# Patient Record
Sex: MALE | Race: BLACK OR AFRICAN AMERICAN | NOT HISPANIC OR LATINO | Employment: UNEMPLOYED | URBAN - METROPOLITAN AREA
[De-identification: names, ages, dates, MRNs, and addresses within clinical notes are randomized per-mention and may not be internally consistent; named-entity substitution may affect disease eponyms.]

---

## 2017-01-03 ENCOUNTER — THERAPY VISIT (OUTPATIENT)
Dept: PHYSICAL THERAPY | Facility: CLINIC | Age: 52
End: 2017-01-03
Payer: COMMERCIAL

## 2017-01-03 DIAGNOSIS — Z98.1 S/P SPINAL FUSION: ICD-10-CM

## 2017-01-03 DIAGNOSIS — Z47.89 AFTERCARE FOLLOWING SURGERY OF THE MUSCULOSKELETAL SYSTEM: Primary | ICD-10-CM

## 2017-01-03 DIAGNOSIS — M47.26 OSTEOARTHRITIS OF SPINE WITH RADICULOPATHY, LUMBAR REGION: ICD-10-CM

## 2017-01-03 PROCEDURE — 97110 THERAPEUTIC EXERCISES: CPT | Mod: GP | Performed by: PHYSICAL THERAPIST

## 2017-01-03 PROCEDURE — 97530 THERAPEUTIC ACTIVITIES: CPT | Mod: GP | Performed by: PHYSICAL THERAPIST

## 2017-01-09 ENCOUNTER — HOSPITAL ENCOUNTER (OUTPATIENT)
Dept: CT IMAGING | Facility: CLINIC | Age: 52
Discharge: HOME | End: 2017-01-09
Attending: PHYSICIAN ASSISTANT | Admitting: PHYSICIAN ASSISTANT
Payer: COMMERCIAL

## 2017-01-09 ENCOUNTER — THERAPY VISIT (OUTPATIENT)
Dept: PHYSICAL THERAPY | Facility: CLINIC | Age: 52
End: 2017-01-09
Payer: COMMERCIAL

## 2017-01-09 DIAGNOSIS — M47.26 OSTEOARTHRITIS OF SPINE WITH RADICULOPATHY, LUMBAR REGION: ICD-10-CM

## 2017-01-09 DIAGNOSIS — Z98.890 POST-OPERATIVE STATE: ICD-10-CM

## 2017-01-09 DIAGNOSIS — Z47.89 AFTERCARE FOLLOWING SURGERY OF THE MUSCULOSKELETAL SYSTEM: Primary | ICD-10-CM

## 2017-01-09 DIAGNOSIS — M54.16 LUMBAR RADICULOPATHY, ACUTE: ICD-10-CM

## 2017-01-09 DIAGNOSIS — Z98.1 S/P SPINAL FUSION: ICD-10-CM

## 2017-01-09 PROCEDURE — 97530 THERAPEUTIC ACTIVITIES: CPT | Mod: GP | Performed by: PHYSICAL THERAPIST

## 2017-01-09 PROCEDURE — 97110 THERAPEUTIC EXERCISES: CPT | Mod: GP | Performed by: PHYSICAL THERAPIST

## 2017-01-09 PROCEDURE — 25000125 ZZHC RX 250: Performed by: PHYSICIAN ASSISTANT

## 2017-01-09 PROCEDURE — 27210258 CT SI JOINT INJECTION

## 2017-01-09 RX ORDER — BUPIVACAINE HYDROCHLORIDE 2.5 MG/ML
10 INJECTION, SOLUTION EPIDURAL; INFILTRATION; INTRACAUDAL ONCE
Status: COMPLETED | OUTPATIENT
Start: 2017-01-09 | End: 2017-01-09

## 2017-01-09 RX ORDER — TRIAMCINOLONE ACETONIDE 40 MG/ML
40 INJECTION, SUSPENSION INTRA-ARTICULAR; INTRAMUSCULAR ONCE
Status: COMPLETED | OUTPATIENT
Start: 2017-01-09 | End: 2017-01-09

## 2017-01-09 RX ADMIN — BUPIVACAINE HYDROCHLORIDE 25 MG: 2.5 INJECTION, SOLUTION EPIDURAL; INFILTRATION; INTRACAUDAL at 13:35

## 2017-01-09 RX ADMIN — Medication 5 ML: at 13:34

## 2017-01-09 RX ADMIN — TRIAMCINOLONE ACETONIDE 40 MG: 40 INJECTION, SUSPENSION INTRA-ARTICULAR; INTRAMUSCULAR at 13:35

## 2017-01-09 NOTE — PROGRESS NOTES
Pt had left SI joint injection. No complications. Pt rated pain at 4 prior to injection and a 4 post injection. Pt was given D/C instructions by RN. Pt ambulated post injection.

## 2017-01-20 ENCOUNTER — THERAPY VISIT (OUTPATIENT)
Dept: PHYSICAL THERAPY | Facility: CLINIC | Age: 52
End: 2017-01-20
Payer: COMMERCIAL

## 2017-01-20 DIAGNOSIS — M47.26 OSTEOARTHRITIS OF SPINE WITH RADICULOPATHY, LUMBAR REGION: ICD-10-CM

## 2017-01-20 DIAGNOSIS — Z47.89 AFTERCARE FOLLOWING SURGERY OF THE MUSCULOSKELETAL SYSTEM: Primary | ICD-10-CM

## 2017-01-20 DIAGNOSIS — Z98.1 S/P SPINAL FUSION: ICD-10-CM

## 2017-01-20 PROCEDURE — 97110 THERAPEUTIC EXERCISES: CPT | Mod: GP | Performed by: PHYSICAL THERAPIST

## 2017-01-20 PROCEDURE — 97530 THERAPEUTIC ACTIVITIES: CPT | Mod: GP | Performed by: PHYSICAL THERAPIST

## 2017-01-23 ENCOUNTER — DOCUMENTATION ONLY (OUTPATIENT)
Dept: OTHER | Facility: CLINIC | Age: 52
End: 2017-01-23

## 2017-01-23 DIAGNOSIS — Z71.89 ACP (ADVANCE CARE PLANNING): Primary | Chronic | ICD-10-CM

## 2017-01-27 ENCOUNTER — THERAPY VISIT (OUTPATIENT)
Dept: PHYSICAL THERAPY | Facility: CLINIC | Age: 52
End: 2017-01-27
Payer: COMMERCIAL

## 2017-01-27 DIAGNOSIS — M47.26 OSTEOARTHRITIS OF SPINE WITH RADICULOPATHY, LUMBAR REGION: ICD-10-CM

## 2017-01-27 DIAGNOSIS — Z98.1 S/P SPINAL FUSION: ICD-10-CM

## 2017-01-27 DIAGNOSIS — Z47.89 AFTERCARE FOLLOWING SURGERY OF THE MUSCULOSKELETAL SYSTEM: Primary | ICD-10-CM

## 2017-01-27 PROCEDURE — 97110 THERAPEUTIC EXERCISES: CPT | Mod: GP | Performed by: PHYSICAL THERAPIST

## 2017-01-27 PROCEDURE — 97112 NEUROMUSCULAR REEDUCATION: CPT | Mod: GP | Performed by: PHYSICAL THERAPIST

## 2017-02-02 ENCOUNTER — THERAPY VISIT (OUTPATIENT)
Dept: PHYSICAL THERAPY | Facility: CLINIC | Age: 52
End: 2017-02-02
Payer: COMMERCIAL

## 2017-02-02 DIAGNOSIS — Z47.89 AFTERCARE FOLLOWING SURGERY OF THE MUSCULOSKELETAL SYSTEM: Primary | ICD-10-CM

## 2017-02-02 DIAGNOSIS — Z98.1 S/P SPINAL FUSION: ICD-10-CM

## 2017-02-02 DIAGNOSIS — M47.26 OSTEOARTHRITIS OF SPINE WITH RADICULOPATHY, LUMBAR REGION: ICD-10-CM

## 2017-02-02 PROCEDURE — 97112 NEUROMUSCULAR REEDUCATION: CPT | Mod: GP | Performed by: PHYSICAL THERAPIST

## 2017-02-02 PROCEDURE — 97110 THERAPEUTIC EXERCISES: CPT | Mod: GP | Performed by: PHYSICAL THERAPIST

## 2017-02-10 ENCOUNTER — OFFICE VISIT (OUTPATIENT)
Dept: NEUROSURGERY | Facility: CLINIC | Age: 52
End: 2017-02-10

## 2017-02-10 VITALS
WEIGHT: 191 LBS | HEIGHT: 70 IN | SYSTOLIC BLOOD PRESSURE: 115 MMHG | BODY MASS INDEX: 27.35 KG/M2 | HEART RATE: 91 BPM | DIASTOLIC BLOOD PRESSURE: 76 MMHG

## 2017-02-10 DIAGNOSIS — Z98.890 POST-OPERATIVE STATE: Primary | ICD-10-CM

## 2017-02-10 DIAGNOSIS — Z98.1 ARTHRODESIS STATUS: ICD-10-CM

## 2017-02-10 NOTE — PROGRESS NOTES
Neurosurgery clinic post op follow up  Date of visit: 2/10/17     Date of Surgery: 11/16/16 by Dr Marshall and Ashanti @[facility].  Procedure: 1.  Lateral lumbar interbody fusion via right-sided approach, L3-L4 and L4-L5.    2.  Placement of interbody PEEK cage with indirect decompression, L3-L4 and L4-L5.    3.  Performed intraoperative neuromonitoring.    4.  Use of bone morphogenic protein.    5.  Bilateral percutaneous segmental pedicle screw fixation, L3-L5.    6.  Revision of right hemilaminectomy, facetectomy and foraminotomy minimally invasive L3-L4    Implants:  1.  NuVasive XLIF System with CoRoent PEEK cages:  L3-L4 equals 12 mm height x 26 mm width x 60 mm length, 10 degrees lordotic; L4-L5 equals 12 mm height x 26 mm width x 16 mm length, 10 degrees lordotic.    2.  Photodigm Longitudinal percutaneous screw system:  L3 equals 6.5 mm x 50 mm on the right and 7.5 mm x 50 mm on the left; L4 equals 6.5 mm x 35 mm on the right and 7.5 mm x 50 mm on the left; L5 equals 7.5 mm x 45 mm bilaterally.    3.  Two 5.5 mm cobalt chrome rods, 70 mm on the right and 80 mm on the left.    4.  Small kit of Infuse rhBMP-2.    5.  Crushed cancellous allograft 30 mL    HPI:   Neema Hunt is a pleasant 51 year old male now 12 weeks status post the above procedure for progressive right-sided radicular pain in his leg.   About 3 years ago he underwent a decompression by Dr. Marshall.  He did well from that operation, but since then has redeveloped his symptoms.  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  For this reason, surgical intervention was offered.   The procedure itself was without incident.  He recovered well and was discharged home on POD 2 in good condition.  Since then his back pain is much better, he's been a bit more active than normal after having some light shoveling activities.   His PT is working on stamina with him.  His weight lift  limit still at 10 pounds.   He's hearing noise in his back when he walks but has no pain.  He had some left SI pain that resolved with an injection, or maybe on it;'s own, in any event he's pain free.  Overall he's making progress.      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  He has light duty offered at work but they rescinded that offer and now want him to for full duty full time when ready..  ACTIVITY:               Has been following activity restrictions.   PERTINENT MEDS:      Pain           Oxycodone 5 mg; 2-3 week per week.  Tylenol 650 mg 3-4 times a day    NSAIDS      none  NICOTINE:               none        Current outpatient prescriptions:      oxyCODONE-acetaminophen (PERCOCET) 5-325 MG per tablet, Take 1-2 tablets by mouth every 6 hours as needed for moderate to severe pain, Disp: 50 tablet, Rfl: 0     lidocaine (LIDODERM) 5 % Patch, Place 1 patch onto the skin every 24 hours, Disp: 30 patch, Rfl: 2     methylPREDNISolone (MEDROL DOSEPAK) 4 MG tablet, Follow package instructions, Disp: 21 tablet, Rfl: 0     gabapentin (NEURONTIN) 300 MG capsule, Take 1 cap (300 mg) in the AM and 3 caps (900mg) at hs, Disp: 240 capsule, Rfl: 1     oxyCODONE (ROXICODONE) 5 MG immediate release tablet, Take 1 tablet (5 mg) by mouth every 3 hours as needed for moderate to severe pain, Disp: 60 tablet, Rfl: 0     diazepam (VALIUM) 5 MG tablet, Take 1 tablet (5 mg) by mouth every 8 hours as needed for muscle spasms, Disp: 60 tablet, Rfl: 0     acetaminophen (TYLENOL) 325 MG tablet, Take 2 tablets (650 mg) by mouth every 4 hours as needed for other (surgical pain), Disp: 100 tablet, Rfl: 0     senna-docusate (SENOKOT-S;PERICOLACE) 8.6-50 MG per tablet, Take 1-2 tablets by mouth 2 times daily, Disp: 100 tablet, Rfl: 0     MELATONIN PO, Take 3 mg by mouth At Bedtime, Disp: , Rfl:      Multiple Vitamins-Minerals (MULTIVITAMIN ADULT PO), Take by mouth daily,  "Disp: , Rfl:      Probiotic Product (SOLUBLE FIBER/PROBIOTICS PO), Take 1 tablet by mouth daily , Disp: , Rfl:      Cyanocobalamin (B-12 PO), Take 500 mg by mouth daily , Disp: , Rfl:      bisacodyl (DULCOLAX) 5 MG EC tablet, Take 5 mg by mouth daily as needed.  , Disp: , Rfl:      AMLODIPINE BESYLATE PO, Take 10 mg by mouth daily., Disp: , Rfl:   No Known Allergies  PMH, SOC HIST, FAM HIST, PROBLEM LIST:  All reviewed in EPIC.    OBJECTIVE:  /76 mmHg  Pulse 91  Ht 1.778 m (5' 10\")  Wt 86.637 kg (191 lb)  BMI 27.41 kg/m2    Imaging:  AP lat L spine XR today  Stable postsurgical changes of combined anterior and  posterior instrumented fusion spanning from L3 through L5. Marked disc  space narrowing at L5-S1.  He has an \"appearance\" of lucency around the upper screws, unchanged over time. No disruption of hardware seen.     Left SI injection 1/9/17 no anesthetic response to injection but had great response after a week or so..    Films reviewed with the patient.    EXAM:  Well developed well nourished male found seated comfortably in exam chair.  No apparent distress. He is unaccompanied.   A&O X3.  Mood and affect WNL. Language and fund of knowledge intact.  Is able to sit and rise independently. Wounds look great.       Assessment:  1. Post-operative state    2. Arthrodesis status      PLAN:  Neema Hunt is doing well after his lumbar decompression and fusion. His surgical pain is improving.  The wounds are healthy. He is progressing as expected. The upper screws looks like there is lucency there but it hasn't changed and  he has no pain at that spot.  No change in plan for that.  We discussed medication.    *  FYI: In general we prescribe narcotics for pain management in the post operative recovery phase generally 2-6 weeks post op, depending on the surgery performed.  Pre-op our patients are advised that by 6 weeks post op we anticipate they will no longer require narcotic.  In some circumstances " we extend the treatment window to up to 12 weeks post-op.  *    Medications prescribed today: None.  From the standpoint of NS he should be off narcotic for his back.  *   Continue to avoid NSAIDs until 3 months post op.  *   PT for work conditioning  We discussed activities and return to work.  *  We recommend he can lift his activity restrictions to allow for work conditioning. No weight lifting limit is assigned.  *  He cannot return to work as yet. Since they are requiring he return full duty, full time we'll have to put him into a work conditioning program .  We'll re-evaluate him probably about a month from now  We discussed follow up   *  All the patient's questions have been answered and they demonstrate good understanding of the above.    *  Return to clinic in 4 weeks with me. Imaging:  None needed for that appointment.  *   The patient has our contact information and is aware that he should call if he has questions comments or concerns.     We appreciate the opportunity to be of service in the care of this pleasant patient.  Please do call if there is anything more we can do    Jennifer Monsivais PA-C  [facility]  Department of Neurosurgery  Phone: [phone]  Fax: [phone]

## 2017-02-10 NOTE — PROGRESS NOTES
Neurosurgery clinic post op follow up  Date of visit: 2/10/17     Date of Surgery: 11/16/16 by Dr Marshall and Ashanti @[facility].  Procedure: 1.  Lateral lumbar interbody fusion via right-sided approach, L3-L4 and L4-L5.    2.  Placement of interbody PEEK cage with indirect decompression, L3-L4 and L4-L5.    3.  Performed intraoperative neuromonitoring.    4.  Use of bone morphogenic protein.    5.  Bilateral percutaneous segmental pedicle screw fixation, L3-L5.    6.  Revision of right hemilaminectomy, facetectomy and foraminotomy minimally invasive L3-L4    Implants:  1.  NuVasive XLIF System with CoRoent PEEK cages:  L3-L4 equals 12 mm height x 26 mm width x 60 mm length, 10 degrees lordotic; L4-L5 equals 12 mm height x 26 mm width x 16 mm length, 10 degrees lordotic.    2.  [employer] Longitudinal percutaneous screw system:  L3 equals 6.5 mm x 50 mm on the right and 7.5 mm x 50 mm on the left; L4 equals 6.5 mm x 35 mm on the right and 7.5 mm x 50 mm on the left; L5 equals 7.5 mm x 45 mm bilaterally.    3.  Two 5.5 mm cobalt chrome rods, 70 mm on the right and 80 mm on the left.    4.  Small kit of Infuse rhBMP-2.    5.  Crushed cancellous allograft 30 mL    HPI:   Neema Hunt is a pleasant 51 year old male now 12 weeks status post the above procedure for progressive right-sided radicular pain in his leg.   About 3 years ago he underwent a decompression by Dr. Marshall.  He did well from that operation, but since then has redeveloped his symptoms.  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  For this reason, surgical intervention was offered.   The procedure itself was without incident.  He recovered well and was discharged home on POD 2 in good condition.  Since then his back pain is much better, he's been a bit more active than normal after having some light shoveling activities.   His PT is working on stamina with him.  But weight limit still at 10 pounds.  He's  hearing noise in the back when he walks but no pain.  Overall he's making progress.      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  He has light duty available at work but they much prefer him him to for full time.  ACTIVITY:               Has been following activity restrictions.   PERTINENT MEDS:      Pain           Oxycodone 5 mg; 2-3 week per week.  Tylenol 650 mg 3-4 times a day    NSAIDS      none  NICOTINE:               none        Current outpatient prescriptions:      oxyCODONE-acetaminophen (PERCOCET) 5-325 MG per tablet, Take 1-2 tablets by mouth every 6 hours as needed for moderate to severe pain, Disp: 50 tablet, Rfl: 0     lidocaine (LIDODERM) 5 % Patch, Place 1 patch onto the skin every 24 hours, Disp: 30 patch, Rfl: 2     methylPREDNISolone (MEDROL DOSEPAK) 4 MG tablet, Follow package instructions, Disp: 21 tablet, Rfl: 0     gabapentin (NEURONTIN) 300 MG capsule, Take 1 cap (300 mg) in the AM and 3 caps (900mg) at hs, Disp: 240 capsule, Rfl: 1     oxyCODONE (ROXICODONE) 5 MG immediate release tablet, Take 1 tablet (5 mg) by mouth every 3 hours as needed for moderate to severe pain, Disp: 60 tablet, Rfl: 0     diazepam (VALIUM) 5 MG tablet, Take 1 tablet (5 mg) by mouth every 8 hours as needed for muscle spasms, Disp: 60 tablet, Rfl: 0     acetaminophen (TYLENOL) 325 MG tablet, Take 2 tablets (650 mg) by mouth every 4 hours as needed for other (surgical pain), Disp: 100 tablet, Rfl: 0     senna-docusate (SENOKOT-S;PERICOLACE) 8.6-50 MG per tablet, Take 1-2 tablets by mouth 2 times daily, Disp: 100 tablet, Rfl: 0     MELATONIN PO, Take 3 mg by mouth At Bedtime, Disp: , Rfl:      Multiple Vitamins-Minerals (MULTIVITAMIN ADULT PO), Take by mouth daily, Disp: , Rfl:      Probiotic Product (SOLUBLE FIBER/PROBIOTICS PO), Take 1 tablet by mouth daily , Disp: , Rfl:      Cyanocobalamin (B-12 PO), Take 500 mg by  "mouth daily , Disp: , Rfl:      bisacodyl (DULCOLAX) 5 MG EC tablet, Take 5 mg by mouth daily as needed.  , Disp: , Rfl:      AMLODIPINE BESYLATE PO, Take 10 mg by mouth daily., Disp: , Rfl:   No Known Allergies  PMH, SOC HIST, FAM HIST, PROBLEM LIST:  All reviewed in EPIC.    OBJECTIVE:  /76 mmHg  Pulse 91  Ht 1.778 m (5' 10\")  Wt 86.637 kg (191 lb)  BMI 27.41 kg/m2    Imaging:  AP lat L spine XR today  Stable postsurgical changes of combined anterior and  posterior instrumented fusion spanning from L3 through L5. Marked disc  space narrowing at L5-S1.  He has an \"appearance\" of lucency around the upper screws, unchanged over time. No disruption of hardware seen.   Left SI injection 1/9/17 no anesthetic response to injection but had great response after a week or so..      Films reviewed with the patient.    EXAM:  Well developed well nourished male found seated comfortably in exam chair.  No apparent distress. He is unaccompanied.   A&O X3.  Mood and affect WNL. Language and fund of knowledge intact.  Is able to sit and rise independently.   Tender over left SI joint and the bilateral TFL.    Assessment:  1. Post-operative state    2. Arthrodesis status      PLAN:  eNema Hunt is doing well after his lumbar decompression and fusion. His surgical pain is improving.  The wounds are healthy. He is mostly progressing as expected. The upper screws look like there is lucency there but it hasn't changed    he has no pain at that spot.  No change in plan for that.  We discussed medication.    *  FYI: In general we prescribe narcotics for pain management in the post operative recovery phase generally 2-6 weeks post op, depending on the surgery performed.  Pre-op our patients are advised that by 6 weeks post op we anticipate they will no longer require narcotic.  In some circumstances we extend the treatment window to up to 12 weeks post-op with the understanding that after 6 weeks they have a choice to " either:  Have us devise a weaning schedule, goal to be no narcotic by the 12 th week post op:  Or we make a referral to pain management or PCP to take over narcotic prescriptions.   For pain outside the scope of these parameters we would refer the patient back to his/her other providers for ongoing narcotic needs.   *  Medications prescribed today:  Percocet,  Lidocaine patches. Medrol dose pack   Percocet was printed today and handed to the patient.  Others were e-prescribed.  *   Continue to avoid NSAIDs until 3 months post op.  *   Steroid injection left SI joint and Lidoderm   *   PT for SI and pelvic stability.  We discussed activities and return to work.  *  We recommend he continue the current activity restrictions until he returns for the next visit..  *  He cannot return to work as yet.  Probably about a month from now, after he's done a bit more PT.  We discussed follow up   *  All the patient's questions have been answered and they demonstrate good understanding of the above.    *  Return to clinic in 6 weeks with me. (That will be 12 weeks post op)  *   The patient has our contact information and is aware that he should call if he has questions comments or concerns.     We appreciate the opportunity to be of service in the care of this pleasant patient.  Please do call if there is anything more we can do    Jennifer Monsivais PA-C  [facility]  Department of Neurosurgery  Phone: [phone]  Fax: [phone]

## 2017-02-10 NOTE — NURSING NOTE
Chief Complaint   Patient presents with     RECHECK     Lateral Lumbar- Xrays prior     Chelly LIAO

## 2017-02-10 NOTE — LETTER
2/10/2017       RE: Neema Hunt  [address]  [facility] [address]     Dear Colleague,    Thank you for referring your patient, Neema Hunt, to the [facility] NEUROSURGERY at [facility]. Please see a copy of my visit note below.      Neurosurgery clinic post op follow up  Date of visit: 2/10/17     Date of Surgery: 11/16/16 by Dr Marshall and Ashanti @[facility].  Procedure: 1.  Lateral lumbar interbody fusion via right-sided approach, L3-L4 and L4-L5.    2.  Placement of interbody PEEK cage with indirect decompression, L3-L4 and L4-L5.    3.  Performed intraoperative neuromonitoring.    4.  Use of bone morphogenic protein.    5.  Bilateral percutaneous segmental pedicle screw fixation, L3-L5.    6.  Revision of right hemilaminectomy, facetectomy and foraminotomy minimally invasive L3-L4    Implants:  1.  NuVasive XLIF System with CoRoent PEEK cages:  L3-L4 equals 12 mm height x 26 mm width x 60 mm length, 10 degrees lordotic; L4-L5 equals 12 mm height x 26 mm width x 16 mm length, 10 degrees lordotic.    2.  Kingsoft[employer]onic Longitudinal percutaneous screw system:  L3 equals 6.5 mm x 50 mm on the right and 7.5 mm x 50 mm on the left; L4 equals 6.5 mm x 35 mm on the right and 7.5 mm x 50 mm on the left; L5 equals 7.5 mm x 45 mm bilaterally.    3.  Two 5.5 mm cobalt chrome rods, 70 mm on the right and 80 mm on the left.    4.  Small kit of Infuse rhBMP-2.    5.  Crushed cancellous allograft 30 mL    HPI:   Neema Hunt is a pleasant 51 year old male now 12 weeks status post the above procedure for progressive right-sided radicular pain in his leg.   About 3 years ago he underwent a decompression by Dr. Marshall.  He did well from that operation, but since then has redeveloped his symptoms.  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  For this reason, surgical intervention was offered.   The procedure itself was without  incident.  He recovered well and was discharged home on POD 2 in good condition.  Since then his back pain is much better, he's been a bit more active than normal after having some light shoveling activities.   His PT is working on stamina with him.  His weight lift  limit still at 10 pounds.  He's hearing noise in his back when he walks but has no pain.  He had some left SI pain that resolved with an injection, or maybe on it;'s own, in any event he's pain free.  Overall he's making progress.      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  He has light duty offered at work but they rescinded that offer and now want him to for full duty full time when ready..  ACTIVITY:               Has been following activity restrictions.   PERTINENT MEDS:      Pain           Oxycodone 5 mg; 2-3 week per week.  Tylenol 650 mg 3-4 times a day    NSAIDS      none  NICOTINE:               none        Current outpatient prescriptions:      oxyCODONE-acetaminophen (PERCOCET) 5-325 MG per tablet, Take 1-2 tablets by mouth every 6 hours as needed for moderate to severe pain, Disp: 50 tablet, Rfl: 0     lidocaine (LIDODERM) 5 % Patch, Place 1 patch onto the skin every 24 hours, Disp: 30 patch, Rfl: 2     methylPREDNISolone (MEDROL DOSEPAK) 4 MG tablet, Follow package instructions, Disp: 21 tablet, Rfl: 0     gabapentin (NEURONTIN) 300 MG capsule, Take 1 cap (300 mg) in the AM and 3 caps (900mg) at hs, Disp: 240 capsule, Rfl: 1     oxyCODONE (ROXICODONE) 5 MG immediate release tablet, Take 1 tablet (5 mg) by mouth every 3 hours as needed for moderate to severe pain, Disp: 60 tablet, Rfl: 0     diazepam (VALIUM) 5 MG tablet, Take 1 tablet (5 mg) by mouth every 8 hours as needed for muscle spasms, Disp: 60 tablet, Rfl: 0     acetaminophen (TYLENOL) 325 MG tablet, Take 2 tablets (650 mg) by mouth every 4 hours as needed for other (surgical pain),  "Disp: 100 tablet, Rfl: 0     senna-docusate (SENOKOT-S;PERICOLACE) 8.6-50 MG per tablet, Take 1-2 tablets by mouth 2 times daily, Disp: 100 tablet, Rfl: 0     MELATONIN PO, Take 3 mg by mouth At Bedtime, Disp: , Rfl:      Multiple Vitamins-Minerals (MULTIVITAMIN ADULT PO), Take by mouth daily, Disp: , Rfl:      Probiotic Product (SOLUBLE FIBER/PROBIOTICS PO), Take 1 tablet by mouth daily , Disp: , Rfl:      Cyanocobalamin (B-12 PO), Take 500 mg by mouth daily , Disp: , Rfl:      bisacodyl (DULCOLAX) 5 MG EC tablet, Take 5 mg by mouth daily as needed.  , Disp: , Rfl:      AMLODIPINE BESYLATE PO, Take 10 mg by mouth daily., Disp: , Rfl:   No Known Allergies  PMH, SOC HIST, FAM HIST, PROBLEM LIST:  All reviewed in EPIC.    OBJECTIVE:  /76 mmHg  Pulse 91  Ht 1.778 m (5' 10\")  Wt 86.637 kg (191 lb)  BMI 27.41 kg/m2    Imaging:  AP lat L spine XR today  Stable postsurgical changes of combined anterior and  posterior instrumented fusion spanning from L3 through L5. Marked disc  space narrowing at L5-S1.  He has an \"appearance\" of lucency around the upper screws, unchanged over time. No disruption of hardware seen.     Left SI injection 1/9/17 no anesthetic response to injection but had great response after a week or so..    Films reviewed with the patient.    EXAM:  Well developed well nourished male found seated comfortably in exam chair.  No apparent distress. He is unaccompanied.   A&O X3.  Mood and affect WNL. Language and fund of knowledge intact.  Is able to sit and rise independently. Wounds look great.       Assessment:  1. Post-operative state    2. Arthrodesis status      PLAN:  Neema Hunt is doing well after his lumbar decompression and fusion. His surgical pain is improving.  The wounds are healthy. He is progressing as expected. The upper screws looks like there is lucency there but it hasn't changed and  he has no pain at that spot.  No change in plan for that.  We discussed medication.    *  " FYI: In general we prescribe narcotics for pain management in the post operative recovery phase generally 2-6 weeks post op, depending on the surgery performed.  Pre-op our patients are advised that by 6 weeks post op we anticipate they will no longer require narcotic.  In some circumstances we extend the treatment window to up to 12 weeks post-op.  *    Medications prescribed today: None.  From the standpoint of NS he should be off narcotic for his back.  *   Continue to avoid NSAIDs until 3 months post op.  *   PT for work conditioning  We discussed activities and return to work.  *  We recommend he can lift his activity restrictions to allow for work conditioning. No weight lifting limit is assigned.  *  He cannot return to work as yet. Since they are requiring he return full duty, full time we'll have to put him into a work conditioning program .  We'll re-evaluate him probably about a month from now  We discussed follow up   *  All the patient's questions have been answered and they demonstrate good understanding of the above.    *  Return to clinic in 4 weeks with me. Imaging:  None needed for that appointment.  *   The patient has our contact information and is aware that he should call if he has questions comments or concerns.     We appreciate the opportunity to be of service in the care of this pleasant patient.  Please do call if there is anything more we can do    Jennifer Monsivais PA-C  [facility]  Department of Neurosurgery  Phone: [phone]  Fax: [phone]            Neurosurgery clinic post op follow up  Date of visit: 2/10/17     Date of Surgery: 11/16/16 by Dr Marshall and Ashanti @[facility].  Procedure: 1.  Lateral lumbar interbody fusion via right-sided approach, L3-L4 and L4-L5.    2.  Placement of interbody PEEK cage with indirect decompression, L3-L4 and L4-L5.    3.  Performed intraoperative neuromonitoring.    4.  Use of bone morphogenic protein.    5.  Bilateral percutaneous  segmental pedicle screw fixation, L3-L5.    6.  Revision of right hemilaminectomy, facetectomy and foraminotomy minimally invasive L3-L4    Implants:  1.  NuVasive XLIF System with CoRoent PEEK cages:  L3-L4 equals 12 mm height x 26 mm width x 60 mm length, 10 degrees lordotic; L4-L5 equals 12 mm height x 26 mm width x 16 mm length, 10 degrees lordotic.    2.  Patent [employer]ar[employer]ronic Longitudinal percutaneous screw system:  L3 equals 6.5 mm x 50 mm on the right and 7.5 mm x 50 mm on the left; L4 equals 6.5 mm x 35 mm on the right and 7.5 mm x 50 mm on the left; L5 equals 7.5 mm x 45 mm bilaterally.    3.  Two 5.5 mm cobalt chrome rods, 70 mm on the right and 80 mm on the left.    4.  Small kit of Infuse rhBMP-2.    5.  Crushed cancellous allograft 30 mL    HPI:   Neema Hunt is a pleasant 51 year old male now 12 weeks status post the above procedure for progressive right-sided radicular pain in his leg.   About 3 years ago he underwent a decompression by Dr. Marshall.  He did well from that operation, but since then has redeveloped his symptoms.  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  For this reason, surgical intervention was offered.   The procedure itself was without incident.  He recovered well and was discharged home on POD 2 in good condition.  Since then his back pain is much better, he's been a bit more active than normal after having some light shoveling activities.   His PT is working on stamina with him.  But weight limit still at 10 pounds.  He's hearing noise in the back when he walks but no pain.  Overall he's making progress.      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  He has light duty available at work but they much prefer him him to for full time.  ACTIVITY:               Has been following activity restrictions.   PERTINENT MEDS:      Pain            "Oxycodone 5 mg; 2-3 week per week.  Tylenol 650 mg 3-4 times a day    NSAIDS      none  NICOTINE:               none        Current outpatient prescriptions:      oxyCODONE-acetaminophen (PERCOCET) 5-325 MG per tablet, Take 1-2 tablets by mouth every 6 hours as needed for moderate to severe pain, Disp: 50 tablet, Rfl: 0     lidocaine (LIDODERM) 5 % Patch, Place 1 patch onto the skin every 24 hours, Disp: 30 patch, Rfl: 2     methylPREDNISolone (MEDROL DOSEPAK) 4 MG tablet, Follow package instructions, Disp: 21 tablet, Rfl: 0     gabapentin (NEURONTIN) 300 MG capsule, Take 1 cap (300 mg) in the AM and 3 caps (900mg) at hs, Disp: 240 capsule, Rfl: 1     oxyCODONE (ROXICODONE) 5 MG immediate release tablet, Take 1 tablet (5 mg) by mouth every 3 hours as needed for moderate to severe pain, Disp: 60 tablet, Rfl: 0     diazepam (VALIUM) 5 MG tablet, Take 1 tablet (5 mg) by mouth every 8 hours as needed for muscle spasms, Disp: 60 tablet, Rfl: 0     acetaminophen (TYLENOL) 325 MG tablet, Take 2 tablets (650 mg) by mouth every 4 hours as needed for other (surgical pain), Disp: 100 tablet, Rfl: 0     senna-docusate (SENOKOT-S;PERICOLACE) 8.6-50 MG per tablet, Take 1-2 tablets by mouth 2 times daily, Disp: 100 tablet, Rfl: 0     MELATONIN PO, Take 3 mg by mouth At Bedtime, Disp: , Rfl:      Multiple Vitamins-Minerals (MULTIVITAMIN ADULT PO), Take by mouth daily, Disp: , Rfl:      Probiotic Product (SOLUBLE FIBER/PROBIOTICS PO), Take 1 tablet by mouth daily , Disp: , Rfl:      Cyanocobalamin (B-12 PO), Take 500 mg by mouth daily , Disp: , Rfl:      bisacodyl (DULCOLAX) 5 MG EC tablet, Take 5 mg by mouth daily as needed.  , Disp: , Rfl:      AMLODIPINE BESYLATE PO, Take 10 mg by mouth daily., Disp: , Rfl:   No Known Allergies  PMH, SOC HIST, FAM HIST, PROBLEM LIST:  All reviewed in EPIC.    OBJECTIVE:  /76 mmHg  Pulse 91  Ht 1.778 m (5' 10\")  Wt 86.637 kg (191 lb)  BMI 27.41 kg/m2    Imaging:  AP lat L spine XR " "today  Stable postsurgical changes of combined anterior and  posterior instrumented fusion spanning from L3 through L5. Marked disc  space narrowing at L5-S1.  He has an \"appearance\" of lucency around the upper screws, unchanged over time. No disruption of hardware seen.   Left SI injection 1/9/17 no anesthetic response to injection but had great response after a week or so..      Films reviewed with the patient.    EXAM:  Well developed well nourished male found seated comfortably in exam chair.  No apparent distress. He is unaccompanied.   A&O X3.  Mood and affect WNL. Language and fund of knowledge intact.  Is able to sit and rise independently.   Tender over left SI joint and the bilateral TFL.    Assessment:  1. Post-operative state    2. Arthrodesis status      PLAN:  Neema Hunt is doing well after his lumbar decompression and fusion. His surgical pain is improving.  The wounds are healthy. He is mostly progressing as expected. The upper screws look like there is lucency there but it hasn't changed    he has no pain at that spot.  No change in plan for that.  We discussed medication.    *  FYI: In general we prescribe narcotics for pain management in the post operative recovery phase generally 2-6 weeks post op, depending on the surgery performed.  Pre-op our patients are advised that by 6 weeks post op we anticipate they will no longer require narcotic.  In some circumstances we extend the treatment window to up to 12 weeks post-op with the understanding that after 6 weeks they have a choice to either:  Have us devise a weaning schedule, goal to be no narcotic by the 12 th week post op:  Or we make a referral to pain management or PCP to take over narcotic prescriptions.   For pain outside the scope of these parameters we would refer the patient back to his/her other providers for ongoing narcotic needs.   *  Medications prescribed today:  Percocet,  Lidocaine patches. Medrol dose pack   Percocet was " printed today and handed to the patient.  Others were e-prescribed.  *   Continue to avoid NSAIDs until 3 months post op.  *   Steroid injection left SI joint and Lidoderm   *   PT for SI and pelvic stability.  We discussed activities and return to work.  *  We recommend he continue the current activity restrictions until he returns for the next visit..  *  He cannot return to work as yet.  Probably about a month from now, after he's done a bit more PT.  We discussed follow up   *  All the patient's questions have been answered and they demonstrate good understanding of the above.    *  Return to clinic in 6 weeks with me. (That will be 12 weeks post op)  *   The patient has our contact information and is aware that he should call if he has questions comments or concerns.     We appreciate the opportunity to be of service in the care of this pleasant patient.  Please do call if there is anything more we can do    Jennifer Monsivais PA-C  [facility]  Department of Neurosurgery  Phone: [phone]  Fax: [phone]

## 2017-02-13 DIAGNOSIS — Z98.890 POST-OPERATIVE STATE: ICD-10-CM

## 2017-02-13 DIAGNOSIS — Z98.1 ARTHRODESIS STATUS: Primary | ICD-10-CM

## 2017-02-17 ENCOUNTER — THERAPY VISIT (OUTPATIENT)
Dept: PHYSICAL THERAPY | Facility: CLINIC | Age: 52
End: 2017-02-17
Payer: COMMERCIAL

## 2017-02-17 DIAGNOSIS — Z47.89 AFTERCARE FOLLOWING SURGERY OF THE MUSCULOSKELETAL SYSTEM: ICD-10-CM

## 2017-02-17 DIAGNOSIS — Z98.1 S/P SPINAL FUSION: ICD-10-CM

## 2017-02-17 DIAGNOSIS — M47.26 OSTEOARTHRITIS OF SPINE WITH RADICULOPATHY, LUMBAR REGION: ICD-10-CM

## 2017-02-17 PROCEDURE — 97110 THERAPEUTIC EXERCISES: CPT | Mod: GP | Performed by: PHYSICAL THERAPIST

## 2017-02-17 PROCEDURE — 97530 THERAPEUTIC ACTIVITIES: CPT | Mod: GP | Performed by: PHYSICAL THERAPIST

## 2017-02-17 NOTE — PROGRESS NOTES
PROGRESS REPORT    Neema has been in therapy from December 13, 2016 to Feb 17, 2017 for treatment of S/p Right Lateral Lumbar interbody fusion, L3-4, 4-5; Posterior percutaneous pedicle screw instrumentation, L3-5; Right decompression L3-4 .    Therapist Impression:  Neema has progressed nicely in therapy.  He demonstrates good hip/core strength and control with his core exercises.  We discussed Jennifer Monsivais's recommendation of work conditioning.  Neema contacted a place that told him they would be able to see him once.  I advised him to follow up with this place to ensure he is being seen for work conditioning which should consist of several sessions over the next several weeks to prepare him for return to working without restrictions.  I provided two numbers of other places to call if he needs to pursue other options.  At this point, he will transition his care to a work conditioning therapist which continuing his general LE/hip/core strengthening program.    Subjective:  Set up for Work Conditioning next week.  Was wrapping a present and felt/heard a pop in his back; then heard some hinging type sounds.  Could reproduce this intermittently but not today.  Denies having any pain from it.  Has some residual pain on R side and hip/low back.  Pushing action brought on symptoms into leg.  Reports being at 80%.  Doing a lot of walking and going for walks.  Notices it after 1 mile.    Objective:  Hip and Knee Strength   MMT Hip Abduction Hip Extension   Left 4+/5 4/5   Right 5-/5 4+/5     Single leg glute bridge hold 60 seconds R and 35 seconds L.    ASSESSMENT/PLAN  Updated problem list and treatment plan: Diagnoses of Aftercare following surgery of the musculoskeletal system, S/P spinal fusion, and Osteoarthritis of spine with radiculopathy, lumbar region were pertinent to this visit. Pain - HEP  Decreased ROM/flexibility - HEP  Decreased strength - HEP  Impaired muscle performance - HEP  STG/LTGs have been met or  progress has been made towards goals:  Yes (See Goal flow sheet completed today.)  Assessment of Progress: The patient's condition is improving.  The patient's condition has potential to improve.  Self Management Plans:  Patient has been instructed in a home treatment program.  Patient is independent in a home treatment program.  Patient  has been instructed in self management of symptoms.  Patient is independent in self management of symptoms.  I have re-evaluated this patient and find that the nature, scope, duration and intensity of the therapy is appropriate for the medical condition of the patient.  Neema continues to require the following intervention to meet STG and LTG's:  PT intervention is no longer required to meet STG/LTG.    Recommendations:  This patient is ready to be discharged from therapy and continue their home treatment program and transition to work conditioining.          Please refer to the daily flowsheet for treatment today, total treatment time and time spent performing 1:1 timed codes.

## 2017-02-17 NOTE — MR AVS SNAPSHOT
After Visit Summary   2/17/2017    Neema Hunt    MRN: [id number]           Patient Information     Date Of Birth          1965        Visit Information        Provider Department      2/17/2017 1:30 PM Osmar Bassett PT [facility] Physical [facility]        Today's Diagnoses     Aftercare following surgery of the musculoskeletal system        S/P spinal fusion        Osteoarthritis of spine with radiculopathy, lumbar region           Follow-ups after your visit        Your next 10 appointments already scheduled     Mar 15, 2017 10:00 AM CDT   (Arrive by 9:45 AM)   Return Visit with Jennifer Monsivais PA-C   [facility] Neurosurgery ([facility])    [address]  3rd Floor  [facility] [address] [phone]              Who to contact     If you have questions or need follow up information about today's clinic visit or your schedule please contact [facility] directly at [phone].  Normal or non-critical lab and imaging results will be communicated to you by Konjekthart, letter or phone within 4 business days after the clinic has received the results. If you do not hear from us within 7 days, please contact the clinic through Cyber-Raint or phone. If you have a critical or abnormal lab result, we will notify you by phone as soon as possible.  Submit refill requests through Skigit or call your pharmacy and they will forward the refill request to us. Please allow 3 business days for your refill to be completed.          Additional Information About Your Visit        MyChart Information     Skigit gives you secure access to your electronic health record. If you see a primary care provider, you can also send messages to your care team and make appointments. If you have questions, please call your primary care clinic.  If you do not have a primary care provider, please call [phone] and they will assist  you.        Care EveryWhere ID     This is your Care EveryWhere ID. This could be used by other organizations to access your [facility] medical records  [id number]         Blood Pressure from Last 3 Encounters:   02/10/17 115/76   12/30/16 120/66   11/30/16 119/78    Weight from Last 3 Encounters:   02/10/17 86.6 kg (191 lb)   12/30/16 86.9 kg (191 lb 8 oz)   11/30/16 86.2 kg (190 lb)              We Performed the Following     THERAPEUTIC ACTIVITIES     THERAPEUTIC EXERCISES        Primary Care Provider Office Phone # Fax #    Cornel Mittal [phone]       [facility] [address]  [place] [address]        Thank you!     Thank you for choosing [facility] PHYSICAL THERAP[facility] [place]  for your care. Our goal is always to provide you with excellent care. Hearing back from our patients is one way we can continue to improve our services. Please take a few minutes to complete the written survey that you may receive in the mail after your visit with us. Thank you!             Your Updated Medication List - Protect others around you: Learn how to safely use, store and throw away your medicines at www.disposemymeds.org.          This list is accurate as of: 2/17/17  3:20 PM.  Always use your most recent med list.                   Brand Name Dispense Instructions for use    acetaminophen 325 MG tablet    TYLENOL    100 tablet    Take 2 tablets (650 mg) by mouth every 4 hours as needed for other (surgical pain)       AMLODIPINE BESYLATE PO      Take 10 mg by mouth daily.       B-12 PO      Take 500 mg by mouth daily       bisacodyl 5 MG EC tablet    DULCOLAX     Take 5 mg by mouth daily as needed.       diazepam 5 MG tablet    VALIUM    60 tablet    Take 1 tablet (5 mg) by mouth every 8 hours as needed for muscle spasms       gabapentin 300 MG capsule    NEURONTIN    240 capsule    Take 1 cap (300 mg) in the AM and 3 caps (900mg) at hs       lidocaine 5 % Patch    LIDODERM     30 patch    Place 1 patch onto the skin every 24 hours       MELATONIN PO      Take 3 mg by mouth At Bedtime       methylPREDNISolone 4 MG tablet    MEDROL DOSEPAK    21 tablet    Follow package instructions       MULTIVITAMIN ADULT PO      Take by mouth daily       oxyCODONE 5 MG IR tablet    ROXICODONE    60 tablet    Take 1 tablet (5 mg) by mouth every 3 hours as needed for moderate to severe pain       oxyCODONE-acetaminophen 5-325 MG per tablet    PERCOCET    50 tablet    Take 1-2 tablets by mouth every 6 hours as needed for moderate to severe pain       senna-docusate 8.6-50 MG per tablet    SENOKOT-S;PERICOLACE    100 tablet    Take 1-2 tablets by mouth 2 times daily       SOLUBLE FIBER/PROBIOTICS PO      Take 1 tablet by mouth daily

## 2017-02-20 ENCOUNTER — CARE COORDINATION (OUTPATIENT)
Dept: NEUROSURGERY | Facility: CLINIC | Age: 52
End: 2017-02-20

## 2017-02-20 DIAGNOSIS — Z98.1 ARTHRODESIS STATUS: Primary | ICD-10-CM

## 2017-02-24 ENCOUNTER — DOCUMENTATION ONLY (OUTPATIENT)
Dept: NEUROSURGERY | Facility: CLINIC | Age: 52
End: 2017-02-24

## 2017-02-24 NOTE — PROGRESS NOTES
Message received from Neema:    I've tried getting into all the possible facilities for work conditioning and no one would coverage unless it's a workman's comp issue.  I've since filed an I-Care to try and turn things around. It's very  frustrating.  I've been trying to go for trwo mile walks and do my PT at home.  The point is that I' have nothing to show for the next visit.    Neema has persistent issues in that he is reporting his insurance will not pay for referrals made.  I have instructed Neema to find out what his insurance will allow and await his response

## 2017-02-28 DIAGNOSIS — Z98.1 ARTHRODESIS STATUS: Primary | ICD-10-CM

## 2017-02-28 NOTE — PROGRESS NOTES
Spoke with Neema on phone.  I am open to seeing him for general strengthening and progression of lifting weights as long as it is ok that it is not a formal work conditioning program and as long as MD ok with this plan.

## 2017-03-03 ENCOUNTER — THERAPY VISIT (OUTPATIENT)
Dept: PHYSICAL THERAPY | Facility: CLINIC | Age: 52
End: 2017-03-03
Payer: COMMERCIAL

## 2017-03-03 DIAGNOSIS — M47.26 OSTEOARTHRITIS OF SPINE WITH RADICULOPATHY, LUMBAR REGION: ICD-10-CM

## 2017-03-03 DIAGNOSIS — Z98.1 S/P SPINAL FUSION: ICD-10-CM

## 2017-03-03 DIAGNOSIS — Z47.89 AFTERCARE FOLLOWING SURGERY OF THE MUSCULOSKELETAL SYSTEM: ICD-10-CM

## 2017-03-03 PROCEDURE — 97110 THERAPEUTIC EXERCISES: CPT | Mod: GP | Performed by: PHYSICAL THERAPIST

## 2017-03-03 PROCEDURE — 97530 THERAPEUTIC ACTIVITIES: CPT | Mod: GP | Performed by: PHYSICAL THERAPIST

## 2017-03-03 NOTE — PROGRESS NOTES
PROGRESS REPORT    Neema has been in therapy from December 13, 2017 to Mar 3, 2017 for treatment of S/p Right Lateral Lumbar interbody fusion, L3-4, 4-5; Posterior percutaneous pedicle screw instrumentation, L3-5; Right decompression L3-4 .      Therapist Impression:   Neema attempted to enroll in work conditioning which he was not able to find a place due to restrictions regarding not having work comp or they only offered a FCE.  Therefore, an order was placed for resuming regular therapy to emphasize strength and work conditioning in an informal matter.  We initiated CKC strengthening with 0 to 10# of weights and will work up to 20# as able.  Would recommend PT for 4-6 weeks to work up to his 20# work requirement.    Subjective:  R sided SI joint pain with walking  Thigh symptoms are improving.    Objective:  Anterior knee translation with squatting.  Good lumbopelvic control, position.    ASSESSMENT/PLAN  Updated problem list and treatment plan: Diagnosis 1:  S/p Right Lateral Lumbar interbody fusion, L3-4, 4-5; Posterior percutaneous pedicle screw instrumentation, L3-5; Right decompression L3-4   Pain -  hot/cold therapy, manual therapy, splint/taping/bracing/orthotics, self management, education and home program  Decreased strength - therapeutic exercise and therapeutic activities  Impaired muscle performance - neuro re-education  STG/LTGs have been met or progress has been made towards goals:  Yes (See Goal flow sheet completed today.)  Assessment of Progress: The patient's condition is improving.  Self Management Plans:  Patient has been instructed in a home treatment program.  Patient  has been instructed in self management of symptoms.  I have re-evaluated this patient and find that the nature, scope, duration and intensity of the therapy is appropriate for the medical condition of the patient.  Neema Hunt continues to require the following intervention to meet STG and LTG's:   PT    Recommendations:  This patient would benefit from continued therapy.     Frequency:  1 X week, once daily  Duration:  for 6 weeks              Please refer to the daily flowsheet for treatment today, total treatment time and time spent performing 1:1 timed codes.

## 2017-03-03 NOTE — MR AVS SNAPSHOT
After Visit Summary   3/3/2017    Neema Hunt    MRN: [id number]           Patient Information     Date Of Birth          1965        Visit Information        Provider Department      3/3/2017 9:00 AM Osmar Bassett, SHE[facility] [place]        Today's Diagnoses     Aftercare following surgery of the musculoskeletal system        S/P spinal fusion        Osteoarthritis of spine with radiculopathy, lumbar region           Follow-ups after your visit        Your next 10 appointments already scheduled     Mar 14, 2017 11:00 AM CDT   MILE Spine with Osmar Bassett PT   [facility] ( [facility])    [address] [place] [address]  [address] [address] [phone]            Mar 15, 2017 10:00 AM CDT   (Arrive by 9:45 AM)   Return Visit with Jennifer Monsivais PA-C   [facility] ([facility])    [address] [address] [phone]            Mar 21, 2017  9:40 AM CDT   MILE Spine with Osmar Bassett PT   [facility] [place] ( [facility])    [address] [place] [address]  [address] [address] [phone]              Who to contact     If you have questions or need follow up information about today's clinic visit or your schedule please contact [facility] directly at [phone].  Normal or non-critical lab and imaging results will be communicated to you by MyChart, letter or phone within 4 business days after the clinic has received the results. If you do not hear from us within 7 days, please contact the clinic through MyChart or phone. If you have a critical or abnormal lab result, we will notify you by phone as soon as possible.  Submit refill requests through [employer] or call your pharmacy and they will forward the refill request to us. Please allow 3  business days for your refill to be completed.          Additional Information About Your Visit        [employer] Information     [employer] gives you secure access to your electronic health record. If you see a primary care provider, you can also send messages to your care team and make appointments. If you have questions, please call your primary care clinic.  If you do not have a primary care provider, please call [phone] and they will assist you.        Care EveryWhere ID     This is your Care EveryWhere ID. This could be used by other organizations to access your [place] medical records  [id number]         Blood Pressure from Last 3 Encounters:   02/10/17 115/76   12/30/16 120/66   11/30/16 119/78    Weight from Last 3 Encounters:   02/10/17 86.6 kg (191 lb)   12/30/16 86.9 kg (191 lb 8 oz)   11/30/16 86.2 kg (190 lb)              We Performed the Following     THERAPEUTIC ACTIVITIES     THERAPEUTIC EXERCISES        Primary Care Provider Office Phone # Fax #    Cornel Mittal [phone]       [facility] [address]  [place] [address]        Thank you!     Thank you for choosing [facility] PHYSICAL THERAPY [place]  for your care. Our goal is always to provide you with excellent care. Hearing back from our patients is one way we can continue to improve our services. Please take a few minutes to complete the written survey that you may receive in the mail after your visit with us. Thank you!             Your Updated Medication List - Protect others around you: Learn how to safely use, store and throw away your medicines at www.disposemymeds.org.          This list is accurate as of: 3/3/17  4:09 PM.  Always use your most recent med list.                   Brand Name Dispense Instructions for use    acetaminophen 325 MG tablet    TYLENOL    100 tablet    Take 2 tablets (650 mg) by mouth every 4 hours as needed for other (surgical pain)       AMLODIPINE  BESYLATE PO      Take 10 mg by mouth daily.       B-12 PO      Take 500 mg by mouth daily       bisacodyl 5 MG EC tablet    DULCOLAX     Take 5 mg by mouth daily as needed.       diazepam 5 MG tablet    VALIUM    60 tablet    Take 1 tablet (5 mg) by mouth every 8 hours as needed for muscle spasms       gabapentin 300 MG capsule    NEURONTIN    240 capsule    Take 1 cap (300 mg) in the AM and 3 caps (900mg) at hs       lidocaine 5 % Patch    LIDODERM    30 patch    Place 1 patch onto the skin every 24 hours       MELATONIN PO      Take 3 mg by mouth At Bedtime       methylPREDNISolone 4 MG tablet    MEDROL DOSEPAK    21 tablet    Follow package instructions       MULTIVITAMIN ADULT PO      Take by mouth daily       oxyCODONE 5 MG IR tablet    ROXICODONE    60 tablet    Take 1 tablet (5 mg) by mouth every 3 hours as needed for moderate to severe pain       oxyCODONE-acetaminophen 5-325 MG per tablet    PERCOCET    50 tablet    Take 1-2 tablets by mouth every 6 hours as needed for moderate to severe pain       senna-docusate 8.6-50 MG per tablet    SENOKOT-S;PERICOLACE    100 tablet    Take 1-2 tablets by mouth 2 times daily       SOLUBLE FIBER/PROBIOTICS PO      Take 1 tablet by mouth daily

## 2017-03-14 ENCOUNTER — THERAPY VISIT (OUTPATIENT)
Dept: PHYSICAL THERAPY | Facility: CLINIC | Age: 52
End: 2017-03-14
Payer: COMMERCIAL

## 2017-03-14 DIAGNOSIS — M47.26 OSTEOARTHRITIS OF SPINE WITH RADICULOPATHY, LUMBAR REGION: ICD-10-CM

## 2017-03-14 DIAGNOSIS — Z98.1 S/P SPINAL FUSION: ICD-10-CM

## 2017-03-14 DIAGNOSIS — Z47.89 AFTERCARE FOLLOWING SURGERY OF THE MUSCULOSKELETAL SYSTEM: ICD-10-CM

## 2017-03-14 PROCEDURE — 97530 THERAPEUTIC ACTIVITIES: CPT | Mod: GP | Performed by: PHYSICAL THERAPIST

## 2017-03-14 PROCEDURE — 97110 THERAPEUTIC EXERCISES: CPT | Mod: GP | Performed by: PHYSICAL THERAPIST

## 2017-03-21 ENCOUNTER — THERAPY VISIT (OUTPATIENT)
Dept: PHYSICAL THERAPY | Facility: CLINIC | Age: 52
End: 2017-03-21
Payer: COMMERCIAL

## 2017-03-21 DIAGNOSIS — Z47.89 AFTERCARE FOLLOWING SURGERY OF THE MUSCULOSKELETAL SYSTEM: ICD-10-CM

## 2017-03-21 DIAGNOSIS — M47.26 OSTEOARTHRITIS OF SPINE WITH RADICULOPATHY, LUMBAR REGION: ICD-10-CM

## 2017-03-21 DIAGNOSIS — Z98.1 S/P SPINAL FUSION: ICD-10-CM

## 2017-03-21 PROCEDURE — 97110 THERAPEUTIC EXERCISES: CPT | Mod: GP | Performed by: PHYSICAL THERAPIST

## 2017-03-30 ENCOUNTER — THERAPY VISIT (OUTPATIENT)
Dept: PHYSICAL THERAPY | Facility: CLINIC | Age: 52
End: 2017-03-30
Payer: COMMERCIAL

## 2017-03-30 DIAGNOSIS — Z98.1 S/P SPINAL FUSION: ICD-10-CM

## 2017-03-30 DIAGNOSIS — Z47.89 AFTERCARE FOLLOWING SURGERY OF THE MUSCULOSKELETAL SYSTEM: ICD-10-CM

## 2017-03-30 DIAGNOSIS — M47.26 OSTEOARTHRITIS OF SPINE WITH RADICULOPATHY, LUMBAR REGION: ICD-10-CM

## 2017-03-30 PROCEDURE — 97530 THERAPEUTIC ACTIVITIES: CPT | Mod: GP | Performed by: PHYSICAL THERAPIST

## 2017-03-30 PROCEDURE — 97110 THERAPEUTIC EXERCISES: CPT | Mod: GP | Performed by: PHYSICAL THERAPIST

## 2017-03-30 NOTE — MR AVS SNAPSHOT
After Visit Summary   3/30/2017    Neema Hunt    MRN: [id number]           Patient Information     Date Of Birth          1965        Visit Information        Provider Department      3/30/2017 9:00 AM Osmar Bassett PT [facility]        Today's Diagnoses     Aftercare following surgery of the musculoskeletal system        S/P spinal fusion        Osteoarthritis of spine with radiculopathy, lumbar region           Follow-ups after your visit        Your next 10 appointments already scheduled     Apr 05, 2017 10:00 AM CDT   (Arrive by 9:45 AM)   Return Visit with Jennifer Monsivais PA-C   [facility] ([facility] [place])    [address] [facility] [address]  3rd Floor  [facility] [address] [phone]              Who to contact     If you have questions or need follow up information about today's clinic visit or your schedule please contact [facility] directly at [phone].  Normal or non-critical lab and imaging results will be communicated to you by [employer], letter or phone within 4 business days after the clinic has received the results. If you do not hear from us within 7 days, please contact the clinic through [email or web address] or phone. If you have a critical or abnormal lab result, we will notify you by phone as soon as possible.  Submit refill requests through [employer] or call your pharmacy and they will forward the refill request to us. Please allow 3 business days for your refill to be completed.          Additional Information About Your Visit        MyChart Information     [employer] gives you secure access to your electronic health record. If you see a primary care provider, you can also send messages to your care team and make appointments. If you have questions, please call your primary care clinic.  If you do not have a primary care provider, please call [phone] and they will assist  you.        Care EveryWhere ID     This is your Care EveryWhere ID. This could be used by other organizations to access your [place] medical records  [id number]         Blood Pressure from Last 3 Encounters:   02/10/17 115/76   12/30/16 120/66   11/30/16 119/78    Weight from Last 3 Encounters:   02/10/17 86.6 kg (191 lb)   12/30/16 86.9 kg (191 lb 8 oz)   11/30/16 86.2 kg (190 lb)              We Performed the Following     THERAPEUTIC ACTIVITIES     THERAPEUTIC EXERCISES        Primary Care Provider Office Phone # Fax #    Cornel Mittal [phone]       [facility] [address]  [place] [address]        Thank you!     Thank you for choosing [facility] PHYSICAL THERAPY [place]  for your care. Our goal is always to provide you with excellent care. Hearing back from our patients is one way we can continue to improve our services. Please take a few minutes to complete the written survey that you may receive in the mail after your visit with us. Thank you!             Your Updated Medication List - Protect others around you: Learn how to safely use, store and throw away your medicines at www.disposemymeds.org.          This list is accurate as of: 3/30/17  9:57 AM.  Always use your most recent med list.                   Brand Name Dispense Instructions for use    acetaminophen 325 MG tablet    TYLENOL    100 tablet    Take 2 tablets (650 mg) by mouth every 4 hours as needed for other (surgical pain)       AMLODIPINE BESYLATE PO      Take 10 mg by mouth daily.       B-12 PO      Take 500 mg by mouth daily       bisacodyl 5 MG EC tablet    DULCOLAX     Take 5 mg by mouth daily as needed.       diazepam 5 MG tablet    VALIUM    60 tablet    Take 1 tablet (5 mg) by mouth every 8 hours as needed for muscle spasms       gabapentin 300 MG capsule    NEURONTIN    240 capsule    Take 1 cap (300 mg) in the AM and 3 caps (900mg) at hs       lidocaine 5 % Patch    LIDODERM     30 patch    Place 1 patch onto the skin every 24 hours       MELATONIN PO      Take 3 mg by mouth At Bedtime       methylPREDNISolone 4 MG tablet    MEDROL DOSEPAK    21 tablet    Follow package instructions       MULTIVITAMIN ADULT PO      Take by mouth daily       oxyCODONE 5 MG IR tablet    ROXICODONE    60 tablet    Take 1 tablet (5 mg) by mouth every 3 hours as needed for moderate to severe pain       oxyCODONE-acetaminophen 5-325 MG per tablet    PERCOCET    50 tablet    Take 1-2 tablets by mouth every 6 hours as needed for moderate to severe pain       senna-docusate 8.6-50 MG per tablet    SENOKOT-S;PERICOLACE    100 tablet    Take 1-2 tablets by mouth 2 times daily       SOLUBLE FIBER/PROBIOTICS PO      Take 1 tablet by mouth daily

## 2017-03-30 NOTE — PROGRESS NOTES
PROGRESS REPORT    Neema has been in therapy from December 13, 2017 to Mar 30, 2017 for treatment of S/p Right Lateral Lumbar interbody fusion, L3-4, 4-5; Posterior percutaneous pedicle screw instrumentation, L3-5; Right decompression L3-4 .      Therapist Impression:   Our focus over the past month has been on general LE/core strengthening and creating a program that challenges both UE/LE as well as core while incorporating lifting of 20# weight to prepare for him to return to work.  Overall, his squatting and lifting mechanics are sound.  His lumbar/core endurance is sound as evident below with our testing today.  At this point, I feel as if Neema is ready to start back to work.  It would be appropriate for him to start 4 hours per day for 1-2 weeks while he continue his strengthening program at home.  Once he returns to work full time without restrictions, he can scale back on the amount of lifting weights and focus more on core/strengthening and upper body strengthening.  He will follow up in therapy as needed.    Subjective:  Doing some of the workout and walknig brought on symptoms but went away after.  Thinks it may be related to leg curls.  Pain is subtle on the R side of back.  Difficulty lifting up L leg when putting on pants.  Reports being at 85-90% overall.    Objective:  Prone Plank Hold: Pose: advanced X/60 Seconds Perceived Exertion   Hold Time 90/60 seconds 2   LSI% 100%      Side Plank Hold: Pose: advanced X/60 Seconds Percent Return Perceived Exertion   Uninvolved Side Down 60/60 seconds 100% 3   Involved Side Down 60/60 seconds 100% 3   LSI% 100%       Single Leg Bridge Reps (Tempo 60 BPM) # of Reps to Failure   Left 15 cramp   Right 30   LSI% 50%         ASSESSMENT/PLAN  Updated problem list and treatment plan: Diagnosis 1:  S/p Right Lateral Lumbar interbody fusion, L3-4, 4-5; Posterior percutaneous pedicle screw instrumentation, L3-5; Right decompression L3-4   Pain -  hot/cold therapy, manual  therapy, splint/taping/bracing/orthotics, self management, education and home program  Decreased strength - therapeutic exercise and therapeutic activities  Impaired muscle performance - neuro re-education  STG/LTGs have been met or progress has been made towards goals:  None  Assessment of Progress: The patient's condition is improving.  The patient's condition has potential to improve.  Self Management Plans:  Patient has been instructed in a home treatment program.  Patient is independent in a home treatment program.  Patient  has been instructed in self management of symptoms.  Patient is independent in self management of symptoms.  I have re-evaluated this patient and find that the nature, scope, duration and intensity of the therapy is appropriate for the medical condition of the patient.  Neema Hunt continues to require the following intervention to meet STG and LTG's:  None    Recommendations:  This patient is ready to be discharged from therapy and continue their home treatment program.          Please refer to the daily flowsheet for treatment today, total treatment time and time spent performing 1:1 timed codes.                  Please refer to the daily flowsheet for treatment today, total treatment time and time spent performing 1:1 timed codes.

## 2017-04-01 ENCOUNTER — TRANSFERRED RECORDS (OUTPATIENT)
Dept: HEALTH INFORMATION MANAGEMENT | Facility: CLINIC | Age: 52
End: 2017-04-01

## 2017-04-04 NOTE — PROGRESS NOTES
Neurosurgery clinic post op follow up  Date of visit: 4/5/17     Date of Surgery: 11/16/16 by Dr Marshall and Ashanti @[facility].  Procedure: 1.  Lateral lumbar interbody fusion via right-sided approach, L3-L4 and L4-L5.    2.  Placement of interbody PEEK cage with indirect decompression, L3-L4 and L4-L5.    3.  Performed intraoperative neuromonitoring.    4.  Use of bone morphogenic protein.    5.  Bilateral percutaneous segmental pedicle screw fixation, L3-L5.    6.  Revision of right hemilaminectomy, facetectomy and foraminotomy minimally invasive L3-L4    Implants:  1.  NuVasive XLIF System with CoRoent PEEK cages:  L3-L4 equals 12 mm height x 26 mm width x 60 mm length, 10 degrees lordotic; L4-L5 equals 12 mm height x 26 mm width x 16 mm length, 10 degrees lordotic.    2.  Space Pencil Longitudinal percutaneous screw system:  L3 equals 6.5 mm x 50 mm on the right and 7.5 mm x 50 mm on the left; L4 equals 6.5 mm x 35 mm on the right and 7.5 mm x 50 mm on the left; L5 equals 7.5 mm x 45 mm bilaterally.    3.  Two 5.5 mm cobalt chrome rods, 70 mm on the right and 80 mm on the left.    4.  Small kit of Infuse rhBMP-2.    5.  Crushed cancellous allograft 30 mL    HPI:   Neema Hunt is a pleasant 51 year old male now 4 months plus status post the above procedure for progressive right-sided radicular pain in his leg.   About 3 years ago he underwent a decompression by Dr. Marshall.  He did well from that operation, but since then has redeveloped his symptoms.  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  For this reason, surgical intervention was offered.   The procedure itself was without incident.  He recovered well and was discharged home on POD 2 in good condition.  Since then his back pain is much better, he's been a bit more active than normal after having some light shoveling activities.   His PT is working on stamina with him.  His weight lift  limit still at 10  pounds.  He's hearing noise in his back when he walks but has no pain.  He had some left SI pain that resolved with an injection, or maybe on it's own, in any event he's pain free.  Overall he's making progress.  He cannot return to work as yet. Since they are requiring he return full duty, full time we'll tried to put him into a work conditioning program .  No program would take him because he was not WC and that's about all they're taking now.  So he went to regular PT and his PT thinks he's ready to go back to work.  He walks up to 4 miles.    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  He has light duty offered at work but they rescinded that offer and now want him to for full duty full time when ready..  ACTIVITY:               Has been following activity restrictions.   PERTINENT MEDS:      Pain          Lidocaine patch. Uses percocet: but rarely Tylenol 650 mg 3-4 times a day    NSAIDS      None but can take them as desired now  NICOTINE:               none        Current Outpatient Prescriptions:      oxyCODONE-acetaminophen (PERCOCET) 5-325 MG per tablet, Take 1-2 tablets by mouth every 6 hours as needed for moderate to severe pain, Disp: 50 tablet, Rfl: 0     lidocaine (LIDODERM) 5 % Patch, Place 1 patch onto the skin every 24 hours, Disp: 30 patch, Rfl: 2     gabapentin (NEURONTIN) 300 MG capsule, Take 1 cap (300 mg) in the AM and 3 caps (900mg) at hs, Disp: 240 capsule, Rfl: 1     oxyCODONE (ROXICODONE) 5 MG immediate release tablet, Take 1 tablet (5 mg) by mouth every 3 hours as needed for moderate to severe pain, Disp: 60 tablet, Rfl: 0     acetaminophen (TYLENOL) 325 MG tablet, Take 2 tablets (650 mg) by mouth every 4 hours as needed for other (surgical pain), Disp: 100 tablet, Rfl: 0     senna-docusate (SENOKOT-S;PERICOLACE) 8.6-50 MG per tablet, Take 1-2 tablets by mouth 2 times daily, Disp: 100 tablet, Rfl:  "0     MELATONIN PO, Take 3 mg by mouth At Bedtime, Disp: , Rfl:      Multiple Vitamins-Minerals (MULTIVITAMIN ADULT PO), Take by mouth daily, Disp: , Rfl:      Probiotic Product (SOLUBLE FIBER/PROBIOTICS PO), Take 1 tablet by mouth daily , Disp: , Rfl:      Cyanocobalamin (B-12 PO), Take 500 mg by mouth daily , Disp: , Rfl:      bisacodyl (DULCOLAX) 5 MG EC tablet, Take 5 mg by mouth daily as needed.  , Disp: , Rfl:      AMLODIPINE BESYLATE PO, Take 10 mg by mouth daily., Disp: , Rfl:   No Known Allergies  PMH, SOC HIST, FAM HIST, PROBLEM LIST:  All reviewed in EPIC.    OBJECTIVE:  /84  Pulse 65  Ht 1.778 m (5' 10\")    Imaging:  AP lat L spine XR last visit  Stable postsurgical changes of combined anterior and  posterior instrumented fusion spanning from L3 through L5. Marked disc  space narrowing at L5-S1.  He has an \"appearance\" of lucency around the upper screws, unchanged over time. No disruption of hardware seen.     Left SI injection 1/9/17 no anesthetic response to injection but had great response after a week or so..    Films reviewed with the patient.    EXAM:  Well developed well nourished male found seated comfortably in exam chair.  No apparent distress. He is unaccompanied.   A&O X3.  Mood and affect WNL. Language and fund of knowledge intact.  Is able to sit and rise independently. Wounds look great.       Assessment:  1. Arthrodesis status    2. Post-operative state    3. S/P spinal fusion      PLAN:  Neema Hunt is doing well after his lumbar decompression and fusion. His surgical pain is improving.  The wounds are healthy. He is progressing as expected. The upper screws looks like there is lucency there but it hasn't changed and  he has no pain at that spot.  No change in plan for that.  We discussed medication.    *  FYI: In general we prescribe narcotics for pain management in the post operative recovery phase generally 2-6 weeks post op, depending on the surgery performed.  Pre-op " our patients are advised that by 6 weeks post op we anticipate they will no longer require narcotic.  In some circumstances we extend the treatment window to up to 12 weeks post-op.  *    Medications prescribed today: Lidocaine. From the standpoint of NS he should be off narcotic for his back. He admits he still takes some but rarely.  *   May resume NSAIDs.  We discussed activities and return to work.  *  We recommend he can return to work. Forms filled out today.    We discussed follow up   *  All the patient's questions have been answered and they demonstrate good understanding of the above.    *  Return to clinic in November with Dr Marshall. Imaging:  CT L spine  *   The patient has our contact information and is aware that he should call if he has questions comments or concerns.     We appreciate the opportunity to be of service in the care of this pleasant patient.  Please do call if there is anything more we can do    Jennifer Monsivais PA-C  [facility]  Department of Neurosurgery  Phone: [phone]  Fax: [phone]

## 2017-04-05 ENCOUNTER — OFFICE VISIT (OUTPATIENT)
Dept: NEUROSURGERY | Facility: CLINIC | Age: 52
End: 2017-04-05

## 2017-04-05 VITALS — DIASTOLIC BLOOD PRESSURE: 84 MMHG | SYSTOLIC BLOOD PRESSURE: 126 MMHG | HEIGHT: 70 IN | HEART RATE: 65 BPM

## 2017-04-05 DIAGNOSIS — M54.16 LUMBAR RADICULOPATHY, ACUTE: ICD-10-CM

## 2017-04-05 DIAGNOSIS — Z98.1 S/P SPINAL FUSION: ICD-10-CM

## 2017-04-05 DIAGNOSIS — Z98.890 POST-OPERATIVE STATE: ICD-10-CM

## 2017-04-05 DIAGNOSIS — Z98.1 ARTHRODESIS STATUS: Primary | ICD-10-CM

## 2017-04-05 RX ORDER — LIDOCAINE 50 MG/G
1 PATCH TOPICAL EVERY 24 HOURS
Qty: 30 PATCH | Refills: 2 | Status: SHIPPED | OUTPATIENT
Start: 2017-04-05

## 2017-04-05 ASSESSMENT — PAIN SCALES - GENERAL: PAINLEVEL: MILD PAIN (2)

## 2017-04-05 NOTE — NURSING NOTE
Chief Complaint   Patient presents with     RECHECK     s/p Right L3-4, 4-5 LLIF; DOS: 11/16/2016; Rolando Price, [facility]

## 2017-04-05 NOTE — LETTER
4/5/2017       RE: Neema Hunt  [address]  [facility] [address]     Dear Colleague,    Thank you for referring your patient, Neema Hunt, to the [facility] NEUROSURGERY at [facility]. Please see a copy of my visit note below.      Neurosurgery clinic post op follow up  Date of visit: 4/5/17     Date of Surgery: 11/16/16 by Dr Marshall and Ashanti @[facility].  Procedure: 1.  Lateral lumbar interbody fusion via right-sided approach, L3-L4 and L4-L5.    2.  Placement of interbody PEEK cage with indirect decompression, L3-L4 and L4-L5.    3.  Performed intraoperative neuromonitoring.    4.  Use of bone morphogenic protein.    5.  Bilateral percutaneous segmental pedicle screw fixation, L3-L5.    6.  Revision of right hemilaminectomy, facetectomy and foraminotomy minimally invasive L3-L4    Implants:  1.  NuVasive XLIF System with CoRoent PEEK cages:  L3-L4 equals 12 mm height x 26 mm width x 60 mm length, 10 degrees lordotic; L4-L5 equals 12 mm height x 26 mm width x 16 mm length, 10 degrees lordotic.    2.  MogiMetronic Longitudinal percutaneous screw system:  L3 equals 6.5 mm x 50 mm on the right and 7.5 mm x 50 mm on the left; L4 equals 6.5 mm x 35 mm on the right and 7.5 mm x 50 mm on the left; L5 equals 7.5 mm x 45 mm bilaterally.    3.  Two 5.5 mm cobalt chrome rods, 70 mm on the right and 80 mm on the left.    4.  Small kit of Infuse rhBMP-2.    5.  Crushed cancellous allograft 30 mL    HPI:   Neema Hunt is a pleasant 51 year old male now 4 months plus status post the above procedure for progressive right-sided radicular pain in his leg.   About 3 years ago he underwent a decompression by Dr. Marshall.  He did well from that operation, but since then has redeveloped his symptoms.  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  For this reason, surgical intervention was offered.   The procedure itself was without  incident.  He recovered well and was discharged home on POD 2 in good condition.  Since then his back pain is much better, he's been a bit more active than normal after having some light shoveling activities.   His PT is working on stamina with him.  His weight lift  limit still at 10 pounds.  He's hearing noise in his back when he walks but has no pain.  He had some left SI pain that resolved with an injection, or maybe on it's own, in any event he's pain free.  Overall he's making progress.  He cannot return to work as yet. Since they are requiring he return full duty, full time we'll tried to put him into a work conditioning program .  No program would take him because he was not WC and that's about all they're taking now.  So he went to regular PT and his PT thinks he's ready to go back to work.  He walks up to 4 miles.    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  He has light duty offered at work but they rescinded that offer and now want him to for full duty full time when ready..  ACTIVITY:               Has been following activity restrictions.   PERTINENT MEDS:      Pain          Lidocaine patch. Uses percocet: but rarely Tylenol 650 mg 3-4 times a day    NSAIDS      None but can take them as desired now  NICOTINE:               none        Current Outpatient Prescriptions:      oxyCODONE-acetaminophen (PERCOCET) 5-325 MG per tablet, Take 1-2 tablets by mouth every 6 hours as needed for moderate to severe pain, Disp: 50 tablet, Rfl: 0     lidocaine (LIDODERM) 5 % Patch, Place 1 patch onto the skin every 24 hours, Disp: 30 patch, Rfl: 2     gabapentin (NEURONTIN) 300 MG capsule, Take 1 cap (300 mg) in the AM and 3 caps (900mg) at hs, Disp: 240 capsule, Rfl: 1     oxyCODONE (ROXICODONE) 5 MG immediate release tablet, Take 1 tablet (5 mg) by mouth every 3 hours as needed for moderate to severe pain, Disp: 60 tablet,  "Rfl: 0     acetaminophen (TYLENOL) 325 MG tablet, Take 2 tablets (650 mg) by mouth every 4 hours as needed for other (surgical pain), Disp: 100 tablet, Rfl: 0     senna-docusate (SENOKOT-S;PERICOLACE) 8.6-50 MG per tablet, Take 1-2 tablets by mouth 2 times daily, Disp: 100 tablet, Rfl: 0     MELATONIN PO, Take 3 mg by mouth At Bedtime, Disp: , Rfl:      Multiple Vitamins-Minerals (MULTIVITAMIN ADULT PO), Take by mouth daily, Disp: , Rfl:      Probiotic Product (SOLUBLE FIBER/PROBIOTICS PO), Take 1 tablet by mouth daily , Disp: , Rfl:      Cyanocobalamin (B-12 PO), Take 500 mg by mouth daily , Disp: , Rfl:      bisacodyl (DULCOLAX) 5 MG EC tablet, Take 5 mg by mouth daily as needed.  , Disp: , Rfl:      AMLODIPINE BESYLATE PO, Take 10 mg by mouth daily., Disp: , Rfl:   No Known Allergies  PMH, SOC HIST, FAM HIST, PROBLEM LIST:  All reviewed in EPIC.    OBJECTIVE:  /84  Pulse 65  Ht 1.778 m (5' 10\")    Imaging:  AP lat L spine XR last visit  Stable postsurgical changes of combined anterior and  posterior instrumented fusion spanning from L3 through L5. Marked disc  space narrowing at L5-S1.  He has an \"appearance\" of lucency around the upper screws, unchanged over time. No disruption of hardware seen.     Left SI injection 1/9/17 no anesthetic response to injection but had great response after a week or so..    Films reviewed with the patient.    EXAM:  Well developed well nourished male found seated comfortably in exam chair.  No apparent distress. He is unaccompanied.   A&O X3.  Mood and affect WNL. Language and fund of knowledge intact.  Is able to sit and rise independently. Wounds look great.       Assessment:  1. Arthrodesis status    2. Post-operative state    3. S/P spinal fusion      PLAN:  Neema Hunt is doing well after his lumbar decompression and fusion. His surgical pain is improving.  The wounds are healthy. He is progressing as expected. The upper screws looks like there is lucency there " but it hasn't changed and  he has no pain at that spot.  No change in plan for that.  We discussed medication.    *  FYI: In general we prescribe narcotics for pain management in the post operative recovery phase generally 2-6 weeks post op, depending on the surgery performed.  Pre-op our patients are advised that by 6 weeks post op we anticipate they will no longer require narcotic.  In some circumstances we extend the treatment window to up to 12 weeks post-op.  *    Medications prescribed today: Lidocaine. From the standpoint of NS he should be off narcotic for his back. He admits he still takes some but rarely.  *   May resume NSAIDs.  We discussed activities and return to work.  *  We recommend he can return to work. Forms filled out today.    We discussed follow up   *  All the patient's questions have been answered and they demonstrate good understanding of the above.    *  Return to clinic in November with Dr Marshall. Imaging:  CT L spine  *   The patient has our contact information and is aware that he should call if he has questions comments or concerns.     We appreciate the opportunity to be of service in the care of this pleasant patient.  Please do call if there is anything more we can do    Jennifer Monsivais PA-C  [facility]  Department of Neurosurgery  Phone: [phone]  Fax: [phone]

## 2017-04-05 NOTE — MR AVS SNAPSHOT
After Visit Summary   4/5/2017    Neema Hunt    MRN: [id number]           Patient Information     Date Of Birth          1965        Visit Information        Provider Department      4/5/2017 10:00 AM Jennifer Monsivais PA-C [facility] Neurosurgery        Today's Diagnoses     Arthrodesis status    -  1    Post-operative state        S/P spinal fusion        Lumbar radiculopathy, acute           Follow-ups after your visit        Your next 10 appointments already scheduled     Nov 06, 2017  8:00 AM CST   (Arrive by 7:45 AM)   CT LUMBAR SPINE W/O CONTRAST with UCCT2   [facility] ([facility])    [address]  [address] [place] [address] [phone]           Please bring any scans or X-rays taken at other hospitals, if similar tests were done. Also bring a list of your medicines, including vitamins, minerals and over-the-counter drugs. It is safest to leave personal items at home.  Be sure to tell your doctor:   If you have any allergies.   If there s any chance you are pregnant.   If you are breastfeeding.   If you have any special needs.  You do not need to do anything special to prepare.  Please wear loose clothing, such as a sweat suit or jogging clothes. Avoid snaps, zippers and other metal. We may ask you to undress and put on a hospital gown.            Nov 06, 2017  8:30 AM CST   (Arrive by 8:15 AM)   Return Visit with Nhan Marshall MD   [facility] ([facility])    [address]  [address] [facility] [address] [phone]              Future tests that were ordered for you today     Open Future Orders        Priority Expected Expires Ordered    CT Lumbar spine without contrast [ITB558] Routine  4/5/2018 4/5/2017            Who to contact     Please call your clinic at [phone] to:    Ask questions about your health    Make or cancel appointments    Discuss your  "medicines    Learn about your test results    Speak to your doctor   If you have compliments or concerns about an experience at your clinic, or if you wish to file a complaint, please contact [facility] Patient Relations at [phone] or email us at [email or web address]         Additional Information About Your Visit        [employer] Information     [employer] gives you secure access to your electronic health record. If you see a primary care provider, you can also send messages to your care team and make appointments. If you have questions, please call your primary care clinic.  If you do not have a primary care provider, please call [phone] and they will assist you.      [employer] is an electronic gateway that provides easy, online access to your medical records. With [employer], you can request a clinic appointment, read your test results, renew a prescription or communicate with your care team.     To access your existing account, please contact your [facility] or call [phone] for assistance.        Care EveryWhere ID     This is your Care EveryWhere ID. This could be used by other organizations to access your [place] medical records  [id number]        Your Vitals Were     Pulse Height                65 1.778 m (5' 10\")           Blood Pressure from Last 3 Encounters:   04/05/17 126/84   02/10/17 115/76   12/30/16 120/66    Weight from Last 3 Encounters:   02/10/17 86.6 kg (191 lb)   12/30/16 86.9 kg (191 lb 8 oz)   11/30/16 86.2 kg (190 lb)                 Where to get your medicines      Some of these will need a paper prescription and others can be bought over the counter.  Ask your nurse if you have questions.     Bring a paper prescription for each of these medications     lidocaine 5 % Patch          Primary Care Provider Office Phone # Fax #    Cornel Mittal [phone]       [facility] [address] [address]        Thank you!     Thank you for choosing [facility] NEUROSURGERY  for your care. Our goal is always to provide you with excellent care. Hearing back from our patients is one way we can continue to improve our services. Please take a few minutes to complete the written survey that you may receive in the mail after your visit with us. Thank you!             Your Updated Medication List - Protect others around you: Learn how to safely use, store and throw away your medicines at www.disposemymeds.org.          This list is accurate as of: 4/5/17 10:44 AM.  Always use your most recent med list.                   Brand Name Dispense Instructions for use    acetaminophen 325 MG tablet    TYLENOL    100 tablet    Take 2 tablets (650 mg) by mouth every 4 hours as needed for other (surgical pain)       AMLODIPINE BESYLATE PO      Take 10 mg by mouth daily.       B-12 PO      Take 500 mg by mouth daily       bisacodyl 5 MG EC tablet    DULCOLAX     Take 5 mg by mouth daily as needed.       gabapentin 300 MG capsule    NEURONTIN    240 capsule    Take 1 cap (300 mg) in the AM and 3 caps (900mg) at hs       lidocaine 5 % Patch    LIDODERM    30 patch    Place 1 patch onto the skin every 24 hours       MELATONIN PO      Take 3 mg by mouth At Bedtime       MULTIVITAMIN ADULT PO      Take by mouth daily       oxyCODONE 5 MG IR tablet    ROXICODONE    60 tablet    Take 1 tablet (5 mg) by mouth every 3 hours as needed for moderate to severe pain       oxyCODONE-acetaminophen 5-325 MG per tablet    PERCOCET    50 tablet    Take 1-2 tablets by mouth every 6 hours as needed for moderate to severe pain       senna-docusate 8.6-50 MG per tablet    SENOKOT-S;PERICOLACE    100 tablet    Take 1-2 tablets by mouth 2 times daily       SOLUBLE FIBER/PROBIOTICS PO      Take 1 tablet by mouth daily

## 2017-06-07 ENCOUNTER — TRANSFERRED RECORDS (OUTPATIENT)
Dept: HEALTH INFORMATION MANAGEMENT | Facility: CLINIC | Age: 52
End: 2017-06-07

## 2017-07-06 ENCOUNTER — TELEPHONE (OUTPATIENT)
Dept: OTHER | Facility: OUTPATIENT CENTER | Age: 52
End: 2017-07-06

## 2017-07-06 NOTE — TELEPHONE ENCOUNTER
[facility] Telephone Intake    Date:  2017  Client Name:  Neema Hunt  Preferred Name: Neema    MRN:  [id number]   :  1965       Age:  52 year old     Presenting Problem / Reason for Assessment   (Clinical History &Symptoms):     Pt received a psycho sexual evaluation and stated that results showed not a threat but therapist encouraged pt to get care at [facility] to address current pending charge.     Suggested Program: [facility]    Seen Other Providers (if so, where):  M.D. :  [facility]  Therapist:  eGra Sheth  Psychiatrist:  no    Medications:    Amlodipine-for hypertension  Aleve -for inflamation    Prescribing Physician:  Dr. At [facility]    Referral Source:  Gera Sheth    Legal:  Court ordered evaluation:    No  Any legal charges pending:  Yes. Incident for an alleged prosition charge, currently pending.  Is this county ordered:   No    Follow Up:    Insurance Benefits to be evaluated.  Note will be entered when validated.    Patient wishes to be contacted regarding Insurance benefits: (n/a) Pt did not have current card on person, entered past insurance, but instructed pt how to verify and instructed to call back or bring card to next appt. )    Please Verify Registration    Please send Welcome Packet and document date sent.

## 2017-07-13 NOTE — TELEPHONE ENCOUNTER
JU OREILLY @ PREF 1 -TIER 1 - NO CO-PAY, $500 IND DEDUCT (MET) 10% CO-INS, W/ AN OOPM $3000 ($836.91MET) AUTH [facility] [phone] - PER BLAKE - NO LONGER FOLLOWING [facility] AUTH REQ. - MH DX REQ. -  ST, M/C COUSEL EXCLUDED. LVM TO [facility] @ [facility].

## 2017-07-17 ENCOUNTER — OFFICE VISIT (OUTPATIENT)
Dept: OTHER | Facility: OUTPATIENT CENTER | Age: 52
End: 2017-07-17

## 2017-07-17 DIAGNOSIS — F43.23 ADJUSTMENT DISORDER WITH MIXED ANXIETY AND DEPRESSED MOOD: Primary | ICD-10-CM

## 2017-07-17 ASSESSMENT — ANXIETY QUESTIONNAIRES
GAD7 TOTAL SCORE: 1
6. BECOMING EASILY ANNOYED OR IRRITABLE: NOT AT ALL
2. NOT BEING ABLE TO STOP OR CONTROL WORRYING: NOT AT ALL
7. FEELING AFRAID AS IF SOMETHING AWFUL MIGHT HAPPEN: NOT AT ALL
4. TROUBLE RELAXING: NOT AT ALL
5. BEING SO RESTLESS THAT IT IS HARD TO SIT STILL: NOT AT ALL
1. FEELING NERVOUS, ANXIOUS, OR ON EDGE: NOT AT ALL
3. WORRYING TOO MUCH ABOUT DIFFERENT THINGS: SEVERAL DAYS
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
## 2017-07-17 NOTE — MR AVS SNAPSHOT
After Visit Summary   7/17/2017    Neema Hunt    MRN: [id number]           Patient Information     Date Of Birth          1965        Visit Information        Provider Department      7/17/2017 10:00 AM Pascual Moore, PhD LP [facility]        Today's Diagnoses     Adjustment disorder with mixed anxiety and depressed mood    -  1       Follow-ups after your visit        Your next 10 appointments already scheduled     Jul 24, 2017  5:00 PM CDT   (Arrive by 4:00 PM)   INDIVIDUAL THERAPY with Pascual Moore, PhD SORAYA   [facility] ([facility])    [address]  [address]  [facility] [address]   [phone]            Nov 06, 2017  8:00 AM CST   (Arrive by 7:45 AM)   CT LUMBAR SPINE W/O CONTRAST with UCCT2   [facility] [place] [facility] ([facility])    [address]  [address] [facility] [address] [phone]           Please bring any scans or X-rays taken at other hospitals, if similar tests were done. Also bring a list of your medicines, including vitamins, minerals and over-the-counter drugs. It is safest to leave personal items at home.  Be sure to tell your doctor:   If you have any allergies.   If there s any chance you are pregnant.   If you are breastfeeding.   If you have any special needs.  You do not need to do anything special to prepare.  Please wear loose clothing, such as a sweat suit or jogging clothes. Avoid snaps, zippers and other metal. We may ask you to undress and put on a hospital gown.            Nov 06, 2017  8:30 AM CST   (Arrive by 8:15 AM)   Return Visit with Nhan Marshall MD   [facility] ([facility] [place])    [address] [place] [address]  [address] [facility] [address] [phone]              Who to contact     Please call your clinic at [phone] to:    Ask questions about your health    Make or cancel appointments    Discuss your  medicines    Learn about your test results    Speak to your doctor   If you have compliments or concerns about an experience at your clinic, or if you wish to file a complaint, please contact [facility] Patient Relations at [phone] or email us at [email or web address]         Additional Information About Your Visit        C[employer] Information     [employer] gives you secure access to your electronic health record. If you see a primary care provider, you can also send messages to your care team and make appointments. If you have questions, please call your primary care clinic.  If you do not have a primary care provider, please call [phone] and they will assist you.      FoxyTunes is an electronic gateway that provides easy, online access to your medical records. With FoxyTunes, you can request a clinic appointment, read your test results, renew a prescription or communicate with your care team.     To access your existing account, please contact your [facility] or call [phone] for assistance.        Care EveryWhere ID     This is your Care EveryWhere ID. This could be used by other organizations to access your [facility] medical records  [id number]         Blood Pressure from Last 3 Encounters:   04/05/17 126/84   02/10/17 115/76   12/30/16 120/66    Weight from Last 3 Encounters:   02/10/17 86.6 kg (191 lb)   12/30/16 86.9 kg (191 lb 8 oz)   11/30/16 86.2 kg (190 lb)              We Performed the Following     Diagnostic Assessment (complete) [33400]        Primary Care Provider Office Phone # Fax #    Cornel Mittal [phone]       [facility] [address]  [place] [address]        Equal Access to Services     GAEL SAUNDERS : Hadii frank Garcia, alfred king. So [facility] [phone].    ATENCIÓN: dunia Carter  yeh disposición servicios gratuitos de asistencia lingüística. Eli spring [phone].    We comply with applicable federal civil rights laws and [place] laws. We do not discriminate on the basis of race, color, national origin, age, disability sex, sexual orientation or gender identity.            Thank you!     Thank you for choosing [facility]  for your care. Our goal is always to provide you with excellent care. Hearing back from our patients is one way we can continue to improve our services. Please take a few minutes to complete the written survey that you may receive in the mail after your visit with us. Thank you!             Your Updated Medication List - Protect others around you: Learn how to safely use, store and throw away your medicines at www.disposemymeds.org.          This list is accurate as of: 7/17/17  3:43 PM.  Always use your most recent med list.                   Brand Name Dispense Instructions for use Diagnosis    acetaminophen 325 MG tablet    TYLENOL    100 tablet    Take 2 tablets (650 mg) by mouth every 4 hours as needed for other (surgical pain)    Lumbar radiculopathy, acute       AMLODIPINE BESYLATE PO      Take 10 mg by mouth daily.        B-12 PO      Take 500 mg by mouth daily        bisacodyl 5 MG EC tablet    DULCOLAX     Take 5 mg by mouth daily as needed.        gabapentin 300 MG capsule    NEURONTIN    240 capsule    Take 1 cap (300 mg) in the AM and 3 caps (900mg) at hs    Arthrodesis status       lidocaine 5 % Patch    LIDODERM    30 patch    Place 1 patch onto the skin every 24 hours    Lumbar radiculopathy, acute, Post-operative state       MELATONIN PO      Take 3 mg by mouth At Bedtime        MULTIVITAMIN ADULT PO      Take by mouth daily        oxyCODONE 5 MG IR tablet    ROXICODONE    60 tablet    Take 1 tablet (5 mg) by mouth every 3 hours as needed for moderate to severe pain    Lumbar radiculopathy, acute       oxyCODONE-acetaminophen 5-325 MG per  tablet    PERCOCET    50 tablet    Take 1-2 tablets by mouth every 6 hours as needed for moderate to severe pain    Post-operative state, Lumbar radiculopathy, acute       senna-docusate 8.6-50 MG per tablet    SENOKOT-S;PERICOLACE    100 tablet    Take 1-2 tablets by mouth 2 times daily    S/P spinal fusion       SOLUBLE FIBER/PROBIOTICS PO      Take 1 tablet by mouth daily

## 2017-07-18 ASSESSMENT — PATIENT HEALTH QUESTIONNAIRE - PHQ9: SUM OF ALL RESPONSES TO PHQ QUESTIONS 1-9: 4

## 2017-07-18 ASSESSMENT — ANXIETY QUESTIONNAIRES: GAD7 TOTAL SCORE: 1

## 2017-07-27 ENCOUNTER — OFFICE VISIT (OUTPATIENT)
Dept: OTHER | Facility: OUTPATIENT CENTER | Age: 52
End: 2017-07-27

## 2017-07-27 DIAGNOSIS — F43.23 ADJUSTMENT DISORDER WITH MIXED ANXIETY AND DEPRESSED MOOD: Primary | ICD-10-CM

## 2017-07-27 DIAGNOSIS — F91.8 CONDUCT DISORDER, UNDIFFERENTIATED TYPE: ICD-10-CM
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
## 2017-07-27 NOTE — MR AVS SNAPSHOT
After Visit Summary   7/27/2017    Neema Hunt    MRN: [id number]           Patient Information     Date Of Birth          1965        Visit Information        Provider Department      7/27/2017 4:00 PM Pascual Moore, PhD  [facility]        Today's Diagnoses     Adjustment disorder with mixed anxiety and depressed mood    -  1    Conduct disorder, undifferentiated type           Follow-ups after your visit        Your next 10 appointments already scheduled     Nov 06, 2017  8:00 AM CST   (Arrive by 7:45 AM)   CT LUMBAR SPINE W/O CONTRAST with UCCT2   [facility] [place] [facility] ([facility] [place])    [address] [address] [phone]           Please bring any scans or X-rays taken at other hospitals, if similar tests were done. Also bring a list of your medicines, including vitamins, minerals and over-the-counter drugs. It is safest to leave personal items at home.  Be sure to tell your doctor:   If you have any allergies.   If there s any chance you are pregnant.   If you are breastfeeding.   If you have any special needs.  You do not need to do anything special to prepare.  Please wear loose clothing, such as a sweat suit or jogging clothes. Avoid snaps, zippers and other metal. We may ask you to undress and put on a hospital gown.            Nov 06, 2017  8:30 AM CST   (Arrive by 8:15 AM)   Return Visit with Nhan Marshall MD   [facility] Neurosurgery ([facility])    [address] [place] [address] [phone]              Who to contact     Please call your clinic at [phone] to:    Ask questions about your health    Make or cancel appointments    Discuss your medicines    Learn about your test results    Speak to your doctor   If you have compliments or concerns about an experience at your clinic, or if you wish to file a complaint, please contact  [facility] Patient Relations at [phone] or email us at [email or web address]         Additional Information About Your Visit        [employer] Information     [employer] gives you secure access to your electronic health record. If you see a primary care provider, you can also send messages to your care team and make appointments. If you have questions, please call your primary care clinic.  If you do not have a primary care provider, please call [phone] and they will assist you.      [employer] is an electronic gateway that provides easy, online access to your medical records. With [employer], you can request a clinic appointment, read your test results, renew a prescription or communicate with your care team.     To access your existing account, please contact your [facility] or call [phone] for assistance.        Care EveryWhere ID     This is your Care EveryWhere ID. This could be used by other organizations to access your [facility] medical records  [id number]         Blood Pressure from Last 3 Encounters:   04/05/17 126/84   02/10/17 115/76   12/30/16 120/66    Weight from Last 3 Encounters:   02/10/17 86.6 kg (191 lb)   12/30/16 86.9 kg (191 lb 8 oz)   11/30/16 86.2 kg (190 lb)              We Performed the Following     Individual Psychotherapy (16-37 min) [[id number]]        Primary Care Provider Office Phone # Fax #    Cornel Mittal [phone]       [facility] [address]  [facility] [address]        Equal Access to Services     GAEL SAUNDERS : Hadii aad ku hadasho Soomaali, waaxda luqadaha, qaybta kaalmada adeegyada, alfred johnson . So [facility] [phone].    ATENCIÓN: Si habla español, tiene a yeh disposición servicios gratuitos de asistencia lingüística. Llame al [phone].    We comply with applicable federal civil rights laws and [place] laws. We do not discriminate on  the basis of race, color, national origin, age, disability sex, sexual orientation or gender identity.            Thank you!     Thank you for choosing [facility]  for your care. Our goal is always to provide you with excellent care. Hearing back from our patients is one way we can continue to improve our services. Please take a few minutes to complete the written survey that you may receive in the mail after your visit with us. Thank you!             Your Updated Medication List - Protect others around you: Learn how to safely use, store and throw away your medicines at www.disposemymeds.org.          This list is accurate as of: 7/27/17  4:30 PM.  Always use your most recent med list.                   Brand Name Dispense Instructions for use Diagnosis    acetaminophen 325 MG tablet    TYLENOL    100 tablet    Take 2 tablets (650 mg) by mouth every 4 hours as needed for other (surgical pain)    Lumbar radiculopathy, acute       AMLODIPINE BESYLATE PO      Take 10 mg by mouth daily.        B-12 PO      Take 500 mg by mouth daily        bisacodyl 5 MG EC tablet    DULCOLAX     Take 5 mg by mouth daily as needed.        gabapentin 300 MG capsule    NEURONTIN    240 capsule    Take 1 cap (300 mg) in the AM and 3 caps (900mg) at hs    Arthrodesis status       lidocaine 5 % Patch    LIDODERM    30 patch    Place 1 patch onto the skin every 24 hours    Lumbar radiculopathy, acute, Post-operative state       MELATONIN PO      Take 3 mg by mouth At Bedtime        MULTIVITAMIN ADULT PO      Take by mouth daily        oxyCODONE 5 MG IR tablet    ROXICODONE    60 tablet    Take 1 tablet (5 mg) by mouth every 3 hours as needed for moderate to severe pain    Lumbar radiculopathy, acute       oxyCODONE-acetaminophen 5-325 MG per tablet    PERCOCET    50 tablet    Take 1-2 tablets by mouth every 6 hours as needed for moderate to severe pain    Post-operative state, Lumbar radiculopathy, acute       senna-docusate  8.6-50 MG per tablet    SENOKOT-S;PERICOLACE    100 tablet    Take 1-2 tablets by mouth 2 times daily    S/P spinal fusion       SOLUBLE FIBER/PROBIOTICS PO      Take 1 tablet by mouth daily

## 2017-11-06 ENCOUNTER — OFFICE VISIT (OUTPATIENT)
Dept: NEUROSURGERY | Facility: CLINIC | Age: 52
End: 2017-11-06

## 2017-11-06 VITALS
BODY MASS INDEX: 28.42 KG/M2 | WEIGHT: 203 LBS | HEART RATE: 63 BPM | HEIGHT: 71 IN | DIASTOLIC BLOOD PRESSURE: 66 MMHG | SYSTOLIC BLOOD PRESSURE: 140 MMHG

## 2017-11-06 DIAGNOSIS — Z98.1 S/P SPINAL FUSION: Primary | ICD-10-CM

## 2017-11-06 ASSESSMENT — PAIN SCALES - GENERAL: PAINLEVEL: NO PAIN (0)

## 2017-11-06 NOTE — MR AVS SNAPSHOT
"              After Visit Summary   11/6/2017    Neema Hunt    MRN: [id number]           Patient Information     Date Of Birth          1965        Visit Information        Provider Department      11/6/2017 8:30 AM Nhan Marshall MD [facility] Neurosurgery        Today's Diagnoses     S/P spinal fusion    -  1       Follow-ups after your visit        Who to contact     Please call your clinic at [phone] to:    Ask questions about your health    Make or cancel appointments    Discuss your medicines    Learn about your test results    Speak to your doctor   If you have compliments or concerns about an experience at your clinic, or if you wish to file a complaint, please contact [facility] Patient Relations at [phone] or email us at [email or web address]         Additional Information About Your Visit        MyChart Information     [employer] gives you secure access to your electronic health record. If you see a primary care provider, you can also send messages to your care team and make appointments. If you have questions, please call your primary care clinic.  If you do not have a primary care provider, please call [phone] and they will assist you.      [employer] is an electronic gateway that provides easy, online access to your medical records. With [employer], you can request a clinic appointment, read your test results, renew a prescription or communicate with your care team.     To access your existing account, please contact your [facility] or call [phone] for assistance.        Care EveryWhere ID     This is your Care EveryWhere ID. This could be used by other organizations to access your [place] medical records  [id number]        Your Vitals Were     Pulse Height BMI (Body Mass Index)             63 1.791 m (5' 10.5\") 28.72 kg/m2          Blood Pressure from Last 3 Encounters:   11/06/17 140/66   04/05/17 126/84 "   02/10/17 115/76    Weight from Last 3 Encounters:   11/06/17 92.1 kg (203 lb)   02/10/17 86.6 kg (191 lb)   12/30/16 86.9 kg (191 lb 8 oz)              Today, you had the following     No orders found for display       Primary Care Provider Office Phone # Fax #    Cornel Mittal [phone] [address]  [facility] [address]        Equal Access to Services     [facility] : Hadii aad ku hadasho Soomaali, waaxda luqadaha, qaybta kaalmada adeegyada, waxay idiin hayaan adeeg kharamika laalhaji . So [facility] [phone].    ATENCIÓN: Si habla español, tiene a yeh disposición servicios gratuitos de asistencia lingüística. [facility] [phone].    We comply with applicable federal civil rights laws and [place] laws. We do not discriminate on the basis of race, color, national origin, age, disability, sex, sexual orientation, or gender identity.            Thank you!     Thank you for choosing [facility] NEUROSURGERY  for your care. Our goal is always to provide you with excellent care. Hearing back from our patients is one way we can continue to improve our services. Please take a few minutes to complete the written survey that you may receive in the mail after your visit with us. Thank you!             Your Updated Medication List - Protect others around you: Learn how to safely use, store and throw away your medicines at www.disposemymeds.org.          This list is accurate as of: 11/6/17 11:59 PM.  Always use your most recent med list.                   Brand Name Dispense Instructions for use Diagnosis    acetaminophen 325 MG tablet    TYLENOL    100 tablet    Take 2 tablets (650 mg) by mouth every 4 hours as needed for other (surgical pain)    Lumbar radiculopathy, acute       AMLODIPINE BESYLATE PO      Take 10 mg by mouth daily.        B-12 PO      Take 500 mg by mouth daily        bisacodyl 5 MG EC tablet    DULCOLAX     Take 5 mg by mouth daily as needed.        gabapentin 300 MG  capsule    NEURONTIN    240 capsule    Take 1 cap (300 mg) in the AM and 3 caps (900mg) at hs    Arthrodesis status       lidocaine 5 % Patch    LIDODERM    30 patch    Place 1 patch onto the skin every 24 hours    Lumbar radiculopathy, acute, Post-operative state       MELATONIN PO      Take 3 mg by mouth At Bedtime        MULTIVITAMIN ADULT PO      Take by mouth daily        oxyCODONE IR 5 MG tablet    ROXICODONE    60 tablet    Take 1 tablet (5 mg) by mouth every 3 hours as needed for moderate to severe pain    Lumbar radiculopathy, acute       oxyCODONE-acetaminophen 5-325 MG per tablet    PERCOCET    50 tablet    Take 1-2 tablets by mouth every 6 hours as needed for moderate to severe pain    Post-operative state, Lumbar radiculopathy, acute       senna-docusate 8.6-50 MG per tablet    SENOKOT-S;PERICOLACE    100 tablet    Take 1-2 tablets by mouth 2 times daily    S/P spinal fusion       SOLUBLE FIBER/PROBIOTICS PO      Take 1 tablet by mouth daily

## 2017-11-06 NOTE — LETTER
11/6/2017       RE: Neema Hunt  [address]  [facility] [address]     Dear Colleague,    Thank you for referring your patient, Neema Hunt, to the [facility] NEUROSURGERY at [facility]. Please see a copy of my visit note below.    It was a pleasure to see Neema Hunt today in Neurosurgery Clinic. he is a 52 year old male who underwent:    DATE OF OPERATION:  11/16/2016       PREOPERATIVE DIAGNOSES:   1.  Advanced lumbar spondylosis with degenerative scoliosis and positive sagittal imbalance.   2.  Right greater than left stenosis, L3-L4 and L4-L5.     3.  Recurrent right leg radicular pain and right-sided low back pain.   4.  Three years status post right foraminotomy, L3-L4.       POSTOPERATIVE DIAGNOSES:   1.  Advanced lumbar spondylosis with degenerative scoliosis and positive sagittal imbalance.   2.  Right greater than left stenosis, L3-L4 and L4-L5.     3.  Recurrent right leg radicular pain and right-sided low back pain.   4.  Three years status post right foraminotomy, L3-L4.       OPERATIONS PERFORMED:   1.  Lateral lumbar interbody fusion via right-sided approach, L3-L4 and L4-L5.   2.  Placement of interbody PEEK cage with indirect decompression, L3-L4 and L4-L5.   3.  Performed intraoperative neuromonitoring.   4.  Use of bone morphogenic protein.   5.  Bilateral percutaneous segmental pedicle screw fixation, L3-L5.   6.  Revision of right hemilaminectomy, facetectomy and foraminotomy minimally invasive L3-L4.   7.  Use of intraoperative neuronavigation.   8.  Use of operating microscope.       ATTENDING SURGEON:  Nhan Marshall MD       CO-SURGEON:  Joel Burrell MD       RESIDENT SURGEON:  Jimenez Pretty MD     Here for 1 year follow up. Doing well and is quite active. Has some intermittent R anterior thigh symptoms, but seem improved from preop. Working with out difficulty most of the time.    Vitals:    11/06/17 0823   BP: 140/66  "  Pulse: 63   Weight: 92.1 kg (203 lb)   Height: 1.791 m (5' 10.5\")     Body mass index is 28.72 kg/(m^2).  No Pain (0)    Incisions well healed.    Strength 5/5 BLE.    CT Lumbar shows no evidence of hardware loosening. Some areas of near fusion through cages and fusion around the cages. The imaging was shown to the patient and reviewed in clinic.     A: S/p L3-5 fusion. Doing well.    P: RTC PRN.    Again, thank you for allowing me to participate in the care of your patient.      Sincerely,    Nhan Marshall MD      "

## 2017-11-06 NOTE — PROGRESS NOTES
"It was a pleasure to see Neema Hunt today in Neurosurgery Clinic. he is a 52 year old male who underwent:    DATE OF OPERATION:  11/16/2016       PREOPERATIVE DIAGNOSES:   1.  Advanced lumbar spondylosis with degenerative scoliosis and positive sagittal imbalance.   2.  Right greater than left stenosis, L3-L4 and L4-L5.     3.  Recurrent right leg radicular pain and right-sided low back pain.   4.  Three years status post right foraminotomy, L3-L4.       POSTOPERATIVE DIAGNOSES:   1.  Advanced lumbar spondylosis with degenerative scoliosis and positive sagittal imbalance.   2.  Right greater than left stenosis, L3-L4 and L4-L5.     3.  Recurrent right leg radicular pain and right-sided low back pain.   4.  Three years status post right foraminotomy, L3-L4.       OPERATIONS PERFORMED:   1.  Lateral lumbar interbody fusion via right-sided approach, L3-L4 and L4-L5.   2.  Placement of interbody PEEK cage with indirect decompression, L3-L4 and L4-L5.   3.  Performed intraoperative neuromonitoring.   4.  Use of bone morphogenic protein.   5.  Bilateral percutaneous segmental pedicle screw fixation, L3-L5.   6.  Revision of right hemilaminectomy, facetectomy and foraminotomy minimally invasive L3-L4.   7.  Use of intraoperative neuronavigation.   8.  Use of operating microscope.       ATTENDING SURGEON:  Nhan Marshall MD       CO-SURGEON:  Joel Burrell MD       RESIDENT SURGEON:  Jimenez Pretty MD     Here for 1 year follow up. Doing well and is quite active. Has some intermittent R anterior thigh symptoms, but seem improved from preop. Working with out difficulty most of the time.    Vitals:    11/06/17 0823   BP: 140/66   Pulse: 63   Weight: 92.1 kg (203 lb)   Height: 1.791 m (5' 10.5\")     Body mass index is 28.72 kg/(m^2).  No Pain (0)    Incisions well healed.    Strength 5/5 BLE.    CT Lumbar shows no evidence of hardware loosening. Some areas of near fusion through cages and fusion around the cages. " The imaging was shown to the patient and reviewed in clinic.     A: S/p L3-5 fusion. Doing well.    P: RTC PRN.

## 2018-01-15 ENCOUNTER — HOSPITAL ENCOUNTER (OUTPATIENT)
Dept: CT IMAGING | Facility: CLINIC | Age: 53
Discharge: HOME | End: 2018-01-15
Attending: RADIOLOGY | Admitting: RADIOLOGY
Payer: COMMERCIAL

## 2018-01-15 DIAGNOSIS — M53.3 SACROILIAC JOINT DYSFUNCTION: ICD-10-CM

## 2018-01-15 PROCEDURE — 27096 INJECT SACROILIAC JOINT: CPT

## 2018-01-15 PROCEDURE — 25000128 H RX IP 250 OP 636: Performed by: RADIOLOGY

## 2018-01-15 PROCEDURE — 25000125 ZZHC RX 250: Performed by: RADIOLOGY

## 2018-01-15 RX ORDER — BUPIVACAINE HYDROCHLORIDE 2.5 MG/ML
10 INJECTION, SOLUTION INFILTRATION; PERINEURAL ONCE
Status: COMPLETED | OUTPATIENT
Start: 2018-01-15 | End: 2018-01-15

## 2018-01-15 RX ORDER — TRIAMCINOLONE ACETONIDE 40 MG/ML
40 INJECTION, SUSPENSION INTRA-ARTICULAR; INTRAMUSCULAR ONCE
Status: COMPLETED | OUTPATIENT
Start: 2018-01-15 | End: 2018-01-15

## 2018-01-15 RX ADMIN — LIDOCAINE HYDROCHLORIDE 5 ML: 10 INJECTION, SOLUTION EPIDURAL; INFILTRATION; INTRACAUDAL; PERINEURAL at 15:46

## 2018-01-15 RX ADMIN — BUPIVACAINE HYDROCHLORIDE 10 MG: 2.5 INJECTION, SOLUTION EPIDURAL; INFILTRATION; INTRACAUDAL at 15:45

## 2018-01-15 RX ADMIN — TRIAMCINOLONE ACETONIDE 40 MG: 40 INJECTION, SUSPENSION INTRA-ARTICULAR; INTRAMUSCULAR at 15:46

## 2018-01-15 NOTE — PROGRESS NOTES
Pt had right side sacro-iliac joint injection in CT by Dr. Pettit.  Pt states pain did decrease post-injection.  Ambulating without difficulty.  Discharge instructions discussed with patient, he verbalizes knowledge.  Discharge to home with a .

## 2019-07-18 ENCOUNTER — TELEPHONE (OUTPATIENT)
Dept: NEUROSURGERY | Facility: CLINIC | Age: 54
End: 2019-07-18

## 2019-07-18 DIAGNOSIS — Z98.1 S/P SPINAL FUSION: Primary | ICD-10-CM

## 2019-07-18 NOTE — TELEPHONE ENCOUNTER
Patient returned call asking for appointment with Dr Marshall for Right sided leg pain, right lower back pain.    DOS: 11/16/16  PREOPERATIVE DIAGNOSES:   1.  Advanced lumbar spondylosis with degenerative scoliosis and positive sagittal imbalance.   2.  Right greater than left stenosis, L3-L4 and L4-L5.     3.  Recurrent right leg radicular pain and right-sided low back pain.   4.  Three years status post right foraminotomy, L3-L4.     LOV 11/6/17     Will send a note to Lucía to schedule with Dr Marshall with CT Lumbar spine.  Note to Jay Care Coordinator to review

## 2019-07-18 NOTE — TELEPHONE ENCOUNTER
Patient left a voice mail on my phone asking for a return call.    Callback to patient, left voice mail on his phone for a return call.

## 2019-07-22 DIAGNOSIS — M54.9 BACK PAIN: Primary | ICD-10-CM

## 2019-07-26 ENCOUNTER — ANCILLARY PROCEDURE (OUTPATIENT)
Dept: CT IMAGING | Facility: CLINIC | Age: 54
End: 2019-07-26
Attending: NEUROLOGICAL SURGERY
Payer: COMMERCIAL

## 2019-07-26 DIAGNOSIS — M54.9 BACK PAIN: ICD-10-CM

## 2019-08-05 ENCOUNTER — PATIENT OUTREACH (OUTPATIENT)
Dept: NEUROSURGERY | Facility: CLINIC | Age: 54
End: 2019-08-05

## 2019-08-05 DIAGNOSIS — M54.16 LUMBAR RADICULOPATHY: ICD-10-CM

## 2019-08-05 DIAGNOSIS — M48.061 SPINAL STENOSIS OF LUMBAR REGION WITHOUT NEUROGENIC CLAUDICATION: Primary | ICD-10-CM

## 2019-08-05 NOTE — PROGRESS NOTES
Patient left a message on my phone mail asking if Dr Marshall had reviewed CT scan completed recently.    Callback to patient, per Dr Marshall patient needs MRI Lumbar spine without contrast then F/U [facility] Dr Marshall in [facility]

## 2019-08-07 NOTE — PROGRESS NOTES
Phone call to patient to pass on Dr Marshall's recommendation.  Will ask scheduling staff to coordinate MRI lumbar spine.  Order entered.

## 2019-08-12 ENCOUNTER — ANCILLARY PROCEDURE (OUTPATIENT)
Dept: MRI IMAGING | Facility: CLINIC | Age: 54
End: 2019-08-12
Attending: NEUROLOGICAL SURGERY
Payer: COMMERCIAL

## 2019-08-12 DIAGNOSIS — M54.16 LUMBAR RADICULOPATHY: ICD-10-CM

## 2019-08-13 ENCOUNTER — OFFICE VISIT (OUTPATIENT)
Dept: SURGERY | Facility: CLINIC | Age: 54
End: 2019-08-13
Attending: NEUROLOGICAL SURGERY
Payer: COMMERCIAL

## 2019-08-13 ENCOUNTER — ANCILLARY PROCEDURE (OUTPATIENT)
Dept: GENERAL RADIOLOGY | Facility: CLINIC | Age: 54
End: 2019-08-13
Attending: NEUROLOGICAL SURGERY
Payer: COMMERCIAL

## 2019-08-13 DIAGNOSIS — M41.56 OTHER SECONDARY SCOLIOSIS, LUMBAR REGION: Primary | ICD-10-CM

## 2019-08-13 DIAGNOSIS — M51.16 HERNIATION OF LUMBAR INTERVERTEBRAL DISC WITH RADICULOPATHY: ICD-10-CM

## 2019-08-13 DIAGNOSIS — M41.56 OTHER SECONDARY SCOLIOSIS, LUMBAR REGION: ICD-10-CM

## 2019-08-13 DIAGNOSIS — Z98.1 S/P LUMBAR FUSION: ICD-10-CM
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
## 2019-08-13 NOTE — LETTER
2019    Neema Hunt  [address] [place] [address]    Todays Date:  2019    Patient was seen in clinic today  yes    Patient:  Neema Hunt  : 1965    Physician/Nurse Practitioner: Nhan Marshall    He may not return to work effective today, 2019.    He is undergoing treatment for a herniated disc and may require surgery.      Nhan Marshall MD

## 2019-08-14 DIAGNOSIS — M54.16 LUMBAR RADICULOPATHY: Primary | ICD-10-CM

## 2019-08-14 NOTE — PROGRESS NOTES
Phone call to patient within info regarding ordered SAW.  Patient prefers [facility] facility closest to home.   Surgery to be scheduled by [facility] schedulers for sometime in September.  Patient preference.   Patient verbalized understanding/agreement with current plan.     Surgery orders sent to surgery schedulers at [facility] via [employer]

## 2019-08-29 ENCOUNTER — TELEPHONE (OUTPATIENT)
Dept: NEUROSURGERY | Facility: CLINIC | Age: 54
End: 2019-08-29

## 2019-08-29 NOTE — PATIENT INSTRUCTIONS
Patient Instructions  Pre-Operative:  -Surgery scheduled at [facility] for  REMOVAL LUMBAR L3-5 INSTRUMENTATION, POSTERIOR SEGMENTAL INSTRUMENTATION L2-4, RIGHT LUMBAR 2-3 DISCECTOMY , TLIF (transforaminal lumbar interbody fusion), REDO DECOMPRESSION RIGHT L3-4, POSTERIOR ARTHRODESIS L2-4   -Surgical risks: blood clots in the leg or lung, problems urinating, nerve damage, drainage from the incision, infection,stiffness  - Pre-operative physical with primary care physician within 30 days of surgical date. Pre op completed with Dr Masood Sauceda [facility] in [place] on 8/27/19.  -2 night hospitalization.   -Shower procedure: please shower with antimicrobial soap the night before surgery and morning of surgery.  Please refer to showering instruction sheet in folder.   -Stop all solid foods 8 hours before surgery.  -Keep drinking clear liquids until 4 hours before surgery. Clear liquids include water, clear juice, black coffee, or clear tea without milk, Gatorade, clear soda.   - Discontinue Aspirin, NSAIDs (Advil/Ibuprofen, Naproxen,Nuprin,Relafen/Nabumetone, Diclofenac,Meloxicam, Aleve, Celebrex) x 7 days prior to surgical date.     - May try tylenol for pain 1000 mg three times per day for pain        Post-Operative:  -Do not begin taking Non-Steroidal Anti-Inflammatory Drugs or NSAIDs (Advil/ibuprofen, Naproxen,Nuprin, Relafen/Nabumetone, Diclofenac,Meloxicam, Aleve, Celebrex, Aspirin, etc.) until 6-12 weeks after surgery. May cause bleeding and interfere with bone healing.   - Post operative incisional pain x 1-2 weeks which will require pain medications and muscle relaxants. You will receive medication upon discharge.  -Do NOT drive while taking narcotic pain medication.  -Do not drink alcohol while using any pain medication.  -Post operative incision care- Watch for signs of infection: redness, swelling, warmth, drainage, and fever of 101 degrees or higher. Notify clinic  [phone].  -No submerging incision in water such as pools, hot tubs,baths for at least 8 weeks or until incision is healed. Showers are fine.   - Post operative activity limitations: 6-8 weeks, no lifting > 10 pounds, no bending, twisting, or overhead reaching.  -Ok to walk as tolerated, avoid bed rest and prolonged sitting.  -No contact sports until after follow up visit  -No high impact activities such as; running/jogging, snowmobile or 4 marte riding or any other recreational vehicles.  -If Dr Marshall feels a brace is needed [facility] will fit you for a brace in the hospital. Lumbar Fusion: wear for 6-8 weeks or as directed by your surgeon.       - Post op follow up appointments: 2 weeks suture/staple removal and 6 week post op follow up visit with x-ray prior to appointment.Please call to schedule follow up appointment at [phone].  -If you are currently employed, you will need to be off work for 4-6 weeks for post op recovery and healing.

## 2019-08-29 NOTE — TELEPHONE ENCOUNTER
Pre-operative Education    Education included but not limited to:  - Pre-operative physical with primary care physician within 30 days of surgical date. Pre op completed on 8/27/19 by Dr Masood Melara at [facility] in [place].   - Pre-operative clearance (cardiology, hematology, etc).   - Discontinue Aspirin, NSAIDs, Diclofenac x 7 days prior to surgical date.   -May try tylenol for pain 1000 mg three times per day for pain  -Do not begin taking Non-Steroidal Anti-Inflammatory Drugs or NSAIDs (Advil,Motrin, Ibuprofen,Nuprin, Diclofenac,Meloxicam, Aleve, Celebrex, Aspirin, etc.) until 12 weeks after surgery if you had a fusion. May cause bleeding and interfere with bone healing.    -Patient is not a smoker  -Forms to be completed: STD/FMA 6 weeks   -Surgical risks: blood clots in the leg or lung, problems urinating, nerve damage, drainage from the incision, infection,stiffness  -Preparation timeline  - When to start NPO           -Special bathing procedure.Patient received Hibiclens and showering instruction sheet.   Hospital stay:    Checking in    The surgery itself     Recovery room    - Transition to the hospital room where you will begin your recovery  - Managing pain   - 2 night hospitalization  - Post operative incisional pain x 1-2 weeks which will require pain medications and muscle relaxants.   -Do NOT drive or drink alcohol while taking narcotic pain medication.  -Post operative incision care-Keep your incision clean and dry at all times. OK to remove dressing on postop day 2. OK to shower on postop day 3 and allow water to run over incision, pat dry after shower. No bathing, swimming or submerging in water. Call immediately or come to ED if any drainage occurs, or you develop new pain. Watch for signs of infection: redness, swelling, warmth, drainage, and fever of 101 degrees or higher. Notify clinic.   - Post operative activity limitations: 6-8 weeks, no lifting > 10 pounds, no bending,  twisting, or overhead reaching.  -Ok to walk as tolerated, avoid bed rest and prolonged sitting.  -No contact sports until after follow up visit  -No high impact activities such as; running/jogging, snowmobile or 4 marte riding or any other recreational vehicles.  -If a brace is required per Dr. Marshall, [facility] will fit you for the brace in the hospital. Brace type and length of time to wear it will be determined by Dr. Marshall.   - Post op follow up appointments: 2 weeks for suture/staple removal and 6 weeks with X-ray prior. Please call to schedule follow up appointment at [phone].   - Spine Education information and printout was also given to the patient for further review. Education was done over the phone . Emailed patient surgery info and instructions on 8/29/19 to [email or web address].    Patient verbalized understanding of above instructions. All questions were answered to the best of my ability and the patient's satisfaction. Patient advised to call with any additional questions or concerns.  Lara Caldera RN

## 2019-09-04 ENCOUNTER — DOCUMENTATION ONLY (OUTPATIENT)
Dept: NEUROSURGERY | Facility: CLINIC | Age: 54
End: 2019-09-04

## 2019-09-04 RX ORDER — AMLODIPINE AND BENAZEPRIL HYDROCHLORIDE 10; 20 MG/1; MG/1
1 CAPSULE ORAL EVERY MORNING
COMMUNITY

## 2019-09-04 NOTE — PROGRESS NOTES
September 4, 2019    [facility] Forms: yes    Faxed: [phone]    Type of form: [place] paperwork completed for [employer].     Placed a copy in the bin and sent the original to medical records

## 2019-09-05 ENCOUNTER — TELEPHONE (OUTPATIENT)
Dept: NEUROSURGERY | Facility: CLINIC | Age: 54
End: 2019-09-05

## 2019-09-05 ENCOUNTER — DOCUMENTATION ONLY (OUTPATIENT)
Dept: OTHER | Facility: CLINIC | Age: 54
End: 2019-09-05

## 2019-09-05 ENCOUNTER — ANESTHESIA (OUTPATIENT)
Dept: SURGERY | Facility: CLINIC | Age: 54
DRG: 454 | End: 2019-09-05
Payer: COMMERCIAL

## 2019-09-05 ENCOUNTER — HOSPITAL ENCOUNTER (INPATIENT)
Facility: CLINIC | Age: 54
LOS: 3 days | Discharge: HOME | DRG: 454 | End: 2019-09-08
Attending: NEUROLOGICAL SURGERY | Admitting: INTERNAL MEDICINE
Payer: COMMERCIAL

## 2019-09-05 ENCOUNTER — APPOINTMENT (OUTPATIENT)
Dept: GENERAL RADIOLOGY | Facility: CLINIC | Age: 54
DRG: 454 | End: 2019-09-05
Attending: NEUROLOGICAL SURGERY
Payer: COMMERCIAL

## 2019-09-05 ENCOUNTER — ANESTHESIA EVENT (OUTPATIENT)
Dept: SURGERY | Facility: CLINIC | Age: 54
DRG: 454 | End: 2019-09-05
Payer: COMMERCIAL

## 2019-09-05 DIAGNOSIS — Z98.1 STATUS POST LUMBAR SPINAL FUSION: Primary | ICD-10-CM

## 2019-09-05 LAB
ABO + RH BLD: NORMAL
ABO + RH BLD: NORMAL
BLD GP AB SCN SERPL QL: NORMAL
BLOOD BANK CMNT PATIENT-IMP: NORMAL
CREAT SERPL-MCNC: 0.94 MG/DL (ref 0.66–1.25)
GFR SERPL CREATININE-BSD FRML MDRD: >90 ML/MIN/{1.73_M2}
POTASSIUM SERPL-SCNC: 3.3 MMOL/L (ref 3.4–5.3)
SPECIMEN EXP DATE BLD: NORMAL

## 2019-09-05 PROCEDURE — 12000000 ZZH R&B MED SURG/OB

## 2019-09-05 PROCEDURE — 25000128 H RX IP 250 OP 636: Performed by: NEUROLOGICAL SURGERY

## 2019-09-05 PROCEDURE — 86891 AUTOLOGOUS BLOOD OP SALVAGE: CPT | Performed by: NEUROLOGICAL SURGERY

## 2019-09-05 PROCEDURE — 37000008 ZZH ANESTHESIA TECHNICAL FEE, 1ST 30 MIN: Performed by: NEUROLOGICAL SURGERY

## 2019-09-05 PROCEDURE — 25800030 ZZH RX IP 258 OP 636: Performed by: NURSE ANESTHETIST, CERTIFIED REGISTERED

## 2019-09-05 PROCEDURE — 86850 RBC ANTIBODY SCREEN: CPT | Performed by: ANESTHESIOLOGY

## 2019-09-05 PROCEDURE — 37000009 ZZH ANESTHESIA TECHNICAL FEE, EACH ADDTL 15 MIN: Performed by: NEUROLOGICAL SURGERY

## 2019-09-05 PROCEDURE — C1713 ANCHOR/SCREW BN/BN,TIS/BN: HCPCS | Performed by: NEUROLOGICAL SURGERY

## 2019-09-05 PROCEDURE — 22842 INSERT SPINE FIXATION DEVICE: CPT | Mod: AS | Performed by: PHYSICIAN ASSISTANT

## 2019-09-05 PROCEDURE — 20936 SP BONE AGRFT LOCAL ADD-ON: CPT | Performed by: NEUROLOGICAL SURGERY

## 2019-09-05 PROCEDURE — 25000566 ZZH SEVOFLURANE, EA 15 MIN: Performed by: NEUROLOGICAL SURGERY

## 2019-09-05 PROCEDURE — 25800030 ZZH RX IP 258 OP 636: Performed by: ANESTHESIOLOGY

## 2019-09-05 PROCEDURE — 0SG00AJ FUSION OF LUMBAR VERTEBRAL JOINT WITH INTERBODY FUSION DEVICE, POSTERIOR APPROACH, ANTERIOR COLUMN, OPEN APPROACH: ICD-10-PCS | Performed by: NEUROLOGICAL SURGERY

## 2019-09-05 PROCEDURE — 25800030 ZZH RX IP 258 OP 636: Performed by: NEUROLOGICAL SURGERY

## 2019-09-05 PROCEDURE — 25000125 ZZHC RX 250: Performed by: NEUROLOGICAL SURGERY

## 2019-09-05 PROCEDURE — 25000128 H RX IP 250 OP 636: Performed by: NURSE ANESTHETIST, CERTIFIED REGISTERED

## 2019-09-05 PROCEDURE — 71000012 ZZH RECOVERY PHASE 1 LEVEL 1 FIRST HR: Performed by: NEUROLOGICAL SURGERY

## 2019-09-05 PROCEDURE — 27211223 ZZ H OR BOWL PROCESSING SET 125ML FOR ELITE MACHINE CSE-P-125: Performed by: NEUROLOGICAL SURGERY

## 2019-09-05 PROCEDURE — 0QP004Z REMOVAL OF INTERNAL FIXATION DEVICE FROM LUMBAR VERTEBRA, OPEN APPROACH: ICD-10-PCS | Performed by: NEUROLOGICAL SURGERY

## 2019-09-05 PROCEDURE — 36415 COLL VENOUS BLD VENIPUNCTURE: CPT | Performed by: ANESTHESIOLOGY

## 2019-09-05 PROCEDURE — 22842 INSERT SPINE FIXATION DEVICE: CPT | Performed by: NEUROLOGICAL SURGERY

## 2019-09-05 PROCEDURE — 40000169 ZZH STATISTIC PRE-PROCEDURE ASSESSMENT I: Performed by: NEUROLOGICAL SURGERY

## 2019-09-05 PROCEDURE — 61783 SCAN PROC SPINAL: CPT | Performed by: NEUROLOGICAL SURGERY

## 2019-09-05 PROCEDURE — 71000013 ZZH RECOVERY PHASE 1 LEVEL 1 EA ADDTL HR: Performed by: NEUROLOGICAL SURGERY

## 2019-09-05 PROCEDURE — 0SB20ZZ EXCISION OF LUMBAR VERTEBRAL DISC, OPEN APPROACH: ICD-10-PCS | Performed by: NEUROLOGICAL SURGERY

## 2019-09-05 PROCEDURE — 22853 INSJ BIOMECHANICAL DEVICE: CPT | Mod: AS | Performed by: PHYSICIAN ASSISTANT

## 2019-09-05 PROCEDURE — 25000125 ZZHC RX 250: Performed by: NURSE ANESTHETIST, CERTIFIED REGISTERED

## 2019-09-05 PROCEDURE — 25000128 H RX IP 250 OP 636: Performed by: PHYSICIAN ASSISTANT

## 2019-09-05 PROCEDURE — 22853 INSJ BIOMECHANICAL DEVICE: CPT | Performed by: NEUROLOGICAL SURGERY

## 2019-09-05 PROCEDURE — 25000128 H RX IP 250 OP 636: Performed by: ANESTHESIOLOGY

## 2019-09-05 PROCEDURE — 86900 BLOOD TYPING SEROLOGIC ABO: CPT | Performed by: ANESTHESIOLOGY

## 2019-09-05 PROCEDURE — 36000075 ZZH SURGERY LEVEL 6 EA 15 ADDTL MIN: Performed by: NEUROLOGICAL SURGERY

## 2019-09-05 PROCEDURE — P9041 ALBUMIN (HUMAN),5%, 50ML: HCPCS | Performed by: NURSE ANESTHETIST, CERTIFIED REGISTERED

## 2019-09-05 PROCEDURE — C1762 CONN TISS, HUMAN(INC FASCIA): HCPCS | Performed by: NEUROLOGICAL SURGERY

## 2019-09-05 PROCEDURE — 82565 ASSAY OF CREATININE: CPT | Performed by: ANESTHESIOLOGY

## 2019-09-05 PROCEDURE — 36000077 ZZH SURGERY LEVEL 6 W FLUORO 1ST 30 MIN: Performed by: NEUROLOGICAL SURGERY

## 2019-09-05 PROCEDURE — 86901 BLOOD TYPING SEROLOGIC RH(D): CPT | Performed by: ANESTHESIOLOGY

## 2019-09-05 PROCEDURE — 22633 ARTHRD CMBN 1NTRSPC LUMBAR: CPT | Performed by: NEUROLOGICAL SURGERY

## 2019-09-05 PROCEDURE — 27211219 ZZ H OR RESEVOIR 3L 150 MICRON FILTER CELLSAVER HARDSHELL 00205-00: Performed by: NEUROLOGICAL SURGERY

## 2019-09-05 PROCEDURE — 22633 ARTHRD CMBN 1NTRSPC LUMBAR: CPT | Mod: AS | Performed by: PHYSICIAN ASSISTANT

## 2019-09-05 PROCEDURE — 27210794 ZZH OR GENERAL SUPPLY STERILE: Performed by: NEUROLOGICAL SURGERY

## 2019-09-05 PROCEDURE — 25000132 ZZH RX MED GY IP 250 OP 250 PS 637: Performed by: PHYSICIAN ASSISTANT

## 2019-09-05 PROCEDURE — 40000277 XR SURGERY CARM FLUORO LESS THAN 5 MIN W STILLS

## 2019-09-05 PROCEDURE — 84132 ASSAY OF SERUM POTASSIUM: CPT | Performed by: ANESTHESIOLOGY

## 2019-09-05 PROCEDURE — 8E0WXBF COMPUTER ASSISTED PROCEDURE OF TRUNK REGION, WITH FLUOROSCOPY: ICD-10-PCS | Performed by: NEUROLOGICAL SURGERY

## 2019-09-05 PROCEDURE — 0SG1071 FUSION OF 2 OR MORE LUMBAR VERTEBRAL JOINTS WITH AUTOLOGOUS TISSUE SUBSTITUTE, POSTERIOR APPROACH, POSTERIOR COLUMN, OPEN APPROACH: ICD-10-PCS | Performed by: NEUROLOGICAL SURGERY

## 2019-09-05 PROCEDURE — 25800030 ZZH RX IP 258 OP 636: Performed by: PHYSICIAN ASSISTANT

## 2019-09-05 PROCEDURE — 27211220 ZZ H OR ASSEMBLY BASIC A&A LINE 00208-00: Performed by: NEUROLOGICAL SURGERY

## 2019-09-05 PROCEDURE — 27211215 ZZ H OR FILTER BLOOD TRANS 40 MICRON SQ40S: Performed by: NEUROLOGICAL SURGERY

## 2019-09-05 PROCEDURE — 20930 SP BONE ALGRFT MORSEL ADD-ON: CPT | Performed by: NEUROLOGICAL SURGERY

## 2019-09-05 DEVICE — IMP SCR MEDT 5.5/6.0MM SOLERA 6.5X45MM MA 55840006545
Type: IMPLANTABLE DEVICE | Site: SPINE LUMBAR | Status: NON-FUNCTIONAL
Removed: 2023-12-21

## 2019-09-05 DEVICE — GRAFT BONE CRUSH CANC 30ML 400080: Type: IMPLANTABLE DEVICE | Site: SPINE LUMBAR | Status: FUNCTIONAL

## 2019-09-05 DEVICE — IMP ROD MEDT SOLERA CVD 5.5X80MM CHR 1555501080
Type: IMPLANTABLE DEVICE | Site: SPINE LUMBAR | Status: NON-FUNCTIONAL
Removed: 2021-05-27

## 2019-09-05 DEVICE — IMP SCR MEDT 5.5/6.0MM SOLERA 7.5X45MM MA 55840007545
Type: IMPLANTABLE DEVICE | Site: SPINE LUMBAR | Status: NON-FUNCTIONAL
Removed: 2023-12-21

## 2019-09-05 DEVICE — IMP SCR MEDT 5.5/6.0MM SOLERA 7.5X50MM MA 55840007550
Type: IMPLANTABLE DEVICE | Site: SPINE LUMBAR | Status: FUNCTIONAL
Removed: 2023-12-21

## 2019-09-05 DEVICE — IMP SCR SET MEDT SOLERA BREAK OFF 5.5MM TI 5540030
Type: IMPLANTABLE DEVICE | Site: SPINE LUMBAR | Status: NON-FUNCTIONAL
Removed: 2021-05-27

## 2019-09-05 DEVICE — IMP SCR MEDT 5.5/6.0MM SOLERA 6.5X50MM MA 55840006550
Type: IMPLANTABLE DEVICE | Site: SPINE LUMBAR | Status: NON-FUNCTIONAL
Removed: 2023-12-21

## 2019-09-05 RX ORDER — NALOXONE HYDROCHLORIDE 0.4 MG/ML
.1-.4 INJECTION, SOLUTION INTRAMUSCULAR; INTRAVENOUS; SUBCUTANEOUS
Status: DISCONTINUED | OUTPATIENT
Start: 2019-09-05 | End: 2019-09-08 | Stop reason: HOSPADM

## 2019-09-05 RX ORDER — FENTANYL CITRATE 50 UG/ML
25-50 INJECTION, SOLUTION INTRAMUSCULAR; INTRAVENOUS
Status: DISCONTINUED | OUTPATIENT
Start: 2019-09-05 | End: 2019-09-05 | Stop reason: HOSPADM

## 2019-09-05 RX ORDER — SODIUM CHLORIDE 9 MG/ML
INJECTION, SOLUTION INTRAVENOUS CONTINUOUS PRN
Status: DISCONTINUED | OUTPATIENT
Start: 2019-09-05 | End: 2019-09-05

## 2019-09-05 RX ORDER — SODIUM CHLORIDE, SODIUM LACTATE, POTASSIUM CHLORIDE, CALCIUM CHLORIDE 600; 310; 30; 20 MG/100ML; MG/100ML; MG/100ML; MG/100ML
INJECTION, SOLUTION INTRAVENOUS CONTINUOUS
Status: DISCONTINUED | OUTPATIENT
Start: 2019-09-05 | End: 2019-09-05 | Stop reason: HOSPADM

## 2019-09-05 RX ORDER — LISINOPRIL 20 MG/1
20 TABLET ORAL DAILY
Status: DISCONTINUED | OUTPATIENT
Start: 2019-09-06 | End: 2019-09-08 | Stop reason: HOSPADM

## 2019-09-05 RX ORDER — AMLODIPINE BESYLATE 10 MG/1
10 TABLET ORAL DAILY
Status: DISCONTINUED | OUTPATIENT
Start: 2019-09-06 | End: 2019-09-08 | Stop reason: HOSPADM

## 2019-09-05 RX ORDER — PROPOFOL 10 MG/ML
INJECTION, EMULSION INTRAVENOUS PRN
Status: DISCONTINUED | OUTPATIENT
Start: 2019-09-05 | End: 2019-09-05

## 2019-09-05 RX ORDER — ALBUMIN, HUMAN INJ 5% 5 %
SOLUTION INTRAVENOUS CONTINUOUS PRN
Status: DISCONTINUED | OUTPATIENT
Start: 2019-09-05 | End: 2019-09-05

## 2019-09-05 RX ORDER — HYDROCORTISONE 25 MG/G
OINTMENT TOPICAL DAILY PRN
COMMUNITY
End: 2023-09-18

## 2019-09-05 RX ORDER — VECURONIUM BROMIDE 1 MG/ML
INJECTION, POWDER, LYOPHILIZED, FOR SOLUTION INTRAVENOUS PRN
Status: DISCONTINUED | OUTPATIENT
Start: 2019-09-05 | End: 2019-09-05

## 2019-09-05 RX ORDER — NALOXONE HYDROCHLORIDE 0.4 MG/ML
.1-.4 INJECTION, SOLUTION INTRAMUSCULAR; INTRAVENOUS; SUBCUTANEOUS
Status: DISCONTINUED | OUTPATIENT
Start: 2019-09-05 | End: 2019-09-05

## 2019-09-05 RX ORDER — CEFAZOLIN SODIUM 1 G/3ML
1 INJECTION, POWDER, FOR SOLUTION INTRAMUSCULAR; INTRAVENOUS SEE ADMIN INSTRUCTIONS
Status: DISCONTINUED | OUTPATIENT
Start: 2019-09-05 | End: 2019-09-05

## 2019-09-05 RX ORDER — LIDOCAINE 40 MG/G
CREAM TOPICAL
Status: DISCONTINUED | OUTPATIENT
Start: 2019-09-05 | End: 2019-09-08 | Stop reason: HOSPADM

## 2019-09-05 RX ORDER — AMOXICILLIN 250 MG
2 CAPSULE ORAL 2 TIMES DAILY
Status: DISCONTINUED | OUTPATIENT
Start: 2019-09-05 | End: 2019-09-08 | Stop reason: HOSPADM

## 2019-09-05 RX ORDER — ACETAMINOPHEN 325 MG/1
975 TABLET ORAL ONCE
Status: COMPLETED | OUTPATIENT
Start: 2019-09-05 | End: 2019-09-05

## 2019-09-05 RX ORDER — ONDANSETRON 4 MG/1
4 TABLET, ORALLY DISINTEGRATING ORAL EVERY 30 MIN PRN
Status: DISCONTINUED | OUTPATIENT
Start: 2019-09-05 | End: 2019-09-05 | Stop reason: HOSPADM

## 2019-09-05 RX ORDER — AMOXICILLIN 250 MG
1 CAPSULE ORAL 2 TIMES DAILY
Status: DISCONTINUED | OUTPATIENT
Start: 2019-09-05 | End: 2019-09-08 | Stop reason: HOSPADM

## 2019-09-05 RX ORDER — METHOCARBAMOL 750 MG/1
750 TABLET, FILM COATED ORAL EVERY 6 HOURS PRN
Status: DISCONTINUED | OUTPATIENT
Start: 2019-09-05 | End: 2019-09-08 | Stop reason: HOSPADM

## 2019-09-05 RX ORDER — METOCLOPRAMIDE HYDROCHLORIDE 5 MG/ML
10 INJECTION INTRAMUSCULAR; INTRAVENOUS EVERY 6 HOURS PRN
Status: DISCONTINUED | OUTPATIENT
Start: 2019-09-05 | End: 2019-09-08 | Stop reason: HOSPADM

## 2019-09-05 RX ORDER — METOCLOPRAMIDE 10 MG/1
10 TABLET ORAL EVERY 6 HOURS PRN
Status: DISCONTINUED | OUTPATIENT
Start: 2019-09-05 | End: 2019-09-08 | Stop reason: HOSPADM

## 2019-09-05 RX ORDER — AMOXICILLIN 250 MG
1 CAPSULE ORAL DAILY
COMMUNITY
End: 2021-02-05

## 2019-09-05 RX ORDER — VANCOMYCIN HYDROCHLORIDE 1 G/20ML
INJECTION, POWDER, LYOPHILIZED, FOR SOLUTION INTRAVENOUS PRN
Status: DISCONTINUED | OUTPATIENT
Start: 2019-09-05 | End: 2019-09-05 | Stop reason: HOSPADM

## 2019-09-05 RX ORDER — ACETAMINOPHEN 325 MG/1
975 TABLET ORAL EVERY 8 HOURS
Status: COMPLETED | OUTPATIENT
Start: 2019-09-05 | End: 2019-09-08

## 2019-09-05 RX ORDER — FENTANYL CITRATE 50 UG/ML
INJECTION, SOLUTION INTRAMUSCULAR; INTRAVENOUS PRN
Status: DISCONTINUED | OUTPATIENT
Start: 2019-09-05 | End: 2019-09-05

## 2019-09-05 RX ORDER — SODIUM CHLORIDE 9 MG/ML
INJECTION, SOLUTION INTRAVENOUS CONTINUOUS
Status: DISCONTINUED | OUTPATIENT
Start: 2019-09-05 | End: 2019-09-07

## 2019-09-05 RX ORDER — OXYCODONE HYDROCHLORIDE 5 MG/1
5-10 TABLET ORAL
Status: DISCONTINUED | OUTPATIENT
Start: 2019-09-05 | End: 2019-09-08 | Stop reason: HOSPADM

## 2019-09-05 RX ORDER — NEOSTIGMINE METHYLSULFATE 1 MG/ML
VIAL (ML) INJECTION PRN
Status: DISCONTINUED | OUTPATIENT
Start: 2019-09-05 | End: 2019-09-05

## 2019-09-05 RX ORDER — GLYCOPYRROLATE 0.2 MG/ML
INJECTION, SOLUTION INTRAMUSCULAR; INTRAVENOUS PRN
Status: DISCONTINUED | OUTPATIENT
Start: 2019-09-05 | End: 2019-09-05

## 2019-09-05 RX ORDER — DEXAMETHASONE SODIUM PHOSPHATE 4 MG/ML
INJECTION, SOLUTION INTRA-ARTICULAR; INTRALESIONAL; INTRAMUSCULAR; INTRAVENOUS; SOFT TISSUE PRN
Status: DISCONTINUED | OUTPATIENT
Start: 2019-09-05 | End: 2019-09-05

## 2019-09-05 RX ORDER — ONDANSETRON 4 MG/1
4 TABLET, ORALLY DISINTEGRATING ORAL EVERY 6 HOURS PRN
Status: DISCONTINUED | OUTPATIENT
Start: 2019-09-05 | End: 2019-09-08 | Stop reason: HOSPADM

## 2019-09-05 RX ORDER — PROCHLORPERAZINE MALEATE 5 MG
10 TABLET ORAL EVERY 6 HOURS PRN
Status: DISCONTINUED | OUTPATIENT
Start: 2019-09-05 | End: 2019-09-08 | Stop reason: HOSPADM

## 2019-09-05 RX ORDER — HYDROXYZINE HYDROCHLORIDE 25 MG/1
25 TABLET, FILM COATED ORAL EVERY 6 HOURS PRN
Status: DISCONTINUED | OUTPATIENT
Start: 2019-09-05 | End: 2019-09-08 | Stop reason: HOSPADM

## 2019-09-05 RX ORDER — ONDANSETRON 2 MG/ML
4 INJECTION INTRAMUSCULAR; INTRAVENOUS EVERY 30 MIN PRN
Status: DISCONTINUED | OUTPATIENT
Start: 2019-09-05 | End: 2019-09-05 | Stop reason: HOSPADM

## 2019-09-05 RX ORDER — LIDOCAINE HYDROCHLORIDE 20 MG/ML
INJECTION, SOLUTION INFILTRATION; PERINEURAL PRN
Status: DISCONTINUED | OUTPATIENT
Start: 2019-09-05 | End: 2019-09-05

## 2019-09-05 RX ORDER — HYDROMORPHONE HYDROCHLORIDE 1 MG/ML
.3-.5 INJECTION, SOLUTION INTRAMUSCULAR; INTRAVENOUS; SUBCUTANEOUS EVERY 5 MIN PRN
Status: DISCONTINUED | OUTPATIENT
Start: 2019-09-05 | End: 2019-09-05 | Stop reason: HOSPADM

## 2019-09-05 RX ORDER — GABAPENTIN 300 MG/1
300 CAPSULE ORAL
Status: COMPLETED | OUTPATIENT
Start: 2019-09-05 | End: 2019-09-05

## 2019-09-05 RX ORDER — COVID-19 ANTIGEN TEST
220-440 KIT MISCELLANEOUS 2 TIMES DAILY PRN
Status: ON HOLD | COMMUNITY
End: 2019-09-05

## 2019-09-05 RX ORDER — BUPIVACAINE HYDROCHLORIDE AND EPINEPHRINE 2.5; 5 MG/ML; UG/ML
INJECTION, SOLUTION INFILTRATION; PERINEURAL PRN
Status: DISCONTINUED | OUTPATIENT
Start: 2019-09-05 | End: 2019-09-05 | Stop reason: HOSPADM

## 2019-09-05 RX ORDER — CALCIUM CARBONATE 500 MG/1
1000 TABLET, CHEWABLE ORAL 4 TIMES DAILY PRN
Status: DISCONTINUED | OUTPATIENT
Start: 2019-09-05 | End: 2019-09-08 | Stop reason: HOSPADM

## 2019-09-05 RX ORDER — CEFAZOLIN SODIUM 2 G/100ML
2 INJECTION, SOLUTION INTRAVENOUS
Status: DISCONTINUED | OUTPATIENT
Start: 2019-09-05 | End: 2019-09-05

## 2019-09-05 RX ORDER — ACETAMINOPHEN 325 MG/1
650 TABLET ORAL EVERY 4 HOURS PRN
Status: DISCONTINUED | OUTPATIENT
Start: 2019-09-08 | End: 2019-09-08 | Stop reason: HOSPADM

## 2019-09-05 RX ORDER — HYDROMORPHONE HYDROCHLORIDE 1 MG/ML
.3-.5 INJECTION, SOLUTION INTRAMUSCULAR; INTRAVENOUS; SUBCUTANEOUS
Status: DISCONTINUED | OUTPATIENT
Start: 2019-09-05 | End: 2019-09-08 | Stop reason: HOSPADM

## 2019-09-05 RX ORDER — ONDANSETRON 2 MG/ML
4 INJECTION INTRAMUSCULAR; INTRAVENOUS EVERY 6 HOURS PRN
Status: DISCONTINUED | OUTPATIENT
Start: 2019-09-05 | End: 2019-09-08 | Stop reason: HOSPADM

## 2019-09-05 RX ORDER — CEFAZOLIN SODIUM 2 G/100ML
2 INJECTION, SOLUTION INTRAVENOUS
Status: COMPLETED | OUTPATIENT
Start: 2019-09-05 | End: 2019-09-05

## 2019-09-05 RX ORDER — ONDANSETRON 2 MG/ML
INJECTION INTRAMUSCULAR; INTRAVENOUS PRN
Status: DISCONTINUED | OUTPATIENT
Start: 2019-09-05 | End: 2019-09-05

## 2019-09-05 RX ORDER — SODIUM CHLORIDE, SODIUM LACTATE, POTASSIUM CHLORIDE, CALCIUM CHLORIDE 600; 310; 30; 20 MG/100ML; MG/100ML; MG/100ML; MG/100ML
INJECTION, SOLUTION INTRAVENOUS CONTINUOUS
Status: DISCONTINUED | OUTPATIENT
Start: 2019-09-05 | End: 2019-09-05

## 2019-09-05 RX ORDER — AMLODIPINE AND BENAZEPRIL HYDROCHLORIDE 10; 20 MG/1; MG/1
1 CAPSULE ORAL DAILY
Status: DISCONTINUED | OUTPATIENT
Start: 2019-09-05 | End: 2019-09-05 | Stop reason: RX

## 2019-09-05 RX ORDER — LIDOCAINE HYDROCHLORIDE 20 MG/ML
JELLY TOPICAL PRN
Status: DISCONTINUED | OUTPATIENT
Start: 2019-09-05 | End: 2019-09-05 | Stop reason: HOSPADM

## 2019-09-05 RX ORDER — CEFAZOLIN SODIUM 2 G/100ML
2 INJECTION, SOLUTION INTRAVENOUS EVERY 8 HOURS
Status: COMPLETED | OUTPATIENT
Start: 2019-09-05 | End: 2019-09-06

## 2019-09-05 RX ADMIN — PHENYLEPHRINE HYDROCHLORIDE 100 MCG: 10 INJECTION INTRAVENOUS at 08:10

## 2019-09-05 RX ADMIN — ALBUMIN HUMAN: 0.05 INJECTION, SOLUTION INTRAVENOUS at 08:46

## 2019-09-05 RX ADMIN — SODIUM CHLORIDE: 9 INJECTION, SOLUTION INTRAVENOUS at 13:54

## 2019-09-05 RX ADMIN — ROCURONIUM BROMIDE 50 MG: 10 INJECTION INTRAVENOUS at 07:38

## 2019-09-05 RX ADMIN — FENTANYL CITRATE 50 MCG: 50 INJECTION, SOLUTION INTRAMUSCULAR; INTRAVENOUS at 11:26

## 2019-09-05 RX ADMIN — ACETAMINOPHEN 325 MG: 325 TABLET, FILM COATED ORAL at 07:08

## 2019-09-05 RX ADMIN — HYDROMORPHONE HYDROCHLORIDE 0.3 MG: 1 INJECTION, SOLUTION INTRAMUSCULAR; INTRAVENOUS; SUBCUTANEOUS at 15:04

## 2019-09-05 RX ADMIN — PHENYLEPHRINE HYDROCHLORIDE 100 MCG: 10 INJECTION INTRAVENOUS at 07:50

## 2019-09-05 RX ADMIN — FENTANYL CITRATE 50 MCG: 50 INJECTION, SOLUTION INTRAMUSCULAR; INTRAVENOUS at 11:01

## 2019-09-05 RX ADMIN — Medication 10 MG: at 10:43

## 2019-09-05 RX ADMIN — HYDROMORPHONE HYDROCHLORIDE 0.5 MG: 1 INJECTION, SOLUTION INTRAMUSCULAR; INTRAVENOUS; SUBCUTANEOUS at 12:43

## 2019-09-05 RX ADMIN — FENTANYL CITRATE 50 MCG: 50 INJECTION, SOLUTION INTRAMUSCULAR; INTRAVENOUS at 10:59

## 2019-09-05 RX ADMIN — LIDOCAINE HYDROCHLORIDE 100 MG: 20 INJECTION, SOLUTION INFILTRATION; PERINEURAL at 07:38

## 2019-09-05 RX ADMIN — SODIUM CHLORIDE, POTASSIUM CHLORIDE, SODIUM LACTATE AND CALCIUM CHLORIDE: 600; 310; 30; 20 INJECTION, SOLUTION INTRAVENOUS at 08:51

## 2019-09-05 RX ADMIN — PHENYLEPHRINE HYDROCHLORIDE 100 MCG: 10 INJECTION INTRAVENOUS at 08:34

## 2019-09-05 RX ADMIN — GLYCOPYRROLATE 0.9 MG: 0.2 INJECTION, SOLUTION INTRAMUSCULAR; INTRAVENOUS at 11:08

## 2019-09-05 RX ADMIN — ALBUMIN HUMAN: 0.05 INJECTION, SOLUTION INTRAVENOUS at 09:02

## 2019-09-05 RX ADMIN — HYDROMORPHONE HYDROCHLORIDE 0.5 MG: 1 INJECTION, SOLUTION INTRAMUSCULAR; INTRAVENOUS; SUBCUTANEOUS at 11:48

## 2019-09-05 RX ADMIN — FENTANYL CITRATE 50 MCG: 50 INJECTION, SOLUTION INTRAMUSCULAR; INTRAVENOUS at 08:22

## 2019-09-05 RX ADMIN — PHENYLEPHRINE HYDROCHLORIDE 100 MCG: 10 INJECTION INTRAVENOUS at 08:31

## 2019-09-05 RX ADMIN — OXYCODONE HYDROCHLORIDE 5 MG: 5 TABLET ORAL at 17:17

## 2019-09-05 RX ADMIN — PROPOFOL 200 MG: 10 INJECTION, EMULSION INTRAVENOUS at 07:38

## 2019-09-05 RX ADMIN — CEFAZOLIN SODIUM 2 G: 2 INJECTION, SOLUTION INTRAVENOUS at 18:00

## 2019-09-05 RX ADMIN — NEOSTIGMINE METHYLSULFATE 4.5 MG: 1 INJECTION, SOLUTION INTRAVENOUS at 11:08

## 2019-09-05 RX ADMIN — SODIUM CHLORIDE: 9 INJECTION, SOLUTION INTRAVENOUS at 08:00

## 2019-09-05 RX ADMIN — DEXMEDETOMIDINE HYDROCHLORIDE 0.4 MCG/KG/HR: 100 INJECTION, SOLUTION INTRAVENOUS at 08:10

## 2019-09-05 RX ADMIN — ACETAMINOPHEN 975 MG: 325 TABLET ORAL at 21:41

## 2019-09-05 RX ADMIN — FENTANYL CITRATE 100 MCG: 50 INJECTION, SOLUTION INTRAMUSCULAR; INTRAVENOUS at 08:21

## 2019-09-05 RX ADMIN — OXYCODONE HYDROCHLORIDE 10 MG: 5 TABLET ORAL at 23:57

## 2019-09-05 RX ADMIN — SODIUM CHLORIDE, POTASSIUM CHLORIDE, SODIUM LACTATE AND CALCIUM CHLORIDE: 600; 310; 30; 20 INJECTION, SOLUTION INTRAVENOUS at 07:10

## 2019-09-05 RX ADMIN — GABAPENTIN 300 MG: 300 CAPSULE ORAL at 07:08

## 2019-09-05 RX ADMIN — HYDROMORPHONE HYDROCHLORIDE 0.5 MG: 1 INJECTION, SOLUTION INTRAMUSCULAR; INTRAVENOUS; SUBCUTANEOUS at 12:18

## 2019-09-05 RX ADMIN — Medication 10 MG: at 09:45

## 2019-09-05 RX ADMIN — PHENYLEPHRINE HYDROCHLORIDE 100 MCG: 10 INJECTION INTRAVENOUS at 08:20

## 2019-09-05 RX ADMIN — OXYCODONE HYDROCHLORIDE 10 MG: 5 TABLET ORAL at 20:44

## 2019-09-05 RX ADMIN — MIDAZOLAM 2 MG: 1 INJECTION INTRAMUSCULAR; INTRAVENOUS at 07:31

## 2019-09-05 RX ADMIN — VECURONIUM BROMIDE 5 MG: 1 INJECTION, POWDER, LYOPHILIZED, FOR SOLUTION INTRAVENOUS at 09:49

## 2019-09-05 RX ADMIN — FENTANYL CITRATE 50 MCG: 50 INJECTION, SOLUTION INTRAMUSCULAR; INTRAVENOUS at 07:38

## 2019-09-05 RX ADMIN — PHENYLEPHRINE HYDROCHLORIDE 200 MCG: 10 INJECTION INTRAVENOUS at 09:00

## 2019-09-05 RX ADMIN — PHENYLEPHRINE HYDROCHLORIDE 200 MCG: 10 INJECTION INTRAVENOUS at 08:55

## 2019-09-05 RX ADMIN — SODIUM CHLORIDE 1 G: 9 INJECTION, SOLUTION INTRAVENOUS at 07:10

## 2019-09-05 RX ADMIN — OXYCODONE HYDROCHLORIDE 5 MG: 5 TABLET ORAL at 18:16

## 2019-09-05 RX ADMIN — FENTANYL CITRATE 50 MCG: 50 INJECTION, SOLUTION INTRAMUSCULAR; INTRAVENOUS at 07:55

## 2019-09-05 RX ADMIN — PHENYLEPHRINE HYDROCHLORIDE 100 MCG: 10 INJECTION INTRAVENOUS at 08:46

## 2019-09-05 RX ADMIN — VECURONIUM BROMIDE 5 MG: 1 INJECTION, POWDER, LYOPHILIZED, FOR SOLUTION INTRAVENOUS at 08:10

## 2019-09-05 RX ADMIN — SENNOSIDES AND DOCUSATE SODIUM 1 TABLET: 8.6; 5 TABLET ORAL at 22:44

## 2019-09-05 RX ADMIN — PHENYLEPHRINE HYDROCHLORIDE 100 MCG: 10 INJECTION INTRAVENOUS at 08:50

## 2019-09-05 RX ADMIN — SODIUM CHLORIDE 1 G: 9 INJECTION, SOLUTION INTRAVENOUS at 08:00

## 2019-09-05 RX ADMIN — METHOCARBAMOL TABLETS 750 MG: 750 TABLET, COATED ORAL at 20:44

## 2019-09-05 RX ADMIN — VECURONIUM BROMIDE 5 MG: 1 INJECTION, POWDER, LYOPHILIZED, FOR SOLUTION INTRAVENOUS at 08:56

## 2019-09-05 RX ADMIN — RANITIDINE 150 MG: 150 TABLET ORAL at 17:17

## 2019-09-05 RX ADMIN — CEFAZOLIN SODIUM 2 G: 2 INJECTION, SOLUTION INTRAVENOUS at 08:15

## 2019-09-05 RX ADMIN — VECURONIUM BROMIDE 1 MG: 1 INJECTION, POWDER, LYOPHILIZED, FOR SOLUTION INTRAVENOUS at 10:31

## 2019-09-05 RX ADMIN — PHENYLEPHRINE HYDROCHLORIDE 0.25 MCG/KG/MIN: 10 INJECTION INTRAVENOUS at 08:34

## 2019-09-05 RX ADMIN — Medication 30 MG: at 08:23

## 2019-09-05 RX ADMIN — CEFAZOLIN SODIUM 1 G: 2 INJECTION, SOLUTION INTRAVENOUS at 10:15

## 2019-09-05 RX ADMIN — ACETAMINOPHEN 975 MG: 325 TABLET ORAL at 14:36

## 2019-09-05 RX ADMIN — PHENYLEPHRINE HYDROCHLORIDE 100 MCG: 10 INJECTION INTRAVENOUS at 08:47

## 2019-09-05 RX ADMIN — PHENYLEPHRINE HYDROCHLORIDE 100 MCG: 10 INJECTION INTRAVENOUS at 08:14

## 2019-09-05 RX ADMIN — HYDROMORPHONE HYDROCHLORIDE 0.5 MG: 1 INJECTION, SOLUTION INTRAMUSCULAR; INTRAVENOUS; SUBCUTANEOUS at 10:48

## 2019-09-05 RX ADMIN — DEXAMETHASONE SODIUM PHOSPHATE 8 MG: 4 INJECTION, SOLUTION INTRA-ARTICULAR; INTRALESIONAL; INTRAMUSCULAR; INTRAVENOUS; SOFT TISSUE at 09:44

## 2019-09-05 RX ADMIN — FENTANYL CITRATE 50 MCG: 50 INJECTION, SOLUTION INTRAMUSCULAR; INTRAVENOUS at 11:08

## 2019-09-05 RX ADMIN — ONDANSETRON 8 MG: 2 INJECTION INTRAMUSCULAR; INTRAVENOUS at 11:00

## 2019-09-05 ASSESSMENT — ACTIVITIES OF DAILY LIVING (ADL)
ADLS_ACUITY_SCORE: 10
ADLS_ACUITY_SCORE: 10

## 2019-09-05 ASSESSMENT — MIFFLIN-ST. JEOR: SCORE: 1699.48

## 2019-09-05 NOTE — TELEPHONE ENCOUNTER
Type of surgery: TLIF  L3-5 w/discectomy   Location of surgery: [facility]  Date and time of surgery: 09/05/2019 at 7:30am  Surgeon: TEQUILA Marshall  Pre-Op Appt Date: 08/27/2019 at 12:30pm w/TEQUILA Melara ([facility])  Post-Op Appt Date: 2 Wk - DIscussed   Packet sent out: Yes  Pre-cert/Authorization completed:  Yes  Date: 09/05/2019 [facility]

## 2019-09-05 NOTE — PROGRESS NOTES
Admission medication history interview status for the 9/5/2019  admission is complete. See EPIC admission navigator for prior to admission medications     Medication history source reliability:Good    Medication history interview source(s):Patient    Medication history resources (including written lists, pill bottles, clinic record):None    Primary pharmacy.[facility]    Additional medication history information not noted on PTA med list :None    Time spent in this activity: 35 minutes    Prior to Admission medications    Medication Sig Last Dose Taking? Auth Provider   acetaminophen (TYLENOL) 325 MG tablet Take 2 tablets (650 mg) by mouth every 4 hours as needed for other (surgical pain) 9/5/2019 at 0400 Yes Sand, Joann Hernandez MD   amLODIPine-benazepril (LOTREL) 10-20 MG capsule Take 1 capsule by mouth daily 9/5/2019 at 0400 Yes Reported, Patient   Cyanocobalamin (B-12 PO) Take 2 tablets by mouth daily  9/4/2019 at am Yes Reported, Patient   hydrocortisone 2.5 % ointment Apply topically 2 times daily as needed more than a week at prn Yes Reported, Patient   lidocaine (LIDODERM) 5 % Patch Place 1 patch onto the skin every 24 hours 9/4/2019 at off Yes Jennifer Monsivais PA-C   MELATONIN PO Take 3 mg by mouth At Bedtime 9/4/2019 at pm Yes Reported, Patient   Multiple Vitamins-Minerals (AIRBORNE PO) Take 1 chew tab by mouth daily 9/4/2019 at pm Yes Reported, Patient   Multiple Vitamins-Minerals (MULTIVITAMIN ADULT PO) Take 2 tablets by mouth daily  9/4/2019 at am Yes Reported, Patient   naproxen sodium 220 MG capsule Take 220-440 mg by mouth 2 times daily as needed 8/29/2019 Yes Reported, Patient   Omega-3 Fatty Acids (OMEGA 3 PO) Take 2 capsules by mouth daily 9/4/2019 at am Yes Reported, Patient   Polyethylene Glycol 3350 (MIRALAX PO) Take 1 Dose by mouth daily 9/5/2019 at 0400 Yes Reported, Patient   Probiotic Product (SOLUBLE FIBER/PROBIOTICS PO) Take 1 tablet by mouth daily  9/4/2019 at pm Yes Reported, Patient    senna-docusate (SENOKOT-S/PERICOLACE) 8.6-50 MG tablet Take 1 tablet by mouth daily 9/4/2019 at am Yes Reported, Patient

## 2019-09-05 NOTE — BRIEF OP NOTE
[facility]    Brief Operative Note    Pre-operative diagnosis: secondary sciolosis lumbar region, herniation of lumbar intervetebral disc with radiculopathy, status post lumbar fusion  Post-operative diagnosis same  Procedure: Procedure(s):  REMOVAL LUMBAR L3-5 INSTRUMENTATION  POSTERIOR SEGMENTAL INSTRUMENTATION L2-4, RIGHT LUMBAR 2-3 DISCECTOMY AND TRANSFORAMINAL INTERBODY FUSION, REDO DECOMPRESSION RIGHT L3-4, POSTERIOR ARTHRODESIS L2-4  Surgeon: Surgeon(s) and Role:     * Nhan Marshall MD - Primary     * Juan Pablo Saez PA-C - Assisting  Anesthesia: General   Estimated blood loss: 400 ml  Drains: Hemovac  Specimens: * No specimens in log *  Findings:   None.  Complications: None.  Implants:    Implant Name Type Inv. Item Serial No.  Lot No. LRB No. Used   IMP SCR MEDT LONGITUDE II 7.5X50MM CAN MA [id number] Metallic Hardware/[place] IMP SCR MEDT LONGITUDE II 7.5X50MM CAN MA [id number]  [employer] [id number] N/A 2   IMP SET SCR MEDT LONGITUDE II 5.5MM TI  [id number] Metallic Hardware/[place] IMP SET SCR MEDT LONGITUDE II 5.5MM TI  [id number]  [employer] [id number] N/A 6   IMP GILL MEDT LONGITUDE II STR 5.5X70MM [id number] Metallic Hardware/[place] IMP GILL MEDT LONGITUDE II STR 5.5X70MM [id number]  [employer] [id number] N/A 1   IMP GILL MEDT LONGITUDE II STR 5.5X80MM [id number] Metallic Hardware/[place] IMP GILL MEDT LONGITUDE II STR 5.5X80MM [id number]  [employer] [id number] N/A 1   IMP SCR MEDT LONGITUDE II 7.5X45MM CAN MA [id number] Metallic Hardware/[place] IMP SCR MEDT LONGITUDE II 7.5X45MM CAN MA [id number]  [employer] [id number] N/A 2   IMP SCR MEDT LONGITUDE II 6.5X35MM CAN MA [id number] Metallic Hardware/[place] IMP SCR MEDT LONGITUDE II 6.5X35MM CAN MA [phone]  [employer] [id number] N/A 1   IMP SCR MEDT LONGITUDE II 6.5X50MM CAN MA [id number] Metallic Hardware/[place] IMP SCR MEDT LONGITUDE II 6.5X50MM CAN MA [phone]  [employer] [id number] N/A 1   GRAFT BONE  CRUSH CANC 30ML [id number] Bone/Tissue/Biologic GRAFT BONE CRUSH CANC 30ML [id number] MUSCULOSKELETAL [place]  N/A 1   Interbody Fusion Device 15mm x 22mm 12deg     06GH N/A 1   IMP SCR SET MEDT SOLERA BREAK OFF 5.5MM TI [id number] Metallic Hardware/[place] IMP SCR SET MEDT SOLERA BREAK OFF 5.5MM TI [id number]  [employer] LOAD 41 04 [id number] N/A 6   IMP SCR MEDT 5.5/6.0MM SOLERA 6.5X45MM MA [id number] Metallic Hardware/[place] IMP SCR MEDT 5.5/6.0MM SOLERA 6.5X45MM MA [phone]  [employer] LOAD 41 04 [id number] N/A 1   IMP SCR MEDT 5.5/6.0MM SOLERA 6.5X50MM MA [id number] Metallic Hardware/[place] IMP SCR MEDT 5.5/6.0MM SOLERA 6.5X50MM MA [phone]  [employer] LOAD 41 04 [id number] N/A 1   IMP SCR MEDT 5.5/6.0MM SOLERA 7.5X45MM MA [id number] Metallic Hardware/[place] IMP SCR MEDT 5.5/6.0MM SOLERA 7.5X45MM MA [phone]  [employer] LOAD 41 04 [id number] N/A 1   IMP SCR MEDT 5.5/6.0MM SOLERA 7.5X50MM MA [id number] Metallic Hardware/[place] IMP SCR MEDT 5.5/6.0MM SOLERA 7.5X50MM MA [phone]  [employer] LOAD 41 04 [id number] N/A 3   IMP GILL MEDT SOLERA CVD 5.5X80MM CHR [id number] Metallic Hardware/[place] IMP GILL MEDT SOLERA CVD 5.5X80MM CHR [id number]  [employer] LOAD 41 04 [id number] N/A 2

## 2019-09-05 NOTE — BRIEF OP NOTE
[facility] Brief Operative Note    Pre-operative diagnosis: secondary scoliosis lumbar region, herniation of lumbar intervetebral disc with radiculopathy, status post lumbar fusion with adjacent segment degeneration   Post-operative diagnosis same   Procedure: Procedure(s):  REMOVAL LUMBAR L3-5 INSTRUMENTATION  POSTERIOR SEGMENTAL INSTRUMENTATION L2-4, RIGHT LUMBAR 2-3 DISCECTOMY AND TRANSFORAMINAL INTERBODY FUSION, REDO DECOMPRESSION RIGHT L3-4, POSTERIOR ARTHRODESIS L2-4   Surgeon(s): Surgeon(s) and Role:     * Nhan Marshall MD - Primary     * Juan Pablo Saez PA-C - Assisting   Estimated blood loss: 575 mL    Specimens: * No specimens in log *   Findings: Adjacent segment degeneration       Juan Pablo Saez PA-C  [facility]

## 2019-09-05 NOTE — ANESTHESIA PROCEDURE NOTES
ARTERIAL LINE PROCEDURE NOTE:   Pre-Procedure  Performed by Javier Hernandez MD  Location: pre-op    Procedure Times:9/5/2019 7:17 AM and 9/5/2019 7:21 AM  Pre-Anesthestic Checklist: patient identified, IV checked, risks and benefits discussed, informed consent, monitors and equipment checked, pre-op evaluation and at physician/surgeon's request    Timeout  Correct Patient: Yes   Correct Procedure: Yes   Correct Site: Yes   Correct Laterality: Yes   Correct Position: Yes     .   Procedure Documentation  Procedure: arterial line    Supine  Insertion Site:radial, left.Injection technique: Seldinger Technique  .  .  Patient Prep;chlorhexidine gluconate and isopropyl alcohol    Assessment/Narrative    Catheter: 20 gauge, 1.75 in/4.5 cm quick cath (integral wire)      Tegaderm dressing used.    Arterial waveform: Yes IBP within 10% of NIBP: Yes

## 2019-09-05 NOTE — ANESTHESIA PREPROCEDURE EVALUATION
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, status post lumbar fusion    Procedure(s):  REMOVAL LUMBAR L3-5 INSTRUMENTATION (SOLERA MED[employer]IC CAPSTONE), (PRONE)  POSTERIOR SEGMENTAL INSTRUMENTATION L2-4, RIGHT LUMBAR 2-3 DISCECTOMY AND TRANSFORAMINAL INTERBODY FUSION, REDO DECOMPRESSION RIGHT L3-4, POSTERIOR ARTHRODESIS L2-4 (STEALTH, O-ARM, CELLSAVER, SOLIS TABLE, 4 POSTER, LEICA MICROSCOPE)    Past Medical History:   Diagnosis Date     Back pain      Gastro-oesophageal reflux disease      Hypertension      Radiculopathy      Scoliosis      Past Surgical History:   Procedure Laterality Date     FUSION SPINE ANTERIOR MINIMALLY INVASIVE TWO LEVELS N/A 2016    Procedure: FUSION SPINE ANTERIOR MINIMALLY INVASIVE TWO LEVELS;  Surgeon: Nhan Marshall MD;  Location: [facility] OR     HERNIA REPAIR      right inguinal hernia     LAMINECTOMY LUMBAR POSTERIOR MICROSCOPIC ONE LEVEL  2013    Procedure: LAMINECTOMY LUMBAR POSTERIOR MICROSCOPIC ONE LEVEL;  Right Lumbar 3-4 Micro Laminectomy & Foraminotomy;  Surgeon: Nhan Marshall MD;  Location: [facility] OR     OPTICAL TRACKING SYSTEM FUSION SPINE POSTERIOR LUMBAR PERCUTANEOUS TWO LEVELS N/A 2016    Procedure: OPTICAL TRACKING SYSTEM FUSION SPINE POSTERIOR LUMBAR PERCUTANEOUS TWO LEVELS;  Surgeon: Nhan Marshall MD;  Location: [facility] OR     ORTHOPEDIC SURGERY      left ankle, right finger       Anesthesia Evaluation     . Pt has had prior anesthetic. Type: General    No history of anesthetic complications          ROS/MED HX    ENT/Pulmonary:  - neg pulmonary ROS     Neurologic:     (+)other neuro Lumbar scoliosis, radiculopathy RLE    Cardiovascular:     (+) hypertension-range: 130s/70s, ---. : . . . :. . No previous cardiac testing      (-) taking anticoagulants/antiplatelets  "  METS/Exercise Tolerance:  >4 METS   Hematologic:     (+) Anemia, -      Musculoskeletal:   (+) arthritis,  -       GI/Hepatic: Comment: Occasional GERD.    (+) GERD       Renal/Genitourinary:  - ROS Renal section negative       Endo:  - neg endo ROS       Psychiatric:  - neg psychiatric ROS       Infectious Disease:  - neg infectious disease ROS       Malignancy:      - no malignancy   Other:    (+) No chance of pregnancy C-spine cleared: N/A, H/O Chronic Pain,no other significant disability                         Physical Exam  Normal systems: cardiovascular, pulmonary and dental    Airway   Mallampati: I  TM distance: >3 FB  Neck ROM: full    Dental     Cardiovascular   Rhythm and rate: regular and normal      Pulmonary    breath sounds clear to auscultation            Lab Results   Component Value Date    WBC 3.5 (L) 11/09/2016    HGB 12.7 (L) 11/09/2016    HCT 37.9 (L) 11/09/2016     11/09/2016     (H) 11/09/2016    POTASSIUM 3.3 (L) 09/05/2019    CHLORIDE 109 11/09/2016    CO2 28 11/09/2016    BUN 11 11/09/2016    CR 0.94 09/05/2019    GLC 91 11/09/2016    JASON 8.9 11/09/2016    ALBUMIN 4.1 11/09/2016    PROTTOTAL 7.5 11/09/2016    ALT 19 11/09/2016    AST 20 11/09/2016    ALKPHOS 40 11/09/2016    BILITOTAL 0.6 11/09/2016    PTT 30 04/09/2013    INR 1.13 11/09/2016       Preop Vitals  BP Readings from Last 3 Encounters:   09/05/19 138/88   11/06/17 140/66   04/05/17 126/84    Pulse Readings from Last 3 Encounters:   09/05/19 78   11/06/17 63   04/05/17 65      Resp Readings from Last 3 Encounters:   09/05/19 16   11/18/16 20   11/09/16 16    SpO2 Readings from Last 3 Encounters:   09/05/19 100%   11/18/16 98%   11/09/16 98%      Temp Readings from Last 1 Encounters:   09/05/19 36.7  C (98.1  F) (Temporal)    Ht Readings from Last 1 Encounters:   09/05/19 1.778 m (5' 10\")      Wt Readings from Last 1 Encounters:   09/05/19 84.8 kg (187 lb)    Estimated body mass index is 26.83 kg/m  as calculated " "from the following:    Height as of this encounter: 1.778 m (5' 10\").    Weight as of this encounter: 84.8 kg (187 lb).       Anesthesia Plan      History & Physical Review  History and physical reviewed and following examination; no interval change.    ASA Status:  2 .    NPO Status:  > 6 hours    Plan for General and ETT with Intravenous induction. Maintenance will be Balanced.    PONV prophylaxis:  Ondansetron (or other 5HT-3) and Dexamethasone or Solumedrol  Additional equipment: Arterial Line   Remifentanil, propofol infusions  ketamine      Postoperative Care  Postoperative pain management:  Oral pain medications and Multi-modal analgesia.      Consents  Anesthetic plan, risks, benefits and alternatives discussed with:  Patient..                 Javier Hernandez MD  "

## 2019-09-05 NOTE — OP NOTE
Procedure Date: 09/05/2019      PREOPERATIVE DIAGNOSIS:  L2-L3 herniated disk with radiculopathy and scoliosis and history of previous L3-L5 fusion.      POSTOPERATIVE DIAGNOSIS:  L2-L3 herniated disk with radiculopathy and scoliosis and history of previous L3-L5 fusion.      PROCEDURES:   1.  Removal of previous L3-L5 posterior segmental instrumentation.   2.  Replacement of posterior segmental instrumentation L2-L4.   3.  Right L2-L3 decompression, as well as a right L2-L3 transforaminal interbody fusion using Capstone control interbody cage.   4.  Right L3-L4 redo decompression.      SURGEON:  Nhan Marshall MD.      ASSISTANT:  Juan Pablo Saez PA-C.      ESTIMATED BLOOD LOSS:  400 mL.      ANESTHESIA:  General endotracheal anesthesia.      INDICATIONS:  The patient is a 54-year-old male with a previous L3-L5 fusion who was noted to have new onset of back and particularly right lower extremity symptoms.  He was noted to have an MRI with a herniated disk at L2-L3 as well as disk space collapse and some scoliosis focused at that level.  He was brought for a decompression and fusion of the area.      DESCRIPTION OF PROCEDURE:  The patient was brought to the operating room, general endotracheal anesthesia was induced.  The patient was rolled into the prone position on the Xu table and the back was prepped and draped in sterile fashion.  A midline incision was created and a midline exposure performed.  The previous pedicle screws were identified and exposed and these were removed from L3-L5.  Once this was done, the O-arm was sterilely draped and brought into the field.  Navigation 3-dimensional imaging was obtained and used to navigate using the Medtronic Stealth system.  Bilateral pedicle screws were placed from L2-L4 using the Medtronic Solera system and position confirmed with intraoperative 3D imaging.        Once this was done, attention was turned at the right L2-L3 level.  Facetectomy was performed and the  ligamentum flavum also removed, decompressing the lateral thecal sac.  The disk space was identified and opened.  Minimal disk material was left, but the turn-and-rotate distractors were used to distract open the disk space and finally after the disk space was packed with crushed cancellous chips, a 15 mm x 12 degree x 22 mm Capstone control cage was placed and confirmed to be in good location with fluoroscopy.  Once this was done, then the new rods were placed from L2-L4 and significant compression across the L2-L3 disk space, particularly on the left, was performed in order to help correct his deformity.  The posterior elements were decorticated and a combination of local autograft and allograft cancellous chips were used to perform arthrodesis from L2-L4.        A medium Hemovac drain was placed.  One gram of vancomycin powder was placed into the wound and the wound was closed with 0 interrupted Vicryl for the fascia, 2-0 inverted interrupted Vicryl for the subcutaneous layer and Dermabond for the skin.  The patient was then awakened, extubated and taken to the recovery room in good condition.         GRACIE LARSON MD             D: 2019   T: 2019   MT: BALWINDER      Name:     MISSY GAUN   MRN:      [id number]        Account:        [id number]   :      1965           Procedure Date: 2019      Document: [id number]

## 2019-09-05 NOTE — PLAN OF CARE
POD 0, arrived at 1330, lumbar fusion..  CMS stable.  Numbness and tingling at times in both legs. Back dressing marked again from PACU.  Hemovac.  Hill.  VSS, O2 at 1L, 98%.  Pain at a 4/10 upon arrival.  Scheduled tylenol given.  Bowel sounds hypoactive on left side.  Tolerating clear liquids.  Bedrest.  Continue to monitor.

## 2019-09-05 NOTE — ANESTHESIA CARE TRANSFER NOTE
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    Anesthesia Type:   General, ETT     Note:  Airway :Face Mask  Patient transferred to:PACU  Comments: Neuromuscular blockade reversed after TOF 4/4, spontaneous respirations, adequate tidal volumes, followed commands to voice, oropharynx suctioned with soft flexible catheter, extubated atraumatically, extubated with suction, airway patent after extubation.  Oxygen via facemask at 10 liters per minute to PACU. Oxygen tubing connected to wall O2 in PACU, SpO2, NiBP, and EKG monitors and alarms on and functioning, Devin Hugger warmer connected to patient gown, report on patient's clinical status given to PACU RN, RN questions answered. Handoff Report: Identifed the Patient, Identified the Reponsible Provider, Reviewed the pertinent medical history, Discussed the surgical course, Reviewed Intra-OP anesthesia mangement and issues during anesthesia, Set expectations for post-procedure period and Allowed opportunity for questions and acknowledgement of understanding      Vitals: (Last set prior to Anesthesia Care Transfer)    CRNA VITALS  9/5/2019 1059 - 9/5/2019 1139      9/5/2019             Pulse:  77    SpO2:  100 %    Resp Rate (set):  10                Electronically Signed By: NADIR Alex CRNA  September 5, 2019  11:39 AM

## 2019-09-05 NOTE — ANESTHESIA POSTPROCEDURE EVALUATION
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    Anesthesia Type:  General, ETT    Note:  Anesthesia Post Evaluation    Patient location during evaluation: PACU  Patient participation: Able to fully participate in evaluation  Level of consciousness: awake and alert  Pain management: adequate  Airway patency: patent  Cardiovascular status: acceptable  Respiratory status: acceptable  Hydration status: acceptable  PONV: none     Anesthetic complications: None          Last vitals:  Vitals:    09/05/19 1220 09/05/19 1230 09/05/19 1240   BP: 121/85 127/75 (!) 142/81   Pulse: 76 64 68   Resp: 16 15 20   Temp: 36.8  C (98.2  F)     SpO2: 99% 98% 99%         Electronically Signed By: Javier Hernandez MD  September 5, 2019  1:00 PM

## 2019-09-06 ENCOUNTER — APPOINTMENT (OUTPATIENT)
Dept: PHYSICAL THERAPY | Facility: CLINIC | Age: 54
DRG: 454 | End: 2019-09-06
Attending: PHYSICIAN ASSISTANT
Payer: COMMERCIAL

## 2019-09-06 ENCOUNTER — APPOINTMENT (OUTPATIENT)
Dept: GENERAL RADIOLOGY | Facility: CLINIC | Age: 54
DRG: 454 | End: 2019-09-06
Attending: PHYSICIAN ASSISTANT
Payer: COMMERCIAL

## 2019-09-06 ENCOUNTER — APPOINTMENT (OUTPATIENT)
Dept: PHYSICAL THERAPY | Facility: CLINIC | Age: 54
DRG: 454 | End: 2019-09-06
Attending: NEUROLOGICAL SURGERY
Payer: COMMERCIAL

## 2019-09-06 LAB
ALBUMIN UR-MCNC: 10 MG/DL
APPEARANCE UR: CLEAR
BILIRUB UR QL STRIP: NEGATIVE
COLOR UR AUTO: YELLOW
GLUCOSE BLDC GLUCOMTR-MCNC: 132 MG/DL (ref 70–99)
GLUCOSE UR STRIP-MCNC: NEGATIVE MG/DL
HGB BLD-MCNC: 9.5 G/DL (ref 13.3–17.7)
HGB UR QL STRIP: ABNORMAL
HYALINE CASTS #/AREA URNS LPF: 8 /LPF (ref 0–2)
KETONES UR STRIP-MCNC: NEGATIVE MG/DL
LACTATE BLD-SCNC: 1.2 MMOL/L (ref 0.7–2)
LEUKOCYTE ESTERASE UR QL STRIP: ABNORMAL
MUCOUS THREADS #/AREA URNS LPF: PRESENT /LPF
NITRATE UR QL: NEGATIVE
PH UR STRIP: 5.5 PH (ref 5–7)
RBC #/AREA URNS AUTO: 3 /HPF (ref 0–2)
SOURCE: ABNORMAL
SP GR UR STRIP: 1.02 (ref 1–1.03)
UROBILINOGEN UR STRIP-MCNC: NORMAL MG/DL (ref 0–2)
WBC #/AREA URNS AUTO: 5 /HPF (ref 0–5)

## 2019-09-06 PROCEDURE — 25800030 ZZH RX IP 258 OP 636: Performed by: PHYSICIAN ASSISTANT

## 2019-09-06 PROCEDURE — 83605 ASSAY OF LACTIC ACID: CPT | Performed by: NEUROLOGICAL SURGERY

## 2019-09-06 PROCEDURE — 25000132 ZZH RX MED GY IP 250 OP 250 PS 637: Performed by: PHYSICIAN ASSISTANT

## 2019-09-06 PROCEDURE — 00000146 ZZHCL STATISTIC GLUCOSE BY METER IP

## 2019-09-06 PROCEDURE — 81001 URINALYSIS AUTO W/SCOPE: CPT | Performed by: UROLOGY

## 2019-09-06 PROCEDURE — 25000132 ZZH RX MED GY IP 250 OP 250 PS 637: Performed by: NEUROLOGICAL SURGERY

## 2019-09-06 PROCEDURE — 36415 COLL VENOUS BLD VENIPUNCTURE: CPT | Performed by: PHYSICIAN ASSISTANT

## 2019-09-06 PROCEDURE — 72100 X-RAY EXAM L-S SPINE 2/3 VWS: CPT

## 2019-09-06 PROCEDURE — 99232 SBSQ HOSP IP/OBS MODERATE 35: CPT | Performed by: UROLOGY

## 2019-09-06 PROCEDURE — 36415 COLL VENOUS BLD VENIPUNCTURE: CPT | Performed by: NEUROLOGICAL SURGERY

## 2019-09-06 PROCEDURE — 97530 THERAPEUTIC ACTIVITIES: CPT | Mod: GP | Performed by: PHYSICAL THERAPIST

## 2019-09-06 PROCEDURE — 85018 HEMOGLOBIN: CPT | Performed by: PHYSICIAN ASSISTANT

## 2019-09-06 PROCEDURE — 97161 PT EVAL LOW COMPLEX 20 MIN: CPT | Mod: GP | Performed by: PHYSICAL THERAPIST

## 2019-09-06 PROCEDURE — 25000128 H RX IP 250 OP 636: Performed by: PHYSICIAN ASSISTANT

## 2019-09-06 PROCEDURE — 12000000 ZZH R&B MED SURG/OB

## 2019-09-06 PROCEDURE — 97116 GAIT TRAINING THERAPY: CPT | Mod: GP | Performed by: PHYSICAL THERAPIST

## 2019-09-06 RX ORDER — AMOXICILLIN 250 MG
1 CAPSULE ORAL 2 TIMES DAILY
Qty: 50 TABLET | Refills: 1 | Status: SHIPPED | OUTPATIENT
Start: 2019-09-06 | End: 2020-06-15

## 2019-09-06 RX ADMIN — OXYCODONE HYDROCHLORIDE 10 MG: 5 TABLET ORAL at 17:58

## 2019-09-06 RX ADMIN — SENNOSIDES AND DOCUSATE SODIUM 2 TABLET: 8.6; 5 TABLET ORAL at 20:17

## 2019-09-06 RX ADMIN — RANITIDINE 150 MG: 150 TABLET ORAL at 05:11

## 2019-09-06 RX ADMIN — OXYCODONE HYDROCHLORIDE 10 MG: 5 TABLET ORAL at 03:14

## 2019-09-06 RX ADMIN — OXYCODONE HYDROCHLORIDE 10 MG: 5 TABLET ORAL at 08:54

## 2019-09-06 RX ADMIN — Medication 1 LOZENGE: at 19:14

## 2019-09-06 RX ADMIN — ACETAMINOPHEN 975 MG: 325 TABLET ORAL at 05:11

## 2019-09-06 RX ADMIN — OXYCODONE HYDROCHLORIDE 10 MG: 5 TABLET ORAL at 15:05

## 2019-09-06 RX ADMIN — OXYCODONE HYDROCHLORIDE 10 MG: 5 TABLET ORAL at 12:08

## 2019-09-06 RX ADMIN — SENNOSIDES AND DOCUSATE SODIUM 1 TABLET: 8.6; 5 TABLET ORAL at 08:28

## 2019-09-06 RX ADMIN — RANITIDINE 150 MG: 150 TABLET ORAL at 17:58

## 2019-09-06 RX ADMIN — HYDROXYZINE HYDROCHLORIDE 25 MG: 25 TABLET ORAL at 20:18

## 2019-09-06 RX ADMIN — SODIUM CHLORIDE: 9 INJECTION, SOLUTION INTRAVENOUS at 04:05

## 2019-09-06 RX ADMIN — METHOCARBAMOL TABLETS 750 MG: 750 TABLET, COATED ORAL at 10:28

## 2019-09-06 RX ADMIN — LISINOPRIL 20 MG: 20 TABLET ORAL at 08:40

## 2019-09-06 RX ADMIN — AMLODIPINE BESYLATE 10 MG: 10 TABLET ORAL at 08:28

## 2019-09-06 RX ADMIN — METHOCARBAMOL TABLETS 750 MG: 750 TABLET, COATED ORAL at 22:58

## 2019-09-06 RX ADMIN — METHOCARBAMOL TABLETS 750 MG: 750 TABLET, COATED ORAL at 04:05

## 2019-09-06 RX ADMIN — ACETAMINOPHEN 975 MG: 325 TABLET ORAL at 21:28

## 2019-09-06 RX ADMIN — ACETAMINOPHEN 975 MG: 325 TABLET ORAL at 13:34

## 2019-09-06 RX ADMIN — HYDROMORPHONE HYDROCHLORIDE 0.5 MG: 1 INJECTION, SOLUTION INTRAMUSCULAR; INTRAVENOUS; SUBCUTANEOUS at 01:34

## 2019-09-06 RX ADMIN — OXYCODONE HYDROCHLORIDE 10 MG: 5 TABLET ORAL at 21:43

## 2019-09-06 RX ADMIN — OXYCODONE HYDROCHLORIDE 10 MG: 5 TABLET ORAL at 06:26

## 2019-09-06 RX ADMIN — CEFAZOLIN SODIUM 2 G: 2 INJECTION, SOLUTION INTRAVENOUS at 01:34

## 2019-09-06 RX ADMIN — HYDROXYZINE HYDROCHLORIDE 25 MG: 25 TABLET ORAL at 13:34

## 2019-09-06 ASSESSMENT — ACTIVITIES OF DAILY LIVING (ADL)
ADLS_ACUITY_SCORE: 10

## 2019-09-06 ASSESSMENT — MIFFLIN-ST. JEOR: SCORE: 1748.46

## 2019-09-06 NOTE — PLAN OF CARE
POD 0 from a L3-L5 hardware removal, L2-4 fusion with Dr. Marshall. A&O x4. VSS on RA. Capno on. CMS intact except tingling/numbness to anterior thighs, (MD aware).  Bowel sounds hypoactive, negative flatus. Tolerating good amount of clear liquids. Dressing marked again early x1 on my shift, ice applied. Hemovac patent with 40 ml output. Hill intact. Bloody drainage noted at tip of catheter site, per report traumatic insertion. Drainage improving, no complaints of pain, draining well and no hematuria noted. Turn and reposition/log roll q 2 hour. C/o pain to thighs and incisional area, decrease with oxycodone, tylenol, robaxin and ice. Pt scoring green on the Aggression Stop Light Tool. Discharge plan pending progress. Nursing to continue to monitor.     Addendum: Call to Juan Pablo Saez regarding patients dressing as has been marked on multiple shifts. OK to reinforce dressing If needed.

## 2019-09-06 NOTE — PROVIDER NOTIFICATION
Text page sent to [facility] Neurosurg regarding pt still bleeding from tip of penis where fowler was placed. Also c/o new numbness to L thigh which is new from preop. Awaiting orders/call back    Call back from Juan Pablo, no additional concerns with numbness to left thigh. Keep fowler in place and will assess need for urology consult tomorrow.

## 2019-09-06 NOTE — CONSULTS
Consult Date:  2019      The patient is a 54-year-old male who was admitted on  and underwent spinal surgery.  Postoperatively, last night he noticed blood around his Hill catheter but no blood in the urine.  Apparently, he had a traumatic catheterization either before his surgery or postop.  He has had Hill catheters before and has never seen blood in the urine previously.  He does have a father who has had prostate cancer, and he has had yearly PSAs and digital rectal exams that have been normal.      OTHER PAST MEDICAL HISTORY:  Eczema, insomnia, fatigue, hypertension, spermatocele, acute stress reaction, former smoker for 1 year in high school.      LABORATORY STUDIES:  No urinalysis on the chart in the last several years.  Normal creatinine.  Hemoglobin is 9.5 this morning.      PHYSICAL EXAMINATION:  On exam, the patient is alert and oriented.  He is on no anticoagulants at this time.  His urine is clear in the Hill tubing, and there is some blood on the catheter that is dried just outside the urethral meatus.  The Hill is not on traction.      ASSESSMENT:  Apparently, he had a traumatic Hill catheterization or a difficult Hill insertion.  Bleeding around the catheter is either from urethral veins or prostatic veins and should be limited.      PLAN:  He will observe overnight and obtain a urinalysis to rule out any other findings.  I will examine him in the morning and if the bleeding has slowed, he can have his Hill catheter removed tomorrow.         LYUBOV BROOKS JR, MD             D: 2019   T: 2019   MT:       Name:     MISSY GUAN   MRN:      [id number]        Account:       [id number]   :      1965           Consult Date:  2019      Document: [id number]       cc: Lyubov Brooks Jr, MD

## 2019-09-06 NOTE — PLAN OF CARE
Pt here with POD 1 hardware removal/fusion. A&O.x4. CMS intact ex numbness BLE. VSS. Regular diet, thin liquids. Takes pills whole. Bowel sounds active, +flatus. Up with assist x2. C/O pain in incision and BLE, prn oxy and robaxin given. Dressing on back, drainage extended, marked. Hemovac. Pt scoring Green on the Aggression Stop Light Tool.

## 2019-09-06 NOTE — PROGRESS NOTES
"   09/06/19 0900   Quick Adds   Type of Visit Initial PT Evaluation   Living Environment   Lives With significant other   Living Arrangements house   Home Accessibility stairs to enter home;stairs within home   Number of Stairs, Main Entrance 5   Stair Railings, Main Entrance railing on left side (ascending)   Number of Stairs, Within Home, Primary   (flight up to bed/bath, flight down to laundry)   Stair Railings, Within Home, Primary railing on left side (ascending)   Transportation Anticipated car, drives self;family or friend will provide   Living Environment Comment half bath main level, upstairs tub/shower with shower chair, standard toilets; states fiance is taking a \"few days off work\" and then planning to have a neighbor check in intermittently   Self-Care   Usual Activity Tolerance moderate   Current Activity Tolerance fair   Regular Exercise No   Equipment Currently Used at Home none   Activity/Exercise/Self-Care Comment 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 ambulation;transferring;toileting;bathing;dressing   Prior Functional Level Comment Reports he needed to walk his hands up his legs to assist to stand prior to surgery, IND without AD but poor tolerance to activity prior to surgery   General Information   Onset of Illness/Injury or Date of Surgery - Date 09/05/19   Referring Physician Juan Pablo Saez, FREYA   Patient/Family Goals Statement Home   Pertinent History of Current Problem (include personal factors and/or comorbidities that impact the POC) Pt is a 53yo male seen POD#1 s/p hardware removal and PSF L2-L4.    Precautions/Limitations fall precautions;spinal precautions   General Observations Pt resting in bed; pt with IV, " "hemovac, capnography, PCDs, fowler   General Info Comments Activity: ambulate, up ad apurva POD1   Cognitive Status Examination   Orientation orientation to person, place and time   Level of Consciousness alert   Follows Commands and Answers Questions 100% of the time;able to follow multistep instructions   Personal Safety and Judgment intact   Pain Assessment   Patient Currently in Pain Yes, see Vital Sign flowsheet   Integumentary/Edema   Integumentary/Edema Comments incision covered with dressing and hemovac appears intact; pt reports traumatic catheterization with \"bleeding from insertion site\"   Posture    Posture Not impaired   Range of Motion (ROM)   ROM Comment BLE WFL for mobility, not formally assessed d/t pain   Strength   Strength Comments BLE WFL for functional mobility with assist and AD, appears functionally weak and deconditioned, UEs appear strong   Bed Mobility   Bed Mobility Comments modA supine>sit   Transfer Skills   Transfer Comments modA sit>stand to FWW   Gait   Gait Comments CGA 3 steps at bedside with FWW, decreased B foot clearance   Balance   Balance Comments Good at EOB without UE support   Sensory Examination   Sensory Perception Comments Reports R/L anterior thigh numbness since surgery but improving   Coordination   Coordination Comments Gross motor coordination appears WFL   General Therapy Interventions   Planned Therapy Interventions bed mobility training;gait training;strengthening;transfer training;progressive activity/exercise;home program guidelines   Clinical Impression   Criteria for Skilled Therapeutic Intervention yes, treatment indicated   PT Diagnosis Difficulty ambulating   Influenced by the following impairments Pain, decreased balance, weakness, decreased activity tolerance, spine precautions   Functional limitations due to impairments Decreased safety and IND with functional transfers, gait, stairs   Clinical Presentation Stable/Uncomplicated   Clinical Decision Making " "(Complexity) Low complexity   Therapy Frequency 2x/day   Predicted Duration of Therapy Intervention (days/wks) 4 days   Anticipated Equipment Needs at Discharge front wheeled walker   Anticipated Discharge Disposition Home with Assist   Risk & Benefits of therapy have been explained Yes   Patient, Family & other staff in agreement with plan of care Yes   [facility] TM \"6 Clicks\"   2016, Trustees of [facility], under license to [employer].  All rights reserved.   6 Clicks Short Forms Basic Mobility Inpatient Short Form   [facility]  \"6 Clicks\" V.2 Basic Mobility Inpatient Short Form   1. Turning from your back to your side while in a flat bed without using bedrails? 3 - A Little   2. Moving from lying on your back to sitting on the side of a flat bed without using bedrails? 2 - A Lot   3. Moving to and from a bed to a chair (including a wheelchair)? 3 - A Little   4. Standing up from a chair using your arms (e.g., wheelchair, or bedside chair)? 2 - A Lot   5. To walk in hospital room? 3 - A Little   6. Climbing 3-5 steps with a railing? 2 - A Lot   Basic Mobility Raw Score (Score out of 24.Lower scores equate to lower levels of function) 15   Total Evaluation Time   Total Evaluation Time (Minutes) 10     "

## 2019-09-06 NOTE — PROGRESS NOTES
[facility]    Neurosurgery Progress Note    Date of Service (when I saw the patient): 09/06/2019     Assessment & Plan   Neema Hunt is a 54 year old male who was admitted on 9/5/2019. He presented with a previous L3-5 fusion with new onset back and right lower extremity symptoms. MRI revealed a herniated disc at L2-L3 as well as disc space collapse and some scoliosis. On 9/5/2019 he underwent a L3-5 removal of previous instrumentation and replacement of L2-4 instrumentation with right L2-3 decompression, right L3-4 decompression, and right L2-3 TLIF. Today he was seen sitting up in the chair. He had a traumatic catheterization and has been having a strong urge to void with bleeding around the fowler. He reports back pain but denies radicular pain. He has localized pain just above his knee on his right thigh. He also has left thigh numbness and tingling. He is without weakness.     Active Problems:    Status post lumbar spinal fusion    Assessment: s/p L3-5 removal of hardware and L2-4 PSF    Plan:   Urology consult for recommendations regarding traumatic catheterization  PT/OT and ambulation  Continue oral pain managment  Encourage use of IS and deep breathing   Keep drains in until less than 30 cc's   Suture/Staple removal in 10-14 days    I have discussed the following assessment and plan Dr. Marshall who is in agreement with initial plan and will follow up with further consultation recommendations.    Caren Light, CNP  [facility]  [address] [address]    Tel [phone]  Pager [phone]      Interval History   Stable.    Physical Exam   Temp: 99  F (37.2  C) Temp src: Oral BP: 118/70 Pulse: 90 Heart Rate: 94 Resp: 16 SpO2: 94 % O2 Device: None (Room air) Oxygen Delivery: 1 LPM  Vitals:    09/05/19 0637 09/06/19 0500   Weight: 187 lb (84.8 kg) 197 lb 12.8 oz (89.7 kg)     Vital Signs with Ranges  Temp:  [98  F  "(36.7  C)-102.1  F (38.9  C)] 99  F (37.2  C)  Pulse:  [60-90] 90  Heart Rate:  [62-94] 94  Resp:  [12-23] 16  BP: (113-142)/(60-91) 118/70  SpO2:  [94 %-100 %] 94 %  I/O last 3 completed shifts:  In: 3400 [P.O.:330; I.V.:2400; Other:170]  Out: 6870 [Urine:5275; Drains:1020; Blood:575]    Heart Rate: 94, Blood pressure 118/70, pulse 90, temperature 99  F (37.2  C), temperature source Oral, resp. rate 16, height 5' 10\" (1.778 m), weight 197 lb 12.8 oz (89.7 kg), SpO2 94 %.  197 lbs 12.8 oz  HEENT:  Normocephalic.  PERRLA.    Heart:  No peripheral edema  Lungs:  No SOB  Skin:  Warm and dry, good capillary refill.  Extremities:  Good radial and dorsalis pedis pulses bilaterally, no edema, cyanosis or clubbing.    NEUROLOGICAL EXAMINATION:   Mental status:  Alert and Oriented x 3, speech is fluent.  Motor:  Strength is 5/5 throughout the upper and lower extremities  Sensation:  Decreased left thigh    Medications     sodium chloride 75 mL/hr at 09/06/19 0405       acetaminophen  975 mg Oral Q8H     amLODIPine  10 mg Oral Daily    And     lisinopril  20 mg Oral Daily     ranitidine  150 mg Oral Q12H    Or     famotidine  20 mg Intravenous Q12H     heparin in saline (CELL SAVER) formula   PERFUSION Once     senna-docusate  1 tablet Oral BID    Or     senna-docusate  2 tablet Oral BID     sodium chloride (PF)  3 mL Intracatheter Q8H       Data     CBC RESULTS:   Recent Labs   Lab Test 09/06/19  0703 11/09/16  1111   WBC  --  3.5*   RBC  --  4.29*   HGB 9.5* 12.7*   HCT  --  37.9*   MCV  --  88   MCH  --  29.6   MCHC  --  33.5   RDW  --  13.9   PLT  --  229     Basic Metabolic Panel:  Lab Results   Component Value Date     11/09/2016      Lab Results   Component Value Date    POTASSIUM 3.3 09/05/2019     Lab Results   Component Value Date    CHLORIDE 109 11/09/2016     Lab Results   Component Value Date    JASON 8.9 11/09/2016     Lab Results   Component Value Date    CO2 28 11/09/2016     Lab Results   Component Value " Date    BUN 11 11/09/2016     Lab Results   Component Value Date    CR 0.94 09/05/2019     Lab Results   Component Value Date    GLC 91 11/09/2016     INR:  Lab Results   Component Value Date    INR 1.13 11/09/2016    INR 1.11 04/09/2013    INR 1.17 02/22/2013    INR 1.07 08/16/2012

## 2019-09-07 ENCOUNTER — APPOINTMENT (OUTPATIENT)
Dept: GENERAL RADIOLOGY | Facility: CLINIC | Age: 54
DRG: 454 | End: 2019-09-07
Attending: INTERNAL MEDICINE
Payer: COMMERCIAL

## 2019-09-07 ENCOUNTER — APPOINTMENT (OUTPATIENT)
Dept: PHYSICAL THERAPY | Facility: CLINIC | Age: 54
DRG: 454 | End: 2019-09-07
Attending: NEUROLOGICAL SURGERY
Payer: COMMERCIAL

## 2019-09-07 ENCOUNTER — APPOINTMENT (OUTPATIENT)
Dept: OCCUPATIONAL THERAPY | Facility: CLINIC | Age: 54
DRG: 454 | End: 2019-09-07
Attending: PHYSICIAN ASSISTANT
Payer: COMMERCIAL

## 2019-09-07 LAB
ALBUMIN UR-MCNC: NEGATIVE MG/DL
APPEARANCE UR: CLEAR
BILIRUB UR QL STRIP: NEGATIVE
COLOR UR AUTO: YELLOW
GLUCOSE BLDC GLUCOMTR-MCNC: 105 MG/DL (ref 70–99)
GLUCOSE UR STRIP-MCNC: NEGATIVE MG/DL
HGB BLD-MCNC: 9.7 G/DL (ref 13.3–17.7)
HGB UR QL STRIP: NEGATIVE
KETONES UR STRIP-MCNC: NEGATIVE MG/DL
LEUKOCYTE ESTERASE UR QL STRIP: NEGATIVE
MUCOUS THREADS #/AREA URNS LPF: PRESENT /LPF
NITRATE UR QL: NEGATIVE
PH UR STRIP: 6.5 PH (ref 5–7)
PROCALCITONIN SERPL-MCNC: 0.23 NG/ML
RBC #/AREA URNS AUTO: 1 /HPF (ref 0–2)
SOURCE: ABNORMAL
SP GR UR STRIP: 1.01 (ref 1–1.03)
UROBILINOGEN UR STRIP-MCNC: NORMAL MG/DL (ref 0–2)
WBC # BLD AUTO: 10.3 10E9/L (ref 4–11)
WBC #/AREA URNS AUTO: <1 /HPF (ref 0–5)

## 2019-09-07 PROCEDURE — 97165 OT EVAL LOW COMPLEX 30 MIN: CPT | Mod: GO

## 2019-09-07 PROCEDURE — 85018 HEMOGLOBIN: CPT | Performed by: PHYSICIAN ASSISTANT

## 2019-09-07 PROCEDURE — 12000000 ZZH R&B MED SURG/OB

## 2019-09-07 PROCEDURE — 99207 ZZC CONSULT E&M CHANGED TO SUBSEQUENT LEVEL: CPT | Performed by: INTERNAL MEDICINE

## 2019-09-07 PROCEDURE — 99232 SBSQ HOSP IP/OBS MODERATE 35: CPT | Performed by: INTERNAL MEDICINE

## 2019-09-07 PROCEDURE — 71046 X-RAY EXAM CHEST 2 VIEWS: CPT

## 2019-09-07 PROCEDURE — 25000132 ZZH RX MED GY IP 250 OP 250 PS 637: Performed by: PHYSICIAN ASSISTANT

## 2019-09-07 PROCEDURE — 36415 COLL VENOUS BLD VENIPUNCTURE: CPT | Performed by: PHYSICIAN ASSISTANT

## 2019-09-07 PROCEDURE — 97530 THERAPEUTIC ACTIVITIES: CPT | Mod: GO

## 2019-09-07 PROCEDURE — 36415 COLL VENOUS BLD VENIPUNCTURE: CPT | Performed by: INTERNAL MEDICINE

## 2019-09-07 PROCEDURE — 25000132 ZZH RX MED GY IP 250 OP 250 PS 637: Performed by: NEUROLOGICAL SURGERY

## 2019-09-07 PROCEDURE — 97116 GAIT TRAINING THERAPY: CPT | Mod: GP

## 2019-09-07 PROCEDURE — 97535 SELF CARE MNGMENT TRAINING: CPT | Mod: GO

## 2019-09-07 PROCEDURE — 81001 URINALYSIS AUTO W/SCOPE: CPT | Performed by: INTERNAL MEDICINE

## 2019-09-07 PROCEDURE — 00000146 ZZHCL STATISTIC GLUCOSE BY METER IP

## 2019-09-07 PROCEDURE — 84145 PROCALCITONIN (PCT): CPT | Performed by: PHYSICIAN ASSISTANT

## 2019-09-07 PROCEDURE — 87040 BLOOD CULTURE FOR BACTERIA: CPT | Performed by: INTERNAL MEDICINE

## 2019-09-07 PROCEDURE — 85048 AUTOMATED LEUKOCYTE COUNT: CPT | Performed by: PHYSICIAN ASSISTANT

## 2019-09-07 RX ADMIN — RANITIDINE 150 MG: 150 TABLET ORAL at 05:42

## 2019-09-07 RX ADMIN — AMLODIPINE BESYLATE 10 MG: 10 TABLET ORAL at 08:34

## 2019-09-07 RX ADMIN — OXYCODONE HYDROCHLORIDE 10 MG: 5 TABLET ORAL at 20:58

## 2019-09-07 RX ADMIN — METHOCARBAMOL TABLETS 750 MG: 750 TABLET, COATED ORAL at 05:42

## 2019-09-07 RX ADMIN — RANITIDINE 150 MG: 150 TABLET ORAL at 17:59

## 2019-09-07 RX ADMIN — OXYCODONE HYDROCHLORIDE 10 MG: 5 TABLET ORAL at 15:07

## 2019-09-07 RX ADMIN — ACETAMINOPHEN 975 MG: 325 TABLET ORAL at 20:53

## 2019-09-07 RX ADMIN — OXYCODONE HYDROCHLORIDE 10 MG: 5 TABLET ORAL at 08:34

## 2019-09-07 RX ADMIN — LISINOPRIL 20 MG: 20 TABLET ORAL at 08:34

## 2019-09-07 RX ADMIN — OXYCODONE HYDROCHLORIDE 10 MG: 5 TABLET ORAL at 04:14

## 2019-09-07 RX ADMIN — SENNOSIDES AND DOCUSATE SODIUM 2 TABLET: 8.6; 5 TABLET ORAL at 20:53

## 2019-09-07 RX ADMIN — METHOCARBAMOL TABLETS 750 MG: 750 TABLET, COATED ORAL at 11:54

## 2019-09-07 RX ADMIN — OXYCODONE HYDROCHLORIDE 10 MG: 5 TABLET ORAL at 18:00

## 2019-09-07 RX ADMIN — OXYCODONE HYDROCHLORIDE 10 MG: 5 TABLET ORAL at 01:13

## 2019-09-07 RX ADMIN — ACETAMINOPHEN 975 MG: 325 TABLET ORAL at 13:42

## 2019-09-07 RX ADMIN — ACETAMINOPHEN 975 MG: 325 TABLET ORAL at 05:42

## 2019-09-07 RX ADMIN — OXYCODONE HYDROCHLORIDE 10 MG: 5 TABLET ORAL at 11:54

## 2019-09-07 RX ADMIN — SENNOSIDES AND DOCUSATE SODIUM 1 TABLET: 8.6; 5 TABLET ORAL at 08:34

## 2019-09-07 ASSESSMENT — ACTIVITIES OF DAILY LIVING (ADL)
ADLS_ACUITY_SCORE: 15
ADLS_ACUITY_SCORE: 12
PREVIOUS_RESPONSIBILITIES: MEAL PREP;HOUSEKEEPING;LAUNDRY;SHOPPING;YARDWORK;MEDICATION MANAGEMENT;FINANCES;DRIVING;WORK
ADLS_ACUITY_SCORE: 12
ADLS_ACUITY_SCORE: 15
ADLS_ACUITY_SCORE: 15
ADLS_ACUITY_SCORE: 12

## 2019-09-07 NOTE — PROVIDER NOTIFICATION
MD Notification    Notified Person: MD    Notified Person Name: Nayana    Notification Date/Time: 9/6/19 2334    Notification Interaction: Amcom    Purpose of Notification:    Room 715 K.[address].    Temp km to 102.8 from 101.7 after scheduled Tylenol   HR: 109  Lactic fired : 1.2     Any new orders?    [id number]    Orders Received:    Consult Hospitalist     Comments:

## 2019-09-07 NOTE — PROGRESS NOTES
[facility]    Neurosurgery Progress Note    Date of Service (when I saw the patient): 09/07/2019     Assessment & Plan   Neema Hunt is a 54 year old male who was admitted on 9/5/2019. He presented with a previous L3-5 fusion with new onset back and right lower extremity symptoms. MRI revealed a herniated disc at L2-L3 as well as disc space collapse and some scoliosis. On 9/5/2019 he underwent a L3-5 removal of previous instrumentation and replacement of L2-4 instrumentation with right L2-3 decompression, right L3-4 decompression, and right L2-3 TLIF. He had a traumatic catheterization and has been having a strong urge to void with bleeding around the fowler, urology was consulted and appreciate their assistance. Fowler removed this AM and he is due to void. He did also have fever and tachycardia overnight, which has now stabilized; appreciate hospitalist assistance. This AM, He is resting comfortably in bed and notes localized back pain and anterior thigh tightness.  Denies radicular pain or weakness.      Active Problems:    Status post lumbar spinal fusion    Assessment: s/p L3-5 removal of hardware and L2-4 PSF    Plan:   Fowler removed this AM; due to void - appreciate urology assistance  VSS; appreciate hospitalist assistance  Discontinue drain  PT/OT and ambulation  Continue oral pain managment  Suture/Staple removal in 10-14 days      I have discussed the following assessment and plan with Dr. Marshall who is in agreement with initial plan and will follow up with further consultation recommendations.      Mónica Kraus PA-C  [facility]  [place] [address]    Tel [phone]  Pager [phone]      Interval History   Fowler removed this AM. VS improving.    Physical Exam   Temp: 99.3  F (37.4  C) Temp src: Oral BP: 109/69 Pulse: 87 Heart Rate: 95 Resp: 20 SpO2: 100 % O2 Device: None (Room air)    Vitals:    09/05/19 0637  "09/06/19 0500   Weight: 187 lb (84.8 kg) 197 lb 12.8 oz (89.7 kg)     Vital Signs with Ranges  Temp:  [98.8  F (37.1  C)-102.8  F (39.3  C)] 99.3  F (37.4  C)  Pulse:  [87] 87  Heart Rate:  [] 95  Resp:  [16-22] 20  BP: (108-151)/(58-71) 109/69  SpO2:  [94 %-100 %] 100 %  I/O last 3 completed shifts:  In: 440 [P.O.:440]  Out: 2800 [Urine:2750; Drains:50]    Heart Rate: 95, Blood pressure 109/69, pulse 87, temperature 99.3  F (37.4  C), temperature source Oral, resp. rate 20, height 5' 10\" (1.778 m), weight 197 lb 12.8 oz (89.7 kg), SpO2 100 %.  197 lbs 12.8 oz  HEENT:  Normocephalic, atraumatic.  PERRLA.  EOM s intact.    Heart:  No peripheral edema  Lungs:  No SOB  Skin:  Warm and dry. Incision covered with dressing re enforced.  Extremities:  No edema, cyanosis or clubbing.    NEUROLOGICAL EXAMINATION:   Mental status:  Alert and Oriented x 3, speech is fluent.  Cranial nerves:  II-XII intact.   Motor:     Hip Flexor:                Right: 5/5  Left:  5/5  Hip Adductor:             Right:  5/5  Left:  5/5  Hip Abductor:             Right:  5/5  Left:  5/5  Gastroc Soleus:        Right:  5/5  Left:  5/5  Tib/Ant:                      Right:  5/5  Left:  5/5  EHL:                     Right:  5/5  Left:  5/5  Sensation:  intact    Medications     sodium chloride 75 mL/hr at 09/06/19 0405       acetaminophen  975 mg Oral Q8H     amLODIPine  10 mg Oral Daily    And     lisinopril  20 mg Oral Daily     ranitidine  150 mg Oral Q12H    Or     famotidine  20 mg Intravenous Q12H     heparin in saline (CELL SAVER) formula   PERFUSION Once     senna-docusate  1 tablet Oral BID    Or     senna-docusate  2 tablet Oral BID     sodium chloride (PF)  3 mL Intracatheter Q8H       Data   CBC RESULTS:   Recent Labs   Lab Test 09/07/19 0913  11/09/16  1111   WBC  --   --  3.5*   RBC  --   --  4.29*   HGB 9.7*   < > 12.7*   HCT  --   --  37.9*   MCV  --   --  88   MCH  --   --  29.6   MCHC  --   --  33.5   RDW  --   --  13.9 "   PLT  --   --  229    < > = values in this interval not displayed.     Basic Metabolic Panel:  Lab Results   Component Value Date     11/09/2016      Lab Results   Component Value Date    POTASSIUM 3.3 09/05/2019     Lab Results   Component Value Date    CHLORIDE 109 11/09/2016     Lab Results   Component Value Date    JASON 8.9 11/09/2016     Lab Results   Component Value Date    CO2 28 11/09/2016     Lab Results   Component Value Date    BUN 11 11/09/2016     Lab Results   Component Value Date    CR 0.94 09/05/2019     Lab Results   Component Value Date    GLC 91 11/09/2016     INR:  Lab Results   Component Value Date    INR 1.13 11/09/2016    INR 1.11 04/09/2013    INR 1.17 02/22/2013    INR 1.07 08/16/2012

## 2019-09-07 NOTE — PROGRESS NOTES
Bladder scan pt for 410 amount scanning. Pt would like to finish lunch tray and will attempt to urinate before straight cath is done.

## 2019-09-07 NOTE — PLAN OF CARE
POD 2 for pt with hardware removal/fusion. A&Ox4. CMS intact ex numbness BLE, pt. States it is improving. Regular diet. Bowel sounds active, +flatus. Up with assist x1. C/O pain in incision and BLE, prn oxy and robaxin given. Dressing on back changed, CDI. Pt has had difficulty urinating after fowler d/c'd, straight cath for 400, bladder scanned  with 75 ml post void. Spoke urology, if need for straight cath again please leave fowler in place. See notes for fowler placement if needed. Continue to monitor.

## 2019-09-07 NOTE — PLAN OF CARE
"Discharge Planner PT   Patient plan for discharge: Home with fiance available to provide 24hr assist through ~Tuesday next week, then planning to have neighbor provide assist as needed     Current status: Patient supine in bed upon arrival; agreeable to therapy. Rates pain 5/10. Able to recall all spinal precautions. Performed supine to sit using log roll technique with bed flat and SBA. Increased time needed due to increase in pain. STS completed with CGA. Ambulated 200 ft x 1 with no assistive device and CGA. Good stability throughout however patient reports LEs feeling \"shaky\" with increase in pain. Describes pain as dull ache in bilateral LEs with LLE pain > RLE. Went up/down 3 stairs x 2 with single rail and CGA progressing to SBA; tolerated well. Assisted patient to toilet at end of session. RN notified.      Barriers to return to prior living situation: Pain, level of assist, stairs  Recommendations for discharge: Home with assist on stairs per plan established by the PT.  Rationale for recommendations: Pt moving fairly well on POD1 despite reporting higher pain levels, anticipate with continued IP PT that pt will progress to safely disch home with fiance       Entered by: Merle lOvera 09/07/2019 10:08 AM     "

## 2019-09-07 NOTE — PROGRESS NOTES
Spoke with Dr Solorzano Urology regarding pt output. Dr Solorzano ordered fowler to be replaced if pt unable to urinate on his own. Use 18 fr with lidocaine for comfort. Recommended to contact neurosurgery to change pain medications to a non narcotic medication.

## 2019-09-07 NOTE — PROGRESS NOTES
Alert  Urine clear  No further bledding around fowler  UA normal  P: discharge fowler       Yearly PSA, SHANE

## 2019-09-07 NOTE — PLAN OF CARE
Discharge Planner OT   Patient plan for discharge: Home  Current status: OT orders received, chart reviewed and initial eval complete. Pt admitted for L3-5 removal of previous instrumentation and replacement of L2-4 instrumentation with right L2-3 decompression, right L3-4 decompression, and right L2-3 TLIF. Pt lives w/significant other in a two-story home. Previously used adaptive equipment for LB dressing and assist of spouse for LB bathing. Pt reported if pain in legs is too bad, he scoots up/down the stairs on his bottom.    Pt STS w/SBA, toilet transfer w/SBA, donned/doffed socks w/min A initially, then SBA w/education of sock aid. Tub transfer w/SBA and education of leg  to assist getting LB into  tub.     Pt has no further IP OT needs at this time. Will complete orders.   Barriers to return to prior living situation: Stairs to enter and w/in home, current level of pain  Recommendations for discharge: Home w/assist of significant other for dressing, bathing as needed.   Rationale for recommendations: Pt at baseline w/I/ADL's, will be able to return home w/assist of significant other as needed for dressing, and bathing as needed.        Entered by: Tammy Jansen 09/07/2019 11:05 AM     Occupational Therapy Discharge Summary    Reason for therapy discharge:    All goals and outcomes met, no further needs identified.    Progress towards therapy goal(s). See goals on Care Plan in [facility] electronic health record for goal details.  Goals met    Therapy recommendation(s):    No further therapy is recommended.

## 2019-09-07 NOTE — PROVIDER NOTIFICATION
MD Notification    Notified Person: MD    Notified Person Name: MD Bobby    Notification Date/Time: 9/7 0039    Notification Interaction: Amcom    Purpose of Notification:    Room 715 [facility]    Tem 102.2  HR:112    Any new orders      Orders Received:    - blood culture  - UA  Comments:

## 2019-09-07 NOTE — CONSULTS
Consult Date:  2019      PRIMARY CARE PHYSICIAN:  Cornel Mittal      CONSULTING PHYSICIAN:  Dr. Nhan Marshall.      REASON FOR CONSULTATION:  Postop fever.      HISTORY OF PRESENT ILLNESS:  Neema Hunt is a very pleasant 54-year-old  gentleman with history of secondary scoliosis of the lumbar region and history of a herniation of the lumbar intervertebral disk with radiculopathy who has had a history of lumbar fusion with adjacent segment degeneration.  The patient subsequently underwent removal of lumbar L3-L5 instrumentation, posterior segment with instrumentation L2-L4 and right lumbar L2-L3 diskectomy and transforaminal interbody fusion and redo decompression on the right side with L3-L4 and posterior arthrodesis at L2-L4.  This was done by Dr. Marshall on .      The patient was doing unremarkable until he started having bleeding from the tip of the penis.  Subsequent Hill was placed.  The patient was doing reasonably well with therapies.  The patient apparently had a traumatic Hill catheterization and there was noted bleeding around the catheter.  The patient was seen by Urology.  Urinalysis obtained which was negative for infection.      Overnight, however, the patient had a fever up to 102.8.  The patient was examined.  He had a mild nonproductive cough.  Blood cultures were obtained.  Blood work is being repeated.  We will hold off on any empiric antibiotics.  The patient's dressing will be reexamined in a few hours.      PAST MEDICAL HISTORY:   1.  Lumbar disk disease with details as outlined above.   2.  Hypertension.   3.  Spermatocele.   4.  Acute stress reaction.   5.  Oral aphthae.     6.  Fatigue.   7.  Insomnia.   8.  Eczema.      ALLERGIES:  NO KNOWN DRUG ALLERGIES.      FAMILY HISTORY:  Sister with hypertension, peripheral artery disease.  Mother  at age 87 from old age.  She had hypertension, asthma, former smoker.  He has 2 children.  Father  at age 85 from  prostate cancer.      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  Former smoker in high school counselor who is .      PAST SURGICAL HISTORY:   1.  Left foot bunionectomy status post L3-L4 decompression, status post right fifth finger surgery, status post right inguinal hernia repair.   2.  Left ankle surgery.   3.  L3-L4 interbody fusion with screws.      CURRENT MEDICATIONS:   1.  Amlodipine/benazepril 10/20 one capsule once daily.   2.  Cyanocobalamin B12 two tablets daily.   3.  Hydrocortisone ointment.   4.  Lidoderm 5% patch.   5.  Melatonin.   6.  Multivitamin.   7.  MiraLax.   8.  Probiotic.     9.  Senna.   10.  Tylenol.      REVIEW OF SYSTEMS:  Ten-point systems reviewed and as dictated in history of present illness.      PHYSICAL EXAMINATION:   VITAL SIGNS:  Temperature 99.3, heart rate 87, respirations 20, blood pressure 109/60, sats 97% on room air.   GENERAL:  The patient is alert and oriented x3, nonseptic appearing.   HEENT:  Head is atraumatic.  Pupils equal.  Mucous membranes are moist.   NECK:  Veins not elevated.  No cervical lymphadenopathy.   PULMONARY:  Lungs are clear to auscultation.   CARDIOVASCULAR:  S1, S2, regular rate and rhythm.  No murmurs, gallops or rubs noted.   ABDOMEN:  Soft, nontender, normoactive bowel sounds.   MUSCULOSKELETAL:  No edema.   NEUROLOGIC:  The patient is grossly nonfocal.  Cranial nerves grossly intact.      LABORATORY:  See history of present illness.      ASSESSMENT:  Neema Hunt is 54, postoperative day 2 for lumbar surgery including removal of L2-L5 instrumentation as a redo decompression, diskectomy, posterior arthrodesis being stable with postoperative fever.      PLAN:   1.  Postop fever.  We will obtain blood cultures, urinalysis which appears to be unremarkable, chest x-ray.  We will add a procalcitonin level as well.  We will follow the patient clinically.   Will defer to Neurosurgery to take a look at the operative site also to ensure that there were no complications from that.  The patient will be monitored.  If he were to have another fever spike then could start the patient on broad-spectrum antibiotics.  For now, we will watch him clinically.   2.  Hypertension.  We will continue the patient on his Lotrel.  3.  Reflux disease.  We will continue the patient on ranitidine.   4.  Deep venous thrombosis prophylaxis:  The patient will receive compression boots.      CODE STATUS:  Full.      Thank you for this consultation.  We will follow the patient with you.         KAMI BROWN MD             D: 2019   T: 2019   MT: NTS      Name:     MISSY GUAN   MRN:      [id number]        Account:       [id number]   :      1965           Consult Date:  2019      Document: [id number]       cc: Cornel Marshall MD

## 2019-09-07 NOTE — PLAN OF CARE
Pt here with POD 2 hardware removal/fusion. A&Ox4. CMS intact ex numbness BLE, pt. States it is improving. Regular diet. Takes pills  whole. Bowel sounds active, +flatus. Up with assist x1. C/O pain in incision and BLE, prn oxy and robaxin given. Dressing on back marked- no change. Hemovac patent.  Hill patent. Pt scoring Green on the Aggression Stop Light Tool. Urology to see pt. Continue to monitor.    Pt. VS Tachycardic (108) and highest temp (102.8), sepsis fired, lactic 1.2. MD notified. Pt.'s VS improving HR (90) temp (99). Continue to monitor.

## 2019-09-07 NOTE — PROGRESS NOTES
"   09/07/19 1000   Quick Adds   Type of Visit Initial Occupational Therapy Evaluation   Living Environment   Lives With significant other   Living Arrangements house   Home Accessibility stairs to enter home;stairs within home   Number of Stairs, Main Entrance 5   Stair Railings, Main Entrance railing on left side (ascending)   Number of Stairs, Within Home, Primary   (1 flight up to bed/bath, 1 flight down to laundry)   Stair Railings, Within Home, Primary railing on right side (ascending)   Transportation Anticipated car, drives self;family or friend will provide   Living Environment Comment half bath main level, upstairs tub/shower with shower chair, standard toilets; states fiance is taking a \"few days off work\" and then planning to have a neighbor check in intermittently   Self-Care   Usual Activity Tolerance moderate   Current Activity Tolerance fair   Regular Exercise No   Equipment Currently Used at Home none   Activity/Exercise/Self-Care Comment 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; has crutches and cane but was not using   Functional Level   Ambulation 0-->independent   Transferring 0-->independent   Toileting 0-->independent   Bathing 2-->assistive person  (bathtub shower, shower chair, hand held shower head, )   Dressing 1-->assistive equipment  (uses reacher for undergarments)   Eating 0-->independent   Communication 0-->understands/communicates without difficulty   Swallowing 0-->swallows foods/liquids without difficulty   Cognition 0 - no cognition issues reported   Fall history within last six months no   Which of the above functional risks had a recent onset or change? ambulation;transferring;dressing;bathing   Prior Functional Level Comment Pt uses AE and person for dressing as needed, it independent w/toileting,    General Information   Onset of Illness/Injury or Date of Surgery - Date 09/05/19   Referring Physician Juan Pablo Saez, FREYA   Patient/Family Goals Statement home   Additional Occupational Profile " Info/Pertinent History of Current Problem L3-5 removal of previous instrumentation and replacement of L2-4 instrumentation with right L2-3 decompression, right L3-4 decompression, and right L2-3 TLIF   Precautions/Limitations spinal precautions   General Info Comments Pt notes pain in BLE, greater w/ambulation   Cognitive Status Examination   Orientation orientation to person, place and time   Visual Perception   Visual Perception No deficits were identified   Sensory Examination   Sensory Quick Adds Pain   Pain Assessment   Patient Currently in Pain Yes, see Vital Sign flowsheet  (in BLE greater w/movement)   Posture   Posture not impaired   Range of Motion (ROM)   ROM Quick Adds No deficits were identified   Coordination   Upper Extremity Coordination No deficits were identified   Transfer Skill: Bed to Chair/Chair to Bed   Level of [place]: Bed to Chair stand-by assist   Transfer Skill: Sit to Stand   Level of [place]: Sit/Stand stand-by assist   Physical Assist/Nonphysical Assist: Sit/Stand supervision   Transfer Skill: Toilet Transfer   Level of [place]: Toilet stand-by assist   Physical Assist/Nonphysical Assist: Toilet supervision   Lower Body Dressing   Level of [place]: Dress Lower Body minimum assist (75% patients effort)   Physical Assist/Nonphysical Assist: Dress Lower Body 1 person assist   Assistive Device reacher;long-handled shoe horn   Toileting   Level of [place]: Toilet independent   Grooming   Level of [place]: Grooming independent   Eating/Self Feeding   Level of [place]: Eating independent   Instrumental Activities of Daily Living (IADL)   Previous Responsibilities meal prep;housekeeping;laundry;shopping;yardwork;medication management;finances;driving;work   Activities of Daily Living Analysis   Impairments Contributing to Impaired Activities of Daily Living pain;post surgical precautions   General Therapy Interventions   Planned Therapy Interventions ADL  "retraining   Clinical Impression   Criteria for Skilled Therapeutic Interventions Met yes, treatment indicated   OT Diagnosis Impaired I/ADL, Imparied transers   Influenced by the following impairments Pain, s/p surigical precautions,    Assessment of Occupational Performance 1-3 Performance Deficits   Identified Performance Deficits I/ADL, functional transfers   Clinical Decision Making (Complexity) Low complexity   Therapy Frequency Daily   Predicted Duration of Therapy Intervention (days/wks)   (eval and one treat)   Anticipated Equipment Needs at Discharge bath sponge;long shoe horn;reacher;shower chair;sock aide;tub bench;raised toilet seat   Anticipated Discharge Disposition Home with Assist   Risks and Benefits of Treatment have been explained. Yes   Patient, Family & other staff in agreement with plan of care Yes   [facility] TM \"6 Clicks\"   2016, Trustees of [facility], under license to [employer].  All rights reserved.   6 Clicks Short Forms Daily Activity Inpatient Short Form   [facility]  \"6 Clicks\" Daily Activity Inpatient Short Form   1. Putting on and taking off regular lower body clothing? 3 - A Little   2. Bathing (including washing, rinsing, drying)? 3 - A Little   3. Toileting, which includes using toilet, bedpan or urinal? 4 - None   4. Putting on and taking off regular upper body clothing? 3 - A Little   5. Taking care of personal grooming such as brushing teeth? 4 - None   6. Eating meals? 4 - None   Daily Activity Raw Score (Score out of 24.Lower scores equate to lower levels of function) 21   Total Evaluation Time   Total Evaluation Time (Minutes) 10     "

## 2019-09-07 NOTE — PROVIDER NOTIFICATION
MD Notification    Notified Person: MD    Notified Person Name: MD Kraus    Notification Date/Time: 9/6/19 2037    Notification Interaction: amcom    Purpose of Notification:    FYI:    Temp 101.7          Orders Received:    Comments:

## 2019-09-08 ENCOUNTER — APPOINTMENT (OUTPATIENT)
Dept: PHYSICAL THERAPY | Facility: CLINIC | Age: 54
DRG: 454 | End: 2019-09-08
Attending: NEUROLOGICAL SURGERY
Payer: COMMERCIAL

## 2019-09-08 VITALS
TEMPERATURE: 97.9 F | BODY MASS INDEX: 28.32 KG/M2 | RESPIRATION RATE: 22 BRPM | WEIGHT: 197.8 LBS | HEART RATE: 88 BPM | DIASTOLIC BLOOD PRESSURE: 59 MMHG | HEIGHT: 70 IN | SYSTOLIC BLOOD PRESSURE: 107 MMHG | OXYGEN SATURATION: 97 %

## 2019-09-08 LAB
ANION GAP SERPL CALCULATED.3IONS-SCNC: 4 MMOL/L (ref 3–14)
BASOPHILS # BLD AUTO: 0 10E9/L (ref 0–0.2)
BASOPHILS NFR BLD AUTO: 0.1 %
BUN SERPL-MCNC: 8 MG/DL (ref 7–30)
CALCIUM SERPL-MCNC: 8.9 MG/DL (ref 8.5–10.1)
CHLORIDE SERPL-SCNC: 97 MMOL/L (ref 94–109)
CO2 SERPL-SCNC: 31 MMOL/L (ref 20–32)
CREAT SERPL-MCNC: 0.81 MG/DL (ref 0.66–1.25)
DIFFERENTIAL METHOD BLD: ABNORMAL
EOSINOPHIL # BLD AUTO: 0.1 10E9/L (ref 0–0.7)
EOSINOPHIL NFR BLD AUTO: 0.7 %
ERYTHROCYTE [DISTWIDTH] IN BLOOD BY AUTOMATED COUNT: 13.3 % (ref 10–15)
GFR SERPL CREATININE-BSD FRML MDRD: >90 ML/MIN/{1.73_M2}
GLUCOSE SERPL-MCNC: 143 MG/DL (ref 70–99)
HCT VFR BLD AUTO: 26.6 % (ref 40–53)
HGB BLD-MCNC: 9.3 G/DL (ref 13.3–17.7)
IMM GRANULOCYTES # BLD: 0.1 10E9/L (ref 0–0.4)
IMM GRANULOCYTES NFR BLD: 0.7 %
LYMPHOCYTES # BLD AUTO: 1.3 10E9/L (ref 0.8–5.3)
LYMPHOCYTES NFR BLD AUTO: 14.1 %
MCH RBC QN AUTO: 30 PG (ref 26.5–33)
MCHC RBC AUTO-ENTMCNC: 35 G/DL (ref 31.5–36.5)
MCV RBC AUTO: 86 FL (ref 78–100)
MONOCYTES # BLD AUTO: 1.2 10E9/L (ref 0–1.3)
MONOCYTES NFR BLD AUTO: 13.2 %
NEUTROPHILS # BLD AUTO: 6.5 10E9/L (ref 1.6–8.3)
NEUTROPHILS NFR BLD AUTO: 71.2 %
NRBC # BLD AUTO: 0 10*3/UL
NRBC BLD AUTO-RTO: 0 /100
PLATELET # BLD AUTO: 223 10E9/L (ref 150–450)
POTASSIUM SERPL-SCNC: 3.3 MMOL/L (ref 3.4–5.3)
RBC # BLD AUTO: 3.1 10E12/L (ref 4.4–5.9)
SODIUM SERPL-SCNC: 132 MMOL/L (ref 133–144)
WBC # BLD AUTO: 9.1 10E9/L (ref 4–11)

## 2019-09-08 PROCEDURE — 97116 GAIT TRAINING THERAPY: CPT | Mod: GP

## 2019-09-08 PROCEDURE — 25000132 ZZH RX MED GY IP 250 OP 250 PS 637: Performed by: NEUROLOGICAL SURGERY

## 2019-09-08 PROCEDURE — 99232 SBSQ HOSP IP/OBS MODERATE 35: CPT | Performed by: INTERNAL MEDICINE

## 2019-09-08 PROCEDURE — 85025 COMPLETE CBC W/AUTO DIFF WBC: CPT | Performed by: INTERNAL MEDICINE

## 2019-09-08 PROCEDURE — 80048 BASIC METABOLIC PNL TOTAL CA: CPT | Performed by: INTERNAL MEDICINE

## 2019-09-08 PROCEDURE — 25000132 ZZH RX MED GY IP 250 OP 250 PS 637: Performed by: PHYSICIAN ASSISTANT

## 2019-09-08 PROCEDURE — 36415 COLL VENOUS BLD VENIPUNCTURE: CPT | Performed by: INTERNAL MEDICINE

## 2019-09-08 RX ORDER — OXYCODONE HYDROCHLORIDE 5 MG/1
5-10 TABLET ORAL EVERY 4 HOURS PRN
Qty: 50 TABLET | Refills: 0 | Status: SHIPPED | OUTPATIENT
Start: 2019-09-08 | End: 2019-09-11

## 2019-09-08 RX ORDER — METHOCARBAMOL 750 MG/1
750 TABLET, FILM COATED ORAL EVERY 6 HOURS PRN
Qty: 50 TABLET | Refills: 1 | Status: SHIPPED | OUTPATIENT
Start: 2019-09-08 | End: 2020-06-15

## 2019-09-08 RX ADMIN — RANITIDINE 150 MG: 150 TABLET ORAL at 06:38

## 2019-09-08 RX ADMIN — ACETAMINOPHEN 975 MG: 325 TABLET ORAL at 06:38

## 2019-09-08 RX ADMIN — ACETAMINOPHEN 650 MG: 325 TABLET, FILM COATED ORAL at 14:31

## 2019-09-08 RX ADMIN — ACETAMINOPHEN 650 MG: 325 TABLET, FILM COATED ORAL at 10:55

## 2019-09-08 RX ADMIN — OXYCODONE HYDROCHLORIDE 10 MG: 5 TABLET ORAL at 06:38

## 2019-09-08 RX ADMIN — METHOCARBAMOL TABLETS 750 MG: 750 TABLET, COATED ORAL at 10:55

## 2019-09-08 RX ADMIN — OXYCODONE HYDROCHLORIDE 10 MG: 5 TABLET ORAL at 12:33

## 2019-09-08 RX ADMIN — OXYCODONE HYDROCHLORIDE 10 MG: 5 TABLET ORAL at 09:39

## 2019-09-08 RX ADMIN — OXYCODONE HYDROCHLORIDE 10 MG: 5 TABLET ORAL at 03:25

## 2019-09-08 RX ADMIN — SENNOSIDES AND DOCUSATE SODIUM 2 TABLET: 8.6; 5 TABLET ORAL at 09:37

## 2019-09-08 RX ADMIN — LISINOPRIL 20 MG: 20 TABLET ORAL at 09:37

## 2019-09-08 RX ADMIN — AMLODIPINE BESYLATE 10 MG: 10 TABLET ORAL at 09:37

## 2019-09-08 RX ADMIN — OXYCODONE HYDROCHLORIDE 10 MG: 5 TABLET ORAL at 00:05

## 2019-09-08 ASSESSMENT — ACTIVITIES OF DAILY LIVING (ADL)
ADLS_ACUITY_SCORE: 15

## 2019-09-08 NOTE — PLAN OF CARE
POD 3 from hardware removal and L2-L3 decompression and fusion and L3-L4 decompression. A&O. CMS intact, ex L thigh numbness. Bowel sounds active, passing flatus, tolerating regular diet. VSS, ex slight fever overnight but brought down with Tylenol. Dressing CDI. Up with SBA. C/o back pain, decreased with Oxycodone and scheduled Tylenol. Pt scoring green on the Aggression Stop Light Tool. Plan to work with therapy today. Discharge pending.

## 2019-09-08 NOTE — PLAN OF CARE
Discharge Planner PT   Patient plan for discharge: Home with fiance available to provide 24hr assist through ~Tuesday next week, then planning to have neighbor provide assist as needed     Current status: Patient supine in bed upon arrival; agreeable to therapy. Performed supine to sit using log roll technique with bed flat independently. STS completed with SBA. Ambulated 400 ft x 1 with no assistive device and SBA. Slow pace but good stability throughout.  Went up/down 3 stairs reciprocally x 3 with single rail and SBA; tolerated well. Up in chair at end of session.     Barriers to return to prior living situation: none anticipated  Recommendations for discharge: Home with assist on stairs per plan established by the PT.  Rationale for recommendations: Pt progressing well mobility wise. anticipate that pt will be able to safely disch home with fiance at discharge       Entered by: Merle Olvera 09/08/2019 8:28 AM

## 2019-09-08 NOTE — DISCHARGE INSTRUCTIONS
[facility]  Dr. Marshall Discharge Instructions Following Spine Surgery  [phone]  Monday - Friday; 8:00 AM - 4:00 PM    In General:   After you have had surgery on your spine, remember do not twist, or excessively flex or extend the area that you had surgery.  These activities can prevent healing.  Pain is normal and to be expected following surgery.  Please call our office to schedule your appointment follow up appointment.      Bowel Care:  Many people have constipation (hard stools) after surgery.  To help prevent constipation: Drink plenty of fluid (8-10 glasses/day); Eat more fiber, such as whole grain bread, bran cereal, and fruits and vegetables; Stay active by walking; Over the counter stool softener may also help.      Medications:  Spine surgery and pain management is unique to all patients.  You will generally be given medications for pain, muscle spasms or tightness, and for constipation during the immediate post op period.  It is important that you use these as prescribed.  Please remember to bring your pill bottles to all of your appointments. Avoid alcoholic beverages while taking narcotic pain medications. You can use ice to areas of pain as needed, 20 minutes at a time.  Changing positions and walking will help loosen your muscles as well.    Driving:  No driving while on narcotic pain medications.  It is state law not to drive while under the influence of a drug to a degree which renders you incapable of safely driving.  The narcotic medication you will be taking after surgery falls under this category.     Activity:   After surgery, most people feel less pain than they have had in a long time.  Walking and light activities will help you regain the use of your muscles.  You are encouraged to walk: start with short walks 5-10 minutes at a time for 4-5 times per day and increase as tolerated.  Stair climbing as tolerated, we recommend you use the railing.  No lifting greater than 10 pounds: approximately equal to one gallon of milk. No twisting, bending in the area you have had surgery. No housework, vacuuming, laundry, leaf raking, lawn mowing, or snow removal. Wear your brace (if ordered) as directed.    Showers:  If you have sutures or staples you may shower two days after surgery. It is ok to let water run over your incision but do not touch or scrub on the incision. Pat dry immediately after showering. If there is a dressing in place, you may remove it 2 days after surgery. If you were closed with Derma cruz (glue), you may shower without covering the incision. No baths, hot tubs, or pool activity for at least 6 weeks.     Nutrition:  In general, your diet restrictions will not change with your surgery.  You may need to eat small frequent meals initially until your appetite returns.  Eat plenty of high fiber foods and drink plenty of fluids. If you do not have a fluid restriction from or prior to surgery, we recommend 6-8 (8oz) glasses of water per day. Other fluids are fine, but water is best. Nausea is not uncommon; it is a common side effect to many pain medications.  We recommend that you take the pain medications with food, if this does not improve your symptoms, please call us.     Smoking:  For proper healing it is required that you quit using all tobacco products.  This includes smoking, chewing, nicotine gums, and nicotine patches.  Call Dr. Marshall if these occur: Drainage from your incision, increased pain/redness/swelling, temperatures greater than 101.5, increased leg pain or swelling or unrelieved headaches    Go to the nearest Emergency Room if you experience: chest pain, shortness of breath, neck swelling or swallowing problems

## 2019-09-08 NOTE — PLAN OF CARE
A and O x4. Pain well controlled with prn oxycodone and tylenol; robaxin continued for muscle spasm pain in R thigh. Mild numbness in anterior R and L thigh, improving. Good equal strength. Voided with PVR < 100 mL, BM today. Good appetite on regular diet. Up independently. VSS. Significant other at bedside for discharge. Reviewed all discharge instructions, follow up recommendations, and when to access medical attention. Reviewed discharge medications. Pt discharged to home with SO.

## 2019-09-08 NOTE — PROGRESS NOTES
"[facility]  Hospitalist Progress Note  Jas Abdullahi MD  09/08/2019    Assessment & Plan   ASSESSMENT:  Neema Hunt is 54, postoperative day 2 for lumbar surgery including removal of L2-L5 instrumentation as a redo decompression, diskectomy, posterior arthrodesis being stable with postoperative fever.      PLAN:   1.  Postop fever.   -- blood cultures, urinalysis and chest x-ray negative  -- likely post-op inflammation vs atelectasis.   2.  Hypertension.  We will continue the patient on his Lotrel, which includes Norvasc and lisinopril and benazepril   3.  Reflux disease.   -- continue the patient on ranitidine.   4. Disposition  -- ok for discharge home.     CODE STATUS:  Full.     Interval History   -- fever resolved  -- ready for discharge    -Data reviewed today: I reviewed all new labs and imaging over the last 24 hours. I personally reviewed no images or EKG's today.    Physical Exam   Heart Rate: 82, Blood pressure 107/59, pulse 88, temperature 97.9  F (36.6  C), temperature source Oral, resp. rate 22, height 1.778 m (5' 10\"), weight 89.7 kg (197 lb 12.8 oz), SpO2 97 %.  Vitals:    09/05/19 0637 09/06/19 0500   Weight: 84.8 kg (187 lb) 89.7 kg (197 lb 12.8 oz)     Vital Signs with Ranges  Temp:  [97.9  F (36.6  C)-100.7  F (38.2  C)] 97.9  F (36.6  C)  Pulse:  [88-99] 88  Heart Rate:  [] 82  Resp:  [16-22] 22  BP: ()/(57-69) 107/59  SpO2:  [97 %-98 %] 97 %  I/O's Last 24 hours  I/O last 3 completed shifts:  In: -   Out: 1525 [Urine:1525]    Constitutional: Awake, alert, cooperative, no apparent distress  Respiratory: Clear to auscultation bilaterally, no crackles or wheezing  Cardiovascular: Regular rate and rhythm, normal S1 and S2, and no murmur noted  GI: Normal bowel sounds, soft, non-distended, non-tender  Skin/Integumen: No rashes, no cyanosis, no edema  Other:      Medications   All medications were reviewed.      amLODIPine  10 mg Oral Daily    And     lisinopril  20 " mg Oral Daily     ranitidine  150 mg Oral Q12H    Or     famotidine  20 mg Intravenous Q12H     heparin in saline (CELL SAVER) formula   PERFUSION Once     senna-docusate  1 tablet Oral BID    Or     senna-docusate  2 tablet Oral BID     sodium chloride (PF)  3 mL Intracatheter Q8H        Data   Recent Labs   Lab 09/08/19  0828 09/07/19  0913 09/06/19  0703 09/05/19  0630   WBC 9.1 10.3  --   --    HGB 9.3* 9.7* 9.5*  --    MCV 86  --   --   --      --   --   --    *  --   --   --    POTASSIUM 3.3*  --   --  3.3*   CHLORIDE 97  --   --   --    CO2 31  --   --   --    BUN 8  --   --   --    CR 0.81  --   --  0.94   ANIONGAP 4  --   --   --    JASON 8.9  --   --   --    *  --   --   --        Recent Results (from the past 24 hour(s))   XR Chest 2 Views    Narrative    CHEST TWO VIEWS 9/7/2019 2:56 PM     HISTORY: Postop fever.    COMPARISON: None.    FINDINGS: Heart size and pulmonary vascularity are within normal  limits. The lungs are clear. No pneumothorax or pleural effusion.       Impression    IMPRESSION: No radiographic evidence of acute chest abnormality.     MD Jas CHÁVEZ MD  Text Page  (7am to 6pm)

## 2019-09-08 NOTE — DISCHARGE SUMMARY
[facility]    Discharge Summary  Neurosurgery    Date of Admission:  9/5/2019  Date of Discharge:  9/8/2019  Discharging Provider: Mónica Kraus  Date of Service (when I saw the patient): 09/08/19    Discharge Diagnoses   Active Problems:    Status post lumbar spinal fusion      History of Present Illness   Neema Hunt is an 54 year old male who presented with previous L3-5 fusion with new onset back and right lower extremity symptoms. MRI revealed a herniated disc at L2-L3 as well as disc space collapse and some scoliosis. On 9/5/2019 he underwent a L3-5 removal of previous instrumentation and replacement of L2-4 instrumentation with right L2-3 decompression, right L3-4 decompression, and right L2-3 TLIF. Today, he is sitting up at edge of bed and states he is feeling well. He has been urinating, passing gas, and tolerating diet. He has been up working with therapies, which has gone well. Notes some localized back pain, controlled with current pain regimen. No weakness.    Hospital Course   Neema Hunt was admitted on 9/5/2019.  The following problems were addressed during his hospitalization:    Active Problems:    Status post lumbar spinal fusion    Assessment: s/p L3-5 removal of hardware and L2-4 PSF    Plan:   -Discharge to home today  -Routine outpatient follow up  -Bowel regimen        I have discussed the following assessment and plan with Dr. Rony Marshall who is in agreement with initial plan and will follow up with further consultation recommendations.    Mónica Kraus PA-C  [facility]  [address] [address]    Tel [phone]  Pager [phone]        Pending Results   These results will be followed up by Dr. Marshall  Unresulted Labs Ordered in the Past 30 Days of this Admission     Date and Time Order Name Status Description    9/7/2019 0108 Blood culture Preliminary           Code Status   Full Code    Primary  Care Physician   Cornel Mittal    Physical Exam   Temp: 98.8  F (37.1  C) Temp src: Oral BP: 119/57 Pulse: 92 Heart Rate: 82 Resp: 18 SpO2: 97 % O2 Device: None (Room air)    Vitals:    09/05/19 0637 09/06/19 0500   Weight: 187 lb (84.8 kg) 197 lb 12.8 oz (89.7 kg)     Vital Signs with Ranges  Temp:  [98.8  F (37.1  C)-100.7  F (38.2  C)] 98.8  F (37.1  C)  Pulse:  [92-99] 92  Heart Rate:  [] 82  Resp:  [16-20] 18  BP: ()/(57-69) 119/57  SpO2:  [97 %-100 %] 97 %  I/O last 3 completed shifts:  In: -   Out: 1525 [Urine:1525]    Constitutional: Awake, alert, cooperative, no apparent distress, and appears stated age.  Eyes: Lids and lashes normal, pupils equal, round and reactive to light, extra ocular muscles intact  ENT: Normocephalic, without obvious abnormality, atraumatic  Respiratory: No increased work of breathing  Skin: No bruising or bleeding, normal skin color, texture, turgor, no redness, warmth, or swelling.  Incision covered with dressing CDI.  Musculoskeletal: There is no redness, warmth, or swelling of the joints.  Full range of motion noted.  Motor strength is 5 out of 5 all extremities bilaterally.  Tone is normal.  Neurologic: Awake, alert, oriented to name, place and time.  Cranial nerves II-XII are grossly intact.  Motor is 5 out of 5 bilaterally.     Neuropsychiatric: Calm, normal eye contact, alert, normal affect, oriented to self, place, time and situation, memory for past and recent events intact and thought process normal.       Time Spent on this Encounter   I, Mónica Kraus PA-C, personally saw the patient today and spent less than or equal to 30 minutes discharging this patient.    Discharge Disposition   Discharged to home  Condition at discharge: Stable    Consultations This Hospital Stay   OCCUPATIONAL THERAPY ADULT IP CONSULT  PHYSICAL THERAPY ADULT IP CONSULT  UROLOGY IP CONSULT  HOSPITALIST IP CONSULT     Discharge Medications   Current Discharge Medication List       START taking these medications    Details   methocarbamol (ROBAXIN) 750 MG tablet Take 1 tablet (750 mg) by mouth every 6 hours as needed for muscle spasms  Qty: 50 tablet, Refills: 1    Associated Diagnoses: Status post lumbar spinal fusion      oxyCODONE (ROXICODONE) 5 MG tablet Take 1-2 tablets (5-10 mg) by mouth every 4 hours as needed  Qty: 50 tablet, Refills: 0    Associated Diagnoses: Status post lumbar spinal fusion      !! senna-docusate (SENOKOT-S/PERICOLACE) 8.6-50 MG tablet Take 1 tablet by mouth 2 times daily  Qty: 50 tablet, Refills: 1    Associated Diagnoses: Status post lumbar spinal fusion       !! - Potential duplicate medications found. Please discuss with provider.      CONTINUE these medications which have NOT CHANGED    Details   acetaminophen (TYLENOL) 325 MG tablet Take 2 tablets (650 mg) by mouth every 4 hours as needed for other (surgical pain)  Qty: 100 tablet, Refills: 0    Associated Diagnoses: Lumbar radiculopathy, acute      amLODIPine-benazepril (LOTREL) 10-20 MG capsule Take 1 capsule by mouth daily      Cyanocobalamin (B-12 PO) Take 2 tablets by mouth daily       hydrocortisone 2.5 % ointment Apply topically 2 times daily as needed      lidocaine (LIDODERM) 5 % Patch Place 1 patch onto the skin every 24 hours  Qty: 30 patch, Refills: 2    Associated Diagnoses: Lumbar radiculopathy, acute; Post-operative state      MELATONIN PO Take 3 mg by mouth At Bedtime      !! Multiple Vitamins-Minerals (AIRBORNE PO) Take 1 chew tab by mouth daily      !! Multiple Vitamins-Minerals (MULTIVITAMIN ADULT PO) Take 2 tablets by mouth daily       Polyethylene Glycol 3350 (MIRALAX PO) Take 1 Dose by mouth daily      Probiotic Product (SOLUBLE FIBER/PROBIOTICS PO) Take 1 tablet by mouth daily       !! senna-docusate (SENOKOT-S/PERICOLACE) 8.6-50 MG tablet Take 1 tablet by mouth daily       !! - Potential duplicate medications found. Please discuss with provider.      STOP taking these medications        Omega-3 Fatty Acids (OMEGA 3 PO) Comments:   Reason for Stopping:             Allergies   No Known Allergies

## 2019-09-09 ENCOUNTER — TELEPHONE (OUTPATIENT)
Dept: NEUROSURGERY | Facility: CLINIC | Age: 54
End: 2019-09-09

## 2019-09-09 NOTE — TELEPHONE ENCOUNTER
Called and LVM for patient to call back to discuss incision site concerns. Patient is s/p L3-5 TLIF w/ discectomy with Dr. Marshall on 9/5.

## 2019-09-09 NOTE — TELEPHONE ENCOUNTER
"REASON FOR CALL: Had called pt to schedule 6 wk post-op appt. After appt was scheduled, pt stated his concern in regards to his incision site. He said that it is \"moist.\" He would like a call back to further discuss.   "

## 2019-09-10 ENCOUNTER — OFFICE VISIT (OUTPATIENT)
Dept: NEUROSURGERY | Facility: CLINIC | Age: 54
End: 2019-09-10
Attending: PHYSICIAN ASSISTANT
Payer: COMMERCIAL

## 2019-09-10 VITALS
WEIGHT: 190 LBS | BODY MASS INDEX: 27.2 KG/M2 | TEMPERATURE: 99.5 F | HEART RATE: 99 BPM | DIASTOLIC BLOOD PRESSURE: 71 MMHG | OXYGEN SATURATION: 97 % | HEIGHT: 70 IN | SYSTOLIC BLOOD PRESSURE: 115 MMHG

## 2019-09-10 DIAGNOSIS — Z98.1 S/P SPINAL FUSION: Primary | ICD-10-CM

## 2019-09-10 PROCEDURE — 99207 ZZC NO CHARGE NURSE ONLY: CPT

## 2019-09-10 PROCEDURE — 40000269 ZZH STATISTIC NO CHARGE FACILITY FEE

## 2019-09-10 RX ORDER — CEPHALEXIN 500 MG/1
500 CAPSULE ORAL 3 TIMES DAILY
Qty: 21 CAPSULE | Refills: 0 | Status: SHIPPED | OUTPATIENT
Start: 2019-09-10 | End: 2020-06-15

## 2019-09-10 ASSESSMENT — MIFFLIN-ST. JEOR: SCORE: 1713.08

## 2019-09-10 ASSESSMENT — PAIN SCALES - GENERAL: PAINLEVEL: SEVERE PAIN (6)

## 2019-09-10 NOTE — LETTER
9/10/2019         RE: Neema Hunt  [address]  [place] [address]        Dear Colleague,    Thank you for referring your patient, Neema Hunt, to the [facility]. Please see a copy of my visit note below.    Nurse Incision visit note:  Incision  Wound is healing nicely. Mild erythema, swelling towards the top of incision. Serosanguinous drainage noted upon palpation at site. Patient has low grade fever managed with tylenol.     Juan Pablo Saez PA-C examined incision site and felt it may be a possible seroma and did not appear to be infected but would like to put patient on prophylactic antibiotics as a precaution. He recommends keflex 500 mg three times daily X 7 days. Patient is to [place] and contact clinic RN once course of ABX is done to check in. Patient was in agreement with plan. Incision was cleaned and redressed by Magdalena Pate    Instructions for Patient    Keep your incision clean and dry at all times. Steri strips applied and should remain in place for 7-10 days.    Keflex antibiotic for one week. This has been sent to your pharmacy.     Call clinic RN after antibiotic course to check in regarding your incision site.     No bathing, swimming, or submerging in water until incision is well healed.      No lifting greater than 10-15 pounds. No bending, twisting, or overhead reaching.    Return as scheduled for post op visit     For refills on meds, please call our clinic. A nurse will call you back to obtain a pain assessment. Please call 3-4 business days before you run out so we can ensure there is a provider available at the location you prefer for .    Call the clinic or go to Emergency Room if you develop any new pain, drainage, swelling, or fever. Go to the Emergency Room if sudden onset of severe headache, weakness, confusion, change in level of consciousness, pain, or loss of movement.    Please call clinic with any further questions or concerns  [phone]        Lara, RN  [facility]  [address] [place] [address]  T:  [phone]  F:  [phone]      Again, thank you for allowing me to participate in the care of your patient.        Sincerely,         Neurosurgery Nurse

## 2019-09-10 NOTE — PROGRESS NOTES
Nurse Incision visit note:  Incision  Wound is healing nicely. Mild erythema, swelling towards the top of incision. Serosanguinous drainage noted upon palpation at site. Patient has low grade fever managed with tylenol.     Juan Pablo Saez PA-C examined incision site and felt it may be a possible seroma and did not appear to be infected but would like to put patient on prophylactic antibiotics as a precaution. He recommends keflex 500 mg three times daily X 7 days. Patient is to [facility] and contact clinic RN once course of ABX is done to check in. Patient was in agreement with plan. Incision was cleaned and redressed by Magdalena Pate MA.     Instructions for Patient    Keep your incision clean and dry at all times. Steri strips applied and should remain in place for 7-10 days.    Keflex antibiotic for one week. This has been sent to your pharmacy.     Call clinic RN after antibiotic course to check in regarding your incision site.     No bathing, swimming, or submerging in water until incision is well healed.      No lifting greater than 10-15 pounds. No bending, twisting, or overhead reaching.    Return as scheduled for post op visit     For refills on meds, please call our clinic. A nurse will call you back to obtain a pain assessment. Please call 3-4 business days before you run out so we can ensure there is a provider available at the location you prefer for .    Call the clinic or go to Emergency Room if you develop any new pain, drainage, swelling, or fever. Go to the Emergency Room if sudden onset of severe headache, weakness, confusion, change in level of consciousness, pain, or loss of movement.    Please call clinic with any further questions or concerns [phone]        MILLER Bermudez  [facility]  [address] [address]  T:  [phone]  F:  [phone]

## 2019-09-10 NOTE — PATIENT INSTRUCTIONS
Instructions for Patient    Keep your incision clean and dry at all times. Steri strips applied and should remain in place for 7-10 days.    Keflex antibiotic for one week. This has been sent to your pharmacy.     Call clinic RN after antibiotic course to check in regarding your incision site.     No bathing, swimming, or submerging in water until incision is well healed.      No lifting greater than 10-15 pounds. No bending, twisting, or overhead reaching.    Return as scheduled for post op visit     For refills on meds, please call our clinic. A nurse will call you back to obtain a pain assessment. Please call 3-4 business days before you run out so we can ensure there is a provider available at the location you prefer for .    Call the clinic or go to Emergency Room if you develop any new pain, drainage, swelling, or fever. Go to the Emergency Room if sudden onset of severe headache, weakness, confusion, change in level of consciousness, pain, or loss of movement.    Please call clinic with any further questions or concerns [phone]

## 2019-09-10 NOTE — NURSING NOTE
"Neema Hunt is a 54 year old male who presents for:  Chief Complaint   Patient presents with     Surgical Followup     Incision check for drainage reported by patient and temp. S/P L3-5 removal of hardware and L2-4 PSF on 9/5/19.         Initial Vitals:  /71 (BP Location: Right arm, Patient Position: Sitting, Cuff Size: Adult Large)   Pulse 99   Temp 99.5  F (37.5  C) (Oral)   Ht 5' 10\" (1.778 m)   Wt 190 lb (86.2 kg)   SpO2 97%   BMI 27.26 kg/m   Estimated body mass index is 27.26 kg/m  as calculated from the following:    Height as of this encounter: 5' 10\" (1.778 m).    Weight as of this encounter: 190 lb (86.2 kg).. Body surface area is 2.06 meters squared. BP completed using cuff size: X-large  Severe Pain (6)        Nursing Comments: Patient states his pain level today is at a 6 out of 10.         Magdalena Pate CMA    "

## 2019-09-10 NOTE — TELEPHONE ENCOUNTER
Spoke to patient. Patient has concerns regarding his incision site. He was discharged from the hospital on Sunday. He said by the time he got home he had soaked his dressing.  Since then he has been soaking through ABD dressings. Patient states watery red tinged fluid. Patient reports its hard for him to tell if if there is swelling at site. Patient reports a fever of 100 degrees. Patient has been treating with tylenol and applying ice to incision site. . Advised patient its best to come in to have RN look at incision site. Patient agreed with plan. He is schedules at [facility] at 1 pm today. He said he should be able to get a ride.

## 2019-09-11 ENCOUNTER — OFFICE VISIT (OUTPATIENT)
Dept: URGENT CARE | Facility: URGENT CARE | Age: 54
End: 2019-09-11
Payer: COMMERCIAL

## 2019-09-11 ENCOUNTER — HOSPITAL ENCOUNTER (EMERGENCY)
Facility: CLINIC | Age: 54
Discharge: HOME | End: 2019-09-11
Attending: EMERGENCY MEDICINE | Admitting: EMERGENCY MEDICINE
Payer: COMMERCIAL

## 2019-09-11 ENCOUNTER — APPOINTMENT (OUTPATIENT)
Dept: CT IMAGING | Facility: CLINIC | Age: 54
End: 2019-09-11
Attending: EMERGENCY MEDICINE
Payer: COMMERCIAL

## 2019-09-11 VITALS
WEIGHT: 190 LBS | OXYGEN SATURATION: 98 % | HEART RATE: 93 BPM | BODY MASS INDEX: 27.26 KG/M2 | SYSTOLIC BLOOD PRESSURE: 143 MMHG | DIASTOLIC BLOOD PRESSURE: 86 MMHG | TEMPERATURE: 100.4 F

## 2019-09-11 VITALS
HEART RATE: 94 BPM | BODY MASS INDEX: 27.2 KG/M2 | DIASTOLIC BLOOD PRESSURE: 81 MMHG | HEIGHT: 70 IN | OXYGEN SATURATION: 97 % | TEMPERATURE: 98.6 F | SYSTOLIC BLOOD PRESSURE: 139 MMHG | RESPIRATION RATE: 16 BRPM | WEIGHT: 190 LBS

## 2019-09-11 DIAGNOSIS — R50.9 FEVER IN ADULT: Primary | ICD-10-CM

## 2019-09-11 DIAGNOSIS — J18.9 PNEUMONIA OF RIGHT LOWER LOBE DUE TO INFECTIOUS ORGANISM: ICD-10-CM

## 2019-09-11 DIAGNOSIS — Z98.1 STATUS POST LUMBAR SPINAL FUSION: ICD-10-CM

## 2019-09-11 DIAGNOSIS — R06.02 SHORTNESS OF BREATH: ICD-10-CM

## 2019-09-11 DIAGNOSIS — R07.9 CHEST PAIN, UNSPECIFIED TYPE: ICD-10-CM

## 2019-09-11 LAB
ALBUMIN SERPL-MCNC: 2.9 G/DL (ref 3.4–5)
ALBUMIN UR-MCNC: NEGATIVE MG/DL
ALP SERPL-CCNC: 74 U/L (ref 40–150)
ALT SERPL W P-5'-P-CCNC: 26 U/L (ref 0–70)
ANION GAP SERPL CALCULATED.3IONS-SCNC: 4 MMOL/L (ref 3–14)
APPEARANCE UR: CLEAR
AST SERPL W P-5'-P-CCNC: 22 U/L (ref 0–45)
BASOPHILS # BLD AUTO: 0 10E9/L (ref 0–0.2)
BASOPHILS NFR BLD AUTO: 0.1 %
BILIRUB SERPL-MCNC: 0.4 MG/DL (ref 0.2–1.3)
BILIRUB UR QL STRIP: NEGATIVE
BUN SERPL-MCNC: 10 MG/DL (ref 7–30)
CALCIUM SERPL-MCNC: 9 MG/DL (ref 8.5–10.1)
CHLORIDE SERPL-SCNC: 102 MMOL/L (ref 94–109)
CO2 SERPL-SCNC: 29 MMOL/L (ref 20–32)
COLOR UR AUTO: YELLOW
CREAT SERPL-MCNC: 0.78 MG/DL (ref 0.66–1.25)
DIFFERENTIAL METHOD BLD: ABNORMAL
EOSINOPHIL # BLD AUTO: 0.1 10E9/L (ref 0–0.7)
EOSINOPHIL NFR BLD AUTO: 0.6 %
ERYTHROCYTE [DISTWIDTH] IN BLOOD BY AUTOMATED COUNT: 12.9 % (ref 10–15)
GFR SERPL CREATININE-BSD FRML MDRD: >90 ML/MIN/{1.73_M2}
GLUCOSE SERPL-MCNC: 125 MG/DL (ref 70–99)
GLUCOSE UR STRIP-MCNC: NEGATIVE MG/DL
HCT VFR BLD AUTO: 25.1 % (ref 40–53)
HGB BLD-MCNC: 8.6 G/DL (ref 13.3–17.7)
HGB UR QL STRIP: NEGATIVE
IMM GRANULOCYTES # BLD: 0.1 10E9/L (ref 0–0.4)
IMM GRANULOCYTES NFR BLD: 0.8 %
KETONES UR STRIP-MCNC: NEGATIVE MG/DL
LACTATE BLD-SCNC: 0.8 MMOL/L (ref 0.7–2)
LEUKOCYTE ESTERASE UR QL STRIP: NEGATIVE
LIPASE SERPL-CCNC: 108 U/L (ref 73–393)
LYMPHOCYTES # BLD AUTO: 1.1 10E9/L (ref 0.8–5.3)
LYMPHOCYTES NFR BLD AUTO: 10.7 %
MCH RBC QN AUTO: 29.2 PG (ref 26.5–33)
MCHC RBC AUTO-ENTMCNC: 34.3 G/DL (ref 31.5–36.5)
MCV RBC AUTO: 85 FL (ref 78–100)
MONOCYTES # BLD AUTO: 1.4 10E9/L (ref 0–1.3)
MONOCYTES NFR BLD AUTO: 14.1 %
MUCOUS THREADS #/AREA URNS LPF: PRESENT /LPF
NEUTROPHILS # BLD AUTO: 7.5 10E9/L (ref 1.6–8.3)
NEUTROPHILS NFR BLD AUTO: 73.7 %
NITRATE UR QL: NEGATIVE
PH UR STRIP: 6.5 PH (ref 5–7)
PLATELET # BLD AUTO: 445 10E9/L (ref 150–450)
POTASSIUM SERPL-SCNC: 4.3 MMOL/L (ref 3.4–5.3)
PROT SERPL-MCNC: 8.4 G/DL (ref 6.8–8.8)
RBC # BLD AUTO: 2.95 10E12/L (ref 4.4–5.9)
RBC #/AREA URNS AUTO: <1 /HPF (ref 0–2)
SODIUM SERPL-SCNC: 135 MMOL/L (ref 133–144)
SOURCE: ABNORMAL
SP GR UR STRIP: 1.01 (ref 1–1.03)
SQUAMOUS #/AREA URNS AUTO: <1 /HPF (ref 0–1)
TROPONIN I SERPL-MCNC: <0.015 UG/L (ref 0–0.04)
UROBILINOGEN UR STRIP-MCNC: NORMAL MG/DL (ref 0–2)
WBC # BLD AUTO: 10.2 10E9/L (ref 4–11)
WBC #/AREA URNS AUTO: <1 /HPF (ref 0–5)

## 2019-09-11 PROCEDURE — 99285 EMERGENCY DEPT VISIT HI MDM: CPT | Mod: 25

## 2019-09-11 PROCEDURE — 25000125 ZZHC RX 250: Performed by: EMERGENCY MEDICINE

## 2019-09-11 PROCEDURE — 81001 URINALYSIS AUTO W/SCOPE: CPT | Performed by: EMERGENCY MEDICINE

## 2019-09-11 PROCEDURE — 71275 CT ANGIOGRAPHY CHEST: CPT

## 2019-09-11 PROCEDURE — 96361 HYDRATE IV INFUSION ADD-ON: CPT

## 2019-09-11 PROCEDURE — 25000128 H RX IP 250 OP 636: Performed by: EMERGENCY MEDICINE

## 2019-09-11 PROCEDURE — 85025 COMPLETE CBC W/AUTO DIFF WBC: CPT | Performed by: EMERGENCY MEDICINE

## 2019-09-11 PROCEDURE — 99214 OFFICE O/P EST MOD 30 MIN: CPT | Performed by: PHYSICIAN ASSISTANT

## 2019-09-11 PROCEDURE — 87040 BLOOD CULTURE FOR BACTERIA: CPT | Mod: XU | Performed by: EMERGENCY MEDICINE

## 2019-09-11 PROCEDURE — 83690 ASSAY OF LIPASE: CPT | Performed by: EMERGENCY MEDICINE

## 2019-09-11 PROCEDURE — 93005 ELECTROCARDIOGRAM TRACING: CPT

## 2019-09-11 PROCEDURE — 25000132 ZZH RX MED GY IP 250 OP 250 PS 637: Performed by: EMERGENCY MEDICINE

## 2019-09-11 PROCEDURE — 83605 ASSAY OF LACTIC ACID: CPT | Performed by: EMERGENCY MEDICINE

## 2019-09-11 PROCEDURE — 84484 ASSAY OF TROPONIN QUANT: CPT | Performed by: EMERGENCY MEDICINE

## 2019-09-11 PROCEDURE — 96360 HYDRATION IV INFUSION INIT: CPT | Mod: 59

## 2019-09-11 PROCEDURE — 80053 COMPREHEN METABOLIC PANEL: CPT | Performed by: EMERGENCY MEDICINE

## 2019-09-11 RX ORDER — METHOCARBAMOL 500 MG/1
500 TABLET, FILM COATED ORAL ONCE
Status: COMPLETED | OUTPATIENT
Start: 2019-09-11 | End: 2019-09-11

## 2019-09-11 RX ORDER — OXYCODONE HYDROCHLORIDE 5 MG/1
5 TABLET ORAL
Status: COMPLETED | OUTPATIENT
Start: 2019-09-11 | End: 2019-09-11

## 2019-09-11 RX ORDER — ACETAMINOPHEN 325 MG/1
650 TABLET ORAL ONCE
Status: COMPLETED | OUTPATIENT
Start: 2019-09-11 | End: 2019-09-11

## 2019-09-11 RX ORDER — OXYCODONE HYDROCHLORIDE 5 MG/1
5-10 TABLET ORAL EVERY 4 HOURS PRN
Qty: 50 TABLET | Refills: 0 | Status: SHIPPED | OUTPATIENT
Start: 2019-09-12 | End: 2020-06-15

## 2019-09-11 RX ORDER — IOPAMIDOL 755 MG/ML
70 INJECTION, SOLUTION INTRAVASCULAR ONCE
Status: COMPLETED | OUTPATIENT
Start: 2019-09-11 | End: 2019-09-11

## 2019-09-11 RX ORDER — AZITHROMYCIN 250 MG/1
TABLET, FILM COATED ORAL
Qty: 6 TABLET | Refills: 0 | Status: SHIPPED | OUTPATIENT
Start: 2019-09-11 | End: 2020-06-15

## 2019-09-11 RX ORDER — CEPHALEXIN 500 MG/1
500 CAPSULE ORAL ONCE
Status: COMPLETED | OUTPATIENT
Start: 2019-09-11 | End: 2019-09-11

## 2019-09-11 RX ADMIN — SODIUM CHLORIDE 1000 ML: 9 INJECTION, SOLUTION INTRAVENOUS at 18:22

## 2019-09-11 RX ADMIN — IOPAMIDOL 70 ML: 755 INJECTION, SOLUTION INTRAVENOUS at 19:23

## 2019-09-11 RX ADMIN — OXYCODONE HYDROCHLORIDE 5 MG: 5 TABLET ORAL at 18:37

## 2019-09-11 RX ADMIN — ACETAMINOPHEN 650 MG: 325 TABLET ORAL at 18:37

## 2019-09-11 RX ADMIN — METHOCARBAMOL 500 MG: 500 TABLET, FILM COATED ORAL at 18:37

## 2019-09-11 RX ADMIN — CEPHALEXIN 500 MG: 500 CAPSULE ORAL at 18:37

## 2019-09-11 RX ADMIN — SODIUM CHLORIDE 94 ML: 9 INJECTION, SOLUTION INTRAVENOUS at 19:23

## 2019-09-11 ASSESSMENT — ENCOUNTER SYMPTOMS
VOMITING: 0
ABDOMINAL PAIN: 0
SHORTNESS OF BREATH: 1
MYALGIAS: 0
FEVER: 1

## 2019-09-11 ASSESSMENT — MIFFLIN-ST. JEOR: SCORE: 1713.08

## 2019-09-11 NOTE — PROGRESS NOTES
CHIEF COMPLAINT:   Chief Complaint   Patient presents with     Urgent Care     Breathing Problem     X2 days. Increased SOB with laying down.        HPI: Neema Hunt is a 54 year old male who presents to clinic today for evaluation of chest pain and shortness of breath. Patient reports that for the past 1 day he has been experiencing right sided chest pain, pain with inspiration and orthopnea. He reports that when he first lies down he is gasping for air. Walking does not increase his SOB. On 9/05/2019, he underwent lumbar fusion. He has been experiencing fever since this time. He was seen by Neurosurgery yesterday, and started on Keflex as prophylactic for infection. He had a CXR performed on 9/7/2019, which was negative for infection. The wound continues to have serosanguinous drainage. He denies having new leg pain or swelling. He denies having abdominal pain, nausea, vomiting or diarrhea. Has had daily Bowel movement.     Past Medical History:   Diagnosis Date     Back pain      Gastro-oesophageal reflux disease      Hypertension      Radiculopathy      Scoliosis      Past Surgical History:   Procedure Laterality Date     EXPLORE SPINE, REMOVE HARDWARE, COMBINED N/A 9/5/2019    Procedure: REMOVAL LUMBAR L3-5 INSTRUMENTATION;  Surgeon: Nhan Marshall MD;  Location: [facility] OR     FUSION SPINE ANTERIOR MINIMALLY INVASIVE TWO LEVELS N/A 11/16/2016    Procedure: FUSION SPINE ANTERIOR MINIMALLY INVASIVE TWO LEVELS;  Surgeon: Nhan Marshall MD;  Location: [facility] OR     HERNIA REPAIR      right inguinal hernia     LAMINECTOMY LUMBAR POSTERIOR MICROSCOPIC ONE LEVEL  4/9/2013    Procedure: LAMINECTOMY LUMBAR POSTERIOR MICROSCOPIC ONE LEVEL;  Right Lumbar 3-4 Micro Laminectomy & Foraminotomy;  Surgeon: Nhan Marshall MD;  Location: [facility] OR     OPTICAL TRACKING SYSTEM FUSION SPINE POSTERIOR LUMBAR PERCUTANEOUS TWO LEVELS N/A 11/16/2016    Procedure: OPTICAL TRACKING SYSTEM FUSION SPINE POSTERIOR LUMBAR  PERCUTANEOUS TWO LEVELS;  Surgeon: Nhan Marshall MD;  Location:  OR     OPTICAL TRACKING SYSTEM FUSION SPINE POSTERIOR LUMBAR THREE+ LEVELS Right 9/5/2019    Procedure: POSTERIOR SEGMENTAL INSTRUMENTATION L2-4, RIGHT LUMBAR 2-3 DISCECTOMY AND TRANSFORAMINAL INTERBODY FUSION, REDO DECOMPRESSION RIGHT L3-4, POSTERIOR ARTHRODESIS L2-4;  Surgeon: Nhan Marshall MD;  Location:  OR     ORTHOPEDIC SURGERY      left ankle, right finger     Social History     Tobacco Use     Smoking status: Never Smoker     Smokeless tobacco: Never Used   Substance Use Topics     Alcohol use: Yes     Comment: 1-2 drinks/week     Current Outpatient Medications   Medication     acetaminophen (TYLENOL) 325 MG tablet     amLODIPine-benazepril (LOTREL) 10-20 MG capsule     cephALEXin (KEFLEX) 500 MG capsule     Cyanocobalamin (B-12 PO)     hydrocortisone 2.5 % ointment     lidocaine (LIDODERM) 5 % Patch     MELATONIN PO     methocarbamol (ROBAXIN) 750 MG tablet     Multiple Vitamins-Minerals (AIRBORNE PO)     Multiple Vitamins-Minerals (MULTIVITAMIN ADULT PO)     [START ON 9/12/2019] oxyCODONE (ROXICODONE) 5 MG tablet     Polyethylene Glycol 3350 (MIRALAX PO)     Probiotic Product (SOLUBLE FIBER/PROBIOTICS PO)     senna-docusate (SENOKOT-S/PERICOLACE) 8.6-50 MG tablet     senna-docusate (SENOKOT-S/PERICOLACE) 8.6-50 MG tablet     No current facility-administered medications for this visit.      No Known Allergies    10 point ROS of systems including Constitutional, Eyes, Respiratory, Cardiovascular, Gastroenterology, Genitourinary, Integumentary, Muscularskeletal, Psychiatric were all negative except for pertinent positives noted in my HPI.        Exam:  BP (!) 143/86   Pulse 93   Temp 100.4  F (38  C) (Oral)   Wt 86.2 kg (190 lb)   SpO2 98%   BMI 27.26 kg/m    Constitutional: healthy, alert and no distress  Head: Normocephalic, atraumatic.  Eyes: conjunctiva clear, no drainage  ENT: MMM  Cardiovascular: RRR  Respiratory:  CTA bilaterally, no rhonchi or rales  Chest wall: No chest wall tenderness  Skin: no rashes  Neurologic: Speech clear, gait normal. Moves all extremities.      ASSESSMENT/PLAN:  1. Fever in adult    2. Chest pain, unspecified type    3. Shortness of breath    54 year old male with a history of recent lumbar surgery on 9/5/2019, has had fever since the surgery and latest CXR on 9/7/2019 was clear. For one day, he has had SOB, pleuritic chest pain and orthopnea. VSS in clinic. Discussed that I am concerned about PE and Pneumonia. Could perform CXR in clinic, but even if infiltrate is noted, I am not convinced that his symptoms are not 2/2 PE. Sending him to [facility] ER for further evaluation and care.     Arlin Foote PA-C

## 2019-09-11 NOTE — ED AVS SNAPSHOT
Emergency Department  [address] [address]  Phone:  [phone]  Fax:  [phone]                                    Neema Hunt   MRN: [id number]    Department:   Emergency Department   Date of Visit:  9/11/2019           After Visit Summary Signature Page    I have received my discharge instructions, and my questions have been answered. I have discussed any challenges I see with this plan with the nurse or doctor.    ..........................................................................................................................................  Patient/Patient Representative Signature      ..........................................................................................................................................  Patient Representative Print Name and Relationship to Patient    ..................................................               ................................................  Date                                   Time    ..........................................................................................................................................  Reviewed by Signature/Title    ...................................................              ..............................................  Date                                               Time          22EPIC Rev 08/18

## 2019-09-11 NOTE — ED PROVIDER NOTES
History     Chief Complaint:  Shortness of Breath    The history is provided by the patient.      Neema Hunt is a 54 year old male who is POD 6 s/p removal of lumbar instrumentation with replacement of segmental instrumentation L2-L4, right L2-L3 decompression and transforaminal interbody fusion and redo decompression right L3-4. He had postoperative fever of unknown cause and was discharged 3 days ago. He continued to have fever to 102.1F at home and some discharge from the operative site. He was seen by neurosurgery yesterday and started on prophylactic Keflex (has taken 3 doses so far). However, once he got home from this appointment yesterday he developed right posterior chest pain, pleuritic in nature, and shortness of breath. This prompted visit to Urgent Care who referred him to the ER. Notably his oral temperature there was 100.4F. He feels his pain is well controlled with oxycodone, Robaxin, and Tylenol. He denies vomiting, anterior chest pain, leg pain, new leg swelling, or abdominal pain. His family feels his mild cough is increased from that of discharge and he rarely has production of clear sputum.     Allergies:  No Known Drug Allergies     Medications:    Lotrel  Keflex  Lidoderm 5% patch  Melatonin  Robaxin  Oxycodone  Miralax  Senna-docusate    Past Medical History:    Back pain  GERD  Hypertension  Radiculopathy  Scoliosis  Osteoarthritis of spine with radiculopathy, lumbar region     Past Surgical History:    Explore spine, remove hardware, combined  Fusion spine anterior minimally invasive two levels  Hernia repair - right inguinal  Laminectomy lumbar posterior microscopic one level  Optical tracking system fusion spine posterior lumbar percutaneous two levels  Optical tracking system fusion spine posterior lumbar three levels  Orthopedic surgery - left ankle right finger    Family History:    Mother - asthma, hypertension, arthritis     Social History:  The patient was accompanied to  "the ED by family.  Smoking Status: No  Smokeless Tobacco: No  Alcohol Use: Yes  Drug Use: No   Marital Status:       Review of Systems   Constitutional: Positive for fever.   Respiratory: Positive for cough and shortness of breath.    Cardiovascular: Negative for chest pain and leg swelling.   Gastrointestinal: Negative for abdominal pain and vomiting.   Musculoskeletal: Positive for back pain (right posterior chest wall). Negative for myalgias.   Skin: Positive for wound (surgical).   All other systems reviewed and are negative.    Physical Exam     Patient Vitals for the past 24 hrs:   BP Temp Temp src Pulse Resp SpO2 Height Weight   09/11/19 2024 139/81 -- -- 94 -- 97 % -- --   09/11/19 2000 -- -- -- -- -- 97 % -- --   09/11/19 1738 (!) 142/83 98.6  F (37  C) Oral 91 16 99 % 1.778 m (5' 10\") 86.2 kg (190 lb)        Physical Exam  General: Well-developed and well-nourished. Well appearing middle aged  man. Cooperative.  Head:  Atraumatic.  Eyes:  Conjunctivae, lids, and sclerae are normal.  ENT:    Normal nose. Moist mucous membranes.  Neck:  Supple. Normal range of motion.  CV:  Regular rate and rhythm. Normal heart sounds with no murmurs, rubs, or gallops detected.  Resp:  No respiratory distress. Clear to auscultation bilaterally without decreased breath sounds, wheezing, rales, or rhonchi.  GI:  Soft. Non-distended. Non-tender.    MS:  Normal ROM. No bilateral lower extremity edema or calf tenderness. No tenderness to the right inferior thoracic back/ribs in area of pain.  Skin:  Warm. Non-diaphoretic. No pallor. Lumbar surgical incision with dressing intact with small amount of serosanguinous drainage on dressing. There is no surrounding erythema, purulent exudate, focal tenderness, crepitus, induration, or fluctuance.  Neuro:  Awake. A&Ox3. Normal strength.  Psych: Normal mood and affect. Normal speech.  Vitals reviewed.     Emergency Department Course     EKG  Indication: Shortness " of breath  Time: 1758  Rate 88 bpm. VA interval 148. QRS duration 86. QT/QTc 370/447.   Normal sinus rhythm  Possible anterior infarct, age undetermined  Abnormal ECG  No acute ST changes.  No significant change as compared to prior, dated 8/27/19.     Imaging:  Radiology findings were communicated with the patient and family who voiced understanding of the findings.  CT Chest Pulmonary Embolism w Contrast:  IMPRESSION:  1. No evidence of pulmonary embolism. Thoracic aorta is unremarkable.  2. Bibasilar infiltrate and areas of atelectasis are noted. Right  lower lobe pneumonia is possible. No pleural fluid is evident.  Reading per radiology.     Laboratory:  Laboratory findings were communicated with the patient and family who voiced understanding of the findings.  CBC: HGB 8.6 (L) o/w WNL (WBC 10.2, )  CMP: Glucose 125 (H), Albumin 2.9 (L) o/w WNL (Creatinine 0.78)  Lipase: 108  Troponin (Collected 1818): <0.015  Blood Cultures: Pending  Lactic Acid (Collected at 1818): 0.8     UA with Microscopic: Mucous Urine Present (A) o/w WNL     Interventions:  1822 0.9% NaCl Bolus 1000 mL IV  1837 Roxicodone 5 mg PO  1837 Tylenol 650 mg PO  1837 Robaxin 500 mg PO  1837 Keflex 500 mg PO     Emergency Department Course:  Nursing notes and vitals reviewed.  The patient was sent for a CT Chest Pulmonary Embolism w Contrast while in the emergency department, results above.    IV was inserted and blood was drawn for laboratory testing, results above.  The patient provided a urine sample here in the emergency department. This was sent for laboratory testing, findings above.  EKG obtained in the ED, see results above.      (1739)   I performed an exam of the patient as documented above. History obtained from patient and family.    (2002)   Rechecked patient. Discussed plan of care and offered admission. However, patient reports he feels like he is otherwise doing well and would prefer to be discharged.     (2019)   Rechecked  patient. Plan of care discussed.    I discussed the treatment plan with the patient. He expressed understanding of this plan and consented to discharge. He will be discharged home with instructions for care and follow up. In addition, the patient will return to the emergency department if his symptoms persist, worsen, if new symptoms arise or if there is any concern.  All questions were answered.     I personally reviewed the laboratory results with the patient and family and answered all related questions prior to discharge.    Impression & Plan      Medical Decision Making:  Neema is a 54-year-old man who is postoperative day 6 status post lumbar spine surgery as per HPI. His postoperative course was complicated by fever to 102F of uncertain cause.  He was seen by neurosurgery yesterday due to drainage from his wound and persistent fevers.  He was empirically started on Keflex.  He presents today because since that visit he has now developed a right thoracic back/posterior chest pain that is pleuritic in nature and associated with shortness of breath.  He has only a mild cough though his family states this does seem to be worsening since discharge which is productive of clear sputum.  He appears well on exam and has no lower extremity edema or tenderness though certainly PE is to be considered.  His surgical wound has no surrounding areas of cellulitis and has no purulent drainage though the bandage does have serosanguineous drainage noted.  EKG is reassuring without acute ST changes or arrhythmias.  Urinalysis reveals no evidence of infection as a possible cause for his ongoing fevers.  Blood cultures were obtained though it should be noted he is afebrile here and has no leukocytosis.  Anemia to 8.6 is near his baseline.  Laboratory tests are otherwise unremarkable including a normal lactic acid.  He was sent for CT angiogram of the chest which reveals no pulmonary embolus but with bibasilar infiltrates and  atelectasis.  Right lower lobe pneumonia is possible per radiology report.  It is unclear to me if patient truly has pneumonia.  He does have worsening cough and persistent fevers though his sputum is clear and he has no leukocytosis.  He is afebrile here.  He required no interventions aside from his usual oxycodone, Robaxin, Tylenol, and a dose of scheduled Keflex.  I discussed the results with him as well as possible pneumonia on the right.  I felt it prudent to treat as pneumonia given postoperative fevers and now shortness of breath with pain in the area.  I advised patient that this should be treated as a healthcare associated pneumonia with IV antibiotics.  However, he does not want to be admitted and states he would prefer to take antibiotics as an outpatient and see how he does.  He has no evidence of severe or overwhelming disease at this time and is a reliable patient, agreeing to return should he have persistent symptoms or development of new concerns as he did both yesterday when he needed his wound evaluated and today when he had onset of right posterior chest pain.  As such, I will discharge him at his request but I will add azithromycin to his Keflex to cover for possible pneumonia.  He understands this is not provide adequate coverage for healthcare pneumonia.  There is no evidence of wound or postoperative site infection at this time though he agrees to return immediately should he have change in drainage from his surgical site, increased pain, redness, or any other concerns.  He and his family understand this very low threshold for return for both possible pneumonia and persistent postoperative fevers.  He understands he will require IV antibiotics and admission should he return to the emergency department.  Otherwise, he was continue his home pain medication regimen and follow-up with neurosurgery or primary care.  All questions answered.  Discharged at his request.    Diagnosis:    ICD-10-CM     1. Pneumonia of right lower lobe due to infectious organism (H) J18.1         Disposition:   Discharged.    Discharge Medications:     Medication List      Started    azithromycin 250 MG tablet  Commonly known as:  ZITHROMAX Z-KILEY  Two tablets on the first day, then one tablet daily for the next 4 days            Scribe Disclosure:  Ignacia MUNGUIA, am serving as a scribe at 5:47 PM on 9/11/2019 to document services personally performed by Nel Brasher MD, based on my observations and the provider's statements to me.  9/11/2019    EMERGENCY DEPARTMENT       Nel Brasher MD  09/13/19 [id number]

## 2019-09-11 NOTE — TELEPHONE ENCOUNTER
Prescription Refill Request    Medication: Oxycodone 5 mg     Surgical procedure/date: s/p L3-5 removal of hardware and L2-4 PSF 9/5/19 by Dr Marshall.     Current regimen/number of tabs per day: 2 tabs every 4 hours.     Last refill:  9/8/19    Pain : low back pain     location: [facility] pulled- no red flags     Any patient questions or concerns: patient reports SOB when he got home from clinic visit from us yesterday around 3 pm  SOB. He did not start his Abx until later yesterday evening. He states he is continuing to have clear red tinged drainage but incision site remains intact and looks good otherwise today. States he is up and waling, using his IS, SOB is somewhat better but still there. Conitnue to have low grade temp. Managing with tyenol. Advised patient to call PCP or go in to Urgent Care regarding SOB.     Will forward request to care team for medication approval.

## 2019-09-12 LAB — INTERPRETATION ECG - MUSE: NORMAL

## 2019-09-12 NOTE — DISCHARGE INSTRUCTIONS
Since you wish to be discharged, I will add azithromycin to your Keflex and continue monitoring closely.  However, you should return to the emergency department if you have worsening cough, right-sided pain, return of fever greater than 101  F, or ANY OTHER CONCERNS.  This would require IV antibiotics and admission.  Your surgeon should know about continued drainage from your surgical site as well as your ER visit and any fevers.  Please follow-up with them closely. If you have increased back pain, redness around incision, change in the kind of discharge, or any other concerning features for surgical site infection, immediately return to the ER.   Continue home pain medication regimen.

## 2019-09-13 LAB
BACTERIA SPEC CULT: NO GROWTH
Lab: NORMAL
SPECIMEN SOURCE: NORMAL

## 2019-09-13 ASSESSMENT — ENCOUNTER SYMPTOMS
COUGH: 1
WOUND: 1
BACK PAIN: 1

## 2019-09-16 ENCOUNTER — NURSE TRIAGE (OUTPATIENT)
Dept: NURSING | Facility: CLINIC | Age: 54
End: 2019-09-16

## 2019-09-16 NOTE — TELEPHONE ENCOUNTER
Patient is calling back to say that he was seen by Dr. Brasher in the ED and is now doing better.  Patient report he had right chest pain and SOB when laying down prior to being seen.  Patient completed the ZPak last night; still on Keflex for one more day (4 doses left).  Patient report breathing is much better; no SOB, no fever (temp has been 100 F or less).  Patient has not follow up with his PCP but will send him a note.      Reason for Disposition    [1] Other NON-URGENT information for PCP AND [2] does not require PCP response    Additional Information    Negative: Lab calling with strep throat test results and triager can call in prescription    Negative: Lab calling with urinalysis test results and triager can call in prescription    Negative: Medication questions    Negative: ED call to PCP    Negative: Physician call to PCP    Negative: Call about patient who is currently hospitalized    Negative: Lab or radiology calling with CRITICAL test results    Negative: [1] Prescription not at pharmacy AND [2] was prescribed today by PCP    Negative: [1] Follow-up call from patient regarding patient's clinical status AND [2] information urgent    Negative: [1] Caller requests to speak ONLY to PCP AND [2] URGENT question    Negative: [1] Caller requests to speak to PCP now AND [2] won't tell us reason for call  (Exception: if 10 pm to 6 am, caller must first discuss reason for the call)    Negative: Notification of hospital admission    Negative: Notification of death    Negative: Caller requesting lab results    Negative: Lab or radiology calling with test results    Negative: [1] Follow-up call from patient regarding patient's clinical status AND [2] information NON-URGENT    Negative: [1] Caller requests to speak ONLY to PCP AND [2] NON-URGENT question    Negative: Caller requesting an appointment, triage offered and declined    Protocols used: PCP CALL - NO TRIAGE-A-

## 2019-09-17 LAB
BACTERIA SPEC CULT: NO GROWTH
BACTERIA SPEC CULT: NO GROWTH
Lab: NORMAL
Lab: NORMAL
SPECIMEN SOURCE: NORMAL
SPECIMEN SOURCE: NORMAL

## 2019-09-30 ENCOUNTER — HEALTH MAINTENANCE LETTER (OUTPATIENT)
Age: 54
End: 2019-09-30

## 2019-10-18 ENCOUNTER — OFFICE VISIT (OUTPATIENT)
Dept: NEUROSURGERY | Facility: CLINIC | Age: 54
End: 2019-10-18
Attending: PHYSICIAN ASSISTANT
Payer: COMMERCIAL

## 2019-10-18 VITALS
HEIGHT: 70 IN | RESPIRATION RATE: 16 BRPM | DIASTOLIC BLOOD PRESSURE: 71 MMHG | OXYGEN SATURATION: 100 % | SYSTOLIC BLOOD PRESSURE: 114 MMHG | HEART RATE: 61 BPM | BODY MASS INDEX: 28.35 KG/M2 | TEMPERATURE: 98.2 F | WEIGHT: 198 LBS

## 2019-10-18 DIAGNOSIS — Z98.1 STATUS POST LUMBAR SPINAL FUSION: Primary | ICD-10-CM

## 2019-10-18 PROCEDURE — G0463 HOSPITAL OUTPT CLINIC VISIT: HCPCS

## 2019-10-18 PROCEDURE — 99024 POSTOP FOLLOW-UP VISIT: CPT | Performed by: PHYSICIAN ASSISTANT

## 2019-10-18 ASSESSMENT — PAIN SCALES - GENERAL: PAINLEVEL: MODERATE PAIN (4)

## 2019-10-18 ASSESSMENT — MIFFLIN-ST. JEOR: SCORE: 1749.37

## 2019-10-18 NOTE — LETTER
[facility]  [address] [place] [address]  [id number]    October 18, 2019      To Whom it May Concern,          Neema Hunt was seen in clinic on 10/18/2019.  He is to remain off of work another 6 weeks while he works with physical therapy to improve conditioning. He will follow up in 6 weeks and will reassess at that time.              Sincerely,        Mónica Kraus PA-C  [facility]  [address] [address]    Tel [phone]

## 2019-10-18 NOTE — LETTER
10/18/2019         RE: Neema Hunt  [address] [place] [address]        Dear Colleague,    Thank you for referring your patient, Neema Hunt, to the [facility]. Please see a copy of my visit note below.    [facility]  Neurosurgery followup:    HPI: 6 weeks s/p revision L2-5 PSF.  Recovering well with significant improvement in back and leg pain. States his right leg also feels much stronger and that he is able to get into the car much easier. His incision is also well healed and states the antibiotics were very helpful. 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 No weakness.  Exam:  Constitutional:  Alert, well nourished, NAD.  HEENT: Normocephalic, atraumatic.   Pulm:  Without shortness of breath   CV:  No pitting edema of BLE.      Neurological:  Awake  Alert  Oriented x 3  Motor exam:        IP Q DF PF EHL  R   5  5   5   5    5  L   5  5   5   5    5     Reflexes are 2+ in the patellar and Achilles. There is no clonus. Downgoing Babinski.    Able to spontaneously move L/E bilaterally  Sensation intact throughout all L/E dermatomes     Incision: Healing nicely  A/P: 6 weeks s/p revision L2-5 PSF.  Recovering well with significant improvement in back and leg pain. Incision healing nicely. No weakness on exam. Will have him start a course of PT prior to returning to work to work on deconditioning. Given letter to reflect this. Discussed increasing lifting restriction to 20lbs. Will have him follow up in 6 weeks with xray prior. Patient voiced understanding and agreement.  - May increase lifting restriction to 20 pounds   - followup in 6 weeks with xray prior   - Call the clinic at [phone] for increased pain or any other questions and concerns.      Mónica Kraus PA-C  [facility]  [address]  [address] [place] [id number]    Tel [phone]  Pager [phone]      Again, thank you for  allowing me to participate in the care of your patient.        Sincerely,        Mónica Kraus PA-C

## 2019-10-18 NOTE — PATIENT INSTRUCTIONS
-Physical therapy ordered - they will contact you to schedule  - May increase lifting restriction to 20 pounds   - followup in 6 weeks with xray prior   - Call the clinic at [phone] for increased pain or any other questions and concerns.

## 2019-10-18 NOTE — PROGRESS NOTES
[facility]  Neurosurgery followup:    HPI: 6 weeks s/p revision L2-5 PSF.  Recovering well with significant improvement in back and leg pain. States his right leg also feels much stronger and that he is able to get into the car much easier. His incision is also well healed and states the antibiotics were very helpful. 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 No weakness.  Exam:  Constitutional:  Alert, well nourished, NAD.  HEENT: Normocephalic, atraumatic.   Pulm:  Without shortness of breath   CV:  No pitting edema of BLE.      Neurological:  Awake  Alert  Oriented x 3  Motor exam:        IP Q DF PF EHL  R   5  5   5   5    5  L   5  5   5   5    5     Reflexes are 2+ in the patellar and Achilles. There is no clonus. Downgoing Babinski.    Able to spontaneously move L/E bilaterally  Sensation intact throughout all L/E dermatomes     Incision: Healing nicely  A/P: 6 weeks s/p revision L2-5 PSF.  Recovering well with significant improvement in back and leg pain. Incision healing nicely. No weakness on exam. Will have him start a course of PT prior to returning to work to work on deconditioning. Given letter to reflect this. Discussed increasing lifting restriction to 20lbs. Will have him follow up in 6 weeks with xray prior. Patient voiced understanding and agreement.  - May increase lifting restriction to 20 pounds   - followup in 6 weeks with xray prior   - Call the clinic at [phone] for increased pain or any other questions and concerns.      Mónica Kraus PA-C  [facility]  [address]  [address] [place] [id number]    Tel [phone]  Pager [phone]

## 2019-10-18 NOTE — NURSING NOTE
"Neema Hunt is a 54 year old male who presents for:  Chief Complaint   Patient presents with     Follow Up        Initial Vitals:  /71   Pulse 61   Temp 98.2  F (36.8  C) (Oral)   Resp 16   Ht 5' 10\" (1.778 m)   Wt 198 lb (89.8 kg)   SpO2 100%   BMI 28.41 kg/m   Estimated body mass index is 28.41 kg/m  as calculated from the following:    Height as of this encounter: 5' 10\" (1.778 m).    Weight as of this encounter: 198 lb (89.8 kg).. Body surface area is 2.11 meters squared. BP completed using cuff size: regular  Moderate Pain (4)        Nursing Comments: Pt present today for follow up.        Radames Fernandez CMA    "

## 2019-10-21 ENCOUNTER — TELEPHONE (OUTPATIENT)
Dept: NEUROSURGERY | Facility: CLINIC | Age: 54
End: 2019-10-21

## 2019-10-21 DIAGNOSIS — M54.16 LUMBAR RADICULOPATHY: Primary | ICD-10-CM

## 2019-10-21 RX ORDER — GABAPENTIN 100 MG/1
300 CAPSULE ORAL 3 TIMES DAILY
Qty: 90 CAPSULE | Refills: 1 | Status: SHIPPED | OUTPATIENT
Start: 2019-10-21 | End: 2020-01-08

## 2019-10-21 NOTE — TELEPHONE ENCOUNTER
Patient is calling to request gabapentin for his achy bilateral lower leg 6/10 pain, right greater than left. This pain is worse at night or with increased activity. This started again approximately 3 post operatively, S/p lumbar fusion on 9/5/19 He states that Dr. Marshall prescribed gabapentin in the past for these symptoms. Will forward care team for review.

## 2019-10-21 NOTE — TELEPHONE ENCOUNTER
Called and informed patient that Mónica PILLAI approved Rx for gabapentin, Rx sent to patients preferred pharmacy.

## 2019-10-23 ENCOUNTER — THERAPY VISIT (OUTPATIENT)
Dept: PHYSICAL THERAPY | Facility: CLINIC | Age: 54
End: 2019-10-23
Payer: COMMERCIAL

## 2019-10-23 DIAGNOSIS — Z98.1 STATUS POST LUMBAR SPINAL FUSION: ICD-10-CM

## 2019-10-23 DIAGNOSIS — M54.50 BILATERAL LOW BACK PAIN WITHOUT SCIATICA: ICD-10-CM

## 2019-10-23 DIAGNOSIS — Z98.1 S/P LUMBAR FUSION: ICD-10-CM

## 2019-10-23 PROCEDURE — 97112 NEUROMUSCULAR REEDUCATION: CPT | Mod: GP | Performed by: PHYSICAL THERAPIST

## 2019-10-23 PROCEDURE — 97161 PT EVAL LOW COMPLEX 20 MIN: CPT | Mod: GP | Performed by: PHYSICAL THERAPIST

## 2019-10-23 PROCEDURE — 97110 THERAPEUTIC EXERCISES: CPT | Mod: GP | Performed by: PHYSICAL THERAPIST

## 2019-10-24 PROBLEM — M54.50 BILATERAL LOW BACK PAIN WITHOUT SCIATICA: Status: ACTIVE | Noted: 2019-10-24

## 2019-10-24 PROBLEM — Z98.1 S/P LUMBAR FUSION: Status: ACTIVE | Noted: 2019-10-24

## 2019-10-24 NOTE — PROGRESS NOTES
[facility] Initial Evaluation  Subjective:    Neema Hunt being seen for Physical Therapy.   Where condition occurred: other.  and reported as 4/10 on pain scale. General health as reported by patient is good. Pertinent medical history includes:  Calf pain-swelling-warmth, high blood pressure, numbness/tingling and pain at night/rest.    Surgeries include:  Orthopedic surgery.  Current medications:  Anti-inflammatory, pain medication and sleep medication.   Primary job tasks include:  Lifting/carrying, operating a machine/assembly, prolonged standing, pushing/pulling and repetitive tasks.  Pain is described as aching and is intermittent. Pain is worse during the day. Since onset symptoms are gradually improving. Special tests:  CT scan, MRI and x-ray. Previous treatment includes physical therapy and surgery. There was significant improvement following previous treatment.   Patient is [employer]  ([employer] Restrictions include:  Currently not working due to present treatment.    Barriers include:  Stairs.    Type of problem:  Lumbar   Condition occurred with:  Degenerative joint disease. This is a chronic condition   Problem details: S/p L2-5 fusion.   Patient reports pain:  Lower lumbar spine and lumbar spine right. Radiates to:  Gluteals left, gluteals right and thigh right. Associated symptoms:  Loss of motion/stiffness and loss of strength. Symptoms are exacerbated by lifting, bending, walking and standing and relieved by rest and NSAID's.                      Objective:  System         Lumbar/SI Evaluation  ROM:  Arom wnl lumbar: Formal AROM not assessed due to recent fusion.      Lumbar Myotomes:  normal                      Functional Tests:  Core strength and proprioception lumbar: poor lumbopelvic control.                                                         General     ROS    Assessment/Plan:    Patient is a 54 year old male with lumbar complaints.    Patient has the  following significant findings with corresponding treatment plan.                Diagnosis 1:  L2-5 fusion  Pain -  manual therapy, splint/taping/bracing/orthotics, self management and education  Decreased ROM/flexibility - manual therapy and therapeutic exercise  Decreased strength - therapeutic exercise and therapeutic activities  Impaired muscle performance - neuro re-education    Therapy Evaluation Codes:   Cumulative Therapy Evaluation is: Low complexity.    Previous and current functional limitations:  (See Goal Flow Sheet for this information)    Short term and Long term goals: (See Goal Flow Sheet for this information)     Communication ability:  Patient appears to be able to clearly communicate and understand verbal and written communication and follow directions correctly.  Treatment Explanation - The following has been discussed with the patient:   RX ordered/plan of care  Anticipated outcomes  Possible risks and side effects  This patient would benefit from PT intervention to resume normal activities.   Rehab potential is good.    Frequency:  1 X week, once daily  Duration:  for 8 weeks  Discharge Plan:  Achieve all LTG.  Independent in home treatment program.  Reach maximal therapeutic benefit.    Please refer to the daily flowsheet for treatment today, total treatment time and time spent performing 1:1 timed codes.

## 2019-10-30 ENCOUNTER — THERAPY VISIT (OUTPATIENT)
Dept: PHYSICAL THERAPY | Facility: CLINIC | Age: 54
End: 2019-10-30
Payer: COMMERCIAL

## 2019-10-30 DIAGNOSIS — M54.50 BILATERAL LOW BACK PAIN WITHOUT SCIATICA: ICD-10-CM

## 2019-10-30 DIAGNOSIS — Z98.1 S/P LUMBAR FUSION: ICD-10-CM

## 2019-10-30 PROCEDURE — 97112 NEUROMUSCULAR REEDUCATION: CPT | Mod: GP | Performed by: PHYSICAL THERAPIST

## 2019-10-30 PROCEDURE — 97110 THERAPEUTIC EXERCISES: CPT | Mod: GP | Performed by: PHYSICAL THERAPIST

## 2019-11-06 ENCOUNTER — THERAPY VISIT (OUTPATIENT)
Dept: PHYSICAL THERAPY | Facility: CLINIC | Age: 54
End: 2019-11-06
Payer: COMMERCIAL

## 2019-11-06 DIAGNOSIS — Z98.1 S/P LUMBAR FUSION: ICD-10-CM

## 2019-11-06 DIAGNOSIS — M54.50 BILATERAL LOW BACK PAIN WITHOUT SCIATICA: ICD-10-CM

## 2019-11-06 PROCEDURE — 97110 THERAPEUTIC EXERCISES: CPT | Mod: GP | Performed by: PHYSICAL THERAPIST

## 2019-11-06 PROCEDURE — 97112 NEUROMUSCULAR REEDUCATION: CPT | Mod: GP | Performed by: PHYSICAL THERAPIST

## 2019-11-06 PROCEDURE — 97530 THERAPEUTIC ACTIVITIES: CPT | Mod: GP | Performed by: PHYSICAL THERAPIST

## 2019-11-19 ENCOUNTER — THERAPY VISIT (OUTPATIENT)
Dept: PHYSICAL THERAPY | Facility: CLINIC | Age: 54
End: 2019-11-19
Payer: COMMERCIAL

## 2019-11-19 DIAGNOSIS — M54.50 BILATERAL LOW BACK PAIN WITHOUT SCIATICA: ICD-10-CM

## 2019-11-19 DIAGNOSIS — Z98.1 S/P LUMBAR FUSION: ICD-10-CM

## 2019-11-19 PROCEDURE — 97110 THERAPEUTIC EXERCISES: CPT | Mod: GP | Performed by: PHYSICAL THERAPIST

## 2019-11-19 PROCEDURE — 97530 THERAPEUTIC ACTIVITIES: CPT | Mod: GP | Performed by: PHYSICAL THERAPIST

## 2019-11-19 PROCEDURE — 97112 NEUROMUSCULAR REEDUCATION: CPT | Mod: GP | Performed by: PHYSICAL THERAPIST

## 2019-11-19 NOTE — PROGRESS NOTES
PROGRESS  REPORT    Progress reporting period is from 10/23/19 to 11/19/19. Pt has attended 4 total PT sessions s/p L2-5 fusion on 9/5/19. Pt reports doing well overall with PT focusing on neutral spine core stabilization, gluteal strengthening, postural control, and lifting mechanics. Pt has been walking for ~60 min without issues and hoping to progress to biking as well. Pt does report occasional R lower leg achiness in the morning but overall much better. Pt has adhered to 20# lifting restriction and hoping to return to work next week pending MD evaluation.     SUBJECTIVE  Subjective: Pt doing okay, but had some increased soreness this morning. Reports adherence to HEP and feels his abdominals have gotten stronger     Current pain level is 2/10  .     Previous pain level was  5/10  .   Changes in function:  Yes (See Goal flowsheet attached for changes in current functional level)  Adverse reaction to treatment or activity: None    OBJECTIVE  Changes noted in objective findings:  Yes,     Normal gait mechanics    Good tolerance for progressive neutral spine core stabilization     Able to squat lift 20# for 10 reps w/o pain (current wt restriction limit, reports needing to lift 50# for work)     ASSESSMENT/PLAN  Updated problem list and treatment plan: Diagnosis 1:  L2-5 Fusion    STG/LTGs have been met or progress has been made towards goals:  Yes (See Goal flow sheet completed today.)  Assessment of Progress: The patient's condition is improving.  Self Management Plans:  Patient has been instructed in a home treatment program.  Patient is independent in a home treatment program.  I have re-evaluated this patient and find that the nature, scope, duration and intensity of the therapy is appropriate for the medical condition of the patient.  Neema continues to require the following intervention to meet STG and LTG's:  PT    Recommendations:  This patient would benefit from continued therapy.     Frequency:  2 X a  month, once daily  Duration:  for 1 months        Please refer to the daily flowsheet for treatment today, total treatment time and time spent performing 1:1 timed codes.

## 2019-11-25 ENCOUNTER — ANCILLARY PROCEDURE (OUTPATIENT)
Dept: GENERAL RADIOLOGY | Facility: CLINIC | Age: 54
End: 2019-11-25
Attending: PHYSICIAN ASSISTANT
Payer: COMMERCIAL

## 2019-11-25 ENCOUNTER — OFFICE VISIT (OUTPATIENT)
Dept: NEUROSURGERY | Facility: CLINIC | Age: 54
End: 2019-11-25
Attending: NURSE PRACTITIONER
Payer: COMMERCIAL

## 2019-11-25 ENCOUNTER — TELEPHONE (OUTPATIENT)
Dept: NEUROSURGERY | Facility: CLINIC | Age: 54
End: 2019-11-25

## 2019-11-25 VITALS
OXYGEN SATURATION: 98 % | BODY MASS INDEX: 28.63 KG/M2 | SYSTOLIC BLOOD PRESSURE: 124 MMHG | WEIGHT: 200 LBS | TEMPERATURE: 98.4 F | HEIGHT: 70 IN | DIASTOLIC BLOOD PRESSURE: 79 MMHG | HEART RATE: 65 BPM

## 2019-11-25 DIAGNOSIS — Z98.1 STATUS POST LUMBAR SPINAL FUSION: Primary | ICD-10-CM

## 2019-11-25 DIAGNOSIS — Z98.1 STATUS POST LUMBAR SPINAL FUSION: ICD-10-CM

## 2019-11-25 PROCEDURE — 72100 X-RAY EXAM L-S SPINE 2/3 VWS: CPT

## 2019-11-25 PROCEDURE — G0463 HOSPITAL OUTPT CLINIC VISIT: HCPCS

## 2019-11-25 PROCEDURE — 99024 POSTOP FOLLOW-UP VISIT: CPT | Performed by: NURSE PRACTITIONER

## 2019-11-25 ASSESSMENT — PAIN SCALES - GENERAL: PAINLEVEL: MODERATE PAIN (5)

## 2019-11-25 ASSESSMENT — MIFFLIN-ST. JEOR: SCORE: 1758.44

## 2019-11-25 NOTE — TELEPHONE ENCOUNTER
Patient returned called Caren RODRIGUEZ reviewed XR results, per Caren RODRIGUEZ okay to write letter for patient to return to work on 11/28. He will be sending paper work via [employer].

## 2019-11-25 NOTE — PATIENT INSTRUCTIONS
- May increase lifting restriction to 30 pounds and increase activity as tolerated.  - Follow up in 3 months.  - Call the clinic at [phone] for increased pain or any other questions and concerns.

## 2019-11-25 NOTE — PROGRESS NOTES
[facility] Neurosurgery  Neurosurgery Follow Up:    HPI: 3 month s/p revision L2-5 PSF. Doing well with improvement in back and leg pain. Continues to work with therapies. He continues to remain off work while working with therapies. No weakness.   Exam:  Constitutional:  Alert, well nourished, NAD.  HEENT: Normocephalic, atraumatic.   Pulm:  Without shortness of breath   CV:  No pitting edema of BLE.      Neurological:  Awake  Alert  Oriented x 3  Motor exam:        IP Q DF PF EHL  R   5  5   5   5    5  L   5  5   5   5    5     Able to spontaneously move L/E bilaterally  Sensation intact throughout all L/E dermatomes     Incisions:  Healing nicely.  Imaging: AP and lateral films reveal intact and stable hardware.     A/P: 3 month s/p revision L2-5 PSF. Doing well with improvement in back and leg pain. Continues to work with therapies. He continues to remain off work while working with therapies. No weakness. Will obtain XR following office visit. I will contact him with the results. He states he will let the clinic know what date he is to return to work and we will write a letter for him and have it emailed per his request. He will follow up in 3 months. He verbalized understanding and agreement.    Patient Instructions   - May increase lifting restriction to 30 pounds and increase activity as tolerated.  - Follow up in 3 months.  - Call the clinic at [phone] for increased pain or any other questions and concerns.    Caren Light, JENNIFER  [facility]surgery  [address] [place] [address]  [address] [place] [address]  Tel [phone]  Fax [phone]

## 2019-11-25 NOTE — LETTER
11/25/2019         RE: Neema Hunt  [address] [place] [address]        Dear Colleague,    Thank you for referring your patient, Neema Hunt, to the [facility] NEUROSURGERY CLINIC. Please see a copy of my visit note below.    [facility] Neurosurgery  Neurosurgery Follow Up:    HPI: 3 month s/p revision L2-5 PSF. Doing well with improvement in back and leg pain. Continues to work with therapies. He continues to remain off work while working with therapies. No weakness.   Exam:  Constitutional:  Alert, well nourished, NAD.  HEENT: Normocephalic, atraumatic.   Pulm:  Without shortness of breath   CV:  No pitting edema of BLE.      Neurological:  Awake  Alert  Oriented x 3  Motor exam:        IP Q DF PF EHL  R   5  5   5   5    5  L   5  5   5   5    5     Able to spontaneously move L/E bilaterally  Sensation intact throughout all L/E dermatomes     Incisions:  Healing nicely.  Imaging: AP and lateral films reveal intact and stable hardware.     A/P: 3 month s/p revision L2-5 PSF. Doing well with improvement in back and leg pain. Continues to work with therapies. He continues to remain off work while working with therapies. No weakness. Will obtain XR following office visit. I will contact him with the results. He states he will let the clinic know what date he is to return to work and we will write a letter for him and have it emailed per his request. He will follow up in 3 months. He verbalized understanding and agreement.    Patient Instructions   - May increase lifting restriction to 30 pounds and increase activity as tolerated.  - Follow up in 3 months.  - Call the clinic at [phone] for increased pain or any other questions and concerns.    Caren Light, JENNIFER  [facility] Neurosurgery  [address] [place] [address]  [address] [place] [address], [place] [address]  Tel [phone]  Fax [phone]      Again, thank you for allowing me to participate in the care of your  patient.        Sincerely,        Caren Light, NP

## 2019-11-25 NOTE — LETTER
[facility]  Neurosurgery Clinic  [address] [place] [address]  [id number]      November 25, 2019    Re: Neema Salinas Gilbert      To Whom it May Concern,      Neema is being seen at our clinic for post-operative follow up care. He is able to return to work on 11/28/19 with the restrictions of:    -No heavy lifting greater than 30 pounds   -Increase activity as tolerated    He will be re-evaluated at his next follow up visit. Please call our clinic with questions or concerns.       Sincerely,          Caren Light, CNP  [phone]

## 2019-11-25 NOTE — TELEPHONE ENCOUNTER
Patient left a message on the nurse line, attempted to return call. LVM requesting a return call.  Per Caren CHEEMA normal hardware stable.

## 2019-12-30 ENCOUNTER — OFFICE VISIT (OUTPATIENT)
Dept: NEUROSURGERY | Facility: CLINIC | Age: 54
End: 2019-12-30
Attending: PHYSICIAN ASSISTANT
Payer: COMMERCIAL

## 2019-12-30 VITALS
SYSTOLIC BLOOD PRESSURE: 147 MMHG | OXYGEN SATURATION: 99 % | DIASTOLIC BLOOD PRESSURE: 97 MMHG | BODY MASS INDEX: 28.63 KG/M2 | HEART RATE: 69 BPM | TEMPERATURE: 98.5 F | HEIGHT: 70 IN | WEIGHT: 200 LBS

## 2019-12-30 DIAGNOSIS — Z98.1 STATUS POST LUMBAR SPINAL FUSION: Primary | ICD-10-CM

## 2019-12-30 PROCEDURE — 99213 OFFICE O/P EST LOW 20 MIN: CPT | Performed by: PHYSICIAN ASSISTANT

## 2019-12-30 PROCEDURE — G0463 HOSPITAL OUTPT CLINIC VISIT: HCPCS

## 2019-12-30 RX ORDER — COVID-19 ANTIGEN TEST
440 KIT MISCELLANEOUS 2 TIMES DAILY
Status: ON HOLD | COMMUNITY
End: 2021-05-29

## 2019-12-30 RX ORDER — ASPIRIN 81 MG/1
81 TABLET, CHEWABLE ORAL DAILY
COMMUNITY
End: 2020-06-15

## 2019-12-30 RX ORDER — OMEGA-3 FATTY ACIDS/FISH OIL 300-1000MG
1 CAPSULE ORAL EVERY MORNING
COMMUNITY

## 2019-12-30 ASSESSMENT — MIFFLIN-ST. JEOR: SCORE: 1758.44

## 2019-12-30 ASSESSMENT — PAIN SCALES - GENERAL: PAINLEVEL: SEVERE PAIN (7)

## 2019-12-30 NOTE — PROGRESS NOTES
"[facility] Neurosurgery  Neurosurgery followup:    HPI: 4 months s/p revision L2-5 revision PSF with Dr. Marshall. States he has done relatively well post op with minimal back and right thigh pain. He has had increased right shin pain, worse with ambulating greater distances. Has been taking gabpentin at night and continuing with home PT, with slight relief. He has been working 8 hour days with restrictions and feels he may be overdoing it (wearing steel toe boots has been bothersome). No weakness.  Exam:  Constitutional:  Alert, well nourished, NAD.  HEENT: Normocephalic, atraumatic.   Pulm:  Without shortness of breath   CV:  No pitting edema of BLE.      BP (!) 147/97 (BP Location: Right arm, Patient Position: Sitting, Cuff Size: Adult Large)   Pulse 69   Temp 98.5  F (36.9  C) (Oral)   Ht 5' 10\" (1.778 m)   Wt 200 lb (90.7 kg)   SpO2 99%   BMI 28.70 kg/m      Neurological:  Awake  Alert  Oriented x 3  Motor exam:        IP Q DF PF EHL  R   5  5   5   5    5  L   5  5   5   5    5     Reflexes are 2+ in the patellar and Achilles. There is no clonus. Downgoing Babinski.    Able to spontaneously move L/E bilaterally  Sensation intact throughout all L/E dermatomes     A/P:  4 months s/p revision L2-5 revision PSF with Dr. Marshall. States he has done relatively well post op with minimal back and right thigh pain. He has had increased right shin pain, worse with ambulating greater distances. Full strength on exam. Current localized right shin pain likely related to shin splints. In regards to his current work restrictions, will have him start a work hardening program and can continue with current restrictions at this time while he completes program. Follow up in 2 months for 6m post op. Patient voiced understanding and agreement.    Discussed plan with Dr. Marshall who is in agreement.        Mónica Kraus PA-C  [facility]  [address] [place] [address]  [address] [place] [address]    Tel " [phone]  Pager [phone]

## 2019-12-30 NOTE — LETTER
"    12/30/2019         RE: Neema Hunt  [address] [place] [address]        Dear Colleague,    Thank you for referring your patient, Neema Hunt, to the [facility] NEUROSURGERY CLINIC. Please see a copy of my visit note below.    [facility] Neurosurgery  Neurosurgery followup:    HPI: 4 months s/p revision L2-5 revision PSF with Dr. Marshall. States he has done relatively well post op with minimal back and right thigh pain. He has had increased right shin pain, worse with ambulating greater distances. Has been taking gabpentin at night and continuing with home PT, with slight relief. He has been working 8 hour days with restrictions and feels he may be overdoing it (wearing steel toe boots has been bothersome). No weakness.  Exam:  Constitutional:  Alert, well nourished, NAD.  HEENT: Normocephalic, atraumatic.   Pulm:  Without shortness of breath   CV:  No pitting edema of BLE.      BP (!) 147/97 (BP Location: Right arm, Patient Position: Sitting, Cuff Size: Adult Large)   Pulse 69   Temp 98.5  F (36.9  C) (Oral)   Ht 5' 10\" (1.778 m)   Wt 200 lb (90.7 kg)   SpO2 99%   BMI 28.70 kg/m       Neurological:  Awake  Alert  Oriented x 3  Motor exam:        IP Q DF PF EHL  R   5  5   5   5    5  L   5  5   5   5    5     Reflexes are 2+ in the patellar and Achilles. There is no clonus. Downgoing Babinski.    Able to spontaneously move L/E bilaterally  Sensation intact throughout all L/E dermatomes     A/P:  4 months s/p revision L2-5 revision PSF with Dr. Marshall. States he has done relatively well post op with minimal back and right thigh pain. He has had increased right shin pain, worse with ambulating greater distances. Full strength on exam. Current localized right shin pain likely related to shin splints. In regards to his current work restrictions, will have him start a work hardening program and can continue with current restrictions at this time while he completes program. " Follow up in 2 months for 6m post op. Patient voiced understanding and agreement.    Discussed plan with Dr. Marshall who is in agreement.        Mónica Kraus PA-C  [facility] Neurosurgery  [address] [place] [address]    Tel [phone]  Pager [phone]      Again, thank you for allowing me to participate in the care of your patient.        Sincerely,        Mónica Kraus PA-C

## 2019-12-30 NOTE — NURSING NOTE
"Neema Hunt is a 54 year old male who presents for:  Chief Complaint   Patient presents with     Follow Up     per Jade for new symptoms.         Initial Vitals:  BP (!) 147/97 (BP Location: Right arm, Patient Position: Sitting, Cuff Size: Adult Large)   Pulse 69   Temp 98.5  F (36.9  C) (Oral)   Ht 5' 10\" (1.778 m)   Wt 200 lb (90.7 kg)   SpO2 99%   BMI 28.70 kg/m   Estimated body mass index is 28.7 kg/m  as calculated from the following:    Height as of this encounter: 5' 10\" (1.778 m).    Weight as of this encounter: 200 lb (90.7 kg).. Body surface area is 2.12 meters squared. BP completed using cuff size: large  Severe Pain (7)        Nursing Comments: Patient states that he has right sided leg pain and gait issues.         Magdalena Pate, KELLY    "

## 2019-12-31 ENCOUNTER — TELEPHONE (OUTPATIENT)
Dept: NEUROSURGERY | Facility: CLINIC | Age: 54
End: 2019-12-31

## 2019-12-31 ENCOUNTER — MYC MEDICAL ADVICE (OUTPATIENT)
Dept: NEUROSURGERY | Facility: CLINIC | Age: 54
End: 2019-12-31

## 2019-12-31 NOTE — TELEPHONE ENCOUNTER
Laurence called stating that [facility] only has work hardening programs available for work comp cases. Stated can do PT for general strength and conditioning.   [facility] Per patient he would like for the referral to be sent to [facility].   Phone: [phone]  Fax: [phone]

## 2019-12-31 NOTE — TELEPHONE ENCOUNTER
Spoke to patient.    Patient is returning call to Mónica regarding the VM she left this morning- Dr. Marshall recommends work hardening program at this time. Mónica placed referral to [facility] program. Mónica LVM with patient asking him where we can send a work letter for continuing current restriction while in program. Patient is currently on a 30 lb lifting restriction.     Patient is wondering if the letter can include excusing him from work 1/1/20 to 1/15/20 so he can participate in the work hardening program.    Patient would like letter emailed to him at [email or web address]    Will send to Mónica PILLAI for approval.

## 2019-12-31 NOTE — LETTER
[facility]   Neurosurgery Clinic   [address] [facility]  [address] [place] [address]  [address]        To Whom it May Concern,      Neema Hunt is being seen at our clinic for post-operative follow up care. Due to necessary post-operative healing and recovery and while he completes a work conditioning program, please excuse him from work for from 1/1-1/20.    He will be re-evaluated at their next follow up visit. Please call our clinic with questions or concerns.       Sincerely,      Mónica Kraus PA-C

## 2019-12-31 NOTE — TELEPHONE ENCOUNTER
Reason For Call: Laurence from work hardening calling requesting additional information about a referal sent over for Neema she can be reached at [phone].

## 2020-01-02 ENCOUNTER — TELEPHONE (OUTPATIENT)
Dept: NEUROSURGERY | Facility: CLINIC | Age: 55
End: 2020-01-02

## 2020-01-02 ENCOUNTER — MYC MEDICAL ADVICE (OUTPATIENT)
Dept: NEUROSURGERY | Facility: CLINIC | Age: 55
End: 2020-01-02

## 2020-01-02 NOTE — TELEPHONE ENCOUNTER
"Sp[facility] with Mona at [facility] and she states that the OT referral form needs to be changed to PT and that it should not say [facility] as his insurance will not cover. Stated that it needs to say \"right leg strengthening\" and to fax order to [phone].  "

## 2020-01-02 NOTE — TELEPHONE ENCOUNTER
Josie from PT clinic call in regard to [facility] pt [facility]hea stating that the PT orders that was put in is wrong and needs to be changed from occupational to physical PT. She can be reached at:  PH: [phone]  FAX:[phone]

## 2020-01-02 NOTE — TELEPHONE ENCOUNTER
Per mark messages, patient would like letter to be emailed to [email or web address]. Per zaynab Armstrong writing letter for patient to be off work 1/1-1/20 while completing work conditioning. Letter sent.

## 2020-01-03 ENCOUNTER — TELEPHONE (OUTPATIENT)
Dept: NEUROSURGERY | Facility: CLINIC | Age: 55
End: 2020-01-03

## 2020-01-03 NOTE — TELEPHONE ENCOUNTER
Estuardo from [facility] called in regards to order that was faxed yesterday. Had some questions wondering if this is a WC case and what type of insurance? Order may need to be changed. She can be reached at [phone].

## 2020-01-06 ENCOUNTER — TELEPHONE (OUTPATIENT)
Dept: NEUROSURGERY | Facility: CLINIC | Age: 55
End: 2020-01-06

## 2020-01-06 NOTE — TELEPHONE ENCOUNTER
Chen from [employer] called requesting information on mutual patient. Claim # [id number]. Called and spoke to . Gave all relevant information. They will call us back if they have any further questions.

## 2020-01-08 DIAGNOSIS — M54.16 LUMBAR RADICULOPATHY: ICD-10-CM

## 2020-01-08 RX ORDER — GABAPENTIN 100 MG/1
300 CAPSULE ORAL 3 TIMES DAILY
Qty: 90 CAPSULE | Refills: 1 | Status: SHIPPED | OUTPATIENT
Start: 2020-01-08 | End: 2020-08-28

## 2020-01-08 NOTE — TELEPHONE ENCOUNTER
Received refill requesting refill of Gabapentin. Patient is s/p POSTERIOR SEGMENTAL INSTRUMENTATION L2-4, RIGHT LUMBAR 2-3 DISCECTOMY AND TRANSFORAMINAL INTERBODY FUSION, REDO DECOMPRESSION RIGHT L3-4, POSTERIOR ARTHRODESIS L2-4 with Dr. Marshall on 9/5/19. Patient states he is taking 3 tablets at night only. Called and LVM for him to return call to discuss before initiating refill.

## 2020-01-08 NOTE — TELEPHONE ENCOUNTER
Called and spoke to patient. States that he is taking three, 100 mg tabs at night to help sleep and that it has significantly helped with his leg pain. Will route message to care team for approval.

## 2020-01-10 ENCOUNTER — MYC MEDICAL ADVICE (OUTPATIENT)
Dept: NEUROSURGERY | Facility: CLINIC | Age: 55
End: 2020-01-10

## 2020-01-10 ENCOUNTER — DOCUMENTATION ONLY (OUTPATIENT)
Dept: NEUROSURGERY | Facility: CLINIC | Age: 55
End: 2020-01-10

## 2020-01-10 NOTE — TELEPHONE ENCOUNTER
Spoke to patient. 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     Will draft up letter and have provider sign. Patient would like letter emailed to him. His email is [email or web address].

## 2020-01-10 NOTE — LETTER
[facility]    Neurosurgery Clinic  [address] [place] [address]  [id number]      To Whom It May Concern,          Neema Hunt may return to work on 1/20/2020 as long as the following restrictions can be accommodated:    - No lifting greater than 30 lbs.  -4 hrs per day for the first two weeks  -then 6 hours per day for two weeks  -then gradually increase to 8 hours per day   -Patient will be reassessed end of February or early March 2020.       Sincerely,            Juan Pablo Saez PA-C  [facility]  Neurosurgery Clinic   [address] [place] [id number]

## 2020-01-17 ENCOUNTER — TRANSFERRED RECORDS (OUTPATIENT)
Dept: HEALTH INFORMATION MANAGEMENT | Facility: CLINIC | Age: 55
End: 2020-01-17

## 2020-01-28 ENCOUNTER — MYC MEDICAL ADVICE (OUTPATIENT)
Dept: NEUROSURGERY | Facility: CLINIC | Age: 55
End: 2020-01-28

## 2020-01-28 DIAGNOSIS — Z98.1 STATUS POST LUMBAR SPINAL FUSION: Primary | ICD-10-CM

## 2020-01-28 DIAGNOSIS — M54.16 LUMBAR RADICULOPATHY: ICD-10-CM

## 2020-01-29 NOTE — TELEPHONE ENCOUNTER
Ok per Dr Marshall to order updated lumbar MRI d/t patient's continues radicular symptoms. Order placed in Epic.     Called and discussed with patient. Patient is in agreement with plan and very grateful to get updated imaging. Provided patient with central scheduling number. Informed patient that he would be contacted once results reviewed by Dr Marshall. He verbalized understanding.

## 2020-02-05 ENCOUNTER — MYC MEDICAL ADVICE (OUTPATIENT)
Dept: NEUROSURGERY | Facility: CLINIC | Age: 55
End: 2020-02-05

## 2020-02-05 ENCOUNTER — HOSPITAL ENCOUNTER (OUTPATIENT)
Dept: MRI IMAGING | Facility: CLINIC | Age: 55
Discharge: HOME | End: 2020-02-05
Attending: NEUROLOGICAL SURGERY | Admitting: NEUROLOGICAL SURGERY
Payer: COMMERCIAL

## 2020-02-05 DIAGNOSIS — Z98.1 STATUS POST LUMBAR SPINAL FUSION: ICD-10-CM

## 2020-02-05 DIAGNOSIS — M54.16 LUMBAR RADICULOPATHY: ICD-10-CM

## 2020-02-05 PROCEDURE — 72148 MRI LUMBAR SPINE W/O DYE: CPT

## 2020-02-17 NOTE — TELEPHONE ENCOUNTER
Per Dr Marshall: He has a new herniated disc at L1-2. I am happy to see him to review and see what might be the best course of action.    Called and discussed above info per Dr marshall. Patient verbalized understanding and in agreement with plan. Patient is scheduled to see Dr Marshall 2/21/20 at 2:30 pm in [place].     Also, patient states he is currently working 6 hours per day , cannot walk long distance. He is due to go up to 8 hours per day which he said he should be able to tolerate as long as he takes breaks and maintain 30 lb weight restriction. He denies any need for updated work letter or PW for [employer] at this time. He will be reassessed with Dr Marshall on 2/21 so may change then.

## 2020-02-21 ENCOUNTER — OFFICE VISIT (OUTPATIENT)
Dept: NEUROSURGERY | Facility: CLINIC | Age: 55
End: 2020-02-21
Attending: NEUROLOGICAL SURGERY
Payer: COMMERCIAL

## 2020-02-21 VITALS
OXYGEN SATURATION: 99 % | DIASTOLIC BLOOD PRESSURE: 78 MMHG | HEART RATE: 70 BPM | WEIGHT: 200 LBS | SYSTOLIC BLOOD PRESSURE: 117 MMHG | HEIGHT: 70 IN | RESPIRATION RATE: 18 BRPM | BODY MASS INDEX: 28.63 KG/M2

## 2020-02-21 DIAGNOSIS — M54.16 LUMBAR RADICULOPATHY: ICD-10-CM

## 2020-02-21 DIAGNOSIS — Z98.1 STATUS POST LUMBAR SPINAL FUSION: Primary | ICD-10-CM

## 2020-02-21 PROCEDURE — 99213 OFFICE O/P EST LOW 20 MIN: CPT | Performed by: NEUROLOGICAL SURGERY

## 2020-02-21 PROCEDURE — G0463 HOSPITAL OUTPT CLINIC VISIT: HCPCS

## 2020-02-21 ASSESSMENT — MIFFLIN-ST. JEOR: SCORE: 1758.44

## 2020-02-21 ASSESSMENT — PAIN SCALES - GENERAL: PAINLEVEL: SEVERE PAIN (7)

## 2020-02-21 NOTE — LETTER
2/21/2020         RE: Neema Hunt  [address]  [place] [address]        Dear Colleague,    Thank you for referring your patient, Neema Hunt, to the [facility] NEUROSURGERY [facility]INIC. Please see a copy of my visit note below.    It was a pleasure to see Neema Hunt today in Neurosurgery Clinic. He is a 54 year old male who recently underwent:    PREOPERATIVE DIAGNOSIS:  L2-L3 herniated disk with radiculopathy and scoliosis and history of previous L3-L5 fusion.      POSTOPERATIVE DIAGNOSIS:  L2-L3 herniated disk with radiculopathy and scoliosis and history of previous L3-L5 fusion.      PROCEDURES:   1.  Removal of previous L3-L5 posterior segmental instrumentation.   2.  Replacement of posterior segmental instrumentation L2-L4.   3.  Right L2-L3 decompression, as well as a right L2-L3 transforaminal interbody fusion using Capstone control interbody cage.   4.  Right L3-L4 redo decompression.      SURGEON:  Nhan Marshall MD.      ASSISTANT:  Juan Pablo Saez PA-C.      ESTIMATED BLOOD LOSS:  400 mL.     He had been doing fairly well but is developed new pain in the right lower extremity particularly in the buttock hip lateral calf and ankle.  There is some numbness in that area if he leans to the right that exacerbates his symptoms.  And he can only walk for short distance without worsening his pain.  He states that once he hits about 6 hours at work that his symptoms worsen significantly.    Past Medical History:   Diagnosis Date     Back pain      Gastro-oesophageal reflux disease      Hypertension      Radiculopathy      Scoliosis      Past Surgical History:   Procedure Laterality Date     EXPLORE SPINE, REMOVE HARDWARE, COMBINED N/A 9/5/2019    Procedure: REMOVAL LUMBAR L3-5 INSTRUMENTATION;  Surgeon: Nhan Marshall MD;  Location: [facility]     FUSION SPINE ANTERIOR MINIMALLY INVASIVE TWO LEVELS N/A 11/16/2016    Procedure: FUSION SPINE ANTERIOR MINIMALLY INVASIVE TWO  LEVELS;  Surgeon: Nhan Marshall MD;  [facility]     HERNIA REPAIR      right inguinal hernia     LAMINECTOMY LUMBAR POSTERIOR MICROSCOPIC ONE LEVEL  4/9/2013    Procedure: LAMINECTOMY LUMBAR POSTERIOR MICROSCOPIC ONE LEVEL;  Right Lumbar 3-4 Micro Laminectomy & Foraminotomy;  Surgeon: Nhan Marshall MD;  [facility]     OPTICAL TRACKING SYSTEM FUSION SPINE POSTERIOR LUMBAR PERCUTANEOUS TWO LEVELS N/A 11/16/2016    Procedure: OPTICAL TRACKING SYSTEM FUSION SPINE POSTERIOR LUMBAR PERCUTANEOUS TWO LEVELS;  Surgeon: Nhan Marshall MD;  [facility]     OPTICAL TRACKING SYSTEM FUSION SPINE POSTERIOR LUMBAR THREE+ LEVELS Right 9/5/2019    Procedure: POSTERIOR SEGMENTAL INSTRUMENTATION L2-4, RIGHT LUMBAR 2-3 DISCECTOMY AND TRANSFORAMINAL INTERBODY FUSION, REDO DECOMPRESSION RIGHT L3-4, POSTERIOR ARTHRODESIS L2-4;  Surgeon: Nhan Marshall MD;  Location:  OR     ORTHOPEDIC SURGERY      left ankle, right finger      No Known Allergies    Current Outpatient Medications:      acetaminophen (TYLENOL) 325 MG tablet, Take 2 tablets (650 mg) by mouth every 4 hours as needed for other (surgical pain), Disp: 100 tablet, Rfl: 0     amLODIPine-benazepril (LOTREL) 10-20 MG capsule, Take 1 capsule by mouth daily, Disp: , Rfl:      Cyanocobalamin (B-12 PO), Take 2 tablets by mouth daily , Disp: , Rfl:      gabapentin (NEURONTIN) 100 MG capsule, Take 3 capsules (300 mg) by mouth 3 times daily, Disp: 90 capsule, Rfl: 1     MELATONIN PO, Take 3 mg by mouth At Bedtime, Disp: , Rfl:      Multiple Vitamins-Minerals (AIRBORNE PO), Take 1 chew tab by mouth daily, Disp: , Rfl:      Multiple Vitamins-Minerals (MULTIVITAMIN ADULT PO), Take 2 tablets by mouth daily , Disp: , Rfl:      naproxen sodium 220 MG capsule, Take 440 mg by mouth 2 times daily (with meals), Disp: , Rfl:      omega 3 1000 MG CAPS, Take 1 capsule by mouth daily, Disp: , Rfl:      Polyethylene Glycol 3350 (MIRALAX PO), Take 1 Dose by mouth  daily, Disp: , Rfl:      Probiotic Product (SOLUBLE FIBER/PROBIOTICS PO), Take 1 tablet by mouth daily , Disp: , Rfl:      senna-docusate (SENOKOT-S/PERICOLACE) 8.6-50 MG tablet, Take 1 tablet by mouth daily, Disp: , Rfl:      aspirin (ASA) 81 MG chewable tablet, Take 81 mg by mouth daily, Disp: , Rfl:      azithromycin (ZITHROMAX Z-KILEY) 250 MG tablet, Two tablets on the first day, then one tablet daily for the next 4 days (Patient not taking: Reported on 11/25/2019), Disp: 6 tablet, Rfl: 0     cephALEXin (KEFLEX) 500 MG capsule, Take 1 capsule (500 mg) by mouth 3 times daily (Patient not taking: Reported on 11/25/2019), Disp: 21 capsule, Rfl: 0     hydrocortisone 2.5 % ointment, Apply topically 2 times daily as needed, Disp: , Rfl:      lidocaine (LIDODERM) 5 % Patch, Place 1 patch onto the skin every 24 hours (Patient not taking: Reported on 12/30/2019), Disp: 30 patch, Rfl: 2     methocarbamol (ROBAXIN) 750 MG tablet, Take 1 tablet (750 mg) by mouth every 6 hours as needed for muscle spasms (Patient not taking: Reported on 11/25/2019), Disp: 50 tablet, Rfl: 1     oxyCODONE (ROXICODONE) 5 MG tablet, Take 1-2 tablets (5-10 mg) by mouth every 4 hours as needed for moderate to severe pain (Patient not taking: Reported on 11/25/2019), Disp: 50 tablet, Rfl: 0     senna-docusate (SENOKOT-S/PERICOLACE) 8.6-50 MG tablet, Take 1 tablet by mouth 2 times daily, Disp: 50 tablet, Rfl: 1  Social History     Socioeconomic History     Marital status:      Spouse name: None     Number of children: None     Years of education: None     Highest education level: None   Occupational History     None   Social Needs     Financial resource strain: None     Food insecurity:     Worry: None     Inability: None     Transportation needs:     Medical: None     Non-medical: None   Tobacco Use     Smoking status: Never Smoker     Smokeless tobacco: Never Used   Substance and Sexual Activity     Alcohol use: Yes     Comment: 1-2  "drinks/week     Drug use: No     Sexual activity: None   Lifestyle     Physical activity:     Days per week: None     Minutes per session: None     Stress: None   Relationships     Social connections:     Talks on phone: None     Gets together: None     Attends Anabaptism service: None     Active member of club or organization: None     Attends meetings of clubs or organizations: None     Relationship status: None     Intimate partner violence:     Fear of current or ex partner: None     Emotionally abused: None     Physically abused: None     Forced sexual activity: None   Other Topics Concern     Parent/sibling w/ CABG, MI or angioplasty before 65F 55M? Not Asked   Social History Narrative     None      Problem (# of Occurrences) Relation (Name,Age of Onset)    Arthritis (1) Mother    Asthma (1) Mother    Hypertension (1) Mother           ROS: 10 point ROS neg other than the symptoms noted above in the HPI.    Vitals:    02/21/20 1437   BP: 117/78   Pulse: 70   Resp: 18   SpO2: 99%   Weight: 90.7 kg (200 lb)   Height: 1.778 m (5' 10\")     Body mass index is 28.7 kg/m .  Severe Pain (7)    Well-healed lumbar incisions.    Bilateral lower extremity strength seems to be 5 out of 5 in all muscle groups.    Imaging: His MRI shows a new left sided L1-2 small disc herniation.  More significantly there is right-sided L5-S1 foraminal stenosis which appears to have gotten progressively worse particularly since his August 2019 MRI.  The imaging was reviewed with the patient shown to the patient in clinic today.    Assessment: Right L5 radiculopathy from L5-S1 foraminal stenosis.    Plan: We discussed that this is not unusual at the level below the fusion.  We will try a right L5-S1 transforaminal epidural steroid to see if this will temporize his symptoms.  If this does not help or if he is ready to proceed with surgery then I think we can proceed with an open right L5-S1 foraminotomies and discectomy.  He will call and let " us know when he wishes to proceed with surgery.        Again, thank you for allowing me to participate in the care of your patient.        Sincerely,        Nhan Marshall MD

## 2020-02-21 NOTE — LETTER
[facility]    Neurosurgery Clinic  [address] [place] [address]  [id number]      To Whom It May Concern,          Neema Hunt was seen in clinic on 2/21/20. We will be keeping him on a lifting restriction of no lifting greater than 30 lbs until further notice. He will be scheduling an injection in his back and we will reassess him after that.           Sincerely,          Rony Marshall MD  [facility]  Neurosurgery Clinic   [address] [place] [phone]

## 2020-02-21 NOTE — PATIENT INSTRUCTIONS
- Order placed for epidural steroid injection. The steroid can take 10-14 days to reach max effect. Please call our clinic if symptoms persist after this timeframe.  You can call [facility] to schedule your injection: [phone]        Please call us if you have any further questions or concerns.      [facility] Neurosurgery Clinic   Phone: [phone]  Fax: [phone]

## 2020-02-21 NOTE — PROGRESS NOTES
It was a pleasure to see Neema Hunt today in Neurosurgery Clinic. He is a 54 year old male who recently underwent:    PREOPERATIVE DIAGNOSIS:  L2-L3 herniated disk with radiculopathy and scoliosis and history of previous L3-L5 fusion.      POSTOPERATIVE DIAGNOSIS:  L2-L3 herniated disk with radiculopathy and scoliosis and history of previous L3-L5 fusion.      PROCEDURES:   1.  Removal of previous L3-L5 posterior segmental instrumentation.   2.  Replacement of posterior segmental instrumentation L2-L4.   3.  Right L2-L3 decompression, as well as a right L2-L3 transforaminal interbody fusion using Capstone control interbody cage.   4.  Right L3-L4 redo decompression.      SURGEON:  Nhan Marshall MD.      ASSISTANT:  Juan Pablo Saez PA-C.      ESTIMATED BLOOD LOSS:  400 mL.     He had been doing fairly well but is developed new pain in the right lower extremity particularly in the buttock hip lateral calf and ankle.  There is some numbness in that area if he leans to the right that exacerbates his symptoms.  And he can only walk for short distance without worsening his pain.  He states that once he hits about 6 hours at work that his symptoms worsen significantly.    Past Medical History:   Diagnosis Date     Back pain      Gastro-oesophageal reflux disease      Hypertension      Radiculopathy      Scoliosis      Past Surgical History:   Procedure Laterality Date     EXPLORE SPINE, REMOVE HARDWARE, COMBINED N/A 9/5/2019    Procedure: REMOVAL LUMBAR L3-5 INSTRUMENTATION;  Surgeon: Nhan Marshall MD;  [facility]     FUSION SPINE ANTERIOR MINIMALLY INVASIVE TWO LEVELS N/A 11/16/2016    Procedure: FUSION SPINE ANTERIOR MINIMALLY INVASIVE TWO LEVELS;  Surgeon: Nhan Marshall MD;  [facility]     HERNIA REPAIR      right inguinal hernia     LAMINECTOMY LUMBAR POSTERIOR MICROSCOPIC ONE LEVEL  4/9/2013    Procedure: LAMINECTOMY LUMBAR POSTERIOR MICROSCOPIC ONE LEVEL;  Right Lumbar 3-4 Micro  Laminectomy & Foraminotomy;  Surgeon: Nhan Marshall MD;  [facility]     OPTICAL TRACKING SYSTEM FUSION SPINE POSTERIOR LUMBAR PERCUTANEOUS TWO LEVELS N/A 11/16/2016    Procedure: OPTICAL TRACKING SYSTEM FUSION SPINE POSTERIOR LUMBAR PERCUTANEOUS TWO LEVELS;  Surgeon: Nhan Marshall MD;  Location:  OR     OPTICAL TRACKING SYSTEM FUSION SPINE POSTERIOR LUMBAR THREE+ LEVELS Right 9/5/2019    Procedure: POSTERIOR SEGMENTAL INSTRUMENTATION L2-4, RIGHT LUMBAR 2-3 DISCECTOMY AND TRANSFORAMINAL INTERBODY FUSION, REDO DECOMPRESSION RIGHT L3-4, POSTERIOR ARTHRODESIS L2-4;  Surgeon: Nhan Marshall MD;  Location:  OR     ORTHOPEDIC SURGERY      left ankle, right finger      No Known Allergies    Current Outpatient Medications:      acetaminophen (TYLENOL) 325 MG tablet, Take 2 tablets (650 mg) by mouth every 4 hours as needed for other (surgical pain), Disp: 100 tablet, Rfl: 0     amLODIPine-benazepril (LOTREL) 10-20 MG capsule, Take 1 capsule by mouth daily, Disp: , Rfl:      Cyanocobalamin (B-12 PO), Take 2 tablets by mouth daily , Disp: , Rfl:      gabapentin (NEURONTIN) 100 MG capsule, Take 3 capsules (300 mg) by mouth 3 times daily, Disp: 90 capsule, Rfl: 1     MELATONIN PO, Take 3 mg by mouth At Bedtime, Disp: , Rfl:      Multiple Vitamins-Minerals (AIRBORNE PO), Take 1 chew tab by mouth daily, Disp: , Rfl:      Multiple Vitamins-Minerals (MULTIVITAMIN ADULT PO), Take 2 tablets by mouth daily , Disp: , Rfl:      naproxen sodium 220 MG capsule, Take 440 mg by mouth 2 times daily (with meals), Disp: , Rfl:      omega 3 1000 MG CAPS, Take 1 capsule by mouth daily, Disp: , Rfl:      Polyethylene Glycol 3350 (MIRALAX PO), Take 1 Dose by mouth daily, Disp: , Rfl:      Probiotic Product (SOLUBLE FIBER/PROBIOTICS PO), Take 1 tablet by mouth daily , Disp: , Rfl:      senna-docusate (SENOKOT-S/PERICOLACE) 8.6-50 MG tablet, Take 1 tablet by mouth daily, Disp: , Rfl:      aspirin (ASA) 81 MG chewable  tablet, Take 81 mg by mouth daily, Disp: , Rfl:      azithromycin (ZITHROMAX Z-KILEY) 250 MG tablet, Two tablets on the first day, then one tablet daily for the next 4 days (Patient not taking: Reported on 11/25/2019), Disp: 6 tablet, Rfl: 0     cephALEXin (KEFLEX) 500 MG capsule, Take 1 capsule (500 mg) by mouth 3 times daily (Patient not taking: Reported on 11/25/2019), Disp: 21 capsule, Rfl: 0     hydrocortisone 2.5 % ointment, Apply topically 2 times daily as needed, Disp: , Rfl:      lidocaine (LIDODERM) 5 % Patch, Place 1 patch onto the skin every 24 hours (Patient not taking: Reported on 12/30/2019), Disp: 30 patch, Rfl: 2     methocarbamol (ROBAXIN) 750 MG tablet, Take 1 tablet (750 mg) by mouth every 6 hours as needed for muscle spasms (Patient not taking: Reported on 11/25/2019), Disp: 50 tablet, Rfl: 1     oxyCODONE (ROXICODONE) 5 MG tablet, Take 1-2 tablets (5-10 mg) by mouth every 4 hours as needed for moderate to severe pain (Patient not taking: Reported on 11/25/2019), Disp: 50 tablet, Rfl: 0     senna-docusate (SENOKOT-S/PERICOLACE) 8.6-50 MG tablet, Take 1 tablet by mouth 2 times daily, Disp: 50 tablet, Rfl: 1  Social History     Socioeconomic History     Marital status:      Spouse name: None     Number of children: None     Years of education: None     Highest education level: None   Occupational History     None   Social Needs     Financial resource strain: None     Food insecurity:     Worry: None     Inability: None     Transportation needs:     Medical: None     Non-medical: None   Tobacco Use     Smoking status: Never Smoker     Smokeless tobacco: Never Used   Substance and Sexual Activity     Alcohol use: Yes     Comment: 1-2 drinks/week     Drug use: No     Sexual activity: None   Lifestyle     Physical activity:     Days per week: None     Minutes per session: None     Stress: None   Relationships     Social connections:     Talks on phone: None     Gets together: None     Attends  "Oriental orthodox service: None     Active member of club or organization: None     Attends meetings of clubs or organizations: None     Relationship status: None     Intimate partner violence:     Fear of current or ex partner: None     Emotionally abused: None     Physically abused: None     Forced sexual activity: None   Other Topics Concern     Parent/sibling w/ CABG, MI or angioplasty before 65F 55M? Not Asked   Social History Narrative     None      Problem (# of Occurrences) Relation (Name,Age of Onset)    Arthritis (1) Mother    Asthma (1) Mother    Hypertension (1) Mother           ROS: 10 point ROS neg other than the symptoms noted above in the HPI.    Vitals:    02/21/20 1437   BP: 117/78   Pulse: 70   Resp: 18   SpO2: 99%   Weight: 90.7 kg (200 lb)   Height: 1.778 m (5' 10\")     Body mass index is 28.7 kg/m .  Severe Pain (7)    Well-healed lumbar incisions.    Bilateral lower extremity strength seems to be 5 out of 5 in all muscle groups.    Imaging: His MRI shows a new left sided L1-2 small disc herniation.  More significantly there is right-sided L5-S1 foraminal stenosis which appears to have gotten progressively worse particularly since his August 2019 MRI.  The imaging was reviewed with the patient shown to the patient in clinic today.    Assessment: Right L5 radiculopathy from L5-S1 foraminal stenosis.    Plan: We discussed that this is not unusual at the level below the fusion.  We will try a right L5-S1 transforaminal epidural steroid to see if this will temporize his symptoms.  If this does not help or if he is ready to proceed with surgery then I think we can proceed with an open right L5-S1 foraminotomies and discectomy.  He will call and let us know when he wishes to proceed with surgery.      "

## 2020-02-21 NOTE — LETTER
[facility]    Neurosurgery Clinic  [address] [place] [address]  [id number]      To Whom It May Concern,          Neema Hunt was seen in clinic on 2/21/20. We will be keeping him on a lifting restriction of no lifting greater than 30 lbs until further notice. He will be scheduling an injection in his back and we will reassess him after that.           Sincerely,          Maico Oliva PA-C    [facility]  Neurosurgery Clinic   [address] [place] [id number]

## 2020-02-24 ENCOUNTER — TELEPHONE (OUTPATIENT)
Dept: PALLIATIVE MEDICINE | Facility: CLINIC | Age: 55
End: 2020-02-24

## 2020-02-24 NOTE — TELEPHONE ENCOUNTER
"Pre-screening Questions for Radiology Injections:    Injection to be done at which interventional clinic site? [facility] [place]    Instruct patient to arrive as directed prior to the scheduled appointment time:    [place] minutes before      [place]: 30 minutes before; if IV needed 1 hour before     Dr. Francis-no IV needed for Cervical SAW; please instruct to arrive 30\" early    Procedure ordered by Rony Marshall     Procedure ordered? RIGHT transforaminal SAW at L5-S1       Transforaminal Cervical SAW - no pain provider currently performing    What insurance would patient like us to bill for this procedure? UMR      Worker's comp or MVA (motor vehicle accident) -Any injection DO NOT SCHEDULE and route to Natalya Talley.      Private Practice insurance - For SI joint injections, DO NOT SCHEDULE and route Natalya Talley.       [employer] - Any injection besides hip/shoulder/knee joint DO NOT SCHEDULE and route to Natalya Talley. She will obtain PA and call pt back to schedule procedure or notify pt of denial.       HP [employer]-Route to [facility] for review      **BCBS- ALL need to be routed to [facility] for review if a PA is needed**      IF SCHEDULING IN [place] AND NEEDS A PA, IT IS OKAY TO SCHEDULE. [place] HANDLES THEIR OWN PA'S AFTER THE PATIENT IS SCHEDULED. PLEASE SCHEDULE AT LEAST 1 WEEK OUT SO A PA CAN BE OBTAINED.    Any chance of pregnancy? Not Applicable   If YES, do NOT schedule and route to RN pool    Is an  needed? No     Patient has a drive home? (mandatory) YES: informed     Is patient taking any blood thinners (i.e. plavix, coumadin, jantoven, warfarin, heparin, pradaxa or dabigatran, etc)? No   If hold needed, do NOT schedule, route to RN pool     Is patient taking any aspirin products (includes Excedrin and Fiorinal)? No     If more than 325mg/day, OK to schedule; Instruct pt to decrease to less than 325 mg for 7 days AND route to RN pool    For CERVICAL procedures, hold all " aspirin products for 6 days.     Tell pt that if aspirin product is not held for 6 days, the procedure WILL BE cancelled.      Does the patient have a bleeding or clotting disorder? No     If YES, okay to schedule AND route to RN nurse pool    For any patients with platelet count <100, must be forwarded to provider    Is patient diabetic?  No  If YES, instruct them to bring their glucometer.    Does patient have an active infection or treated for one within the past week? No     Is patient currently taking any antibiotics?  No     For patients on chronic, preventative, or prophylactic antibiotics, procedures may be scheduled.     For patients on antibiotics for active or recent infection:antibiotic course must have been completed for 4 days    Is patient currently taking any steroid medications? (i.e. Prednisone, Medrol)  No     For patients on steroid medications, course must have been completed for 4 days    Is patient actively being treated for cancer or immunocompromised? No  If YES, do NOT schedule and route to RN pool     Are you able to get on and off an exam table with minimal or no assistance? Yes  If NO, do NOT schedule and route to RN pool    Are you able to roll over and lay on your stomach with minimal or no assistance? Yes  If NO, do NOT schedule and route to RN pool     Any allergies to contrast dye, iodine, shellfish, or numbing and steroid medications? No  If YES, route to RN pool AND add allergy information to appointment notes    Allergies: Patient has no known allergies.      Has the patient had a flu shot or any other vaccinations within 7 days before or after the procedure.  No     Does patient have an MRI/CT?  YES: 2020  Check Procedure Scheduling Grid to see if required.      Was the MRI done within the last 3 years?  Yes    If yes, where was the MRI done i.e.[facility], [facility], [facility], [facility] etc?       If no, do not schedule and route to RN pool    If MRI was not done at  [facility], [facility] or [facility] do NOT schedule and route to RN pool.      If pt has an imaging disc, the injection MAY be scheduled but pt has to bring disc to appt.     If they show up without the disc the injection cannot be done    Procedure Specific Instructions:      If celiac plexus block, informed patient NPO for 6 hours and that it is okay to take medications with sips of water, especially blood pressure medications  Not Applicable         If this is for a cervical procedure, informed patient that aspirin needs to be held for 6 days.   Not Applicable      If IV needed:    Do not schedule procedures requiring IV placement in the first appointment of the day or first appointment after lunch. Do NOT schedule at 0745, 0815 or 1245.     Instructed pt to arrive 30 minutes early for IV start if required. (Check Procedure Scheduling Grid)  Not Applicable    Reminders:      If you are started on any steroids or antibiotics between now and your appointment, you must contact us because the procedure may need to be cancelled.  No      For all procedures except radiofrequency ablations (RFAs) and spinal cord stimulator (SCS) trials, informed patient:    IV sedation is not provided for this procedure.  If you feel that an oral anti-anxiety medication is needed, you can discuss this further with your referring provider or primary care provider.  The Pain Clinic provider will discuss specifics of what the procedure includes at your appointment.  Most procedures last 10-20 minutes.  We use numbing medications to help with any discomfort during the procedure.  Not Applicable      For patients 85 or older we recommend having an adult stay w/ them for the remainder of the day.       Does the patient have any questions?  NO  Brittney Johnson  [facility]

## 2020-02-27 DIAGNOSIS — M54.16 LUMBAR RADICULOPATHY: Primary | ICD-10-CM

## 2020-02-27 NOTE — PROGRESS NOTES
[facility] - Procedure Note    Date of Service: 2/28/2020    Procedure performed: Right L5 transforaminal epidural steroid injection with fluoroscopic guidance  Diagnosis: Lumbar spondylosis; Lumbar radiculitis/radiculopathy  : Leo Francis DO   Anesthesia: none    Indications: Neema Hunt is a 54 year old male who is seen at the request of Dr. Marshall for lumbar transforaminal epidural steroid injection. The patient describes pain in his low back that radiates into his right leg and to the top of his foot. The patient has been exhibiting symptoms consistent with lumbar intraspinal inflammation and radiculopathy. Symptoms have been persistent, disabling, and intermittently severe. The patient reports minimal improvement with conservative treatment, including oral medications, exercises, prior lumbar fusion/decompression.    Lumbar MRI was done on 2/5/2020 which showed      FINDINGS: Five lumbar vertebral bodies are presumed, in keeping with  the numbering system from the prior lumbar spine MRI. Findings of  previous L2-L5 anterior/interbody fusion are noted, with  susceptibility artifact representing interbody devices in the L2-L3,  L3-L4 and L4-L5 disc spaces. Susceptibility artifact representing  posterior instrumentation spanning the L2-L4 levels is also noted. The  susceptibility artifact mildly limits evaluation of the regional  anatomy. No evidence for acute fracture. The vertebral body heights  are maintained. Modic type I degenerative endplate changes again noted  at the L5-S1 level. No destructive marrow lesions are seen. There is  straightening of the normal lumbar lordosis without significant  spondylolisthesis. Severe disc height loss at L5-S1, moderate disc  height loss at T11-T12 (which has progressed) and mild disc height  loss at L1-L2 (which has also progressed). Conus terminates at L1.  Paraspinous soft tissues are unremarkable.     Segmental  analysis:  T12-L1: No disc bulge or herniation. Normal facets. No spinal or  neural foraminal stenosis.     L1-L2: There is a new moderate size diffuse disc bulge with  superimposed left central disc extrusion with mild cranial migration  of the extruded disc content along the posterior aspect of the L1  vertebral body (series 10 image 11, series 13 image 9). Mild to  moderate bilateral facet arthropathy. Mild to moderate overall spinal  stenosis has significantly increased from the prior study. No  significant neural foraminal stenosis.     L2-L3: Compared to the most recent MRI, there are new changes of  anterior and posterior instrumented fusion. The previously seen large  disc bulge with associated extrusion is no longer present status post  discectomy. Mild residual neural foraminal stenosis related primarily  to bony hypertrophy of the vertebral endplate and facets. No residual  spinal stenosis.     L3-L4: Postoperative changes of anterior and posterior spinal fusion  again noted at this level. There is no significant spinal stenosis.  There is mild to moderate right and mild left neural foraminal  narrowing related to bony hypertrophy of the endplates and facets.  This is probably not significantly changed.     L4-L5: Postoperative changes of anterior interbody fusion are again  noted at this level. There is no spinal stenosis. There is mild right  neural foraminal narrowing related to bony hypertrophy of the  endplates/endplate osteophytes and facet joint on the right. Minimal  left neural foraminal narrowing. The findings are unchanged.     L5-S1: Prominent diffuse disc bulge with moderate bilateral facet  arthropathy again noted. Mild ligamentum flavum thickening. Minimal  spinal canal narrowing, most pronounced in the region of the left  lateral recess, with severe bilateral neural foraminal stenosis. The  degree of foraminal stenosis is similar to perhaps slightly increased  compared to most recent  exam.                                                                      IMPRESSION:  1. Postsurgical changes following L2-L3, L3-L4 and L4-L5 interbody  fusion and posterior instrumented fusion spanning L2-L4. The L2-L3  interbody and posterior spinal fusion changes are new from most recent  MRI of 8/12/2019. There is no residual spinal stenosis at L2-L3, and  the degree of neural foraminal stenosis at that level has also  significantly decreased.  2. Progression of degenerative disc disease at L1-L2, including a new  diffuse disc bulge with a superimposed cranially migrating small left  central disc extrusion. There is new mild-to-moderate spinal stenosis  at L1-L2.  3. Varying degrees of multilevel neural foraminal stenosis elsewhere  do not appear significantly changed. Of note, there is persistent  severe bilateral neural foraminal stenosis at L5-S1.     IVET LOVELL MD    Allergies:    No Known Allergies     Vitals:  Vitals   Pulse  55           BP  122/55               Review of Systems: The patient denies recent fever, chills, illness, use of antibiotics or anticoagulants. All other 10-point review of systems negative.     Procedure: The procedure and risks were explained, and informed written consent was obtained from the patient. Risks include but are not limited to: infection, bleeding, increased pain, and damage to soft tissue, nerve, muscle, and vasculature structures. After getting informed consent, patient was brought into the procedure suite and was placed in a prone position on the procedure table. A Pause for the Cause was performed. Patient was prepped and draped in sterile fashion.     After identifying the right L5 neuroforamen, the C-arm was rotated to a right lateral oblique angle.  A total of 4.5 mL of Lidocaine 1% was used to anesthetize the skin and the needle track at a skin entry site coaxial with the fluoroscopy beam, and overriding the superior aspect of the neuroforamen.  A 22  gauge 5 inch spinal needle was advanced under intermittent fluoroscopy until it entered the foramen superiorly.    The position was then inspected from anteroposterior and lateral views, and the needle adjusted appropriately.  After negative aspiration, a total of 1 mL of Isovue-200 was injected using static and continuous fluoroscopy confirming appropriate position, with spread along the nerve root sheath and into the epidural space, with no intravascular or intrathecal uptake. 9 mL of Isovue-200 was wasted.    1mL of 1% lidocaine with 10 mg of dexamethasone was injected.  The needle was removed. Hemostasis was achieved, the area was cleaned, and bandaids were placed when appropriate. Images were saved to PACS.    The patient tolerated the procedure well, and was taken to the recovery room, and there was no evidence of procedural complications. No new sensory or motor deficits were noted following the procedure. The patient was stable and able to ambulate on discharge home. Post-procedure instructions were provided.     Pre-procedure pain score: 1/10 in the back, 8/10 in the leg  Post-procedure pain score: 1/10 in the back, 6/10 in the leg    Assessment/Plan: Neema Hunt is a 54 year old male s/p Right L5 transforaminal epidural steroid injection today for lumbar spondylosis, radiculitis/radiculopathy.     1. Following today's procedure, the patient was advised to contact the [facility] for any of the following:   Fever, chills, or night sweats   New onset of pain, numbness, or weakness   Any questions/concerns regarding the procedure  If unable to contact the Pain Center, the patient was instructed to go to a local Emergency Room for any complications.   2. The patient will receive a follow-up call in 1 week.  3. The patient should follow-up with the referring provider in 2 weeks for post-procedure evaluation.        Leo Francis DO  [facility] [place]

## 2020-02-28 ENCOUNTER — HOSPITAL ENCOUNTER (OUTPATIENT)
Dept: GENERAL RADIOLOGY | Facility: CLINIC | Age: 55
End: 2020-02-28
Attending: PHYSICAL MEDICINE & REHABILITATION | Admitting: PHYSICAL MEDICINE & REHABILITATION
Payer: COMMERCIAL

## 2020-02-28 ENCOUNTER — HOSPITAL ENCOUNTER (OUTPATIENT)
Facility: CLINIC | Age: 55
Discharge: HOME | End: 2020-02-28
Attending: PHYSICAL MEDICINE & REHABILITATION | Admitting: PHYSICAL MEDICINE & REHABILITATION
Payer: COMMERCIAL

## 2020-02-28 ENCOUNTER — RADIOLOGY INJECTION OFFICE VISIT (OUTPATIENT)
Dept: PALLIATIVE MEDICINE | Facility: CLINIC | Age: 55
End: 2020-02-28
Payer: COMMERCIAL

## 2020-02-28 VITALS — HEART RATE: 62 BPM | SYSTOLIC BLOOD PRESSURE: 118 MMHG | DIASTOLIC BLOOD PRESSURE: 73 MMHG | OXYGEN SATURATION: 99 %

## 2020-02-28 VITALS — SYSTOLIC BLOOD PRESSURE: 122 MMHG | HEART RATE: 55 BPM | DIASTOLIC BLOOD PRESSURE: 55 MMHG

## 2020-02-28 DIAGNOSIS — M54.16 LUMBAR RADICULOPATHY: Primary | ICD-10-CM

## 2020-02-28 DIAGNOSIS — M54.16 LUMBAR RADICULOPATHY: ICD-10-CM

## 2020-02-28 PROCEDURE — 25000125 ZZHC RX 250: Performed by: PHYSICAL MEDICINE & REHABILITATION

## 2020-02-28 PROCEDURE — 40000863 ZZH STATISTIC RADIOLOGY XRAY, US, CT, MAR, NM

## 2020-02-28 PROCEDURE — 25000128 H RX IP 250 OP 636: Performed by: PHYSICAL MEDICINE & REHABILITATION

## 2020-02-28 PROCEDURE — 64483 NJX AA&/STRD TFRM EPI L/S 1: CPT | Mod: TC

## 2020-02-28 PROCEDURE — 64483 NJX AA&/STRD TFRM EPI L/S 1: CPT | Mod: RT | Performed by: PHYSICAL MEDICINE & REHABILITATION

## 2020-02-28 PROCEDURE — 25500064 ZZH RX 255 OP 636: Performed by: PHYSICAL MEDICINE & REHABILITATION

## 2020-02-28 RX ORDER — DEXAMETHASONE SODIUM PHOSPHATE 10 MG/ML
20 INJECTION, SOLUTION INTRAMUSCULAR; INTRAVENOUS ONCE
Status: COMPLETED | OUTPATIENT
Start: 2020-02-28 | End: 2020-02-28

## 2020-02-28 RX ORDER — LIDOCAINE HYDROCHLORIDE 10 MG/ML
30 INJECTION, SOLUTION EPIDURAL; INFILTRATION; INTRACAUDAL; PERINEURAL ONCE
Status: COMPLETED | OUTPATIENT
Start: 2020-02-28 | End: 2020-02-28

## 2020-02-28 RX ORDER — IOPAMIDOL 408 MG/ML
10 INJECTION, SOLUTION INTRATHECAL ONCE
Status: COMPLETED | OUTPATIENT
Start: 2020-02-28 | End: 2020-02-28

## 2020-02-28 RX ADMIN — LIDOCAINE HYDROCHLORIDE 5 ML: 10 INJECTION, SOLUTION EPIDURAL; INFILTRATION; INTRACAUDAL; PERINEURAL at 10:36

## 2020-02-28 RX ADMIN — IOPAMIDOL 1.5 ML: 408 INJECTION, SOLUTION INTRATHECAL at 10:40

## 2020-02-28 RX ADMIN — DEXAMETHASONE SODIUM PHOSPHATE 10 MG: 10 INJECTION, SOLUTION INTRAMUSCULAR; INTRAVENOUS at 10:42

## 2020-02-28 NOTE — BRIEF OP NOTE
[facility] Brief Operative Note    RADIOLOGY POST PROCEDURE NOTE    Patient name: Neema Hunt  MRN: [id number]  : 1965    Pre-procedure diagnosis: Lumbar Radiculopathy   Post-procedure diagnosis: Same    Procedure Date/Time: 2020  10:45 AM  Procedure: lumbar epidural steroid injection   Estimated blood loss: None  Specimen(s) collected with description: none    The patient tolerated the procedure well with no immediate complications.  Significant findings:none    See imaging dictation for procedural details.    Provider name: Leo Francis DO  Assistant(s):None

## 2020-02-28 NOTE — PROGRESS NOTES
Care Suites Discharge Nursing Note    Patient Information  Name: Neema Hunt  Age: 54 year old    Discharge Education:  Discharge instructions reviewed: Yes  Additional education/resources provided: none  Patient/patient representative verbalizes understanding: Yes  Patient discharging on new medications: No  Medication education completed: N/A    Discharge Plans:   Discharge location: home  Discharge ride contacted: Yes  Approximate discharge time: 1100    Rates pain at 1/10 in back and 6/10 in leg. Post procedure.    Discharge Criteria:  Discharge criteria met and vital signs stable: Yes    Patient Belongs:  Patient belongings returned to patient: N/A    Deirdre Cisneros RN

## 2020-02-28 NOTE — DISCHARGE INSTRUCTIONS
[facility] Procedure Discharge Instructions    Today you saw:  Dr Joel Barboza   Procedure:  Epidural Steroid Injection     Medications used:  Lidocaine- Dexamethasone  --  Isovue M200  -    After you go home:      You may resume your normal diet    It is recommended that a responsible adult stay with you for 6 hours if you received sedation       If you received sedation before, during or after your procedure:      For 24 hours -     Relax and take it easy    Do NOT make any important or legal decisions    Do NOT drive or operate machines at home or at work    Do NOT drink alcohol    Care of Puncture Site:      If you have a bandaid on your puncture site, you may remove it the next morning    You may shower     No bath tubs, whirlpools or swimming for 24 hours after your procedure     Activity:      You may go back to normal activity in 24 hours    Avoid strenuous activity for the first 24 hours.    Be cautious when walking. Numbness and/or weakness in the lower extremities may occur for up to 6-8 hours after the procedure due to effect of the numbing medication used.    Do not drive for 6 hours.  The effect of the numbing medication could slow your reflexes.    Medicines:      You may resume all medications    Resume your Warfarin/Coumadin at your regular dose tonight. Follow up with your provider to have your INR rechecked    Resume your Plavix/Clopidogrel and Aspirin in 12 hours    If you are taking a different blood thinner, follow the directions provided by the Pain Clinic RN for restarting the medication    For minor pain, you may use anti-inflammatory medications - (such as Ibuprofen, Aleve, Advil) or Acetaminophen (Tylenol) for pain control if necessary.    Pain:       You may have a mild to moderate increase in pain for several days following the injection.    It may take up to 14 days for the steroid medication to start working although you may feel the effect as early as a few  days after the procedure.    You may use ice packs for 10-15 minutes, 3 to 4 times a day at the injection site for comfort.    Do not use heat to painful areas for 6 to 8 hours.  This will give the numbing medication time to wear off and prevent you from accidentally burning your skin.    Call the Pain Clinic if you experience any of the following:      You have chills or a fever greater than 100 F     Swelling, bleeding, redness, drainage, warmth at the injection site    Progressive weakness or numbness in your legs or arms    If lumbar, call if you have a loss of bowel or bladder function    If cervical, call if you have any unusual headache that is not relieved by Tylenol or other pain medication    Unusual new onset of pain that is not improving    Other Instructions:      If you are diabetic, check your blood sugar more frequently than usual as your blood sugar may be higher than normal for 10-14 days following a steroid injection.  Contact the provider who manages your diabetes to help you control your blood sugar if needed.      If you have questions call:        Pain Clinic @ [phone] during business hours  Monday-Thursday 8 AM-5:30 PM and Friday 8 AM-4:30 PM    Provider Line @ [phone] after hours

## 2020-03-09 NOTE — PROGRESS NOTES
Patient did not return for further treatment and no additional progress was noted.  Please refer to the progress note and goal flowsheet completed on  Nov 19, 2019 for discharge information.

## 2020-03-10 PROBLEM — Z98.1 S/P LUMBAR FUSION: Status: RESOLVED | Noted: 2019-10-24 | Resolved: 2020-03-10

## 2020-03-10 PROBLEM — M54.50 BILATERAL LOW BACK PAIN WITHOUT SCIATICA: Status: RESOLVED | Noted: 2019-10-24 | Resolved: 2020-03-10

## 2020-03-26 ENCOUNTER — MYC MEDICAL ADVICE (OUTPATIENT)
Dept: NEUROSURGERY | Facility: CLINIC | Age: 55
End: 2020-03-26

## 2020-03-26 DIAGNOSIS — M54.16 LUMBAR RADICULOPATHY: Primary | ICD-10-CM

## 2020-03-26 NOTE — LETTER
[facility]  Neurosurgery Clinic  [address] [place] [address]  [id number]        3/27/2020    To Whom it May Concern,      Neema Hunt is being seen at our clinic. We will be keeping him on a lifting restriction of no lifting greater than 30 lbs and 5 minute hourly stretch breaks until further notice. He will be scheduling another injection in his back and we will reassess him after that.      Please call our clinic with questions or concerns: [phone]      Sincerely,      Rony Marshall MD

## 2020-03-26 NOTE — LETTER
[facility]  Neurosurgery Clinic  [address] [place] [address]  [id number]        6/5/2020    To Whom it May Concern,      Neema Hunt is being seen at our clinic. We will be keeping him on a lifting restriction of no lifting greater than 30 lbs and he will be reducing his hours to 4 hour shifts beginning 6/8/20-6/16/20. He is scheduled for surgery on 6/17/20. Patient will need to remain off work for 8 weeks for post op healing and recovery.     Please call our clinic with questions or concerns: [phone]      Sincerely,      Rony Marshall MD

## 2020-03-26 NOTE — LETTER
[facility]  Neurosurgery Clinic  [address] [place] [address]  [id number]        6/5/2020    To Whom it May Concern,      Neema Hunt is being seen at our clinic. We will be keeping him on a lifting restriction of no lifting greater than 30 lbs and he will be reducing his hours to 4 hour shifts beginning 6/8/20-6/16/20. He is scheduled for surgery on 6/17/20. He will need to remain off work for 8 weeks for post op healing and recovery.     Please call our clinic with questions or concerns: [phone]      Sincerely,      Rony Marshall MD

## 2020-03-27 ENCOUNTER — MYC MEDICAL ADVICE (OUTPATIENT)
Dept: NEUROSURGERY | Facility: CLINIC | Age: 55
End: 2020-03-27

## 2020-03-27 DIAGNOSIS — M54.16 LUMBAR RADICULOPATHY: Primary | ICD-10-CM

## 2020-03-27 NOTE — TELEPHONE ENCOUNTER
Spoke to patient. We can update restrictions letter to keep him on 30 lb restrictions and 5 min hourly breaks. Received some relief from the injection but still having continued pain. Ok per Dr Marshall to repeat injection. Order placed with [facility] and faxed to them at [phone]. Letter sent to patient via [employer]. Advised to call clinic if any further questions or concerns.

## 2020-03-27 NOTE — TELEPHONE ENCOUNTER
Called patient to discuss injection order and work letter specifications over the phone. Awaiting call back.

## 2020-04-09 ENCOUNTER — MYC MEDICAL ADVICE (OUTPATIENT)
Dept: NEUROSURGERY | Facility: CLINIC | Age: 55
End: 2020-04-09

## 2020-04-09 ENCOUNTER — TELEPHONE (OUTPATIENT)
Dept: PALLIATIVE MEDICINE | Facility: CLINIC | Age: 55
End: 2020-04-09

## 2020-04-09 NOTE — TELEPHONE ENCOUNTER
Essential, ok to schedule repeat right L5-S1 transforaminal epidural.    DO [facility]

## 2020-04-09 NOTE — TELEPHONE ENCOUNTER
"Pre-screening Questions for Radiology Injections:    Injection to be done at which interventional clinic site? [facility]    Instruct patient to arrive as directed prior to the scheduled appointment time:    [place] minutes before      [place]: 30 minutes before; if IV needed 1 hour before     Dr. Francis-no IV needed for Cervical SAW; please instruct to arrive 30\" early    Procedure ordered by Dr. Marshall    Procedure ordered? repeat right L5-S1 transforaminal epidural.      Transforaminal Cervical SAW - no pain provider currently performing    What insurance would patient like us to bill for this procedure? [employer]MR      Worker's comp or MVA (motor vehicle accident) -Any injection DO NOT SCHEDULE and route to Natalya Talley.      [employer] insurance - For SI joint injections, DO NOT SCHEDULE and route Natalya Talley.       [employer] - Any injection besides hip/shoulder/knee joint DO NOT SCHEDULE and route to Natalya Talley. She will obtain PA and call pt back to schedule procedure or notify pt of denial.       [employer]-Route to [place] for review      **BCBS- ALL need to be routed to [place] for review if a PA is needed**      IF SCHEDULING IN [place] AND NEEDS A PA, IT IS OKAY TO SCHEDULE. [place] HANDLES THEIR OWN PA'S AFTER THE PATIENT IS SCHEDULED. PLEASE SCHEDULE AT LEAST 1 WEEK OUT SO A PA CAN BE OBTAINED.    Any chance of pregnancy? Not Applicable   If YES, do NOT schedule and route to RN pool    Is an  needed? No     Patient has a drive home? (mandatory) YES: informed     Is patient taking any blood thinners (i.e. plavix, coumadin, jantoven, warfarin, heparin, pradaxa or dabigatran, etc)? No   If hold needed, do NOT schedule, route to RN pool     Is patient taking any aspirin products (includes Excedrin and Fiorinal)? No     If more than 325mg/day, OK to schedule; Instruct pt to decrease to less than 325 mg for 7 days AND route to RN pool    For CERVICAL procedures, hold all aspirin products " for 6 days.     Tell pt that if aspirin product is not held for 6 days, the procedure WILL BE cancelled.      Does the patient have a bleeding or clotting disorder? No     If YES, okay to schedule AND route to RN nurse pool    For any patients with platelet count <100, must be forwarded to provider    Is patient diabetic?  No  If YES, instruct them to bring their glucometer.    Does patient have an active infection or treated for one within the past week? No     Is patient currently taking any antibiotics?  No     For patients on chronic, preventative, or prophylactic antibiotics, procedures may be scheduled.     For patients on antibiotics for active or recent infection:antibiotic course must have been completed for 4 days    Is patient currently taking any steroid medications? (i.e. Prednisone, Medrol)  No     For patients on steroid medications, course must have been completed for 4 days    Is patient actively being treated for cancer or immunocompromised? No  If YES, do NOT schedule and route to RN pool     Are you able to get on and off an exam table with minimal or no assistance? Yes  If NO, do NOT schedule and route to RN pool    Are you able to roll over and lay on your stomach with minimal or no assistance? Yes  If NO, do NOT schedule and route to RN pool     Any allergies to contrast dye, iodine, shellfish, or numbing and steroid medications? No  If YES, route to RN pool AND add allergy information to appointment notes    Allergies: Patient has no known allergies.      Has the patient had a flu shot or any other vaccinations within 7 days before or after the procedure.  No     Does patient have an MRI/CT?  YES: 2020  Check Procedure Scheduling Grid to see if required.      Was the MRI done within the last 3 years?  Yes    If yes, where was the MRI done i.e.[facility], [facility], [facility], [facility] etc?       If no, do not schedule and route to RN pool    If MRI was not done at [facility], [facility] or  [facility] do NOT schedule and route to RN pool.      If pt has an imaging disc, the injection MAY be scheduled but pt has to bring disc to appt.     If they show up without the disc the injection cannot be done    Procedure Specific Instructions:      If celiac plexus block, informed patient NPO for 6 hours and that it is okay to take medications with sips of water, especially blood pressure medications  Not Applicable         If this is for a cervical procedure, informed patient that aspirin needs to be held for 6 days.   Not Applicable      If IV needed:    Do not schedule procedures requiring IV placement in the first appointment of the day or first appointment after lunch. Do NOT schedule at 0745, 0815 or 1245.     Instructed pt to arrive 30 minutes early for IV start if required. (Check Procedure Scheduling Grid)  Not Applicable    Reminders:      If you are started on any steroids or antibiotics between now and your appointment, you must contact us because the procedure may need to be cancelled.  No      For all procedures except radiofrequency ablations (RFAs) and spinal cord stimulator (SCS) trials, informed patient:    IV sedation is not provided for this procedure.  If you feel that an oral anti-anxiety medication is needed, you can discuss this further with your referring provider or primary care provider.  The Pain Clinic provider will discuss specifics of what the procedure includes at your appointment.  Most procedures last 10-20 minutes.  We use numbing medications to help with any discomfort during the procedure.  Not Applicable      For patients 85 or older we recommend having an adult stay w/ them for the remainder of the day.       Does the patient have any questions?  NO  Brittney Johnson  [facility]

## 2020-04-09 NOTE — TELEPHONE ENCOUNTER
Order received from Rony Marshall   at [facility]       Patient is being referred for LESI       Patients diagnosis is Lumbar radiculopathy  - Primary       Routing to interventionlist pool to determine if this is essential.         Brittney Johnson    [facility]

## 2020-04-16 NOTE — PROGRESS NOTES
[facility] - Procedure Note    Date of Visit: 4/17/2020    Procedure performed: Lumbar L5-S1 interlaminar epidural steroid injection  Diagnosis: Lumbar spondylosis; Lumbar radiculitis/radiculopathy  : Leo Francis DO   Anesthesia: none    Indications: Neema Hunt is a 54 year old male who is seen at the request of Dr. Marshall for a lumbar epidural steroid injection. The patient describes pain in his low back that radiates into the right leg and to the top of the right foot. The patient has been exhibiting symptoms consistent with lumbar intraspinal inflammation and radiculopathy. Symptoms have been persistent, disabling, and intermittently severe. The patient reports minimal improvement with conservative treatment, including oral medications, physical therapy, exercises, surgery.    He underwent a right L5 transforaminal SAW on 2/28/2020 with relief for about 40% relief for 6 weeks.    Lumbar MRI was done on 2/5/2020 which showed                                           1. Postsurgical changes following L2-L3, L3-L4 and L4-L5 interbody  fusion and posterior instrumented fusion spanning L2-L4. The L2-L3  interbody and posterior spinal fusion changes are new from most recent  MRI of 8/12/2019. There is no residual spinal stenosis at L2-L3, and  the degree of neural foraminal stenosis at that level has also  significantly decreased.  2. Progression of degenerative disc disease at L1-L2, including a new  diffuse disc bulge with a superimposed cranially migrating small left  central disc extrusion. There is new mild-to-moderate spinal stenosis  at L1-L2.  3. Varying degrees of multilevel neural foraminal stenosis elsewhere  do not appear significantly changed. Of note, there is persistent  severe bilateral neural foraminal stenosis at L5-S1.     IVET LOVELL MD  Allergies:    No Known Allergies     Vitals:  BP (!) 138/92   Pulse 71   SpO2 98%     Review of Systems: The  patient denies recent fever, chills, illness, use of antibiotics or anticoagulants. All other 10-point review of systems negative.     Procedure: The procedure and risks were explained, and informed written consent was obtained from the patient. Risks include but are not limited to: infection, bleeding, increased pain, and damage to soft tissue, nerve, muscle, and vasculature structures. After getting informed consent, patient was brought into the procedure suite and was placed in a prone position on the procedure table. A Pause for the Cause was performed. Patient was prepped and draped in sterile fashion.     The L5-S1 interspace was identified with use of fluoroscopy in AP view. A 25-gauge, 1.5 inch needle was used to anesthetize the skin and subcutaneous tissue entry site with a total of 2 ml of 1% lidocaine. Under fluoroscopic visualization, a 22-gauge, 3.5 inch Tuohy epidural needle was slowly advanced towards the epidural space a few millimeters left-sided of midline. The latter part of the needle advancement was guided with fluoroscopy in the lateral view. The epidural space was identified using loss of resistance technique. After negative aspiration for heme and cerebrospinal fluid, a total of 1 mL of non-ionic contrast was injected to confirm needle placement with 9 mL of contrast wasted. Epidurogram confirmed spread within the posterior epidural space. 1 ml of 40mg/ml of triamcinolone, 1 ml of 1% lidocaine, and 3 ml of preservative free saline was injected. The needle was removed.  Images were saved to PACS.    The patient tolerated the procedure well, and there was no evidence of procedural complications. No new sensory or motor deficits were noted following the procedure. The patient was stable and able to ambulate on discharge home. Post-procedure instructions were provided.     Pre-procedure pain score: 2/10 in the back, 7/10 in the leg  Post-procedure pain score: 2/10 in the back, 7/10 in the  leg    Assessment/Plan: Neema Hunt is a 54 year old male s/p lumbar interlaminar epidural steroid injection today for lumbar spondylosis and radiculitis/radiculopathy.     1. Following today's procedure, the patient was advised to contact the [facility] for any of the following:   Fever, chills, or night sweats   New onset of pain, numbness, or weakness   Any questions/concerns regarding the procedure  If unable to contact the Pain Center, the patient was instructed to go to a local Emergency Room for any complications.   2. The patient will receive a follow-up call in 1 week.   3. Follow-up with the referring provider in 2 weeks for post-procedure evaluation.        Leo Francis DO  [facility] [place]

## 2020-04-17 ENCOUNTER — ANCILLARY PROCEDURE (OUTPATIENT)
Dept: GENERAL RADIOLOGY | Facility: CLINIC | Age: 55
End: 2020-04-17
Attending: PHYSICAL MEDICINE & REHABILITATION
Payer: COMMERCIAL

## 2020-04-17 ENCOUNTER — RADIOLOGY INJECTION OFFICE VISIT (OUTPATIENT)
Dept: PALLIATIVE MEDICINE | Facility: CLINIC | Age: 55
End: 2020-04-17
Payer: COMMERCIAL

## 2020-04-17 VITALS — OXYGEN SATURATION: 98 % | SYSTOLIC BLOOD PRESSURE: 138 MMHG | HEART RATE: 71 BPM | DIASTOLIC BLOOD PRESSURE: 92 MMHG

## 2020-04-17 DIAGNOSIS — M54.16 LUMBAR RADICULOPATHY: ICD-10-CM

## 2020-04-17 DIAGNOSIS — M54.16 LUMBAR RADICULOPATHY: Primary | ICD-10-CM

## 2020-04-17 PROCEDURE — 62323 NJX INTERLAMINAR LMBR/SAC: CPT | Performed by: PHYSICAL MEDICINE & REHABILITATION

## 2020-04-17 NOTE — PATIENT INSTRUCTIONS
[facility] Procedure Discharge Instructions    Today you saw:    Dr. Leo Francis    Your procedure:  Epidural steroid injection     Medications used:  Lidocaine (anesthetic), Normal Saline,  Kenalog (steroid)  Omnipaque (contrast)             Be cautious when walking as numbness and/or weakness in the legs may occur up to 6-8 hours after the procedure due to effect of the local anesthetic    Do not drive for 6 hours. The effect of the local anesthetic could slow your reflexes.     Avoid strenuous activity for the first 24 hours. You may resume your regular activities after that.     You may shower, however avoid swimming, tub baths or hot tubs for 24 hours following your procedure    You may have a mild to moderate increase in pain for several days following the injection.      You may use ice packs for 10-15 minutes, 3 to 4 times a day at the injection site for comfort    Do not use heat to painful areas for 6 to 8 hours. This will give the local anesthetic time to wear off and prevent you from accidentally burning your skin.    Unless you have been directed to avoid the use of anti-inflammatory medications (NSAIDS-ibuprofen, Aleve, Motrin), you may use these medications or Tylenol for pain control if needed.     With diabetes, check your blood sugar more frequently than usual as your blood sugar may be higher than normal for 10-14 days following a steroid injection. Contact your doctor who manages your diabetes if your blood sugar is higher than usual    Possible side effects of steroids that you may experience include flushing, elevated blood pressure, increased appetite, mild headaches and restlessness.  All of these symptoms will get better with time.    It may take up to 14 days for the steroid medication to start working although you may feel the effect as early as a few days after the procedure.     Follow up with your referring provider in 2-3 weeks      If you experience any of  the following, call the pain center line during work hours at [phone] or on-call physician after hours at [phone]:  -Fever over 100 degree F  -Swelling, bleeding, redness, drainage, warmth at the injection site  -Progressive weakness or numbness in your legs  -Loss of bowel or bladder function  -Unusual headache that is not relieved by Tylenol or your regular headache medication  -Unusual new onset of pain that is not improving

## 2020-04-17 NOTE — Clinical Note
Lumbar epidural steroid injection completed. Tried the interlaminar approach so that we could use a different steroid to see if he would have longer duration of relief.    Thanks for the referral,    DO [facility]

## 2020-04-17 NOTE — NURSING NOTE
Pre-procedure Intake    Have you been fasting? NA    If yes, for how long? 2    Are you taking a prescribed blood thinner such as coumadin, Plavix, Xarelto?    No    If yes, when did you take your last dose?     Do you take aspirin?  No    If cervical procedure, have you held aspirin for 6 days?   NA    Do you have any allergies to contrast dye, iodine, steroid and/or numbing medications?  NO    Are you currently taking antibiotics or have an active infection?  NO    Have you had a fever/elevated temperature within the past week? NO    Are you currently taking oral steroids? NO    Do you have a ? Yes       Are you pregnant or breastfeeding?  Not Applicable    Are the vital signs normal?  Yes

## 2020-04-17 NOTE — NURSING NOTE
Discharge Information    IV Discontiued Time:  NA    Amount of Fluid Infused:  NA    Discharge Criteria = When patient returns to baseline or as per MD order    Consciousness:  Pt is fully awake    Circulation:  BP +/- 20% of pre-procedure level    Respiration:  Patient is able to breathe deeply    O2 Sat:  Patient is able to maintain O2 Sat >92% on room air    Activity:  Moves 4 extremities on command    Ambulation:  Patient is able to stand and walk or stand and pivot into wheelchair    Dressing:  Clean/dry or No Dressing    Notes:   Discharge instructions and AVS given to patient    Patient meets criteria for discharge?  YES    Admitted to PCU?  No    Responsible adult present to accompany patient home?  Yes    Signature/Title:    Ingrid Ram RN  RN Care Coordinator  [facility]

## 2020-05-11 ENCOUNTER — MYC MEDICAL ADVICE (OUTPATIENT)
Dept: NEUROSURGERY | Facility: CLINIC | Age: 55
End: 2020-05-11

## 2020-05-11 NOTE — LETTER
[facility]  Neurosurgery Clinic  [address] [place] [address]  [id number]        5/14/2020      To Whom it May Concern,        Neema Hunt is being seen at our clinic. We will be keeping him on a lifting restriction of no lifting greater than 30 lbs and 5 minute hourly stretch breaks until further notice. He will be scheduling surgery in the near future and will need to maintain these restrictions until then.      Please call our clinic with questions or concerns: [phone]        Sincerely,        Rony Marshall MD

## 2020-05-12 ENCOUNTER — PREP FOR PROCEDURE (OUTPATIENT)
Dept: NEUROLOGY | Facility: CLINIC | Age: 55
End: 2020-05-12

## 2020-05-12 DIAGNOSIS — M48.061 SPINAL STENOSIS OF LUMBAR REGION WITH RADICULOPATHY: Primary | ICD-10-CM

## 2020-05-12 DIAGNOSIS — M54.16 SPINAL STENOSIS OF LUMBAR REGION WITH RADICULOPATHY: Primary | ICD-10-CM

## 2020-05-12 NOTE — TELEPHONE ENCOUNTER
Called patient to discuss work letter. Patient states he is feeling a little bit better with this last injection that was done 3 weeks ago. Discussed with patient that he would like to keep that same restrictions as previous letter.  Pain is better, it has taken the edge off but repots pain in in right leg still but is more manageable, walking has improved since injection. He would like to proceed with surgery sometime this summer. Would like updated restrictions letter sent to him via [employer]. Would like to initiate surgery. Will update Dr Marshall.

## 2020-05-14 NOTE — TELEPHONE ENCOUNTER
Dr Marshall placed surgery case request and okayed to continue current work restrictions until surgery. Letter sent to patient via [employer].

## 2020-06-04 ENCOUNTER — TELEPHONE (OUTPATIENT)
Dept: NEUROSURGERY | Facility: CLINIC | Age: 55
End: 2020-06-04

## 2020-06-04 NOTE — TELEPHONE ENCOUNTER
Patient is calling because he was originally going to have surgery later but now it is being moved up. He would like a call back regarding this.

## 2020-06-04 NOTE — TELEPHONE ENCOUNTER
LMTCB- wondering if patient would like to have surgery in [place] instead of [place]. I could get him in a month sooner.

## 2020-06-04 NOTE — TELEPHONE ENCOUNTER
Spoke to patient. Surgery being bumped up to 6/17 at . He would like work letter to be updated. He is having increased pain and wants to see if Dr Marshall would approve him to Reduce work hours to 4 hour shifts starting 6/8/20 through surgery date can omit 5 min hourly stretch breaks per patient, would like to keep 30 lbs weight restriction.     He informed that his Pre op is scheduled on 6/9/20 at [facility].    Patient needs pt ed  He will be available tomorrow after 2 pm to do this.     Message routed to Dr Marshall for approval of new work restrictions letter.

## 2020-06-05 ENCOUNTER — TELEPHONE (OUTPATIENT)
Dept: NEUROSURGERY | Facility: CLINIC | Age: 55
End: 2020-06-05

## 2020-06-05 DIAGNOSIS — Z11.59 ENCOUNTER FOR SCREENING FOR OTHER VIRAL DISEASES: Primary | ICD-10-CM

## 2020-06-05 NOTE — TELEPHONE ENCOUNTER
New letter sent to patient via [employer]. Patient requesting to be off work 8 weeks for post op recovery and healing. He has a very physical job working in [employer]. He does a lot of standing and twisting.

## 2020-06-05 NOTE — PATIENT INSTRUCTIONS
Patient Instructions  Pre-Operative:    -Surgery scheduled at [facility] for Right Lumbar 5-Sacral 1 foraminotomy/discectomy with Dr. Marshall on 6/17/20  -Surgical risks: blood clots in the leg or lung, problems urinating, nerve damage, drainage from the incision, infection,stiffness  - Pre-operative physical with primary care physician within 30 days of surgical date.   - Likely same day procedure with discharge home day of surgery, may stay for 23 hour observation hospitalization for monitoring   -Shower procedure: please shower with antimicrobial soap the night before surgery and morning of surgery. Please refer to showering instruction sheet in folder.  -Stop all solid foods 8 hours before surgery.  -Keep drinking clear liquids until 4 hours before surgery. Clear liquids include water, clear juice, black coffee, or clear tea without milk, Gatorade, clear soda.   - Discontinue Aspirin, NSAIDs (Advil/Ibuprofen, Naproxen,Nuprin,Relafen/Nabumetone, Diclofenac,Meloxicam, Aleve, Celebrex) x 7 days prior to surgical date.    - May try tylenol for pain 1000 mg three times per day for pain      Post-Operative:  - you may resume taking NSAIDs immediately post op   - Post operative incisional pain which will require pain medications and muscle relaxants. You will receive medication upon discharge.  -Do NOT drive while taking narcotic pain medication.  -Do not drink alcohol while using any pain medication  -Post operative incision care- Watch for signs of infection: redness, swelling, warmth, drainage, and fever of 101 degrees or higher. [facility] [phone].  -No submerging incision in water such as pools, hot tubs,baths for at least 8 weeks or until incision is healed. Showers are fine.   - Post operative activity limitations: 6-8 weeks, no lifting > 10 pounds, limited bending, twisting, or overhead reaching.  -Ok to walk as tolerated, avoid bed rest and prolonged sitting.  -No contact sports until after  follow up visit  -No high impact activities such as; running/jogging, snowmobile or 4 marte riding or any other recreational vehicles.  - Post op follow up appointments: 2 week incision check with RN, 6 week post op follow up visit with Nurse practitioner or Physician Assistant, 3 months with Dr. Marshall. Please call to schedule follow up appointment at [phone].  -If you are currently employed, you will need to be off work for 2-4 weeks for post op recovery and healing. Please fax any FMLA/short term disability paperwork to [phone]. You may call our clinic when you'd like to return to work and we can provide a work letter.

## 2020-06-09 NOTE — TELEPHONE ENCOUNTER
Patient returned the call. Education reviewed. All questions answered. Packet placed in outgoing mail.

## 2020-06-14 DIAGNOSIS — Z11.59 ENCOUNTER FOR SCREENING FOR OTHER VIRAL DISEASES: ICD-10-CM

## 2020-06-14 PROCEDURE — U0003 INFECTIOUS AGENT DETECTION BY NUCLEIC ACID (DNA OR RNA); SEVERE ACUTE RESPIRATORY SYNDROME CORONAVIRUS 2 (SARS-COV-2) (CORONAVIRUS DISEASE [COVID-19]), AMPLIFIED PROBE TECHNIQUE, MAKING USE OF HIGH THROUGHPUT TECHNOLOGIES AS DESCRIBED BY CMS-2020-01-R: HCPCS | Performed by: NEUROLOGICAL SURGERY

## 2020-06-15 ENCOUNTER — MYC MEDICAL ADVICE (OUTPATIENT)
Dept: NEUROSURGERY | Facility: CLINIC | Age: 55
End: 2020-06-15

## 2020-06-15 ENCOUNTER — DOCUMENTATION ONLY (OUTPATIENT)
Dept: NEUROSURGERY | Facility: CLINIC | Age: 55
End: 2020-06-15

## 2020-06-15 LAB
SARS-COV-2 RNA SPEC QL NAA+PROBE: NOT DETECTED
SPECIMEN SOURCE: NORMAL

## 2020-06-15 NOTE — TELEPHONE ENCOUNTER
Patient returned call on nurse line. He states he signed a form approving [employer] to access his medical records to help support short term disability. He is wondering next steps on how [facility] can contact med rec dept.     Provided patient with the phone number for  [employer]  medical record dept. Advised patient to call us back if there are other questions or concerns with this process. Patient voiced agreement.

## 2020-06-16 ENCOUNTER — TELEPHONE (OUTPATIENT)
Dept: NEUROSURGERY | Facility: CLINIC | Age: 55
End: 2020-06-16

## 2020-06-16 DIAGNOSIS — Z11.59 ENCOUNTER FOR SCREENING FOR OTHER VIRAL DISEASES: Primary | ICD-10-CM

## 2020-06-16 ASSESSMENT — MIFFLIN-ST. JEOR: SCORE: 1734.73

## 2020-06-16 NOTE — TELEPHONE ENCOUNTER
"Patient was scheduled for surgery with Dr. Marshall tomorrow 06/17/20. He called today to update us of this call. Also inquiring about a letter for his work. He was unsure what the letter needed to state, and is waiting for a call back from his employer. He will let us know when he hears back.    Ivonne, surgery scheduling, is waiting for a call back to get a peer to peer set up.    Patient stated he is worried about the pain and waiting that long.     Discussed red flag symptoms to watch for. He stated in March he stated to have a new symptom, which was his right foot being harder to control. He confirmed it does not drag when he walks or sits but when he steps down it \"slaps\" to the ground. This symptom has gotten progressively worse. He is able to hold it up vs it dropping down. He is aware to be seen should the drop be sudden. He was unsure if this was discussed with Dr. Marshall since it first started in March.     Will discuss with care team concerns/recommendations and await his call back for letter.  "

## 2020-06-16 NOTE — LETTER
[facility]   Neurosurgery Clinic   [address] [place] [address]  [id number]          June 17, 2020    Re: Neema Hunt      To Whom it May Concern,      Neema was scheduled for surgery today, unfortunately this surgery was canceled and the reschedule is pending at this time. Due to Neema's current symptoms we would recommend that he continue to work with restrictions until his surgery is rescheduled.     Neema may return to work as of 6/18/20 with the following restrictions:  -Unable to lift/carry greater than 30 lbs.  -Max of 4 hours per work day.    Please feel free to contact our clinic with any questions or concerns: [phone]      Sincerely,            Nhan Marshall MD

## 2020-06-16 NOTE — TELEPHONE ENCOUNTER
"Per Maico Oliva PA-C, \"Since his procedure was canceled, and if he is truly developing a foot drop, I would recommend that he start some physical therapy in the interim. \"    Called to discuss with patient. He stated he would like to continue to monitor his foot versus going to PT. He says that the \"slap\" when he steps is better in the AM and gets worse throughout the day. That someone onlooking may not notice it unless right next to him. Said he had this happen before, prior to his injection, just seems to have been worse since the injection. He would like to monitor.  He is aware that if foot drop occurred to be seen right away.    He will call our team and update with any changes, concerns, or if he decides to pursue PT in the interim.  "

## 2020-06-16 NOTE — TELEPHONE ENCOUNTER
Patient called back to discuss work letter. Surgery was scheduled for tomorrow but cancelled d/t insurance. He is r/s for 7/15/20. Per previous RN note Ivonne, surgery scheduler is waiting for a c/b for peer to peer set up.     Patient does not feel he can go back to work and maintain the current restrictions for 4 weeks. He thinks he could for 2 weeks just because his symptoms are getting worse. See previous RN note. Patient has concerns at his job they have mandatory overtime and would like to have no overtime work added to new letter. Will update Dr Marshall and discuss continuation of restrictions letter. Patient states that he would like work letter sent to him via [employer].

## 2020-06-17 NOTE — TELEPHONE ENCOUNTER
Patient called to request the letter he discussed with Lara BANKS RN yesterday. Patient feels that he can return to work as previously detailed in letter on 6/5/20. Previous letter was written through today 6/17, which was when he was supposed to undergo surgery. However, surgery has been canceled due to insurance reasons and he is waiting to reschedule.     Will write new letter to reflect current situation.

## 2020-07-10 ENCOUNTER — AMBULATORY - HEALTHEAST (OUTPATIENT)
Dept: FAMILY MEDICINE | Facility: CLINIC | Age: 55
End: 2020-07-10

## 2020-07-10 DIAGNOSIS — Z11.59 ENCOUNTER FOR SCREENING FOR OTHER VIRAL DISEASES: ICD-10-CM

## 2020-07-12 ENCOUNTER — AMBULATORY - HEALTHEAST (OUTPATIENT)
Dept: FAMILY MEDICINE | Facility: CLINIC | Age: 55
End: 2020-07-12

## 2020-07-12 DIAGNOSIS — Z11.59 ENCOUNTER FOR SCREENING FOR OTHER VIRAL DISEASES: ICD-10-CM

## 2020-07-15 ENCOUNTER — ANESTHESIA EVENT (OUTPATIENT)
Dept: SURGERY | Facility: CLINIC | Age: 55
End: 2020-07-15
Payer: COMMERCIAL

## 2020-07-15 ENCOUNTER — ANESTHESIA (OUTPATIENT)
Dept: SURGERY | Facility: CLINIC | Age: 55
End: 2020-07-15
Payer: COMMERCIAL

## 2020-07-15 ENCOUNTER — APPOINTMENT (OUTPATIENT)
Dept: GENERAL RADIOLOGY | Facility: CLINIC | Age: 55
End: 2020-07-15
Attending: NEUROLOGICAL SURGERY
Payer: COMMERCIAL

## 2020-07-15 ENCOUNTER — HOSPITAL ENCOUNTER (OUTPATIENT)
Facility: CLINIC | Age: 55
Discharge: HOME | End: 2020-07-15
Attending: NEUROLOGICAL SURGERY | Admitting: NEUROLOGICAL SURGERY
Payer: COMMERCIAL

## 2020-07-15 VITALS
DIASTOLIC BLOOD PRESSURE: 78 MMHG | WEIGHT: 194 LBS | HEIGHT: 70 IN | SYSTOLIC BLOOD PRESSURE: 122 MMHG | TEMPERATURE: 97.5 F | RESPIRATION RATE: 18 BRPM | HEART RATE: 57 BPM | BODY MASS INDEX: 27.77 KG/M2 | OXYGEN SATURATION: 100 %

## 2020-07-15 DIAGNOSIS — M48.061 SPINAL STENOSIS OF LUMBAR REGION WITH RADICULOPATHY: ICD-10-CM

## 2020-07-15 DIAGNOSIS — M54.16 SPINAL STENOSIS OF LUMBAR REGION WITH RADICULOPATHY: ICD-10-CM

## 2020-07-15 LAB
ABO + RH BLD: NORMAL
ABO + RH BLD: NORMAL
BLD GP AB SCN SERPL QL: NORMAL
BLOOD BANK CMNT PATIENT-IMP: NORMAL
CREAT SERPL-MCNC: 0.77 MG/DL (ref 0.66–1.25)
GFR SERPL CREATININE-BSD FRML MDRD: >90 ML/MIN/{1.73_M2}
HGB BLD-MCNC: 12.4 G/DL (ref 13.3–17.7)
POTASSIUM SERPL-SCNC: 3.5 MMOL/L (ref 3.4–5.3)
SPECIMEN EXP DATE BLD: NORMAL

## 2020-07-15 PROCEDURE — 36415 COLL VENOUS BLD VENIPUNCTURE: CPT | Performed by: ANESTHESIOLOGY

## 2020-07-15 PROCEDURE — 71000013 ZZH RECOVERY PHASE 1 LEVEL 1 EA ADDTL HR: Performed by: NEUROLOGICAL SURGERY

## 2020-07-15 PROCEDURE — 37000008 ZZH ANESTHESIA TECHNICAL FEE, 1ST 30 MIN: Performed by: NEUROLOGICAL SURGERY

## 2020-07-15 PROCEDURE — 63030 LAMOT DCMPRN NRV RT 1 LMBR: CPT | Mod: AS | Performed by: PHYSICIAN ASSISTANT

## 2020-07-15 PROCEDURE — 25000125 ZZHC RX 250: Performed by: NEUROLOGICAL SURGERY

## 2020-07-15 PROCEDURE — 25000128 H RX IP 250 OP 636: Performed by: NURSE ANESTHETIST, CERTIFIED REGISTERED

## 2020-07-15 PROCEDURE — 25000128 H RX IP 250 OP 636: Performed by: ANESTHESIOLOGY

## 2020-07-15 PROCEDURE — 25800030 ZZH RX IP 258 OP 636: Performed by: ANESTHESIOLOGY

## 2020-07-15 PROCEDURE — 84132 ASSAY OF SERUM POTASSIUM: CPT | Performed by: NEUROLOGICAL SURGERY

## 2020-07-15 PROCEDURE — 86900 BLOOD TYPING SEROLOGIC ABO: CPT | Performed by: ANESTHESIOLOGY

## 2020-07-15 PROCEDURE — 85018 HEMOGLOBIN: CPT | Performed by: ANESTHESIOLOGY

## 2020-07-15 PROCEDURE — 36415 COLL VENOUS BLD VENIPUNCTURE: CPT | Performed by: NEUROLOGICAL SURGERY

## 2020-07-15 PROCEDURE — 25000128 H RX IP 250 OP 636: Performed by: NEUROLOGICAL SURGERY

## 2020-07-15 PROCEDURE — 82565 ASSAY OF CREATININE: CPT | Performed by: NEUROLOGICAL SURGERY

## 2020-07-15 PROCEDURE — 71000027 ZZH RECOVERY PHASE 2 EACH 15 MINS: Performed by: NEUROLOGICAL SURGERY

## 2020-07-15 PROCEDURE — 25000125 ZZHC RX 250: Performed by: NURSE ANESTHETIST, CERTIFIED REGISTERED

## 2020-07-15 PROCEDURE — 86850 RBC ANTIBODY SCREEN: CPT | Performed by: ANESTHESIOLOGY

## 2020-07-15 PROCEDURE — 36000063 ZZH SURGERY LEVEL 4 EA 15 ADDTL MIN: Performed by: NEUROLOGICAL SURGERY

## 2020-07-15 PROCEDURE — 71000012 ZZH RECOVERY PHASE 1 LEVEL 1 FIRST HR: Performed by: NEUROLOGICAL SURGERY

## 2020-07-15 PROCEDURE — 27210995 ZZH RX 272: Performed by: NEUROLOGICAL SURGERY

## 2020-07-15 PROCEDURE — 25000132 ZZH RX MED GY IP 250 OP 250 PS 637: Performed by: PHYSICIAN ASSISTANT

## 2020-07-15 PROCEDURE — 86901 BLOOD TYPING SEROLOGIC RH(D): CPT | Performed by: ANESTHESIOLOGY

## 2020-07-15 PROCEDURE — 40000277 XR SURGERY CARM FLUORO LESS THAN 5 MIN W STILLS: Mod: TC

## 2020-07-15 PROCEDURE — 40000306 ZZH STATISTIC PRE PROC ASSESS II: Performed by: NEUROLOGICAL SURGERY

## 2020-07-15 PROCEDURE — 63030 LAMOT DCMPRN NRV RT 1 LMBR: CPT | Mod: RT | Performed by: NEUROLOGICAL SURGERY

## 2020-07-15 PROCEDURE — 27210794 ZZH OR GENERAL SUPPLY STERILE: Performed by: NEUROLOGICAL SURGERY

## 2020-07-15 PROCEDURE — 25800030 ZZH RX IP 258 OP 636: Performed by: NURSE ANESTHETIST, CERTIFIED REGISTERED

## 2020-07-15 PROCEDURE — 36000065 ZZH SURGERY LEVEL 4 W FLUORO 1ST 30 MIN: Performed by: NEUROLOGICAL SURGERY

## 2020-07-15 PROCEDURE — 37000009 ZZH ANESTHESIA TECHNICAL FEE, EACH ADDTL 15 MIN: Performed by: NEUROLOGICAL SURGERY

## 2020-07-15 RX ORDER — ONDANSETRON 2 MG/ML
INJECTION INTRAMUSCULAR; INTRAVENOUS PRN
Status: DISCONTINUED | OUTPATIENT
Start: 2020-07-15 | End: 2020-07-15

## 2020-07-15 RX ORDER — ONDANSETRON 4 MG/1
4 TABLET, ORALLY DISINTEGRATING ORAL EVERY 30 MIN PRN
Status: DISCONTINUED | OUTPATIENT
Start: 2020-07-15 | End: 2020-07-15 | Stop reason: HOSPADM

## 2020-07-15 RX ORDER — LABETALOL 20 MG/4 ML (5 MG/ML) INTRAVENOUS SYRINGE
10
Status: DISCONTINUED | OUTPATIENT
Start: 2020-07-15 | End: 2020-07-15 | Stop reason: HOSPADM

## 2020-07-15 RX ORDER — FENTANYL CITRATE 50 UG/ML
100 INJECTION, SOLUTION INTRAMUSCULAR; INTRAVENOUS
Status: COMPLETED | OUTPATIENT
Start: 2020-07-15 | End: 2020-07-15

## 2020-07-15 RX ORDER — LIDOCAINE 40 MG/G
CREAM TOPICAL
Status: DISCONTINUED | OUTPATIENT
Start: 2020-07-15 | End: 2020-07-15 | Stop reason: HOSPADM

## 2020-07-15 RX ORDER — LIDOCAINE HYDROCHLORIDE 10 MG/ML
INJECTION, SOLUTION INFILTRATION; PERINEURAL PRN
Status: DISCONTINUED | OUTPATIENT
Start: 2020-07-15 | End: 2020-07-15

## 2020-07-15 RX ORDER — PROPOFOL 10 MG/ML
INJECTION, EMULSION INTRAVENOUS PRN
Status: DISCONTINUED | OUTPATIENT
Start: 2020-07-15 | End: 2020-07-15

## 2020-07-15 RX ORDER — GLYCOPYRROLATE 0.2 MG/ML
INJECTION, SOLUTION INTRAMUSCULAR; INTRAVENOUS PRN
Status: DISCONTINUED | OUTPATIENT
Start: 2020-07-15 | End: 2020-07-15

## 2020-07-15 RX ORDER — METHOCARBAMOL 750 MG/1
750 TABLET, FILM COATED ORAL 4 TIMES DAILY PRN
Qty: 20 TABLET | Refills: 0 | Status: SHIPPED | OUTPATIENT
Start: 2020-07-15 | End: 2020-07-28

## 2020-07-15 RX ORDER — OXYCODONE HYDROCHLORIDE 5 MG/1
5-10 TABLET ORAL EVERY 4 HOURS PRN
Qty: 20 TABLET | Refills: 0 | Status: SHIPPED | OUTPATIENT
Start: 2020-07-15 | End: 2020-07-17

## 2020-07-15 RX ORDER — FENTANYL CITRATE 50 UG/ML
25-50 INJECTION, SOLUTION INTRAMUSCULAR; INTRAVENOUS
Status: DISCONTINUED | OUTPATIENT
Start: 2020-07-15 | End: 2020-07-15 | Stop reason: HOSPADM

## 2020-07-15 RX ORDER — CEFAZOLIN SODIUM 1 G/3ML
1 INJECTION, POWDER, FOR SOLUTION INTRAMUSCULAR; INTRAVENOUS SEE ADMIN INSTRUCTIONS
Status: DISCONTINUED | OUTPATIENT
Start: 2020-07-15 | End: 2020-07-15 | Stop reason: HOSPADM

## 2020-07-15 RX ORDER — KETAMINE HYDROCHLORIDE 10 MG/ML
INJECTION INTRAMUSCULAR; INTRAVENOUS PRN
Status: DISCONTINUED | OUTPATIENT
Start: 2020-07-15 | End: 2020-07-15

## 2020-07-15 RX ORDER — OXYCODONE HYDROCHLORIDE 5 MG/1
5 TABLET ORAL
Status: COMPLETED | OUTPATIENT
Start: 2020-07-15 | End: 2020-07-15

## 2020-07-15 RX ORDER — CEFAZOLIN SODIUM 2 G/100ML
2 INJECTION, SOLUTION INTRAVENOUS
Status: COMPLETED | OUTPATIENT
Start: 2020-07-15 | End: 2020-07-15

## 2020-07-15 RX ORDER — EPHEDRINE SULFATE 50 MG/ML
INJECTION, SOLUTION INTRAMUSCULAR; INTRAVENOUS; SUBCUTANEOUS PRN
Status: DISCONTINUED | OUTPATIENT
Start: 2020-07-15 | End: 2020-07-15

## 2020-07-15 RX ORDER — NEOSTIGMINE METHYLSULFATE 1 MG/ML
VIAL (ML) INJECTION PRN
Status: DISCONTINUED | OUTPATIENT
Start: 2020-07-15 | End: 2020-07-15

## 2020-07-15 RX ORDER — ONDANSETRON 2 MG/ML
4 INJECTION INTRAMUSCULAR; INTRAVENOUS EVERY 30 MIN PRN
Status: DISCONTINUED | OUTPATIENT
Start: 2020-07-15 | End: 2020-07-15 | Stop reason: HOSPADM

## 2020-07-15 RX ORDER — HYDROMORPHONE HYDROCHLORIDE 1 MG/ML
.3-.5 INJECTION, SOLUTION INTRAMUSCULAR; INTRAVENOUS; SUBCUTANEOUS EVERY 5 MIN PRN
Status: DISCONTINUED | OUTPATIENT
Start: 2020-07-15 | End: 2020-07-15 | Stop reason: HOSPADM

## 2020-07-15 RX ORDER — DEXAMETHASONE SODIUM PHOSPHATE 4 MG/ML
INJECTION, SOLUTION INTRA-ARTICULAR; INTRALESIONAL; INTRAMUSCULAR; INTRAVENOUS; SOFT TISSUE PRN
Status: DISCONTINUED | OUTPATIENT
Start: 2020-07-15 | End: 2020-07-15

## 2020-07-15 RX ORDER — SODIUM CHLORIDE, SODIUM LACTATE, POTASSIUM CHLORIDE, CALCIUM CHLORIDE 600; 310; 30; 20 MG/100ML; MG/100ML; MG/100ML; MG/100ML
INJECTION, SOLUTION INTRAVENOUS CONTINUOUS
Status: DISCONTINUED | OUTPATIENT
Start: 2020-07-15 | End: 2020-07-15 | Stop reason: HOSPADM

## 2020-07-15 RX ORDER — BUPIVACAINE HYDROCHLORIDE AND EPINEPHRINE 2.5; 5 MG/ML; UG/ML
INJECTION, SOLUTION INFILTRATION; PERINEURAL PRN
Status: DISCONTINUED | OUTPATIENT
Start: 2020-07-15 | End: 2020-07-15 | Stop reason: HOSPADM

## 2020-07-15 RX ORDER — FENTANYL CITRATE 50 UG/ML
INJECTION, SOLUTION INTRAMUSCULAR; INTRAVENOUS PRN
Status: DISCONTINUED | OUTPATIENT
Start: 2020-07-15 | End: 2020-07-15

## 2020-07-15 RX ORDER — NALOXONE HYDROCHLORIDE 0.4 MG/ML
.1-.4 INJECTION, SOLUTION INTRAMUSCULAR; INTRAVENOUS; SUBCUTANEOUS
Status: DISCONTINUED | OUTPATIENT
Start: 2020-07-15 | End: 2020-07-15 | Stop reason: HOSPADM

## 2020-07-15 RX ORDER — ACETAMINOPHEN 325 MG/1
650 TABLET ORAL
Status: DISCONTINUED | OUTPATIENT
Start: 2020-07-15 | End: 2020-07-15 | Stop reason: HOSPADM

## 2020-07-15 RX ADMIN — FENTANYL CITRATE 150 MCG: 50 INJECTION, SOLUTION INTRAMUSCULAR; INTRAVENOUS at 09:51

## 2020-07-15 RX ADMIN — MIDAZOLAM 2 MG: 1 INJECTION INTRAMUSCULAR; INTRAVENOUS at 09:49

## 2020-07-15 RX ADMIN — SODIUM CHLORIDE, POTASSIUM CHLORIDE, SODIUM LACTATE AND CALCIUM CHLORIDE: 600; 310; 30; 20 INJECTION, SOLUTION INTRAVENOUS at 11:01

## 2020-07-15 RX ADMIN — FENTANYL CITRATE 100 MCG: 50 INJECTION, SOLUTION INTRAMUSCULAR; INTRAVENOUS at 09:34

## 2020-07-15 RX ADMIN — SODIUM CHLORIDE, POTASSIUM CHLORIDE, SODIUM LACTATE AND CALCIUM CHLORIDE: 600; 310; 30; 20 INJECTION, SOLUTION INTRAVENOUS at 09:38

## 2020-07-15 RX ADMIN — GLYCOPYRROLATE 0.4 MG: 0.2 INJECTION, SOLUTION INTRAMUSCULAR; INTRAVENOUS at 11:12

## 2020-07-15 RX ADMIN — Medication 10 MG: at 10:09

## 2020-07-15 RX ADMIN — SODIUM CHLORIDE, POTASSIUM CHLORIDE, SODIUM LACTATE AND CALCIUM CHLORIDE: 600; 310; 30; 20 INJECTION, SOLUTION INTRAVENOUS at 09:45

## 2020-07-15 RX ADMIN — OXYCODONE HYDROCHLORIDE 5 MG: 5 TABLET ORAL at 13:04

## 2020-07-15 RX ADMIN — Medication 3 MG: at 11:12

## 2020-07-15 RX ADMIN — HYDROMORPHONE HYDROCHLORIDE 1 MG: 1 INJECTION, SOLUTION INTRAMUSCULAR; INTRAVENOUS; SUBCUTANEOUS at 10:05

## 2020-07-15 RX ADMIN — HYDROMORPHONE HYDROCHLORIDE 0.5 MG: 1 INJECTION, SOLUTION INTRAMUSCULAR; INTRAVENOUS; SUBCUTANEOUS at 11:50

## 2020-07-15 RX ADMIN — GLYCOPYRROLATE 0.2 MG: 0.2 INJECTION, SOLUTION INTRAMUSCULAR; INTRAVENOUS at 09:51

## 2020-07-15 RX ADMIN — DEXMEDETOMIDINE HYDROCHLORIDE 0.4 MCG/KG/HR: 100 INJECTION, SOLUTION INTRAVENOUS at 09:55

## 2020-07-15 RX ADMIN — DEXAMETHASONE SODIUM PHOSPHATE 4 MG: 4 INJECTION, SOLUTION INTRA-ARTICULAR; INTRALESIONAL; INTRAMUSCULAR; INTRAVENOUS; SOFT TISSUE at 09:51

## 2020-07-15 RX ADMIN — ROCURONIUM BROMIDE 10 MG: 10 INJECTION INTRAVENOUS at 10:12

## 2020-07-15 RX ADMIN — ROCURONIUM BROMIDE 10 MG: 10 INJECTION INTRAVENOUS at 10:30

## 2020-07-15 RX ADMIN — ONDANSETRON HYDROCHLORIDE 4 MG: 2 INJECTION, SOLUTION INTRAVENOUS at 11:12

## 2020-07-15 RX ADMIN — Medication 15 MG: at 10:15

## 2020-07-15 RX ADMIN — FENTANYL CITRATE 50 MCG: 50 INJECTION, SOLUTION INTRAMUSCULAR; INTRAVENOUS at 12:03

## 2020-07-15 RX ADMIN — FENTANYL CITRATE 50 MCG: 50 INJECTION, SOLUTION INTRAMUSCULAR; INTRAVENOUS at 11:45

## 2020-07-15 RX ADMIN — HYDROMORPHONE HYDROCHLORIDE 0.5 MG: 1 INJECTION, SOLUTION INTRAMUSCULAR; INTRAVENOUS; SUBCUTANEOUS at 13:06

## 2020-07-15 RX ADMIN — PROPOFOL 150 MG: 10 INJECTION, EMULSION INTRAVENOUS at 09:51

## 2020-07-15 RX ADMIN — LIDOCAINE HYDROCHLORIDE 50 MG: 10 INJECTION, SOLUTION INFILTRATION; PERINEURAL at 09:51

## 2020-07-15 RX ADMIN — ROCURONIUM BROMIDE 10 MG: 10 INJECTION INTRAVENOUS at 10:01

## 2020-07-15 RX ADMIN — ROCURONIUM BROMIDE 40 MG: 10 INJECTION INTRAVENOUS at 09:51

## 2020-07-15 RX ADMIN — OXYCODONE HYDROCHLORIDE 5 MG: 5 TABLET ORAL at 12:17

## 2020-07-15 RX ADMIN — Medication 50 MG: at 10:05

## 2020-07-15 RX ADMIN — ACETAMINOPHEN 650 MG: 325 TABLET, FILM COATED ORAL at 12:17

## 2020-07-15 RX ADMIN — CEFAZOLIN SODIUM 2 G: 2 INJECTION, SOLUTION INTRAVENOUS at 09:58

## 2020-07-15 ASSESSMENT — ENCOUNTER SYMPTOMS: DYSRHYTHMIAS: 0

## 2020-07-15 ASSESSMENT — MIFFLIN-ST. JEOR: SCORE: 1730.2

## 2020-07-15 NOTE — OR NURSING
Discharge instructions reviewed with patient and support person.  All questions were answered to their satisfaction.  Patient and support person verbalized understanding of all Same Day Surgery discharge instructions.

## 2020-07-15 NOTE — OR NURSING
"Patient able to urinate \"much more\" than he was able to last time.  States he feels he was able to empty his bladder at this time.  "

## 2020-07-15 NOTE — OP NOTE
Procedure Date: 07/15/2020      PREOPERATIVE DIAGNOSIS:  Right L5-S1 stenosis with radiculopathy.      POSTOPERATIVE DIAGNOSIS:  Right L5-S1 stenosis with radiculopathy.      PROCEDURES:  Right L5-S1 foraminotomy and microdiskectomy.      SURGEON:  Nhan Marshall MD      ASSISTANT:  Prasanna Oliva PA-C      ANESTHESIA:  General endotracheal anesthesia plus local anesthetic.      ESTIMATED BLOOD LOSS:  20 mL      INDICATIONS:  The patient is a 55-year-old male with right lower extremity pain and a history of previous fusion to L5.  He was noted to have severe stenosis in the right L5-S1 foramen from facet hypertrophy and foraminal disk herniation He was brought to the operating room for foraminotomy and microdiscectomy. Please note that Prasanna Oliva PA-C's assistance was needed for positioning, retraction, suctioning, and closure.      DESCRIPTION OF PROCEDURE:  The patient was brought to the operating room, general endotracheal anesthesia was induced.  The patient was rolled in the prone position on the Vinnie frame.  The back was prepped and draped in sterile fashion.  C-arm fluoroscopy was used to localize the L5-S1 level and a midline incision was made and the right-sided exposure performed.  The Garcia retractor was placed.  The microscope was sterilely draped and brought into the field.  The level was again confirmed with fluoroscopy and then the high-speed drill was used to remove part of the lamina of L5 as well as the medial facet and part of the inferior articular process of L5.  The S1 lamina was exposed and then used to identify the superior articular process of S1, which was then drilled away as much as possible undercutting the L5 inferior articular process.  Kerrison punches were used to remove the remainder of the facet particularly the tip of the process.  Any ligamentous or other synovium was also removed from the foramen and the nerve root was clearly identified.  The L5-S1 disk  space was then opened with an 11 blade.  He used a 15 blade and the foraminal disk herniation also removed using pituitary rongeurs and curettes.  Once the foramen was well decompressed and then the nerve noted to be free.  Hemostasis was achieved.  The fascia was closed with 0 interrupted Vicryl, 2-0 inverted interrupted Vicryl was used for the galea and a 4-0 subcuticular Monocryl used set for skin and Exofin placed as a dressing.  The patient was awakened, extubated and taken to the recovery room in good condition.         GRACIE LARSON MD             D: 07/15/2020   T: 07/15/2020   MT:       Name:     MISSY GUAN   MRN:      [id number]        Account:        [id number]   :      1965           Procedure Date: 07/15/2020      Document: [id number]

## 2020-07-15 NOTE — DISCHARGE INSTRUCTIONS
Types of Lumbar Disk Surgery     During a diskectomy, the surgeon removes part of a damaged disk.   You have a problem disk in your lower back. This disk problem may be corrected with a diskectomy. This is the surgical removal of a portion of a disk. There are 3 main types of diskectomy. Each has risks and benefits. Your surgeon will recommend the type that is best for your back and your health:    Standard open diskectomy. An incision is made in the middle of the back. To see and reach the damaged disk, the surgeon then removes some or all of the lamina. Once the disk is exposed, the surgeon removes the part of the disk that is pressing on a nerve.    Microdiskectomy. This surgery is much like the  standard  diskectomy. But the surgeon uses an operating microscope or special glasses to magnify, highlight, and view the disk.    Percutaneous or endoscopic diskectomy. This is an outpatient procedure (usually doesn t need an overnight hospital stay). The surgeon uses X-ray pictures and a video screen to help reach and fix the damaged disk.  Date Last Reviewed: 5/1/2018 2000-2019 [employer]. [address] [place] [address]. All rights reserved. This information is not intended as a substitute for professional medical care. Always follow your healthcare professional's instructions.      DR. GRACIE LARSON M.D.         CLINIC PHONE NUMBER:  [phone]          [facility]    Maximum acetaminophen (Tylenol) dose from all sources should not exceed 4 grams (4000 mg) per day. You had 650 mg at 12:17 p.m. today.             GENERAL ANESTHESIA OR SEDATION ADULT DISCHARGE INSTRUCTIONS   SPECIAL PRECAUTIONS FOR 24 HOURS AFTER SURGERY    IT IS NOT UNUSUAL TO FEEL LIGHT-HEADED OR FAINT, UP TO 24 HOURS AFTER SURGERY OR WHILE TAKING PAIN MEDICATION.  IF YOU HAVE THESE SYMPTOMS; SIT FOR A FEW MINUTES BEFORE STANDING AND HAVE SOMEONE ASSIST YOU WHEN YOU GET UP TO WALK OR USE THE  BATHROOM.    YOU SHOULD REST AND RELAX FOR THE NEXT 24 HOURS AND YOU MUST MAKE ARRANGEMENTS TO HAVE SOMEONE STAY WITH YOU FOR AT LEAST 24 HOURS AFTER YOUR DISCHARGE.  AVOID HAZARDOUS AND STRENUOUS ACTIVITIES.  DO NOT MAKE IMPORTANT DECISIONS FOR 24 HOURS.    DO NOT DRIVE ANY VEHICLE OR OPERATE MECHANICAL EQUIPMENT FOR 24 HOURS FOLLOWING THE END OF YOUR SURGERY.  EVEN THOUGH YOU MAY FEEL NORMAL, YOUR REACTIONS MAY BE AFFECTED BY THE MEDICATION YOU HAVE RECEIVED.    DO NOT DRINK ALCOHOLIC BEVERAGES FOR 24 HOURS FOLLOWING YOUR SURGERY.    DRINK CLEAR LIQUIDS (APPLE JUICE, GINGER ALE, 7-UP, BROTH, ETC.).  PROGRESS TO YOUR REGULAR DIET AS YOU FEEL ABLE.    YOU MAY HAVE A DRY MOUTH, A SORE THROAT, MUSCLES ACHES OR TROUBLE SLEEPING.  THESE SHOULD GO AWAY AFTER 24 HOURS.    CALL YOUR DOCTOR FOR ANY OF THE FOLLOWING:  SIGNS OF INFECTION (FEVER, GROWING TENDERNESS AT THE SURGERY SITE, A LARGE AMOUNT OF DRAINAGE OR BLEEDING, SEVERE PAIN, FOUL-SMELLING DRAINAGE, REDNESS OR SWELLING.    IT HAS BEEN OVER 8 TO 10 HOURS SINCE SURGERY AND YOU ARE STILL NOT ABLE TO URINATE (PASS WATER).     If you do not urinate by 9:30PM, or if your bladder starts feeling uncomfortably full, you need to be evaluated in the Emergency Department.

## 2020-07-15 NOTE — PROGRESS NOTES
"SPIRITUAL HEALTH SERVICES Progress Note  [facility] Pre-surgical    SH consult per pt request.  Met with pt, Neema, and his fiance, \"Delphine\". Neema shares he is undergoing a lumbar spine procedure and invites prayers for his medical team and recovery. Delphine asks for prayers to \"be safe from COVID\" and they also request blessing for their upcoming marriage. Gathered bedside and shared in prayer together. No other needs expressed at this time. SH remains available.     ASTRID Kelly.  Staff    Pager #[phone]   Pronouns: he/him/his    "

## 2020-07-15 NOTE — ANESTHESIA PREPROCEDURE EVALUATION
Anesthesia Pre-Procedure Evaluation    Patient: Neema Hunt   MRN: [id number] : 1965          Preoperative Diagnosis: Spinal stenosis of lumbar region with radiculopathy [M48.061, M54.16]    Procedure(s):  Right Lumbar 5-Sacral 1 foraminotomy/discectomy    Past Medical History:   Diagnosis Date     Arthritis      Back pain      Gastro-oesophageal reflux disease      Hypertension      Radiculopathy      Scoliosis      Past Surgical History:   Procedure Laterality Date     EXPLORE SPINE, REMOVE HARDWARE, COMBINED N/A 2019    Procedure: REMOVAL LUMBAR L3-5 INSTRUMENTATION;  Surgeon: Nhan Marshall MD;  Location: [facility] OR     FUSION SPINE ANTERIOR MINIMALLY INVASIVE TWO LEVELS N/A 2016    Procedure: FUSION SPINE ANTERIOR MINIMALLY INVASIVE TWO LEVELS;  Surgeon: Nhan Marshall MD;  Location: [facility] OR     HERNIA REPAIR      right inguinal hernia     LAMINECTOMY LUMBAR POSTERIOR MICROSCOPIC ONE LEVEL  2013    Procedure: LAMINECTOMY LUMBAR POSTERIOR MICROSCOPIC ONE LEVEL;  Right Lumbar 3-4 Micro Laminectomy & Foraminotomy;  Surgeon: Nhan Marshall MD;  [facility]     OPTICAL TRACKING SYSTEM FUSION SPINE POSTERIOR LUMBAR PERCUTANEOUS TWO LEVELS N/A 2016    Procedure: OPTICAL TRACKING SYSTEM FUSION SPINE POSTERIOR LUMBAR PERCUTANEOUS TWO LEVELS;  Surgeon: Nhan Marshall MD;  Location: [facility] OR     OPTICAL TRACKING SYSTEM FUSION SPINE POSTERIOR LUMBAR THREE+ LEVELS Right 2019    Procedure: POSTERIOR SEGMENTAL INSTRUMENTATION L2-4, RIGHT LUMBAR 2-3 DISCECTOMY AND TRANSFORAMINAL INTERBODY FUSION, REDO DECOMPRESSION RIGHT L3-4, POSTERIOR ARTHRODESIS L2-4;  Surgeon: Nhan Marshall MD;  Location:  OR     ORTHOPEDIC SURGERY      left ankle, right finger     Anesthesia Evaluation     .             ROS/MED HX    ENT/Pulmonary:      (-) asthma and sleep apnea   Neurologic:  - neg neurologic ROS     Cardiovascular:     (+) hypertension----. : . . . :. .      (-) CAD, CHF,  arrhythmias, pulmonary hypertension and dyslipidemia   METS/Exercise Tolerance:     Hematologic:        (-) anemia   Musculoskeletal:   (+) arthritis,  -       GI/Hepatic:     (+) GERD       Renal/Genitourinary:      (-) renal disease   Endo:      (-) Type I DM, Type II DM, thyroid disease, chronic steroid usage, other endocrine disorder and obesity   Psychiatric:        (-) psychiatric history   Infectious Disease:  - neg infectious disease ROS       Malignancy:         Other:    (+) H/O Chronic Pain,                        Physical Exam      Airway   Mallampati: II  TM distance: >3 FB  Neck ROM: full    Dental     Cardiovascular   Rhythm and rate: regular and normal  (-) no murmur    Pulmonary    breath sounds clear to auscultation    Other findings: EKG - Sinus Jimy    Lab Test        09/11/19 09/08/19 09/07/19      --          11/09/16      --          04/09/13      --          02/22/13                       1818          0828          0913           --           1111           --           1216           --           0820          WBC          10.2         9.1          10.3          --          3.5*           < >         --           --           --           HGB          8.6*         9.3*         9.7*           < >        12.7*         --          13.0*          < >         --           MCV          85           86            --           --          88            --           --           --           --           PLT          445          223           --           --          229           --           --           --          231           INR           --           --           --           --          1.13          --          1.11          --          1.17*          < > = values in this interval not displayed.                  Lab Test        09/11/19 09/08/19 09/05/19 11/09/16                       1818          0828          0630          1111          NA           135           "132*          --          146*          POTASSIUM    4.3          3.3*         3.3*         4.2           CHLORIDE     102          97            --          109           CO2          29           31            --          28            BUN          10           8             --          11            CR           0.78         0.81         0.94         1.02          ANIONGAP     4            4             --          9             JASON          9.0          8.9           --          8.9           GLC          125*         143*          --          91                  Lab Results   Component Value Date    WBC 10.2 09/11/2019    HGB 8.6 (L) 09/11/2019    HCT 25.1 (L) 09/11/2019     09/11/2019     09/11/2019    POTASSIUM 4.3 09/11/2019    CHLORIDE 102 09/11/2019    CO2 29 09/11/2019    BUN 10 09/11/2019    CR 0.78 09/11/2019     (H) 09/11/2019    JASON 9.0 09/11/2019    ALBUMIN 2.9 (L) 09/11/2019    PROTTOTAL 8.4 09/11/2019    ALT 26 09/11/2019    AST 22 09/11/2019    ALKPHOS 74 09/11/2019    BILITOTAL 0.4 09/11/2019    LIPASE 108 09/11/2019    PTT 30 04/09/2013    INR 1.13 11/09/2016       Preop Vitals  BP Readings from Last 3 Encounters:   04/17/20 (!) 138/92   02/28/20 118/73   02/28/20 122/55    Pulse Readings from Last 3 Encounters:   04/17/20 71   02/28/20 62   02/28/20 55      Resp Readings from Last 3 Encounters:   02/21/20 18   10/18/19 16   09/11/19 16    SpO2 Readings from Last 3 Encounters:   04/17/20 98%   02/28/20 99%   02/21/20 99%      Temp Readings from Last 1 Encounters:   12/30/19 98.5  F (36.9  C) (Oral)    Ht Readings from Last 1 Encounters:   06/16/20 1.784 m (5' 10.25\")      Wt Readings from Last 1 Encounters:   06/16/20 88.5 kg (195 lb)    Estimated body mass index is 27.78 kg/m  as calculated from the following:    Height as of this encounter: 1.784 m (5' 10.25\").    Weight as of this encounter: 88.5 kg (195 lb).       Anesthesia Plan      History & Physical Review  History " and physical reviewed and following examination; no interval change.    ASA Status:  2 .    NPO Status:  > 8 hours    Plan for General with Propofol induction. Maintenance will be Balanced.    PONV prophylaxis:  Ondansetron (or other 5HT-3) and Dexamethasone or Solumedrol  Precedex please        Postoperative Care  Postoperative pain management:  IV analgesics.      Consents  Anesthetic plan, risks, benefits and alternatives discussed with:  Patient.  Use of blood products discussed: Yes.   Use of blood products discussed with Patient.  Consented to blood products.  .                 Osmar Alfaro MD                    .

## 2020-07-15 NOTE — BRIEF OP NOTE
[facility]    Brief Operative Note    Pre-operative diagnosis: Spinal stenosis of lumbar region with radiculopathy [M48.061, M54.16]  Post-operative diagnosis Same as pre-operative diagnosis    Procedure: Procedure(s):  Right Lumbar 5-Sacral 1 foraminotomy/discectomy  Surgeon: Surgeon(s) and Role:     * Nhan Marshall MD - Primary     * Prasanna Oliva PA-C - Assisting  Anesthesia: General   Estimated blood loss: 20 ml  Drains: None  Specimens: * No specimens in log *  Findings:   None.  Complications: None.  Implants: * No implants in log *    [id number]

## 2020-07-15 NOTE — OR NURSING
"Patient able to urinate \"a bit\" on third attempt at voiding.  No orders to have patient urinate before discharge.  Patient states he had issues urinating after a fowler catheter was discontinued with a previous surgery.  This RN educated patient on post-op urinary retention.  This line was added to the discharge paperwork \"If you do not urinate by 9:30PM, or if your bladder starts feeling uncomfortably full, you need to be evaluated in the Emergency Department.\".  Patient verbalizes understanding.    "

## 2020-07-15 NOTE — ANESTHESIA CARE TRANSFER NOTE
Patient: Neema Hunt    Procedure(s):  Right Lumbar 5-Sacral 1 foraminotomy/discectomy    Diagnosis: Spinal stenosis of lumbar region with radiculopathy [M48.061, M54.16]  Diagnosis Additional Information: No value filed.    Anesthesia Type:   General     Note:  Airway :Face Mask  Patient transferred to:PACU  Comments: Pt transferred to PACu; VSS; Report to RNHandoff Report: Identifed the Patient, Identified the Reponsible Provider, Reviewed the pertinent medical history, Discussed the surgical course, Reviewed Intra-OP anesthesia mangement and issues during anesthesia, Set expectations for post-procedure period and Allowed opportunity for questions and acknowledgement of understanding      Vitals: (Last set prior to Anesthesia Care Transfer)    CRNA VITALS  7/15/2020 1051 - 7/15/2020 1128      7/15/2020             Pulse:  87    SpO2:  100 %    Resp Rate (observed):  10                Electronically Signed By: NADIR Bell CRNA  July 15, 2020  11:28 AM

## 2020-07-15 NOTE — ANESTHESIA POSTPROCEDURE EVALUATION
Patient: Neema Hunt    Procedure(s):  Right Lumbar 5-Sacral 1 foraminotomy/discectomy    Diagnosis:Spinal stenosis of lumbar region with radiculopathy [M48.061, M54.16]  Diagnosis Additional Information: No value filed.    Anesthesia Type:  General    Note:  Anesthesia Post Evaluation    Patient location during evaluation: PACU  Patient participation: Able to fully participate in evaluation  Level of consciousness: awake  Pain management: adequate  Airway patency: patent  Cardiovascular status: acceptable  Respiratory status: acceptable  Hydration status: euvolemic  PONV: controlled     Anesthetic complications: None          Last vitals:  Vitals:    07/15/20 1238 07/15/20 1300 07/15/20 1334   BP: 127/83 114/64 114/70   Pulse:      Resp: 16 16 16   Temp: 96.4  F (35.8  C)     SpO2: 99% 97% 97%         Electronically Signed By: Osmar Alfaro MD  July 15, 2020  1:48 PM

## 2020-07-16 ENCOUNTER — COMMUNICATION - HEALTHEAST (OUTPATIENT)
Dept: FAMILY MEDICINE | Facility: CLINIC | Age: 55
End: 2020-07-16

## 2020-07-16 ENCOUNTER — TELEPHONE (OUTPATIENT)
Dept: NEUROSURGERY | Facility: CLINIC | Age: 55
End: 2020-07-16

## 2020-07-16 NOTE — TELEPHONE ENCOUNTER
Contacted patient for post-op follow up call. Patient is s/p Right L5-S1 foraminotomy and microdiskectomy on 7/15/20.     Post-Op Pain Management:   After surgery patient is doing well overall, but reports sore muscles, continued right shin pain. Rates pain 5-6/10. Patient denies any swelling, pain, warmth, redness, to lower extremities. Denies any SOB.   Pain is controlled by robaxin ,tylenol and oxycodone.   Reminded patient to apply ice to help with pain management as well.      Incision Care:   Patient denies issues or concerns with incision today.   Reminded to watch for s/sx of infection: drainage, redness, swelling, warmth, and fever.   Advised proper incision care: No submerging incision for 8 weeks or until it is fully healed. Okay to shower, use soap, and gently pat dry.   Advised to call clinic with any incision questions or concerns.     Post-Op Activity Restrictions:   Reminded patient to follow activity restrictions for the next 6 weeks: No heavy lifting more than 10lbs, limit repetitive bending/twisting.     Nutrition:   Patient has been eating and drinking well.   No issues with bowel or bladder habits.   Advised patient to increase fluid, fiber intake, and use OTC stool softener if needed.     Follow-Up:  Patient scheduled for post-op visits.   Advised to call our clinic with any post-op questions or concerns: [phone]

## 2020-07-17 DIAGNOSIS — M54.16 SPINAL STENOSIS OF LUMBAR REGION WITH RADICULOPATHY: ICD-10-CM

## 2020-07-17 DIAGNOSIS — Z98.890 S/P LUMBAR MICRODISCECTOMY: Primary | ICD-10-CM

## 2020-07-17 DIAGNOSIS — M48.061 SPINAL STENOSIS OF LUMBAR REGION WITH RADICULOPATHY: ICD-10-CM

## 2020-07-17 RX ORDER — OXYCODONE HYDROCHLORIDE 5 MG/1
5-10 TABLET ORAL EVERY 4 HOURS PRN
Qty: 20 TABLET | Refills: 0 | Status: SHIPPED | OUTPATIENT
Start: 2020-07-19 | End: 2020-08-28

## 2020-07-17 NOTE — TELEPHONE ENCOUNTER
Prescription Refill Request    Medication: Oxycodone 5 mg    Surgical procedure/date: s/p Right L5-S1 foraminotomy and microdiskectomy on 7/15/20.    Current regimen/number of tabs per day: 1-2 tabs q 4 hrs , has 6-8 tabs left. Will run out on Sunday.     Last refill:  7/15/20 #20    Pain Assessment:  5/10, low back pain, sore muscles, right leg pain (mostly in right shin)      location: [facility]   Any patient questions or concerns: n/a    Will forward request to care team for approval.

## 2020-07-28 ENCOUNTER — VIRTUAL VISIT (OUTPATIENT)
Dept: NEUROSURGERY | Facility: CLINIC | Age: 55
End: 2020-07-28
Payer: COMMERCIAL

## 2020-07-28 DIAGNOSIS — M48.061 SPINAL STENOSIS OF LUMBAR REGION WITH RADICULOPATHY: ICD-10-CM

## 2020-07-28 DIAGNOSIS — Z98.890 S/P LUMBAR MICRODISCECTOMY: Primary | ICD-10-CM

## 2020-07-28 DIAGNOSIS — M54.16 SPINAL STENOSIS OF LUMBAR REGION WITH RADICULOPATHY: ICD-10-CM

## 2020-07-28 PROCEDURE — 40000269 ZZH STATISTIC NO CHARGE FACILITY FEE: Mod: TEL

## 2020-07-28 PROCEDURE — 99207 ZZC NO CHARGE NURSE ONLY: CPT | Mod: TEL

## 2020-07-28 RX ORDER — METHOCARBAMOL 750 MG/1
750 TABLET, FILM COATED ORAL 4 TIMES DAILY PRN
Qty: 20 TABLET | Refills: 0 | Status: SHIPPED | OUTPATIENT
Start: 2020-07-28 | End: 2020-12-15

## 2020-07-28 NOTE — PROGRESS NOTES
"Telephone Encounter: Nurse Visit for Incision Check      S:  Patient is s/p Right L5-S1 foraminotomy and microdiskectomy on 7/15/20 on with Dr. Marshall. Patient states pain rating 4/10 in low back, 6/10 mid shin to ankle and mid quad to knee on right leg, intermittent pain in right leg. He reports tingling in right shin, denies numbness. Denies weakness to lower extremities describes as more muscle fatigue. For pain management, patient is taking oxycodone and robaxin. Patient also denies any fever, cough, or SOB today.   Patient will need refill refill on muscle relaxer Robaxin. Received verbal order from Aureliano Gaspar CNP to refill.  Refill sent over to pharmacy.   O:  Patient assessed incision while discussing over the phone. He reports incision \"looks good\".  Denies any redness, swelling, drainage, dehiscence, warmth or fevers.    A: S/p Right L5-S1 foraminotomy and microdiskectomy on 7/15/20 on with Dr. Marshall. Recovering well overall.  P:    - Keep your incision clean and dry at all times. No bathing, swimming, or submerging in water until incision is well healed.  Call clinic or go to Emergency Room if you develop any new pain, drainage, swelling, or fever.  - No lifting greater than 10-15 pounds. No bending or twisting, or over head reaching.   - Return  as scheduled for post op follow-up      -Please call clinic with any further questions or concerns: [phone]     Lara Caldera RN  [facility] Neurosurgery   [address] [address]     Tel [phone]       "

## 2020-08-25 ENCOUNTER — MYC MEDICAL ADVICE (OUTPATIENT)
Dept: NEUROSURGERY | Facility: CLINIC | Age: 55
End: 2020-08-25

## 2020-08-25 NOTE — TELEPHONE ENCOUNTER
Called and spoke to the patient. He states that it has not been draining anymore since the blood spots he noticed this morning. He states that he put Vaseline and a primapore dressing over it. No fevers, incision does not look swollen. Utilizing ice as needed.  Pain is slowly improving. He is taking Tylenol as needed for pain.     Spoke with Prasanna Oliva, he thinks it is okay to have patient keep his scheduled appt this Friday and we can assess his incision at that time. Informed patient that he should monitor his incision very closely and let us know if there is any increased drainage, swelling, or if he develops any fevers or chills.

## 2020-08-28 ENCOUNTER — OFFICE VISIT (OUTPATIENT)
Dept: NEUROSURGERY | Facility: CLINIC | Age: 55
End: 2020-08-28
Attending: NEUROLOGICAL SURGERY
Payer: COMMERCIAL

## 2020-08-28 VITALS
DIASTOLIC BLOOD PRESSURE: 81 MMHG | SYSTOLIC BLOOD PRESSURE: 126 MMHG | HEIGHT: 70 IN | OXYGEN SATURATION: 99 % | BODY MASS INDEX: 28.77 KG/M2 | WEIGHT: 201 LBS | HEART RATE: 62 BPM | RESPIRATION RATE: 18 BRPM

## 2020-08-28 DIAGNOSIS — Z98.890 S/P LUMBAR MICRODISCECTOMY: ICD-10-CM

## 2020-08-28 PROCEDURE — 99024 POSTOP FOLLOW-UP VISIT: CPT | Performed by: NURSE PRACTITIONER

## 2020-08-28 PROCEDURE — G0463 HOSPITAL OUTPT CLINIC VISIT: HCPCS

## 2020-08-28 RX ORDER — GABAPENTIN 100 MG/1
300 CAPSULE ORAL 3 TIMES DAILY
Qty: 90 CAPSULE | Refills: 1 | Status: SHIPPED | OUTPATIENT
Start: 2020-08-28 | End: 2021-05-26

## 2020-08-28 RX ORDER — OXYCODONE HYDROCHLORIDE 5 MG/1
5 TABLET ORAL 2 TIMES DAILY PRN
Qty: 10 TABLET | Refills: 0 | Status: SHIPPED | OUTPATIENT
Start: 2020-08-28 | End: 2020-10-09

## 2020-08-28 ASSESSMENT — MIFFLIN-ST. JEOR: SCORE: 1757.98

## 2020-08-28 ASSESSMENT — PAIN SCALES - GENERAL: PAINLEVEL: MODERATE PAIN (4)

## 2020-08-28 NOTE — LETTER
[facility]  Neurosurgery Clinic  [address]  [address]        8/28/20    To Whom it May Concern,      Neema Hunt is being seen at our clinic for post-operative follow up care. He is able to return to work on 9/14/20 with the restrictions of:    -4 hours per day for 2 weeks, then able to increase to full time hours.  -20 lb lifting restriction with gradual increase of 2-5 lbs per week, avoiding any jarring/jostling activities     Neema will be re-evaluated at the next follow up visit on 10/9/20. Please call our clinic with questions or concerns: [phone]      Sincerely,            Chasity Bedoya, CNP

## 2020-08-28 NOTE — PROGRESS NOTES
"[facility] Neurosurgery Follow Up:     HPI: Neema Hunt is a 55 year old male who presents for post op follow-up, 6 weeks s/p right L5-S1 foraminotomy and microdiscectomy with Dr. Marshall. Today, patient reports his right leg pain has improved overall, but still bothersome at night. Denies back pain, falls, or weakness. Currently using ice, Aleve, and Tylenol. He does not have any remaining tabs of Oxycodone or Robaxin at this time, but would like a refill of Oxycodone if possible. He states he takes 1/2 tab to 1 tab daily as needed. He would also like to resume Gabapentin 300mg at bedtime. He has noticed some improvement in his walking, but would like to work on PT to help with right leg strengthening. He would also like to RTW on 9/14/20 with part time hours for 2 weeks, then increase to full time hours.        Exam:  Constitutional:  Alert, well nourished, NAD.  HEENT: Normocephalic, atraumatic.   Pulm:  Without shortness of breath   CV:  No pitting edema of BLE.      /81   Pulse 62   Resp 18   Ht 5' 10\" (1.778 m)   Wt 201 lb (91.2 kg)   SpO2 99%   BMI 28.84 kg/m      Neurological:  Awake  Alert  Oriented x 3  Motor exam:         IP Q DF PF EHL  R   5  5   5   5    5  L   5  5   5   5    5     Reflexes are 2+ in the patellar and Achilles. There is no clonus. Downgoing Babinski.    Able to spontaneously move L/E bilaterally  Sensation intact throughout all L/E dermatomes     Incision: Clean, dry, intact. Healing well. No signs of infection noted.     A/P: 6 weeks post op right L5-S1 foraminotomy and microdiscectomy     Patient Instructions   Work letter provided     Referral for physical therapy     Gabapentin and Oxycodone refill sent to your pharmacy    Follow up in 6 weeks as scheduled     Please call our clinic with any post op questions or concerns        Chasity Bedoya CNP  [facility] Neurosurgery  [address] [place] [id number]    Tel [phone]      "

## 2020-08-28 NOTE — LETTER
"    8/28/2020         RE: Neema Hunt  [address]  [place] [address]        Dear Colleague,    Thank you for referring your patient, Neema Hunt, to the [facility] NEUROSURGERY CLINIC. Please see a copy of my visit note below.    [facility] Neurosurgery Follow Up:     HPI: Neema Hunt is a 55 year old male who presents for post op follow-up, 6 weeks s/p right L5-S1 foraminotomy and microdiscectomy with Dr. Marshall. Today, patient reports his right leg pain has improved overall, but still bothersome at night. Denies back pain, falls, or weakness. Currently using ice, Aleve, and Tylenol. He does not have any remaining tabs of Oxycodone or Robaxin at this time, but would like a refill of Oxycodone if possible. He states he takes 1/2 tab to 1 tab daily as needed. He would also like to resume Gabapentin 300mg at bedtime. He has noticed some improvement in his walking, but would like to work on PT to help with right leg strengthening. He would also like to RTW on 9/14/20 with part time hours for 2 weeks, then increase to full time hours.        Exam:  Constitutional:  Alert, well nourished, NAD.  HEENT: Normocephalic, atraumatic.   Pulm:  Without shortness of breath   CV:  No pitting edema of BLE.      /81   Pulse 62   Resp 18   Ht 5' 10\" (1.778 m)   Wt 201 lb (91.2 kg)   SpO2 99%   BMI 28.84 kg/m      Neurological:  Awake  Alert  Oriented x 3  Motor exam:         IP Q DF PF EHL  R   5  5   5   5    5  L   5  5   5   5    5     Reflexes are 2+ in the patellar and Achilles. There is no clonus. Downgoing Babinski.    Able to spontaneously move L/E bilaterally  Sensation intact throughout all L/E dermatomes     Incision: Clean, dry, intact. Healing well. No signs of infection noted.     A/P: 6 weeks post op right L5-S1 foraminotomy and microdiscectomy     Patient Instructions   Work letter provided     Referral for physical therapy     Gabapentin and Oxycodone refill sent to " your pharmacy    Follow up in 6 weeks as scheduled     Please call our clinic with any post op questions or concerns        Chasity Bedoya CNP  [address] [address]    Tel [phone]        Again, thank you for allowing me to participate in the care of your patient.        Sincerely,        Chasity Bedoya, NP

## 2020-08-28 NOTE — LETTER
[facility]  Neurosurgery Clinic  [address]  [address]        8/28/20    To Whom it May Concern,      Neema Hunt is being seen at our clinic for post-operative follow up care. He is able to return to work on 9/7/20 with the restrictions of:    -4 hours per day for 2 weeks, then able to increase to full time hours.  -20 lb lifting restriction with gradual increase of 2-5 lbs per week, avoiding any jarring/jostling activities     Neema will be re-evaluated at the next follow up visit on 10/9/20. Please call our clinic with questions or concerns: [phone]      Sincerely,            Chasity Bedoya, CNP

## 2020-08-28 NOTE — NURSING NOTE
"Neema Hunt is a 55 year old male who presents for:  Chief Complaint   Patient presents with     Follow Up        Initial Vitals:  /81   Pulse 62   Resp 18   Ht 5' 10\" (1.778 m)   Wt 201 lb (91.2 kg)   SpO2 99%   BMI 28.84 kg/m   Estimated body mass index is 28.84 kg/m  as calculated from the following:    Height as of this encounter: 5' 10\" (1.778 m).    Weight as of this encounter: 201 lb (91.2 kg).. Body surface area is 2.12 meters squared. BP completed using cuff size: regular  Moderate Pain (4)    Nursing Comments: Pt present today for 6 weeks post op follow up.    Radames Fernandez CMA    "

## 2020-08-28 NOTE — PATIENT INSTRUCTIONS
Work letter provided     Referral for physical therapy     Gabapentin and Oxycodone refill sent to your pharmacy    Follow up in 6 weeks as scheduled     Please call our clinic with any post op questions or concerns

## 2020-09-01 ENCOUNTER — THERAPY VISIT (OUTPATIENT)
Dept: PHYSICAL THERAPY | Facility: CLINIC | Age: 55
End: 2020-09-01
Attending: NURSE PRACTITIONER
Payer: COMMERCIAL

## 2020-09-01 DIAGNOSIS — Z98.890 S/P LUMBAR MICRODISCECTOMY: ICD-10-CM

## 2020-09-01 PROCEDURE — 97110 THERAPEUTIC EXERCISES: CPT | Mod: GP | Performed by: PHYSICAL THERAPIST

## 2020-09-01 PROCEDURE — 97161 PT EVAL LOW COMPLEX 20 MIN: CPT | Mod: GP | Performed by: PHYSICAL THERAPIST
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
## 2020-09-01 NOTE — PROGRESS NOTES
[facility] Initial Evaluation  Subjective:  The history is provided by the patient. No  was used.   Patient Health History    Date of Onset: 8/28/20.   Problem occurred: right L5-S1 foraminotomy and microdiscectomy (7/15/20)                                                              Objective:  System    Physical Exam    General     ROS    Assessment/Plan:    {REHAB NOTES:111137}

## 2020-09-01 NOTE — PROGRESS NOTES
[facility] Initial Evaluation  Subjective:  The history is provided by the patient. No  was used.   Patient Health History  Neema Hunt being seen for R leg weakness.     Date of Onset: 8/28/20.   Problem occurred: right L5-S1 foraminotomy and microdiscectomy (7/15/20)   Pain is reported as 5/10 on pain scale.                                         Therapist Generated HPI Evaluation  Problem details: The pt has not really stopped his core exercises from his previous PT. He reports that he feels as though he has been gaining more weight. He feels frustrated that his symptoms have not changed much from all of his back surgeries.    Pt reports he has been able to walk for longer distances now since his most recent surgery. Now he can walk 35 minutes until he feels weakness on the right side of his leg.    Goals: strengthen his right leg and walk for longer..         Type of problem:  Lumbar.      Condition occurred with:  Other reason.  Where condition occurred: for unknown reasons.                                         Objective:  System    Physical Exam    General     ROS    Assessment/Plan:    {REHAB NOTES:770423}

## 2020-09-08 ENCOUNTER — THERAPY VISIT (OUTPATIENT)
Dept: PHYSICAL THERAPY | Facility: CLINIC | Age: 55
End: 2020-09-08
Payer: COMMERCIAL

## 2020-09-08 DIAGNOSIS — M54.16 LUMBAR RADICULOPATHY, ACUTE: ICD-10-CM

## 2020-09-08 PROCEDURE — 97110 THERAPEUTIC EXERCISES: CPT | Mod: GP | Performed by: PHYSICAL THERAPIST

## 2020-09-08 PROCEDURE — 97112 NEUROMUSCULAR REEDUCATION: CPT | Mod: GP | Performed by: PHYSICAL THERAPIST

## 2020-09-14 ENCOUNTER — THERAPY VISIT (OUTPATIENT)
Dept: PHYSICAL THERAPY | Facility: CLINIC | Age: 55
End: 2020-09-14
Payer: COMMERCIAL

## 2020-09-14 DIAGNOSIS — M54.16 LUMBAR RADICULOPATHY, ACUTE: ICD-10-CM

## 2020-09-14 PROCEDURE — 97140 MANUAL THERAPY 1/> REGIONS: CPT | Mod: GP | Performed by: PHYSICAL THERAPIST

## 2020-09-14 PROCEDURE — 97110 THERAPEUTIC EXERCISES: CPT | Mod: GP | Performed by: PHYSICAL THERAPIST

## 2020-09-21 ENCOUNTER — THERAPY VISIT (OUTPATIENT)
Dept: PHYSICAL THERAPY | Facility: CLINIC | Age: 55
End: 2020-09-21
Payer: COMMERCIAL

## 2020-09-21 ENCOUNTER — MYC MEDICAL ADVICE (OUTPATIENT)
Dept: NEUROSURGERY | Facility: CLINIC | Age: 55
End: 2020-09-21

## 2020-09-21 DIAGNOSIS — M54.16 LUMBAR RADICULOPATHY, ACUTE: ICD-10-CM

## 2020-09-21 DIAGNOSIS — Z98.890 S/P LUMBAR MICRODISCECTOMY: Primary | ICD-10-CM

## 2020-09-21 PROCEDURE — 97110 THERAPEUTIC EXERCISES: CPT | Mod: GP | Performed by: PHYSICAL THERAPIST

## 2020-09-21 PROCEDURE — 97530 THERAPEUTIC ACTIVITIES: CPT | Mod: GP | Performed by: PHYSICAL THERAPIST

## 2020-10-08 ENCOUNTER — TELEPHONE (OUTPATIENT)
Dept: NEUROSURGERY | Facility: CLINIC | Age: 55
End: 2020-10-08

## 2020-10-08 NOTE — TELEPHONE ENCOUNTER
Patient is calling to see if imaging is needed for his appointment tomorrow. Please call the patient back @ [phone] as he states there was talk about imaging through [employer] messages
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
## 2020-10-09 ENCOUNTER — OFFICE VISIT (OUTPATIENT)
Dept: NEUROSURGERY | Facility: CLINIC | Age: 55
End: 2020-10-09
Attending: NEUROLOGICAL SURGERY
Payer: COMMERCIAL

## 2020-10-09 VITALS
SYSTOLIC BLOOD PRESSURE: 128 MMHG | BODY MASS INDEX: 28.63 KG/M2 | DIASTOLIC BLOOD PRESSURE: 80 MMHG | OXYGEN SATURATION: 100 % | HEART RATE: 65 BPM | HEIGHT: 70 IN | WEIGHT: 200 LBS

## 2020-10-09 DIAGNOSIS — M54.16 SPINAL STENOSIS OF LUMBAR REGION WITH RADICULOPATHY: Primary | ICD-10-CM

## 2020-10-09 DIAGNOSIS — M48.061 SPINAL STENOSIS OF LUMBAR REGION WITH RADICULOPATHY: Primary | ICD-10-CM

## 2020-10-09 PROCEDURE — 99024 POSTOP FOLLOW-UP VISIT: CPT | Performed by: NEUROLOGICAL SURGERY

## 2020-10-09 ASSESSMENT — MIFFLIN-ST. JEOR: SCORE: 1753.44
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
## 2020-12-10 ENCOUNTER — MYC MEDICAL ADVICE (OUTPATIENT)
Dept: NEUROSURGERY | Facility: CLINIC | Age: 55
End: 2020-12-10

## 2020-12-10 DIAGNOSIS — M54.16 SPINAL STENOSIS OF LUMBAR REGION WITH RADICULOPATHY: Primary | ICD-10-CM

## 2020-12-10 DIAGNOSIS — M48.061 SPINAL STENOSIS OF LUMBAR REGION WITH RADICULOPATHY: Primary | ICD-10-CM

## 2020-12-15 DIAGNOSIS — Z11.59 ENCOUNTER FOR SCREENING FOR OTHER VIRAL DISEASES: Primary | ICD-10-CM

## 2020-12-16 RX ORDER — DEXTROSE MONOHYDRATE 25 G/50ML
25-50 INJECTION, SOLUTION INTRAVENOUS
Status: CANCELLED | OUTPATIENT
Start: 2020-12-16

## 2020-12-16 RX ORDER — NICOTINE POLACRILEX 4 MG
15-30 LOZENGE BUCCAL
Status: CANCELLED | OUTPATIENT
Start: 2020-12-16

## 2020-12-18 ENCOUNTER — TELEPHONE (OUTPATIENT)
Dept: MEDSURG UNIT | Facility: CLINIC | Age: 55
End: 2020-12-18

## 2020-12-18 DIAGNOSIS — Z11.59 ENCOUNTER FOR SCREENING FOR OTHER VIRAL DISEASES: ICD-10-CM

## 2020-12-18 LAB
SARS-COV-2 RNA SPEC QL NAA+PROBE: NORMAL
SPECIMEN SOURCE: NORMAL

## 2020-12-18 PROCEDURE — U0003 INFECTIOUS AGENT DETECTION BY NUCLEIC ACID (DNA OR RNA); SEVERE ACUTE RESPIRATORY SYNDROME CORONAVIRUS 2 (SARS-COV-2) (CORONAVIRUS DISEASE [COVID-19]), AMPLIFIED PROBE TECHNIQUE, MAKING USE OF HIGH THROUGHPUT TECHNOLOGIES AS DESCRIBED BY CMS-2020-01-R: HCPCS | Performed by: PHYSICIAN ASSISTANT

## 2020-12-18 NOTE — TELEPHONE ENCOUNTER
Pre-Procedure Negative COVID Test Results    Results   The patient had a COVID test today at 10:45 am.  RN will follow-up on results prior to his procedure on 12/21/20.    No COVID pre-call needed.

## 2020-12-19 LAB
LABORATORY COMMENT REPORT: NORMAL
SARS-COV-2 RNA SPEC QL NAA+PROBE: NEGATIVE
SPECIMEN SOURCE: NORMAL

## 2020-12-21 ENCOUNTER — HOSPITAL ENCOUNTER (OUTPATIENT)
Dept: GENERAL RADIOLOGY | Facility: CLINIC | Age: 55
End: 2020-12-21
Attending: PHYSICIAN ASSISTANT
Payer: COMMERCIAL

## 2020-12-21 ENCOUNTER — HOSPITAL ENCOUNTER (OUTPATIENT)
Facility: CLINIC | Age: 55
Discharge: HOME | End: 2020-12-21
Admitting: PHYSICIAN ASSISTANT
Payer: COMMERCIAL

## 2020-12-21 VITALS
SYSTOLIC BLOOD PRESSURE: 144 MMHG | HEIGHT: 70 IN | HEART RATE: 60 BPM | OXYGEN SATURATION: 99 % | BODY MASS INDEX: 28.35 KG/M2 | TEMPERATURE: 98.5 F | DIASTOLIC BLOOD PRESSURE: 83 MMHG | RESPIRATION RATE: 16 BRPM | WEIGHT: 198 LBS

## 2020-12-21 DIAGNOSIS — M48.061 SPINAL STENOSIS OF LUMBAR REGION WITH RADICULOPATHY: ICD-10-CM

## 2020-12-21 DIAGNOSIS — M54.16 SPINAL STENOSIS OF LUMBAR REGION WITH RADICULOPATHY: ICD-10-CM

## 2020-12-21 PROCEDURE — 250N000011 HC RX IP 250 OP 636: Performed by: PHYSICIAN ASSISTANT

## 2020-12-21 PROCEDURE — 255N000002 HC RX 255 OP 636: Performed by: PHYSICIAN ASSISTANT

## 2020-12-21 PROCEDURE — 999N000154 HC STATISTIC RADIOLOGY XRAY, US, CT, MAR, NM

## 2020-12-21 PROCEDURE — 64483 NJX AA&/STRD TFRM EPI L/S 1: CPT

## 2020-12-21 PROCEDURE — 250N000009 HC RX 250: Performed by: PHYSICIAN ASSISTANT

## 2020-12-21 RX ORDER — BETAMETHASONE SODIUM PHOSPHATE AND BETAMETHASONE ACETATE 3; 3 MG/ML; MG/ML
6 INJECTION, SUSPENSION INTRA-ARTICULAR; INTRALESIONAL; INTRAMUSCULAR; SOFT TISSUE ONCE
Status: COMPLETED | OUTPATIENT
Start: 2020-12-21 | End: 2020-12-21

## 2020-12-21 RX ORDER — IOPAMIDOL 408 MG/ML
10 INJECTION, SOLUTION INTRATHECAL ONCE
Status: COMPLETED | OUTPATIENT
Start: 2020-12-21 | End: 2020-12-21

## 2020-12-21 RX ORDER — LIDOCAINE HYDROCHLORIDE 10 MG/ML
30 INJECTION, SOLUTION EPIDURAL; INFILTRATION; INTRACAUDAL; PERINEURAL ONCE
Status: COMPLETED | OUTPATIENT
Start: 2020-12-21 | End: 2020-12-21

## 2020-12-21 RX ORDER — DEXAMETHASONE SODIUM PHOSPHATE 10 MG/ML
20 INJECTION, SOLUTION INTRAMUSCULAR; INTRAVENOUS ONCE
Status: DISCONTINUED | OUTPATIENT
Start: 2020-12-21 | End: 2020-12-21 | Stop reason: CLARIF

## 2020-12-21 RX ORDER — DEXTROSE MONOHYDRATE 25 G/50ML
25-50 INJECTION, SOLUTION INTRAVENOUS
Status: DISCONTINUED | OUTPATIENT
Start: 2020-12-21 | End: 2020-12-21 | Stop reason: HOSPADM

## 2020-12-21 RX ORDER — NICOTINE POLACRILEX 4 MG
15-30 LOZENGE BUCCAL
Status: DISCONTINUED | OUTPATIENT
Start: 2020-12-21 | End: 2020-12-21 | Stop reason: HOSPADM

## 2020-12-21 RX ADMIN — LIDOCAINE HYDROCHLORIDE 4 ML: 10 INJECTION, SOLUTION EPIDURAL; INFILTRATION; INTRACAUDAL; PERINEURAL at 11:48

## 2020-12-21 RX ADMIN — IOPAMIDOL 0.5 ML: 408 INJECTION, SOLUTION INTRATHECAL at 11:56

## 2020-12-21 RX ADMIN — BETAMETHASONE ACETATE AND BETAMETHASONE SODIUM PHOSPHATE 3 ML: 3; 3 INJECTION, SUSPENSION INTRA-ARTICULAR; INTRALESIONAL; INTRAMUSCULAR; SOFT TISSUE at 11:57

## 2020-12-21 ASSESSMENT — MIFFLIN-ST. JEOR: SCORE: 1744.37

## 2020-12-21 NOTE — PROGRESS NOTES
Care Suites Post Procedure Note    Patient Information  Name: Neema Hunt  Age: 55 year old    Post Procedure  Time patient returned to Care Suites:1200  Concerns/abnormal assessment: none  If abnormal assessment, provider notified: No  Plan/Other: Get up in 30 minutes then discharge. Band-aid to mid low back CDI. Pain as before injection. Taking PO, BP slightly elevated.    Jaqueline Bauman RN

## 2020-12-21 NOTE — DISCHARGE INSTRUCTIONS
Steroid Injection Discharge Instructions     After you go home:      You may resume your normal diet.    Care of Puncture Site:      If you have a bandaid on your puncture site, you may remove it the next morning    You may shower tomorrow    No bath tubs, whirlpools or swimming for at least 3 days     Activity:      You may go back to normal activity in 24 hours    You should let pain be your guide as to the extent of your activities    Maintain any activity limitations as ordered by your provider    Do NOT drive a vehicle if you develop numbness in your arm or leg    Medicines:      You may resume all medications    Resume your Warfarin/Coumadin at your regular dose today. Follow up with your provider to have your INR rechecked    Resume your Platelet Inhibitors and Aspirin tomorrow at your regular dose    For minor pain, you may take Acetaminophen (Tylenol) or Ibuprofen (Advil)    Pain:       You may experience increased or different pain over the next 24-48 hours    For the next 48 hrs - you may use ice packs for discomfort     Call your primary care doctor if:      You have severe pain that does not improve with pain medication    You have chills or a fever greater than 101 F (38 C)    The site is red, swollen, hot or tender    New problems with your bowel or bladder    Any questions or concerns    Other Instructions:      New numbness down your leg post injection is temporary and may last for up to 6 hours. You may need assistance with activity until your leg has normal sensation.    If you are diabetic, monitor your blood sugar closely. Contact the provider who manages your diabetes to help you control your blood sugar if needed.    For Your Information:      A steroid was injected to help decrease swelling and may help to reduce pain. It may take up to 7-10 days to obtain full results.    Some patients will get lasting relief from a single injection. Others may require up to 3 injections to get results. If  you have more than one steroid injection, they should be given 2 weeks apart.    Side effects of your steroid injection are mild and will go away in 2-3 days  - Insomnia  - Heartburn  - Flushed face  - Water retention  - Increased appetite  - Increased blood sugar      If you have questions call:        [facility] Radiology Dept @ [phone]      The provider who performed your procedure was _________________.
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
## 2020-12-21 NOTE — PROGRESS NOTES
PATIENT/VISITOR WELLNESS SCREENING    Step 1 Patient Screening    1. In the last month, have you been in contact with someone who was confirmed or suspected to have Coronavirus/COVID-19? No    2. Do you have the following symptoms?  Fever/Chills? No   Cough? No   Shortness of breath? No   New loss of taste or smell? No  Sore throat? No  Muscle or body aches? No  Headaches? No  Fatigue? No  Vomiting or diarrhea? No    Step 3 Refer to logic grid below for actions    NO SYMPTOM(S)    ACTIONS:  1. Standard rooming process  2. Provider to assess per normal protocol  3. Implement precautions as needed and per guidelines     POSITIVE SYMPTOM(S)  If positive for ANY of the following symptoms: fever, cough, shortness of breath, rash    ACTION:  1. Continue to have the patient wear a mask   2. Room patient as soon as possible  3. Don appropriate PPE when entering room  4. Provider evaluation

## 2021-01-04 ENCOUNTER — MYC MEDICAL ADVICE (OUTPATIENT)
Dept: NEUROSURGERY | Facility: CLINIC | Age: 56
End: 2021-01-04

## 2021-01-04 DIAGNOSIS — M54.16 SPINAL STENOSIS OF LUMBAR REGION WITH RADICULOPATHY: Primary | ICD-10-CM

## 2021-01-04 DIAGNOSIS — M48.061 SPINAL STENOSIS OF LUMBAR REGION WITH RADICULOPATHY: Primary | ICD-10-CM

## 2021-01-08 DIAGNOSIS — Z11.59 ENCOUNTER FOR SCREENING FOR OTHER VIRAL DISEASES: Primary | ICD-10-CM

## 2021-01-11 ENCOUNTER — MYC MEDICAL ADVICE (OUTPATIENT)
Dept: NEUROSURGERY | Facility: CLINIC | Age: 56
End: 2021-01-11

## 2021-01-11 RX ORDER — NICOTINE POLACRILEX 4 MG
15-30 LOZENGE BUCCAL
Status: CANCELLED | OUTPATIENT
Start: 2021-01-11

## 2021-01-11 RX ORDER — DEXTROSE MONOHYDRATE 25 G/50ML
25-50 INJECTION, SOLUTION INTRAVENOUS
Status: CANCELLED | OUTPATIENT
Start: 2021-01-11

## 2021-01-12 DIAGNOSIS — Z11.59 ENCOUNTER FOR SCREENING FOR OTHER VIRAL DISEASES: ICD-10-CM

## 2021-01-12 LAB
SARS-COV-2 RNA RESP QL NAA+PROBE: NORMAL
SPECIMEN SOURCE: NORMAL

## 2021-01-12 PROCEDURE — 87635 SARS-COV-2 COVID-19 AMP PRB: CPT | Performed by: NEUROLOGICAL SURGERY

## 2021-01-13 LAB
LABORATORY COMMENT REPORT: NORMAL
SARS-COV-2 RNA RESP QL NAA+PROBE: NEGATIVE
SPECIMEN SOURCE: NORMAL

## 2021-01-14 ENCOUNTER — TELEPHONE (OUTPATIENT)
Dept: MEDSURG UNIT | Facility: CLINIC | Age: 56
End: 2021-01-14

## 2021-01-14 NOTE — TELEPHONE ENCOUNTER
Pre-Procedure Negative COVID Test Results    Results Reviewed  The patient has a negative COVID test result within the required timeframe for the scheduled procedure.     No COVID pre-call needed.     Lauren Deleon RN

## 2021-01-15 ENCOUNTER — HOSPITAL ENCOUNTER (OUTPATIENT)
Dept: GENERAL RADIOLOGY | Facility: CLINIC | Age: 56
End: 2021-01-15
Attending: PHYSICIAN ASSISTANT
Payer: COMMERCIAL

## 2021-01-15 ENCOUNTER — HOSPITAL ENCOUNTER (OUTPATIENT)
Facility: CLINIC | Age: 56
Discharge: HOME | End: 2021-01-15
Admitting: PHYSICIAN ASSISTANT
Payer: COMMERCIAL

## 2021-01-15 ENCOUNTER — HEALTH MAINTENANCE LETTER (OUTPATIENT)
Age: 56
End: 2021-01-15

## 2021-01-15 VITALS
SYSTOLIC BLOOD PRESSURE: 113 MMHG | HEIGHT: 70 IN | HEART RATE: 53 BPM | OXYGEN SATURATION: 98 % | RESPIRATION RATE: 16 BRPM | BODY MASS INDEX: 27.92 KG/M2 | WEIGHT: 195 LBS | DIASTOLIC BLOOD PRESSURE: 79 MMHG | TEMPERATURE: 98 F

## 2021-01-15 DIAGNOSIS — M48.061 SPINAL STENOSIS OF LUMBAR REGION WITH RADICULOPATHY: ICD-10-CM

## 2021-01-15 DIAGNOSIS — M54.16 SPINAL STENOSIS OF LUMBAR REGION WITH RADICULOPATHY: ICD-10-CM

## 2021-01-15 PROCEDURE — 250N000009 HC RX 250: Performed by: PHYSICIAN ASSISTANT

## 2021-01-15 PROCEDURE — 62323 NJX INTERLAMINAR LMBR/SAC: CPT

## 2021-01-15 PROCEDURE — 999N000154 HC STATISTIC RADIOLOGY XRAY, US, CT, MAR, NM

## 2021-01-15 PROCEDURE — 255N000002 HC RX 255 OP 636: Performed by: PHYSICIAN ASSISTANT

## 2021-01-15 PROCEDURE — 250N000011 HC RX IP 250 OP 636: Performed by: PHYSICIAN ASSISTANT

## 2021-01-15 RX ORDER — BETAMETHASONE SODIUM PHOSPHATE AND BETAMETHASONE ACETATE 3; 3 MG/ML; MG/ML
6 INJECTION, SUSPENSION INTRA-ARTICULAR; INTRALESIONAL; INTRAMUSCULAR; SOFT TISSUE ONCE
Status: COMPLETED | OUTPATIENT
Start: 2021-01-15 | End: 2021-01-15

## 2021-01-15 RX ORDER — LIDOCAINE HYDROCHLORIDE 10 MG/ML
30 INJECTION, SOLUTION EPIDURAL; INFILTRATION; INTRACAUDAL; PERINEURAL ONCE
Status: COMPLETED | OUTPATIENT
Start: 2021-01-15 | End: 2021-01-15

## 2021-01-15 RX ORDER — DEXAMETHASONE SODIUM PHOSPHATE 10 MG/ML
10 INJECTION, SOLUTION INTRAMUSCULAR; INTRAVENOUS ONCE
Status: DISCONTINUED | OUTPATIENT
Start: 2021-01-15 | End: 2021-01-15 | Stop reason: CLARIF

## 2021-01-15 RX ORDER — IOPAMIDOL 408 MG/ML
10 INJECTION, SOLUTION INTRATHECAL ONCE
Status: COMPLETED | OUTPATIENT
Start: 2021-01-15 | End: 2021-01-15

## 2021-01-15 RX ADMIN — LIDOCAINE HYDROCHLORIDE 3 ML: 10 INJECTION, SOLUTION EPIDURAL; INFILTRATION; INTRACAUDAL; PERINEURAL at 14:49

## 2021-01-15 RX ADMIN — IOPAMIDOL 1 ML: 408 INJECTION, SOLUTION INTRATHECAL at 14:46

## 2021-01-15 RX ADMIN — BETAMETHASONE ACETATE AND BETAMETHASONE SODIUM PHOSPHATE 3 ML: 3; 3 INJECTION, SUSPENSION INTRA-ARTICULAR; INTRALESIONAL; INTRAMUSCULAR; SOFT TISSUE at 14:50

## 2021-01-15 RX ADMIN — LIDOCAINE HYDROCHLORIDE 3 ML: 10 INJECTION, SOLUTION EPIDURAL; INFILTRATION; INTRACAUDAL; PERINEURAL at 14:45

## 2021-01-15 ASSESSMENT — MIFFLIN-ST. JEOR: SCORE: 1730.76

## 2021-01-15 NOTE — DISCHARGE INSTRUCTIONS
Steroid Injection Discharge Instructions     After you go home:      You may resume your normal diet.    Care of Puncture Site:      If you have a bandaid on your puncture site, you may remove it the next morning    You may shower tomorrow    No bath tubs, whirlpools or swimming for at least 3 days     Activity:      You may go back to normal activity in 24 hours    You should let pain be your guide as to the extent of your activities    Maintain any activity limitations as ordered by your provider    Do NOT drive a vehicle if you develop numbness in your arm or leg    Medicines:      You may resume all medications    Resume your Warfarin/Coumadin at your regular dose today. Follow up with your provider to have your INR rechecked    Resume your Platelet Inhibitors and Aspirin tomorrow at your regular dose    For minor pain, you may take Acetaminophen (Tylenol) or Ibuprofen (Advil)    Pain:       You may experience increased or different pain over the next 24-48 hours    For the next 48 hrs - you may use ice packs for discomfort     Call your primary care doctor if:      You have severe pain that does not improve with pain medication    You have chills or a fever greater than 101 F (38 C)    The site is red, swollen, hot or tender    New problems with your bowel or bladder    Any questions or concerns    Other Instructions:      New numbness down your leg post injection is temporary and may last for up to 6 hours. You may need assistance with activity until your leg has normal sensation.    If you are diabetic, monitor your blood sugar closely. Contact the provider who manages your diabetes to help you control your blood sugar if needed.    For Your Information:      A steroid was injected to help decrease swelling and may help to reduce pain. It may take up to 7-10 days to obtain full results.    Some patients will get lasting relief from a single injection. Others may require up to 3 injections to get results. If  you have more than one steroid injection, they should be given 2 weeks apart.    Side effects of your steroid injection are mild and will go away in 2-3 days  - Insomnia  - Heartburn  - Flushed face  - Water retention  - Increased appetite  - Increased blood sugar      If you have questions call:        [facility] Radiology Dept @ [phone]      The provider who performed your procedure was _________________.

## 2021-01-15 NOTE — PROGRESS NOTES
Care Suites Admission Nursing Note    Patient Information  Name: Neema Hunt  Age: 55 year old  Reason for admission: epidural   Care Suites arrival time: 1335    Patient Admission/Assessment   Pre-procedure assessment complete: Yes  If abnormal assessment/labs, provider notified: N/A  NPO: Yes  Medications held per instructions/orders: Yes  Consent: deferred  If applicable, pregnancy test status: deferred  Patient oriented to room: Yes  Education/questions answered: Yes  Plan/other: steroid injection     Discharge Planning  Discharge name/phone number: adelina  Overnight post sedation caregiver: adelina  Discharge location: home    Mahsa Gordillo RN

## 2021-01-15 NOTE — PROCEDURES
[facility]    Procedure: L5-S1 interlaminar SAW    Date/Time: 1/15/2021 2:52 PM  Performed by: Teagan Wade PA-C  Authorized by: Teagan Wade PA-C     UNIVERSAL PROTOCOL   Site Marked: Yes  Prior Images Obtained and Reviewed:  Yes  Required items: Required blood products, implants, devices and special equipment available    Patient identity confirmed:  Verbally with patient  NA - No sedation, light sedation, or local anesthesia  Confirmation Checklist:  Patient's identity using two indicators, relevant allergies, procedure was appropriate and matched the consent or emergent situation and correct equipment/implants were available  Time out: Immediately prior to the procedure a time out was called    Universal Protocol: the Joint Commission Universal Protocol was followed    Preparation: Patient was prepped and draped in usual sterile fashion           ANESTHESIA    Anesthesia: Local infiltration  Local Anesthetic:  Lidocaine 1% without epinephrine      SEDATION    Patient Sedated: No    See dictated procedure note for full details.  PROCEDURE   Patient Tolerance:  Patient tolerated the procedure well with no immediate complications  Describe Procedure: Pt has had unsuccessful TF SAW in the past and successful IL SAW x2. Requested that we repeat IL SAW at L5-S1.   Length of time physician/provider present for 1:1 monitoring during sedation: 0

## 2021-01-15 NOTE — DISCHARGE SUMMARY
Care Suites Discharge Nursing Note    Patient Information  Name: Neema Hunt  Age: 55 year old    Discharge Education:  Discharge instructions reviewed: Yes  Additional education/resources provided: no  Patient/patient representative verbalizes understanding: Yes  Patient discharging on new medications: No  Medication education completed: N/A    Discharge Plans:   Discharge location: home  Discharge ride contacted: Yes  Approximate discharge time: 1530    Discharge Criteria:  Discharge criteria met and vital signs stable: Yes    Patient Belongs:  Patient belongings returned to patient: Yes    Injection site CDI, no swelling or hematoma    Espinoza Rosenberg RN

## 2021-01-25 NOTE — PROGRESS NOTES
Discharge Note    Progress reporting period is from initial evaluation date (please see noted date below) to Sep 21, 2020.  No linked episodes      Neema failed to follow up and current status is unknown.  Please see information below for last relevant information on current status.  Patient seen for 4 visits.    SUBJECTIVE  Subjective changes noted by patient:  The pt reports he has really good days and then days where he feels more achy.  .  Current pain level is  .     Previous pain level was  5/10.   Changes in function:  Yes (See Goal flowsheet attached for changes in current functional level)  Adverse reaction to treatment or activity: None    OBJECTIVE  Changes noted in objective findings: Pt demonstrates good squatting mechancis 75% of the time, 25% of the time pt is increasing weight shift to R LE. Strengthe DF: L: 4+/5, R: 4-/5     ASSESSMENT/PLAN  Diagnosis: R lumbar radiculopathy   Updated problem list and treatment plan:   Pain - HEP  Decreased ROM/flexibility - HEP  Decreased strength - HEP  Impaired muscle performance - HEP  STG/LTGs have been met or progress has been made towards goals:  Yes, please see goal flowsheet for most current information  Assessment of Progress: current status is unknown.    Last current status: Pt is progressing as expected   Self Management Plans:  HEP  I have re-evaluated this patient and find that the nature, scope, duration and intensity of the therapy is appropriate for the medical condition of the patient.  Neema continues to require the following intervention to meet STG and LTG's:  HEP.    Recommendations:  Discharge with current home program.  Patient to follow up with MD as needed.    Please refer to the daily flowsheet for treatment today, total treatment time and time spent performing 1:1 timed codes.

## 2021-02-04 ENCOUNTER — MYC MEDICAL ADVICE (OUTPATIENT)
Dept: NEUROSURGERY | Facility: CLINIC | Age: 56
End: 2021-02-04

## 2021-02-05 ENCOUNTER — OFFICE VISIT (OUTPATIENT)
Dept: NEUROSURGERY | Facility: CLINIC | Age: 56
End: 2021-02-05
Attending: NURSE PRACTITIONER
Payer: COMMERCIAL

## 2021-02-05 VITALS
HEIGHT: 70 IN | OXYGEN SATURATION: 99 % | SYSTOLIC BLOOD PRESSURE: 128 MMHG | WEIGHT: 195 LBS | HEART RATE: 68 BPM | BODY MASS INDEX: 27.92 KG/M2 | DIASTOLIC BLOOD PRESSURE: 82 MMHG

## 2021-02-05 DIAGNOSIS — Z98.890 S/P LUMBAR MICRODISCECTOMY: Primary | ICD-10-CM

## 2021-02-05 PROCEDURE — 99213 OFFICE O/P EST LOW 20 MIN: CPT | Performed by: NURSE PRACTITIONER

## 2021-02-05 ASSESSMENT — MIFFLIN-ST. JEOR: SCORE: 1730.76

## 2021-02-05 NOTE — LETTER
"    2/5/2021         RE: Neema Hunt  [address] [place] [facility] [place] [address]        Dear Colleague,    Thank you for referring your patient, Neema Hunt, to the [facility] NEUROSURGERY CLINIC [place]. Please see a copy of my visit note below.    [facility] Neurosurgery Follow-Up:     HPI: 6 months s/p right L5-S1 foraminotomy and microdiscectomy with Dr. Marshall. Patient reports recent flare up of back pain that occurred at work on 2/3, somewhat improved today with use of heating pad. Continued right leg pain, states it is the same pain as prior to surgery, with some intermittent numbness in right shin. Mild pain in left shin as discussed at last office visit with Dr. Marshall. Feels unsteady at times, but denies any falls or bowel/bladder changes. Tried injections on 12/21/20 and 1/15/21 which provided about 2 days of relief. Also did course of PT and continues to do the home exercises.     Exam:  Constitutional:  Alert, well nourished, NAD.  HEENT: Normocephalic, atraumatic.   Pulm:  Without shortness of breath   CV:  No pitting edema of BLE.      /82   Pulse 68   Ht 5' 10\" (1.778 m)   Wt 195 lb (88.5 kg)   SpO2 99%   BMI 27.98 kg/m      Neurological:  Awake  Alert  Oriented x 3  Motor exam:  Hip Flexor:                Right: 5/5  Left:  5/5  Quadriceps:              Right:  5/5  Left:  5/5  Hamstrings:              Right:  5/5  Left:  5/5  Gastroc Soleus:        Right:  5/5  Left:  5/5  Tib/Ant:                      Right:  5/5  Left:  5/5  EHL:                          Right:  5/5  Left:  5/5      Reflexes are 2+ in the patellar and Achilles. There is no clonus.  Able to spontaneously move L/E bilaterally  Sensation intact throughout all L/E dermatomes     Incision: Well healed     Assessment/Plan:   6 months s/p right L5-S1 foraminotomy and microdiscectomy with Dr. Marshall. Patient reports recent flare up of back pain that occurred at work on 2/3, somewhat improved today with use of " heating pad. Continued right leg pain, states it is the same pain as prior to surgery, with some intermittent numbness in right shin. Tried PT and SAW x2, but this did not provide lasting relief. We discussed obtaining a lumbar spine MRI for further evaluation at this time. I will call with the results and next steps.     Discussed red flag symptoms and advised to seek medical attention if these develop. Patient voiced understanding and agreement.      Chasity Bedoya, JENNIFER  [address] [address]  Tel [phone]  Pager [phone]          Again, thank you for allowing me to participate in the care of your patient.        Sincerely,        Chasity Bedoya, NP

## 2021-02-05 NOTE — PROGRESS NOTES
"[facility] Neurosurgery Follow-Up:     HPI: 6 months s/p right L5-S1 foraminotomy and microdiscectomy with Dr. Marshall. Patient reports recent flare up of back pain that occurred at work on 2/3, somewhat improved today with use of heating pad. Continued pain down anterior right leg, states it is the same pain as prior to surgery, with some intermittent numbness in right shin. Mild pain in left shin as discussed at last office visit with Dr. Marshall. Feels unsteady at times, but denies any falls or bowel/bladder changes. Tried injections on 12/21/20 and 1/15/21 which provided about 2 days of relief. Also did course of PT and continues to do the home exercises.     Exam:  Constitutional:  Alert, well nourished, NAD.  HEENT: Normocephalic, atraumatic.   Pulm:  Without shortness of breath   CV:  No pitting edema of BLE.      /82   Pulse 68   Ht 5' 10\" (1.778 m)   Wt 195 lb (88.5 kg)   SpO2 99%   BMI 27.98 kg/m      Neurological:  Awake  Alert  Oriented x 3  Motor exam:  Hip Flexor:                Right: 5/5  Left:  5/5  Quadriceps:              Right:  5/5  Left:  5/5  Hamstrings:              Right:  5/5  Left:  5/5  Gastroc Soleus:        Right:  5/5  Left:  5/5  Tib/Ant:                      Right:  5/5  Left:  5/5  EHL:                          Right:  5/5  Left:  5/5      Reflexes are 2+ in the patellar and Achilles. There is no clonus.  Able to spontaneously move L/E bilaterally  Sensation intact throughout all L/E dermatomes     Incision: Well healed     Assessment/Plan:   6 months s/p right L5-S1 foraminotomy and microdiscectomy with Dr. Marshall. Patient reports recent flare up of back pain that occurred at work on 2/3, somewhat improved today with use of heating pad. Continued right leg pain, states it is the same pain as prior to surgery, with some intermittent numbness in right shin. Tried PT and SAW x2, but this did not provide lasting relief. We discussed obtaining a lumbar spine MRI for further " evaluation at this time. I will call with the results and next steps. Also offered MDP for his acute low back pain, but patient declined today since it has improved.     Discussed red flag symptoms and advised to seek medical attention if these develop. Patient voiced understanding and agreement.      Chasity Bedoya CNP  [facility] Neurosurgery  [address] [place] [id number]  Tel [phone]  Pager [phone]

## 2021-02-05 NOTE — LETTER
[facility]  Neurosurgery Clinic  [address] [place] [address], [place]  [address]        2/5/21    To Whom it May Concern,      Neema Hunt is being seen at our clinic for post-operative follow up care. Please continue with current workability status. We will plan to re evaluate after the new MRI is completed. Please call our clinic with questions or concerns: [phone]      Sincerely,          Chasity Bedoya CNP  [facility] Neurosurgery  [address] [place] [address]  Tel [phone]

## 2021-02-05 NOTE — NURSING NOTE
"February 5, 2021 2:03 PM   Neema Hunt is a 55 year old male who presents for:    Chief Complaint   Patient presents with     RECHECK     Lumbar strain     Initial Vitals: /82   Pulse 68   Ht 5' 10\" (1.778 m)   Wt 195 lb (88.5 kg)   SpO2 99%   BMI 27.98 kg/m   Estimated body mass index is 27.98 kg/m  as calculated from the following:    Height as of this encounter: 5' 10\" (1.778 m).    Weight as of this encounter: 195 lb (88.5 kg). Body surface area is 2.09 meters squared.  Data Unavailable Comment: Data Unavailable       Clinical concerns: Neema Hunt is here today for strain of lumbar.  Danni Hudson MA  "

## 2021-02-05 NOTE — PATIENT INSTRUCTIONS
Order for lumbar spine MRI. You can schedule at [phone]. I will call with the results and next steps.    Work letter provided.     Please call our clinic if symptoms persist, change, or worsen at any time.    Chasity Bedoya CNP  [facility] Neurosurgery  [address] [address]  Tel [phone]  Pager [phone]

## 2021-02-10 ENCOUNTER — HOSPITAL ENCOUNTER (OUTPATIENT)
Dept: MRI IMAGING | Facility: CLINIC | Age: 56
Discharge: HOME | End: 2021-02-10
Attending: NURSE PRACTITIONER | Admitting: NURSE PRACTITIONER
Payer: COMMERCIAL

## 2021-02-10 DIAGNOSIS — Z98.890 S/P LUMBAR MICRODISCECTOMY: ICD-10-CM

## 2021-02-10 PROCEDURE — 72148 MRI LUMBAR SPINE W/O DYE: CPT

## 2021-02-26 ENCOUNTER — OFFICE VISIT (OUTPATIENT)
Dept: NEUROSURGERY | Facility: CLINIC | Age: 56
End: 2021-02-26
Attending: NEUROLOGICAL SURGERY
Payer: COMMERCIAL

## 2021-02-26 VITALS
HEART RATE: 56 BPM | DIASTOLIC BLOOD PRESSURE: 84 MMHG | SYSTOLIC BLOOD PRESSURE: 135 MMHG | WEIGHT: 205.2 LBS | HEIGHT: 70 IN | BODY MASS INDEX: 29.38 KG/M2 | OXYGEN SATURATION: 100 %

## 2021-02-26 DIAGNOSIS — M54.16 SPINAL STENOSIS OF LUMBAR REGION WITH RADICULOPATHY: Primary | ICD-10-CM

## 2021-02-26 DIAGNOSIS — M48.061 SPINAL STENOSIS OF LUMBAR REGION WITH RADICULOPATHY: Primary | ICD-10-CM

## 2021-02-26 PROCEDURE — 99213 OFFICE O/P EST LOW 20 MIN: CPT | Performed by: NEUROLOGICAL SURGERY

## 2021-02-26 ASSESSMENT — MIFFLIN-ST. JEOR: SCORE: 1777.03

## 2021-02-26 ASSESSMENT — PAIN SCALES - GENERAL: PAINLEVEL: SEVERE PAIN (7)

## 2021-02-26 NOTE — LETTER
2021    Neema Hunt  [address] [place] [address]    Patient was seen in clinic today  yes    Patient:  Neema Hunt  : 1965    Physician/Nurse Practitioner: Nhan Marshall MD    Mr. Hunt may work without restrictions. He will be reevaluated in 3 months.        Nhan Marshall MD

## 2021-02-26 NOTE — PROGRESS NOTES
"It was a pleasure to see Neema Hunt today in Neurosurgery Clinic. He is a 55 year old male who underwent surgery at L5-S1 on the right last summer for radiculopathy.  He improved for a time.    However his symptoms have begun to worsen again.  He also has some mild left lower extremity symptoms.    He has had 2 epidural injections at L5-S1 with short-lived relief.    Vitals:    02/26/21 1512   BP: 135/84   Pulse: 56   SpO2: 100%   Weight: 93.1 kg (205 lb 3.2 oz)   Height: 1.778 m (5' 10\")     Body mass index is 29.44 kg/m .  Severe Pain (7)    Well-healed lumbar incision.    Bilateral lower extremity strength is 5/5 in all muscle groups.    Imaging: MRI of the lumbar spine from earlier in February shows what appears to be recurrent right L5-S1 foraminal stenosis that is not quite as bad as it was prior to his last surgery but certainly looks significant enough to cause symptomatic radiculopathy.  The imaging was reviewed with patient shown the patient in clinic today.    Assessment: Recurrent right L5 radiculopathy.    Plan: Given his recurrent symptoms and multiple attempts at previous decompression I think that future intervention will require fusion at L5-S1.  Patient wishes to hold off for now and will come back in 3 months to see me in clinic with x-rays of the lumbar spine.  If he worsens in the meantime we be happy to see him back sooner to discuss surgery.     "

## 2021-02-26 NOTE — LETTER
"    2/26/2021         RE: Neema Hunt  [address]  [place] [address]        Dear Colleague,    Thank you for referring your patient, Neema Hunt, to the [facility] NEUROSURGERY CLINIC [place]. Please see a copy of my visit note below.    It was a pleasure to see Neema Hunt today in Neurosurgery Clinic. He is a 55 year old male who underwent surgery at L5-S1 on the right last summer for radiculopathy.  He improved for a time.    However his symptoms have begun to worsen again.  He also has some mild left lower extremity symptoms.    He has had 2 epidural injections at L5-S1 with short-lived relief.    Vitals:    02/26/21 1512   BP: 135/84   Pulse: 56   SpO2: 100%   Weight: 93.1 kg (205 lb 3.2 oz)   Height: 1.778 m (5' 10\")     Body mass index is 29.44 kg/m .  Severe Pain (7)    Well-healed lumbar incision.    Bilateral lower extremity strength is 5/5 in all muscle groups.    Imaging: MRI of the lumbar spine from earlier in February shows what appears to be recurrent right L5-S1 foraminal stenosis that is not quite as bad as it was prior to his last surgery but certainly looks significant enough to cause symptomatic radiculopathy.  The imaging was reviewed with patient shown the patient in clinic today.    Assessment: Recurrent right L5 radiculopathy.    Plan: Given his recurrent symptoms and multiple attempts at previous decompression I think that future intervention will require fusion at L5-S1.  Patient wishes to hold off for now and will come back in 3 months to see me in clinic with x-rays of the lumbar spine.  If he worsens in the meantime we be happy to see him back sooner to discuss surgery.         Again, thank you for allowing me to participate in the care of your patient.        Sincerely,        Nhan Marshall MD    "

## 2021-02-26 NOTE — NURSING NOTE
"February 26, 2021 3:13 PM   Neema Hunt is a 55 year old male who presents for:    Chief Complaint   Patient presents with     Consult     Review MRI (2/10/21), lumbar, right leg pain (from hip to ankle)     Initial Vitals: /84   Pulse 56   Ht 5' 10\" (1.778 m)   Wt 205 lb 3.2 oz (93.1 kg)   SpO2 100%   BMI 29.44 kg/m   Estimated body mass index is 29.44 kg/m  as calculated from the following:    Height as of this encounter: 5' 10\" (1.778 m).    Weight as of this encounter: 205 lb 3.2 oz (93.1 kg). Body surface area is 2.14 meters squared.  Severe Pain (7) Comment: Data Unavailable       Clinical concerns: Neema Hunt is here today for a Review of MRI (2/10/21), lumbar, right leg pain (from hip to ankle).    Jc Gleason MA  "

## 2021-04-23 ENCOUNTER — OFFICE VISIT (OUTPATIENT)
Dept: NEUROSURGERY | Facility: CLINIC | Age: 56
End: 2021-04-23
Attending: NEUROLOGICAL SURGERY
Payer: COMMERCIAL

## 2021-04-23 ENCOUNTER — ANCILLARY PROCEDURE (OUTPATIENT)
Dept: GENERAL RADIOLOGY | Facility: CLINIC | Age: 56
End: 2021-04-23
Attending: NEUROLOGICAL SURGERY
Payer: COMMERCIAL

## 2021-04-23 VITALS
BODY MASS INDEX: 27.49 KG/M2 | SYSTOLIC BLOOD PRESSURE: 113 MMHG | OXYGEN SATURATION: 100 % | WEIGHT: 192 LBS | HEIGHT: 70 IN | DIASTOLIC BLOOD PRESSURE: 71 MMHG | TEMPERATURE: 98.3 F | HEART RATE: 65 BPM

## 2021-04-23 DIAGNOSIS — M48.061 SPINAL STENOSIS OF LUMBAR REGION WITH RADICULOPATHY: ICD-10-CM

## 2021-04-23 DIAGNOSIS — M48.061 SPINAL STENOSIS OF LUMBAR REGION WITH RADICULOPATHY: Primary | ICD-10-CM

## 2021-04-23 DIAGNOSIS — M54.16 SPINAL STENOSIS OF LUMBAR REGION WITH RADICULOPATHY: Primary | ICD-10-CM

## 2021-04-23 DIAGNOSIS — M54.16 SPINAL STENOSIS OF LUMBAR REGION WITH RADICULOPATHY: ICD-10-CM

## 2021-04-23 PROCEDURE — G0463 HOSPITAL OUTPT CLINIC VISIT: HCPCS

## 2021-04-23 PROCEDURE — 72100 X-RAY EXAM L-S SPINE 2/3 VWS: CPT | Performed by: RADIOLOGY

## 2021-04-23 PROCEDURE — 99213 OFFICE O/P EST LOW 20 MIN: CPT | Performed by: NEUROLOGICAL SURGERY

## 2021-04-23 ASSESSMENT — PAIN SCALES - GENERAL: PAINLEVEL: MODERATE PAIN (5)

## 2021-04-23 ASSESSMENT — MIFFLIN-ST. JEOR: SCORE: 1717.16

## 2021-04-23 NOTE — LETTER
"    4/23/2021         RE: Neema Hunt  [address] [facility] [address]        Dear Colleague,    Thank you for referring your patient, Neema Hunt, to the [facility] NEUROSURGERY CLINIC [place]. Please see a copy of my visit note below.    It was a pleasure to see Neema Hunt today in Neurosurgery Clinic. He is a 55 year old male well-known to me from multiple previous spinal operations, most recently a right L5-S1 foraminotomy microdiscectomy.    The patient received some initial benefit but really continues to have bilateral lower extremity symptoms consistent with L5 radiculopathies after multiple previous interventions.    Given his persistent symptoms we had previously discussed extending his fusion down to the L5-S1 level in order to be able to provide adequate bilateral decompression at this level.    He continues to have pain in the mainly radiates down the right leg in an L5 distribution as well as in the left lower leg in an L5 distribution.    Vitals:    04/23/21 1425   BP: 113/71   Pulse: 65   Temp: 98.3  F (36.8  C)   SpO2: 100%   Weight: 87.1 kg (192 lb)   Height: 1.778 m (5' 10\")     Body mass index is 27.55 kg/m .  Moderate Pain (5)    Well-healed lumbar incisions.  Bilateral lower extremity strength is 5 out of 5 in all muscle groups.    Imaging: X-rays today show stable alignment of his previous fusions from L2-L5.  There is significant disc collapse and foraminal stenosis at L5-S1.  Imaging was reviewed with the patient shown to the patient clinic today.    Assessment: Persistent radicular symptoms from foraminal stenosis after multiple decompressions at L5-S1.    Plan: I have recommended extension of his fusion to L5-S1 which would require removal of the previous rods placement of new pedicle screws at L5 and S1 and bilateral L5-S1 transforaminal interbody fusion.  We discussed the risks benefits and alternatives to this.  The patient understands and wishes " to proceed with surgery.  We will work on scheduling this at the near future.       I spent a total of 20 minutes on the day of the visit.   Time spent doing chart review, history and exam, documentation and further activities per the note          Again, thank you for allowing me to participate in the care of your patient.        Sincerely,        Nhan Marshall MD

## 2021-04-23 NOTE — PATIENT INSTRUCTIONS
Patient Instructions    Surgery scheduled at [facility] for Lumbar 5-Sacral 1 posterior segemental instrumentation, bilateral decompression and transforaminal interbody fusion. Revision of Lumbar 2-4 ian. Posterior arthrodesis Lumbar 5-Sacral 1 with Dr. Marshall    Pre-Operative    Surgical risks: blood clots in the leg or lung, problems urinating, nerve damage, drainage from the incision, infection,stiffness    Pre-operative physical with primary care physician within 30 days of surgical date.     2-4 night hospitalization stay    Shower procedure  o Please shower with antimicrobial soap the night before surgery and morning of surgery. Please refer to showering instruction sheet in folder.    Eating/Drinking  o Stop all solid foods 8 hours before surgery.  o Keep drinking clear liquids until 4 hours before surgery  - Clear liquids include water, clear juice, black coffee, or clear tea without milk, Gatorade, clear soda.     Medications  o Hold Aspirin, NSAIDs (Advil/Ibuprofen, Indocin, Naproxen,Nuprin,Relafen/Nabumetone, Diclofenac,Meloxicam, Aleve, Celebrex) x 7 days prior to surgical date  o You can take Tylenol (Acetaminophen) for pain, 1000 mg  - Do not exceed 3,000 mg per day   o Any other medications prescribed, please discuss with your primary care provider at your pre-operative physical     As part of preparation for your upcoming procedure you are required to have a test for the novel Coronavirus, COVID-19.  o Please call the drive-up testing number to schedule your appointment. The test needs to be completed within 4 days (96) hours of surgery.   o Scheduling Number: [phone]    Pain Management    Dealing with pain  o As your body heals, you might feel a stabbing, burning, or aching pain. You may also have some numbness.  o Everyone feels pain differently, we may ask you to rate your pain using a pain scale. This will let us know how much pain you feel.   o Keep in mind that medicine  won't take away all of your pain. It helps to try other ways to relax and ease pain.     Things to help with pain  o After surgery, we will give you medicine for your pain. These medications work well, but they can make you drowsy, itchy, or sick to your stomach. If we give you narcotics for pain, try to take the pills with food.   o For mild to moderate pain, you can take medication such as Tylenol. These can be used with narcotics, but make sure that your narcotic does not contain Tylenol.   - Do NOT drive while taking narcotic pain medication  - Do NOT drink alcohol while using any pain medication  o You can utilize ice as needed (no longer than 20 minutes at one time)    You may resume taking NSAIDs (ex. Ibuprofen, aleve, naproxen) 12 weeks after surgery as it may cause bleeding and interfere with bone healing     Incision Care    No submerging incision in water such as pools, hot tubs, baths for at least 8 weeks or until incision is healed    You may get your incision wet in the shower. Allow water to run over incision, and gently pat dry.     Remove dressing as instructed upon discharge    Watch for signs of infection  o Redness, swelling, warmth, drainage, and fever of 101 degrees or higher  o Notify clinic [phone].    Activity Restrictions    For the first 6-8 weeks, no lifting > 10 pounds, no bending, twisting, or overhead reaching.    Take stairs in moderation     Ok to walk as tolerated, take short frequent walks. You may gradually increase the distance as tolerated.     Avoid bed rest and prolonged sitting for longer than 30 minutes (change positions frequently while awake)    No contact sports until after follow up visit    No high impact activities such as; running/jogging, snowmobile or 4 marte riding or any other recreational vehicles    Please call the clinic if you develop any of the following symptoms:  o Swelling and/or warmth in one or both legs  o Pain or tenderness in your leg, ankle,  foot, or arm   o Red or discolored skin     Post-Op Follow Up Appointments    2 week staple/suture removal with RN    6 week post op with xray prior     3 months post op with xray prior     1 year post op with xray prior    Please call to schedule follow up and xray appointments at [phone]    Resources    If you are currently employed, you will need to be off work for 4-6 weeks for post op recovery and healing.    Please fax any FMLA/short term disability paperwork to [phone]    You may call our clinic when you'd like to return to work and we can provide a work letter    To allow staff adequate time to complete paperwork, please send as soon as possible

## 2021-04-23 NOTE — NURSING NOTE
"Neema Hunt is a 55 year old male who presents for:  Chief Complaint   Patient presents with     Surgical Followup     3 mo. f/u--dx. lumbar microdiscectomy        Initial Vitals:  /71   Pulse 65   Temp 98.3  F (36.8  C)   Ht 5' 10\" (1.778 m)   Wt 192 lb (87.1 kg)   SpO2 100%   BMI 27.55 kg/m   Estimated body mass index is 27.55 kg/m  as calculated from the following:    Height as of this encounter: 5' 10\" (1.778 m).    Weight as of this encounter: 192 lb (87.1 kg).. Body surface area is 2.07 meters squared. BP completed using cuff size: large  Moderate Pain (5)    Nursing Comments: Patient presents for 3 mo. f/u--dx. lumbar microdiscectomy    Rodri Mares MA  "

## 2021-04-23 NOTE — NURSING NOTE
Reviewed pre- and post-operative instructions as outlined in the After Visit Summary/Patient Instructions with patient.   Surgery folder was given to patient    Patient Education Topic: Procedure with Dr. Marshall     Learner(s): Patient    Knowledge Level: Basic    Readiness to Learn: Ready    Method:  Verbal explanation and Written material     Outcome: Able to verbalize instructions    Barriers to Learning: No barrier    Covid Testing: patient educated they will need to have a test for the novel Coronavirus, COVID-19.The test needs to be completed within 4 days (96) hours of surgery. Order Placed.    Scheduling Number: [phone]    Patient had the opportunity for questions to be answered. Advised Patient to call clinic with any questions/concerns. Gave patient antibacterial soap for pre-surgery skin preparation.

## 2021-04-23 NOTE — PROGRESS NOTES
"It was a pleasure to see Neema Hunt today in Neurosurgery Clinic. He is a 55 year old male well-known to me from multiple previous spinal operations, most recently a right L5-S1 foraminotomy microdiscectomy.    The patient received some initial benefit but really continues to have bilateral lower extremity symptoms consistent with L5 radiculopathies after multiple previous interventions.    Given his persistent symptoms we had previously discussed extending his fusion down to the L5-S1 level in order to be able to provide adequate bilateral decompression at this level.    He continues to have pain in the mainly radiates down the right leg in an L5 distribution as well as in the left lower leg in an L5 distribution.    Vitals:    04/23/21 1425   BP: 113/71   Pulse: 65   Temp: 98.3  F (36.8  C)   SpO2: 100%   Weight: 87.1 kg (192 lb)   Height: 1.778 m (5' 10\")     Body mass index is 27.55 kg/m .  Moderate Pain (5)    Well-healed lumbar incisions.  Bilateral lower extremity strength is 5 out of 5 in all muscle groups.    Imaging: X-rays today show stable alignment of his previous fusions from L2-L5.  There is significant disc collapse and foraminal stenosis at L5-S1.  Imaging was reviewed with the patient shown to the patient clinic today.    Assessment: Persistent radicular symptoms from foraminal stenosis after multiple decompressions at L5-S1.    Plan: I have recommended extension of his fusion to L5-S1 which would require removal of the previous rods placement of new pedicle screws at L5 and S1 and bilateral L5-S1 transforaminal interbody fusion.  We discussed the risks benefits and alternatives to this.  The patient understands and wishes to proceed with surgery.  We will work on scheduling this at the near future.       I spent a total of 20 minutes on the day of the visit.   Time spent doing chart review, history and exam, documentation and further activities per the note      "

## 2021-04-26 DIAGNOSIS — M54.16 SPINAL STENOSIS OF LUMBAR REGION WITH RADICULOPATHY: Primary | ICD-10-CM

## 2021-04-26 DIAGNOSIS — M48.061 SPINAL STENOSIS OF LUMBAR REGION WITH RADICULOPATHY: Primary | ICD-10-CM

## 2021-05-04 ENCOUNTER — TELEPHONE (OUTPATIENT)
Dept: NEUROSURGERY | Facility: CLINIC | Age: 56
End: 2021-05-04

## 2021-05-04 NOTE — TELEPHONE ENCOUNTER
Last Visit: 4/23/21  Next Visit: 5/27/21 Lumbar 5-Sacral 1 posterior segemental instrumentation, bilateral decompression and transforaminal interbody fusion using local autograft and allograft cancellous chips. Revision of Lumbar 2-4 ian. Posterior arthrodesis Lumbar 5-Sacral 1 using  local autograft and allograft cancellous chips    Name of Provider: Dr. Marshall    Assessment: Pt calls requesting a work note for activity restrictions. He said his [employer] job is very strenuous requiring a lot of lifting and bending and he is exhausted and in a lot of pain by the end of the day exacerbating his symptoms. He is currently scheduled for surgery on 5/27/21.  He has pain primarily to his right leg, and some to his low back and left knee which also has been feeling more weak.     Action needed from provider: Please advise.

## 2021-05-05 ENCOUNTER — MYC MEDICAL ADVICE (OUTPATIENT)
Dept: NEUROSURGERY | Facility: CLINIC | Age: 56
End: 2021-05-05

## 2021-05-05 NOTE — TELEPHONE ENCOUNTER
Ok per Dr. Marshall to provide work note with activity restrictions prior to surgery. Sent to pt via [employer].     Arlin Rivera RN

## 2021-05-06 ENCOUNTER — TELEPHONE (OUTPATIENT)
Dept: NEUROSURGERY | Facility: CLINIC | Age: 56
End: 2021-05-06

## 2021-05-06 NOTE — TELEPHONE ENCOUNTER
Patient needs more specifics for work restrictions letter.  Wants to speak with someone so they have a clear understanding of his job duties, as he wants to work, yet have a safe work environment.  Patient also has several forms he has been trying to upload to My-chart and the system won't take the forms.

## 2021-05-12 ENCOUNTER — HOSPITAL ENCOUNTER (OUTPATIENT)
Dept: CT IMAGING | Facility: CLINIC | Age: 56
Discharge: HOME | End: 2021-05-12
Attending: NEUROLOGICAL SURGERY | Admitting: NEUROLOGICAL SURGERY
Payer: COMMERCIAL

## 2021-05-12 DIAGNOSIS — M48.061 SPINAL STENOSIS OF LUMBAR REGION WITH RADICULOPATHY: ICD-10-CM

## 2021-05-12 DIAGNOSIS — M54.16 SPINAL STENOSIS OF LUMBAR REGION WITH RADICULOPATHY: ICD-10-CM

## 2021-05-12 PROCEDURE — 72131 CT LUMBAR SPINE W/O DYE: CPT

## 2021-05-13 DIAGNOSIS — Z11.59 ENCOUNTER FOR SCREENING FOR OTHER VIRAL DISEASES: ICD-10-CM

## 2021-05-23 DIAGNOSIS — Z11.59 ENCOUNTER FOR SCREENING FOR OTHER VIRAL DISEASES: ICD-10-CM

## 2021-05-23 LAB
LABORATORY COMMENT REPORT: NORMAL
SARS-COV-2 RNA RESP QL NAA+PROBE: NEGATIVE
SARS-COV-2 RNA RESP QL NAA+PROBE: NORMAL
SPECIMEN SOURCE: NORMAL
SPECIMEN SOURCE: NORMAL

## 2021-05-23 PROCEDURE — U0003 INFECTIOUS AGENT DETECTION BY NUCLEIC ACID (DNA OR RNA); SEVERE ACUTE RESPIRATORY SYNDROME CORONAVIRUS 2 (SARS-COV-2) (CORONAVIRUS DISEASE [COVID-19]), AMPLIFIED PROBE TECHNIQUE, MAKING USE OF HIGH THROUGHPUT TECHNOLOGIES AS DESCRIBED BY CMS-2020-01-R: HCPCS | Performed by: NEUROLOGICAL SURGERY

## 2021-05-23 PROCEDURE — U0005 INFEC AGEN DETEC AMPLI PROBE: HCPCS | Performed by: NEUROLOGICAL SURGERY

## 2021-05-26 ENCOUNTER — ANESTHESIA EVENT (OUTPATIENT)
Dept: SURGERY | Facility: CLINIC | Age: 56
DRG: 455 | End: 2021-05-26
Payer: COMMERCIAL

## 2021-05-26 RX ORDER — GABAPENTIN 100 MG/1
300 CAPSULE ORAL AT BEDTIME
COMMUNITY
End: 2021-08-27

## 2021-05-26 RX ORDER — TETRAHYDROZOLINE HCL 0.05 %
1 DROPS OPHTHALMIC (EYE) DAILY PRN
COMMUNITY

## 2021-05-26 RX ORDER — ACETAMINOPHEN 500 MG
1000 TABLET ORAL DAILY PRN
COMMUNITY

## 2021-05-26 RX ORDER — PHENOL 1.4 %
10 AEROSOL, SPRAY (ML) MUCOUS MEMBRANE
COMMUNITY

## 2021-05-26 RX ORDER — POLYETHYLENE GLYCOL 3350 17 G/17G
1 POWDER, FOR SOLUTION ORAL DAILY
COMMUNITY

## 2021-05-26 NOTE — PROGRESS NOTES
PTA medications updated by Medication Scribe prior to surgery via phone call with patient (last doses completed by Nurse)     Medication history sources: Patient, Surescripts and H&P  In the past week, patient estimated taking medication this percent of the time: Greater than 90%  Adherence assessment: N/A Not Observed    Significant changes made to the medication list:  None      Additional medication history information:   Patient brought own home meds: Visine Eye Drops    Medication reconciliation completed by provider prior to medication history? No    Time spent in this activity: 20 minutes    The information provided in this note is only as accurate as the sources available at the time of update(s)      Prior to Admission medications    Medication Sig Last Dose Taking? Auth Provider   acetaminophen (TYLENOL) 500 MG tablet Take 1,000 mg by mouth daily as needed for mild pain (2 X 500 mg)  at PRN Yes Reported, Patient   amLODIPine-benazepril (LOTREL) 10-20 MG capsule Take 1 capsule by mouth every morning   at AM Yes Reported, Patient   Cyanocobalamin (B-12 PO) Take 2 chew tab by mouth every morning  5/26/2021 at AM Yes Reported, Patient   gabapentin (NEURONTIN) 100 MG capsule Take 300 mg by mouth At Bedtime (3 X 300 mg) 5/26/2021 at HS Yes Reported, Patient   hydrocortisone 2.5 % ointment Apply topically daily as needed   at PRN Yes Reported, Patient   lidocaine (LIDODERM) 5 % Patch Place 1 patch onto the skin every 24 hours 5/26/2021 at AM Yes Jennifer Monsivais PA-C   Melatonin 10 MG TABS tablet Take 10 mg by mouth nightly as needed for sleep 5/26/2021 at PM Yes Reported, Patient   Multiple Vitamins-Minerals (AIRBORNE PO) Take 1 chew tab by mouth every morning  5/26/2021 at AM Yes Reported, Patient   Multiple Vitamins-Minerals (MULTIVITAMIN ADULT PO) Take 2 chew tab by mouth every morning  5/26/2021 at AM Yes Reported, Patient   naproxen sodium 220 MG capsule Take 440 mg by mouth 2 times daily  MORE THAN A WEEK  at AM Yes Reported, Patient   omega 3 1000 MG CAPS Take 1 chew tab by mouth every morning  MORE THAN A WEEK at AM Yes Reported, Patient   polyethylene glycol (MIRALAX) 17 g packet Take 1 packet by mouth every morning 5/26/2021 at AM Yes Reported, Patient   Probiotic Product (SOLUBLE FIBER/PROBIOTICS PO) Take 1 chew tab by mouth every morning  5/26/2021 at AM Yes Reported, Patient   tetrahydrozoline (VISINE) 0.05 % ophthalmic solution Place 1 drop into both eyes daily as needed  at PRN Yes Reported, Patient       Nuno Vigil, Niall  [facility]

## 2021-05-27 ENCOUNTER — HOSPITAL ENCOUNTER (INPATIENT)
Facility: CLINIC | Age: 56
LOS: 2 days | Discharge: HOME | DRG: 455 | End: 2021-05-29
Attending: NEUROLOGICAL SURGERY | Admitting: NEUROLOGICAL SURGERY
Payer: COMMERCIAL

## 2021-05-27 ENCOUNTER — ANESTHESIA (OUTPATIENT)
Dept: SURGERY | Facility: CLINIC | Age: 56
DRG: 455 | End: 2021-05-27
Payer: COMMERCIAL

## 2021-05-27 ENCOUNTER — APPOINTMENT (OUTPATIENT)
Dept: GENERAL RADIOLOGY | Facility: CLINIC | Age: 56
DRG: 455 | End: 2021-05-27
Attending: NEUROLOGICAL SURGERY
Payer: COMMERCIAL

## 2021-05-27 DIAGNOSIS — Z98.1 S/P SPINAL FUSION: Primary | ICD-10-CM

## 2021-05-27 DIAGNOSIS — M54.16 SPINAL STENOSIS OF LUMBAR REGION WITH RADICULOPATHY: ICD-10-CM

## 2021-05-27 DIAGNOSIS — M48.061 SPINAL STENOSIS OF LUMBAR REGION WITH RADICULOPATHY: ICD-10-CM

## 2021-05-27 LAB
ABO + RH BLD: NORMAL
ABO + RH BLD: NORMAL
BLD GP AB SCN SERPL QL: NORMAL
BLOOD BANK CMNT PATIENT-IMP: NORMAL
POTASSIUM SERPL-SCNC: 3.4 MMOL/L (ref 3.4–5.3)
SPECIMEN EXP DATE BLD: NORMAL

## 2021-05-27 PROCEDURE — 250N000011 HC RX IP 250 OP 636: Performed by: NEUROLOGICAL SURGERY

## 2021-05-27 PROCEDURE — 258N000003 HC RX IP 258 OP 636: Performed by: PHYSICIAN ASSISTANT

## 2021-05-27 PROCEDURE — 250N000009 HC RX 250

## 2021-05-27 PROCEDURE — 272N000002 HC OR SUPPLY OTHER OPNP: Performed by: NEUROLOGICAL SURGERY

## 2021-05-27 PROCEDURE — 22633 ARTHRD CMBN 1NTRSPC LUMBAR: CPT | Performed by: NEUROLOGICAL SURGERY

## 2021-05-27 PROCEDURE — 0SG3071 FUSION OF LUMBOSACRAL JOINT WITH AUTOLOGOUS TISSUE SUBSTITUTE, POSTERIOR APPROACH, POSTERIOR COLUMN, OPEN APPROACH: ICD-10-PCS | Performed by: NEUROLOGICAL SURGERY

## 2021-05-27 PROCEDURE — 0QH004Z INSERTION OF INTERNAL FIXATION DEVICE INTO LUMBAR VERTEBRA, OPEN APPROACH: ICD-10-PCS | Performed by: NEUROLOGICAL SURGERY

## 2021-05-27 PROCEDURE — 999N000141 HC STATISTIC PRE-PROCEDURE NURSING ASSESSMENT: Performed by: NEUROLOGICAL SURGERY

## 2021-05-27 PROCEDURE — 22853 INSJ BIOMECHANICAL DEVICE: CPT | Mod: AS | Performed by: PHYSICIAN ASSISTANT

## 2021-05-27 PROCEDURE — C1762 CONN TISS, HUMAN(INC FASCIA): HCPCS | Performed by: NEUROLOGICAL SURGERY

## 2021-05-27 PROCEDURE — 84132 ASSAY OF SERUM POTASSIUM: CPT | Performed by: ANESTHESIOLOGY

## 2021-05-27 PROCEDURE — 22842 INSERT SPINE FIXATION DEVICE: CPT | Performed by: NEUROLOGICAL SURGERY

## 2021-05-27 PROCEDURE — 710N000009 HC RECOVERY PHASE 1, LEVEL 1, PER MIN: Performed by: NEUROLOGICAL SURGERY

## 2021-05-27 PROCEDURE — 250N000009 HC RX 250: Performed by: NURSE ANESTHETIST, CERTIFIED REGISTERED

## 2021-05-27 PROCEDURE — 63047 LAM FACETEC & FORAMOT LUMBAR: CPT | Mod: AS | Performed by: PHYSICIAN ASSISTANT

## 2021-05-27 PROCEDURE — 22853 INSJ BIOMECHANICAL DEVICE: CPT | Performed by: NEUROLOGICAL SURGERY

## 2021-05-27 PROCEDURE — 250N000011 HC RX IP 250 OP 636: Performed by: PHYSICIAN ASSISTANT

## 2021-05-27 PROCEDURE — 8E0W0CZ ROBOTIC ASSISTED PROCEDURE OF TRUNK REGION, OPEN APPROACH: ICD-10-PCS | Performed by: NEUROLOGICAL SURGERY

## 2021-05-27 PROCEDURE — 999N000179 XR SURGERY CARM FLUORO LESS THAN 5 MIN W STILLS

## 2021-05-27 PROCEDURE — 61783 SCAN PROC SPINAL: CPT | Performed by: NEUROLOGICAL SURGERY

## 2021-05-27 PROCEDURE — 86850 RBC ANTIBODY SCREEN: CPT | Performed by: NEUROLOGICAL SURGERY

## 2021-05-27 PROCEDURE — 250N000011 HC RX IP 250 OP 636: Performed by: NURSE ANESTHETIST, CERTIFIED REGISTERED

## 2021-05-27 PROCEDURE — 370N000017 HC ANESTHESIA TECHNICAL FEE, PER MIN: Performed by: NEUROLOGICAL SURGERY

## 2021-05-27 PROCEDURE — 0SP004Z REMOVAL OF INTERNAL FIXATION DEVICE FROM LUMBAR VERTEBRAL JOINT, OPEN APPROACH: ICD-10-PCS | Performed by: NEUROLOGICAL SURGERY

## 2021-05-27 PROCEDURE — 272N000001 HC OR GENERAL SUPPLY STERILE: Performed by: NEUROLOGICAL SURGERY

## 2021-05-27 PROCEDURE — 0SG30AJ FUSION OF LUMBOSACRAL JOINT WITH INTERBODY FUSION DEVICE, POSTERIOR APPROACH, ANTERIOR COLUMN, OPEN APPROACH: ICD-10-PCS | Performed by: NEUROLOGICAL SURGERY

## 2021-05-27 PROCEDURE — 278N000051 HC OR IMPLANT GENERAL: Performed by: NEUROLOGICAL SURGERY

## 2021-05-27 PROCEDURE — 250N000009 HC RX 250: Performed by: NEUROLOGICAL SURGERY

## 2021-05-27 PROCEDURE — 250N000025 HC SEVOFLURANE, PER MIN: Performed by: NEUROLOGICAL SURGERY

## 2021-05-27 PROCEDURE — 36415 COLL VENOUS BLD VENIPUNCTURE: CPT | Performed by: NEUROLOGICAL SURGERY

## 2021-05-27 PROCEDURE — 0SB40ZZ EXCISION OF LUMBOSACRAL DISC, OPEN APPROACH: ICD-10-PCS | Performed by: NEUROLOGICAL SURGERY

## 2021-05-27 PROCEDURE — C1713 ANCHOR/SCREW BN/BN,TIS/BN: HCPCS | Performed by: NEUROLOGICAL SURGERY

## 2021-05-27 PROCEDURE — 250N000013 HC RX MED GY IP 250 OP 250 PS 637: Performed by: NEUROLOGICAL SURGERY

## 2021-05-27 PROCEDURE — 250N000011 HC RX IP 250 OP 636

## 2021-05-27 PROCEDURE — 01NB0ZZ RELEASE LUMBAR NERVE, OPEN APPROACH: ICD-10-PCS | Performed by: NEUROLOGICAL SURGERY

## 2021-05-27 PROCEDURE — 250N000005 HC OR RX SURGIFLO HEMOSTATIC MATRIX 10ML 199102S OPNP: Performed by: NEUROLOGICAL SURGERY

## 2021-05-27 PROCEDURE — 20936 SP BONE AGRFT LOCAL ADD-ON: CPT | Performed by: NEUROLOGICAL SURGERY

## 2021-05-27 PROCEDURE — 360N000087 HC SURGERY LEVEL 7 W/ FLUORO, PER MIN: Performed by: NEUROLOGICAL SURGERY

## 2021-05-27 PROCEDURE — 250N000011 HC RX IP 250 OP 636: Performed by: ANESTHESIOLOGY

## 2021-05-27 PROCEDURE — 120N000001 HC R&B MED SURG/OB

## 2021-05-27 PROCEDURE — 22842 INSERT SPINE FIXATION DEVICE: CPT | Mod: AS | Performed by: PHYSICIAN ASSISTANT

## 2021-05-27 PROCEDURE — 86901 BLOOD TYPING SEROLOGIC RH(D): CPT | Performed by: NEUROLOGICAL SURGERY

## 2021-05-27 PROCEDURE — 258N000003 HC RX IP 258 OP 636: Performed by: NURSE ANESTHETIST, CERTIFIED REGISTERED

## 2021-05-27 PROCEDURE — P9041 ALBUMIN (HUMAN),5%, 50ML: HCPCS | Performed by: NURSE ANESTHETIST, CERTIFIED REGISTERED

## 2021-05-27 PROCEDURE — 250N000013 HC RX MED GY IP 250 OP 250 PS 637: Performed by: PHYSICIAN ASSISTANT

## 2021-05-27 PROCEDURE — 63047 LAM FACETEC & FORAMOT LUMBAR: CPT | Mod: 59 | Performed by: NEUROLOGICAL SURGERY

## 2021-05-27 PROCEDURE — 258N000003 HC RX IP 258 OP 636: Performed by: ANESTHESIOLOGY

## 2021-05-27 PROCEDURE — 22633 ARTHRD CMBN 1NTRSPC LUMBAR: CPT | Mod: AS | Performed by: PHYSICIAN ASSISTANT

## 2021-05-27 PROCEDURE — 20930 SP BONE ALGRFT MORSEL ADD-ON: CPT | Performed by: NEUROLOGICAL SURGERY

## 2021-05-27 PROCEDURE — 86900 BLOOD TYPING SEROLOGIC ABO: CPT | Performed by: NEUROLOGICAL SURGERY

## 2021-05-27 DEVICE — IMPLANTABLE DEVICE
Type: IMPLANTABLE DEVICE | Site: SPINE LUMBAR | Status: NON-FUNCTIONAL
Removed: 2023-12-21

## 2021-05-27 DEVICE — IMP SPACER MEDT CAPSTONE CONTROL 13X22MM 18DEG 4021322: Type: IMPLANTABLE DEVICE | Site: SPINE LUMBAR | Status: FUNCTIONAL

## 2021-05-27 DEVICE — IMP SCR MEDT 5.5/6.0MM SOLERA 6.5X45MM MA 55840006545: Type: IMPLANTABLE DEVICE | Site: SPINE LUMBAR | Status: FUNCTIONAL

## 2021-05-27 DEVICE — GRAFT BONE CRUSH CANC 30ML 400080: Type: IMPLANTABLE DEVICE | Site: SPINE LUMBAR | Status: FUNCTIONAL

## 2021-05-27 DEVICE — IMP SCR SET MEDT SOLERA BREAK OFF 5.5MM TI 5540030
Type: IMPLANTABLE DEVICE | Site: SPINE LUMBAR | Status: NON-FUNCTIONAL
Removed: 2023-12-21

## 2021-05-27 DEVICE — IMP SCR MEDT 5.5/6.0MM SOLERA 7.5X45MM MA 55840007545
Type: IMPLANTABLE DEVICE | Site: SPINE LUMBAR | Status: FUNCTIONAL
Removed: 2023-12-21

## 2021-05-27 RX ORDER — SODIUM CHLORIDE 9 MG/ML
INJECTION, SOLUTION INTRAVENOUS CONTINUOUS
Status: DISCONTINUED | OUTPATIENT
Start: 2021-05-27 | End: 2021-05-29 | Stop reason: HOSPADM

## 2021-05-27 RX ORDER — PROPOFOL 10 MG/ML
INJECTION, EMULSION INTRAVENOUS CONTINUOUS PRN
Status: DISCONTINUED | OUTPATIENT
Start: 2021-05-27 | End: 2021-05-27

## 2021-05-27 RX ORDER — NALOXONE HYDROCHLORIDE 0.4 MG/ML
0.2 INJECTION, SOLUTION INTRAMUSCULAR; INTRAVENOUS; SUBCUTANEOUS
Status: DISCONTINUED | OUTPATIENT
Start: 2021-05-27 | End: 2021-05-27

## 2021-05-27 RX ORDER — CALCIUM CARBONATE 500 MG/1
500 TABLET, CHEWABLE ORAL 4 TIMES DAILY PRN
Status: DISCONTINUED | OUTPATIENT
Start: 2021-05-27 | End: 2021-05-29 | Stop reason: HOSPADM

## 2021-05-27 RX ORDER — FAMOTIDINE 20 MG/1
20 TABLET, FILM COATED ORAL DAILY PRN
Status: DISCONTINUED | OUTPATIENT
Start: 2021-05-27 | End: 2021-05-29 | Stop reason: HOSPADM

## 2021-05-27 RX ORDER — SODIUM CHLORIDE, SODIUM LACTATE, POTASSIUM CHLORIDE, CALCIUM CHLORIDE 600; 310; 30; 20 MG/100ML; MG/100ML; MG/100ML; MG/100ML
INJECTION, SOLUTION INTRAVENOUS CONTINUOUS
Status: DISCONTINUED | OUTPATIENT
Start: 2021-05-27 | End: 2021-05-27 | Stop reason: HOSPADM

## 2021-05-27 RX ORDER — NALOXONE HYDROCHLORIDE 0.4 MG/ML
0.4 INJECTION, SOLUTION INTRAMUSCULAR; INTRAVENOUS; SUBCUTANEOUS
Status: DISCONTINUED | OUTPATIENT
Start: 2021-05-27 | End: 2021-05-29 | Stop reason: HOSPADM

## 2021-05-27 RX ORDER — LISINOPRIL 20 MG/1
20 TABLET ORAL EVERY MORNING
Status: DISCONTINUED | OUTPATIENT
Start: 2021-05-28 | End: 2021-05-29 | Stop reason: HOSPADM

## 2021-05-27 RX ORDER — TETRAHYDROZOLINE HCL 0.05 %
1 DROPS OPHTHALMIC (EYE) DAILY PRN
Status: DISCONTINUED | OUTPATIENT
Start: 2021-05-27 | End: 2021-05-29 | Stop reason: HOSPADM

## 2021-05-27 RX ORDER — GABAPENTIN 300 MG/1
300 CAPSULE ORAL AT BEDTIME
Status: DISCONTINUED | OUTPATIENT
Start: 2021-05-27 | End: 2021-05-29 | Stop reason: HOSPADM

## 2021-05-27 RX ORDER — AMLODIPINE AND BENAZEPRIL HYDROCHLORIDE 10; 20 MG/1; MG/1
1 CAPSULE ORAL EVERY MORNING
Status: DISCONTINUED | OUTPATIENT
Start: 2021-05-28 | End: 2021-05-27

## 2021-05-27 RX ORDER — FENTANYL CITRATE 50 UG/ML
INJECTION, SOLUTION INTRAMUSCULAR; INTRAVENOUS PRN
Status: DISCONTINUED | OUTPATIENT
Start: 2021-05-27 | End: 2021-05-27

## 2021-05-27 RX ORDER — UBIDECARENONE 75 MG
100 CAPSULE ORAL EVERY MORNING
Status: DISCONTINUED | OUTPATIENT
Start: 2021-05-28 | End: 2021-05-29 | Stop reason: HOSPADM

## 2021-05-27 RX ORDER — FENTANYL CITRATE 50 UG/ML
25-50 INJECTION, SOLUTION INTRAMUSCULAR; INTRAVENOUS
Status: DISCONTINUED | OUTPATIENT
Start: 2021-05-27 | End: 2021-05-27 | Stop reason: HOSPADM

## 2021-05-27 RX ORDER — EPHEDRINE SULFATE 50 MG/ML
INJECTION, SOLUTION INTRAMUSCULAR; INTRAVENOUS; SUBCUTANEOUS PRN
Status: DISCONTINUED | OUTPATIENT
Start: 2021-05-27 | End: 2021-05-27

## 2021-05-27 RX ORDER — HYDROMORPHONE HCL IN WATER/PF 6 MG/30 ML
0.2 PATIENT CONTROLLED ANALGESIA SYRINGE INTRAVENOUS
Status: DISCONTINUED | OUTPATIENT
Start: 2021-05-27 | End: 2021-05-29 | Stop reason: HOSPADM

## 2021-05-27 RX ORDER — NAPROXEN 500 MG/1
500 TABLET ORAL 2 TIMES DAILY
Status: DISCONTINUED | OUTPATIENT
Start: 2021-05-27 | End: 2021-05-29 | Stop reason: HOSPADM

## 2021-05-27 RX ORDER — AMOXICILLIN 250 MG
1 CAPSULE ORAL 2 TIMES DAILY
Status: DISCONTINUED | OUTPATIENT
Start: 2021-05-27 | End: 2021-05-29 | Stop reason: HOSPADM

## 2021-05-27 RX ORDER — OXYCODONE HYDROCHLORIDE 5 MG/1
5 TABLET ORAL EVERY 4 HOURS PRN
Status: DISCONTINUED | OUTPATIENT
Start: 2021-05-27 | End: 2021-05-29 | Stop reason: HOSPADM

## 2021-05-27 RX ORDER — ONDANSETRON 2 MG/ML
4 INJECTION INTRAMUSCULAR; INTRAVENOUS EVERY 30 MIN PRN
Status: DISCONTINUED | OUTPATIENT
Start: 2021-05-27 | End: 2021-05-27 | Stop reason: HOSPADM

## 2021-05-27 RX ORDER — MULTIVITAMIN,THERAPEUTIC
1 TABLET ORAL EVERY MORNING
Status: DISCONTINUED | OUTPATIENT
Start: 2021-05-28 | End: 2021-05-29 | Stop reason: HOSPADM

## 2021-05-27 RX ORDER — HYDROCORTISONE 25 MG/G
OINTMENT TOPICAL DAILY PRN
Status: DISCONTINUED | OUTPATIENT
Start: 2021-05-27 | End: 2021-05-29 | Stop reason: HOSPADM

## 2021-05-27 RX ORDER — BUPIVACAINE HYDROCHLORIDE AND EPINEPHRINE 2.5; 5 MG/ML; UG/ML
INJECTION, SOLUTION INFILTRATION; PERINEURAL PRN
Status: DISCONTINUED | OUTPATIENT
Start: 2021-05-27 | End: 2021-05-27 | Stop reason: HOSPADM

## 2021-05-27 RX ORDER — PROPOFOL 10 MG/ML
INJECTION, EMULSION INTRAVENOUS PRN
Status: DISCONTINUED | OUTPATIENT
Start: 2021-05-27 | End: 2021-05-27

## 2021-05-27 RX ORDER — POLYETHYLENE GLYCOL 3350 17 G/17G
17 POWDER, FOR SOLUTION ORAL EVERY MORNING
Status: DISCONTINUED | OUTPATIENT
Start: 2021-05-28 | End: 2021-05-27

## 2021-05-27 RX ORDER — GLYCOPYRROLATE 0.2 MG/ML
INJECTION, SOLUTION INTRAMUSCULAR; INTRAVENOUS PRN
Status: DISCONTINUED | OUTPATIENT
Start: 2021-05-27 | End: 2021-05-27

## 2021-05-27 RX ORDER — TRANEXAMIC ACID 10 MG/ML
1000 INJECTION, SOLUTION INTRAVENOUS ONCE
Status: COMPLETED | OUTPATIENT
Start: 2021-05-27 | End: 2021-05-27

## 2021-05-27 RX ORDER — NALOXONE HYDROCHLORIDE 0.4 MG/ML
0.4 INJECTION, SOLUTION INTRAMUSCULAR; INTRAVENOUS; SUBCUTANEOUS
Status: DISCONTINUED | OUTPATIENT
Start: 2021-05-27 | End: 2021-05-27

## 2021-05-27 RX ORDER — ALBUMIN, HUMAN INJ 5% 5 %
SOLUTION INTRAVENOUS CONTINUOUS PRN
Status: DISCONTINUED | OUTPATIENT
Start: 2021-05-27 | End: 2021-05-27

## 2021-05-27 RX ORDER — LIDOCAINE HYDROCHLORIDE 20 MG/ML
INJECTION, SOLUTION INFILTRATION; PERINEURAL PRN
Status: DISCONTINUED | OUTPATIENT
Start: 2021-05-27 | End: 2021-05-27

## 2021-05-27 RX ORDER — OXYCODONE HYDROCHLORIDE 5 MG/1
10 TABLET ORAL EVERY 4 HOURS PRN
Status: DISCONTINUED | OUTPATIENT
Start: 2021-05-27 | End: 2021-05-29 | Stop reason: HOSPADM

## 2021-05-27 RX ORDER — ACETAMINOPHEN 325 MG/1
975 TABLET ORAL ONCE
Status: COMPLETED | OUTPATIENT
Start: 2021-05-27 | End: 2021-05-27

## 2021-05-27 RX ORDER — CEFAZOLIN SODIUM 2 G/100ML
2 INJECTION, SOLUTION INTRAVENOUS SEE ADMIN INSTRUCTIONS
Status: DISCONTINUED | OUTPATIENT
Start: 2021-05-27 | End: 2021-05-27 | Stop reason: HOSPADM

## 2021-05-27 RX ORDER — NALOXONE HYDROCHLORIDE 0.4 MG/ML
0.2 INJECTION, SOLUTION INTRAMUSCULAR; INTRAVENOUS; SUBCUTANEOUS
Status: DISCONTINUED | OUTPATIENT
Start: 2021-05-27 | End: 2021-05-29 | Stop reason: HOSPADM

## 2021-05-27 RX ORDER — HYDROMORPHONE HYDROCHLORIDE 1 MG/ML
0.4 INJECTION, SOLUTION INTRAMUSCULAR; INTRAVENOUS; SUBCUTANEOUS
Status: DISCONTINUED | OUTPATIENT
Start: 2021-05-27 | End: 2021-05-29 | Stop reason: HOSPADM

## 2021-05-27 RX ORDER — SODIUM CHLORIDE, SODIUM LACTATE, POTASSIUM CHLORIDE, CALCIUM CHLORIDE 600; 310; 30; 20 MG/100ML; MG/100ML; MG/100ML; MG/100ML
INJECTION, SOLUTION INTRAVENOUS CONTINUOUS PRN
Status: DISCONTINUED | OUTPATIENT
Start: 2021-05-27 | End: 2021-05-27

## 2021-05-27 RX ORDER — AMLODIPINE BESYLATE 10 MG/1
10 TABLET ORAL EVERY MORNING
Status: DISCONTINUED | OUTPATIENT
Start: 2021-05-28 | End: 2021-05-29 | Stop reason: HOSPADM

## 2021-05-27 RX ORDER — BISACODYL 10 MG
10 SUPPOSITORY, RECTAL RECTAL DAILY PRN
Status: DISCONTINUED | OUTPATIENT
Start: 2021-05-27 | End: 2021-05-29 | Stop reason: HOSPADM

## 2021-05-27 RX ORDER — FENTANYL CITRATE 50 UG/ML
50 INJECTION, SOLUTION INTRAMUSCULAR; INTRAVENOUS
Status: DISCONTINUED | OUTPATIENT
Start: 2021-05-27 | End: 2021-05-27 | Stop reason: HOSPADM

## 2021-05-27 RX ORDER — ONDANSETRON 4 MG/1
4 TABLET, ORALLY DISINTEGRATING ORAL EVERY 6 HOURS PRN
Status: DISCONTINUED | OUTPATIENT
Start: 2021-05-27 | End: 2021-05-29 | Stop reason: HOSPADM

## 2021-05-27 RX ORDER — NEOSTIGMINE METHYLSULFATE 1 MG/ML
VIAL (ML) INJECTION PRN
Status: DISCONTINUED | OUTPATIENT
Start: 2021-05-27 | End: 2021-05-27

## 2021-05-27 RX ORDER — LIDOCAINE HYDROCHLORIDE 20 MG/ML
JELLY TOPICAL PRN
Status: DISCONTINUED | OUTPATIENT
Start: 2021-05-27 | End: 2021-05-27 | Stop reason: HOSPADM

## 2021-05-27 RX ORDER — ACETAMINOPHEN 325 MG/1
975 TABLET ORAL EVERY 8 HOURS
Status: DISCONTINUED | OUTPATIENT
Start: 2021-05-27 | End: 2021-05-29 | Stop reason: HOSPADM

## 2021-05-27 RX ORDER — LIDOCAINE 40 MG/G
CREAM TOPICAL
Status: DISCONTINUED | OUTPATIENT
Start: 2021-05-27 | End: 2021-05-29 | Stop reason: HOSPADM

## 2021-05-27 RX ORDER — TRANEXAMIC ACID 10 MG/ML
1 INJECTION, SOLUTION INTRAVENOUS CONTINUOUS
Status: DISCONTINUED | OUTPATIENT
Start: 2021-05-27 | End: 2021-05-27 | Stop reason: HOSPADM

## 2021-05-27 RX ORDER — HYDROMORPHONE HYDROCHLORIDE 1 MG/ML
.3-.5 INJECTION, SOLUTION INTRAMUSCULAR; INTRAVENOUS; SUBCUTANEOUS EVERY 5 MIN PRN
Status: DISCONTINUED | OUTPATIENT
Start: 2021-05-27 | End: 2021-05-27 | Stop reason: HOSPADM

## 2021-05-27 RX ORDER — METHOCARBAMOL 750 MG/1
750 TABLET, FILM COATED ORAL EVERY 6 HOURS PRN
Status: DISCONTINUED | OUTPATIENT
Start: 2021-05-27 | End: 2021-05-29 | Stop reason: HOSPADM

## 2021-05-27 RX ORDER — KETAMINE HYDROCHLORIDE 10 MG/ML
INJECTION INTRAMUSCULAR; INTRAVENOUS PRN
Status: DISCONTINUED | OUTPATIENT
Start: 2021-05-27 | End: 2021-05-27

## 2021-05-27 RX ORDER — ONDANSETRON 2 MG/ML
4 INJECTION INTRAMUSCULAR; INTRAVENOUS EVERY 6 HOURS PRN
Status: DISCONTINUED | OUTPATIENT
Start: 2021-05-27 | End: 2021-05-29 | Stop reason: HOSPADM

## 2021-05-27 RX ORDER — ACETAMINOPHEN 325 MG/1
650 TABLET ORAL EVERY 4 HOURS PRN
Status: DISCONTINUED | OUTPATIENT
Start: 2021-05-30 | End: 2021-05-29 | Stop reason: HOSPADM

## 2021-05-27 RX ORDER — ONDANSETRON 4 MG/1
4 TABLET, ORALLY DISINTEGRATING ORAL EVERY 30 MIN PRN
Status: DISCONTINUED | OUTPATIENT
Start: 2021-05-27 | End: 2021-05-27 | Stop reason: HOSPADM

## 2021-05-27 RX ORDER — LIDOCAINE 4 G/G
1 PATCH TOPICAL EVERY 24 HOURS
Status: DISCONTINUED | OUTPATIENT
Start: 2021-05-28 | End: 2021-05-29 | Stop reason: HOSPADM

## 2021-05-27 RX ORDER — PROCHLORPERAZINE MALEATE 10 MG
10 TABLET ORAL EVERY 6 HOURS PRN
Status: DISCONTINUED | OUTPATIENT
Start: 2021-05-27 | End: 2021-05-29 | Stop reason: HOSPADM

## 2021-05-27 RX ORDER — LIDOCAINE 40 MG/G
CREAM TOPICAL
Status: DISCONTINUED | OUTPATIENT
Start: 2021-05-27 | End: 2021-05-27 | Stop reason: HOSPADM

## 2021-05-27 RX ORDER — ACETAMINOPHEN 500 MG
1000 TABLET ORAL DAILY PRN
Status: DISCONTINUED | OUTPATIENT
Start: 2021-05-27 | End: 2021-05-27

## 2021-05-27 RX ORDER — DOCUSATE SODIUM 100 MG/1
100 CAPSULE, LIQUID FILLED ORAL 2 TIMES DAILY
Status: DISCONTINUED | OUTPATIENT
Start: 2021-05-27 | End: 2021-05-29 | Stop reason: HOSPADM

## 2021-05-27 RX ORDER — ONDANSETRON 2 MG/ML
INJECTION INTRAMUSCULAR; INTRAVENOUS PRN
Status: DISCONTINUED | OUTPATIENT
Start: 2021-05-27 | End: 2021-05-27

## 2021-05-27 RX ORDER — HYDROXYZINE HYDROCHLORIDE 25 MG/1
25 TABLET, FILM COATED ORAL EVERY 6 HOURS PRN
Status: DISCONTINUED | OUTPATIENT
Start: 2021-05-27 | End: 2021-05-29 | Stop reason: HOSPADM

## 2021-05-27 RX ORDER — POLYETHYLENE GLYCOL 3350 17 G/17G
17 POWDER, FOR SOLUTION ORAL DAILY
Status: DISCONTINUED | OUTPATIENT
Start: 2021-05-28 | End: 2021-05-29 | Stop reason: HOSPADM

## 2021-05-27 RX ORDER — CEFAZOLIN SODIUM 2 G/100ML
2 INJECTION, SOLUTION INTRAVENOUS
Status: DISCONTINUED | OUTPATIENT
Start: 2021-05-27 | End: 2021-05-27 | Stop reason: HOSPADM

## 2021-05-27 RX ORDER — FAMOTIDINE 20 MG/1
20 TABLET, FILM COATED ORAL EVERY EVENING
COMMUNITY

## 2021-05-27 RX ADMIN — Medication 10 MG: at 11:33

## 2021-05-27 RX ADMIN — HYDROMORPHONE HYDROCHLORIDE 0.5 MG: 1 INJECTION, SOLUTION INTRAMUSCULAR; INTRAVENOUS; SUBCUTANEOUS at 14:17

## 2021-05-27 RX ADMIN — OXYCODONE HYDROCHLORIDE 5 MG: 5 TABLET ORAL at 17:19

## 2021-05-27 RX ADMIN — ROCURONIUM BROMIDE 10 MG: 10 INJECTION INTRAVENOUS at 12:01

## 2021-05-27 RX ADMIN — TRANEXAMIC ACID 1 G: 10 INJECTION, SOLUTION INTRAVENOUS at 10:28

## 2021-05-27 RX ADMIN — ROCURONIUM BROMIDE 10 MG: 10 INJECTION INTRAVENOUS at 11:30

## 2021-05-27 RX ADMIN — SODIUM CHLORIDE, POTASSIUM CHLORIDE, SODIUM LACTATE AND CALCIUM CHLORIDE: 600; 310; 30; 20 INJECTION, SOLUTION INTRAVENOUS at 07:53

## 2021-05-27 RX ADMIN — Medication 10 MG: at 10:39

## 2021-05-27 RX ADMIN — MIDAZOLAM 2 MG: 1 INJECTION INTRAMUSCULAR; INTRAVENOUS at 10:04

## 2021-05-27 RX ADMIN — DOCUSATE SODIUM 100 MG: 100 CAPSULE, LIQUID FILLED ORAL at 20:07

## 2021-05-27 RX ADMIN — PHENYLEPHRINE HYDROCHLORIDE 50 MCG: 10 INJECTION INTRAVENOUS at 11:05

## 2021-05-27 RX ADMIN — LIDOCAINE HYDROCHLORIDE 80 MG: 20 INJECTION, SOLUTION INFILTRATION; PERINEURAL at 10:07

## 2021-05-27 RX ADMIN — FENTANYL CITRATE 50 MCG: 0.05 INJECTION, SOLUTION INTRAMUSCULAR; INTRAVENOUS at 13:54

## 2021-05-27 RX ADMIN — HYDROMORPHONE HYDROCHLORIDE 0.3 MG: 1 INJECTION, SOLUTION INTRAMUSCULAR; INTRAVENOUS; SUBCUTANEOUS at 13:10

## 2021-05-27 RX ADMIN — NEOSTIGMINE METHYLSULFATE 5 MG: 1 INJECTION, SOLUTION INTRAVENOUS at 13:10

## 2021-05-27 RX ADMIN — FENTANYL CITRATE 50 MCG: 50 INJECTION, SOLUTION INTRAMUSCULAR; INTRAVENOUS at 09:15

## 2021-05-27 RX ADMIN — GABAPENTIN 300 MG: 300 CAPSULE ORAL at 20:06

## 2021-05-27 RX ADMIN — ONDANSETRON 4 MG: 2 INJECTION INTRAMUSCULAR; INTRAVENOUS at 12:40

## 2021-05-27 RX ADMIN — Medication 30 MG: at 12:38

## 2021-05-27 RX ADMIN — SENNOSIDES AND DOCUSATE SODIUM 1 TABLET: 8.6; 5 TABLET ORAL at 20:06

## 2021-05-27 RX ADMIN — DEXMEDETOMIDINE HYDROCHLORIDE 0.3 MCG/KG/HR: 100 INJECTION, SOLUTION INTRAVENOUS at 10:28

## 2021-05-27 RX ADMIN — TRANEXAMIC ACID 1 MG/KG/HR: 10 INJECTION, SOLUTION INTRAVENOUS at 11:10

## 2021-05-27 RX ADMIN — PROPOFOL 200 MG: 10 INJECTION, EMULSION INTRAVENOUS at 10:07

## 2021-05-27 RX ADMIN — ROCURONIUM BROMIDE 10 MG: 10 INJECTION INTRAVENOUS at 12:20

## 2021-05-27 RX ADMIN — PHENYLEPHRINE HYDROCHLORIDE 0.2 MCG/KG/MIN: 10 INJECTION INTRAVENOUS at 11:15

## 2021-05-27 RX ADMIN — HYDROMORPHONE HYDROCHLORIDE 0.5 MG: 1 INJECTION, SOLUTION INTRAMUSCULAR; INTRAVENOUS; SUBCUTANEOUS at 12:40

## 2021-05-27 RX ADMIN — NAPROXEN 500 MG: 500 TABLET ORAL at 20:07

## 2021-05-27 RX ADMIN — HYDROMORPHONE HYDROCHLORIDE 0.2 MG: 0.2 INJECTION, SOLUTION INTRAMUSCULAR; INTRAVENOUS; SUBCUTANEOUS at 16:23

## 2021-05-27 RX ADMIN — SODIUM CHLORIDE, SODIUM LACTATE, POTASSIUM CHLORIDE, CALCIUM CHLORIDE: 600; 310; 30; 20 INJECTION, SOLUTION INTRAVENOUS at 10:28

## 2021-05-27 RX ADMIN — Medication 5 MG: at 10:19

## 2021-05-27 RX ADMIN — CEFAZOLIN SODIUM 2 G: 2 INJECTION, SOLUTION INTRAVENOUS at 10:26

## 2021-05-27 RX ADMIN — ALBUMIN HUMAN: 0.05 INJECTION, SOLUTION INTRAVENOUS at 11:24

## 2021-05-27 RX ADMIN — ACETAMINOPHEN 975 MG: 325 TABLET, FILM COATED ORAL at 08:09

## 2021-05-27 RX ADMIN — PHENYLEPHRINE HYDROCHLORIDE 50 MCG: 10 INJECTION INTRAVENOUS at 12:09

## 2021-05-27 RX ADMIN — ROCURONIUM BROMIDE 50 MG: 10 INJECTION INTRAVENOUS at 10:07

## 2021-05-27 RX ADMIN — HYDROMORPHONE HYDROCHLORIDE 0.2 MG: 0.2 INJECTION, SOLUTION INTRAMUSCULAR; INTRAVENOUS; SUBCUTANEOUS at 18:13

## 2021-05-27 RX ADMIN — METHOCARBAMOL 750 MG: 750 TABLET ORAL at 20:07

## 2021-05-27 RX ADMIN — HYDROMORPHONE HYDROCHLORIDE 0.5 MG: 1 INJECTION, SOLUTION INTRAMUSCULAR; INTRAVENOUS; SUBCUTANEOUS at 14:37

## 2021-05-27 RX ADMIN — PHENYLEPHRINE HYDROCHLORIDE 50 MCG: 10 INJECTION INTRAVENOUS at 12:00

## 2021-05-27 RX ADMIN — GLYCOPYRROLATE 0.2 MG: 0.2 INJECTION, SOLUTION INTRAMUSCULAR; INTRAVENOUS at 11:35

## 2021-05-27 RX ADMIN — Medication 5 MG: at 10:35

## 2021-05-27 RX ADMIN — SODIUM CHLORIDE: 9 INJECTION, SOLUTION INTRAVENOUS at 16:05

## 2021-05-27 RX ADMIN — ACETAMINOPHEN 975 MG: 325 TABLET, FILM COATED ORAL at 16:23

## 2021-05-27 RX ADMIN — PROPOFOL 20 MCG/KG/MIN: 10 INJECTION, EMULSION INTRAVENOUS at 10:28

## 2021-05-27 RX ADMIN — GLYCOPYRROLATE 0.8 MG: 0.2 INJECTION, SOLUTION INTRAMUSCULAR; INTRAVENOUS at 13:10

## 2021-05-27 RX ADMIN — Medication 5 MG: at 10:51

## 2021-05-27 RX ADMIN — FENTANYL CITRATE 100 MCG: 50 INJECTION, SOLUTION INTRAMUSCULAR; INTRAVENOUS at 10:07

## 2021-05-27 RX ADMIN — HYDROMORPHONE HYDROCHLORIDE 0.4 MG: 1 INJECTION, SOLUTION INTRAMUSCULAR; INTRAVENOUS; SUBCUTANEOUS at 20:06

## 2021-05-27 RX ADMIN — SODIUM CHLORIDE, POTASSIUM CHLORIDE, SODIUM LACTATE AND CALCIUM CHLORIDE: 600; 310; 30; 20 INJECTION, SOLUTION INTRAVENOUS at 12:42

## 2021-05-27 RX ADMIN — ROCURONIUM BROMIDE 20 MG: 10 INJECTION INTRAVENOUS at 10:46

## 2021-05-27 ASSESSMENT — MIFFLIN-ST. JEOR: SCORE: 1715.79

## 2021-05-27 ASSESSMENT — ACTIVITIES OF DAILY LIVING (ADL)
ADLS_ACUITY_SCORE: 13
ADLS_ACUITY_SCORE: 13

## 2021-05-27 NOTE — ANESTHESIA CARE TRANSFER NOTE
Patient: Neema Hunt    Procedure(s):  Lumbar 5-Sacral 1 posterior segemental instrumentation, bilateral decompression and transforaminal interbody fusion using local autograft and allograft cancellous chips. Revision of Lumbar 2-4 ian. Posterior arthrodesis Lumbar 5-Sacral 1 using  local autograft and allograft cancellous chips    Diagnosis: Spinal stenosis of lumbar region with radiculopathy [M48.061, M54.16]  Diagnosis Additional Information: No value filed.    Anesthesia Type:   General     Note:    Oropharynx: oropharynx clear of all foreign objects  Level of Consciousness: awake and drowsy  Oxygen Supplementation: face mask  Level of Supplemental Oxygen (L/min / FiO2): 6  Independent Airway: airway patency satisfactory and stable  Dentition: dentition unchanged  Vital Signs Stable: post-procedure vital signs reviewed and stable  Report to RN Given: handoff report given  Patient transferred to: PACU  Comments: To PACU: Arouses easily, good airway, 02 face mask, VSS  Follows simple commands & moves all extremities  Report to RN  Handoff Report: Identifed the Patient, Identified the Reponsible Provider, Reviewed the pertinent medical history, Discussed the surgical course, Reviewed Intra-OP anesthesia mangement and issues during anesthesia, Set expectations for post-procedure period and Allowed opportunity for questions and acknowledgement of understanding      Vitals: (Last set prior to Anesthesia Care Transfer)  CRNA VITALS  5/27/2021 1256 - 5/27/2021 1334      5/27/2021             NIBP:  137/85    NIBP Mean:  104        Electronically Signed By: NADIR Delaney CRNA  May 27, 2021  1:34 PM

## 2021-05-27 NOTE — ANESTHESIA PROCEDURE NOTES
Airway       Patient location during procedure: OR       Procedure Start/Stop Times: 5/27/2021 10:10 AM  Staff -        Anesthesiologist:  Chris Fernandez DO       CRNA: Marilou Bray APRN CRNA       Other Anesthesia Staff: Jimy Lynn       Performed By: EM  Consent for Airway        Urgency: elective  Indications and Patient Condition       Indications for airway management: rubén-procedural       Induction type:intravenous       Mask difficulty assessment: 1 - vent by mask    Final Airway Details       Final airway type: endotracheal airway       Successful airway: ETT - single and Oral  Endotracheal Airway Details        ETT size (mm): 8.0       Cuffed: yes       Successful intubation technique: direct laryngoscopy       DL Blade Type: MAC 4       Grade View of Cords: 2       Adjucts: stylet       Position: Left       Measured from: gums/teeth       Secured at (cm): 24       Bite block used: Soft    Post intubation assessment        Placement verified by: capnometry, equal breath sounds and chest rise        Number of attempts at approach: 1       Number of other approaches attempted: 0       Secured with: pink tape       Ease of procedure: easy       Dentition: Intact and Unchanged    Medication(s) Administered   Medication Administration Time: 5/27/2021 10:10 AM

## 2021-05-27 NOTE — PROGRESS NOTES
SPIRITUAL HEALTH SERVICES  [facility] Pre-Op  PRE-SURGICAL VISIT    Had pre-surgical visit with patient.  Provided spiritual support and prayer.     Linnea Deal  Associate   [phone] (pager)  [phone] (office)

## 2021-05-27 NOTE — PLAN OF CARE
Pt here with L5-S1 fusion POD 0. A&Ox4. CMS intact except numbness to RUE and RLE. VSS. Clear liquid diet, thin liquids. Takes pills whole. Bowel sounds hypoactive, has not passed gas. Bedrest this shift. Pain controlled with IV Dilaudid x2, Oxycodone x1. Hill patent with good output. Incision marked, no extension. Pt scoring green on the Aggression Stop Light Tool. Plan mobilize. Discharge pending.

## 2021-05-27 NOTE — BRIEF OP NOTE
[facility]    Brief Operative Note    Pre-operative diagnosis: Spinal stenosis of lumbar region with radiculopathy [M48.061, M54.16]  Post-operative diagnosis Same as pre-operative diagnosis    Procedure: Procedure(s):  Lumbar 5-Sacral 1 posterior segemental instrumentation, bilateral decompression and transforaminal interbody fusion using local autograft and allograft cancellous chips. Revision of Lumbar 2-4 ian. Posterior arthrodesis Lumbar 5-Sacral 1 using  local autograft and allograft cancellous chips  Surgeon: Surgeon(s) and Role:     * Nhan Marshall MD - Primary     * Trinh Deluna PA-C - Assisting     * Prasanna Oliva PA-C - Assisting  Anesthesia: General   Estimated blood loss: 650ml  Drains: None  Specimens: * No specimens in log *  Findings:   None.  Complications: None.  Implants:   Implant Name Type Inv. Item Serial No.  Lot No. LRB No. Used Action   IMP SCR MEDT 5.5/6.0MM SOLERA 7.5X45MM MA [id number] - [id number] Metallic Hardware/[place] IMP SCR MEDT 5.5/6.0MM SOLERA 7.5X45MM MA [id number]  MEDTRONIC INC 41 06 12MAY 2021 N/A 3 Implanted   IMP SCR MEDT 5.5/6.0MM SOLERA 6.5X45MM MA [id number] - [id number] Metallic Hardware/[place] IMP SCR MEDT 5.5/6.0MM SOLERA 6.5X45MM MA [id number]  [employer] 41 06 12MAY 2021 N/A 1 Implanted   IMP SPACER MEDT CAPSTONE CONTROL [id number] 18DEG [id number] - [id number] Metallic Hardware/[place] IMP SPACER MEDT CAPSTONE CONTROL [id number] 18DEG [id number]  MED[employer] [id number] N/A 1 Wasted   IMP SPACER MEDT CAPSTONE CONTROL [id number] 18DEG [id number] - [id number] Metallic Hardware/[place] IMP SPACER MEDT CAPSTONE CONTROL [id number] 18DEG [id number]  [employer] INC [id number] N/A 1 Wasted   IMP SPACER MEDT CAPSTONE CONTROL [id number] 18DEG [id number] - [id number] Metallic Hardware/[place] IMP SPACER MEDT CAPSTONE CONTROL [id number] 18DEG 1626897  [employer] [id number] N/A 1 Implanted   IMP SPACER MEDT CAPSTONE CONTROL [id number] 18DEG [id number]  - [id number] Metallic Hardware/[place] IMP SPACER MEDT CAPSTONE CONTROL [id number] 18DEG 3654509  [employer] INC [id number] N/A 1 Implanted   GRAFT BONE CRUSH CANC 30ML [id number] - [id number] Bone/Tissue/Biologic GRAFT BONE CRUSH CANC 30ML 201540 [id number] MUSCULOSKELETAL [place]  N/A 1 Implanted   IMP SCR SET MEDT SOLERA BREAK OFF 5.5MM TI [id number] - [id number] Metallic Hardware/[place] IMP SCR SET MEDT SOLERA BREAK OFF 5.5MM TI [id number]  [employer] 41 09, [id number] N/A 10 Implanted   IMP GILL MEDT LONGITUDE II STR [id number] - [id number] Metallic Hardware/[place] IMP GILL MEDT LONGITUDE II STR 5.9M827NG [id number]  [employer] 41 09, [id number] N/A 1 Implanted   IMP SCR SET MEDT SOLERA BREAK OFF 5.5MM TI [id number] Metallic Hardware/[place] IMP SCR SET MEDT SOLERA BREAK OFF 5.5MM TI [id number]  [employer] LOAD 41 04 [id number] N/A 6 Explanted   IMP GILL MEDT SOLERA CVD 5.5X80MM CHR [id number] Metallic Hardware/[place] IMP GILL MEDT SOLERA CVD 5.5X80MM CHR [id number]  [employer] LOAD 41 04 [id number] N/A 2 Explanted   IMP GILL MEDT LONGITUDE II STR 5.6F337YI [id number] - [id number] Metallic Hardware/[place] IMP GILL MEDT LONGITUDE II STR 5.4S357HH [id number]  [employer] 41 09, [id number] N/A 1 Implanted     [id number]

## 2021-05-27 NOTE — ANESTHESIA PREPROCEDURE EVALUATION
Anesthesia Pre-Procedure Evaluation    Patient: Neema Hunt   MRN: [id number] : 1965        Preoperative Diagnosis: Spinal stenosis of lumbar region with radiculopathy [M48.061, M54.16]   Procedure : Procedure(s):  Lumbar 5-Sacral 1 posterior segemental instrumentation, bilateral decompression and transforaminal interbody fusion using local autograft and allograft cancellous chips. Revision of Lumbar 2-4 ian. Posterior arthrodesis Lumbar 5-Sacral 1 using  local autograft and allograft cancellous chips     Past Medical History:   Diagnosis Date     Arthritis      Back pain      Gastro-oesophageal reflux disease      Hypertension      Radiculopathy      Scoliosis       Past Surgical History:   Procedure Laterality Date     DISCECTOMY LUMBAR POSTERIOR MICROSCOPIC ONE LEVEL Right 7/15/2020    Procedure: Right L5-S1 foraminotomy and microdiskectomy;  Surgeon: Nhan Marshall MD;  Location: [facility] OR     EXPLORE SPINE, REMOVE HARDWARE, COMBINED N/A 2019    Procedure: REMOVAL LUMBAR L3-5 INSTRUMENTATION;  Surgeon: Nhan Marshall MD;  Location: [facility] OR     FUSION SPINE ANTERIOR MINIMALLY INVASIVE TWO LEVELS N/A 2016    Procedure: FUSION SPINE ANTERIOR MINIMALLY INVASIVE TWO LEVELS;  Surgeon: Nhan Marshall MD;  Location: [facility] OR     HERNIA REPAIR      right inguinal hernia     LAMINECTOMY LUMBAR POSTERIOR MICROSCOPIC ONE LEVEL  2013    Procedure: LAMINECTOMY LUMBAR POSTERIOR MICROSCOPIC ONE LEVEL;  Right Lumbar 3-4 Micro Laminectomy & Foraminotomy;  Surgeon: Nhan Marshall MD;  Location: [facility] OR     OPTICAL TRACKING SYSTEM FUSION SPINE POSTERIOR LUMBAR PERCUTANEOUS TWO LEVELS N/A 2016    Procedure: OPTICAL TRACKING SYSTEM FUSION SPINE POSTERIOR LUMBAR PERCUTANEOUS TWO LEVELS;  Surgeon: Nhan Marshall MD;  Location: [facility] OR     OPTICAL TRACKING SYSTEM FUSION SPINE POSTERIOR LUMBAR THREE+ LEVELS Right 2019    Procedure: POSTERIOR SEGMENTAL INSTRUMENTATION L2-4, RIGHT  LUMBAR 2-3 DISCECTOMY AND TRANSFORAMINAL INTERBODY FUSION, REDO DECOMPRESSION RIGHT L3-4, POSTERIOR ARTHRODESIS L2-4;  Surgeon: Nhan Marshall MD;  Location: [facility] OR     ORTHOPEDIC SURGERY      left ankle, right finger      No Known Allergies   Social History     Tobacco Use     Smoking status: Former Smoker     Smokeless tobacco: Never Used   Substance Use Topics     Alcohol use: Yes     Comment: 1-2 drinks/week      Wt Readings from Last 1 Encounters:   04/23/21 87.1 kg (192 lb)        Anesthesia Evaluation            ROS/MED HX  ENT/Pulmonary:  - neg pulmonary ROS  (-) sleep apnea   Neurologic: Comment: Continued radiculopathy s/p lumbar fusion      Cardiovascular:     (+) hypertension-----    METS/Exercise Tolerance:     Hematologic: Comments: H/o elevated PLT count    (+) anemia,     Musculoskeletal: Comment: S/p lumbar fusion      GI/Hepatic:     (+) GERD,     Renal/Genitourinary:  - neg Renal ROS     Endo:  - neg endo ROS     Psychiatric/Substance Use:       Infectious Disease:       Malignancy:       Other:            Physical Exam    Airway  airway exam normal      Mallampati: II   TM distance: > 3 FB   Neck ROM: full   Mouth opening: > 3 cm    Respiratory Devices and Support         Dental  no notable dental history         Cardiovascular   cardiovascular exam normal          Pulmonary                   OUTSIDE LABS:  CBC:   Lab Results   Component Value Date    WBC 10.2 09/11/2019    WBC 9.1 09/08/2019    HGB 12.4 (L) 07/15/2020    HGB 8.6 (L) 09/11/2019    HCT 25.1 (L) 09/11/2019    HCT 26.6 (L) 09/08/2019     09/11/2019     09/08/2019     BMP:   Lab Results   Component Value Date     09/11/2019     (L) 09/08/2019    POTASSIUM 3.5 07/15/2020    POTASSIUM 4.3 09/11/2019    CHLORIDE 102 09/11/2019    CHLORIDE 97 09/08/2019    CO2 29 09/11/2019    CO2 31 09/08/2019    BUN 10 09/11/2019    BUN 8 09/08/2019    CR 0.77 07/15/2020    CR 0.78 09/11/2019     (H) 09/11/2019      (H) 09/08/2019     COAGS:   Lab Results   Component Value Date    PTT 30 04/09/2013    INR 1.13 11/09/2016     POC:   Lab Results   Component Value Date     (H) 09/07/2019     HEPATIC:   Lab Results   Component Value Date    ALBUMIN 2.9 (L) 09/11/2019    PROTTOTAL 8.4 09/11/2019    ALT 26 09/11/2019    AST 22 09/11/2019    ALKPHOS 74 09/11/2019    BILITOTAL 0.4 09/11/2019     OTHER:   Lab Results   Component Value Date    LACT 0.8 09/11/2019    JASON 9.0 09/11/2019    LIPASE 108 09/11/2019       Anesthesia Plan    ASA Status:  2   NPO Status:  NPO Appropriate    Anesthesia Type: General.     - Airway: ETT   Induction: Intravenous, Propofol.   Maintenance: Balanced.        Consents    Anesthesia Plan(s) and associated risks, benefits, and realistic alternatives discussed. Questions answered and patient/representative(s) expressed understanding.     - Discussed with:  Patient         Postoperative Care    Pain management: IV analgesics.   PONV prophylaxis: Ondansetron (or other 5HT-3)     Comments:                Chris Fernandez, DO, DO

## 2021-05-27 NOTE — PHARMACY
"The following home medications were NOT continued on inpatient admission per \"Discontinuation of nonessential home medications during hospitalization\" policy: Airborne Chewable, Soluble Fiber Chews    If a therapeutic holiday is deemed inappropriate per the prescriber, please notify the pharmacist regarding the medication order.    The pharmacist is available to answer any questions and/or concerns the patient may have regarding discontinuation of non-essential medications.    Please ensure that these medications are restarted as needed upon discharge via the medication reconciliation discharge process and included on the discharge medication reconciliation report.    Thank you,  Shobha Maxwell, MONE    "

## 2021-05-27 NOTE — OR NURSING
During pre-surgery brief, the patient expressed concern about catheter placement. He states that in the past, following catheter placement for previous surgeries, he has had blood in his urine and been passing clots after the surgery. This has been painful and delayed his discharge from the hospital. He asked Dr. Marshall if a urologist could place his catheter for this surgery.     He also has concerns around pain control. During a previous surgery, he wasn't given enough pain medication to last a whole week. His insurance wouldn't fill another prescription from his surgeon unless it had been at least a week since his last prescription refill.     Dr. Marshall paged to pre-op. No urologist is available for catheter placement. Dr. Marshall stated he would place the patient's catheter (14 Fr. Coude tip) and use Lidocaine jelly. I also reminded Dr. Marshall and TYSON Guardado about the patient's concerns around pain control.

## 2021-05-27 NOTE — OP NOTE
Procedure Date: 05/27/2021    PREOPERATIVE DIAGNOSES:    1.  L5-S1 bilateral foraminal stenosis with radiculopathy.  2.  History of previous L2-L5 fusion.    POSTOPERATIVE DIAGNOSES:    1.  L5-S1 bilateral foraminal stenosis with radiculopathy.  2.  History of previous L2-L5 fusion.    PROCEDURES:    1.  L5-S1 bilateral posterior segmental instrumentation using Medtronic Solera system.  2.  Bilateral L5-S1 decompression, transforaminal interbody fusion using Capstone control cages and local autograft.  3.  Revision of L2-L4 ian.  4.  Posterior arthrodesis, L5-S1 using local autograft and allograft cancellous chips.    SURGEON:  Nhan Marshall MD    ASSISTANT:  Prasanna Oliva PA-C    ANESTHESIA:  General endotracheal anesthesia.    ESTIMATED BLOOD LOSS: 650 mL    INDICATIONS FOR PROCEDURE:  The patient is a 56-year-old male with a history of multiple previous lumbar fusions with symptomatic degeneration at L5-S1, particularly foraminal stenosis bilaterally.  He was brought for extension of his fusion down to the sacrum, interbody fusion and decompression. Please note that Prasanna Oliva PA-C's assistance was needed for positioning, retraction, suctioning, and closure.     DESCRIPTION OF PROCEDURE:  The patient was brought to the operating room.  General endotracheal anesthesia was induced.  The patient was rolled in the prone position on the four poster bed.  The back was prepped and draped in sterile fashion.  Midline exposure was performed of the previous instrumentation down to the sacrum.  Once this was done, a right posterior superior iliac spine Schanz pin was placed and the [employer] robot was attached to the patient and the navigation was registered. Using neuronavigation, bilateral pedicle screws were placed using the Medtronic Solera system at L5 and S1 under robotic guidance.  Once these were confirmed in good position with fluoroscopy then the robot was removed and bilateral facetectomies, total  facetectomies, were performed, a wheeled lamina  was used to distract the disc spaces and the disc spaces were opened bilaterally and turn and rotate distractors were used to distract the disc spaces.  Eventually, a bilateral 13 x 22 x 18 mm degree Capstone control cages were placed. These were packed with local autograft and allograft and local autograft was also placed in the space between the cages.  Once these were confirmed to be in good position the previous rods were removed from L2 -L4 and a new ian was seated from L2-S1.  After being appropriately contoured and secured compression was applied across L5-S1.  The wound was copiously irrigated.  The posterior elements were decorticated at L5-S1 and a combination of allograft cancellous chips and local autograft placed across the posterior elements there.  Fascia was closed with 0 Vicryl, 2-0 inverted interrupted Vicryl was used in subcutaneous layer, running 3-0 nylon was used for skin.  Sterile dressing was applied.  The patient was awakened, extubated, and taken to the recovery room in good condition.    Nhan Marshall MD        D: 2021   T: 2021   MT: [id number]/[id number]    Name:     MISSY GUAN  MRN:      [id number]        Account:        [id number]   :      1965           Procedure Date: 2021     Document: [id number]

## 2021-05-27 NOTE — PROGRESS NOTES
Pt would like a urologist to place the fowler catheter due to troubles with catheters in the past; history of blood clots in urine per patient.     Pt would also like to speak to the physician prescribing pain medications; has run out in the past, insurance wouldn't cover the refill.

## 2021-05-27 NOTE — ANESTHESIA POSTPROCEDURE EVALUATION
Patient: Neema Hunt    Procedure(s):  Lumbar 5-Sacral 1 posterior segemental instrumentation, bilateral decompression and transforaminal interbody fusion using local autograft and allograft cancellous chips. Revision of Lumbar 2-4 ian. Posterior arthrodesis Lumbar 5-Sacral 1 using  local autograft and allograft cancellous chips    Diagnosis:Spinal stenosis of lumbar region with radiculopathy [M48.061, M54.16]  Diagnosis Additional Information: No value filed.    Anesthesia Type:  General    Note:  Disposition: Admission   Postop Pain Control: Uneventful            Sign Out: Well controlled pain   PONV: No   Neuro/Psych: Uneventful            Sign Out: Acceptable/Baseline neuro status   Airway/Respiratory: Uneventful            Sign Out: Acceptable/Baseline resp. status   CV/Hemodynamics: Uneventful            Sign Out: Acceptable CV status; No obvious hypovolemia; No obvious fluid overload   Other NRE: NONE   DID A NON-ROUTINE EVENT OCCUR? No           Last vitals:  Vitals:    05/27/21 1500 05/27/21 1510 05/27/21 1521   BP: 112/74 114/82    Pulse: 66 56    Resp: 14 12    Temp:      SpO2: 100% 95% 98%       Last vitals prior to Anesthesia Care Transfer:  CRNA VITALS  5/27/2021 1256 - 5/27/2021 1356      5/27/2021             SpO2:  100 %          Electronically Signed By: Chris Fernandez DO, DO  May 27, 2021  3:26 PM

## 2021-05-27 NOTE — OR NURSING
Dr. Fernandez approved pt to Tx to station 73.  A&Ox4, neuros intact, Resp: 2L NC--lungs clear, Tele: SR/SB--no ectopy, GI/: fowler good UOP.  50mcg fentanyl and 0.5mg dilaudid x2 for pain control.  Dressing drainage marked.  Wife Delphine updated.

## 2021-05-28 ENCOUNTER — APPOINTMENT (OUTPATIENT)
Dept: PHYSICAL THERAPY | Facility: CLINIC | Age: 56
DRG: 455 | End: 2021-05-28
Attending: PHYSICIAN ASSISTANT
Payer: COMMERCIAL

## 2021-05-28 ENCOUNTER — APPOINTMENT (OUTPATIENT)
Dept: GENERAL RADIOLOGY | Facility: CLINIC | Age: 56
DRG: 455 | End: 2021-05-28
Attending: PHYSICIAN ASSISTANT
Payer: COMMERCIAL

## 2021-05-28 LAB — HGB BLD-MCNC: 10.8 G/DL (ref 13.3–17.7)

## 2021-05-28 PROCEDURE — 250N000013 HC RX MED GY IP 250 OP 250 PS 637: Performed by: NEUROLOGICAL SURGERY

## 2021-05-28 PROCEDURE — 97161 PT EVAL LOW COMPLEX 20 MIN: CPT | Mod: GP

## 2021-05-28 PROCEDURE — 120N000001 HC R&B MED SURG/OB

## 2021-05-28 PROCEDURE — 72100 X-RAY EXAM L-S SPINE 2/3 VWS: CPT

## 2021-05-28 PROCEDURE — 97116 GAIT TRAINING THERAPY: CPT | Mod: GP

## 2021-05-28 PROCEDURE — 250N000013 HC RX MED GY IP 250 OP 250 PS 637: Performed by: PHYSICIAN ASSISTANT

## 2021-05-28 PROCEDURE — 36415 COLL VENOUS BLD VENIPUNCTURE: CPT | Performed by: PHYSICIAN ASSISTANT

## 2021-05-28 PROCEDURE — 97530 THERAPEUTIC ACTIVITIES: CPT | Mod: GP

## 2021-05-28 PROCEDURE — 85018 HEMOGLOBIN: CPT | Performed by: PHYSICIAN ASSISTANT

## 2021-05-28 RX ADMIN — OXYCODONE HYDROCHLORIDE 10 MG: 5 TABLET ORAL at 00:15

## 2021-05-28 RX ADMIN — HYDROXYZINE HYDROCHLORIDE 25 MG: 25 TABLET, FILM COATED ORAL at 04:41

## 2021-05-28 RX ADMIN — LISINOPRIL 20 MG: 20 TABLET ORAL at 08:34

## 2021-05-28 RX ADMIN — ACETAMINOPHEN 975 MG: 325 TABLET, FILM COATED ORAL at 00:02

## 2021-05-28 RX ADMIN — OXYCODONE HYDROCHLORIDE 10 MG: 5 TABLET ORAL at 12:47

## 2021-05-28 RX ADMIN — DOCUSATE SODIUM 100 MG: 100 CAPSULE, LIQUID FILLED ORAL at 20:12

## 2021-05-28 RX ADMIN — GABAPENTIN 300 MG: 300 CAPSULE ORAL at 22:02

## 2021-05-28 RX ADMIN — DOCUSATE SODIUM 100 MG: 100 CAPSULE, LIQUID FILLED ORAL at 08:34

## 2021-05-28 RX ADMIN — LIDOCAINE 1 PATCH: 560 PATCH PERCUTANEOUS; TOPICAL; TRANSDERMAL at 08:34

## 2021-05-28 RX ADMIN — SENNOSIDES AND DOCUSATE SODIUM 1 TABLET: 8.6; 5 TABLET ORAL at 08:34

## 2021-05-28 RX ADMIN — THERA TABS 1 TABLET: TAB at 08:34

## 2021-05-28 RX ADMIN — Medication 100 MCG: at 08:34

## 2021-05-28 RX ADMIN — OXYCODONE HYDROCHLORIDE 5 MG: 5 TABLET ORAL at 22:01

## 2021-05-28 RX ADMIN — Medication 1 LOZENGE: at 22:35

## 2021-05-28 RX ADMIN — METHOCARBAMOL 750 MG: 750 TABLET ORAL at 11:25

## 2021-05-28 RX ADMIN — METHOCARBAMOL 750 MG: 750 TABLET ORAL at 22:01

## 2021-05-28 RX ADMIN — AMLODIPINE BESYLATE 10 MG: 10 TABLET ORAL at 08:34

## 2021-05-28 RX ADMIN — OXYCODONE HYDROCHLORIDE 10 MG: 5 TABLET ORAL at 17:57

## 2021-05-28 RX ADMIN — NAPROXEN 500 MG: 500 TABLET ORAL at 08:34

## 2021-05-28 RX ADMIN — ACETAMINOPHEN 975 MG: 325 TABLET, FILM COATED ORAL at 08:34

## 2021-05-28 RX ADMIN — METHOCARBAMOL 750 MG: 750 TABLET ORAL at 04:41

## 2021-05-28 RX ADMIN — ACETAMINOPHEN 975 MG: 325 TABLET, FILM COATED ORAL at 15:55

## 2021-05-28 RX ADMIN — SENNOSIDES AND DOCUSATE SODIUM 1 TABLET: 8.6; 5 TABLET ORAL at 20:12

## 2021-05-28 RX ADMIN — OXYCODONE HYDROCHLORIDE 10 MG: 5 TABLET ORAL at 08:40

## 2021-05-28 RX ADMIN — FAMOTIDINE 20 MG: 20 TABLET ORAL at 20:12

## 2021-05-28 RX ADMIN — POLYETHYLENE GLYCOL 3350 17 G: 17 POWDER, FOR SOLUTION ORAL at 08:34

## 2021-05-28 RX ADMIN — OXYCODONE HYDROCHLORIDE 10 MG: 5 TABLET ORAL at 04:41

## 2021-05-28 RX ADMIN — NAPROXEN 500 MG: 500 TABLET ORAL at 20:12

## 2021-05-28 ASSESSMENT — ACTIVITIES OF DAILY LIVING (ADL)
ADLS_ACUITY_SCORE: 13

## 2021-05-28 NOTE — PLAN OF CARE
POD1 L5-S1 fusion. A&Ox4. VSS on RA. C/o of pain controlled w/ oxycodone and robixan. CMS intact. Lidocaine patch in place on RLE. Incision dressing CDI dressing reinforced. Bowel sounds normoactive and +flatus. Hill was removed due to void. Regular diet tolerating well. SBA in the halls/ind in room. Plan: possible discharge 5/29

## 2021-05-28 NOTE — PROGRESS NOTES
05/28/21 1045   Quick Adds   Type of Visit Initial PT Evaluation   Living Environment   People in home spouse   Current Living Arrangements house   Home Accessibility stairs to enter home;stairs within home   Number of Stairs, Main Entrance 2   Stair Railings, Main Entrance none   Number of Stairs, Within Home, Primary 10  (flight of stairs to basement)   Stair Railings, Within Home, Primary railing on left side (ascending)   Transportation Anticipated family or friend will provide   Living Environment Comments All needs can be met on first level   Self-Care   Usual Activity Tolerance moderate   Current Activity Tolerance fair   Regular Exercise Yes   Activity/Exercise Type walking   Exercise Amount/Frequency 3-5 times/wk   Equipment Currently Used at Home none   Disability/Function   Hearing Difficulty or Deaf no   Wear Glasses or Blind no   Concentrating, Remembering or Making Decisions Difficulty no   Difficulty Communicating no   Difficulty Eating/Swallowing no   Walking or Climbing Stairs Difficulty no   Dressing/Bathing Difficulty no   Toileting issues no   Doing Errands Independently Difficulty (such as shopping) no   Fall history within last six months no   Change in Functional Status Since Onset of Current Illness/Injury yes   General Information   Onset of Illness/Injury or Date of Surgery 05/27/21   Referring Physician Nhan Marshall MD   Patient/Family Therapy Goals Statement (PT) Return home and get back to daily routine   Pertinent History of Current Problem (include personal factors and/or comorbidities that impact the POC) Per chart, pt is a 56-year-old male with a history of multiple previous lumbar fusions with symptomatic degeneration at L5-S1, particularly foraminal stenosis bilaterally.  He was brought for extension of his fusion down to the sacrum, interbody fusion and decompression. PMH signifincant for HTN, anemia, and GERD. Pt is POD 1 s/p L5-S1 fusion.    Existing  Precautions/Restrictions spinal   Cognition   Orientation Status (Cognition) oriented x 4   Affect/Mental Status (Cognition) WNL   Follows Commands (Cognition) WNL   Pain Assessment   Patient Currently in Pain Yes, see Vital Sign flowsheet  (reporting 4/10 along incision site)   Integumentary/Edema   Integumentary/Edema Comments Incision site draining blood once ambulating, requiring dressing change from RN   Posture    Posture Not impaired   Range of Motion (ROM)   ROM Quick Adds ROM WFL   Strength   Manual Muscle Testing Quick Adds Deficits observed during functional mobility   Strength Comments Generalized weakness    Bed Mobility   Comment (Bed Mobility) SBA w/supine>sit using logroll technique   Transfers   Transfer Safety Comments STS CGA no AD   Gait/Stairs (Locomotion)   Comment (Gait/Stairs) Pt amb x 80' + 200' SBA-CGA w/o AD, one minor LOB when turning head. ASc/desc x 3 stairs x 3 bouts SBA, initially B railing and on last out w/one railing.    Balance   Balance Comments Balance impaired, tali dynamic, e.g. w/head turns   Sensory Examination   Sensory Perception Comments Pt w/some numbness/tingling in palmar aspect of R hand and dorsal aspect of L foot; reports is much improved from pre-op state   Coordination   Coordination no deficits were identified   Muscle Tone   Muscle Tone no deficits were identified   Clinical Impression   Criteria for Skilled Therapeutic Intervention yes, treatment indicated   PT Diagnosis (PT) Impaired mobility   Influenced by the following impairments pain, generalized weakness, impaired balance   Functional limitations due to impairments s/p spinal fusion   Clinical Presentation Stable/Uncomplicated   Clinical Presentation Rationale Pt presentation and clinical judgement   Clinical Decision Making (Complexity) low complexity   Therapy Frequency (PT) Daily   Predicted Duration of Therapy Intervention (days/wks) 3 days   Planned Therapy Interventions (PT) balance training;bed  mobility training;gait training;home exercise program;neuromuscular re-education;patient/family education;ROM (range of motion);strengthening;stair training;transfer training   Risk & Benefits of therapy have been explained evaluation/treatment results reviewed;care plan/treatment goals reviewed;risks/benefits reviewed;current/potential barriers reviewed;participants voiced agreement with care plan;participants included;patient   PT Discharge Planning    PT Discharge Recommendation (DC Rec) home with assist   PT Rationale for DC Rec Anticipate pt will be safe to return home w/assist from spouse. Pt is able to stay on one level once in home (2STE) but demonstrates safety w/amb w/o AD and stair training. Pt demonstrates awareness and maintenance of spinal precautions.    PT Brief overview of current status  grossly SBA for bed mobility, transfers, and gait. Occasional CGA for STS from low chair or balance during amb   Total Evaluation Time   Total Evaluation Time (Minutes) 10

## 2021-05-28 NOTE — PLAN OF CARE
Up A1 GB&W. Pt did not get OOB this shift. Log-rolled a few times with moderate discomfort. A&Ox4. VSS RA. BLE tingling toes, improving. Incision reinforced with ABD x1, CDI. Pain managed with oxycodone, robaxin, and atarax. Hill patent with AUO. Tolerating full liq diet, advanced to reg this morning. BS hypo, +gas. Plan for PT today. Discharge pending progress.

## 2021-05-29 ENCOUNTER — APPOINTMENT (OUTPATIENT)
Dept: OCCUPATIONAL THERAPY | Facility: CLINIC | Age: 56
DRG: 455 | End: 2021-05-29
Attending: PHYSICIAN ASSISTANT
Payer: COMMERCIAL

## 2021-05-29 ENCOUNTER — MYC MEDICAL ADVICE (OUTPATIENT)
Dept: NEUROSURGERY | Facility: CLINIC | Age: 56
End: 2021-05-29

## 2021-05-29 VITALS
RESPIRATION RATE: 18 BRPM | TEMPERATURE: 97.6 F | BODY MASS INDEX: 27.6 KG/M2 | WEIGHT: 192.8 LBS | HEIGHT: 70 IN | DIASTOLIC BLOOD PRESSURE: 65 MMHG | HEART RATE: 88 BPM | OXYGEN SATURATION: 99 % | SYSTOLIC BLOOD PRESSURE: 99 MMHG

## 2021-05-29 DIAGNOSIS — Z98.1 S/P SPINAL FUSION: ICD-10-CM

## 2021-05-29 DIAGNOSIS — M48.061 SPINAL STENOSIS OF LUMBAR REGION WITH RADICULOPATHY: ICD-10-CM

## 2021-05-29 DIAGNOSIS — M54.16 SPINAL STENOSIS OF LUMBAR REGION WITH RADICULOPATHY: ICD-10-CM

## 2021-05-29 LAB
GLUCOSE BLDC GLUCOMTR-MCNC: 94 MG/DL (ref 70–99)
HGB BLD-MCNC: 10.5 G/DL (ref 13.3–17.7)

## 2021-05-29 PROCEDURE — 36415 COLL VENOUS BLD VENIPUNCTURE: CPT | Performed by: PHYSICIAN ASSISTANT

## 2021-05-29 PROCEDURE — 85018 HEMOGLOBIN: CPT | Performed by: PHYSICIAN ASSISTANT

## 2021-05-29 PROCEDURE — 250N000013 HC RX MED GY IP 250 OP 250 PS 637: Performed by: PHYSICIAN ASSISTANT

## 2021-05-29 PROCEDURE — 999N001017 HC STATISTIC GLUCOSE BY METER IP

## 2021-05-29 PROCEDURE — 97535 SELF CARE MNGMENT TRAINING: CPT | Mod: GO

## 2021-05-29 PROCEDURE — 97165 OT EVAL LOW COMPLEX 30 MIN: CPT | Mod: GO

## 2021-05-29 PROCEDURE — 250N000013 HC RX MED GY IP 250 OP 250 PS 637: Performed by: NEUROLOGICAL SURGERY

## 2021-05-29 RX ORDER — METHOCARBAMOL 750 MG/1
750 TABLET, FILM COATED ORAL EVERY 6 HOURS PRN
Qty: 30 TABLET | Refills: 0 | Status: SHIPPED | OUTPATIENT
Start: 2021-05-29 | End: 2021-06-10

## 2021-05-29 RX ORDER — AMOXICILLIN 250 MG
1 CAPSULE ORAL 2 TIMES DAILY PRN
Qty: 20 TABLET | Refills: 0 | Status: SHIPPED | OUTPATIENT
Start: 2021-05-29 | End: 2021-07-12

## 2021-05-29 RX ORDER — DOCUSATE SODIUM 100 MG/1
100 CAPSULE, LIQUID FILLED ORAL 2 TIMES DAILY PRN
Qty: 20 CAPSULE | Refills: 0 | Status: SHIPPED | OUTPATIENT
Start: 2021-05-29 | End: 2023-09-26

## 2021-05-29 RX ORDER — OXYCODONE HYDROCHLORIDE 5 MG/1
5 TABLET ORAL EVERY 4 HOURS PRN
Qty: 40 TABLET | Refills: 0 | Status: SHIPPED | OUTPATIENT
Start: 2021-05-29 | End: 2021-06-01

## 2021-05-29 RX ADMIN — ACETAMINOPHEN 975 MG: 325 TABLET, FILM COATED ORAL at 15:03

## 2021-05-29 RX ADMIN — OXYCODONE HYDROCHLORIDE 10 MG: 5 TABLET ORAL at 15:04

## 2021-05-29 RX ADMIN — SENNOSIDES AND DOCUSATE SODIUM 1 TABLET: 8.6; 5 TABLET ORAL at 09:06

## 2021-05-29 RX ADMIN — OXYCODONE HYDROCHLORIDE 10 MG: 5 TABLET ORAL at 09:06

## 2021-05-29 RX ADMIN — LIDOCAINE 1 PATCH: 560 PATCH PERCUTANEOUS; TOPICAL; TRANSDERMAL at 09:07

## 2021-05-29 RX ADMIN — NAPROXEN 500 MG: 500 TABLET ORAL at 09:06

## 2021-05-29 RX ADMIN — METHOCARBAMOL 750 MG: 750 TABLET ORAL at 04:12

## 2021-05-29 RX ADMIN — THERA TABS 1 TABLET: TAB at 09:06

## 2021-05-29 RX ADMIN — ACETAMINOPHEN 975 MG: 325 TABLET, FILM COATED ORAL at 00:11

## 2021-05-29 RX ADMIN — LISINOPRIL 20 MG: 20 TABLET ORAL at 09:06

## 2021-05-29 RX ADMIN — AMLODIPINE BESYLATE 10 MG: 10 TABLET ORAL at 09:06

## 2021-05-29 RX ADMIN — OXYCODONE HYDROCHLORIDE 10 MG: 5 TABLET ORAL at 02:02

## 2021-05-29 RX ADMIN — HYDROXYZINE HYDROCHLORIDE 25 MG: 25 TABLET, FILM COATED ORAL at 09:06

## 2021-05-29 RX ADMIN — Medication 100 MCG: at 09:05

## 2021-05-29 RX ADMIN — POLYETHYLENE GLYCOL 3350 17 G: 17 POWDER, FOR SOLUTION ORAL at 09:06

## 2021-05-29 RX ADMIN — DOCUSATE SODIUM 100 MG: 100 CAPSULE, LIQUID FILLED ORAL at 09:06

## 2021-05-29 RX ADMIN — ACETAMINOPHEN 975 MG: 325 TABLET, FILM COATED ORAL at 09:05

## 2021-05-29 RX ADMIN — Medication 1 LOZENGE: at 04:27

## 2021-05-29 ASSESSMENT — ACTIVITIES OF DAILY LIVING (ADL)
ADLS_ACUITY_SCORE: 13

## 2021-05-29 NOTE — PROGRESS NOTES
05/29/21 0942   Quick Adds   Type of Visit Initial Occupational Therapy Evaluation   Living Environment   People in home spouse   Current Living Arrangements house   Home Accessibility stairs to enter home;stairs within home   Number of Stairs, Main Entrance 2   Stair Railings, Main Entrance none   Number of Stairs, Within Home, Primary other (see comments)  (flight of stairs to basement)   Transportation Anticipated family or friend will provide   Living Environment Comments All needs met on first level   Self-Care   Usual Activity Tolerance moderate   Current Activity Tolerance fair   Regular Exercise Yes   Activity/Exercise Type walking   Exercise Amount/Frequency 3-5 times/wk   Equipment Currently Used at Home none   Activity/Exercise/Self-Care Comment Pt reports indep with self-cares and mobility. Wobbly when walking but did not use a device. Has a shower chair. Sits on lower bench to don socks.    Disability/Function   Fall history within last six months no   General Information   Onset of Illness/Injury or Date of Surgery 05/27/21   Referring Physician Prasanna Oliva PA-C   Patient/Family Therapy Goal Statement (OT) return home   Additional Occupational Profile Info/Pertinent History of Current Problem Per chart, pt is a 56-year-old male with a history of multiple previous lumbar fusions with symptomatic degeneration at L5-S1, particularly foraminal stenosis bilaterally.  He was brought for extension of his fusion down to the sacrum, interbody fusion and decompression. PMH signifincant for HTN, anemia, and GERD. Pt is POD 1 s/p L5-S1 fusion.    Existing Precautions/Restrictions spinal   Cognitive Status Examination   Orientation Status orientation to person, place and time   Affect/Mental Status (Cognitive) WNL   Sensory   Sensory Quick Adds No deficits were identified   Pain Assessment   Patient Currently in Pain Yes, see Vital Sign flowsheet   Integumentary/Edema   Integumentary/Edema no  deficits were identifed   Posture   Posture forward head position   Range of Motion Comprehensive   General Range of Motion no range of motion deficits identified   Strength Comprehensive (MMT)   General Manual Muscle Testing (MMT) Assessment other (see comments)  (not formally tested d/t recent sx, appears at least 4/5)   Coordination   Upper Extremity Coordination No deficits were identified   Transfers   Transfers sit-stand transfer;toilet transfer   Transfer Comments Mod I with all mobility. No AD but moves slowly.    Activities of Daily Living   BADL Assessment/Intervention lower body dressing;grooming   Lower Body Dressing Assessment/Training   [place] Level (Lower Body Dressing) supervision;verbal cues   Comment (Lower Body Dressing) VC for pants, dep to don sock but pt declines AE   Grooming Assessment/Training   [place] Level (Grooming) modified independence   Position (Grooming) sink side   Clinical Impression   Criteria for Skilled Therapeutic Interventions Met (OT) yes;meets criteria;skilled treatment is necessary   OT Diagnosis Impaired ADLs, IADLs and functional mobility    OT Problem List-Impairments impacting ADL problems related to;activity tolerance impaired;balance;mobility;post-surgical precautions;pain   Assessment of Occupational Performance 1-3 Performance Deficits   Identified Performance Deficits dressing, functional mobility   Planned Therapy Interventions (OT) ADL retraining;strengthening;transfer training;home program guidelines;progressive activity/exercise   Clinical Decision Making Complexity (OT) low complexity   Therapy Frequency (OT) 1x eval and treat   Predicted Duration of Therapy 1   Risk & Benefits of therapy have been explained evaluation/treatment results reviewed;care plan/treatment goals reviewed   OT Discharge Planning    OT Discharge Recommendation (DC Rec) Home with assist   OT Rationale for DC Rec Pt is below baseline for functional mobility likely d/t  post-surgical pain. Pt has had multiple back surgeries in the past and understanding process. Pt may need assist with heavy household chores but will likely be indep with all ADLs and mobility.    Total Evaluation Time (Minutes)   Total Evaluation Time (Minutes) 10

## 2021-05-29 NOTE — DISCHARGE INSTRUCTIONS
Have someone look at you incision daily to check for signs of infection. Please call Dr. Marshall's office if you have any redness, drainage, swelling at the incision site or a fever greater than 101.0 degrees. Dr. Marshall's number is [phone].

## 2021-05-29 NOTE — DISCHARGE SUMMARY
[facility]    Discharge Summary   [facility]    Date of Admission:  5/27/2021  Date of Discharge:  5/29/2021  Discharging Provider: Trinh Deluna  Date of Service (when I saw the patient): 05/29/21    Discharge Diagnoses   Principal Problem:    Spinal stenosis of lumbar region with radiculopathy      History of Present Illness   Neema Hunt is an 56 year old male who presented with L5-S1 bilateral foraminal stenosis with radiculopathy. Surgery was recommended.    Hospital Course   Neema Hunt was admitted on 5/27/2021.  The following problems were addressed during his hospitalization:    Neema Hunt is an 56 year old male who presented with L5-S1 bilateral foraminal stenosis with radiculopathy. Surgery was recommended. On 5/27/21, Neema underwent Lumbar 5-Sacral 1 posterior segemental instrumentation, bilateral decompression and transforaminal interbody fusion using local autograft and allograft cancellous chips. Revision of Lumbar 2-4 ian. Posterior arthrodesis Lumbar 5-Sacral 1 using  local autograft and allograft cancellous chips with Dr. Marshall. No intraoperative complications.EBL 650ml. Admitted for pain control, wound care, therapies. On 5/29, was meeting all discharge criteria.     I have discussed the following assessment and plan with Weston who is in agreement with initial plan and will follow up with further consultation recommendations.    Significant Results and Procedures       Pending Results     Unresulted Labs Ordered in the Past 30 Days of this Admission     Date and Time Order Name Status Description    5/29/2021 0000 Hemoglobin In process           Code Status   Full Code    Primary Care Physician   Cornel Mittal    Physical Exam   Temp: 98.2  F (36.8  C) Temp src: Oral BP: 123/73 Pulse: 92   Resp: 18 SpO2: 100 % O2 Device: None (Room air)    Vitals:    05/27/21 0750   Weight: 192 lb 12.8 oz (87.5 kg)     Vital Signs with  Ranges  Temp:  [97.5  F (36.4  C)-101.2  F (38.4  C)] 98.2  F (36.8  C)  Pulse:  [69-92] 92  Resp:  [17-18] 18  BP: (109-126)/(65-76) 123/73  SpO2:  [94 %-100 %] 100 %  I/O last 3 completed shifts:  In: 480 [P.O.:480]  Out: 2000 [Urine:2000]  Awake, alert, appropriate, nad   PEOPLES  Standing in room independently   Incision dressing removed; incision c/d/i with nylon in place. No active drainage  Full strength BLE  Sensation intact     Time Spent on this Encounter   ITrinh PA-C, personally saw the patient today and spent less than or equal to 30 minutes discharging this patient.    Discharge Disposition   Discharged to home  Condition at discharge: Stable    Consultations This Hospital Stay   OCCUPATIONAL THERAPY ADULT IP CONSULT  PHYSICAL THERAPY ADULT IP CONSULT    Discharge Orders   No discharge procedures on file.  Discharge Medications   Current Discharge Medication List      CONTINUE these medications which have NOT CHANGED    Details   acetaminophen (TYLENOL) 500 MG tablet Take 1,000 mg by mouth daily as needed for mild pain (2 X 500 mg)      amLODIPine-benazepril (LOTREL) 10-20 MG capsule Take 1 capsule by mouth every morning       Cyanocobalamin (B-12 PO) Take 2 chew tab by mouth every morning       famotidine (PEPCID) 20 MG tablet Take 20 mg by mouth as needed      gabapentin (NEURONTIN) 100 MG capsule Take 300 mg by mouth At Bedtime (3 X 300 mg)      hydrocortisone 2.5 % ointment Apply topically daily as needed       lidocaine (LIDODERM) 5 % Patch Place 1 patch onto the skin every 24 hours  Qty: 30 patch, Refills: 2    Associated Diagnoses: Lumbar radiculopathy, acute; Post-operative state      Melatonin 10 MG TABS tablet Take 10 mg by mouth nightly as needed for sleep      !! Multiple Vitamins-Minerals (AIRBORNE PO) Take 1 chew tab by mouth every morning       !! Multiple Vitamins-Minerals (MULTIVITAMIN ADULT PO) Take 2 chew tab by mouth every morning       naproxen sodium 220 MG capsule Take  440 mg by mouth 2 times daily       omega 3 1000 MG CAPS Take 1 chew tab by mouth every morning       polyethylene glycol (MIRALAX) 17 g packet Take 1 packet by mouth every morning      Probiotic Product (SOLUBLE FIBER/PROBIOTICS PO) Take 1 chew tab by mouth every morning       tetrahydrozoline (VISINE) 0.05 % ophthalmic solution Place 1 drop into both eyes daily as needed       !! - Potential duplicate medications found. Please discuss with provider.        Allergies   No Known Allergies

## 2021-05-29 NOTE — PLAN OF CARE
Pt discharged home approximately 15:45. Discharge instructions and medications reviewed with pt. AVS signed and all questions answered. Medications sent with pt and receipt signed. IV removed. No changes noted.  Pt sent home with belongings: shoes, cell phone, clothing.

## 2021-05-29 NOTE — DISCHARGE SUMMARY
Occupational Therapy Discharge Summary    Reason for therapy discharge:    All goals and outcomes met, no further needs identified.    Progress towards therapy goal(s). See goals on Care Plan in [facility] electronic health record for goal details.  Goals met    Therapy recommendation(s):    No further therapy is recommended.

## 2021-05-29 NOTE — PLAN OF CARE
PT: attempted session. Pt currently waiting for pain meds. Wishes to defer therapy at this time due to increased pain  ]    Pt discharged to home. PT goals met.

## 2021-05-29 NOTE — PLAN OF CARE
POD 1 from a L5-S1 A&O x 4. CMS intact Bowel sounds normoactive, passing flatus, tolerating regular diet. VSS, on RA. Dressing ABD- small drainage, marked and unchanged during shift.  Independent in room.  C/o incisional and BLE pain, decreased with ice packs, PRN oxycodone, and PRN robaxin.  PVR's within parameters.  Pt scoring green on the Aggression Stop Light Tool. Plan for possible discharge tomorrow.

## 2021-05-29 NOTE — PLAN OF CARE
POD #2 from a L4-S1 PSF. A&Ox4. CMS intact. Bowel sounds active, passing flatus, tolerating regular diet. VSS. Dressing ABD changed, original bandage in place. Up independently in room. C/o 5/10 pain, decreased with 10 mg oxycodone and PRN robaxin. Voiding adequately. Pt scoring green on the Aggression Stop Light Tool. Continue to monitor.

## 2021-05-29 NOTE — PLAN OF CARE
POD2 L5-S1 fusion. A&Ox4. VSS on RA. C/o of pain controlled w/ oxycodone and robaxin. CMS intact. Lidocaine patch in place on RLE. Incision- new dressing CDI. BS (+). No BM since surgery but voiding well. Regular diet tolerating well. SBA in the halls/ind in room. Pt discharging home today.

## 2021-06-01 ENCOUNTER — DOCUMENTATION ONLY (OUTPATIENT)
Dept: NEUROSURGERY | Facility: CLINIC | Age: 56
End: 2021-06-01

## 2021-06-01 RX ORDER — OXYCODONE HYDROCHLORIDE 5 MG/1
5 TABLET ORAL EVERY 4 HOURS PRN
Qty: 20 TABLET | Refills: 0 | Status: SHIPPED | OUTPATIENT
Start: 2021-06-01 | End: 2021-06-10

## 2021-06-01 NOTE — TELEPHONE ENCOUNTER
Called pt to follow-up on his [employer] message and picture sent regarding his concerns with his hospitalization stay and being discharge with 2 IV's.     Pt reports that he was able to remove his two PIVs on his own. He states the catheter tips were intact and he applied pressure and a bandage after.     Writer informed pt that patient relations were contacted and they will be calling him to follow up with his concerns.     At this time pt did request a medication refill for his Oxycodone. He said that he was taking 2 tabs in the hospital and so that is what he was taking at home until he realized that the prescribing dose was for only one tab at a time. He has since weaned down to the 1 tab Q4 hrs. Refill request sent to provider.     Necessary reporting and documentation has been completed per facility policy.

## 2021-06-01 NOTE — PROGRESS NOTES
TYPE: LA    RECEIVED DATE: [unfilled]    FAX/MAIL/EMAIL TO: [facility] [phone]     ATTN: unknown    SCANNED TO MEDIA: Yes    Kel Mota, CMA

## 2021-06-10 ENCOUNTER — OFFICE VISIT (OUTPATIENT)
Dept: NEUROSURGERY | Facility: CLINIC | Age: 56
End: 2021-06-10
Payer: COMMERCIAL

## 2021-06-10 ENCOUNTER — TELEPHONE (OUTPATIENT)
Dept: NEUROSURGERY | Facility: CLINIC | Age: 56
End: 2021-06-10

## 2021-06-10 VITALS
BODY MASS INDEX: 27.49 KG/M2 | DIASTOLIC BLOOD PRESSURE: 72 MMHG | OXYGEN SATURATION: 99 % | WEIGHT: 192 LBS | HEART RATE: 65 BPM | HEIGHT: 70 IN | SYSTOLIC BLOOD PRESSURE: 110 MMHG | TEMPERATURE: 98.8 F

## 2021-06-10 DIAGNOSIS — Z98.1 S/P SPINAL FUSION: Primary | ICD-10-CM

## 2021-06-10 DIAGNOSIS — M48.061 SPINAL STENOSIS OF LUMBAR REGION WITH RADICULOPATHY: ICD-10-CM

## 2021-06-10 DIAGNOSIS — Z98.1 S/P SPINAL FUSION: ICD-10-CM

## 2021-06-10 DIAGNOSIS — M54.16 SPINAL STENOSIS OF LUMBAR REGION WITH RADICULOPATHY: ICD-10-CM

## 2021-06-10 PROCEDURE — 99207 PR NO CHARGE NURSE ONLY: CPT

## 2021-06-10 PROCEDURE — G0463 HOSPITAL OUTPT CLINIC VISIT: HCPCS

## 2021-06-10 RX ORDER — METHOCARBAMOL 750 MG/1
750 TABLET, FILM COATED ORAL EVERY 6 HOURS PRN
Qty: 30 TABLET | Refills: 0 | Status: SHIPPED | OUTPATIENT
Start: 2021-06-10 | End: 2021-08-11

## 2021-06-10 RX ORDER — OXYCODONE HYDROCHLORIDE 5 MG/1
5 TABLET ORAL EVERY 4 HOURS PRN
Qty: 20 TABLET | Refills: 0 | Status: SHIPPED | OUTPATIENT
Start: 2021-06-10 | End: 2021-07-12

## 2021-06-10 ASSESSMENT — MIFFLIN-ST. JEOR: SCORE: 1712.16

## 2021-06-10 ASSESSMENT — PAIN SCALES - GENERAL: PAINLEVEL: MODERATE PAIN (4)

## 2021-06-10 NOTE — TELEPHONE ENCOUNTER
The patient was just seen in the clinic for his 2 week incision check. He still has pain 6-7/10.     5/27/21 Lumbar 5-Sacral 1 posterior segemental instrumentation, bilateral decompression and transforaminal interbody fusion using local autograft and allograft cancellous chips. Revision of Lumbar 2-4 ian. Posterior arthrodesis Lumbar 5-Sacral 1 using  local autograft and allograft cancellous chips with Dr Marshall    Pain assessment completed in the office visit note.

## 2021-06-10 NOTE — PATIENT INSTRUCTIONS
Instructions for Patient    Keep your incision clean and dry at all times.     No bathing, swimming, or submerging in water until incision is well healed.      No lifting greater than 10-15 pounds. No bending, twisting, or overhead reaching.    Return in 4  weeks for follow up appointment and xray    Weaning from narcotic pain medications    When it is time, start weaning by extending the time between doses.     For example, if you're taking 2 tabs every 4 hours, spread it out to 2 tabs over 4.5, 5, 6 hours.     At that point you can certainly cut down to 1 tab, then wean to an as needed basis until completely done with them.    For refills, please call our clinic. A nurse will call you back to obtain a pain assessment. Please call 3-4 business days before you run out so we can ensure there is a provider available at the location you prefer for .    Call the clinic or go to Emergency Room if you develop any new pain, drainage, swelling, or fever. Go to the Emergency Room if sudden onset of severe headache, weakness, confusion, change in level of consciousness, pain, or loss of movement.    Post-operative appointments    You will need an x-ray before your 6 week post op, 3 months post op, 6 months post op, 1 year post-op appointments     Please call clinic with any further questions or concerns    [facility]  [address] [address]  T:  [phone]  F:  [phone]

## 2021-06-10 NOTE — PROGRESS NOTES
Patient presents for 2 week incision nurse visit check.     DOS: 5/27/21  Procedure: Lumbar 5-Sacral 1 posterior segemental instrumentation, bilateral decompression and transforaminal interbody fusion using local autograft and allograft cancellous chips. Revision of Lumbar 2-4 ian. Posterior arthrodesis Lumbar 5-Sacral 1 using  local autograft and allograft cancellous chips  Surgeon: Nhan Marshall MD    The patient states the pain  Has moved from the right leg to the left.  He has pain in the hips and knees now.  On Tues 6/8 he reports he was not being able to walk but that is slowly resolving.  He states the pressure is 6-7/10. He has some sharp shooting pain when he is walking.  Denies falls.  The patient is taking robaxin and tylenol every 8 hours.  Oxycodone every 4-6 hours.     Encouraged icing for at least 5-7 times throughout the day for 20-30 minutes at a time, avoiding heat to the incision area. Discussed maximum dose of Acetaminophen in 24 hours, along with holding NSAIDs for a period of 6 weeks.     Patient is walking frequently without difficulty. Legs examined in clinic; no redness, swelling, or warmth noted. Patient denies any pain in bilateral calves. Activity restrictions reinforced at this time as outlined the After Visit Summary.     Patient's appetite is normal  Bowel/bladder problems? No  Taking stool softeners? Yes  Discussed reasons for constipation post-operatively and encouraged stool softeners while taking narcotic pain medication.     Patient denies signs of infection at incision site. The incision inspected. Edges well-approximated. No redness, swelling, drainage, or warmth noted. Incision prepped with ChloraPrep and suture(s) removed without difficulty. Steri-strips applied. Aftercare instructions discussed with patient.     Refills given at this appointment? Yes  Sent for x-rays after this appointment? No  Return to work discussed at this appointment? No    All of patient's questions  addressed today. He was instructed to call with any additional questions/concerns.

## 2021-06-10 NOTE — NURSING NOTE
"Neema Hunt is a 56 year old male who presents for:  Chief Complaint   Patient presents with     Surgical Followup     2 Wk F/U. DOS: 5/27/21.  L 5-S 1 posterior segemental instrumentation, bilateral decompression and transforaminal interbody fusion. Revision of Lumbar 2-4 ian. Posterior arthrodesis Lumbar 5-Sacral 1        Initial Vitals:  /72   Pulse 65   Temp 98.8  F (37.1  C)   Ht 5' 10\" (1.778 m)   Wt 192 lb (87.1 kg)   SpO2 99%   BMI 27.55 kg/m   Estimated body mass index is 27.55 kg/m  as calculated from the following:    Height as of this encounter: 5' 10\" (1.778 m).    Weight as of this encounter: 192 lb (87.1 kg).. Body surface area is 2.07 meters squared. BP completed using cuff size: large  Moderate Pain (4)    Nursing Comments: Patient presents for 2 Wk F/U. DOS: 5/27/21.  L 5-S 1 posterior segemental instrumentation, bilateral decompression and transforaminal interbody fusion. Revision of Lumbar 2-4 ian. Posterior arthrodesis Lumbar 5-Sacral 1    Rodri Mares MA  "

## 2021-06-10 NOTE — TELEPHONE ENCOUNTER
Informed pateint that imaging has been scheduled prior to both follow up apts, and will be on his my-chart appt desk as well.

## 2021-06-20 NOTE — LETTER
Letter by Fiona Bowden RN at      Author: Fiona Bowden RN Service: -- Author Type: --    Filed:  Encounter Date: 7/16/2020 Status: (Other)       7/16/2020        Neema Hunt  [address] [place] [address]    This letter provides a written record that you were tested for COVID-19 on 7/12/2020.     Your result was negative. This means that we didnt find the virus that causes COVID-19 in your sample. A test may show negative when you do actually have the virus. This can happen when the virus is in the early stages of infection, before you feel illness symptoms.    If you have symptoms   Stay home and away from others (self-isolate) until you meet ALL of the guidelines below:    Youve had no fever--and no medicine that reduces fever--for 3 full days (72 hours). And ?    Your other symptoms have gotten better. For example, your cough or breathing has improved. And?    At least 10 days have passed since your symptoms started.    During this time:    Stay home. Dont go to work, school or anywhere else.     Stay in your own room, including for meals. Use your own bathroom if you can.    Stay away from others in your home. No hugging, kissing or shaking hands. No visitors.    Clean high touch surfaces often (doorknobs, counters, handles, etc.). Use a household cleaning spray or wipes. You can find a full list on the EPA website at www.epa.gov/pesticide-registration/list-n-disinfectants-use-against-sars-cov-2.    Cover your mouth and nose with a mask, tissue or washcloth to avoid spreading germs.    Wash your hands and face often with soap and water.    Going back to work  Check with your employer for any guidelines to follow for going back to work.    Employers: This document serves as formal notice that your employee tested negative for COVID-19, as of the testing date shown above.

## 2021-07-12 ENCOUNTER — OFFICE VISIT (OUTPATIENT)
Dept: NEUROSURGERY | Facility: CLINIC | Age: 56
End: 2021-07-12
Attending: PHYSICIAN ASSISTANT
Payer: COMMERCIAL

## 2021-07-12 ENCOUNTER — ANCILLARY PROCEDURE (OUTPATIENT)
Dept: GENERAL RADIOLOGY | Facility: CLINIC | Age: 56
End: 2021-07-12
Attending: PHYSICIAN ASSISTANT
Payer: COMMERCIAL

## 2021-07-12 VITALS — DIASTOLIC BLOOD PRESSURE: 79 MMHG | HEART RATE: 91 BPM | OXYGEN SATURATION: 98 % | SYSTOLIC BLOOD PRESSURE: 125 MMHG

## 2021-07-12 DIAGNOSIS — Z98.1 S/P SPINAL FUSION: ICD-10-CM

## 2021-07-12 DIAGNOSIS — Z98.1 S/P SPINAL FUSION: Primary | ICD-10-CM

## 2021-07-12 DIAGNOSIS — Z98.1 STATUS POST LUMBAR SPINAL FUSION: ICD-10-CM

## 2021-07-12 PROCEDURE — 99024 POSTOP FOLLOW-UP VISIT: CPT | Performed by: PHYSICIAN ASSISTANT

## 2021-07-12 PROCEDURE — G0463 HOSPITAL OUTPT CLINIC VISIT: HCPCS

## 2021-07-12 PROCEDURE — 72100 X-RAY EXAM L-S SPINE 2/3 VWS: CPT | Performed by: RADIOLOGY

## 2021-07-12 NOTE — PROGRESS NOTES
Neurosurgery 6-week follow-up    Mr. Hunt is 6 weeks status post extension of lumbar fusion from L5-S1 with revision of pedicle rods from L2-S1.  The patient reports that he has had some improvement in his symptoms however he continues to have significant issues with back pain and left greater than right hamstring pain.  He had a flareup of plantar fasciitis in his right foot and some numbness and tingling in his right foot.  He also reports pain to his hips.  He is having difficulty with walking distances, and has not yet begun physical therapy.  He denies any issues with his incision    Exam    Alert and oriented no acute distress  Bilateral lower extremities with appropriate strength  Gait is normal  Incision is well-healed    Imaging    Her x-ray demonstrates expected placement of fusion hardware from L2-S1    Assessment    Status post revision and extension of lumbar fusion      Plan    Patient will begin physical therapy  Follow-up in 6 weeks with Dr. Marshall with repeat x-rays prior    Follow-up visit in 6 weeks with cervical x-ray with AP/lateral views prior.

## 2021-07-12 NOTE — LETTER
7/12/2021         RE: Neema Hunt  [address] [facility] [id number]        Dear Colleague,    Thank you for referring your patient, Neema Hunt, to the [facility] [place]. Please see a copy of my visit note below.    Neurosurgery 6-week follow-up    Mr. Hunt is 6 weeks status post extension of lumbar fusion from L5-S1 with revision of pedicle rods from L2-S1.  The patient reports that he has had some improvement in his symptoms however he continues to have significant issues with back pain and left greater than right hamstring pain.  He had a flareup of plantar fasciitis in his right foot and some numbness and tingling in his right foot.  He also reports pain to his hips.  He is having difficulty with walking distances, and has not yet begun physical therapy.  He denies any issues with his incision    Exam    Alert and oriented no acute distress  Bilateral lower extremities with appropriate strength  Gait is normal  Incision is well-healed    Imaging    Her x-ray demonstrates expected placement of fusion hardware from L2-S1    Assessment    Status post revision and extension of lumbar fusion      Plan    Patient will begin physical therapy  Follow-up in 6 weeks with Dr. Marshall with repeat x-rays prior    Follow-up visit in 6 weeks with cervical x-ray with AP/lateral views prior.              Again, thank you for allowing me to participate in the care of your patient.        Sincerely,        Anthony Freeman PA-C

## 2021-07-14 ENCOUNTER — THERAPY VISIT (OUTPATIENT)
Dept: PHYSICAL THERAPY | Facility: CLINIC | Age: 56
End: 2021-07-14
Attending: PHYSICIAN ASSISTANT
Payer: COMMERCIAL

## 2021-07-14 ENCOUNTER — MYC MEDICAL ADVICE (OUTPATIENT)
Dept: NEUROSURGERY | Facility: CLINIC | Age: 56
End: 2021-07-14

## 2021-07-14 DIAGNOSIS — Z98.1 STATUS POST LUMBAR SPINAL FUSION: ICD-10-CM

## 2021-07-14 PROCEDURE — 97161 PT EVAL LOW COMPLEX 20 MIN: CPT | Mod: GP | Performed by: PHYSICAL THERAPIST

## 2021-07-14 PROCEDURE — 97110 THERAPEUTIC EXERCISES: CPT | Mod: GP | Performed by: PHYSICAL THERAPIST

## 2021-07-14 NOTE — PROGRESS NOTES
Physical Therapy Initial Evaluation  Subjective:  The history is provided by the patient. No  was used.   Therapist Generated HPI Evaluation  Problem details: 5/27/21. Pt underwent lumbar spinal fusion revision surgery on 5/27/21. He was in hospital for 2 nights following his surgery. His current restrictions are no lifting >10 pounds, no bending, and no twisting. Pt reports chronic history of low back problems. He reports history of 4 previous low back surgeries..         Type of problem:  Lumbar.    This is a chronic condition.  Condition occurred with:  Insidious onset.  Where condition occurred: for unknown reasons.  Patient reports pain:  Central lumbar spine, lumbar spine left and lumbar spine right.  Pain is described as burning and cramping and is intermittent.  Pain radiates to:  Gluteals left, gluteals right, thigh left, lower leg right and foot right.   Since onset symptoms are gradually improving.  Associated symptoms:  Tingling, loss of strength and loss of balance. Symptoms are exacerbated by other and walking (sneezing, bed mobility)  and relieved by analgesics and ice.  Special tests included:  MRI and x-ray.  Previous treatment includes physical therapy and surgery.   Restrictions due to condition include:  Currently not working due to present treatment.  Barriers include:  None as reported by patient.    Patient Health History         Pain is reported as 2/10 (8/10 at worst) on pain scale.  General health as reported by patient is good.  Pertinent medical history includes: high blood pressure and numbness/tingling.   Red flags:  Pain at rest/night, calf pain-swelling-warmth and significant weakness (consistent with current problem).  Medical allergies: none.   Surgeries include:  Orthopedic surgery. Other surgery history details: spinal fusion, L subtalar fusion 2013.    Current medications:  Anti-inflammatory, high blood pressure medication and pain medication.    Current  occupation is Fabricator.   Primary job tasks include:  Lifting/carrying, pushing/pulling, repetitive tasks and prolonged standing.                                    Objective:  Standing Alignment:    Cervical/Thoracic:  Forward head  Shoulder/UE:  Rounded shoulders  Lumbar:  Lordosis decr            Gait:  Mild general unsteadiness  Gait Type:  Antalgic   Assistive Devices:  None  Deviations:  Shoulder:  Decr arm swing RLumbar:  Trunk lean RGeneral Deviations:  Base of support incr               Lumbar/SI Evaluation  ROM:  AROM Lumbar: not assessed      Lumbar Myotomes:  Lumbar myotomes: L ankle has been weak since subtalar fusion surgery in 2013.    L2-4 (Quads):  Left:  5    Right:  5  L4 (Ankle DF):  Left:  5    Right:  5  L5 (Great Toe Ext): Left: 5    Right: 4-   S1 (Toe Raise):  Left: 2+    Right: 5  Lumbar DTR's:    L4 (Quad):  Left:  0   Right:  0  S1 (Achilles):  Left:  0   Right:  1    Lumbar Dermtomes:        L2 Left:  Hypo-light touch     L2 Right:  Normal-light touch  L3 Left:  Hypo-light touch     L3 Right:  Normal-light touch  L4 Left:  Hypo-light touch       L4 Right:  Hypo-light touch  L5 Left:  Normal-light touch     L5 Right:  Normal-light touch  S1 Left:  Normal-light touch     S1 Right:  Normal-light touch                                                         General     ROS    Assessment/Plan:    Patient is a 56 year old male with lumbar complaints.    Patient has the following significant findings with corresponding treatment plan.                Diagnosis 1:  S/p lumbar spinal fusion, DOS 5/27/21  Pain -  hot/cold therapy, manual therapy, self management, education and home program  Decreased ROM/flexibility - manual therapy, therapeutic exercise and home program  Decreased strength - therapeutic exercise, therapeutic activities and home program  Decreased proprioception - neuro re-education, gait training, therapeutic activities and home program  Impaired gait - gait training and home  program  Impaired muscle performance - neuro re-education and home program  Decreased function - therapeutic activities and home program  Impaired posture - neuro re-education and home program    Therapy Evaluation Codes:   1) History comprised of:   Personal factors that impact the plan of care:      Time since onset of symptoms and Work status.    Comorbidity factors that impact the plan of care are:      High blood pressure, Numbness/tingling, Pain at night/rest and Weakness.     Medications impacting care: Anti-inflammatory, High blood pressure and Pain.  2) Examination of Body Systems comprised of:   Body structures and functions that impact the plan of care:      Lumbar spine.   Activity limitations that impact the plan of care are:      Bathing, Cooking, Dressing, Lifting, Standing, Walking, Working and Sleeping.  3) Clinical presentation characteristics are:   Stable/Uncomplicated.  4) Decision-Making    Low complexity using standardized patient assessment instrument and/or measureable assessment of functional outcome.  Cumulative Therapy Evaluation is: Low complexity.    Previous and current functional limitations:  (See Goal Flow Sheet for this information)    Short term and Long term goals: (See Goal Flow Sheet for this information)     Communication ability:  Patient appears to be able to clearly communicate and understand verbal and written communication and follow directions correctly.  Treatment Explanation - The following has been discussed with the patient:   RX ordered/plan of care  Anticipated outcomes  Possible risks and side effects  This patient would benefit from PT intervention to resume normal activities.   Rehab potential is good.    Frequency:  1 X week, once daily  Duration:  for 6 weeks tapering to 1 X every other week over 4 weeks  Discharge Plan:  Achieve all LTG.  Independent in home treatment program.  Reach maximal therapeutic benefit.    Please refer to the daily flowsheet for  treatment today, total treatment time and time spent performing 1:1 timed codes.

## 2021-07-19 ENCOUNTER — DOCUMENTATION ONLY (OUTPATIENT)
Dept: NEUROSURGERY | Facility: CLINIC | Age: 56
End: 2021-07-19

## 2021-07-21 ENCOUNTER — THERAPY VISIT (OUTPATIENT)
Dept: PHYSICAL THERAPY | Facility: CLINIC | Age: 56
End: 2021-07-21
Payer: COMMERCIAL

## 2021-07-21 DIAGNOSIS — Z98.1 STATUS POST LUMBAR SPINAL FUSION: Primary | ICD-10-CM

## 2021-07-21 PROCEDURE — 97110 THERAPEUTIC EXERCISES: CPT | Mod: GP | Performed by: PHYSICAL THERAPIST

## 2021-07-21 PROCEDURE — 97112 NEUROMUSCULAR REEDUCATION: CPT | Mod: GP | Performed by: PHYSICAL THERAPIST

## 2021-07-21 PROCEDURE — 97530 THERAPEUTIC ACTIVITIES: CPT | Mod: GP | Performed by: PHYSICAL THERAPIST

## 2021-07-28 ENCOUNTER — THERAPY VISIT (OUTPATIENT)
Dept: PHYSICAL THERAPY | Facility: CLINIC | Age: 56
End: 2021-07-28
Payer: COMMERCIAL

## 2021-07-28 DIAGNOSIS — Z98.1 STATUS POST LUMBAR SPINAL FUSION: Primary | ICD-10-CM

## 2021-07-28 PROCEDURE — 97112 NEUROMUSCULAR REEDUCATION: CPT | Mod: GP | Performed by: PHYSICAL THERAPIST

## 2021-07-28 PROCEDURE — 97530 THERAPEUTIC ACTIVITIES: CPT | Mod: GP | Performed by: PHYSICAL THERAPIST

## 2021-07-28 PROCEDURE — 97110 THERAPEUTIC EXERCISES: CPT | Mod: GP | Performed by: PHYSICAL THERAPIST

## 2021-08-04 ENCOUNTER — THERAPY VISIT (OUTPATIENT)
Dept: PHYSICAL THERAPY | Facility: CLINIC | Age: 56
End: 2021-08-04
Payer: COMMERCIAL

## 2021-08-04 DIAGNOSIS — Z98.1 STATUS POST LUMBAR SPINAL FUSION: ICD-10-CM

## 2021-08-04 PROCEDURE — 97110 THERAPEUTIC EXERCISES: CPT | Mod: GP | Performed by: PHYSICAL THERAPIST

## 2021-08-04 PROCEDURE — 97140 MANUAL THERAPY 1/> REGIONS: CPT | Mod: GP | Performed by: PHYSICAL THERAPIST

## 2021-08-04 PROCEDURE — 97112 NEUROMUSCULAR REEDUCATION: CPT | Mod: GP | Performed by: PHYSICAL THERAPIST

## 2021-08-11 ENCOUNTER — OFFICE VISIT (OUTPATIENT)
Dept: SURGERY | Facility: CLINIC | Age: 56
End: 2021-08-11
Payer: COMMERCIAL

## 2021-08-11 VITALS
WEIGHT: 195 LBS | SYSTOLIC BLOOD PRESSURE: 126 MMHG | HEIGHT: 70 IN | HEART RATE: 66 BPM | DIASTOLIC BLOOD PRESSURE: 88 MMHG | BODY MASS INDEX: 27.92 KG/M2 | OXYGEN SATURATION: 98 %

## 2021-08-11 DIAGNOSIS — K42.9 UMBILICAL HERNIA WITHOUT OBSTRUCTION AND WITHOUT GANGRENE: Primary | ICD-10-CM

## 2021-08-11 PROCEDURE — 99204 OFFICE O/P NEW MOD 45 MIN: CPT | Performed by: SURGERY

## 2021-08-11 ASSESSMENT — MIFFLIN-ST. JEOR: SCORE: 1725.76

## 2021-08-11 NOTE — LETTER
August 11, 2021          Lara Doimngo PA-C  [facility]  [address]  [place] [address]      RE:   Neema Hunt 1965      Dear Colleague,    Thank you for referring your patient, Neema Hunt, to [facility]. Please see a copy of my visit note below.    [facility]  Surgery Consultation     HPI: Patient is a 56 year old male who is here for consultation requested by Cornel Mittal [phone] for evaluation of umbilical and possible recurrent right hernia. Hernia has been present for some time but he has had multiple back operations and has not had significant symptoms from the hernia so he has been watching it.  His right inguinal hernia repair was open approximately 25 years ago.  He does not have significant pain from either hernia.  He describes being able to push in the hernia most days but sometimes it takes more effort.  He denies any episodes of incarceration.  He does have some discomfort while doing abdominal exercises but it is temporary. Patient has not had any symptoms of bowel obstruction. Patient denies fevers, chills, nausea, vomiting, SOB, chest pain, abdominal pain..     PMH:   has a past medical history of Arthritis, Back pain, Gastro-oesophageal reflux disease, Hypertension, Radiculopathy, and Scoliosis.  PSH:    has a past surgical history that includes orthopedic surgery; hernia repair; Laminectomy lumbar posterior microscopic one level (4/9/2013); Fusion anterior spine minimally invasive two levels (N/A, 11/16/2016); Optical tracking system fusion spine posterior lumbar percutaneous two levels (N/A, 11/16/2016); Explore spine, remove hardware, combined (N/A, 9/5/2019); Optical tracking system fusion spine posterior lumbar three+ levels (Right, 9/5/2019); Discectomy lumbar posterior microscopic one level (Right, 7/15/2020); and Fusion, Spine, Lumbar, 1 Level, Posterior Approach, Robotic-Assisted (N/A,  "5/27/2021).  Social History:   reports that he has quit smoking. He has never used smokeless tobacco. He reports current alcohol use. He reports that he does not use drugs.  Family History:   family history includes Arthritis in his mother; Asthma in his mother; Hypertension in his mother.  Medications/Allergies: Home medications and allergies reviewed.     ROS:  The 12 point Review of Systems is negative other than noted in the HPI.     Physical Exam:  /88 (BP Location: Left arm, Patient Position: Sitting, Cuff Size: Adult Regular)   Pulse 66   Ht 1.778 m (5' 10\")   Wt 88.5 kg (195 lb)   SpO2 98%   BMI 27.98 kg/m    GENERAL: Generally appears well.  Psych: Alert and Oriented.  Normal affect  Eyes: Sclera clear  Respiratory:  Lungs with good air excursion  Cardiovascular:  Normal peripheral pulses  GI: Abdomen Soft Non-Tender entire abdomen.  Umbilical hernia easily seen and reduced while sitting against supine position.  Defect measures approximate 1 cm.  Groin- I examined the patient in both the standing and supine positions. Right Groin- moderate sized inguinal hernia and hernia was reducible Left Groin- no hernia palpated and no tenderness No scrotal or testicle abnormalities.  Lymphatic/Hematologic/Immune:  No cervical lymphadenopathy.  Integumentary:  No rashes  Neurological: grossly intact      All new lab and imaging data was reviewed.      Impression and Plan:  Patient is a 56 year old male with umbilical and recurrent right inguinal hernia     PLAN:  I discussed the pathophysiology of hernias and options for repair including laparoscopic VS open.  The patient has a recurrent right and umbilical hernia.  He does not have significant symptoms from either hernia at this point and given his recent multiple back operations he would like to defer treatment for a number of months.  I think this is acceptable as he has had both hernias for some time without significant issues.  I would recommend going " forward with a laparoscopic right inguinal hernia repair and open umbilical hernia repair with mesh.  He will think it over and will call to schedule.  The risks associated with the procedure including, but not limited to, recurrence, nerve entrapment or injury, persistence of pain, injury to the bowel/bladder, infertility, hematoma, mesh migration, mesh infection, MI, and PE were discussed with the patient. He indicated understanding of the discussion, asked appropriate questions, and provided consent. Signs and symptoms of incarceration were discussed. If these develop in the interim, he promises to call or go straight to the ER. I have provided the patient with an information pamphlet.  Thank you very much for this consult.       Again, thank you for allowing me to participate in the care of your patient.      Sincerely,      Giovani Regalado MD

## 2021-08-11 NOTE — PROGRESS NOTES
[facility]  Surgery Consultation    HPI: Patient is a 56 year old male who is here for consultation requested by Cornel Mittal [phone] for evaluation of umbilical and possible recurrent right hernia. Hernia has been present for some time but he has had multiple back operations and has not had significant symptoms from the hernia so he has been watching it.  His right inguinal hernia repair was open approximately 25 years ago.  He does not have significant pain from either hernia.  He describes being able to push in the hernia most days but sometimes it takes more effort.  He denies any episodes of incarceration.  He does have some discomfort while doing abdominal exercises but it is temporary. Patient has not had any symptoms of bowel obstruction. Patient denies fevers, chills, nausea, vomiting, SOB, chest pain, abdominal pain..    PMH:   has a past medical history of Arthritis, Back pain, Gastro-oesophageal reflux disease, Hypertension, Radiculopathy, and Scoliosis.  PSH:    has a past surgical history that includes orthopedic surgery; hernia repair; Laminectomy lumbar posterior microscopic one level (4/9/2013); Fusion anterior spine minimally invasive two levels (N/A, 11/16/2016); Optical tracking system fusion spine posterior lumbar percutaneous two levels (N/A, 11/16/2016); Explore spine, remove hardware, combined (N/A, 9/5/2019); Optical tracking system fusion spine posterior lumbar three+ levels (Right, 9/5/2019); Discectomy lumbar posterior microscopic one level (Right, 7/15/2020); and Fusion, Spine, Lumbar, 1 Level, Posterior Approach, Robotic-Assisted (N/A, 5/27/2021).  Social History:   reports that he has quit smoking. He has never used smokeless tobacco. He reports current alcohol use. He reports that he does not use drugs.  Family History:   family history includes Arthritis in his mother; Asthma in his mother; Hypertension in his mother.  Medications/Allergies: Home  "medications and allergies reviewed.    ROS:  The 12 point Review of Systems is negative other than noted in the HPI.    Physical Exam:  /88 (BP Location: Left arm, Patient Position: Sitting, Cuff Size: Adult Regular)   Pulse 66   Ht 1.778 m (5' 10\")   Wt 88.5 kg (195 lb)   SpO2 98%   BMI 27.98 kg/m    GENERAL: Generally appears well.  Psych: Alert and Oriented.  Normal affect  Eyes: Sclera clear  Respiratory:  Lungs with good air excursion  Cardiovascular:  Normal peripheral pulses  GI: Abdomen Soft Non-Tender entire abdomen.  Umbilical hernia easily seen and reduced while sitting against supine position.  Defect measures approximate 1 cm.  Groin- I examined the patient in both the standing and supine positions. Right Groin- moderate sized inguinal hernia and hernia was reducible Left Groin- no hernia palpated and no tenderness No scrotal or testicle abnormalities.  Lymphatic/Hematologic/Immune:  No cervical lymphadenopathy.  Integumentary:  No rashes  Neurological: grossly intact     All new lab and imaging data was reviewed.     Impression and Plan:  Patient is a 56 year old male with umbilical and recurrent right inguinal hernia    PLAN:  I discussed the pathophysiology of hernias and options for repair including laparoscopic VS open.  The patient has a recurrent right and umbilical hernia.  He does not have significant symptoms from either hernia at this point and given his recent multiple back operations he would like to defer treatment for a number of months.  I think this is acceptable as he has had both hernias for some time without significant issues.  I would recommend going forward with a laparoscopic right inguinal hernia repair and open umbilical hernia repair with mesh.  He will think it over and will call to schedule.  The risks associated with the procedure including, but not limited to, recurrence, nerve entrapment or injury, persistence of pain, injury to the bowel/bladder, infertility, " hematoma, mesh migration, mesh infection, MI, and PE were discussed with the patient. He indicated understanding of the discussion, asked appropriate questions, and provided consent. Signs and symptoms of incarceration were discussed. If these develop in the interim, he promises to call or go straight to the ER. I have provided the patient with an information pamphlet.  Thank you very much for this consult.    Giovani Regalado M.D.  [facility]  [phone]    Please route or send letter to:  Primary Care Provider (PCP) and Referring Provider

## 2021-08-13 ENCOUNTER — THERAPY VISIT (OUTPATIENT)
Dept: PHYSICAL THERAPY | Facility: CLINIC | Age: 56
End: 2021-08-13
Payer: COMMERCIAL

## 2021-08-13 DIAGNOSIS — Z98.1 STATUS POST LUMBAR SPINAL FUSION: ICD-10-CM

## 2021-08-13 PROCEDURE — 97140 MANUAL THERAPY 1/> REGIONS: CPT | Mod: GP | Performed by: PHYSICAL THERAPIST

## 2021-08-13 PROCEDURE — 97112 NEUROMUSCULAR REEDUCATION: CPT | Mod: GP | Performed by: PHYSICAL THERAPIST

## 2021-08-13 PROCEDURE — 97110 THERAPEUTIC EXERCISES: CPT | Mod: GP | Performed by: PHYSICAL THERAPIST

## 2021-08-18 ENCOUNTER — THERAPY VISIT (OUTPATIENT)
Dept: PHYSICAL THERAPY | Facility: CLINIC | Age: 56
End: 2021-08-18
Payer: COMMERCIAL

## 2021-08-18 DIAGNOSIS — Z98.1 STATUS POST LUMBAR SPINAL FUSION: ICD-10-CM

## 2021-08-18 PROCEDURE — 97140 MANUAL THERAPY 1/> REGIONS: CPT | Mod: GP | Performed by: PHYSICAL THERAPIST

## 2021-08-18 PROCEDURE — 97112 NEUROMUSCULAR REEDUCATION: CPT | Mod: GP | Performed by: PHYSICAL THERAPIST

## 2021-08-18 PROCEDURE — 97110 THERAPEUTIC EXERCISES: CPT | Mod: GP | Performed by: PHYSICAL THERAPIST

## 2021-08-25 ENCOUNTER — THERAPY VISIT (OUTPATIENT)
Dept: PHYSICAL THERAPY | Facility: CLINIC | Age: 56
End: 2021-08-25
Payer: COMMERCIAL

## 2021-08-25 DIAGNOSIS — Z98.1 STATUS POST LUMBAR SPINAL FUSION: ICD-10-CM

## 2021-08-25 PROCEDURE — 97112 NEUROMUSCULAR REEDUCATION: CPT | Mod: GP | Performed by: PHYSICAL THERAPIST

## 2021-08-25 PROCEDURE — 97110 THERAPEUTIC EXERCISES: CPT | Mod: GP | Performed by: PHYSICAL THERAPIST

## 2021-08-25 NOTE — PROGRESS NOTES
Subjective:  The history is provided by the patient. No  was used.     Physical Exam                    Objective:  Standing Alignment:    Cervical/Thoracic:  Forward head  Shoulder/UE:  Rounded shoulders  Lumbar:  Normal            Gait:    Gait Type:  Antalgic   Assistive Devices:  None  Deviations:  Shoulder:  Decr arm swing R and shoulder hiking RAnkle:  Push off decr L          Physical Exam    General     ROS    Assessment/Plan:    PROGRESS  REPORT    Progress reporting period is from 7/14/21 to 8/25/21.       SUBJECTIVE  Subjective changes noted by patient:  Pt reports overall improvement of 80% since his low back surgery. Able to walk better and farther. C/o intermittent pain in R thigh and constant grabbing sensation in R lower leg. R knee feels better. C/o intermittent episodes of R knee weakness.       Current pain level is 3/10.     Initial Pain level: 8/10.   Changes in function:  Yes (See Goal flowsheet attached for changes in current functional level)  Adverse reaction to treatment or activity: None    OBJECTIVE  Changes noted in objective findings:  Yes, see objective findings.    ASSESSMENT/PLAN  Updated problem list and treatment plan: Diagnosis 1:  S/p lumbar spinal fusion, DOS 5/27/21  Pain -  hot/cold therapy, manual therapy, self management, education and home program  Decreased ROM/flexibility - manual therapy, therapeutic exercise and home program  Decreased strength - therapeutic exercise, therapeutic activities and home program  Decreased proprioception - neuro re-education, gait training, therapeutic activities and home program  Impaired gait - gait training and home program  Impaired muscle performance - neuro re-education and home program  Decreased function - therapeutic activities and home program  Impaired posture - neuro re-education and home program  STG/LTGs have been met or progress has been made towards goals:  Yes (See Goal flow sheet completed  today.)  Assessment of Progress: The patient's condition is improving.  Self Management Plans:  Patient has been instructed in a home treatment program.  Patient  has been instructed in self management of symptoms.  I have re-evaluated this patient and find that the nature, scope, duration and intensity of the therapy is appropriate for the medical condition of the patient.  Neema continues to require the following intervention to meet STG and LTG's:  PT    Recommendations:  This patient would benefit from continued therapy.     Frequency:  1 X week, once daily  Duration:  for 2 weeks tapering to 1 X every other week over 8 weeks        Please refer to the daily flowsheet for treatment today, total treatment time and time spent performing 1:1 timed codes.

## 2021-08-27 ENCOUNTER — ANCILLARY PROCEDURE (OUTPATIENT)
Dept: GENERAL RADIOLOGY | Facility: CLINIC | Age: 56
End: 2021-08-27
Attending: PHYSICIAN ASSISTANT
Payer: COMMERCIAL

## 2021-08-27 ENCOUNTER — OFFICE VISIT (OUTPATIENT)
Dept: NEUROSURGERY | Facility: CLINIC | Age: 56
End: 2021-08-27
Attending: NEUROLOGICAL SURGERY
Payer: COMMERCIAL

## 2021-08-27 DIAGNOSIS — Z98.1 STATUS POST LUMBAR SPINAL FUSION: ICD-10-CM

## 2021-08-27 DIAGNOSIS — Z98.1 S/P SPINAL FUSION: ICD-10-CM

## 2021-08-27 DIAGNOSIS — M48.061 SPINAL STENOSIS OF LUMBAR REGION WITH RADICULOPATHY: Primary | ICD-10-CM

## 2021-08-27 DIAGNOSIS — M54.16 SPINAL STENOSIS OF LUMBAR REGION WITH RADICULOPATHY: Primary | ICD-10-CM

## 2021-08-27 PROCEDURE — 99024 POSTOP FOLLOW-UP VISIT: CPT | Performed by: NEUROLOGICAL SURGERY

## 2021-08-27 PROCEDURE — 72100 X-RAY EXAM L-S SPINE 2/3 VWS: CPT | Performed by: RADIOLOGY

## 2021-08-27 RX ORDER — GABAPENTIN 300 MG/1
300 CAPSULE ORAL AT BEDTIME
Qty: 90 CAPSULE | Refills: 3 | Status: SHIPPED | OUTPATIENT
Start: 2021-08-27

## 2021-08-27 NOTE — LETTER
8/27/2021         RE: Neema Hunt  [address] [id number]        Dear Colleague,    Thank you for referring your patient, Neema Hunt, to the [facility] NEUROSURGERY CLINIC [place]. Please see a copy of my visit note below.    It was a pleasure to see Neema Hunt today in Neurosurgery Clinic. He is a 56 year old male who underwent:    Procedure Date: 05/27/2021     PREOPERATIVE DIAGNOSES:    1.  L5-S1 bilateral foraminal stenosis with radiculopathy.  2.  History of previous L2-L5 fusion.     POSTOPERATIVE DIAGNOSES:    1.  L5-S1 bilateral foraminal stenosis with radiculopathy.  2.  History of previous L2-L5 fusion.     PROCEDURES:    1.  L5-S1 bilateral posterior segmental instrumentation using Yogome Solera system.  2.  Bilateral L5-S1 decompression, transforaminal interbody fusion using Capstone control cages and local autograft.  3.  Revision of L2-L4 ian.  4.  Posterior arthrodesis, L5-S1 using local autograft and allograft cancellous chips.     SURGEON:  Nhan Marshall MD     ASSISTANT:  Prasanna Oliva PA-C    Overall he thinks he is doing reasonably well and feels like his symptoms have improved from prior to surgery.    He continues to have some symptoms with some fatigue particularly in the right lower extremity if he walks more than a mile.    He is continuing to work with PT.    There were no vitals filed for this visit.  There is no height or weight on file to calculate BMI.  Data Unavailable    Well healed incision.    BLE strength 5/5 except for trace R right ADF weakness.    Imaging: X-rays of the lumbar spine show stable alignment.  Imaging was reviewed with patient shown the patient in clinic today.    Assessment: Status post fusion L5-S1.    Plan: I would like to see him back in 9 months with a CT of the lumbar spine to evaluate his fusion.    We discussed his return to work and I think he would be ready to return to work with a 25 pound  weight restriction on September 20.  He may start at 4 hours a day for the first 2 weeks then 6 hours/day for 2 weeks then move up to 8 hours/day.  He will continue to work with physical therapy on improving his weight tolerance and once physical therapy let us know that he can tolerate up to 50 pounds we will let him lift up to 50 pounds at work.           Again, thank you for allowing me to participate in the care of your patient.        Sincerely,        Nhan Marshall MD

## 2021-08-27 NOTE — PROGRESS NOTES
It was a pleasure to see Neema Hunt today in Neurosurgery Clinic. He is a 56 year old male who underwent:    Procedure Date: 05/27/2021     PREOPERATIVE DIAGNOSES:    1.  L5-S1 bilateral foraminal stenosis with radiculopathy.  2.  History of previous L2-L5 fusion.     POSTOPERATIVE DIAGNOSES:    1.  L5-S1 bilateral foraminal stenosis with radiculopathy.  2.  History of previous L2-L5 fusion.     PROCEDURES:    1.  L5-S1 bilateral posterior segmental instrumentation using Medtronic Solera system.  2.  Bilateral L5-S1 decompression, transforaminal interbody fusion using Capstone control cages and local autograft.  3.  Revision of L2-L4 ian.  4.  Posterior arthrodesis, L5-S1 using local autograft and allograft cancellous chips.     SURGEON:  Nhan Marshall MD     ASSISTANT:  Prasanna Oliva PA-C    Overall he thinks he is doing reasonably well and feels like his symptoms have improved from prior to surgery.    He continues to have some symptoms with some fatigue particularly in the right lower extremity if he walks more than a mile.    He is continuing to work with PT.    There were no vitals filed for this visit.  There is no height or weight on file to calculate BMI.  Data Unavailable    Well healed incision.    BLE strength 5/5 except for trace R right ADF weakness.    Imaging: X-rays of the lumbar spine show stable alignment.  Imaging was reviewed with patient shown the patient in clinic today.    Assessment: Status post fusion L5-S1.    Plan: I would like to see him back in 9 months with a CT of the lumbar spine to evaluate his fusion.    We discussed his return to work and I think he would be ready to return to work with a 25 pound weight restriction on September 20.  He may start at 4 hours a day for the first 2 weeks then 6 hours/day for 2 weeks then move up to 8 hours/day.  He will continue to work with physical therapy on improving his weight tolerance and once physical therapy let us know that he  can tolerate up to 50 pounds we will let him lift up to 50 pounds at work.

## 2021-08-27 NOTE — LETTER
[facility]   Neurosurgery Clinic   [address] [place] [address]  [id number]      August 27, 2021    To Whom it May Concern,      Neema Hunt is being seen at our clinic for post-operative follow up care. Due to necessary post-operative healing and recovery, please excuse Neema from work until September 21, 2021. At that time he will return with the restrictions of:    No lifting greater than 25 lbs    He will start his transition back to work with 4hrs per day x2 weeks, 6hrs per day x2 weeks, then 8hrs per day    He will continue to be evaluated by PT during this time    Please call our clinic with questions or concerns: [phone]      Sincerely,    Dr. Nhan Marshall  (electronically signed 8/27/2021 at 12:00PM)      [facility] Neurosurgery  [address] [address]  Tel [phone]

## 2021-09-01 ENCOUNTER — THERAPY VISIT (OUTPATIENT)
Dept: PHYSICAL THERAPY | Facility: CLINIC | Age: 56
End: 2021-09-01
Payer: COMMERCIAL

## 2021-09-01 DIAGNOSIS — Z98.1 STATUS POST LUMBAR SPINAL FUSION: ICD-10-CM

## 2021-09-01 PROCEDURE — 97112 NEUROMUSCULAR REEDUCATION: CPT | Mod: GP | Performed by: PHYSICAL THERAPIST

## 2021-09-01 PROCEDURE — 97110 THERAPEUTIC EXERCISES: CPT | Mod: GP | Performed by: PHYSICAL THERAPIST

## 2021-09-03 ENCOUNTER — DOCUMENTATION ONLY (OUTPATIENT)
Dept: NEUROSURGERY | Facility: CLINIC | Age: 56
End: 2021-09-03

## 2021-09-03 NOTE — PROGRESS NOTES
On 8/26/21, [employer] requested additional disability paperwork for patient. Forms completed. Attached office visits as requested. Awaiting Dr. Masrhall signature.     Once signed - please fax to [phone].

## 2021-09-08 ENCOUNTER — THERAPY VISIT (OUTPATIENT)
Dept: PHYSICAL THERAPY | Facility: CLINIC | Age: 56
End: 2021-09-08
Payer: COMMERCIAL

## 2021-09-08 DIAGNOSIS — Z98.1 STATUS POST LUMBAR SPINAL FUSION: ICD-10-CM

## 2021-09-08 PROCEDURE — 97530 THERAPEUTIC ACTIVITIES: CPT | Mod: GP | Performed by: PHYSICAL THERAPIST

## 2021-09-08 PROCEDURE — 97110 THERAPEUTIC EXERCISES: CPT | Mod: GP | Performed by: PHYSICAL THERAPIST

## 2021-09-08 PROCEDURE — 97112 NEUROMUSCULAR REEDUCATION: CPT | Mod: GP | Performed by: PHYSICAL THERAPIST

## 2021-09-16 ENCOUNTER — THERAPY VISIT (OUTPATIENT)
Dept: PHYSICAL THERAPY | Facility: CLINIC | Age: 56
End: 2021-09-16
Payer: COMMERCIAL

## 2021-09-16 DIAGNOSIS — Z98.1 STATUS POST LUMBAR SPINAL FUSION: ICD-10-CM

## 2021-09-16 PROCEDURE — 97110 THERAPEUTIC EXERCISES: CPT | Mod: GP | Performed by: PHYSICAL THERAPIST

## 2021-09-16 PROCEDURE — 97112 NEUROMUSCULAR REEDUCATION: CPT | Mod: GP | Performed by: PHYSICAL THERAPIST

## 2021-09-16 PROCEDURE — 97530 THERAPEUTIC ACTIVITIES: CPT | Mod: GP | Performed by: PHYSICAL THERAPIST

## 2021-09-17 ENCOUNTER — DOCUMENTATION ONLY (OUTPATIENT)
Dept: NEUROSURGERY | Facility: CLINIC | Age: 56
End: 2021-09-17

## 2021-09-17 NOTE — PROGRESS NOTES
Faxed [employer] Forms September 17, 2021 to fax number [phone]    Right Fax confirmed at 4:00 PM    Sent to be scanned

## 2021-09-23 ENCOUNTER — THERAPY VISIT (OUTPATIENT)
Dept: PHYSICAL THERAPY | Facility: CLINIC | Age: 56
End: 2021-09-23
Payer: COMMERCIAL

## 2021-09-23 DIAGNOSIS — Z98.1 STATUS POST LUMBAR SPINAL FUSION: ICD-10-CM

## 2021-09-23 PROCEDURE — 97112 NEUROMUSCULAR REEDUCATION: CPT | Mod: GP | Performed by: PHYSICAL THERAPIST

## 2021-09-23 PROCEDURE — 97110 THERAPEUTIC EXERCISES: CPT | Mod: GP | Performed by: PHYSICAL THERAPIST

## 2021-09-30 ENCOUNTER — THERAPY VISIT (OUTPATIENT)
Dept: PHYSICAL THERAPY | Facility: CLINIC | Age: 56
End: 2021-09-30
Payer: COMMERCIAL

## 2021-09-30 DIAGNOSIS — Z98.1 STATUS POST LUMBAR SPINAL FUSION: ICD-10-CM

## 2021-09-30 PROCEDURE — 97530 THERAPEUTIC ACTIVITIES: CPT | Mod: GP | Performed by: PHYSICAL THERAPIST

## 2021-09-30 PROCEDURE — 97110 THERAPEUTIC EXERCISES: CPT | Mod: GP | Performed by: PHYSICAL THERAPIST

## 2021-10-06 ENCOUNTER — THERAPY VISIT (OUTPATIENT)
Dept: PHYSICAL THERAPY | Facility: CLINIC | Age: 56
End: 2021-10-06
Payer: COMMERCIAL

## 2021-10-06 DIAGNOSIS — Z98.1 STATUS POST LUMBAR SPINAL FUSION: ICD-10-CM

## 2021-10-06 PROCEDURE — 97110 THERAPEUTIC EXERCISES: CPT | Mod: GP | Performed by: PHYSICAL THERAPIST

## 2021-10-06 PROCEDURE — 97530 THERAPEUTIC ACTIVITIES: CPT | Mod: GP | Performed by: PHYSICAL THERAPIST

## 2021-10-13 ENCOUNTER — THERAPY VISIT (OUTPATIENT)
Dept: PHYSICAL THERAPY | Facility: CLINIC | Age: 56
End: 2021-10-13
Payer: COMMERCIAL

## 2021-10-13 DIAGNOSIS — Z98.1 STATUS POST LUMBAR SPINAL FUSION: ICD-10-CM

## 2021-10-13 PROCEDURE — 97112 NEUROMUSCULAR REEDUCATION: CPT | Mod: GP | Performed by: PHYSICAL THERAPIST

## 2021-10-13 PROCEDURE — 97140 MANUAL THERAPY 1/> REGIONS: CPT | Mod: GP | Performed by: PHYSICAL THERAPIST

## 2021-10-13 PROCEDURE — 97110 THERAPEUTIC EXERCISES: CPT | Mod: GP | Performed by: PHYSICAL THERAPIST

## 2021-10-24 ENCOUNTER — HEALTH MAINTENANCE LETTER (OUTPATIENT)
Age: 56
End: 2021-10-24

## 2021-10-25 ENCOUNTER — MYC MEDICAL ADVICE (OUTPATIENT)
Dept: NEUROSURGERY | Facility: CLINIC | Age: 56
End: 2021-10-25

## 2021-10-25 DIAGNOSIS — Z98.1 STATUS POST LUMBAR SPINAL FUSION: Primary | ICD-10-CM

## 2021-11-02 ENCOUNTER — THERAPY VISIT (OUTPATIENT)
Dept: PHYSICAL THERAPY | Facility: CLINIC | Age: 56
End: 2021-11-02
Payer: COMMERCIAL

## 2021-11-02 DIAGNOSIS — Z98.1 STATUS POST LUMBAR SPINAL FUSION: ICD-10-CM

## 2021-11-02 PROCEDURE — 97110 THERAPEUTIC EXERCISES: CPT | Mod: GP | Performed by: PHYSICAL THERAPIST

## 2021-11-02 PROCEDURE — 97112 NEUROMUSCULAR REEDUCATION: CPT | Mod: GP | Performed by: PHYSICAL THERAPIST

## 2021-11-02 PROCEDURE — 97530 THERAPEUTIC ACTIVITIES: CPT | Mod: GP | Performed by: PHYSICAL THERAPIST

## 2021-11-02 NOTE — PROGRESS NOTES
Subjective:  The history is provided by the patient. No  was used.     Physical Exam                    Objective:  Standing Alignment:    Cervical/Thoracic:  Forward head  Shoulder/UE:  Normal  Lumbar:  Lordosis decr            Gait:    Gait Type:  Antalgic   Assistive Devices:  None  Deviations:  Ankle:  Push off decr R                                                 Hip Evaluation          Functional Testing:          Quad:      Bilateral leg squat:  Mild loss of control and excessive anterior knee excursion                  General     ROS    Assessment/Plan:    PROGRESS  REPORT    Progress reporting period is from 7/14/21 to 11/2/21.       SUBJECTIVE  Subjective changes noted by patient:  Pt has resumed working 8 hours days. He notes intermittent L thigh pain and R anterolateral lower leg pain which is worse by end of work day. He has been following 25# lifting restriction at work. He has questions about how to further progress to normal work activities.       Current Pain level: 3/10 R anterolateral lower leg.     Initial Pain level: 8/10.   Changes in function:  Yes (See Goal flowsheet attached for changes in current functional level)  Adverse reaction to treatment or activity: None    OBJECTIVE  Changes noted in objective findings:  Yes, see objective findings    Lifting mechanics with partial 2-leg squat:  Normal control and no symptoms 25-35 pounds  Mild loss of control and R anterolateral lower leg discomfort with 40 pounds    ASSESSMENT/PLAN  Updated problem list and treatment plan: Diagnosis 1:  S/p lumbar spinal fusion, DOS 5/27/21  Pain -  hot/cold therapy, manual therapy, self management, education and home program  Decreased ROM/flexibility - manual therapy, therapeutic exercise and home program  Decreased strength - therapeutic exercise, therapeutic activities and home program  Decreased proprioception - neuro re-education, gait training, therapeutic activities and home  program  Impaired gait - gait training and home program  Impaired muscle performance - neuro re-education and home program  Decreased function - therapeutic activities and home program  Impaired posture - neuro re-education and home program  STG/LTGs have been met or progress has been made towards goals:  Yes (See Goal flow sheet completed today.)  Assessment of Progress: The patient's condition is improving.  Self Management Plans:  Patient has been instructed in a home treatment program.  Patient  has been instructed in self management of symptoms.  I have re-evaluated this patient and find that the nature, scope, duration and intensity of the therapy is appropriate for the medical condition of the patient.  Neema continues to require the following intervention to meet STG and LTG's:  PT    Recommendations:  This patient would benefit from continued therapy.     Frequency:  1 X every other week, once daily  Duration:  for 8 weeks        Please refer to the daily flowsheet for treatment today, total treatment time and time spent performing 1:1 timed codes.

## 2021-11-03 RX ORDER — METHYLPREDNISOLONE 4 MG
TABLET, DOSE PACK ORAL
Qty: 21 TABLET | Refills: 0 | Status: SHIPPED | OUTPATIENT
Start: 2021-11-03 | End: 2022-05-13

## 2021-11-03 NOTE — TELEPHONE ENCOUNTER
Responded to patients [email or web address]. Per Maico Oliva ordered MDP. Sent to patients pharmacy and instructed to call us back with any change or worsening [email or web address]

## 2021-11-15 ENCOUNTER — THERAPY VISIT (OUTPATIENT)
Dept: PHYSICAL THERAPY | Facility: CLINIC | Age: 56
End: 2021-11-15
Payer: COMMERCIAL

## 2021-11-15 DIAGNOSIS — Z98.1 STATUS POST LUMBAR SPINAL FUSION: ICD-10-CM

## 2021-11-15 PROCEDURE — 97112 NEUROMUSCULAR REEDUCATION: CPT | Mod: GP | Performed by: PHYSICAL THERAPIST

## 2021-11-15 PROCEDURE — 97110 THERAPEUTIC EXERCISES: CPT | Mod: GP | Performed by: PHYSICAL THERAPIST

## 2021-11-17 ENCOUNTER — TELEPHONE (OUTPATIENT)
Dept: NEUROSURGERY | Facility: CLINIC | Age: 56
End: 2021-11-17
Payer: COMMERCIAL

## 2021-11-17 NOTE — TELEPHONE ENCOUNTER
Cornel Blake PT had left a VM on the nurse line.     Returned the call to SHELIA Unger.  The patient is currently working with PT on a work conditioning program. He has been able to tolerate lifting 35 pounds. If approved by Dr Marshall the patient would like to have the work letter updated from 25 pounds to a 35 pound limit.   Per Cornel the patient is still not tolerating lunges    Per the last appointment with Dr Marshall 8/27:      Return to work with a 25 pound weight restriction on September 20.     Start at 4 hours a day for the first 2 weeks then 6 hours/day for 2 weeks then move up to 8 hours/day.     Continue to work with physical therapy on improving his weight tolerance and once physical therapy let us know that he can tolerate up to 50 pounds we will let him lift up to 50 pounds at work.    DOS: 5/27/21  Lumbar 5-Sacral 1 posterior segemental instrumentation, bilateral decompression and transforaminal interbody fusion using local autograft and allograft cancellous chips. Revision of Lumbar 2-4 ian. Posterior arthrodesis Lumbar 5-Sacral 1 using  local autograft and allograft cancellous chips

## 2021-11-17 NOTE — LETTER
[facility]  Neurosurgery Clinic  [address] [place] [address]  [id number]        November 18, 2021    To Whom it May Concern,      Neema Hunt is being seen at our clinic for post-operative follow up care. He is able to return to work  with the restrictions of:    -No heavy lifting greater than 35 pounds   -No twisting or bending     Please call our clinic with questions or concerns: [phone]      Sincerely,      Dr Nhan Marshall  (Electronically Signed)

## 2021-11-18 NOTE — TELEPHONE ENCOUNTER
Okay to increase weight restriction to 35 pounds per Dr Marshall.    Letter drafted and sent to the patient's email  [email or web address]    The patient will call if he has any further concerns.

## 2021-11-30 ENCOUNTER — THERAPY VISIT (OUTPATIENT)
Dept: PHYSICAL THERAPY | Facility: CLINIC | Age: 56
End: 2021-11-30
Payer: COMMERCIAL

## 2021-11-30 DIAGNOSIS — Z98.1 STATUS POST LUMBAR SPINAL FUSION: ICD-10-CM

## 2021-11-30 PROCEDURE — 97112 NEUROMUSCULAR REEDUCATION: CPT | Mod: GP | Performed by: PHYSICAL THERAPIST

## 2021-11-30 PROCEDURE — 97110 THERAPEUTIC EXERCISES: CPT | Mod: GP | Performed by: PHYSICAL THERAPIST

## 2021-12-13 ENCOUNTER — THERAPY VISIT (OUTPATIENT)
Dept: PHYSICAL THERAPY | Facility: CLINIC | Age: 56
End: 2021-12-13
Payer: COMMERCIAL

## 2021-12-13 DIAGNOSIS — Z98.1 STATUS POST LUMBAR SPINAL FUSION: ICD-10-CM

## 2021-12-13 PROCEDURE — 97112 NEUROMUSCULAR REEDUCATION: CPT | Mod: GP | Performed by: PHYSICAL THERAPIST

## 2021-12-13 PROCEDURE — 97110 THERAPEUTIC EXERCISES: CPT | Mod: GP | Performed by: PHYSICAL THERAPIST

## 2021-12-27 ENCOUNTER — THERAPY VISIT (OUTPATIENT)
Dept: PHYSICAL THERAPY | Facility: CLINIC | Age: 56
End: 2021-12-27
Payer: COMMERCIAL

## 2021-12-27 DIAGNOSIS — Z98.1 STATUS POST LUMBAR SPINAL FUSION: ICD-10-CM

## 2021-12-27 PROCEDURE — 97110 THERAPEUTIC EXERCISES: CPT | Mod: GP | Performed by: PHYSICAL THERAPIST

## 2021-12-27 PROCEDURE — 97112 NEUROMUSCULAR REEDUCATION: CPT | Mod: GP | Performed by: PHYSICAL THERAPIST

## 2022-01-10 ENCOUNTER — THERAPY VISIT (OUTPATIENT)
Dept: PHYSICAL THERAPY | Facility: CLINIC | Age: 57
End: 2022-01-10
Payer: COMMERCIAL

## 2022-01-10 DIAGNOSIS — Z98.1 STATUS POST LUMBAR SPINAL FUSION: ICD-10-CM

## 2022-01-10 PROCEDURE — 97110 THERAPEUTIC EXERCISES: CPT | Mod: GP | Performed by: PHYSICAL THERAPIST

## 2022-01-10 PROCEDURE — 97112 NEUROMUSCULAR REEDUCATION: CPT | Mod: GP | Performed by: PHYSICAL THERAPIST

## 2022-01-10 PROCEDURE — 97530 THERAPEUTIC ACTIVITIES: CPT | Mod: GP | Performed by: PHYSICAL THERAPIST

## 2022-01-10 NOTE — PROGRESS NOTES
Subjective:  The history is provided by the patient. No  was used.     Physical Exam                    Objective:  Standing Alignment:    Cervical/Thoracic:  Forward head  Shoulder/UE:  Rounded shoulders  Lumbar:  Lordosis decr            Gait:    Gait Type:  Antalgic   Assistive Devices:  None  Deviations:  Ankle:  Push off decr L               Lumbar/SI Evaluation    Lumbar Myotomes:            S1 (Toe Raise):  Left: 2+    Right: 5                                                    Hip Evaluation          Functional Testing:          Quad:      Bilateral leg squat: Able to squat and lift up to 40 pounds from surface height of 30 inches with good form. Pt started to demonstrate mild loss of control and faitigue with squatting and lifting 45 pounds. Pt had not pain and symptoms while performing lifting.                   General     ROS    Assessment/Plan:    PROGRESS  REPORT    Progress reporting period is from 11/2/21 to 1/10/22.       SUBJECTIVE  Subjective changes noted by patient:  C/o intermittent B thigh pain, R knee pain, and R anterolateral lower leg pain. His pain is worse at end of work day and with home care activities such as blowing and shoveling snow. Pt is working normal hours with lifting restrictions. He was able to lift 50 pound bag of salt at home without pain. He notes his R leg pain worsens with repetitive heavy lifting. He continues to have 35 pound lifting restriction. He is able to perform stationary biking at home. He will resume outdoor walking as weather allows.       Current Pain level: 2-3/10 R knee.     nitial Pain level: 8/10.   Changes in function:  Yes (See Goal flowsheet attached for changes in current functional level)  Adverse reaction to treatment or activity: None    OBJECTIVE  Changes noted in objective findings:  Yes, see objective findings.    ASSESSMENT/PLAN  Updated problem list and treatment plan: Diagnosis 1:  S/p lumbar spinal fusion, DOS 5/27/21   Pain -  hot/cold therapy, manual therapy, self management, education and home program  Decreased ROM/flexibility - manual therapy, therapeutic exercise and home program  Decreased strength - therapeutic exercise, therapeutic activities and home program  Decreased proprioception - neuro re-education, gait training, therapeutic activities and home program  Impaired gait - gait training and home program  Impaired muscle performance - neuro re-education and home program  Decreased function - therapeutic activities and home program  Impaired posture - neuro re-education and home program  STG/LTGs have been met or progress has been made towards goals:  Yes (See Goal flow sheet completed today.)  Assessment of Progress: The patient's condition is improving.  Self Management Plans:  Patient has been instructed in a home treatment program.  Patient  has been instructed in self management of symptoms.  I have re-evaluated this patient and find that the nature, scope, duration and intensity of the therapy is appropriate for the medical condition of the patient.  Neema continues to require the following intervention to meet STG and LTG's:  PT    Recommendations:  This patient would benefit from continued therapy.     Frequency:  1 X a month, once daily  Duration:  for 3 months        Please refer to the daily flowsheet for treatment today, total treatment time and time spent performing 1:1 timed codes.

## 2022-02-07 ENCOUNTER — THERAPY VISIT (OUTPATIENT)
Dept: PHYSICAL THERAPY | Facility: CLINIC | Age: 57
End: 2022-02-07
Payer: COMMERCIAL

## 2022-02-07 DIAGNOSIS — Z98.1 STATUS POST LUMBAR SPINAL FUSION: ICD-10-CM

## 2022-02-07 PROCEDURE — 97112 NEUROMUSCULAR REEDUCATION: CPT | Mod: GP | Performed by: PHYSICAL THERAPIST

## 2022-02-07 PROCEDURE — 97110 THERAPEUTIC EXERCISES: CPT | Mod: GP | Performed by: PHYSICAL THERAPIST

## 2022-02-13 ENCOUNTER — HEALTH MAINTENANCE LETTER (OUTPATIENT)
Age: 57
End: 2022-02-13

## 2022-02-17 PROBLEM — Z71.89 ACP (ADVANCE CARE PLANNING): Chronic | Status: RESOLVED | Noted: 2017-01-23 | Resolved: 2019-09-05

## 2022-03-07 ENCOUNTER — THERAPY VISIT (OUTPATIENT)
Dept: PHYSICAL THERAPY | Facility: CLINIC | Age: 57
End: 2022-03-07
Payer: COMMERCIAL

## 2022-03-07 DIAGNOSIS — Z98.1 STATUS POST LUMBAR SPINAL FUSION: ICD-10-CM

## 2022-03-07 PROCEDURE — 97530 THERAPEUTIC ACTIVITIES: CPT | Mod: GP | Performed by: PHYSICAL THERAPIST

## 2022-03-07 PROCEDURE — 97110 THERAPEUTIC EXERCISES: CPT | Mod: GP | Performed by: PHYSICAL THERAPIST

## 2022-03-07 NOTE — PROGRESS NOTES
Subjective:  The history is provided by the patient. No  was used.     Physical Exam                    Objective:    Gait:  Mild decreased push off on R  Gait Type:  Antalgic   Assistive Devices:  None            Physical Exam    General     ROS    Assessment/Plan:    PROGRESS  REPORT    Progress reporting period is from 1/10/22 to 3/7/22.       SUBJECTIVE  Subjective changes noted by patient:  Pt reports he is better overall. He has been able to lift as much as 50 pounds at home. He c/o intermittent low back, R thigh, and R shin pain. His pain worsens with lifting, repetitive activities, and prolonged standing/walking. His pain will intermittently wake him at night. Pt has been diligent with improved posture and body mechanics. He is more aware of asking for help with lifting heavy and large objects at work.       Current Pain level: 0/10 low back, 2/10 R thigh, 3/10 R shin.     Initial Pain level: 8/10.   Changes in function:  Yes (See Goal flowsheet attached for changes in current functional level)  Adverse reaction to treatment or activity: None    OBJECTIVE  Changes noted in objective findings:  Yes, see objective findings.    Lifting mechanics: Pt demonstrates good body mechanics and control when lifting weights from medium height counter top ranging from 30-45 pounds. Pt notes mild R shin tingling after lifting 45 pounds.    ASSESSMENT/PLAN  Updated problem list and treatment plan: Diagnosis 1:  S/p lumbar spinal fusion, DOS 5/27/21  Pain -  hot/cold therapy, manual therapy, self management, education and home program  Decreased ROM/flexibility - manual therapy, therapeutic exercise and home program  Decreased strength - therapeutic exercise, therapeutic activities and home program  Decreased proprioception - neuro re-education, gait training, therapeutic activities and home program  Impaired gait - gait training and home program  Impaired muscle performance - neuro re-education and home  program  Decreased function - therapeutic activities and home program  Impaired posture - neuro re-education and home program  STG/LTGs have been met or progress has been made towards goals:  Yes (See Goal flow sheet completed today.)  Assessment of Progress: The patient's condition is improving.  Self Management Plans:  Patient has been instructed in a home treatment program.  Patient  has been instructed in self management of symptoms.  I have re-evaluated this patient and find that the nature, scope, duration and intensity of the therapy is appropriate for the medical condition of the patient.  Neema continues to require the following intervention to meet STG and LTG's:  PT    Recommendations:  This patient would benefit from continued therapy.     Frequency:  1 X a month, once daily  Duration:  for 1 month        Please refer to the daily flowsheet for treatment today, total treatment time and time spent performing 1:1 timed codes.

## 2022-04-04 ENCOUNTER — THERAPY VISIT (OUTPATIENT)
Dept: PHYSICAL THERAPY | Facility: CLINIC | Age: 57
End: 2022-04-04
Payer: COMMERCIAL

## 2022-04-04 DIAGNOSIS — Z98.1 STATUS POST LUMBAR SPINAL FUSION: ICD-10-CM

## 2022-04-04 PROCEDURE — 97110 THERAPEUTIC EXERCISES: CPT | Mod: GP | Performed by: PHYSICAL THERAPIST

## 2022-04-04 PROCEDURE — 97530 THERAPEUTIC ACTIVITIES: CPT | Mod: GP | Performed by: PHYSICAL THERAPIST

## 2022-04-04 NOTE — PROGRESS NOTES
Subjective:  The history is provided by the patient. No  was used.     Physical Exam                    Objective:    Gait:  Mild decreased push off on R  Gait Type:  Antalgic   Assistive Devices:  None            Physical Exam    General     ROS    Assessment/Plan:    DISCHARGE REPORT    Progress reporting period is from 3/7/22 to 4/4/22.       SUBJECTIVE  Subjective changes noted by patient:  Pt feels better overall since surgery. He is working full time with lifting restrictions. He notes increased R anterolateral lower leg pain after prolonged activity such as working. His lower leg pain improves with rest. His R lower leg pain has been same for a while now. He started having R mid- and low back pain over past 2 weeks. Denies specific cause for R sided back pain. He has been inconsistent with walking due to weather. He notes onset of R foot and ankle weakness with walking >20 minutes. He has been consistent with PT prescribed HEP. He purchased  for his bike.       Current Pain level: 2/10.     Initial Pain level: 8/10.   Changes in function:  Yes (See Goal flowsheet attached for changes in current functional level)  Adverse reaction to treatment or activity: None    OBJECTIVE  Changes noted in objective findings:  Yes, see objective findings:    Lifting mechanics: Able to lift 35-50# in crate from counter height mat utilizing 2 leg squat. Intermittent cues for keeping weight close to body, TA muscle activation, use of squat with lowering the weight back to the mat, and controlling speed. Pt is able to maintain improved body mechanics after cuing. Pt reported 1 episode of brief L lower leg pain with lifting 40# which resolved with standing rest.      ASSESSMENT/PLAN  Updated problem list and treatment plan: Diagnosis 1:  S/p lumbar spinal fusion, DOS 5/27/21  Pain -  hot/cold therapy, manual therapy, self management, education and home program  Decreased ROM/flexibility - manual  therapy, therapeutic exercise and home program  Decreased strength - therapeutic exercise, therapeutic activities and home program  Decreased proprioception - neuro re-education, gait training, therapeutic activities and home program  Impaired gait - gait training and home program  Impaired muscle performance - neuro re-education and home program  Decreased function - therapeutic activities and home program  Impaired posture - neuro re-education and home program  STG/LTGs have been met or progress has been made towards goals:  Yes (See Goal flow sheet completed today.)  Assessment of Progress: The patient's condition is improving.  Self Management Plans:  Patient is independent in a home treatment program.  Patient is independent in self management of symptoms.  I have re-evaluated this patient and find that the nature, scope, duration and intensity of the therapy is appropriate for the medical condition of the patient.  Neema continues to require the following intervention to meet STG and LTG's:  PT intervention is no longer required to meet STG/LTG.    Recommendations:  This patient is ready to be discharged from therapy and continue their home treatment program.    Please refer to the daily flowsheet for treatment today, total treatment time and time spent performing 1:1 timed codes.

## 2022-04-29 ENCOUNTER — HOSPITAL ENCOUNTER (OUTPATIENT)
Dept: CT IMAGING | Facility: CLINIC | Age: 57
Discharge: HOME | End: 2022-04-29
Attending: NEUROLOGICAL SURGERY | Admitting: NEUROLOGICAL SURGERY
Payer: COMMERCIAL

## 2022-04-29 ENCOUNTER — OFFICE VISIT (OUTPATIENT)
Dept: NEUROSURGERY | Facility: CLINIC | Age: 57
End: 2022-04-29
Payer: COMMERCIAL

## 2022-04-29 VITALS — OXYGEN SATURATION: 98 % | HEART RATE: 55 BPM | DIASTOLIC BLOOD PRESSURE: 79 MMHG | SYSTOLIC BLOOD PRESSURE: 121 MMHG

## 2022-04-29 DIAGNOSIS — Z98.1 STATUS POST LUMBAR SPINAL FUSION: ICD-10-CM

## 2022-04-29 DIAGNOSIS — M48.061 SPINAL STENOSIS OF LUMBAR REGION WITH RADICULOPATHY: ICD-10-CM

## 2022-04-29 DIAGNOSIS — M96.0 PSEUDARTHROSIS AFTER FUSION OR ARTHRODESIS: ICD-10-CM

## 2022-04-29 DIAGNOSIS — M54.16 SPINAL STENOSIS OF LUMBAR REGION WITH RADICULOPATHY: ICD-10-CM

## 2022-04-29 DIAGNOSIS — Z98.1 STATUS POST LUMBAR SPINAL FUSION: Primary | ICD-10-CM

## 2022-04-29 PROCEDURE — 72131 CT LUMBAR SPINE W/O DYE: CPT

## 2022-04-29 PROCEDURE — 99214 OFFICE O/P EST MOD 30 MIN: CPT | Performed by: NEUROLOGICAL SURGERY

## 2022-04-29 ASSESSMENT — PAIN SCALES - GENERAL: PAINLEVEL: MILD PAIN (3)

## 2022-04-29 NOTE — PROGRESS NOTES
"It was a pleasure to see Neema Hunt today in Neurosurgery Clinic. He is a 57 year old male who underwent:    Procedure Date: 05/27/2021     PREOPERATIVE DIAGNOSES:    1.  L5-S1 bilateral foraminal stenosis with radiculopathy.  2.  History of previous L2-L5 fusion.     POSTOPERATIVE DIAGNOSES:    1.  L5-S1 bilateral foraminal stenosis with radiculopathy.  2.  History of previous L2-L5 fusion.     PROCEDURES:    1.  L5-S1 bilateral posterior segmental instrumentation using Medtronic Solera system.  2.  Bilateral L5-S1 decompression, transforaminal interbody fusion using Capstone control cages and local autograft.  3.  Revision of L2-L4 ian.  4.  Posterior arthrodesis, L5-S1 using local autograft and allograft cancellous chips.     SURGEON:  Nhan Marshall MD     ASSISTANT:  Prasanna Oliva PA-C    Overall he is doing reasonably well.  He thinks that he is better than prior to surgery but continues to have some right lateral calf symptoms.  He feels that he did tweak his back and physical therapy with some worsening of his symptoms.    Vitals:    04/29/22 1518   BP: 121/79   Pulse: 55   SpO2: 98%     Estimated body mass index is 27.98 kg/m  as calculated from the following:    Height as of 8/11/21: 1.778 m (5' 10\").    Weight as of 8/11/21: 88.5 kg (195 lb).  Mild Pain (3)    Well-healed lumbar incisions.  No pain to palpation.  Bilateral lower extremity strength 5 out of 5 in all muscle groups.    Imaging: CT scan of the lumbar spine shows solid fusion from L2-L5.  There does appear to be pseudoarthrosis developing at L5-S1 with haloing around the S1 screws and some vacuum sign in the disc.  The imaging was reviewed with the patient shown to the patient clinic today.    Assessment: Status post lumbar fusion.  Pseudoarthrosis L5-S1.    Plan: At this point I do not think intervention is necessary although we did discuss bone growth stimulator which I think might be helpful in this setting.  However if he " continues to have problems and his symptoms worsen then we would likely need to consider revision fusion at L5-S1 including pelvic instrumentation.  I have scheduled him to see me in 1 year with a repeat CT to see what things look like in that timeframe.  He will return sooner if he has worsening symptoms or other problems.

## 2022-04-29 NOTE — LETTER
"    4/29/2022         RE: Neema Hunt  [address] [facility] [address]        Dear Colleague,    Thank you for referring your patient, Neema Hunt, to the [facility] NEUROLOGY CLINICS [facility]. Please see a copy of my visit note below.    It was a pleasure to see Neema Hunt today in Neurosurgery Clinic. He is a 57 year old male who underwent:    Procedure Date: 05/27/2021     PREOPERATIVE DIAGNOSES:    1.  L5-S1 bilateral foraminal stenosis with radiculopathy.  2.  History of previous L2-L5 fusion.     POSTOPERATIVE DIAGNOSES:    1.  L5-S1 bilateral foraminal stenosis with radiculopathy.  2.  History of previous L2-L5 fusion.     PROCEDURES:    1.  L5-S1 bilateral posterior segmental instrumentation using [employer] Solera system.  2.  Bilateral L5-S1 decompression, transforaminal interbody fusion using Capstone control cages and local autograft.  3.  Revision of L2-L4 ian.  4.  Posterior arthrodesis, L5-S1 using local autograft and allograft cancellous chips.     SURGEON:  Nhan Marshall MD     ASSISTANT:  Prasanna Oliva PA-C    Overall he is doing reasonably well.  He thinks that he is better than prior to surgery but continues to have some right lateral calf symptoms.  He feels that he did tweak his back and physical therapy with some worsening of his symptoms.    Vitals:    04/29/22 1518   BP: 121/79   Pulse: 55   SpO2: 98%     Estimated body mass index is 27.98 kg/m  as calculated from the following:    Height as of 8/11/21: 1.778 m (5' 10\").    Weight as of 8/11/21: 88.5 kg (195 lb).  Mild Pain (3)    Well-healed lumbar incisions.  No pain to palpation.  Bilateral lower extremity strength 5 out of 5 in all muscle groups.    Imaging: CT scan of the lumbar spine shows solid fusion from L2-L5.  There does appear to be pseudoarthrosis developing at L5-S1 with haloing around the S1 screws and some vacuum sign in the disc.  The imaging was reviewed with the patient shown " to the patient clinic today.    Assessment: Status post lumbar fusion.  Pseudoarthrosis L5-S1.    Plan: At this point I do not think intervention is necessary although we did discuss bone growth stimulator which I think might be helpful in this setting.  However if he continues to have problems and his symptoms worsen then we would likely need to consider revision fusion at L5-S1 including pelvic instrumentation.  I have scheduled him to see me in 1 year with a repeat CT to see what things look like in that timeframe.  He will return sooner if he has worsening symptoms or other problems.         Again, thank you for allowing me to participate in the care of your patient.        Sincerely,        Nhan Marshall MD

## 2022-04-29 NOTE — LETTER
April 29, 2022    Neema Hunt  [address] [facility] [place] [address]      To Whom It May Concern:    Mr. Hunt has a permanent 40 pound lifting restriction.    Sincerely,        Nhan Marshall MD

## 2022-05-13 ENCOUNTER — OFFICE VISIT (OUTPATIENT)
Dept: SURGERY | Facility: CLINIC | Age: 57
End: 2022-05-13
Payer: COMMERCIAL

## 2022-05-13 VITALS — HEART RATE: 65 BPM | SYSTOLIC BLOOD PRESSURE: 120 MMHG | DIASTOLIC BLOOD PRESSURE: 72 MMHG | OXYGEN SATURATION: 97 %

## 2022-05-13 DIAGNOSIS — K40.91 UNILATERAL RECURRENT INGUINAL HERNIA WITHOUT OBSTRUCTION OR GANGRENE: ICD-10-CM

## 2022-05-13 DIAGNOSIS — K42.9 UMBILICAL HERNIA WITHOUT OBSTRUCTION AND WITHOUT GANGRENE: Primary | ICD-10-CM

## 2022-05-13 PROCEDURE — 99204 OFFICE O/P NEW MOD 45 MIN: CPT | Performed by: SURGERY

## 2022-05-13 NOTE — PROGRESS NOTES
[facility] General Surgery Clinic Consultation    CHIEF COMPLAINT:  Chief Complaint   Patient presents with     Consult     [facility]        HISTORY OF PRESENT ILLNESS:  Neema Hunt is a 57 year old male who is seen in consultation at the request of Dr. Mittal for evaluation of umbilical and right inguinal hernias.  The patient was previously seen by one of my surgical partners in August 2021.  At that time, he was noted to have both umbilical and recurrent right inguinal hernias.  The patient did previously undergo open right inguinal hernia repair with placement of mesh approximately 25 years ago.  Since last August, the patient reports noticing intermittent symptoms, most pronounced at the umbilicus.  He does describe some associated indigestion.  No discomfort or bulging in the left inguinal area.  No other previous abdominal surgical procedures.  Patient denies use of tobacco.  No significant family history of hernias.    REVIEW OF SYSTEMS:  Constitutional: No fevers or chills  Eyes: No blurred or double vision  HENT: Denies headaches, No rhinorrhea, No sore throat  Respiratory: No cough or shortness of breath  Cardiovascular: Denies chest pain or palpitations  Gastrointestinal: No abdominal pain or nausea/vomiting  Genitourinary: No hematuria or dysuria  Musculoskeletal: Right knee pain  Neurologic: Right lower extremity numbness/tingling  Integumentary: No skin rashes    Past Medical History:   Diagnosis Date     Arthritis      Back pain      Gastro-oesophageal reflux disease      Hypertension      Radiculopathy      Scoliosis        Past Surgical History:   Procedure Laterality Date     DISCECTOMY LUMBAR POSTERIOR MICROSCOPIC ONE LEVEL Right 7/15/2020    Procedure: Right L5-S1 foraminotomy and microdiskectomy;  Surgeon: Nhan Marshall MD;  Location: [facility] OR     EXPLORE SPINE, REMOVE HARDWARE, COMBINED N/A 9/5/2019    Procedure: REMOVAL LUMBAR L3-5 INSTRUMENTATION;  Surgeon: Nhan Marshall MD;   Location: [facility] OR     FUSION SPINE ANTERIOR MINIMALLY INVASIVE TWO LEVELS N/A 11/16/2016    Procedure: FUSION SPINE ANTERIOR MINIMALLY INVASIVE TWO LEVELS;  Surgeon: Nhan Marshall MD;  Location: [facility]     FUSION, SPINE, LUMBAR, 1 LEVEL, POSTERIOR APPROACH, ROBOTIC-ASSISTED N/A 5/27/2021    Procedure: Lumbar 5-Sacral 1 posterior segemental instrumentation, bilateral decompression and transforaminal interbody fusion using local autograft and allograft cancellous chips. Revision of Lumbar 2-4 ian. Posterior arthrodesis Lumbar 5-Sacral 1 using  local autograft and allograft cancellous chips;  Surgeon: Nhan Marshall MD;  Location:  OR     HERNIA REPAIR      right inguinal hernia     LAMINECTOMY LUMBAR POSTERIOR MICROSCOPIC ONE LEVEL  4/9/2013    Procedure: LAMINECTOMY LUMBAR POSTERIOR MICROSCOPIC ONE LEVEL;  Right Lumbar 3-4 Micro Laminectomy & Foraminotomy;  Surgeon: Nhan Marshall MD;  [facility]: [facility] OR     OPTICAL TRACKING SYSTEM FUSION SPINE POSTERIOR LUMBAR PERCUTANEOUS TWO LEVELS N/A 11/16/2016    Procedure: OPTICAL TRACKING SYSTEM FUSION SPINE POSTERIOR LUMBAR PERCUTANEOUS TWO LEVELS;  Surgeon: Nhan Marshall MD;  Location[facility]     OPTICAL TRACKING SYSTEM FUSION SPINE POSTERIOR LUMBAR THREE+ LEVELS Right 9/5/2019    Procedure: POSTERIOR SEGMENTAL INSTRUMENTATION L2-4, RIGHT LUMBAR 2-3 DISCECTOMY AND TRANSFORAMINAL INTERBODY FUSION, REDO DECOMPRESSION RIGHT L3-4, POSTERIOR ARTHRODESIS L2-4;  Surgeon: Nhan Marshall MD;  Location:  OR     ORTHOPEDIC SURGERY      left ankle, right finger       Family History   Problem Relation Age of Onset     Asthma Mother      Hypertension Mother      Arthritis Mother        Social History     Tobacco Use     Smoking status: Former Smoker     Smokeless tobacco: Never Used   Substance Use Topics     Alcohol use: Yes     Comment: 3-4 drinks/week       Patient Active Problem List   Diagnosis     Acquired kyphosis     Other secondary scoliosis, lumbar  region     Spinal stenosis of lumbar region without neurogenic claudication     Status post lumbar spinal fusion     Spinal stenosis of lumbar region with radiculopathy       No Known Allergies    Current Outpatient Medications   Medication Sig Dispense Refill     acetaminophen (TYLENOL) 500 MG tablet Take 1,000 mg by mouth daily as needed for mild pain (2 X 500 mg)       amLODIPine-benazepril (LOTREL) 10-20 MG capsule Take 1 capsule by mouth every morning        Cyanocobalamin (B-12 PO) Take 2 chew tab by mouth every morning        docusate sodium (COLACE) 100 MG capsule Take 1 capsule (100 mg) by mouth 2 times daily as needed for constipation 20 capsule 0     famotidine (PEPCID) 20 MG tablet Take 20 mg by mouth as needed       gabapentin (NEURONTIN) 300 MG capsule Take 1 capsule (300 mg) by mouth At Bedtime 90 capsule 3     hydrocortisone 2.5 % ointment Apply topically daily as needed        lidocaine (LIDODERM) 5 % Patch Place 1 patch onto the skin every 24 hours 30 patch 2     Melatonin 10 MG TABS tablet Take 10 mg by mouth nightly as needed for sleep       Multiple Vitamins-Minerals (AIRBORNE PO) Take 1 chew tab by mouth every morning        Multiple Vitamins-Minerals (MULTIVITAMIN ADULT PO) Take 2 chew tab by mouth every morning        omega 3 1000 MG CAPS Take 1 chew tab by mouth every morning        polyethylene glycol (MIRALAX) 17 g packet Take 1 packet by mouth every morning       Probiotic Product (SOLUBLE FIBER/PROBIOTICS PO) Take 1 chew tab by mouth every morning        tetrahydrozoline (VISINE) 0.05 % ophthalmic solution Place 1 drop into both eyes daily as needed         Vitals: /72   Pulse 65   SpO2 97%   BMI= There is no height or weight on file to calculate BMI.    EXAM:  General: Vital signs reviewed, in no apparent distress  Eyes: Anicteric  HENT: Normocephalic, atraumatic, trachea midline   Respiratory: Breathing nonlabored  Cardiovascular: Regular rate and rhythm  GI: Abdomen soft,  nondistended, nontender; easily reducible umbilical hernia with approximately 1.5 cm fascial defect; small, reducible right inguinal hernia, possible small left inguinal hernia  Musculoskeletal: No gross deformities  Neurologic: Grossly nonfocal exam  Psychiatric: Normal mood, affect and insight  Integumentary: Warm and dry      ASSESSMENT:  Neema Hunt is a 57 year old who presents with umbilical hernia, recurrent right inguinal hernia, possible left inguinal hernia.  Significant pertinent co-morbidities include: None.       PLAN:  Following a thorough discussion with the patient, the decision was made to proceed to the operating room for robotic repair of the patient's umbilical and right inguinal hernias with placement of mesh.  If I do identify a left inguinal hernia at the time of surgery, this will be repaired with mesh as well.  Risks and benefits of surgery were discussed.  The procedure will be scheduled at the patient's earliest convenience.    It was my pleasure to participate in the care of Neema Hunt in clinic today. Thank you for this consultation.         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  Per [facility] policy, any patient questions/concerns regarding the income that I earn from [employer] were addressed today in clinic.

## 2022-05-13 NOTE — LETTER
May 16, 2022          Cornel Mittal MD  [address] [facility] [address]  [place] [address]      RE:   Neema Hunt 1965      Dear Colleague,    Thank you for referring your patient, Neema Hunt, to [facility] Please see a copy of my visit note below.     Neema Hunt is a 57 year old male who is seen in consultation at the request of Dr. Mittal for evaluation of umbilical and right inguinal hernias.  The patient was previously seen by one of my surgical partners in August 2021.  At that time, he was noted to have both umbilical and recurrent right inguinal hernias.  The patient did previously undergo open right inguinal hernia repair with placement of mesh approximately 25 years ago.  Since last August, the patient reports noticing intermittent symptoms, most pronounced at the umbilicus.  He does describe some associated indigestion.  No discomfort or bulging in the left inguinal area.  No other previous abdominal surgical procedures.  Patient denies use of tobacco.  No significant family history of hernias.     REVIEW OF SYSTEMS:  Constitutional: No fevers or chills  Eyes: No blurred or double vision  HENT: Denies headaches, No rhinorrhea, No sore throat  Respiratory: No cough or shortness of breath  Cardiovascular: Denies chest pain or palpitations  Gastrointestinal: No abdominal pain or nausea/vomiting  Genitourinary: No hematuria or dysuria  Musculoskeletal: Right knee pain  Neurologic: Right lower extremity numbness/tingling  Integumentary: No skin rashes          Past Medical History:   Diagnosis Date     Arthritis       Back pain       Gastro-oesophageal reflux disease       Hypertension       Radiculopathy       Scoliosis                 Past Surgical History:   Procedure Laterality Date     DISCECTOMY LUMBAR POSTERIOR MICROSCOPIC ONE LEVEL Right 7/15/2020     Procedure: Right L5-S1 foraminotomy and microdiskectomy;  Surgeon: Nhan Marshall MD;   Location:  OR     EXPLORE SPINE, REMOVE HARDWARE, COMBINED N/A 9/5/2019     Procedure: REMOVAL LUMBAR L3-5 INSTRUMENTATION;  Surgeon: Nhan Marshall MD;  Location: [facility] OR     FUSION SPINE ANTERIOR MINIMALLY INVASIVE TWO LEVELS N/A 11/16/2016     Procedure: FUSION SPINE ANTERIOR MINIMALLY INVASIVE TWO LEVELS;  Surgeon: Nhan Marshall MD;  [facility]     FUSION, SPINE, LUMBAR, 1 LEVEL, POSTERIOR APPROACH, ROBOTIC-ASSISTED N/A 5/27/2021     Procedure: Lumbar 5-Sacral 1 posterior segemental instrumentation, bilateral decompression and transforaminal interbody fusion using local autograft and allograft cancellous chips. Revision of Lumbar 2-4 ian. Posterior arthrodesis Lumbar 5-Sacral 1 using  local autograft and allograft cancellous chips;  Surgeon: Nhan Marshall MD;  Location:  OR     HERNIA REPAIR         right inguinal hernia     LAMINECTOMY LUMBAR POSTERIOR MICROSCOPIC ONE LEVEL   4/9/2013     Procedure: LAMINECTOMY LUMBAR POSTERIOR MICROSCOPIC ONE LEVEL;  Right Lumbar 3-4 Micro Laminectomy & Foraminotomy;  Surgeon: Nhan Marshall MD;  [facility]: [facility]     OPTICAL TRACKING SYSTEM FUSION SPINE POSTERIOR LUMBAR PERCUTANEOUS TWO LEVELS N/A 11/16/2016     Procedure: OPTICAL TRACKING SYSTEM FUSION SPINE POSTERIOR LUMBAR PERCUTANEOUS TWO LEVELS;  Surgeon: Nhan Marshall MD;  [facility]     OPTICAL TRACKING SYSTEM FUSION SPINE POSTERIOR LUMBAR THREE+ LEVELS Right 9/5/2019     Procedure: POSTERIOR SEGMENTAL INSTRUMENTATION L2-4, RIGHT LUMBAR 2-3 DISCECTOMY AND TRANSFORAMINAL INTERBODY FUSION, REDO DECOMPRESSION RIGHT L3-4, POSTERIOR ARTHRODESIS L2-4;  Surgeon: Nhan Marshall MD;  Location:  OR     ORTHOPEDIC SURGERY         left ankle, right finger               Family History   Problem Relation Age of Onset     Asthma Mother       Hypertension Mother       Arthritis Mother           Social History            Tobacco Use     Smoking status: Former Smoker     Smokeless  tobacco: Never Used   Substance Use Topics     Alcohol use: Yes       Comment: 3-4 drinks/week             Patient Active Problem List   Diagnosis     Acquired kyphosis     Other secondary scoliosis, lumbar region     Spinal stenosis of lumbar region without neurogenic claudication     Status post lumbar spinal fusion     Spinal stenosis of lumbar region with radiculopathy         No Known Allergies            Current Outpatient Medications   Medication Sig Dispense Refill     acetaminophen (TYLENOL) 500 MG tablet Take 1,000 mg by mouth daily as needed for mild pain (2 X 500 mg)         amLODIPine-benazepril (LOTREL) 10-20 MG capsule Take 1 capsule by mouth every morning          Cyanocobalamin (B-12 PO) Take 2 chew tab by mouth every morning          docusate sodium (COLACE) 100 MG capsule Take 1 capsule (100 mg) by mouth 2 times daily as needed for constipation 20 capsule 0     famotidine (PEPCID) 20 MG tablet Take 20 mg by mouth as needed         gabapentin (NEURONTIN) 300 MG capsule Take 1 capsule (300 mg) by mouth At Bedtime 90 capsule 3     hydrocortisone 2.5 % ointment Apply topically daily as needed          lidocaine (LIDODERM) 5 % Patch Place 1 patch onto the skin every 24 hours 30 patch 2     Melatonin 10 MG TABS tablet Take 10 mg by mouth nightly as needed for sleep         Multiple Vitamins-Minerals (AIRBORNE PO) Take 1 chew tab by mouth every morning          Multiple Vitamins-Minerals (MULTIVITAMIN ADULT PO) Take 2 chew tab by mouth every morning          omega 3 1000 MG CAPS Take 1 chew tab by mouth every morning          polyethylene glycol (MIRALAX) 17 g packet Take 1 packet by mouth every morning         Probiotic Product (SOLUBLE FIBER/PROBIOTICS PO) Take 1 chew tab by mouth every morning          tetrahydrozoline (VISINE) 0.05 % ophthalmic solution Place 1 drop into both eyes daily as needed             Vitals: /72   Pulse 65   SpO2 97%   BMI= There is no height or weight on file to  calculate BMI.     EXAM:  General: Vital signs reviewed, in no apparent distress  Eyes: Anicteric  HENT: Normocephalic, atraumatic, trachea midline   Respiratory: Breathing nonlabored  Cardiovascular: Regular rate and rhythm  GI: Abdomen soft, nondistended, nontender; easily reducible umbilical hernia with approximately 1.5 cm fascial defect; small, reducible right inguinal hernia, possible small left inguinal hernia  Musculoskeletal: No gross deformities  Neurologic: Grossly nonfocal exam  Psychiatric: Normal mood, affect and insight  Integumentary: Warm and dry        ASSESSMENT:  Neema Hunt is a 57 year old who presents with umbilical hernia, recurrent right inguinal hernia, possible left inguinal hernia.  Significant pertinent co-morbidities include: None.         PLAN:  Following a thorough discussion with the patient, the decision was made to proceed to the operating room for robotic repair of the patient's umbilical and right inguinal hernias with placement of mesh.  If I do identify a left inguinal hernia at the time of surgery, this will be repaired with mesh as well.  Risks and benefits of surgery were discussed.  The procedure will be scheduled at the patient's earliest convenience.        Again, thank you for allowing me to participate in the care of your patient.      Sincerely,      Mary Morales MD

## 2022-05-18 ENCOUNTER — TELEPHONE (OUTPATIENT)
Dept: SURGERY | Facility: CLINIC | Age: 57
End: 2022-05-18
Payer: COMMERCIAL

## 2022-05-18 NOTE — TELEPHONE ENCOUNTER
Type of surgery: Robotic umbilical hernia repair with mesh, robotic right inguinal hernia repair with mesh, possible bilateral repair  Location of surgery: [facility]  Date and time of surgery: 7/26/22 at 11:30am  Surgeon: Dr. Mary Morales  Pre-Op Appt Date: patient to schedule  Post-Op Appt Date: patient to schedule   Packet sent out: Yes  Pre-cert/Authorization completed:  Not Applicable  Date: 5/18/22

## 2022-07-21 ENCOUNTER — TRANSFERRED RECORDS (OUTPATIENT)
Dept: MULTI SPECIALTY CLINIC | Facility: CLINIC | Age: 57
End: 2022-07-21

## 2022-07-21 LAB
CREATININE (EXTERNAL): 0.78 MG/DL (ref 0.76–1.27)
GFR ESTIMATED (EXTERNAL): 105 ML/MIN/1.73
GLUCOSE (EXTERNAL): 80 MG/DL (ref 65–99)
POTASSIUM (EXTERNAL): 4.4 MMOL/L (ref 3.5–5.2)

## 2022-07-25 ENCOUNTER — ANESTHESIA EVENT (OUTPATIENT)
Dept: SURGERY | Facility: CLINIC | Age: 57
End: 2022-07-25
Payer: COMMERCIAL

## 2022-07-25 RX ORDER — COVID-19 ANTIGEN TEST
220 KIT MISCELLANEOUS 2 TIMES DAILY WITH MEALS
Status: ON HOLD | COMMUNITY
End: 2023-09-29

## 2022-07-25 ASSESSMENT — LIFESTYLE VARIABLES: TOBACCO_USE: 0

## 2022-07-25 ASSESSMENT — COPD QUESTIONNAIRES: COPD: 0

## 2022-07-25 NOTE — ANESTHESIA PREPROCEDURE EVALUATION
Anesthesia Pre-Procedure Evaluation    Patient: Neema Hunt   MRN: [id number] : 1965        Procedure : Procedure(s):  Robotic repair of umbilical hernia with placement of mesh  Robotic repair of recurrent right inguinal hernia with placement of mesh, possible robotic repair of left inguinal hernia with placement of mesh          Past Medical History:   Diagnosis Date     Arthritis      Back pain      Gastro-oesophageal reflux disease      Hypertension      Radiculopathy      Scoliosis       Past Surgical History:   Procedure Laterality Date     DISCECTOMY LUMBAR POSTERIOR MICROSCOPIC ONE LEVEL Right 7/15/2020    Procedure: Right L5-S1 foraminotomy and microdiskectomy;  Surgeon: Nhan Marshall MD;  Location:  OR     EXPLORE SPINE, REMOVE HARDWARE, COMBINED N/A 2019    Procedure: REMOVAL LUMBAR L3-5 INSTRUMENTATION;  Surgeon: Nhan Marshall MD;  Location: [facility] OR     FUSION SPINE ANTERIOR MINIMALLY INVASIVE TWO LEVELS N/A 2016    Procedure: FUSION SPINE ANTERIOR MINIMALLY INVASIVE TWO LEVELS;  Surgeon: Nhan Marshall MD;  [facility]     FUSION, SPINE, LUMBAR, 1 LEVEL, POSTERIOR APPROACH, ROBOTIC-ASSISTED N/A 2021    Procedure: Lumbar 5-Sacral 1 posterior segemental instrumentation, bilateral decompression and transforaminal interbody fusion using local autograft and allograft cancellous chips. Revision of Lumbar 2-4 ian. Posterior arthrodesis Lumbar 5-Sacral 1 using  local autograft and allograft cancellous chips;  Surgeon: Nhan Marshall MD;  Location:  OR     HERNIA REPAIR      right inguinal hernia     LAMINECTOMY LUMBAR POSTERIOR MICROSCOPIC ONE LEVEL  2013    Procedure: LAMINECTOMY LUMBAR POSTERIOR MICROSCOPIC ONE LEVEL;  Right Lumbar 3-4 Micro Laminectomy & Foraminotomy;  Surgeon: Nhan Marshall MD;  Location:  OR     OPTICAL TRACKING SYSTEM FUSION SPINE POSTERIOR LUMBAR PERCUTANEOUS TWO LEVELS N/A 2016    Procedure: OPTICAL TRACKING  SYSTEM FUSION SPINE POSTERIOR LUMBAR PERCUTANEOUS TWO LEVELS;  Surgeon: Nhan Marshall MD;  [facility]     OPTICAL TRACKING SYSTEM FUSION SPINE POSTERIOR LUMBAR THREE+ LEVELS Right 9/5/2019    Procedure: POSTERIOR SEGMENTAL INSTRUMENTATION L2-4, RIGHT LUMBAR 2-3 DISCECTOMY AND TRANSFORAMINAL INTERBODY FUSION, REDO DECOMPRESSION RIGHT L3-4, POSTERIOR ARTHRODESIS L2-4;  Surgeon: Nhan Marshall MD;  Location:  OR     ORTHOPEDIC SURGERY      left ankle, right finger      No Known Allergies   Social History     Tobacco Use     Smoking status: Former Smoker     Smokeless tobacco: Never Used   Substance Use Topics     Alcohol use: Yes     Comment: 3-4 drinks/week      Wt Readings from Last 1 Encounters:   08/11/21 88.5 kg (195 lb)        Anesthesia Evaluation   Pt has had prior anesthetic. Type: General.    No history of anesthetic complications       ROS/MED HX  ENT/Pulmonary:    (-) tobacco use, asthma, COPD, sleep apnea and recent URI   Neurologic:  - neg neurologic ROS     Cardiovascular:     (+) hypertension----- (-) CAD   METS/Exercise Tolerance:     Hematologic:  - neg hematologic  ROS     Musculoskeletal: Comment: Hx of lumbar fusion  (+) arthritis,     GI/Hepatic:     (+) GERD,  (-) liver disease   Renal/Genitourinary:    (-) renal disease   Endo:    (-) Type I DM and Type II DM   Psychiatric/Substance Use:       Infectious Disease:       Malignancy:       Other:            Physical Exam    Airway        Mallampati: II   TM distance: > 3 FB   Neck ROM: full   Mouth opening: > 3 cm    Respiratory Devices and Support         Dental  no notable dental history         Cardiovascular   cardiovascular exam normal          Pulmonary   pulmonary exam normal                OUTSIDE LABS:  CBC:   Lab Results   Component Value Date    WBC 10.2 09/11/2019    WBC 9.1 09/08/2019    HGB 10.5 (L) 05/29/2021    HGB 10.8 (L) 05/28/2021    HCT 25.1 (L) 09/11/2019    HCT 26.6 (L) 09/08/2019     09/11/2019      09/08/2019     BMP:   Lab Results   Component Value Date     09/11/2019     (L) 09/08/2019    POTASSIUM 3.4 05/27/2021    POTASSIUM 3.5 07/15/2020    CHLORIDE 102 09/11/2019    CHLORIDE 97 09/08/2019    CO2 29 09/11/2019    CO2 31 09/08/2019    BUN 10 09/11/2019    BUN 8 09/08/2019    CR 0.77 07/15/2020    CR 0.78 09/11/2019     (H) 09/11/2019     (H) 09/08/2019     COAGS:   Lab Results   Component Value Date    PTT 30 04/09/2013    INR 1.13 11/09/2016     POC:   Lab Results   Component Value Date    BGM 94 05/29/2021     HEPATIC:   Lab Results   Component Value Date    ALBUMIN 2.9 (L) 09/11/2019    PROTTOTAL 8.4 09/11/2019    ALT 26 09/11/2019    AST 22 09/11/2019    ALKPHOS 74 09/11/2019    BILITOTAL 0.4 09/11/2019     OTHER:   Lab Results   Component Value Date    LACT 0.8 09/11/2019    JASON 9.0 09/11/2019    LIPASE 108 09/11/2019       Anesthesia Plan    ASA Status:  2   NPO Status:  NPO Appropriate    Anesthesia Type: General.     - Airway: ETT   Induction: Intravenous.   Maintenance: Balanced.   Techniques and Equipment:     - Lines/Monitors: 2nd IV     Consents    Anesthesia Plan(s) and associated risks, benefits, and realistic alternatives discussed. Questions answered and patient/representative(s) expressed understanding.    - Discussed:     - Discussed with:  Patient      - Extended Intubation/Ventilatory Support Discussed: No.      - Patient is DNR/DNI Status: No    Use of blood products discussed: Yes.     - Discussed with: Patient.     - Consented: consented to blood products            Reason for refusal: other.     Postoperative Care    Pain management: IV analgesics, Multi-modal analgesia.     - Plan for long acting post-op opioid use   PONV prophylaxis: Ondansetron (or other 5HT-3), Dexamethasone or Solumedrol, Background Propofol Infusion     Comments:                Chris Heck MD

## 2022-07-26 ENCOUNTER — ANESTHESIA (OUTPATIENT)
Dept: SURGERY | Facility: CLINIC | Age: 57
End: 2022-07-26
Payer: COMMERCIAL

## 2022-07-26 ENCOUNTER — HOSPITAL ENCOUNTER (OUTPATIENT)
Facility: CLINIC | Age: 57
Discharge: HOME | End: 2022-07-26
Attending: SURGERY | Admitting: SURGERY
Payer: COMMERCIAL

## 2022-07-26 VITALS
WEIGHT: 191.2 LBS | SYSTOLIC BLOOD PRESSURE: 140 MMHG | HEART RATE: 70 BPM | DIASTOLIC BLOOD PRESSURE: 88 MMHG | BODY MASS INDEX: 27.37 KG/M2 | TEMPERATURE: 98.1 F | RESPIRATION RATE: 16 BRPM | OXYGEN SATURATION: 98 % | HEIGHT: 70 IN

## 2022-07-26 DIAGNOSIS — K40.91 UNILATERAL RECURRENT INGUINAL HERNIA WITHOUT OBSTRUCTION OR GANGRENE: ICD-10-CM

## 2022-07-26 DIAGNOSIS — K42.9 UMBILICAL HERNIA WITHOUT OBSTRUCTION AND WITHOUT GANGRENE: ICD-10-CM

## 2022-07-26 LAB — POTASSIUM BLD-SCNC: 4.1 MMOL/L (ref 3.4–5.3)

## 2022-07-26 PROCEDURE — 49651 LAP ING HERNIA REPAIR RECUR: CPT | Mod: RT | Performed by: SURGERY

## 2022-07-26 PROCEDURE — 258N000003 HC RX IP 258 OP 636: Performed by: ANESTHESIOLOGY

## 2022-07-26 PROCEDURE — 250N000009 HC RX 250: Performed by: NURSE ANESTHETIST, CERTIFIED REGISTERED

## 2022-07-26 PROCEDURE — 250N000011 HC RX IP 250 OP 636: Performed by: NURSE ANESTHETIST, CERTIFIED REGISTERED

## 2022-07-26 PROCEDURE — 250N000013 HC RX MED GY IP 250 OP 250 PS 637: Performed by: PHYSICIAN ASSISTANT

## 2022-07-26 PROCEDURE — 272N000001 HC OR GENERAL SUPPLY STERILE: Performed by: SURGERY

## 2022-07-26 PROCEDURE — 49651 LAP ING HERNIA REPAIR RECUR: CPT | Mod: AS | Performed by: PHYSICIAN ASSISTANT

## 2022-07-26 PROCEDURE — 999N000141 HC STATISTIC PRE-PROCEDURE NURSING ASSESSMENT: Performed by: SURGERY

## 2022-07-26 PROCEDURE — 49650 LAP ING HERNIA REPAIR INIT: CPT | Mod: AS | Performed by: PHYSICIAN ASSISTANT

## 2022-07-26 PROCEDURE — 710N000012 HC RECOVERY PHASE 2, PER MINUTE: Performed by: SURGERY

## 2022-07-26 PROCEDURE — 710N000009 HC RECOVERY PHASE 1, LEVEL 1, PER MIN: Performed by: SURGERY

## 2022-07-26 PROCEDURE — 49650 LAP ING HERNIA REPAIR INIT: CPT | Mod: LT | Performed by: SURGERY

## 2022-07-26 PROCEDURE — 360N000080 HC SURGERY LEVEL 7, PER MIN: Performed by: SURGERY

## 2022-07-26 PROCEDURE — 36415 COLL VENOUS BLD VENIPUNCTURE: CPT | Performed by: ANESTHESIOLOGY

## 2022-07-26 PROCEDURE — 250N000009 HC RX 250: Performed by: SURGERY

## 2022-07-26 PROCEDURE — 370N000017 HC ANESTHESIA TECHNICAL FEE, PER MIN: Performed by: SURGERY

## 2022-07-26 PROCEDURE — C1781 MESH (IMPLANTABLE): HCPCS | Performed by: SURGERY

## 2022-07-26 PROCEDURE — 84132 ASSAY OF SERUM POTASSIUM: CPT | Performed by: ANESTHESIOLOGY

## 2022-07-26 PROCEDURE — 250N000025 HC SEVOFLURANE, PER MIN: Performed by: SURGERY

## 2022-07-26 PROCEDURE — 250N000011 HC RX IP 250 OP 636: Performed by: ANESTHESIOLOGY

## 2022-07-26 PROCEDURE — 49653 PR LAP VENT/ABD HERN PROC COMP: CPT | Mod: AS | Performed by: PHYSICIAN ASSISTANT

## 2022-07-26 PROCEDURE — 49653 PR LAP VENT/ABD HERN PROC COMP: CPT | Performed by: SURGERY

## 2022-07-26 PROCEDURE — 250N000011 HC RX IP 250 OP 636: Performed by: SURGERY

## 2022-07-26 DEVICE — MESH MACROPOROUS POLY 15X10CM X3 PPM1510X3: Type: IMPLANTABLE DEVICE | Site: UMBILICAL | Status: FUNCTIONAL

## 2022-07-26 DEVICE — MESH DEXTILE ANATOMICAL 15CM X 10CM LG RIGHT DXT1510AR: Type: IMPLANTABLE DEVICE | Site: GROIN | Status: FUNCTIONAL

## 2022-07-26 DEVICE — MESH DEXTILE ANATOMICAL 15CM X 10CM LG LEFT DXT1510AL: Type: IMPLANTABLE DEVICE | Site: GROIN | Status: FUNCTIONAL

## 2022-07-26 RX ORDER — HYDROMORPHONE HYDROCHLORIDE 1 MG/ML
INJECTION, SOLUTION INTRAMUSCULAR; INTRAVENOUS; SUBCUTANEOUS PRN
Status: DISCONTINUED | OUTPATIENT
Start: 2022-07-26 | End: 2022-07-26

## 2022-07-26 RX ORDER — PROPOFOL 10 MG/ML
INJECTION, EMULSION INTRAVENOUS PRN
Status: DISCONTINUED | OUTPATIENT
Start: 2022-07-26 | End: 2022-07-26

## 2022-07-26 RX ORDER — HYDROMORPHONE HCL IN WATER/PF 6 MG/30 ML
0.2 PATIENT CONTROLLED ANALGESIA SYRINGE INTRAVENOUS EVERY 5 MIN PRN
Status: DISCONTINUED | OUTPATIENT
Start: 2022-07-26 | End: 2022-07-26 | Stop reason: HOSPADM

## 2022-07-26 RX ORDER — ONDANSETRON 4 MG/1
4 TABLET, ORALLY DISINTEGRATING ORAL EVERY 30 MIN PRN
Status: DISCONTINUED | OUTPATIENT
Start: 2022-07-26 | End: 2022-07-26 | Stop reason: HOSPADM

## 2022-07-26 RX ORDER — FENTANYL CITRATE 0.05 MG/ML
25 INJECTION, SOLUTION INTRAMUSCULAR; INTRAVENOUS EVERY 5 MIN PRN
Status: DISCONTINUED | OUTPATIENT
Start: 2022-07-26 | End: 2022-07-26 | Stop reason: HOSPADM

## 2022-07-26 RX ORDER — GLYCOPYRROLATE 0.2 MG/ML
INJECTION, SOLUTION INTRAMUSCULAR; INTRAVENOUS PRN
Status: DISCONTINUED | OUTPATIENT
Start: 2022-07-26 | End: 2022-07-26

## 2022-07-26 RX ORDER — OXYCODONE HYDROCHLORIDE 5 MG/1
5-10 TABLET ORAL EVERY 4 HOURS PRN
Qty: 18 TABLET | Refills: 0 | Status: SHIPPED | OUTPATIENT
Start: 2022-07-26 | End: 2023-09-18

## 2022-07-26 RX ORDER — NEOSTIGMINE METHYLSULFATE 1 MG/ML
VIAL (ML) INJECTION PRN
Status: DISCONTINUED | OUTPATIENT
Start: 2022-07-26 | End: 2022-07-26

## 2022-07-26 RX ORDER — MAGNESIUM HYDROXIDE 1200 MG/15ML
LIQUID ORAL PRN
Status: DISCONTINUED | OUTPATIENT
Start: 2022-07-26 | End: 2022-07-26 | Stop reason: HOSPADM

## 2022-07-26 RX ORDER — LIDOCAINE HYDROCHLORIDE 20 MG/ML
INJECTION, SOLUTION INFILTRATION; PERINEURAL PRN
Status: DISCONTINUED | OUTPATIENT
Start: 2022-07-26 | End: 2022-07-26

## 2022-07-26 RX ORDER — CEFAZOLIN SODIUM/WATER 2 G/20 ML
2 SYRINGE (ML) INTRAVENOUS SEE ADMIN INSTRUCTIONS
Status: DISCONTINUED | OUTPATIENT
Start: 2022-07-26 | End: 2022-07-26 | Stop reason: HOSPADM

## 2022-07-26 RX ORDER — OXYCODONE HYDROCHLORIDE 5 MG/1
5 TABLET ORAL
Status: COMPLETED | OUTPATIENT
Start: 2022-07-26 | End: 2022-07-26

## 2022-07-26 RX ORDER — DEXMEDETOMIDINE HYDROCHLORIDE 4 UG/ML
INJECTION, SOLUTION INTRAVENOUS PRN
Status: DISCONTINUED | OUTPATIENT
Start: 2022-07-26 | End: 2022-07-26

## 2022-07-26 RX ORDER — AMOXICILLIN 250 MG
1-2 CAPSULE ORAL 2 TIMES DAILY PRN
Qty: 20 TABLET | Refills: 0 | Status: ON HOLD | OUTPATIENT
Start: 2022-07-26 | End: 2023-09-29

## 2022-07-26 RX ORDER — BUPIVACAINE HYDROCHLORIDE AND EPINEPHRINE 5; 5 MG/ML; UG/ML
INJECTION, SOLUTION PERINEURAL PRN
Status: DISCONTINUED | OUTPATIENT
Start: 2022-07-26 | End: 2022-07-26 | Stop reason: HOSPADM

## 2022-07-26 RX ORDER — ONDANSETRON 2 MG/ML
INJECTION INTRAMUSCULAR; INTRAVENOUS PRN
Status: DISCONTINUED | OUTPATIENT
Start: 2022-07-26 | End: 2022-07-26

## 2022-07-26 RX ORDER — MEPERIDINE HYDROCHLORIDE 25 MG/ML
12.5 INJECTION INTRAMUSCULAR; INTRAVENOUS; SUBCUTANEOUS
Status: DISCONTINUED | OUTPATIENT
Start: 2022-07-26 | End: 2022-07-26 | Stop reason: HOSPADM

## 2022-07-26 RX ORDER — FENTANYL CITRATE 0.05 MG/ML
25 INJECTION, SOLUTION INTRAMUSCULAR; INTRAVENOUS
Status: DISCONTINUED | OUTPATIENT
Start: 2022-07-26 | End: 2022-07-26 | Stop reason: HOSPADM

## 2022-07-26 RX ORDER — LABETALOL HYDROCHLORIDE 5 MG/ML
INJECTION, SOLUTION INTRAVENOUS PRN
Status: DISCONTINUED | OUTPATIENT
Start: 2022-07-26 | End: 2022-07-26

## 2022-07-26 RX ORDER — SODIUM CHLORIDE, SODIUM LACTATE, POTASSIUM CHLORIDE, CALCIUM CHLORIDE 600; 310; 30; 20 MG/100ML; MG/100ML; MG/100ML; MG/100ML
INJECTION, SOLUTION INTRAVENOUS CONTINUOUS
Status: DISCONTINUED | OUTPATIENT
Start: 2022-07-26 | End: 2022-07-26 | Stop reason: HOSPADM

## 2022-07-26 RX ORDER — ONDANSETRON 2 MG/ML
4 INJECTION INTRAMUSCULAR; INTRAVENOUS EVERY 30 MIN PRN
Status: DISCONTINUED | OUTPATIENT
Start: 2022-07-26 | End: 2022-07-26 | Stop reason: HOSPADM

## 2022-07-26 RX ORDER — OXYCODONE HYDROCHLORIDE 5 MG/1
5 TABLET ORAL EVERY 4 HOURS PRN
Status: DISCONTINUED | OUTPATIENT
Start: 2022-07-26 | End: 2022-07-26 | Stop reason: HOSPADM

## 2022-07-26 RX ORDER — CEFAZOLIN SODIUM/WATER 2 G/20 ML
2 SYRINGE (ML) INTRAVENOUS
Status: COMPLETED | OUTPATIENT
Start: 2022-07-26 | End: 2022-07-26

## 2022-07-26 RX ORDER — DEXAMETHASONE SODIUM PHOSPHATE 4 MG/ML
INJECTION, SOLUTION INTRA-ARTICULAR; INTRALESIONAL; INTRAMUSCULAR; INTRAVENOUS; SOFT TISSUE PRN
Status: DISCONTINUED | OUTPATIENT
Start: 2022-07-26 | End: 2022-07-26

## 2022-07-26 RX ORDER — FENTANYL CITRATE 50 UG/ML
INJECTION, SOLUTION INTRAMUSCULAR; INTRAVENOUS PRN
Status: DISCONTINUED | OUTPATIENT
Start: 2022-07-26 | End: 2022-07-26

## 2022-07-26 RX ADMIN — ROCURONIUM BROMIDE 30 MG: 50 INJECTION, SOLUTION INTRAVENOUS at 14:03

## 2022-07-26 RX ADMIN — DEXAMETHASONE SODIUM PHOSPHATE 4 MG: 4 INJECTION, SOLUTION INTRA-ARTICULAR; INTRALESIONAL; INTRAMUSCULAR; INTRAVENOUS; SOFT TISSUE at 13:26

## 2022-07-26 RX ADMIN — OXYCODONE HYDROCHLORIDE 5 MG: 5 TABLET ORAL at 17:18

## 2022-07-26 RX ADMIN — ROCURONIUM BROMIDE 20 MG: 50 INJECTION, SOLUTION INTRAVENOUS at 13:33

## 2022-07-26 RX ADMIN — ROCURONIUM BROMIDE 10 MG: 50 INJECTION, SOLUTION INTRAVENOUS at 15:26

## 2022-07-26 RX ADMIN — FENTANYL CITRATE 25 MCG: 50 INJECTION, SOLUTION INTRAMUSCULAR; INTRAVENOUS at 16:49

## 2022-07-26 RX ADMIN — GLYCOPYRROLATE 0.8 MG: 0.2 INJECTION, SOLUTION INTRAMUSCULAR; INTRAVENOUS at 15:51

## 2022-07-26 RX ADMIN — ROCURONIUM BROMIDE 20 MG: 50 INJECTION, SOLUTION INTRAVENOUS at 14:46

## 2022-07-26 RX ADMIN — FENTANYL CITRATE 100 MCG: 50 INJECTION, SOLUTION INTRAMUSCULAR; INTRAVENOUS at 13:13

## 2022-07-26 RX ADMIN — ROCURONIUM BROMIDE 20 MG: 50 INJECTION, SOLUTION INTRAVENOUS at 14:30

## 2022-07-26 RX ADMIN — FENTANYL CITRATE 25 MCG: 50 INJECTION, SOLUTION INTRAMUSCULAR; INTRAVENOUS at 16:28

## 2022-07-26 RX ADMIN — HYDROMORPHONE HYDROCHLORIDE 0.5 MG: 1 INJECTION, SOLUTION INTRAMUSCULAR; INTRAVENOUS; SUBCUTANEOUS at 15:55

## 2022-07-26 RX ADMIN — FENTANYL CITRATE 25 MCG: 50 INJECTION, SOLUTION INTRAMUSCULAR; INTRAVENOUS at 17:22

## 2022-07-26 RX ADMIN — DEXMEDETOMIDINE HYDROCHLORIDE 12 MCG: 200 INJECTION INTRAVENOUS at 13:37

## 2022-07-26 RX ADMIN — PROPOFOL 200 MG: 10 INJECTION, EMULSION INTRAVENOUS at 13:13

## 2022-07-26 RX ADMIN — LABETALOL HYDROCHLORIDE 5 MG: 5 INJECTION INTRAVENOUS at 15:57

## 2022-07-26 RX ADMIN — SODIUM CHLORIDE, POTASSIUM CHLORIDE, SODIUM LACTATE AND CALCIUM CHLORIDE: 600; 310; 30; 20 INJECTION, SOLUTION INTRAVENOUS at 13:07

## 2022-07-26 RX ADMIN — SODIUM CHLORIDE, POTASSIUM CHLORIDE, SODIUM LACTATE AND CALCIUM CHLORIDE: 600; 310; 30; 20 INJECTION, SOLUTION INTRAVENOUS at 15:02

## 2022-07-26 RX ADMIN — ROCURONIUM BROMIDE 50 MG: 50 INJECTION, SOLUTION INTRAVENOUS at 13:13

## 2022-07-26 RX ADMIN — ONDANSETRON 4 MG: 2 INJECTION INTRAMUSCULAR; INTRAVENOUS at 16:32

## 2022-07-26 RX ADMIN — LIDOCAINE HYDROCHLORIDE 100 MG: 20 INJECTION, SOLUTION INFILTRATION; PERINEURAL at 13:13

## 2022-07-26 RX ADMIN — Medication 2 G: at 13:09

## 2022-07-26 RX ADMIN — ONDANSETRON 4 MG: 2 INJECTION INTRAMUSCULAR; INTRAVENOUS at 15:47

## 2022-07-26 RX ADMIN — NEOSTIGMINE METHYLSULFATE 5 MG: 1 INJECTION, SOLUTION INTRAVENOUS at 15:51

## 2022-07-26 RX ADMIN — MIDAZOLAM 2 MG: 1 INJECTION INTRAMUSCULAR; INTRAVENOUS at 13:12

## 2022-07-26 RX ADMIN — HYDROMORPHONE HYDROCHLORIDE 0.5 MG: 1 INJECTION, SOLUTION INTRAMUSCULAR; INTRAVENOUS; SUBCUTANEOUS at 13:40

## 2022-07-26 RX ADMIN — DEXMEDETOMIDINE HYDROCHLORIDE 8 MCG: 200 INJECTION INTRAVENOUS at 13:40

## 2022-07-26 NOTE — BRIEF OP NOTE
[facility]    Brief Operative Note    Pre-operative diagnosis: Umbilical hernia without obstruction and without gangrene [K42.9]  Unilateral recurrent inguinal hernia without obstruction or gangrene [K40.91]  Post-operative diagnosis Umbilical hernia. Bilateral Inguinal hernia    Procedure: Procedure(s):  Robotic repair of umbilical hernia with placement of mesh  Robotic repair of recurrent right inguinal hernia with placement of mesh, robotic repair of left inguinal hernia with placement of mesh  Surgeon: Surgeon(s) and Role:     * Mary Morales MD - Primary     * Lonny Mittal PA-C - Assisting  Anesthesia: General   Estimated Blood Loss: 10cc    Drains: None  Specimens: * No specimens in log *  Findings:   Bilateral groin hernias. Umbilical hernia.  Complications: None.  Implants:   Implant Name Type Inv. Item Serial No.  Lot No. LRB No. Used Action   MESH DEXTILE ANATOMICAL 15CM X 10CM LG LEFT [id number] - [id number] Mesh MESH DEXTILE ANATOMICAL 15CM X 10CM LG LEFT [id number]  COVIDIEN [id number] Left 1 Implanted   MESH DEXTILE ANATOMICAL 15CM X 10CM LG RIGHT [id number] - [id number] Mesh MESH DEXTILE ANATOMICAL 15CM X 10CM LG RIGHT [id number]  COVIDIEN [id number] Right 1 Implanted   MESH MACROPOROUS POLY [id number] [id number] - [id number] Mesh MESH MACROPOROUS POLY [id number] X3 [id number]  COVIDIEN [id number] N/A 1 Implanted       Lonny COE. FREYA Mittal

## 2022-07-26 NOTE — DISCHARGE INSTRUCTIONS
[facility]  Discharge Instructions: Post-Operative Robotic Inguinal and Umbilical Hernia Repair    ACTIVITY  Take frequent, short walks and increase your activity gradually.    Avoid strenuous physical activity or heavy lifting greater than 15 lbs. for 3-4 weeks.  You may climb stairs.  You may drive without restrictions when you are not using any prescription pain medication and feel comfortable in a car.  You may return to work/school when you are comfortable without any prescription pain medication.    WOUND CARE  You may remove your outer dressing or Band-Aids and shower 48 hours after the surgery.  Pat your incisions dry and leave them open to air.  Re-apply dressing (Band-Aids or gauze/tape) as needed for comfort or drainage.  You may have steri-strips (looks like white tape) on your incision.  You may peel off the steri-strips 2 weeks after your surgery if they have not peeled off on their own.   Do not soak your incisions in a tub or pool for 2 weeks.   Do not apply any lotions, creams, or ointments to your incisions.  A ridge under your incisions is normal and will gradually resolve.    DIET  Start with liquids, then gradually resume your regular diet as tolerated.   Drink plenty of fluids to stay hydrated.    PAIN  Expect some tenderness and discomfort at the incision site(s).  Use the prescribed pain medication at your discretion.  Expect gradual resolution of your pain over several days.  You may take ibuprofen with food (unless you have been told not to) or acetaminophen/Tylenol instead of or in addition to your prescribed pain medication.  If you are taking Norco or Percocet, do not take any additional acetaminophen/Tylenol.  Do not drink alcohol or drive while you are taking pain medications.  You may apply ice to your incisions in 20 minute intervals as needed for the next 48 hours.  After that time, consider switching to heat if you prefer.    EXPECTATIONS  You may  notice air in your scrotum and/or penis after the surgery.  This is due to the gas used during the surgery.  You can expect some swelling and bruising involving the scrotum and/or penis as well as numbness.  These symptoms are expected and should gradually resolve in the next few days.  You may use ice to help with the swelling and try placing a rolled hand towel below your scrotum to help alleviate scrotal discomfort.  If you develop significant testicular or penile pain, please call our office and speak with a nurse.  Pain medications can cause constipation.  Limit use when possible.  Take over the counter stool softener/stimulant, such as Colace or Senna, 1-2 times a day with plenty of water.  You may take a mild over the counter laxative, such as Miralax or a suppository, as needed.  You may take 1 oz. (2 tablespoons) Milk of Magnesia the evening following surgery to encourage bowel movement.  You may discontinue these medications once you are having regular bowel movements and/or are no longer taking your narcotic pain medication.   You may have shoulder or upper back discomfort due to the gas used in surgery.  This is temporary and should resolve in 48-72 hours.  Short, frequent walks may help with this.  If you are unable to urinate, or feel as though you are not emptying your bladder adequately, we recommend you call our office and/or seek care at an ER or Urgent Care facility if after hours.    FOLLOW UP  Our office will contact you in approximately 2 weeks to check on your progress and answer any questions you may have.  If you are doing well, you will not need to return for a follow up appointment.  If any concerns are identified over the phone, we will help you make an appointment to see a provider.   If you have not received a phone call, have any questions or concerns, or would like to be seen, please call us at [phone] and ask to speak with our nurse.  We are located at [address]  [address] [place] [address].    CALL OUR OFFICE -[phone] IF YOU HAVE:   Chills or fever above 101 F.  Increased redness, warmth, or drainage at your incisions.  Significant bleeding.  Pain not relieved by your pain medication or rest.  Increasing pain after the first 48 hours.  Any other concerns or questions.       Rev 9/20        Same Day Surgery Discharge Instructions for  Sedation and General Anesthesia     It's not unusual to feel dizzy, light-headed or faint for up to 24 hours after surgery or while taking pain medication.  If you have these symptoms: sit for a few minutes before standing and have someone assist you when you get up to walk or use the bathroom.    You should rest and relax for the next 24 hours. We recommend you make arrangements to have an adult stay with you for at least 24 hours after your discharge.  Avoid hazardous and strenuous activity.    DO NOT DRIVE any vehicle or operate mechanical equipment for 24 hours following the end of your surgery.  Even though you may feel normal, your reactions may be affected by the medication you have received.    Do not drink alcoholic beverages for 24 hours following surgery.     Slowly progress to your regular diet as you feel able. It's not unusual to feel nauseated and/or vomit after receiving anesthesia.  If you develop these symptoms, drink clear liquids (apple juice, ginger ale, broth, 7-up, etc. ) until you feel better.  If your nausea and vomiting persists for 24 hours, please notify your surgeon.      All narcotic pain medications, along with inactivity and anesthesia, can cause constipation. Drinking plenty of liquids and increasing fiber intake will help.    For any questions of a medical nature, call your surgeon.    Do not make important decisions for 24 hours.    If you had general anesthesia, you may have a sore throat for a couple of days related to the breathing tube used during surgery.  You may use Cepacol lozenges to help with  this discomfort.  If it worsens or if you develop a fever, contact your surgeon.     If you feel your pain is not well managed with the pain medications prescribed by your surgeon, please contact your surgeon's office to let them know so they can address your concerns.       Today you received Toradol, an antiinflammatory medication similar to Ibuprofen.  You should not take other antiinflammatory medication, such as Ibuprofen, Motrin, Advil, Aleve, Naprosyn, etc until 8:00pm      .**If you have concerns or questions about your procedure,    please contact Dr Morales at  [phone]**

## 2022-07-26 NOTE — INTERVAL H&P NOTE
"I have reviewed the surgical (or preoperative) H&P that is linked to this encounter, and examined the patient. There are no significant changes    Clinical Conditions Present on Arrival:  Clinically Significant Risk Factors Present on Admission                   # Overweight: Estimated body mass index is 27.43 kg/m  as calculated from the following:    Height as of this encounter: 1.778 m (5' 10\").    Weight as of this encounter: 86.7 kg (191 lb 3.2 oz).       "

## 2022-07-26 NOTE — ANESTHESIA POSTPROCEDURE EVALUATION
Patient: Neema Hunt    Procedure: Procedure(s):  Robotic repair of umbilical hernia with placement of mesh  Robotic repair of recurrent right inguinal hernia with placement of mesh, robotic repair of left inguinal hernia with placement of mesh       Anesthesia Type:  General    Note:     Postop Pain Control: Uneventful            Sign Out: Well controlled pain   PONV: No   Neuro/Psych: Uneventful            Sign Out: Acceptable/Baseline neuro status   Airway/Respiratory: Uneventful            Sign Out: Acceptable/Baseline resp. status   CV/Hemodynamics: Uneventful            Sign Out: Acceptable CV status; No obvious hypovolemia; No obvious fluid overload   Other NRE: NONE   DID A NON-ROUTINE EVENT OCCUR? No           Last vitals:  Vitals Value Taken Time   /91 07/26/22 1715   Temp 37.1  C (98.7  F) 07/26/22 1700   Pulse 72 07/26/22 1727   Resp 22 07/26/22 1727   SpO2 99 % 07/26/22 1727   Vitals shown include unvalidated device data.    Electronically Signed By: Masood Peres MD  July 26, 2022  5:28 PM

## 2022-07-26 NOTE — ANESTHESIA CARE TRANSFER NOTE
Patient: Neema Hunt    Procedure: Procedure(s):  Robotic repair of umbilical hernia with placement of mesh  Robotic repair of recurrent right inguinal hernia with placement of mesh, robotic repair of left inguinal hernia with placement of mesh       Diagnosis: Umbilical hernia without obstruction and without gangrene [K42.9]  Unilateral recurrent inguinal hernia without obstruction or gangrene [K40.91]  Diagnosis Additional Information: No value filed.    Anesthesia Type:   General     Note:    Oropharynx: oropharynx clear of all foreign objects  Level of Consciousness: drowsy  Oxygen Supplementation: face mask  Level of Supplemental Oxygen (L/min / FiO2): 6  Independent Airway: airway patency satisfactory and stable  Dentition: dentition unchanged  Vital Signs Stable: post-procedure vital signs reviewed and stable  Report to RN Given: handoff report given  Patient transferred to: PACU    Handoff Report: Identifed the Patient, Identified the Reponsible Provider, Reviewed the pertinent medical history, Discussed the surgical course, Reviewed Intra-OP anesthesia mangement and issues during anesthesia, Set expectations for post-procedure period and Allowed opportunity for questions and acknowledgement of understanding      Vitals:  Vitals Value Taken Time   /95 07/26/22 1602   Temp     Pulse 79 07/26/22 1604   Resp 12 07/26/22 1604   SpO2 100 % 07/26/22 1604   Vitals shown include unvalidated device data.    Electronically Signed By: NADIR Maradiaga CRNA  July 26, 2022  4:05 PM

## 2022-07-26 NOTE — OP NOTE
Procedure Date: 07/26/22    STAFF SURGEON:  Mary Morales MD    ASSISTANT:  Lonny Mittal PA-C     PREOPERATIVE DIAGNOSIS:    1.  Recurrent right inguinal hernia, possible left inguinal hernia.  2.  Umbilical hernia    POSTOPERATIVE DIAGNOSIS:    1.   Left inguinal hernia, recurrent right inguinal hernia  2.  Umbilical hernia    PROCEDURE:    1. Robotic repair of bilateral inguinal hernias with placement of bilateral preperitoneal mesh.  2.  Robotic repair of umbilical hernia with placement of preperitoneal mesh    INDICATION FOR OPERATION:  Neeam is a 57-year-old male who presented to the Surgery Clinic with complaints of right inguinal pain.  Patient had undergone previous open right inguinal hernia repair with placement of mesh.  On examination, the patient was found to have evidence of a reducible right inguinal hernia and a possible small left inguinal hernia.  The patient was also found to have evidence of reducible umbilical hernia.  The decision was made for the patient to proceed to the operating room for robotic repair of umbilical and recurrent right and possible left inguinal hernias with placement of mesh.  The risks and benefits of the procedure were discussed with the patient.  Consent for the procedure was obtained.    ANESTHESIA:  General.    DESCRIPTION OF PROCEDURE:  The patient was brought to the preoperative area and preoperative antibiotics were administered.  The patient was brought back to the operating room and placed in the supine position on the operating table.  A timeout was completed.  Anesthesia was induced and the patient was intubated.  The patient was secured to the operating table and his pressure points were padded.  The patient's abdomen was prepped and draped in the sterile fashion.  Following infiltration with local anesthetic, the 11 blade scalpel was used to make a small incision overlying the patient's left upper quadrant.  Entrance into the abdomen was then gained under  direct visualization using the 5 mm Visiport and the 5 mm laparoscope.  When the patient's abdomen had been safely entered, it was fully insufflated.  The laparoscope was reinserted into the abdominal cavity and initial inspection revealed no evidence of injury at the port entry site.  Under direct visualization, 2 additional 8 mm robotic ports were placed, one in the supraumbilical position and one in the right upper quadrant.  The patient's left upper quadrant port was then up-sized to an 8 mm robotic port.  The patient was placed into the Trendelenburg position.  The robot was brought onto the field and docked.  Under direct visualization, the robotic fenestrated grasper and robotic scissors were introduced into the abdominal cavity.  The patient's bilateral inguinal areas were surveyed.  The patient was found to have evidence of bilateral indirect and direct inguinal hernias.  We began our dissection on the patient's right side.  The peritoneum was incised from the level of the anterior-superior iliac spine to the abdominal midline superior to the patient's hernia defects, utilizing the robotic scissors.  The patient's preperitoneal space was then developed using the robotic scissors, electrocautery, and blunt dissection.  We dissected down to Darek's ligament medially and then continued our dissection in a medial to lateral fashion.  The patient's direct and indirect hernia sacs were carefully using the electrocautery and blunt dissection.  We dissected out laterally to accommodate placement of the mesh.  The patient's peritoneal edge was then swept inferiorly along the length of our preperitoneal pocket utilizing blunt dissection and the electrocautery.  We then turned our attention to the patient's left side.  In a similar fashion, the peritoneum was incised and the preperitoneal pocket was developed.  We continued our medial dissection on the left side until we met up with her medial dissection from the  right side.  The direct inguinal hernia sac was reduced and the small indirect inguinal hernia sac on the left side was carefully  from the cord structures using blunt dissection and the electrocautery.  The peritoneum was swept inferiorly along the length of our preperitoneal pocket.  A piece of large right-sided and large left-sided direct Dextile mesh were then rolled and inserted into the abdominal cavity.  Each piece of mesh was situated in the correct-sided pocket with excellent overlap of the potential hernia sites of the myopectineal orifice.  Each piece of mesh was sutured into place medially at Darek's ligament and just lateral to the epigastric vessels using interrupted 2-0 Vicryl sutures.  The mesh was overlapped medially at the pubis.  The peritoneum was then reapproximated over each piece of mesh using a running 3-0 Stratafix suture.  Small holes in the peritoneum on both sides were reapproximated using figure-of-eight 2-0 Vicryl sutures.  The surgical sites were inspected and found to be hemostatic.  The remaining needles and suture material were removed from the abdominal cavity under direct visualization.    Our attention was then turned to the patient's umbilicus.  The peritoneum was incised superior to the patient's umbilical defect utilizing the robotic scissors.  The periumbilical preperitoneal space was then developed using blunt dissection and the electrocautery.  A large plug of preperitoneal fat was reduced from the patient's umbilical fascial defect.  The fascial defect was found to measure approximately 3 cm.  Once the preperitoneal space had been fully developed, the fascial defect was reapproximated using a running #1 strata fix suture.  A piece of Parietene mesh was then trimmed to an 8 cm [place].  The mesh was placed into our preperitoneal pocket with excellent overlap of the umbilical defect circumferentially.  The mesh was secured in each of the 4 quadrants utilizing  interrupted 2-0 Vicryl sutures.  The peritoneal pocket was then reapproximated over the top of the mesh using a running 3-0 strata fix suture.  The remaining needles and suture material were removed from the abdominal cavity under direct visualization.      The robotic equipment was removed from the abdominal cavity and the robot was undocked.  The patient's abdomen was allowed to desufflate fully and the robotic ports were removed.  The port sites were inspected and hemostasis was ensured.  The port sites were then reapproximated using 4-0 Monocryl subcuticular stitches.  The incisions were washed and dried and skin glue was applied.  Lap, needle and instrument counts were correct at the end of the procedure.  The patient tolerated the procedure well and was extubated and taken to the recovery area in stable condition.    Lonny Mittal PA-C assisted in the above procedure. They provided assistance with pre-operative positioning, prepping, and draping of the patient. The assistant provided vital operative assistance with retraction using instruments thus providing the necessary exposure and visualization for the case, manipulation of tissues to achieve hemostasis, and assisted in closure of the wound. Post-operatively they assisted in transfer of the patient off the operative table and transition to the PACU physicians.    ESTIMATED BLOOD LOSS:  10 mL.    FINDINGS: Direct and indirect inguinal hernias bilaterally (recurrent indirect inguinal hernia on the right) repaired with sac reduction and placement of large Dextile preperitoneal mesh.  Umbilical hernia with approximately 3 cm fascial defect repaired primarily and with placement of 8 cm Parietene preperitoneal mesh.    COMPLICATIONS:  None.    SPECIMENS:  None.    DRAINS:  None.    CONDITION:  The patient will be discharged to home directly from the postanesthesia care unit with instructions for postoperative cares and medications for pain management.    Mary  EMMY Morales MD

## 2022-07-26 NOTE — PROGRESS NOTES
Patient brought a picture of home covid antigen test on phone as directed.  I personally saw this result and it was time stamped 7/24/22.  Result was negative.

## 2022-07-26 NOTE — ANESTHESIA PROCEDURE NOTES
Airway       Patient location during procedure: OR       Procedure Start/Stop Times: 7/26/2022 1:16 PM  Staff -        Anesthesiologist:  Chris Heck MD       CRNA: Elise Patel APRN CRNA       Performed By: CRNA  Consent for Airway        Urgency: elective  Indications and Patient Condition       Indications for airway management: rubén-procedural       Induction type:intravenous       Mask difficulty assessment: 2 - vent by mask + OA or adjuvant +/- NMBA    Final Airway Details       Final airway type: endotracheal airway       Successful airway: ETT - single  Endotracheal Airway Details        ETT size (mm): 8.0       Cuffed: yes       Successful intubation technique: direct laryngoscopy       DL Blade Type: Painter 2       Grade View of Cords: 1       Adjucts: stylet       Position: Right       Measured from: gums/teeth       Secured at (cm): 23       Bite block used: None    Post intubation assessment        Placement verified by: capnometry, equal breath sounds and chest rise        Number of attempts at approach: 1       Number of other approaches attempted: 0       Secured with: silk tape       Ease of procedure: easy       Dentition: Intact and Unchanged    Medication(s) Administered   Medication Administration Time: 7/26/2022 1:16 PM

## 2022-08-16 ENCOUNTER — TELEPHONE (OUTPATIENT)
Dept: SURGERY | Facility: CLINIC | Age: 57
End: 2022-08-16

## 2022-08-16 NOTE — LETTER
[facility] - [facility] SERVICE LINE  [address] [address]          August 16, 2022    RE:  Neema Hunt                                                                                                                                                       [address] [address]            To whom it may concern:    Neema Hunt underwent surgery with Dr. Morales and is recovering as expected. He  may return to work on 9/5/22 working limited hours per day to accommodate his ongoing recovery. I would expect he will be able to work full time by mid September.      Regards,       oLnny Mittal PA-C

## 2022-08-16 NOTE — TELEPHONE ENCOUNTER
[facility]  Post op call note - Minimally Invasive Inguinal Hernia Repair    Neema Hunt was called for an update regarding his recovery.  He underwent   1. Robotic repair of bilateral inguinal hernias with placement of bilateral preperitoneal mesh.  2.  Robotic repair of umbilical hernia with placement of preperitoneal mesh  by Dr. Morales on 7/26/22.     Today he tells me he is doing well and denies any complaints.  He currently takes APAP intermittently for pain, which is mostly in his testicles at this point.  He is eating a normal diet and his bowels are regular.   He states he is slowly resuming normal activity.  He states his wounds are healing well   He denies any erythema or drainage at his wounds.      He may advance his activity as tolerated with no heavy lifting > 20 lbs or strenuous exercise x 1-2 more weeks. He is planning to return to work on 9/5 with limited hours, then increase to full time in mid September.   A letter has been drafted outlining this plan.    The patient states all of his questions were answered and he understands our discussion.  He agrees to follow up as needed and to call our office with any concerns.    Lonny Mittal PA-C

## 2022-10-15 ENCOUNTER — HEALTH MAINTENANCE LETTER (OUTPATIENT)
Age: 57
End: 2022-10-15

## 2023-01-29 ENCOUNTER — MYC MEDICAL ADVICE (OUTPATIENT)
Dept: NEUROSURGERY | Facility: CLINIC | Age: 58
End: 2023-01-29
Payer: COMMERCIAL

## 2023-01-29 DIAGNOSIS — Z98.1 S/P SPINAL FUSION: Primary | ICD-10-CM

## 2023-02-02 NOTE — TELEPHONE ENCOUNTER
Patient s/p Lumbar fusion 5/27/21. Last seen in clinic 4/29/22. Plan then was to followup in a year with CT Lumbar prior.   Patient sent [facility] complaining of increased symptoms.     As for Dr Marshall, ok for patient to return to clinic sooner than originally planned, with CT prior.     CT ordered.   Patient notified.

## 2023-02-03 ENCOUNTER — HOSPITAL ENCOUNTER (OUTPATIENT)
Dept: CT IMAGING | Facility: CLINIC | Age: 58
Discharge: HOME | End: 2023-02-03
Attending: NEUROLOGICAL SURGERY | Admitting: NEUROLOGICAL SURGERY
Payer: COMMERCIAL

## 2023-02-03 DIAGNOSIS — Z98.1 S/P SPINAL FUSION: ICD-10-CM

## 2023-02-03 PROCEDURE — 72131 CT LUMBAR SPINE W/O DYE: CPT

## 2023-02-10 ENCOUNTER — OFFICE VISIT (OUTPATIENT)
Dept: NEUROSURGERY | Facility: CLINIC | Age: 58
End: 2023-02-10
Payer: COMMERCIAL

## 2023-02-10 VITALS
HEIGHT: 70 IN | OXYGEN SATURATION: 100 % | BODY MASS INDEX: 28.43 KG/M2 | DIASTOLIC BLOOD PRESSURE: 79 MMHG | SYSTOLIC BLOOD PRESSURE: 137 MMHG | WEIGHT: 198.56 LBS | HEART RATE: 59 BPM

## 2023-02-10 DIAGNOSIS — Z98.1 S/P SPINAL FUSION: ICD-10-CM

## 2023-02-10 DIAGNOSIS — M96.0 PSEUDARTHROSIS AFTER FUSION OR ARTHRODESIS: Primary | ICD-10-CM

## 2023-02-10 PROCEDURE — 99213 OFFICE O/P EST LOW 20 MIN: CPT | Performed by: NEUROLOGICAL SURGERY

## 2023-02-10 ASSESSMENT — PAIN SCALES - GENERAL: PAINLEVEL: MILD PAIN (3)

## 2023-02-10 NOTE — NURSING NOTE
"Neema Hunt is a 57 year old male who presents for:  Chief Complaint   Patient presents with     RECHECK     S/P Lumbar Fusion        Initial Vitals:  /79   Pulse 59   Ht 5' 10\" (1.778 m)   Wt 198 lb 9 oz (90.1 kg)   SpO2 100%   BMI 28.49 kg/m   Estimated body mass index is 28.49 kg/m  as calculated from the following:    Height as of this encounter: 5' 10\" (1.778 m).    Weight as of this encounter: 198 lb 9 oz (90.1 kg).. Body surface area is 2.11 meters squared. BP completed using cuff size: small regular  Mild Pain (3)        Kimberly Stephenson  "

## 2023-02-10 NOTE — PROGRESS NOTES
"It was a pleasure to see Neema Hunt today in Neurosurgery Clinic. He is a 57 year old male who last underwent:    Procedure Date: 05/27/2021     PREOPERATIVE DIAGNOSES:    1.  L5-S1 bilateral foraminal stenosis with radiculopathy.  2.  History of previous L2-L5 fusion.     POSTOPERATIVE DIAGNOSES:    1.  L5-S1 bilateral foraminal stenosis with radiculopathy.  2.  History of previous L2-L5 fusion.     PROCEDURES:    1.  L5-S1 bilateral posterior segmental instrumentation using Medtronic Solera system.  2.  Bilateral L5-S1 decompression, transforaminal interbody fusion using Capstone control cages and local autograft.  3.  Revision of L2-L4 ian.  4.  Posterior arthrodesis, L5-S1 using local autograft and allograft cancellous chips.     SURGEON:  Nhan Marshall MD     ASSISTANT:  Prasanna Oliva PA-C    He returns today with some increase in his symptoms.  He feels like his chronic L5 radicular symptoms seem to worsen with some weakness in the right foot and some occasional flopping of the foot.  He also feels that increasing activity seems to worsen his low back pain.    Vitals:    02/10/23 1422   BP: 137/79   Pulse: 59   SpO2: 100%   Weight: 90.1 kg (198 lb 9 oz)   Height: 1.778 m (5' 10\")     Estimated body mass index is 28.49 kg/m  as calculated from the following:    Height as of this encounter: 1.778 m (5' 10\").    Weight as of this encounter: 90.1 kg (198 lb 9 oz).  Mild Pain (3)    Well-healed lumbar incision  Bilateral lower extremity strength is 5 out of 5 in all muscle groups except for 4+ out of 5 ankle dorsiflexion inversion eversion    Imaging: Review of his CT of the lumbar spine and compared with 2022 shows there is clear evidence of pseudoarthrosis at L5-S1.  The haloing of around the S1 screws appears to have worsened.  Imaging was reviewed with the patient shown to the patient clinic today.    Assessment: L5-S1 pseudoarthrosis and stenosis with radiculopathy.    Plan: At this point the " patient is not eager to undergo further surgery.  I think that however repair of the pseudoarthrosis at L5-S1 will be necessary.  For the meantime however we will continue to try to manage this conservatively.  If needed I think right L5-S1 transforaminal epidural might be a reasonable course of action.  When he returns to clinic I would like to obtain standing scoliosis films on the EOS machine for further planning purposes for potential surgery in the future.

## 2023-02-10 NOTE — LETTER
February 10, 2023    Neema Hunt  [address] [place] [address]    Todays Date:  2/10/2023    Patient was seen in clinic today  yes    Patient:  Neema Hunt  : 1965    Physician/Nurse Practitioner: Nhan Marshall MD    He may not lift more than 20# at work for the next month, until March 15, 2023.          Nhan Marshall MD

## 2023-02-10 NOTE — LETTER
"    2/10/2023         RE: Neema Hunt  [address] [address]        Dear Colleague,    Thank you for referring your patient, Neema Hunt, to the [facility]. Please see a copy of my visit note below.    It was a pleasure to see Neema Hunt today in Neurosurgery Clinic. He is a 57 year old male who last underwent:    Procedure Date: 05/27/2021     PREOPERATIVE DIAGNOSES:    1.  L5-S1 bilateral foraminal stenosis with radiculopathy.  2.  History of previous L2-L5 fusion.     POSTOPERATIVE DIAGNOSES:    1.  L5-S1 bilateral foraminal stenosis with radiculopathy.  2.  History of previous L2-L5 fusion.     PROCEDURES:    1.  L5-S1 bilateral posterior segmental instrumentation using Medtronic Solera system.  2.  Bilateral L5-S1 decompression, transforaminal interbody fusion using Capstone control cages and local autograft.  3.  Revision of L2-L4 ian.  4.  Posterior arthrodesis, L5-S1 using local autograft and allograft cancellous chips.     SURGEON:  Nhan Marshall MD     ASSISTANT:  Prasanna Oliva PA-C    He returns today with some increase in his symptoms.  He feels like his chronic L5 radicular symptoms seem to worsen with some weakness in the right foot and some occasional flopping of the foot.  He also feels that increasing activity seems to worsen his low back pain.    Vitals:    02/10/23 1422   BP: 137/79   Pulse: 59   SpO2: 100%   Weight: 90.1 kg (198 lb 9 oz)   Height: 1.778 m (5' 10\")     Estimated body mass index is 28.49 kg/m  as calculated from the following:    Height as of this encounter: 1.778 m (5' 10\").    Weight as of this encounter: 90.1 kg (198 lb 9 oz).  Mild Pain (3)    Well-healed lumbar incision  Bilateral lower extremity strength is 5 out of 5 in all muscle groups except for 4+ out of 5 ankle dorsiflexion inversion eversion    Imaging: Review of his CT of the lumbar spine and compared with 2022 shows there is clear evidence of " pseudoarthrosis at L5-S1.  The haloing of around the S1 screws appears to have worsened.  Imaging was reviewed with the patient shown to the patient clinic today.    Assessment: L5-S1 pseudoarthrosis and stenosis with radiculopathy.    Plan: At this point the patient is not eager to undergo further surgery.  I think that however repair of the pseudoarthrosis at L5-S1 will be necessary.  For the meantime however we will continue to try to manage this conservatively.  If needed I think right L5-S1 transforaminal epidural might be a reasonable course of action.  When he returns to clinic I would like to obtain standing scoliosis films on the EOS machine for further planning purposes for potential surgery in the future.         Again, thank you for allowing me to participate in the care of your patient.        Sincerely,        Nhan Marshall MD

## 2023-03-14 ENCOUNTER — TELEPHONE (OUTPATIENT)
Dept: NEUROSURGERY | Facility: CLINIC | Age: 58
End: 2023-03-14
Payer: COMMERCIAL

## 2023-03-14 NOTE — TELEPHONE ENCOUNTER
Returned call. Reviewed what updated letter stated. She will call back with any further questions or concerns.

## 2023-03-14 NOTE — TELEPHONE ENCOUNTER
Ev calling from patient's work requesting clarification on the most recent note that was faxed to her regarding notes from 3/15/23. The note that was sent was not clear and blurry and she wanted clarification on what it says. Please call her back at [phone] Thank you

## 2023-03-26 ENCOUNTER — HEALTH MAINTENANCE LETTER (OUTPATIENT)
Age: 58
End: 2023-03-26

## 2023-03-30 ENCOUNTER — TELEPHONE (OUTPATIENT)
Dept: NEUROSURGERY | Facility: CLINIC | Age: 58
End: 2023-03-30
Payer: COMMERCIAL

## 2023-03-30 NOTE — CONFIDENTIAL NOTE
"Last Visit: 2/10/23    Next Visit: None scheduled    Name of Provider:  Dr. Marshall    Assessment: Patient last saw Dr. Marshall on 2/10. Per Dr. Marshall's last OV note, right L5-S1 transforaminal epidural might be a reasonable course of action.     Called patient to discuss -     Reviewed last office visit note with patient and mentioned possible L5-S1 injection.     Patient states he is interested in possibly getting an injection for his mid-back. Patient states pain is at 'elbow height.' Patient states he feels \"clicking\", tightness, and aching especially with twisting.     He states his lower back seems to be improving so he doesn't think he needs an injection at his low back at this time.     Recommendation given: Will route to provider for review and recommendation. Will call patient with recommendations.     Action needed from provider: Patient requesting injection for his mid back pain.      "

## 2023-03-30 NOTE — TELEPHONE ENCOUNTER
Patient requesting call back from 's nursing team. He is still experiencing pain and interested in getting an injection and other treatment options.

## 2023-03-31 NOTE — CONFIDENTIAL NOTE
Dr. Marshall does not recommend injection at a higher level at this time. [facility]

## 2023-08-02 ENCOUNTER — TELEPHONE (OUTPATIENT)
Dept: NEUROSURGERY | Facility: CLINIC | Age: 58
End: 2023-08-02

## 2023-08-02 DIAGNOSIS — Z98.1 S/P SPINAL FUSION: Primary | ICD-10-CM

## 2023-08-02 DIAGNOSIS — M96.0 PSEUDARTHROSIS AFTER FUSION OR ARTHRODESIS: ICD-10-CM

## 2023-08-02 NOTE — TELEPHONE ENCOUNTER
"Patient last saw Dr. Marshall on 2/10/23.    At that time Dr. Marshall stated , \"I think that however repair of the pseudoarthrosis at L5-S1 will be necessary\". Patient was not ready for another surgical procedure at that time.     Dr. Marshall also stated \"When he returns to clinic I would like to obtain standing scoliosis films on the EOS machine for further planning purposes for potential surgery in the future\".     Called patient to discuss.    Patient reporting he has fallen about 4 times in the last month. Denied hitting his head during any falls.     He states both of his legs feel weak, \"from time to time\". Previously his symptoms were all right sided, he states he now also has pain near his left SI joint. Patient feels like all of his symptoms are slowly progressing and wonders what his next steps are.    Reviewed previous above recommendations from Dr. Marshall, patient in agreement with that plan.    Will review with Dr. Marshall to ensure the standing scoli XR on EOS machine is the only imaging needed.       "

## 2023-08-02 NOTE — TELEPHONE ENCOUNTER
No additional imaging besides standing scoli on EOS machine needed prior to appointment with Dr. Marshall.     XR order placed. Called patient to provide update.     Call transferred to scheduling team to coordinate.

## 2023-08-02 NOTE — TELEPHONE ENCOUNTER
Patient requests a call back regarding more symptoms he is referring to as debilitating as his legs are getting weaker and has trouble with stairs. He would like to speak to nurse to see what the next steps should be. Please call patient back at [phone]. Thank you~

## 2023-08-03 ENCOUNTER — ANCILLARY PROCEDURE (OUTPATIENT)
Dept: GENERAL RADIOLOGY | Facility: CLINIC | Age: 58
End: 2023-08-03
Attending: NEUROLOGICAL SURGERY

## 2023-08-03 DIAGNOSIS — M96.0 PSEUDARTHROSIS AFTER FUSION OR ARTHRODESIS: ICD-10-CM

## 2023-08-03 DIAGNOSIS — Z98.1 S/P SPINAL FUSION: ICD-10-CM

## 2023-08-03 PROCEDURE — 72082 X-RAY EXAM ENTIRE SPI 2/3 VW: CPT | Performed by: STUDENT IN AN ORGANIZED HEALTH CARE EDUCATION/TRAINING PROGRAM

## 2023-08-14 ENCOUNTER — OFFICE VISIT (OUTPATIENT)
Dept: NEUROSURGERY | Facility: CLINIC | Age: 58
End: 2023-08-14
Attending: NEUROLOGICAL SURGERY

## 2023-08-14 VITALS
HEART RATE: 63 BPM | DIASTOLIC BLOOD PRESSURE: 80 MMHG | WEIGHT: 198 LBS | HEIGHT: 70 IN | SYSTOLIC BLOOD PRESSURE: 133 MMHG | BODY MASS INDEX: 28.35 KG/M2 | OXYGEN SATURATION: 99 %

## 2023-08-14 DIAGNOSIS — Z98.1 STATUS POST LUMBAR SPINAL FUSION: ICD-10-CM

## 2023-08-14 DIAGNOSIS — M47.12 CERVICAL SPONDYLOSIS WITH MYELOPATHY: Primary | ICD-10-CM

## 2023-08-14 DIAGNOSIS — R29.898 WEAKNESS OF BOTH LOWER LIMBS: ICD-10-CM

## 2023-08-14 DIAGNOSIS — M54.6 THORACIC SPINE PAIN: ICD-10-CM

## 2023-08-14 PROCEDURE — 99214 OFFICE O/P EST MOD 30 MIN: CPT | Performed by: NEUROLOGICAL SURGERY

## 2023-08-14 PROCEDURE — G0463 HOSPITAL OUTPT CLINIC VISIT: HCPCS | Performed by: NEUROLOGICAL SURGERY

## 2023-08-14 ASSESSMENT — PAIN SCALES - GENERAL: PAINLEVEL: EXTREME PAIN (8)

## 2023-08-14 NOTE — LETTER
"    8/14/2023         RE: Neema Hunt  [address] [facility] [address]        Dear Colleague,    Thank you for referring your patient, Neema Hunt, to the [facility] [place]. Please see a copy of my visit note below.    It was a pleasure to see Neema Hunt today in Neurosurgery Clinic. He is a 58 year old male who has a history of multiple previous lumbar fusions.  I have been following him for his pseudoarthrosis at L5-S1.  However he is had worsening symptoms including pain along the left iliac crest and hip and proximal weakness of the lower extremities.  He also has some tingling along the left lateral calf.  He also describes what sounds like a Lhermitte's type phenomenon when he extends his neck.    Vitals:    08/14/23 1527   BP: 133/80   Pulse: 63   SpO2: 99%   Weight: 89.8 kg (198 lb)   Height: 1.778 m (5' 10\")     Estimated body mass index is 28.41 kg/m  as calculated from the following:    Height as of this encounter: 1.778 m (5' 10\").    Weight as of this encounter: 89.8 kg (198 lb).  Extreme Pain (8)    Awake alert and oriented.  Well-healed lumbar incision  Bilateral lower extremities with proximal weakness 4 out of 5.    Negative SHERMAN sign bilaterally.    Imaging: Review of updated x-rays shows his previous instrumentation with the previously noted concerns for pseudoarthrosis.  Imaging was reviewed with the patient shown the patient in clinic today.    Assessment: Status post lumbar fusion.  Bilateral lower extremity numbness and weakness concerning for cervical or thoracic myelopathy.    Plan: I would like to obtain MRI imaging of the cervical thoracic and lumbar spine to evaluate his lower extremity weakness.  Once this is done we will follow-up with the patient and make further diagnostic and treatment plans as needed.         Again, thank you for allowing me to participate in the care of your patient.        Sincerely,        Nhan" Glenn Marshall MD

## 2023-08-14 NOTE — PROGRESS NOTES
"It was a pleasure to see Neema Hunt today in Neurosurgery Clinic. He is a 58 year old male who has a history of multiple previous lumbar fusions.  I have been following him for his pseudoarthrosis at L5-S1.  However he is had worsening symptoms including pain along the left iliac crest and hip and proximal weakness of the lower extremities.  He also has some tingling along the left lateral calf.  He also describes what sounds like a Lhermitte's type phenomenon when he extends his neck.    Vitals:    08/14/23 1527   BP: 133/80   Pulse: 63   SpO2: 99%   Weight: 89.8 kg (198 lb)   Height: 1.778 m (5' 10\")     Estimated body mass index is 28.41 kg/m  as calculated from the following:    Height as of this encounter: 1.778 m (5' 10\").    Weight as of this encounter: 89.8 kg (198 lb).  Extreme Pain (8)    Awake alert and oriented.  Well-healed lumbar incision  Bilateral lower extremities with proximal weakness 4 out of 5.    Negative SHERMAN sign bilaterally.    Imaging: Review of updated x-rays shows his previous instrumentation with the previously noted concerns for pseudoarthrosis.  Imaging was reviewed with the patient shown the patient in clinic today.    Assessment: Status post lumbar fusion.  Bilateral lower extremity numbness and weakness concerning for cervical or thoracic myelopathy.    Plan: I would like to obtain MRI imaging of the cervical thoracic and lumbar spine to evaluate his lower extremity weakness.  Once this is done we will follow-up with the patient and make further diagnostic and treatment plans as needed.     "

## 2023-08-14 NOTE — PATIENT INSTRUCTIONS
Patient Next Steps:      Order placed for imaging. MRIs Lumbar, Thoracic and Cervical. You can schedule at our  today, or Central Scheduling will call you to make the appointment. If you do not hear from them within 1-2 business days you can call the numbers below. We will call you with the results and next steps once imaging is completed.  [phone] ([place])  [phone] ([place])  [phone] ([facility])  You can call [facility] (previously known as [facility]) to schedule your imaging at [phone]      Dr Marshall would like to see you back in the clinic for follow up after.  Please call the number below to schedule.         Please call us if you have any further questions or concerns.    [facility]   Phone: [phone]  Fax: [phone]

## 2023-08-16 NOTE — NURSING NOTE
Faxed MRI referrals to [facility] August 16, 2023 to fax number [phone]    Right Fax confirmed at 1231 PM    Bandar Reed RN

## 2023-09-11 ENCOUNTER — TELEPHONE (OUTPATIENT)
Dept: NEUROSURGERY | Facility: CLINIC | Age: 58
End: 2023-09-11
Payer: COMMERCIAL

## 2023-09-11 ENCOUNTER — TRANSFERRED RECORDS (OUTPATIENT)
Dept: MULTI SPECIALTY CLINIC | Facility: CLINIC | Age: 58
End: 2023-09-11

## 2023-09-11 LAB
ALT SERPL-CCNC: 18 IU/L (ref 0–44)
AST SERPL-CCNC: 31 IU/L (ref 0–40)
CHOLESTEROL (EXTERNAL): 202 MG/DL (ref 100–199)
CREATININE (EXTERNAL): 0.98 MG/DL (ref 0.76–1.27)
GFR ESTIMATED (EXTERNAL): 90 ML/MIN/1.73M2
GLUCOSE (EXTERNAL): 101 MG/DL (ref 70–99)
HDLC SERPL-MCNC: 113 MG/DL
LDL CHOLESTEROL CALCULATED (EXTERNAL): 80 MG/DL (ref 0–99)
POTASSIUM (EXTERNAL): 4.7 MMOL/L (ref 3.5–5.2)
TRIGLYCERIDES (EXTERNAL): 50 MG/DL (ref 0–149)

## 2023-09-11 NOTE — TELEPHONE ENCOUNTER
Peer to Peer scheduled - 9/12/23- 12:30P:     The authorization for procedure MRI LUMBAR AND THORACIC SPINE on date of service 9/13/2023 has been denied by the patient's insurance for the following reason:     Denial Reason:   In order to complete a medical necessity review on your request, the following information is   required: type and duration of treatment performed in the last 3 months for the diagnosis.     Case Number: [id number]  Appointment Date: 09/12/2023  Appointment Time: 12:30 PM /Central  Name of Provider Requesting P2P: John Deluna   Name: Christine Novak  Phone Number for P2P: [phone]  Alternate Contact Number: [phone]  Contact Instructions: JOHN Deluna on behalf of Dr. Nhan Marshall  Level of Review: Reconsideration P2P

## 2023-09-12 ENCOUNTER — TELEPHONE (OUTPATIENT)
Dept: NEUROSURGERY | Facility: CLINIC | Age: 58
End: 2023-09-12
Payer: COMMERCIAL

## 2023-09-12 NOTE — TELEPHONE ENCOUNTER
PRIOR AUTH FOR IMAGING APPROVAL      Approval number   [id number]  Effective now-10/27/23- thoracic and lumbar expiration     C spine approval number [id number]  Approved 9/5  expiration 10/21/23

## 2023-09-12 NOTE — TELEPHONE ENCOUNTER
Per Trinh Deluna PA-C:    Approval number  [id number]  Effective now-10/27/23- thoracic and lumbar expiration    C spine approval number [id number]  Approved 9/5  expiration 10/21/23     Called and updated patient of approval and to keep imaging appts as scheduled for tomorrow.     Staff message sent to Auth team on above as well.

## 2023-09-13 ENCOUNTER — HOSPITAL ENCOUNTER (OUTPATIENT)
Dept: MRI IMAGING | Facility: CLINIC | Age: 58
Discharge: HOME | End: 2023-09-13
Attending: NEUROLOGICAL SURGERY
Payer: COMMERCIAL

## 2023-09-13 ENCOUNTER — TELEPHONE (OUTPATIENT)
Dept: NEUROSURGERY | Facility: CLINIC | Age: 58
End: 2023-09-13

## 2023-09-13 ENCOUNTER — TELEPHONE (OUTPATIENT)
Dept: NEUROSURGERY | Facility: CLINIC | Age: 58
End: 2023-09-13
Payer: COMMERCIAL

## 2023-09-13 DIAGNOSIS — M47.12 CERVICAL SPONDYLOSIS WITH MYELOPATHY: ICD-10-CM

## 2023-09-13 DIAGNOSIS — Z98.1 STATUS POST LUMBAR SPINAL FUSION: ICD-10-CM

## 2023-09-13 DIAGNOSIS — M54.6 THORACIC SPINE PAIN: ICD-10-CM

## 2023-09-13 LAB — RADIOLOGIST FLAGS: ABNORMAL

## 2023-09-13 PROCEDURE — 72146 MRI CHEST SPINE W/O DYE: CPT

## 2023-09-13 PROCEDURE — 72141 MRI NECK SPINE W/O DYE: CPT

## 2023-09-13 PROCEDURE — 72148 MRI LUMBAR SPINE W/O DYE: CPT

## 2023-09-13 NOTE — TELEPHONE ENCOUNTER
Called patient to schedule appointment with Dr. Ontiveros on Monday, 9/18/23 per BALTAZAR request. While speaking to patient he asked that BALTAZAR call him back as he has an additional question for her. Please call patient back at [phone]. Thank you~

## 2023-09-13 NOTE — TELEPHONE ENCOUNTER
Reviewed imaging with Dr. Ontiveros and with patient     Patient notes his symptoms have been significantly worsening   He is now falling at least once weekly, continued weakness primarily at bilateral hips, both feet are completely numb and over the last 1 month does not feel like he is fully emptying his bladder    Plan  -schedule follow up MONDAY 9/18 with Dr. Ontiveros   -our office will write note to excuse patient from work until clinic visit   -advised to present sooner should further concerns arise    Dr. Weston Bethea in agreement    Rns updated    Trinh Deluna PA-C  [facility] [place] [facility]  [address] [address]    Tel [phone]  Pager [phone]

## 2023-09-13 NOTE — TELEPHONE ENCOUNTER
Patient called to request MRI results and next steps. He did ask for an appointment however, there are no open BALTAZAR dates until October and patient does not want to wait that long. Could someone call him to discuss his results on behalf of Dr. Marshall? Thank you~

## 2023-09-13 NOTE — TELEPHONE ENCOUNTER
Called patient with plans per Trinh Deluna PA-C. Letter written and delivered to patient via [employer] per patient preference. Staff message sent to scheduling team to schedule patient with Dr. Ontiveros on Monday. Advised patient to contact clinic or seek emergency care should symptoms worsen and become unmanageable at home. Patient voiced agreement and understanding.

## 2023-09-13 NOTE — LETTER
September 13, 2023      Nemea Hunt  [address] [facility] [address]        To Whom It May Concern:      Neema Hunt is being seen in our clinic for care. Please excuse him from work through 9/18/2023 at which time he will be re-evaluated. Please contact our clinic if you have additional questions or concerns: [phone].      Sincerely,        Trinh Deluna PA-C

## 2023-09-14 ENCOUNTER — TELEPHONE (OUTPATIENT)
Dept: NEUROSURGERY | Facility: CLINIC | Age: 58
End: 2023-09-14
Payer: COMMERCIAL

## 2023-09-14 NOTE — TELEPHONE ENCOUNTER
Called Neema OSPINA to return call if he has questions/concerns      Trinh SHEPARD [facility] Neurosurgery  [address] [address]    Tel [phone]  Pager [phone]

## 2023-09-15 NOTE — TELEPHONE ENCOUNTER
Patient returning call. He would like to know if he needs additional imaging because he fell recently.

## 2023-09-18 ENCOUNTER — OFFICE VISIT (OUTPATIENT)
Dept: NEUROSURGERY | Facility: CLINIC | Age: 58
End: 2023-09-18
Payer: COMMERCIAL

## 2023-09-18 VITALS
HEIGHT: 70 IN | SYSTOLIC BLOOD PRESSURE: 137 MMHG | DIASTOLIC BLOOD PRESSURE: 75 MMHG | HEART RATE: 76 BPM | WEIGHT: 189 LBS | BODY MASS INDEX: 27.06 KG/M2 | OXYGEN SATURATION: 98 %

## 2023-09-18 DIAGNOSIS — M47.14 THORACIC MYELOPATHY: Primary | ICD-10-CM

## 2023-09-18 PROCEDURE — 99214 OFFICE O/P EST MOD 30 MIN: CPT | Performed by: NEUROLOGICAL SURGERY

## 2023-09-18 RX ORDER — MULTIVITAMIN WITH IRON
1 TABLET ORAL DAILY
COMMUNITY

## 2023-09-18 ASSESSMENT — PAIN SCALES - GENERAL: PAINLEVEL: MODERATE PAIN (5)

## 2023-09-18 NOTE — LETTER
September 18, 2023   To Whom it May Concern,      Neema Hunt  is being seen at our clinic for care. He/she is scheduled for surgery 9/27/2023. He/she is to be off of work starting now and he/she is able to return to work on 10/25/2023 with the restrictions of:    -Light duty work only  -No heavy lifting greater than 10 pounds   -No twisting or bending    Neema Hunt will be re-evaluated at the next follow up visit. Please call our clinic with questions or concerns: [phone]      Sincerely,    Zane Ontiveros MD   (Electronically Signed, September 18, 2023, 0549)

## 2023-09-18 NOTE — PATIENT INSTRUCTIONS
Patient Instructions    Surgery scheduled at [facility] for T11-12 laminectomy with Dr. Ontiveros    Pre-Operative      Surgical risks: blood clots in the leg or lung, problems urinating, nerve damage, drainage from the incision, infection, stiffness    You will need a Pre-operative physical with primary care physician within 30 days prior to your surgical date.     This is a same day procedure with discharge home day of surgery.    Smoking Cessation  You are advised to quit smoking immediately through recovery to help with healing and reduce risk of complications. Printout given.     Shower procedure  You will need to shower the night before and morning of surgery using the antimicrobial soap (chlorhexidine) given to you. Please refer to showering instruction sheet in surgery education folder.    Eating/Drinking  Stop all solid foods 8 hours before surgery.  Keep drinking clear liquids until 4 hours before surgery  Clear liquids include water, clear juice, black coffee, or clear tea without milk, Gatorade, clear soda.     Medications  Discontinue Aspirin & NSAIDs (Advil/Ibuprofen, Indocin, Naproxen,Nuprin,Relafen/Nabumetone, Diclofenac,Meloxicam, Aleve, Celebrex) 7 days prior to surgical date. After surgery, do not begin taking these medications until given clearance as it may cause bleeding and interfere with healing.  It is ok to take Tylenol (Acetaminophen) for pain within the 7 days prior to surgery. Example: you could take 1000 mg 3 times per day. Do not exceed 3,000 mg per day.   If you are on chronic pain medication (oxycodone, Percocet, hydrocodone, Vicodin, Norco, Dilaudid, morphine, MS Contin, naltrexone, Suboxone, etc) or have a pain contract we will reach out to your pain clinic to gather your most recent records and recommendations for pain management post-op.  Please ask your provider who manages your chronic pain if they require you to schedule an office visit prior to surgery.  Continue obtaining your pain medications from your current provider until surgery. Our team will manage your acute post-op pain in the hospital and during the recovery period. Your pain team will continue to manage your chronic pain.   Any other medications prescribed, please discuss with your primary care provider at your pre-operative physical       Post-Operative    Incision Care  Look at your incision site every day. You  may need a mirror or family member to help you.   Watch for signs of infection  Redness, swelling, warmth, drainage (Green or yellow drainage (pus) from your incision or increased bloody drainage), and fever of 101 degrees or higher  [facility] [phone]  Remove dressing as instructed upon discharge  Do not apply lotions or ointments to incision  It is okay to shower, just pat the incision dry   No submerging incision in water such as pools, hot tubs, or baths for at least 8 weeks and until the incision is healed    Pain Management  Dealing with pain  As your body heals, you might feel a stabbing, burning, or aching pain. You may also have some numbness.  Everyone feels pain differently, we may ask you to rate your pain using a pain scale. This will let us know how much pain you feel.   Keep in mind that medicine won't take away all of your pain. It helps to try other ways to relax and ease pain.   Things to help with pain  After surgery, we will give you medicine for your pain. These medications work well, but they can make you drowsy, itchy, or sick to your stomach. If we give you narcotics for pain, try to take the pills with food.   For mild to moderate pain, you can take medication such as Tylenol. These can be used with narcotics, but make sure that your narcotic does not contain Tylenol.   Do NOT drive while taking narcotic pain medication  Do NOT drink alcohol while using any pain medication  You can utilize ice as needed (no longer than 20 minutes at one time)  Refills of pain  medication: please call the neurosurgery clinic to request 2-3 days before you run out  Aspirin & NSAIDS (ex. Ibuprofen, aleve, naproxen): Don't take NSAIDs until 2 weeks after surgery to reduce risk of bleeding and interference with bone healing     Bowel Care  Many people have constipation (hard stools) after surgery. The narcotic pain medication we often prescribed can contribute to constipation. To help prevent constipation: Drink plenty of fluid (8-10 glasses/day); Eat more fiber, such as whole grain bread, bran cereal, and fruits and vegetables; Stay active by walking; Over the counter stool softener may also help.      Activity Restrictions  For the first 6 weeks, no lifting > 10 pounds, limited bending, twisting, or overhead reaching.  Take stairs in moderation   Walking is the best way to start exercise after surgery. Take short frequent walks. You may gradually increase the distance as tolerated. If you feel pain, decrease your activity, but DO NOT stop walking. Walking will help you gain strength, prevent muscle soreness and spasms, and prevent blood clots.  Avoid bed rest and prolonged sitting for longer than 30 minutes (change positions frequently while awake)  No contact sports or high impact activities such as; running/jogging, snowmobile or 4 marte riding or any other recreational vehicles until after given clearance at one of your follow up visits    Contact clinic right away or go to the Emergency Department if you develop:   Infection (incisional redness, swelling, warmth, drainage, or fever (temp > 101 F))  New injury  Bladder or bowel changes or loss of control    Signs of blood clot:  Swelling and/or warmth in one or both legs  Pain or tenderness in your leg, ankle, foot, or arm   Red or discolored skin     Go to the Emergency Department   If sudden onset of severe headache, weakness, confusion, change in level of consciousness, pain, or loss of movement.  Chest pain  Trouble breathing      Post-Op Follow Up Appointments  2 week incision check & staple/suture removal with a Neurosurgery Nurse  6 week and 3 month post-op visit with Physican Assistant or Nurse Practitioner     Resources  If you are currently employed, you will need to be off work for 2-4 weeks for recovery and healing.  Please fax any FMLA/short term disability paperwork to [phone] prior to surgery  You may call our clinic when you'd like to return to work and we can provide a work letter  To allow staff adequate time to complete paperwork, please send as soon as possible     [facility]  [facility] - [address] [address]  Telephone:  [phone]       Fax:  [phone]

## 2023-09-18 NOTE — NURSING NOTE
Dr. Ontiveros ok'd pt off starting through surgery recovery. Letter drafted and sent via [facility].

## 2023-09-18 NOTE — PROGRESS NOTES
58 year old male with multiple prior lumbar surgeries, presents with worsening leg weakness, severe thoracic stenosis and myelopathy.  Has undergone past lumbar surgeries resulting in instrumentation from L2-S1, with evidence of L5-S1 pseudoarthrosis on CT Lumbar from February.  XR Scoli last month showed positive sagittal balance.  He notes 6 weeks of markedly worsening weakness and numbness in his legs.  Has had several falls, and is unable to work currently as a result of falling at work.  Severe numbness below the knees and worsening balance.  Sharp throbbing pain radiating to the left flank.  MR Thoracic, personally reviewed, with T11-12 grade 1 anterolisthesis and severe stenosis.       Past Medical History:   Diagnosis Date    Arthritis     Back pain     Gastro-oesophageal reflux disease     Hypertension     Radiculopathy     Scoliosis      Past Surgical History:   Procedure Laterality Date    DAVINCI XI HERNIORRHAPHY INGUINAL Bilateral 7/26/2022    Procedure: Robotic repair of recurrent right inguinal hernia with placement of mesh, robotic repair of left inguinal hernia with placement of mesh;  Surgeon: Mary Morales MD;  Location:  OR    DAVINCI XI HERNIORRHAPHY VENTRAL N/A 7/26/2022    Procedure: Robotic repair of umbilical hernia with placement of mesh;  Surgeon: Mary Morales MD;  Location: [facility] OR    DISCECTOMY LUMBAR POSTERIOR MICROSCOPIC ONE LEVEL Right 7/15/2020    Procedure: Right L5-S1 foraminotomy and microdiskectomy;  Surgeon: Nhan Marshall MD;  Location: [facility] OR    EXPLORE SPINE, REMOVE HARDWARE, COMBINED N/A 9/5/2019    Procedure: REMOVAL LUMBAR L3-5 INSTRUMENTATION;  Surgeon: Nhan Marshall MD;  Location: [facility] OR    FUSION SPINE ANTERIOR MINIMALLY INVASIVE TWO LEVELS N/A 11/16/2016    Procedure: FUSION SPINE ANTERIOR MINIMALLY INVASIVE TWO LEVELS;  Surgeon: Nhan Marshall MD;  Location: [facility]    FUSION, SPINE, LUMBAR, 1 LEVEL, POSTERIOR APPROACH, ROBOTIC-ASSISTED N/A  5/27/2021    Procedure: Lumbar 5-Sacral 1 posterior segemental instrumentation, bilateral decompression and transforaminal interbody fusion using local autograft and allograft cancellous chips. Revision of Lumbar 2-4 ian. Posterior arthrodesis Lumbar 5-Sacral 1 using  local autograft and allograft cancellous chips;  Surgeon: Nhan Marshall MD;  Location:  OR    HERNIA REPAIR      right inguinal hernia    LAMINECTOMY LUMBAR POSTERIOR MICROSCOPIC ONE LEVEL  4/9/2013    Procedure: LAMINECTOMY LUMBAR POSTERIOR MICROSCOPIC ONE LEVEL;  Right Lumbar 3-4 Micro Laminectomy & Foraminotomy;  Surgeon: Nhan Marshall MD;  [facility]: [facility]    OPTICAL TRACKING SYSTEM FUSION SPINE POSTERIOR LUMBAR PERCUTANEOUS TWO LEVELS N/A 11/16/2016    Procedure: OPTICAL TRACKING SYSTEM FUSION SPINE POSTERIOR LUMBAR PERCUTANEOUS TWO LEVELS;  Surgeon: Nhan Marshall MD;  Location: [facility]    OPTICAL TRACKING SYSTEM FUSION SPINE POSTERIOR LUMBAR THREE+ LEVELS Right 9/5/2019    Procedure: POSTERIOR SEGMENTAL INSTRUMENTATION L2-4, RIGHT LUMBAR 2-3 DISCECTOMY AND TRANSFORAMINAL INTERBODY FUSION, REDO DECOMPRESSION RIGHT L3-4, POSTERIOR ARTHRODESIS L2-4;  Surgeon: Nhan Marshall MD;  Location:  OR    ORTHOPEDIC SURGERY      left ankle, right finger     Social History     Socioeconomic History    Marital status:      Spouse name: Not on file    Number of children: Not on file    Years of education: Not on file    Highest education level: Not on file   Occupational History    Not on file   Tobacco Use    Smoking status: Former    Smokeless tobacco: Never   Substance and Sexual Activity    Alcohol use: Yes     Comment: 3-4 drinks/week    Drug use: No    Sexual activity: Not on file   Other Topics Concern    Parent/sibling w/ CABG, MI or angioplasty before 65F 55M? Not Asked   Social History Narrative    Not on file     Social Determinants of Health     Financial Resource Strain: Not on file   Food Insecurity: Not on file  "  Transportation Needs: Not on file   Physical Activity: Not on file   Stress: Not on file   Social Connections: Not on file   Intimate Partner Violence: Not on file   Housing Stability: Not on file     Family History   Problem Relation Age of Onset    Asthma Mother     Hypertension Mother     Arthritis Mother         ROS: 10 point ROS neg other than the symptoms noted above in the HPI.    Physical Exam  /75   Pulse 76   Ht 1.778 m (5' 10\")   Wt 85.7 kg (189 lb)   SpO2 98%   BMI 27.12 kg/m    HEENT:  Normocephalic, atraumatic.  PERRLA.  EOM s intact.  Visual fields full to gross exam  Neck:  Supple, non-tender, without lymphadenopathy.  Heart:  No peripheral edema  Lungs:  No SOB  Abdomen:  Non-distended.   Skin:  Warm and dry.  Extremities:  No edema, cyanosis or clubbing.  Psychiatric:  No apparent distress  Musculoskeletal:  Normal bulk and tone    NEUROLOGICAL EXAMINATION:     Mental status:  Alert and Oriented x 3, speech is fluent.  Cranial nerves:  II-XII intact.   Motor:    Shoulder Abduction:  Right:  5/5   Left:  5/5  Biceps:                      Right:  5/5   Left:  5/5  Triceps:                     Right:  5/5   Left:  5/5  Wrist Extensors:       Right:  5/5   Left:  5/5  Wrist Flexors:           Right:  5/5   Left:  5/5  interosseus :            Right:  5/5   Left:  5/5  Hip Flexion:                Right: 5/5  Left:  4+/5  Quadriceps:             Right:  5/5  Left:  5/5  Hamstrings:             Right:  5/5  Left:  5/5  Gastroc Soleus:        Right:  5/5  Left:  5/5  Tib/Ant:                      Right:  5/5  Left:  4+/5  EHL:                     Right:  5/5  Left:  4+/5  Sensation:  Intact  Reflexes:  Positive patellar hyper-reflexia  Coordination:  Smooth finger to nose testing.   Negative pronator drift.  Needs a cane and walking with wide-based gait    A/P:  58 year old male with multiple prior lumbar surgeries, presents with worsening leg weakness, severe thoracic stenosis and " myelopathy    I had a discussion with the patient, reviewing the history, symptoms, and imaging  Given his severe stenosis and progressive myelopathy, he is not currently a candidate for physical therapy  We discussed the option of a larger reconstructive approach from T11-pelvis versus a focused T11-12 decompression  Will plan for T11-12 laminectomy  Risks and benefits discussed

## 2023-09-18 NOTE — LETTER
9/18/2023         RE: Neema Hunt  [address] [facility] [address]        Dear Colleague,    Thank you for referring your patient, Neema Hunt, to the [facility]. Please see a copy of my visit note below.    58 year old male with multiple prior lumbar surgeries, presents with worsening leg weakness, severe thoracic stenosis and myelopathy.  Has undergone past lumbar surgeries resulting in instrumentation from L2-S1, with evidence of L5-S1 pseudoarthrosis on CT Lumbar from February.  XR Scoli last month showed positive sagittal balance.  He notes 6 weeks of markedly worsening weakness and numbness in his legs.  Has had several falls, and is unable to work currently as a result of falling at work.  Severe numbness below the knees and worsening balance.  Sharp throbbing pain radiating to the left flank.  MR Thoracic, personally reviewed, with T11-12 grade 1 anterolisthesis and severe stenosis.       Past Medical History:   Diagnosis Date     Arthritis      Back pain      Gastro-oesophageal reflux disease      Hypertension      Radiculopathy      Scoliosis      Past Surgical History:   Procedure Laterality Date     DAVINCI XI HERNIORRHAPHY INGUINAL Bilateral 7/26/2022    Procedure: Robotic repair of recurrent right inguinal hernia with placement of mesh, robotic repair of left inguinal hernia with placement of mesh;  Surgeon: Mary Morales MD;  Location:  OR     DAVINCI XI HERNIORRHAPHY VENTRAL N/A 7/26/2022    Procedure: Robotic repair of umbilical hernia with placement of mesh;  Surgeon: Mary Morales MD;  Location:  OR     DISCECTOMY LUMBAR POSTERIOR MICROSCOPIC ONE LEVEL Right 7/15/2020    Procedure: Right L5-S1 foraminotomy and microdiskectomy;  Surgeon: Nhan Marshall MD;  Location:  OR     EXPLORE SPINE, REMOVE HARDWARE, COMBINED N/A 9/5/2019    Procedure: REMOVAL LUMBAR L3-5 INSTRUMENTATION;  Surgeon: Nhan Marshall MD;  Location:  [facility] OR     FUSION SPINE ANTERIOR MINIMALLY INVASIVE TWO LEVELS N/A 11/16/2016    Procedure: FUSION SPINE ANTERIOR MINIMALLY INVASIVE TWO LEVELS;  Surgeon: Nhan Marshall MD;  [facility]: [facility]     FUSION, SPINE, LUMBAR, 1 LEVEL, POSTERIOR APPROACH, ROBOTIC-ASSISTED N/A 5/27/2021    Procedure: Lumbar 5-Sacral 1 posterior segemental instrumentation, bilateral decompression and transforaminal interbody fusion using local autograft and allograft cancellous chips. Revision of Lumbar 2-4 ian. Posterior arthrodesis Lumbar 5-Sacral 1 using  local autograft and allograft cancellous chips;  Surgeon: Nhan Marshall MD;  Location:  OR     HERNIA REPAIR      right inguinal hernia     LAMINECTOMY LUMBAR POSTERIOR MICROSCOPIC ONE LEVEL  4/9/2013    Procedure: LAMINECTOMY LUMBAR POSTERIOR MICROSCOPIC ONE LEVEL;  Right Lumbar 3-4 Micro Laminectomy & Foraminotomy;  Surgeon: Nhan Marshall MD;  [facility]     OPTICAL TRACKING SYSTEM FUSION SPINE POSTERIOR LUMBAR PERCUTANEOUS TWO LEVELS N/A 11/16/2016    Procedure: OPTICAL TRACKING SYSTEM FUSION SPINE POSTERIOR LUMBAR PERCUTANEOUS TWO LEVELS;  Surgeon: Nhan Marshall MD;  Location:  OR     OPTICAL TRACKING SYSTEM FUSION SPINE POSTERIOR LUMBAR THREE+ LEVELS Right 9/5/2019    Procedure: POSTERIOR SEGMENTAL INSTRUMENTATION L2-4, RIGHT LUMBAR 2-3 DISCECTOMY AND TRANSFORAMINAL INTERBODY FUSION, REDO DECOMPRESSION RIGHT L3-4, POSTERIOR ARTHRODESIS L2-4;  Surgeon: Nhan Marshall MD;  Location:  OR     ORTHOPEDIC SURGERY      left ankle, right finger     Social History     Socioeconomic History     Marital status:      Spouse name: Not on file     Number of children: Not on file     Years of education: Not on file     Highest education level: Not on file   Occupational History     Not on file   Tobacco Use     Smoking status: Former     Smokeless tobacco: Never   Substance and Sexual Activity     Alcohol use: Yes     Comment: 3-4 drinks/week      "Drug use: No     Sexual activity: Not on file   Other Topics Concern     Parent/sibling w/ CABG, MI or angioplasty before 65F 55M? Not Asked   Social History Narrative     Not on file     Social Determinants of Health     Financial Resource Strain: Not on file   Food Insecurity: Not on file   Transportation Needs: Not on file   Physical Activity: Not on file   Stress: Not on file   Social Connections: Not on file   Intimate Partner Violence: Not on file   Housing Stability: Not on file     Family History   Problem Relation Age of Onset     Asthma Mother      Hypertension Mother      Arthritis Mother         ROS: 10 point ROS neg other than the symptoms noted above in the HPI.    Physical Exam  /75   Pulse 76   Ht 1.778 m (5' 10\")   Wt 85.7 kg (189 lb)   SpO2 98%   BMI 27.12 kg/m    HEENT:  Normocephalic, atraumatic.  PERRLA.  EOM s intact.  Visual fields full to gross exam  Neck:  Supple, non-tender, without lymphadenopathy.  Heart:  No peripheral edema  Lungs:  No SOB  Abdomen:  Non-distended.   Skin:  Warm and dry.  Extremities:  No edema, cyanosis or clubbing.  Psychiatric:  No apparent distress  Musculoskeletal:  Normal bulk and tone    NEUROLOGICAL EXAMINATION:     Mental status:  Alert and Oriented x 3, speech is fluent.  Cranial nerves:  II-XII intact.   Motor:    Shoulder Abduction:  Right:  5/5   Left:  5/5  Biceps:                      Right:  5/5   Left:  5/5  Triceps:                     Right:  5/5   Left:  5/5  Wrist Extensors:       Right:  5/5   Left:  5/5  Wrist Flexors:           Right:  5/5   Left:  5/5  interosseus :            Right:  5/5   Left:  5/5  Hip Flexion:                Right: 5/5  Left:  4+/5  Quadriceps:             Right:  5/5  Left:  5/5  Hamstrings:             Right:  5/5  Left:  5/5  Gastroc Soleus:        Right:  5/5  Left:  5/5  Tib/Ant:                      Right:  5/5  Left:  4+/5  EHL:                     Right:  5/5  Left:  4+/5  Sensation:  Intact  Reflexes:  " Positive patellar hyper-reflexia  Coordination:  Smooth finger to nose testing.   Negative pronator drift.  Needs a cane and walking with wide-based gait    A/P:  58 year old male with multiple prior lumbar surgeries, presents with worsening leg weakness, severe thoracic stenosis and myelopathy    I had a discussion with the patient, reviewing the history, symptoms, and imaging  Given his severe stenosis and progressive myelopathy, he is not currently a candidate for physical therapy  We discussed the option of a larger reconstructive approach from T11-pelvis versus a focused T11-12 decompression  Will plan for T11-12 laminectomy  Risks and benefits discussed          Again, thank you for allowing me to participate in the care of your patient.        Sincerely,        Zane Ontiveros MD

## 2023-09-22 ENCOUNTER — MYC MEDICAL ADVICE (OUTPATIENT)
Dept: NEUROSURGERY | Facility: CLINIC | Age: 58
End: 2023-09-22
Payer: COMMERCIAL

## 2023-09-22 NOTE — TELEPHONE ENCOUNTER
Pt scheduled on 9/27/23 for surgery with Dr. Ontiveros -    Had H/P done per pt, not in EPIC. Sent message to inquire where this was done.

## 2023-09-22 NOTE — TELEPHONE ENCOUNTER
H/P scanned into media tab. Placed call to preop to ensure this is okay. Staff verified this is. Pt updated.

## 2023-09-23 ENCOUNTER — MYC MEDICAL ADVICE (OUTPATIENT)
Dept: NEUROSURGERY | Facility: CLINIC | Age: 58
End: 2023-09-23
Payer: COMMERCIAL

## 2023-09-25 ENCOUNTER — MYC MEDICAL ADVICE (OUTPATIENT)
Dept: NEUROSURGERY | Facility: CLINIC | Age: 58
End: 2023-09-25
Payer: COMMERCIAL

## 2023-09-26 ENCOUNTER — TELEPHONE (OUTPATIENT)
Dept: NEUROSURGERY | Facility: CLINIC | Age: 58
End: 2023-09-26
Payer: COMMERCIAL

## 2023-09-26 RX ORDER — VALACYCLOVIR HYDROCHLORIDE 1 G/1
1000 TABLET, FILM COATED ORAL 2 TIMES DAILY PRN
COMMUNITY

## 2023-09-26 NOTE — TELEPHONE ENCOUNTER
Mentions per mark new symptoms that he has reported to his PCP who orders C, Th and L MRI's w/wo for evaluation  Spoke to patient yesterday who did not mention any new sx besides his knee was hurting more   Unable to get in for this imaging until 10/5/23  No additional falls/injuries since prior to visit with Dr Ontiveros     Called patient for more details     Neck pain stared 2 days ago. Pain in back of neck when he turns to the left, no radiating sx     Low back: pain started 3-4 days ago   Aching/cramping pain left of lower spine. Radiates to left glute. Reports he had rad pain to left glute prior    Feels his weakness is worse and needs to use a cane constantly now for ambulation   Unable to determine if this is true weakness vs less mobile due to pain    Routed to Dr Ontiveros

## 2023-09-26 NOTE — PROGRESS NOTES
PTA medications updated by Medication Scribe prior to surgery via phone call with patient (last doses completed by Nurse)     Medication history sources: Patient, Surescripts, and H&P  In the past week, patient estimated taking medication this percent of the time: Greater than 90%      Significant changes made to the medication list:  Patient reports no longer taking the following meds (med scribe removed from PTA med list): Colace      Additional medication history information:   Patient was advised to bring: Visine Op soln.    Medication reconciliation completed by provider prior to medication history? No    Time spent in this activity: 40 minutes    The information provided in this note is only as accurate as the sources available at the time of update(s)      Prior to Admission medications    Medication Sig Last Dose Taking? Auth Provider Long Term End Date   acetaminophen (TYLENOL) 500 MG tablet Take 1,000 mg by mouth daily as needed for mild pain (2 X 500 mg = 1000 mg) Unknown at PRN Yes Reported, Patient     amLODIPine-benazepril (LOTREL) 10-20 MG capsule Take 1 capsule by mouth every morning   at AM Yes Reported, Patient Yes    Cyanocobalamin (B-12 PO) Take 2 chew tab by mouth every morning  9/26/2023 at AM Yes Reported, Patient     famotidine (PEPCID) 20 MG tablet Take 20 mg by mouth every evening 9/26/2023 at PM Yes Reported, Patient     gabapentin (NEURONTIN) 300 MG capsule Take 1 capsule (300 mg) by mouth At Bedtime 9/26/2023 at PM Yes Nhan Marshall MD Yes    lidocaine (LIDODERM) 5 % Patch Place 1 patch onto the skin every 24 hours 9/26/2023 at AM Yes Jennifer Monsivais PA-C     magnesium 250 MG tablet Take 1 tablet by mouth daily Daily 9/26/2023 at AM Yes Reported, Patient     Melatonin 10 MG TABS tablet Take 10 mg by mouth nightly as needed for sleep  at HS Yes Reported, Patient     Misc Natural Products (OSTEO BI-FLEX JOINT SHIELD) TABS Take 1 tablet by mouth daily 9/26/2023 at AM Yes Reported,  Patient     Multiple Vitamins-Minerals (AIRBORNE PO) Take 1 chew tab by mouth every morning  9/26/2023 at AM Yes Reported, Patient     Multiple Vitamins-Minerals (MULTIVITAMIN ADULT PO) Take 2 chew tab by mouth every morning  9/26/2023 at AM Yes Reported, Patient     naproxen sodium 220 MG capsule Take 220 mg by mouth 2 times daily (with meals) 9/21/2023 at PM Yes Reported, Patient No    omega 3 1000 MG CAPS Take 1 chew tab by mouth every morning  9/26/2023 at AM Yes Reported, Patient No    polyethylene glycol (MIRALAX) 17 g packet Take 1 packet by mouth daily as needed for constipation Unknown at PRN Yes Reported, Patient     Probiotic Product (SOLUBLE FIBER/PROBIOTICS PO) Take 1 chew tab by mouth every morning  9/26/2023 at AM Yes Reported, Patient     senna-docusate (SENOKOT-S/PERICOLACE) 8.6-50 MG tablet Take 1-2 tablets by mouth 2 times daily as needed for constipation (While on oral opioids.) Unknown at PRN Yes Lonny Mittal, PACalebC     tetrahydrozoline (VISINE) 0.05 % ophthalmic solution Place 1 drop into both eyes daily as needed Unknown at PRN Yes Reported, Patient     valACYclovir (VALTREX) 1000 mg tablet Take 1,000 mg by mouth 2 times daily as needed Unknown at PRN Yes Reported, Patient Yes

## 2023-09-26 NOTE — TELEPHONE ENCOUNTER
Per Dr Ontiveros, his new symptoms doesn't change anything.  We can have him come in and I'll review things with him in pre-op     Called patient to relay message.  Patient asks many questions regarding whether this new symptoms will change his outcome for workers comp claim  Advised that this is not what is currently at hand. And there is no way to answer that   His new symptoms have no barring on his surgery tomorrow.  He asks when it could be rescheduled so he can get the MRIs that were ordered by the primary  Advised that Dr Ontiveros does not advise additional imaging based on symptoms    called patient who plans on surgery tomorrow.

## 2023-09-26 NOTE — TELEPHONE ENCOUNTER
Patient requests a call back regarding a question he has about an MRI he needs before surgery. Please call patient back at [phone]. Thank you~

## 2023-09-27 ENCOUNTER — ANESTHESIA (OUTPATIENT)
Dept: SURGERY | Facility: CLINIC | Age: 58
End: 2023-09-27
Payer: COMMERCIAL

## 2023-09-27 ENCOUNTER — ANESTHESIA EVENT (OUTPATIENT)
Dept: SURGERY | Facility: CLINIC | Age: 58
End: 2023-09-27
Payer: COMMERCIAL

## 2023-09-27 ENCOUNTER — APPOINTMENT (OUTPATIENT)
Dept: GENERAL RADIOLOGY | Facility: CLINIC | Age: 58
End: 2023-09-27
Attending: NEUROLOGICAL SURGERY
Payer: COMMERCIAL

## 2023-09-27 ENCOUNTER — HOSPITAL ENCOUNTER (OUTPATIENT)
Facility: CLINIC | Age: 58
Discharge: HOME | End: 2023-09-29
Attending: NEUROLOGICAL SURGERY | Admitting: NEUROLOGICAL SURGERY
Payer: COMMERCIAL

## 2023-09-27 DIAGNOSIS — Z98.890 S/P LAMINECTOMY: Primary | ICD-10-CM

## 2023-09-27 PROCEDURE — 250N000009 HC RX 250: Performed by: ANESTHESIOLOGY

## 2023-09-27 PROCEDURE — 370N000017 HC ANESTHESIA TECHNICAL FEE, PER MIN: Performed by: NEUROLOGICAL SURGERY

## 2023-09-27 PROCEDURE — 258N000003 HC RX IP 258 OP 636: Performed by: ANESTHESIOLOGY

## 2023-09-27 PROCEDURE — 69990 MICROSURGERY ADD-ON: CPT | Performed by: NEUROLOGICAL SURGERY

## 2023-09-27 PROCEDURE — 250N000011 HC RX IP 250 OP 636: Mod: JZ | Performed by: ANESTHESIOLOGY

## 2023-09-27 PROCEDURE — 250N000011 HC RX IP 250 OP 636: Performed by: NURSE PRACTITIONER

## 2023-09-27 PROCEDURE — 360N000084 HC SURGERY LEVEL 4 W/ FLUORO, PER MIN: Performed by: NEUROLOGICAL SURGERY

## 2023-09-27 PROCEDURE — 63266 EXCISE INTRSPINL LESION THRC: CPT | Mod: AS | Performed by: NURSE PRACTITIONER

## 2023-09-27 PROCEDURE — 710N000009 HC RECOVERY PHASE 1, LEVEL 1, PER MIN: Performed by: NEUROLOGICAL SURGERY

## 2023-09-27 PROCEDURE — 250N000011 HC RX IP 250 OP 636: Performed by: ANESTHESIOLOGY

## 2023-09-27 PROCEDURE — 250N000013 HC RX MED GY IP 250 OP 250 PS 637: Performed by: NURSE PRACTITIONER

## 2023-09-27 PROCEDURE — 250N000009 HC RX 250: Performed by: NEUROLOGICAL SURGERY

## 2023-09-27 PROCEDURE — 258N000003 HC RX IP 258 OP 636: Performed by: NURSE PRACTITIONER

## 2023-09-27 PROCEDURE — 272N000282 HC OR IOM SUPPLIES OPNP: Performed by: NEUROLOGICAL SURGERY

## 2023-09-27 PROCEDURE — 999N000141 HC STATISTIC PRE-PROCEDURE NURSING ASSESSMENT: Performed by: NEUROLOGICAL SURGERY

## 2023-09-27 PROCEDURE — 922N000001 HC NEURO MONITORING SERVICE, UP TO 7 HOURS (T1FEE): Performed by: NEUROLOGICAL SURGERY

## 2023-09-27 PROCEDURE — 999N000179 XR SURGERY CARM FLUORO LESS THAN 5 MIN W STILLS

## 2023-09-27 PROCEDURE — 63266 EXCISE INTRSPINL LESION THRC: CPT | Performed by: NEUROLOGICAL SURGERY

## 2023-09-27 PROCEDURE — 250N000011 HC RX IP 250 OP 636: Performed by: NEUROLOGICAL SURGERY

## 2023-09-27 PROCEDURE — 272N000001 HC OR GENERAL SUPPLY STERILE: Performed by: NEUROLOGICAL SURGERY

## 2023-09-27 RX ORDER — FENTANYL CITRATE 0.05 MG/ML
50 INJECTION, SOLUTION INTRAMUSCULAR; INTRAVENOUS EVERY 5 MIN PRN
Status: DISCONTINUED | OUTPATIENT
Start: 2023-09-27 | End: 2023-09-27 | Stop reason: HOSPADM

## 2023-09-27 RX ORDER — EPHEDRINE SULFATE 50 MG/ML
INJECTION, SOLUTION INTRAMUSCULAR; INTRAVENOUS; SUBCUTANEOUS PRN
Status: DISCONTINUED | OUTPATIENT
Start: 2023-09-27 | End: 2023-09-27

## 2023-09-27 RX ORDER — NALOXONE HYDROCHLORIDE 0.4 MG/ML
0.2 INJECTION, SOLUTION INTRAMUSCULAR; INTRAVENOUS; SUBCUTANEOUS
Status: DISCONTINUED | OUTPATIENT
Start: 2023-09-27 | End: 2023-09-29 | Stop reason: HOSPADM

## 2023-09-27 RX ORDER — FENTANYL CITRATE 50 UG/ML
INJECTION, SOLUTION INTRAMUSCULAR; INTRAVENOUS PRN
Status: DISCONTINUED | OUTPATIENT
Start: 2023-09-27 | End: 2023-09-27

## 2023-09-27 RX ORDER — PROPOFOL 10 MG/ML
INJECTION, EMULSION INTRAVENOUS CONTINUOUS PRN
Status: DISCONTINUED | OUTPATIENT
Start: 2023-09-27 | End: 2023-09-27

## 2023-09-27 RX ORDER — AMOXICILLIN 250 MG
1-2 CAPSULE ORAL 2 TIMES DAILY PRN
Status: DISCONTINUED | OUTPATIENT
Start: 2023-09-27 | End: 2023-09-29 | Stop reason: HOSPADM

## 2023-09-27 RX ORDER — DEXAMETHASONE SODIUM PHOSPHATE 4 MG/ML
INJECTION, SOLUTION INTRA-ARTICULAR; INTRALESIONAL; INTRAMUSCULAR; INTRAVENOUS; SOFT TISSUE PRN
Status: DISCONTINUED | OUTPATIENT
Start: 2023-09-27 | End: 2023-09-27

## 2023-09-27 RX ORDER — SODIUM CHLORIDE, SODIUM LACTATE, POTASSIUM CHLORIDE, CALCIUM CHLORIDE 600; 310; 30; 20 MG/100ML; MG/100ML; MG/100ML; MG/100ML
INJECTION, SOLUTION INTRAVENOUS CONTINUOUS
Status: DISCONTINUED | OUTPATIENT
Start: 2023-09-27 | End: 2023-09-27 | Stop reason: HOSPADM

## 2023-09-27 RX ORDER — LISINOPRIL 20 MG/1
20 TABLET ORAL DAILY
Status: DISCONTINUED | OUTPATIENT
Start: 2023-09-28 | End: 2023-09-29 | Stop reason: HOSPADM

## 2023-09-27 RX ORDER — AMLODIPINE AND BENAZEPRIL HYDROCHLORIDE 10; 20 MG/1; MG/1
1 CAPSULE ORAL EVERY MORNING
Status: DISCONTINUED | OUTPATIENT
Start: 2023-09-28 | End: 2023-09-27

## 2023-09-27 RX ORDER — CEFAZOLIN SODIUM/WATER 2 G/20 ML
2 SYRINGE (ML) INTRAVENOUS SEE ADMIN INSTRUCTIONS
Status: DISCONTINUED | OUTPATIENT
Start: 2023-09-27 | End: 2023-09-27 | Stop reason: HOSPADM

## 2023-09-27 RX ORDER — HYDROMORPHONE HCL IN WATER/PF 6 MG/30 ML
0.2 PATIENT CONTROLLED ANALGESIA SYRINGE INTRAVENOUS EVERY 5 MIN PRN
Status: DISCONTINUED | OUTPATIENT
Start: 2023-09-27 | End: 2023-09-27 | Stop reason: HOSPADM

## 2023-09-27 RX ORDER — FAMOTIDINE 20 MG/1
20 TABLET, FILM COATED ORAL EVERY EVENING
Status: DISCONTINUED | OUTPATIENT
Start: 2023-09-27 | End: 2023-09-29 | Stop reason: HOSPADM

## 2023-09-27 RX ORDER — POLYETHYLENE GLYCOL 3350 17 G/17G
17 POWDER, FOR SOLUTION ORAL DAILY
Status: DISCONTINUED | OUTPATIENT
Start: 2023-09-28 | End: 2023-09-29 | Stop reason: HOSPADM

## 2023-09-27 RX ORDER — CEFAZOLIN SODIUM/WATER 2 G/20 ML
2 SYRINGE (ML) INTRAVENOUS
Status: COMPLETED | OUTPATIENT
Start: 2023-09-27 | End: 2023-09-27

## 2023-09-27 RX ORDER — OXYCODONE HYDROCHLORIDE 5 MG/1
10 TABLET ORAL EVERY 4 HOURS PRN
Status: DISCONTINUED | OUTPATIENT
Start: 2023-09-27 | End: 2023-09-29 | Stop reason: HOSPADM

## 2023-09-27 RX ORDER — ONDANSETRON 2 MG/ML
4 INJECTION INTRAMUSCULAR; INTRAVENOUS EVERY 30 MIN PRN
Status: DISCONTINUED | OUTPATIENT
Start: 2023-09-27 | End: 2023-09-27 | Stop reason: HOSPADM

## 2023-09-27 RX ORDER — LIDOCAINE 40 MG/G
CREAM TOPICAL
Status: DISCONTINUED | OUTPATIENT
Start: 2023-09-27 | End: 2023-09-29 | Stop reason: HOSPADM

## 2023-09-27 RX ORDER — CALCIUM CARBONATE 500 MG/1
500 TABLET, CHEWABLE ORAL 4 TIMES DAILY PRN
Status: DISCONTINUED | OUTPATIENT
Start: 2023-09-27 | End: 2023-09-29 | Stop reason: HOSPADM

## 2023-09-27 RX ORDER — NALOXONE HYDROCHLORIDE 0.4 MG/ML
0.4 INJECTION, SOLUTION INTRAMUSCULAR; INTRAVENOUS; SUBCUTANEOUS
Status: DISCONTINUED | OUTPATIENT
Start: 2023-09-27 | End: 2023-09-29 | Stop reason: HOSPADM

## 2023-09-27 RX ORDER — ONDANSETRON 4 MG/1
4 TABLET, ORALLY DISINTEGRATING ORAL EVERY 6 HOURS PRN
Status: DISCONTINUED | OUTPATIENT
Start: 2023-09-27 | End: 2023-09-29 | Stop reason: HOSPADM

## 2023-09-27 RX ORDER — HYDROMORPHONE HCL IN WATER/PF 6 MG/30 ML
0.2 PATIENT CONTROLLED ANALGESIA SYRINGE INTRAVENOUS
Status: DISCONTINUED | OUTPATIENT
Start: 2023-09-27 | End: 2023-09-29 | Stop reason: HOSPADM

## 2023-09-27 RX ORDER — METHOCARBAMOL 750 MG/1
750 TABLET, FILM COATED ORAL EVERY 6 HOURS PRN
Status: DISCONTINUED | OUTPATIENT
Start: 2023-09-27 | End: 2023-09-29 | Stop reason: HOSPADM

## 2023-09-27 RX ORDER — ACETAMINOPHEN 325 MG/1
975 TABLET ORAL EVERY 8 HOURS
Status: DISCONTINUED | OUTPATIENT
Start: 2023-09-27 | End: 2023-09-29 | Stop reason: HOSPADM

## 2023-09-27 RX ORDER — BUPIVACAINE HYDROCHLORIDE AND EPINEPHRINE 5; 5 MG/ML; UG/ML
INJECTION, SOLUTION PERINEURAL PRN
Status: DISCONTINUED | OUTPATIENT
Start: 2023-09-27 | End: 2023-09-27 | Stop reason: HOSPADM

## 2023-09-27 RX ORDER — ONDANSETRON 2 MG/ML
INJECTION INTRAMUSCULAR; INTRAVENOUS PRN
Status: DISCONTINUED | OUTPATIENT
Start: 2023-09-27 | End: 2023-09-27

## 2023-09-27 RX ORDER — HYDROMORPHONE HCL IN WATER/PF 6 MG/30 ML
0.4 PATIENT CONTROLLED ANALGESIA SYRINGE INTRAVENOUS EVERY 5 MIN PRN
Status: DISCONTINUED | OUTPATIENT
Start: 2023-09-27 | End: 2023-09-27 | Stop reason: HOSPADM

## 2023-09-27 RX ORDER — BISACODYL 10 MG
10 SUPPOSITORY, RECTAL RECTAL DAILY PRN
Status: DISCONTINUED | OUTPATIENT
Start: 2023-09-27 | End: 2023-09-29 | Stop reason: HOSPADM

## 2023-09-27 RX ORDER — FENTANYL CITRATE 0.05 MG/ML
25 INJECTION, SOLUTION INTRAMUSCULAR; INTRAVENOUS EVERY 5 MIN PRN
Status: DISCONTINUED | OUTPATIENT
Start: 2023-09-27 | End: 2023-09-27 | Stop reason: HOSPADM

## 2023-09-27 RX ORDER — SODIUM CHLORIDE 9 MG/ML
INJECTION, SOLUTION INTRAVENOUS CONTINUOUS
Status: DISCONTINUED | OUTPATIENT
Start: 2023-09-27 | End: 2023-09-29 | Stop reason: HOSPADM

## 2023-09-27 RX ORDER — AMOXICILLIN 250 MG
1 CAPSULE ORAL 2 TIMES DAILY
Status: DISCONTINUED | OUTPATIENT
Start: 2023-09-27 | End: 2023-09-29 | Stop reason: HOSPADM

## 2023-09-27 RX ORDER — POLYETHYLENE GLYCOL 3350 17 G/17G
17 POWDER, FOR SOLUTION ORAL DAILY PRN
Status: DISCONTINUED | OUTPATIENT
Start: 2023-09-27 | End: 2023-09-29 | Stop reason: HOSPADM

## 2023-09-27 RX ORDER — PROPOFOL 10 MG/ML
INJECTION, EMULSION INTRAVENOUS PRN
Status: DISCONTINUED | OUTPATIENT
Start: 2023-09-27 | End: 2023-09-27

## 2023-09-27 RX ORDER — AMLODIPINE BESYLATE 10 MG/1
10 TABLET ORAL DAILY
Status: DISCONTINUED | OUTPATIENT
Start: 2023-09-28 | End: 2023-09-29 | Stop reason: HOSPADM

## 2023-09-27 RX ORDER — ONDANSETRON 4 MG/1
4 TABLET, ORALLY DISINTEGRATING ORAL EVERY 30 MIN PRN
Status: DISCONTINUED | OUTPATIENT
Start: 2023-09-27 | End: 2023-09-27 | Stop reason: HOSPADM

## 2023-09-27 RX ORDER — GABAPENTIN 300 MG/1
300 CAPSULE ORAL
Status: COMPLETED | OUTPATIENT
Start: 2023-09-27 | End: 2023-09-27

## 2023-09-27 RX ORDER — HYDROXYZINE HYDROCHLORIDE 25 MG/1
25 TABLET, FILM COATED ORAL EVERY 6 HOURS PRN
Status: DISCONTINUED | OUTPATIENT
Start: 2023-09-27 | End: 2023-09-29 | Stop reason: HOSPADM

## 2023-09-27 RX ORDER — GABAPENTIN 300 MG/1
300 CAPSULE ORAL AT BEDTIME
Status: DISCONTINUED | OUTPATIENT
Start: 2023-09-27 | End: 2023-09-29 | Stop reason: HOSPADM

## 2023-09-27 RX ORDER — CEFAZOLIN SODIUM 1 G/3ML
1 INJECTION, POWDER, FOR SOLUTION INTRAMUSCULAR; INTRAVENOUS EVERY 8 HOURS
Status: DISCONTINUED | OUTPATIENT
Start: 2023-09-28 | End: 2023-09-29

## 2023-09-27 RX ORDER — OXYCODONE HYDROCHLORIDE 5 MG/1
5 TABLET ORAL EVERY 4 HOURS PRN
Status: DISCONTINUED | OUTPATIENT
Start: 2023-09-27 | End: 2023-09-29 | Stop reason: HOSPADM

## 2023-09-27 RX ORDER — HYDROMORPHONE HCL IN WATER/PF 6 MG/30 ML
0.4 PATIENT CONTROLLED ANALGESIA SYRINGE INTRAVENOUS
Status: DISCONTINUED | OUTPATIENT
Start: 2023-09-27 | End: 2023-09-29 | Stop reason: HOSPADM

## 2023-09-27 RX ORDER — ONDANSETRON 2 MG/ML
4 INJECTION INTRAMUSCULAR; INTRAVENOUS EVERY 6 HOURS PRN
Status: DISCONTINUED | OUTPATIENT
Start: 2023-09-27 | End: 2023-09-29 | Stop reason: HOSPADM

## 2023-09-27 RX ORDER — LIDOCAINE HYDROCHLORIDE 20 MG/ML
INJECTION, SOLUTION INFILTRATION; PERINEURAL PRN
Status: DISCONTINUED | OUTPATIENT
Start: 2023-09-27 | End: 2023-09-27

## 2023-09-27 RX ORDER — DEXMEDETOMIDINE HYDROCHLORIDE 4 UG/ML
INJECTION, SOLUTION INTRAVENOUS PRN
Status: DISCONTINUED | OUTPATIENT
Start: 2023-09-27 | End: 2023-09-27

## 2023-09-27 RX ORDER — ACETAMINOPHEN 325 MG/1
650 TABLET ORAL EVERY 4 HOURS PRN
Status: DISCONTINUED | OUTPATIENT
Start: 2023-09-30 | End: 2023-09-29 | Stop reason: HOSPADM

## 2023-09-27 RX ADMIN — CEFAZOLIN 1 G: 1 INJECTION, POWDER, FOR SOLUTION INTRAMUSCULAR; INTRAVENOUS at 23:15

## 2023-09-27 RX ADMIN — PROPOFOL 100 MCG/KG/MIN: 10 INJECTION, EMULSION INTRAVENOUS at 17:01

## 2023-09-27 RX ADMIN — REMIFENTANIL HYDROCHLORIDE 0.2 MCG/KG/MIN: 1 INJECTION, POWDER, LYOPHILIZED, FOR SOLUTION INTRAVENOUS at 16:22

## 2023-09-27 RX ADMIN — FENTANYL CITRATE 25 MCG: 50 INJECTION, SOLUTION INTRAMUSCULAR; INTRAVENOUS at 19:04

## 2023-09-27 RX ADMIN — PHENYLEPHRINE HYDROCHLORIDE 100 MCG: 10 INJECTION INTRAVENOUS at 16:27

## 2023-09-27 RX ADMIN — HYDROMORPHONE HYDROCHLORIDE 0.5 MG: 1 INJECTION, SOLUTION INTRAMUSCULAR; INTRAVENOUS; SUBCUTANEOUS at 17:29

## 2023-09-27 RX ADMIN — ROCURONIUM BROMIDE 5 MG: 50 INJECTION, SOLUTION INTRAVENOUS at 15:56

## 2023-09-27 RX ADMIN — Medication 5 MG: at 17:38

## 2023-09-27 RX ADMIN — PROPOFOL 50 MG: 10 INJECTION, EMULSION INTRAVENOUS at 16:07

## 2023-09-27 RX ADMIN — Medication 5 MG: at 16:35

## 2023-09-27 RX ADMIN — SODIUM CHLORIDE, POTASSIUM CHLORIDE, SODIUM LACTATE AND CALCIUM CHLORIDE: 600; 310; 30; 20 INJECTION, SOLUTION INTRAVENOUS at 13:26

## 2023-09-27 RX ADMIN — SODIUM CHLORIDE: 9 INJECTION, SOLUTION INTRAVENOUS at 20:25

## 2023-09-27 RX ADMIN — PHENYLEPHRINE HYDROCHLORIDE 100 MCG: 10 INJECTION INTRAVENOUS at 16:35

## 2023-09-27 RX ADMIN — PHENYLEPHRINE HYDROCHLORIDE 0.4 MCG/KG/MIN: 10 INJECTION INTRAVENOUS at 16:21

## 2023-09-27 RX ADMIN — DEXMEDETOMIDINE HYDROCHLORIDE 12 MCG: 200 INJECTION INTRAVENOUS at 16:14

## 2023-09-27 RX ADMIN — FENTANYL CITRATE 50 MCG: 50 INJECTION, SOLUTION INTRAMUSCULAR; INTRAVENOUS at 18:30

## 2023-09-27 RX ADMIN — PROPOFOL 180 MG: 10 INJECTION, EMULSION INTRAVENOUS at 15:56

## 2023-09-27 RX ADMIN — SENNOSIDES AND DOCUSATE SODIUM 1 TABLET: 50; 8.6 TABLET ORAL at 21:10

## 2023-09-27 RX ADMIN — Medication 2 G: at 16:00

## 2023-09-27 RX ADMIN — Medication 5 MG: at 17:47

## 2023-09-27 RX ADMIN — LIDOCAINE HYDROCHLORIDE 100 MG: 20 INJECTION, SOLUTION INFILTRATION; PERINEURAL at 15:56

## 2023-09-27 RX ADMIN — FAMOTIDINE 20 MG: 20 TABLET, FILM COATED ORAL at 21:11

## 2023-09-27 RX ADMIN — METHOCARBAMOL 750 MG: 750 TABLET ORAL at 21:11

## 2023-09-27 RX ADMIN — PROPOFOL 20 MG: 10 INJECTION, EMULSION INTRAVENOUS at 16:03

## 2023-09-27 RX ADMIN — PHENYLEPHRINE HYDROCHLORIDE 100 MCG: 10 INJECTION INTRAVENOUS at 16:24

## 2023-09-27 RX ADMIN — FENTANYL CITRATE 100 MCG: 50 INJECTION, SOLUTION INTRAMUSCULAR; INTRAVENOUS at 15:56

## 2023-09-27 RX ADMIN — GABAPENTIN 300 MG: 300 CAPSULE ORAL at 21:11

## 2023-09-27 RX ADMIN — DEXMEDETOMIDINE HYDROCHLORIDE 8 MCG: 200 INJECTION INTRAVENOUS at 17:31

## 2023-09-27 RX ADMIN — PROPOFOL 150 MCG/KG/MIN: 10 INJECTION, EMULSION INTRAVENOUS at 15:58

## 2023-09-27 RX ADMIN — GABAPENTIN 300 MG: 300 CAPSULE ORAL at 13:25

## 2023-09-27 RX ADMIN — OXYCODONE HYDROCHLORIDE 5 MG: 5 TABLET ORAL at 21:10

## 2023-09-27 RX ADMIN — SUCCINYLCHOLINE CHLORIDE 100 MG: 20 INJECTION, SOLUTION INTRAMUSCULAR; INTRAVENOUS; PARENTERAL at 15:57

## 2023-09-27 RX ADMIN — Medication 5 MG: at 17:33

## 2023-09-27 RX ADMIN — MIDAZOLAM 2 MG: 1 INJECTION INTRAMUSCULAR; INTRAVENOUS at 16:42

## 2023-09-27 RX ADMIN — ONDANSETRON 4 MG: 2 INJECTION INTRAMUSCULAR; INTRAVENOUS at 17:30

## 2023-09-27 RX ADMIN — ACETAMINOPHEN 975 MG: 325 TABLET, FILM COATED ORAL at 21:10

## 2023-09-27 RX ADMIN — PHENYLEPHRINE HYDROCHLORIDE 100 MCG: 10 INJECTION INTRAVENOUS at 15:57

## 2023-09-27 RX ADMIN — Medication 5 MG: at 17:43

## 2023-09-27 RX ADMIN — DEXAMETHASONE SODIUM PHOSPHATE 8 MG: 4 INJECTION, SOLUTION INTRA-ARTICULAR; INTRALESIONAL; INTRAMUSCULAR; INTRAVENOUS; SOFT TISSUE at 16:27

## 2023-09-27 ASSESSMENT — ACTIVITIES OF DAILY LIVING (ADL)
ADLS_ACUITY_SCORE: 24
ADLS_ACUITY_SCORE: 24
ADLS_ACUITY_SCORE: 35
ADLS_ACUITY_SCORE: 24
ADLS_ACUITY_SCORE: 26
ADLS_ACUITY_SCORE: 24

## 2023-09-27 ASSESSMENT — LIFESTYLE VARIABLES: TOBACCO_USE: 0

## 2023-09-27 ASSESSMENT — ENCOUNTER SYMPTOMS
SEIZURES: 0
DYSRHYTHMIAS: 0

## 2023-09-27 NOTE — ANESTHESIA PREPROCEDURE EVALUATION
Anesthesia Pre-Procedure Evaluation    Patient: Neema Hunt   MRN: [id number] : 1965        Procedure : Procedure(s):  Thoracic 11 to thoracic 12 laminectomy          Past Medical History:   Diagnosis Date    Arthritis     Back pain     Gastro-oesophageal reflux disease     Hypertension     Radiculopathy     Scoliosis       Past Surgical History:   Procedure Laterality Date    DAVINCI XI HERNIORRHAPHY INGUINAL Bilateral 2022    Procedure: Robotic repair of recurrent right inguinal hernia with placement of mesh, robotic repair of left inguinal hernia with placement of mesh;  Surgeon: Mary Morales MD;  Location: [facility] OR    DAVINCI XI HERNIORRHAPHY VENTRAL N/A 2022    Procedure: Robotic repair of umbilical hernia with placement of mesh;  Surgeon: Mary Morales MD;  Location: [facility]    DISCECTOMY LUMBAR POSTERIOR MICROSCOPIC ONE LEVEL Right 7/15/2020    Procedure: Right L5-S1 foraminotomy and microdiskectomy;  Surgeon: Nhan Marshall MD;  Location: [facility] OR    EXPLORE SPINE, REMOVE HARDWARE, COMBINED N/A 2019    Procedure: REMOVAL LUMBAR L3-5 INSTRUMENTATION;  Surgeon: Nhan Marshall MD;  Location: [facility]    FUSION SPINE ANTERIOR MINIMALLY INVASIVE TWO LEVELS N/A 2016    Procedure: FUSION SPINE ANTERIOR MINIMALLY INVASIVE TWO LEVELS;  Surgeon: Nhan Marshall MD;  Location: [facility]    FUSION, SPINE, LUMBAR, 1 LEVEL, POSTERIOR APPROACH, ROBOTIC-ASSISTED N/A 2021    Procedure: Lumbar 5-Sacral 1 posterior segemental instrumentation, bilateral decompression and transforaminal interbody fusion using local autograft and allograft cancellous chips. Revision of Lumbar 2-4 ian. Posterior arthrodesis Lumbar 5-Sacral 1 using  local autograft and allograft cancellous chips;  Surgeon: Nhan Marshall MD;  Location: [facility] OR    HERNIA REPAIR      right inguinal hernia    LAMINECTOMY LUMBAR POSTERIOR MICROSCOPIC ONE LEVEL  2013    Procedure: LAMINECTOMY LUMBAR POSTERIOR  MICROSCOPIC ONE LEVEL;  Right Lumbar 3-4 Micro Laminectomy & Foraminotomy;  Surgeon: Nhan Marshall MD;  [facility]    OPTICAL TRACKING SYSTEM FUSION SPINE POSTERIOR LUMBAR PERCUTANEOUS TWO LEVELS N/A 11/16/2016    Procedure: OPTICAL TRACKING SYSTEM FUSION SPINE POSTERIOR LUMBAR PERCUTANEOUS TWO LEVELS;  Surgeon: Nhan Marshall MD;  [facility]: [facility]    OPTICAL TRACKING SYSTEM FUSION SPINE POSTERIOR LUMBAR THREE+ LEVELS Right 9/5/2019    Procedure: POSTERIOR SEGMENTAL INSTRUMENTATION L2-4, RIGHT LUMBAR 2-3 DISCECTOMY AND TRANSFORAMINAL INTERBODY FUSION, REDO DECOMPRESSION RIGHT L3-4, POSTERIOR ARTHRODESIS L2-4;  Surgeon: Nhan Marshall MD;  Location:  OR    ORTHOPEDIC SURGERY      left ankle, right finger      No Known Allergies   Social History     Tobacco Use    Smoking status: Never    Smokeless tobacco: Never   Substance Use Topics    Alcohol use: Yes     Comment: 3-4 drinks/week      Wt Readings from Last 1 Encounters:   09/27/23 84.9 kg (187 lb 1.6 oz)        Prior to Admission medications    Medication Sig Start Date End Date Taking? Authorizing Provider   acetaminophen (TYLENOL) 500 MG tablet Take 1,000 mg by mouth daily as needed for mild pain (2 X 500 mg = 1000 mg)   Yes Reported, Patient   amLODIPine-benazepril (LOTREL) 10-20 MG capsule Take 1 capsule by mouth every morning    Yes Reported, Patient   Cyanocobalamin (B-12 PO) Take 2 chew tab by mouth every morning    Yes Reported, Patient   famotidine (PEPCID) 20 MG tablet Take 20 mg by mouth every evening   Yes Reported, Patient   gabapentin (NEURONTIN) 300 MG capsule Take 1 capsule (300 mg) by mouth At Bedtime 8/27/21  Yes Nhan Marshall MD   lidocaine (LIDODERM) 5 % Patch Place 1 patch onto the skin every 24 hours 4/5/17  Yes Jennifer Monsivais PA-C   magnesium 250 MG tablet Take 1 tablet by mouth daily Daily   Yes Reported, Patient   Melatonin 10 MG TABS tablet Take 10 mg by mouth nightly as needed for sleep   Yes Reported,  Patient   Misc Natural Products (OSTEO BI-FLEX JOINT SHIELD) TABS Take 1 tablet by mouth daily   Yes Reported, Patient   Multiple Vitamins-Minerals (AIRBORNE PO) Take 1 chew tab by mouth every morning    Yes Reported, Patient   Multiple Vitamins-Minerals (MULTIVITAMIN ADULT PO) Take 2 chew tab by mouth every morning    Yes Reported, Patient   naproxen sodium 220 MG capsule Take 220 mg by mouth 2 times daily (with meals)   Yes Reported, Patient   omega 3 1000 MG CAPS Take 1 chew tab by mouth every morning    Yes Reported, Patient   polyethylene glycol (MIRALAX) 17 g packet Take 1 packet by mouth daily as needed for constipation   Yes Reported, Patient   Probiotic Product (SOLUBLE FIBER/PROBIOTICS PO) Take 1 chew tab by mouth every morning    Yes Reported, Patient   senna-docusate (SENOKOT-S/PERICOLACE) 8.6-50 MG tablet Take 1-2 tablets by mouth 2 times daily as needed for constipation (While on oral opioids.) 7/26/22  Yes Lonny Mittal, FREYA   tetrahydrozoline (VISINE) 0.05 % ophthalmic solution Place 1 drop into both eyes daily as needed   Yes Reported, Patient   valACYclovir (VALTREX) 1000 mg tablet Take 1,000 mg by mouth 2 times daily as needed   Yes Reported, Patient     Anesthesia Evaluation   Pt has had prior anesthetic. Type: General.    No history of anesthetic complications       ROS/MED HX  ENT/Pulmonary:    (-) tobacco use, asthma and sleep apnea   Neurologic:    (-) no seizures, no CVA and migraines   Cardiovascular:     (+)  hypertension- -   -  - -                                   (-) CAD, MEEKS, arrhythmias, valvular problems/murmurs, dyslipidemia and murmur   METS/Exercise Tolerance:     Hematologic:    (-) history of blood clots and anemia   Musculoskeletal:    (-) arthritis   GI/Hepatic:     (+) GERD,                (-) liver disease   Renal/Genitourinary:    (-) renal disease and nephrolithiasis   Endo:    (-) Type I DM, Type II DM, thyroid disease and obesity   Psychiatric/Substance Use: Comment:  Scoliosis/back pain/radiculopathy   (-) psychiatric history   Infectious Disease:    (-) Recent Fever   Malignancy:       Other:            Physical Exam    Airway        Mallampati: II   TM distance: > 3 FB   Neck ROM: full   Mouth opening: > 3 cm    Respiratory Devices and Support         Dental       (+) Minor Abnormalities - some fillings, tiny chips      Cardiovascular   cardiovascular exam normal       Rhythm and rate: regular and normal (-) no murmur    Pulmonary   pulmonary exam normal        breath sounds clear to auscultation           OUTSIDE LABS:  CBC:   Lab Results   Component Value Date    WBC 10.2 09/11/2019    WBC 9.1 09/08/2019    HGB 10.5 (L) 05/29/2021    HGB 10.8 (L) 05/28/2021    HCT 25.1 (L) 09/11/2019    HCT 26.6 (L) 09/08/2019     09/11/2019     09/08/2019     BMP:   Lab Results   Component Value Date     09/11/2019     (L) 09/08/2019    POTASSIUM 4.1 07/26/2022    POTASSIUM 3.4 05/27/2021    CHLORIDE 102 09/11/2019    CHLORIDE 97 09/08/2019    CO2 29 09/11/2019    CO2 31 09/08/2019    BUN 10 09/11/2019    BUN 8 09/08/2019    CR 0.77 07/15/2020    CR 0.78 09/11/2019     (H) 09/11/2019     (H) 09/08/2019     COAGS:   Lab Results   Component Value Date    PTT 30 04/09/2013    INR 1.13 11/09/2016     POC:   Lab Results   Component Value Date    BGM 94 05/29/2021     HEPATIC:   Lab Results   Component Value Date    ALBUMIN 2.9 (L) 09/11/2019    PROTTOTAL 8.4 09/11/2019    ALT 26 09/11/2019    AST 22 09/11/2019    ALKPHOS 74 09/11/2019    BILITOTAL 0.4 09/11/2019     OTHER:   Lab Results   Component Value Date    LACT 0.8 09/11/2019    JASON 9.0 09/11/2019    LIPASE 108 09/11/2019       Anesthesia Plan    ASA Status:  2    NPO Status:  NPO Appropriate    Anesthesia Type: General.     - Airway: ETT   Induction: Propofol.   Maintenance: Balanced.        Consents    Anesthesia Plan(s) and associated risks, benefits, and realistic alternatives discussed.  Questions answered and patient/representative(s) expressed understanding.     - Discussed:     - Discussed with:  Patient            Postoperative Care    Pain management: Multi-modal analgesia.   PONV prophylaxis: Dexamethasone or Solumedrol, Ondansetron (or other 5HT-3), Background Propofol Infusion     Comments:                Neema Bullard MD

## 2023-09-27 NOTE — INTERVAL H&P NOTE
"I have reviewed the surgical (or preoperative) H&P that is linked to this encounter, and examined the patient. There are no significant changes    Clinical Conditions Present on Arrival:  Clinically Significant Risk Factors Present on Admission                  # Overweight: Estimated body mass index is 26.85 kg/m  as calculated from the following:    Height as of this encounter: 1.778 m (5' 10\").    Weight as of this encounter: 84.9 kg (187 lb 1.6 oz).       "

## 2023-09-27 NOTE — ANESTHESIA CARE TRANSFER NOTE
Patient: Neema Hunt    Procedure: Procedure(s):  Thoracic 11 to thoracic 12 laminectomy       Diagnosis: Thoracic myelopathy [M47.14]  Diagnosis Additional Information: No value filed.    Anesthesia Type:   General     Note:    Oropharynx: oropharynx clear of all foreign objects and spontaneously breathing  Level of Consciousness: drowsy  Oxygen Supplementation: face mask  Level of Supplemental Oxygen (L/min / FiO2): 6  Independent Airway: airway patency satisfactory and stable  Dentition: dentition unchanged  Vital Signs Stable: post-procedure vital signs reviewed and stable  Report to RN Given: handoff report given  Patient transferred to: PACU    Handoff Report: Identifed the Patient, Identified the Reponsible Provider, Reviewed the pertinent medical history, Discussed the surgical course, Reviewed Intra-OP anesthesia mangement and issues during anesthesia, Set expectations for post-procedure period and Allowed opportunity for questions and acknowledgement of understanding      Vitals:  Vitals Value Taken Time   /79 09/27/23 1809   Temp 36.2  C (97.2  F) 09/27/23 1806   Pulse 70 09/27/23 1811   Resp 17 09/27/23 1811   SpO2 100 % 09/27/23 1811   Vitals shown include unvalidated device data.    Electronically Signed By: NADIR Kwon CRNA  September 27, 2023  6:12 PM

## 2023-09-27 NOTE — BRIEF OP NOTE
[facility] Brief Operative Note    Pre-operative diagnosis: Thoracic myelopathy [M47.14]   Post-operative diagnosis As above   Procedure: Procedure(s):  Thoracic 11 to thoracic 12 laminectomy   Surgeon(s): Surgeon(s) and Role:     * Zane Ontiveros MD - Primary   Estimated blood loss: * No values recorded between 9/27/2023  4:42 PM and 9/27/2023  6:05 PM *    Specimens: * No specimens in log *   Findings: See op note

## 2023-09-27 NOTE — PROGRESS NOTES
SPIRITUAL HEALTH SERVICES  [facility]. Pre-Op  PRE-SURGERY VISIT    Had pre-surgery visit with Neema Hunt, his wife and his sister.  Provided spiritual support, prayer.     Fely Madrid MDiv  Staff   [facility] routine referrals *[phone]  [facility] available 24/7 for emergent requests/referrals, either by having the on-call  paged or by entering an ASAP/STAT consult in Epic (this will also page the on-call ).

## 2023-09-27 NOTE — OP NOTE
DATE OF SURGERY: 9/27/23      SURGEON:  Zane Ontiveros MD   ASSISTANT:  Caren Light NP  (Note: The assistant was present for and assisted with the entire surgery, and his/her role as an assistant was crucial for aid in positioning, exposure, suctioning, retraction, and closure)      PREOPERATIVE DIAGNOSIS:  Thoracic stenosis and myelopathy      POSTOPERATIVE DIAGNOSIS:  Thoracic stenosis, synovial cyst, and myelopathy      PROCEDURES:   1.  T11-12 bilateral laminectomy and resection of synovial cyst for decompression of stenosis  2.  Use of intraoperative neuromonitoring, microscope, and fluoroscopy.       ESTIMATED BLOOD LOSS:  25 mL.       INDICATIONS FOR SURGERY: 58 year old male with past surgeries resulting in L2-S1 fusion, presented with acute severe progressive leg weakness and urinary incontinence.  Imaging showed severe stenosis at T11-12 with cord signal change.  Risks, benefits, indications, and alternatives were discussed with the patient and family in detail.  All their questions were answered, and they wished to proceed with surgery.       DESCRIPTION OF SURGERY:  The patient was positioned prone on a Vinnie frame.  Sterile prepping and draping procedures were performed.  Antibiotics were administered and timeout was performed.  The 10 blade was used to perform a midline incision and the monopolar was used to perform a subperiosteal dissection of T11 and T12.  Fluoroscopy was used to verify the correct level.  Subsequently, the high-speed drill was used to perform bilateral laminectomy and medial facetectomy.  The microscope was brought into the field, and under microscopy, the Kerrison rongeurs were used to remove the ligamentum flavum.  As the ligamentum flavum was removed, we noted a large left sided synovial cyst compressing the thecal sac.  This was dissected from the thecal sac under microscopy, and removed with pituitary rongeurs and kerrison rongeurs.  Left sided foraminotomy was also  performed.  Antibiotic irrigation was performed.  The fascial layer was closed with 0 Vicryl sutures.  The dermal layer was closed with 2-0 Vicryl sutures and the skin was closed.  Neuro-monitoring signals improved after the decompression.

## 2023-09-27 NOTE — ANESTHESIA PROCEDURE NOTES
Airway       Patient location during procedure: OR       Procedure Start/Stop Times: 9/27/2023 3:58 PM  Staff -        CRNA: Sheron Ray APRN CRNA       Other Anesthesia Staff: Keena Moreau       Performed By: SRNA  Consent for Airway        Urgency: elective  Indications and Patient Condition       Indications for airway management: rubén-procedural       Induction type:intravenous       Mask difficulty assessment: 1 - vent by mask    Final Airway Details       Final airway type: endotracheal airway       Successful airway: ETT - single and Oral  Endotracheal Airway Details        ETT size (mm): 8.0       Cuffed: yes       Successful intubation technique: direct laryngoscopy       DL Blade Type: MAC 4       Grade View of Cords: 1       Adjucts: stylet and bougie       Position: Right       Measured from: lips       Secured at (cm): 23       Bite block used: Soft    Post intubation assessment        Placement verified by: capnometry, equal breath sounds and chest rise        Number of attempts at approach: 1       Number of other approaches attempted: 0       Secured with: silk tape       Ease of procedure: easy       Dentition: Intact    Medication(s) Administered   Medication Administration Time: 9/27/2023 3:58 PM

## 2023-09-28 ENCOUNTER — APPOINTMENT (OUTPATIENT)
Dept: PHYSICAL THERAPY | Facility: CLINIC | Age: 58
End: 2023-09-28
Attending: NEUROLOGICAL SURGERY
Payer: COMMERCIAL

## 2023-09-28 ENCOUNTER — TRANSFERRED RECORDS (OUTPATIENT)
Dept: HEALTH INFORMATION MANAGEMENT | Facility: CLINIC | Age: 58
End: 2023-09-28
Payer: COMMERCIAL

## 2023-09-28 LAB
CREAT SERPL-MCNC: 0.64 MG/DL (ref 0.67–1.17)
EGFRCR SERPLBLD CKD-EPI 2021: >90 ML/MIN/1.73M2

## 2023-09-28 PROCEDURE — 97530 THERAPEUTIC ACTIVITIES: CPT | Mod: GP | Performed by: PHYSICAL THERAPIST

## 2023-09-28 PROCEDURE — 250N000011 HC RX IP 250 OP 636: Performed by: NURSE PRACTITIONER

## 2023-09-28 PROCEDURE — 36415 COLL VENOUS BLD VENIPUNCTURE: CPT | Performed by: NEUROLOGICAL SURGERY

## 2023-09-28 PROCEDURE — 250N000013 HC RX MED GY IP 250 OP 250 PS 637: Performed by: NURSE PRACTITIONER

## 2023-09-28 PROCEDURE — 97161 PT EVAL LOW COMPLEX 20 MIN: CPT | Mod: GP | Performed by: PHYSICAL THERAPIST

## 2023-09-28 PROCEDURE — 82565 ASSAY OF CREATININE: CPT | Performed by: NEUROLOGICAL SURGERY

## 2023-09-28 PROCEDURE — 97116 GAIT TRAINING THERAPY: CPT | Mod: GP | Performed by: PHYSICAL THERAPIST

## 2023-09-28 RX ADMIN — AMLODIPINE BESYLATE 10 MG: 10 TABLET ORAL at 08:27

## 2023-09-28 RX ADMIN — METHOCARBAMOL 750 MG: 750 TABLET ORAL at 05:44

## 2023-09-28 RX ADMIN — GABAPENTIN 300 MG: 300 CAPSULE ORAL at 21:23

## 2023-09-28 RX ADMIN — SENNOSIDES AND DOCUSATE SODIUM 1 TABLET: 50; 8.6 TABLET ORAL at 21:23

## 2023-09-28 RX ADMIN — POLYETHYLENE GLYCOL 3350 17 G: 17 POWDER, FOR SOLUTION ORAL at 08:28

## 2023-09-28 RX ADMIN — SENNOSIDES AND DOCUSATE SODIUM 1 TABLET: 50; 8.6 TABLET ORAL at 08:27

## 2023-09-28 RX ADMIN — OXYCODONE HYDROCHLORIDE 5 MG: 5 TABLET ORAL at 18:14

## 2023-09-28 RX ADMIN — ACETAMINOPHEN 975 MG: 325 TABLET, FILM COATED ORAL at 13:00

## 2023-09-28 RX ADMIN — CEFAZOLIN 1 G: 1 INJECTION, POWDER, FOR SOLUTION INTRAMUSCULAR; INTRAVENOUS at 17:16

## 2023-09-28 RX ADMIN — LISINOPRIL 20 MG: 20 TABLET ORAL at 08:27

## 2023-09-28 RX ADMIN — CEFAZOLIN 1 G: 1 INJECTION, POWDER, FOR SOLUTION INTRAMUSCULAR; INTRAVENOUS at 09:32

## 2023-09-28 RX ADMIN — OXYCODONE HYDROCHLORIDE 5 MG: 5 TABLET ORAL at 03:17

## 2023-09-28 RX ADMIN — ACETAMINOPHEN 975 MG: 325 TABLET, FILM COATED ORAL at 21:24

## 2023-09-28 RX ADMIN — Medication 200 MG: at 09:36

## 2023-09-28 RX ADMIN — ACETAMINOPHEN 975 MG: 325 TABLET, FILM COATED ORAL at 05:44

## 2023-09-28 RX ADMIN — OXYCODONE HYDROCHLORIDE 5 MG: 5 TABLET ORAL at 08:40

## 2023-09-28 RX ADMIN — FAMOTIDINE 20 MG: 20 TABLET, FILM COATED ORAL at 21:23

## 2023-09-28 RX ADMIN — POLYETHYLENE GLYCOL 3350 17 G: 17 POWDER, FOR SOLUTION ORAL at 21:30

## 2023-09-28 ASSESSMENT — ACTIVITIES OF DAILY LIVING (ADL)
ADLS_ACUITY_SCORE: 26
ADLS_ACUITY_SCORE: 23
ADLS_ACUITY_SCORE: 26
ADLS_ACUITY_SCORE: 23
ADLS_ACUITY_SCORE: 26
ADLS_ACUITY_SCORE: 26
ADLS_ACUITY_SCORE: 23
ADLS_ACUITY_SCORE: 26
ADLS_ACUITY_SCORE: 26

## 2023-09-28 NOTE — PROGRESS NOTES
Patient vital signs are at baseline: Yes  Patient able to ambulate as they were prior to admission or with assist devices provided by therapies during their stay:  Yes  Patient MUST void prior to discharge:  Yes  Patient able to tolerate oral intake:  Yes  Pain has adequate pain control using Oral analgesics:  Yes  Does patient have an identified :  Yes  Has goal D/C date and time been discussed with patient:  not yet    Pt A&O x4, up x SBA with walker and GB. VSS.RA. Back incision site dressing dry and intact. FRANK patent with 50 ml output. Pain managed with Oxy and Tyl. Denied numbness. Voided in the BR. Possible discharge home tomorrow.

## 2023-09-28 NOTE — PROGRESS NOTES
[facility]    Neurosurgery  Daily Note    Assessment & Plan   Procedure(s):  Thoracic 11 to thoracic 12 laminectomy   1 Day Post-Op  Pain well controlled  Continues with left>right hip flexion weakness and left knee extension weakness; stable per patient   Continues with BLE numbness, stable per patient     Dressing is dry, drain is secure and output is 50 overnight     Plan:  -Ambulate  -Advance activity as tolerated  -Continue supportive and symptomatic treatment  -Start or continue physical therapy  -Pain control measures  -Advance diet as tolerated  -Routine wound care  -Continue drain and abx     -if pain well controlled, PT clears and drain output decreases, OK for discharge this afternoon. RN to update on output and patient progress    Dr. Ontiveros in agreement     Trinh Deluna PA-C  [facility]  [facility]  [address]  [address] [place] [id number]    Tel [phone]  Pager [phone]    Principal Problem:    S/P laminectomy     Trinh Deluna PA-C    Interval History   Stable.  Doing well.  Improving slowly.  Pain is reasonably controlled.  No fevers.     Physical Exam   Temp: 98.6  F (37  C) Temp src: Oral BP: 122/77 Pulse: 75   Resp: 16 SpO2: 99 % O2 Device: None (Room air) Oxygen Delivery: 6 LPM  Vitals:    09/27/23 1302   Weight: 187 lb 1.6 oz (84.9 kg)     Vital Signs with Ranges  Temp:  [97.2  F (36.2  C)-98.9  F (37.2  C)] 98.6  F (37  C)  Pulse:  [65-79] 75  Resp:  [11-22] 16  BP: ()/(63-87) 122/77  SpO2:  [96 %-100 %] 99 %  I/O last 3 completed shifts:  In: 1100 [I.V.:1100]  Out: 2085 [Urine:1900; Drains:185]    Awake, alert, appropriate   Left hip flexion 3+/5  Right hip flexion 4/5  Left knee extension 4/5, knee flexion 5/5   Right knee 5/5   Right and left PF DF 5/5   Decreased sensation bilateral anterolateral thighs left>right  Negative clonus     Dressing intact   Drain secure       Medications    sodium chloride  Stopped (09/28/23 1132)       acetaminophen  975 mg Oral Q8H    amLODIPine  10 mg Oral Daily    And    lisinopril  20 mg Oral Daily    ceFAZolin  1 g Intravenous Q8H    famotidine  20 mg Oral QPM    gabapentin  300 mg Oral At Bedtime    magnesium oxide  200 mg Oral Daily    polyethylene glycol  17 g Oral Daily    senna-docusate  1 tablet Oral BID    sodium chloride (PF)  3 mL Intracatheter Q8H       Plans discussed with Dr. Ontiveros who was in agreement with plans    Trinh SHEPARD [facility]  [address] [address]    Tel [phone]  Pager [phone]

## 2023-09-28 NOTE — PROGRESS NOTES
[facility]  OUTPATIENT PHYSICAL THERAPY EVALUATION  PLAN OF TREATMENT FOR OUTPATIENT REHABILITATION  (COMPLETE FOR INITIAL CLAIMS ONLY)  Patient's Last Name, First Name, M.I.  YOB: 1965  Neema Hunt                        Provider's Name  [facility] Medical Record No.  [id number]                             Onset Date:  09/27/23   Start of Care Date:  09/27/23   Type:     _X_PT   ___OT   ___SLP Medical Diagnosis:  T11-12 laminectomty              PT Diagnosis:  impaired mobility Visits from SOC:  1     See note for plan of treatment, functional goals and certification details    I CERTIFY THE NEED FOR THESE SERVICES FURNISHED UNDER        THIS PLAN OF TREATMENT AND WHILE UNDER MY CARE     (Physician co-signature of this document indicates review and certification of the therapy plan).                 09/28/23 0830   Appointment Info   Signing Clinician's Name / Credentials (PT) Tammy Way DPT   Rehab Comments (PT) spinal precautions   Quick Adds   Quick Adds Certification   Living Environment   People in Home spouse   Current Living Arrangements house   Home Accessibility stairs to enter home;stairs within home   Number of Stairs, Main Entrance 3   Stair Railings, Main Entrance railings safe and in good condition   Number of Stairs, Within Home, Primary greater than 10 stairs   Stair Railings, Within Home, Primary railings safe and in good condition   Transportation Anticipated family or friend will provide   Living Environment Comments Pt lives in house with a few steps to enter, > 10 stairs down to bedroom level. Has cane, walk in shower, shower chair, vanity next to toilet (standard height).   Self-Care   Usual Activity Tolerance moderate   Current Activity Tolerance fair   Regular Exercise No   Equipment Currently Used at Home cane, straight   Fall history within last six months yes   Number of times patient has fallen within  last six months 7   Activity/Exercise/Self-Care Comment Reports ind with self-cares and mobility, number of falls related to LE weakness d/t myelopathy. Has spouse that works and sister is in town to assist as needed. Open to using walker.   General Information   Onset of Illness/Injury or Date of Surgery 09/27/23   Referring Physician Caren Light, NP   Patient/Family Therapy Goals Statement (PT) return home   Pertinent History of Current Problem (include personal factors and/or comorbidities that impact the POC) s/p T11-12 bilateral laminectomy and resection of synovial cyst for decompression of stenosis; 58 year old male with past surgeries resulting in L2-S1 fusion, presented with acute severe progressive leg weakness and urinary incontinence. Imaging showed severe stenosis at T11-12 with cord signal change.   Weight-Bearing Status - LUE full weight-bearing   Weight-Bearing Status - RUE full weight-bearing   Weight-Bearing Status - LLE full weight-bearing   Weight-Bearing Status - RLE full weight-bearing   General Observations FRANK drain, thoracic level bandage   Cognition   Affect/Mental Status (Cognition) WFL   Orientation Status (Cognition) oriented x 4   Pain Assessment   Patient Currently in Pain Yes, see Vital Sign flowsheet  (5/10 back pain and radiating leg pain)   Strength (Manual Muscle Testing)   Strength Comments L LE weakness > R LE weakness, ~ 3/5 MMT with 4/5 DF/PF   Bed Mobility   Comment, (Bed Mobility) SBA supine <> sit   Transfers   Comment, (Transfers) CGA sit <> stand with FWW   Gait/Stairs (Locomotion)   [place]ord Level (Gait) contact guard   Assistive Device (Gait) walker, front-wheeled   Distance in Feet 50'   Distance in Feet (Gait) 8 stairs + 175'   Comment, (Gait/Stairs) L LE snaps back at times d/t quad weakness/motor control impairments s/p spinal compression   Balance   Balance Comments fall risk d/t spinal compression and LE weakness, hx of buckling   Sensory  Examination   Sensory Perception Comments numbness in bilateral toes and throughout LEs   Coordination   Coordination Comments impaired motor control at times evidence by impaired L quad activation with gait/stance   Clinical Impression   Criteria for Skilled Therapeutic Intervention Yes, treatment indicated   PT Diagnosis (PT) impaired mobility   Influenced by the following impairments weakness, impaired balance, impaired coordination and sensation, pain   Functional limitations due to impairments fall risk, decreased activity tolerance   Clinical Presentation (PT Evaluation Complexity) Stable/Uncomplicated   Clinical Presentation Rationale clinical judgement   Clinical Decision Making (Complexity) low complexity   Planned Therapy Interventions (PT) balance training;bed mobility training;gait training;neuromuscular re-education;stair training;strengthening;transfer training;progressive activity/exercise;patient/family education   Anticipated Equipment Needs at Discharge (PT) walker, rolling   Risk & Benefits of therapy have been explained evaluation/treatment results reviewed;care plan/treatment goals reviewed;risks/benefits reviewed;current/potential barriers reviewed;participants voiced agreement with care plan;participants included;patient   PT Total Evaluation Time   PT Eval, Low Complexity Minutes (94413) 20   Plan of Care Review   Plan of Care Reviewed With patient   Therapy Certification   Start of care date 09/27/23   Certification date from 09/27/23   Certification date to 10/13/23   Medical Diagnosis T11-12 laminectomty   Physical Therapy Goals   PT Frequency 2x/day   PT Predicted Duration/Target Date for Goal Attainment 10/01/23   PT Goals Bed Mobility;Transfers;Gait;Stairs   PT: Bed Mobility Independent;Within precautions   PT: Transfers Modified independent;Sit to/from stand;Bed to/from chair;Assistive device   PT: Gait Supervision/stand-by assist;Rolling walker;150 feet   PT: Stairs Modified  independent;Greater than 10 stairs;Rail on right  (with cane and rail use)   Interventions   Interventions Quick Adds Gait Training;Therapeutic Activity   Therapeutic Activity   Therapeutic Activities: dynamic activities to improve functional performance Minutes (15535) 10   Symptoms Noted During/After Treatment None   Treatment Detail/Skilled Intervention cued for spinal precautions with bed mobility SBA, cues for safe hand placement during transfers from various heights and locations, CGA.   Gait Training   Gait Training Minutes (07790) 15   Symptoms Noted During/After Treatment (Gait Training) fatigue   Treatment Detail/Skilled Intervention gait training in hallway with FWW and CGA, cues for upright posture and gaze with discussion of FWW recommendations depsite pt mostly using cane at home prior (hx of 7 falls d/t LE buckling unexpectedly before surgery). Pt ambulating 175' with CGA progressing to SBA with FWW. Navigated 8 stairs with L side rail and cane use with edu and demo for safe sequencing up to strong R LE and down with weaker L LE.   [place] Level (Gait Training) contact guard   Physical Assistance Level (Gait Training) verbal cues   Assistive Device (Gait Training) rolling walker   PT Discharge Planning   PT Plan reinforce use of FWW not cane at home, gait with FWW and stairs with cane/railing, review spinal precautions   PT Discharge Recommendation (DC Rec) home with assist   PT Rationale for DC Rec Pt at increased risk for falls given spinal cord compression evidenced by LE weakness and bladder changes. Currently moving A x 1 with FWW >100 feet and navigating stairs, risk for falls. Recommend pt return home with family assist, use of FWW for improved safety.   PT Brief overview of current status SBA/CGA Ax1 with FWW, hx LE buckling   Total Session Time   Timed Code Treatment Minutes 25   Total Session Time (sum of timed and untimed services) 45

## 2023-09-28 NOTE — PROGRESS NOTES
[id number]  Patient vital signs are at baseline: Yes  Patient able to ambulate as they were prior to admission or with assist devices provided by therapies during their stay:  Yes  Patient MUST void prior to discharge:  Yes  Patient able to tolerate oral intake:  Yes  Pain has adequate pain control using Oral analgesics:  Yes  Does patient have an identified :  Yes  Has goal D/C date and time been discussed with patient:  Yes    Pt is alert and oriented x 4, VSS on RA, IVF@ 75ml/hr .Dressing CDI. Discharge pending

## 2023-09-28 NOTE — ANESTHESIA POSTPROCEDURE EVALUATION
Patient: Neema Hunt    Procedure: Procedure(s):  Thoracic 11 to thoracic 12 laminectomy       Anesthesia Type:  General    Note:     Postop Pain Control: Uneventful            Sign Out: Well controlled pain   PONV:    Neuro/Psych: Uneventful            Sign Out: Acceptable/Baseline neuro status   Airway/Respiratory: Uneventful            Sign Out: Acceptable/Baseline resp. status   CV/Hemodynamics: Uneventful            Sign Out: Acceptable CV status; No obvious hypovolemia; No obvious fluid overload   Other NRE:    DID A NON-ROUTINE EVENT OCCUR?            Last vitals:  Vitals Value Taken Time   /75 09/27/23 1945   Temp 37.2  C (98.9  F) 09/27/23 1900   Pulse 75 09/27/23 1948   Resp 10 09/27/23 1948   SpO2 99 % 09/27/23 1948   Vitals shown include unvalidated device data.    Electronically Signed By: Joel Chaudhry MD  September 27, 2023  9:46 PM

## 2023-09-28 NOTE — PROVIDER NOTIFICATION
"MD Notification    Notified Person: MD    Notified Person Name: PAZ Eldridge    Notification Date/Time: 9/28/23; 1132    Notification Interaction: Amcom messaging    Purpose of Notification: \"7507; can we discontinue the IVF on this patient? He is tolerating fluids, well. SHAW Soni [phone]\"    Orders Received: awaiting    Comments:   "

## 2023-09-29 ENCOUNTER — APPOINTMENT (OUTPATIENT)
Dept: PHYSICAL THERAPY | Facility: CLINIC | Age: 58
End: 2023-09-29
Attending: NEUROLOGICAL SURGERY
Payer: COMMERCIAL

## 2023-09-29 VITALS
RESPIRATION RATE: 16 BRPM | HEIGHT: 70 IN | DIASTOLIC BLOOD PRESSURE: 77 MMHG | WEIGHT: 187.1 LBS | HEART RATE: 74 BPM | OXYGEN SATURATION: 100 % | BODY MASS INDEX: 26.79 KG/M2 | TEMPERATURE: 98.6 F | SYSTOLIC BLOOD PRESSURE: 133 MMHG

## 2023-09-29 PROCEDURE — 250N000013 HC RX MED GY IP 250 OP 250 PS 637: Performed by: NURSE PRACTITIONER

## 2023-09-29 PROCEDURE — 250N000011 HC RX IP 250 OP 636: Performed by: NURSE PRACTITIONER

## 2023-09-29 PROCEDURE — 97116 GAIT TRAINING THERAPY: CPT | Mod: GP | Performed by: PHYSICAL THERAPIST

## 2023-09-29 PROCEDURE — 97530 THERAPEUTIC ACTIVITIES: CPT | Mod: GP | Performed by: PHYSICAL THERAPIST

## 2023-09-29 RX ORDER — OXYCODONE HYDROCHLORIDE 5 MG/1
5 TABLET ORAL EVERY 4 HOURS PRN
Qty: 30 TABLET | Refills: 0 | Status: SHIPPED | OUTPATIENT
Start: 2023-09-29 | End: 2023-10-12

## 2023-09-29 RX ORDER — COVID-19 ANTIGEN TEST
220 KIT MISCELLANEOUS 2 TIMES DAILY WITH MEALS
Status: ON HOLD | COMMUNITY
Start: 2023-09-29 | End: 2023-12-25

## 2023-09-29 RX ORDER — METHOCARBAMOL 750 MG/1
750 TABLET, FILM COATED ORAL EVERY 6 HOURS PRN
Qty: 40 TABLET | Refills: 0 | Status: SHIPPED | OUTPATIENT
Start: 2023-09-29 | End: 2023-10-12

## 2023-09-29 RX ORDER — AMOXICILLIN 250 MG
1-2 CAPSULE ORAL 2 TIMES DAILY PRN
Qty: 60 TABLET | Refills: 0 | Status: ON HOLD | OUTPATIENT
Start: 2023-09-29 | End: 2023-12-25

## 2023-09-29 RX ADMIN — POLYETHYLENE GLYCOL 3350 17 G: 17 POWDER, FOR SOLUTION ORAL at 09:33

## 2023-09-29 RX ADMIN — CEFAZOLIN 1 G: 1 INJECTION, POWDER, FOR SOLUTION INTRAMUSCULAR; INTRAVENOUS at 09:34

## 2023-09-29 RX ADMIN — OXYCODONE HYDROCHLORIDE 10 MG: 5 TABLET ORAL at 00:16

## 2023-09-29 RX ADMIN — OXYCODONE HYDROCHLORIDE 5 MG: 5 TABLET ORAL at 04:34

## 2023-09-29 RX ADMIN — OXYCODONE HYDROCHLORIDE 5 MG: 5 TABLET ORAL at 09:33

## 2023-09-29 RX ADMIN — SENNOSIDES AND DOCUSATE SODIUM 2 TABLET: 8.6; 5 TABLET ORAL at 04:41

## 2023-09-29 RX ADMIN — ACETAMINOPHEN 975 MG: 325 TABLET, FILM COATED ORAL at 04:30

## 2023-09-29 RX ADMIN — AMLODIPINE BESYLATE 10 MG: 10 TABLET ORAL at 09:34

## 2023-09-29 RX ADMIN — Medication 200 MG: at 09:34

## 2023-09-29 RX ADMIN — CEFAZOLIN 1 G: 1 INJECTION, POWDER, FOR SOLUTION INTRAMUSCULAR; INTRAVENOUS at 00:18

## 2023-09-29 RX ADMIN — METHOCARBAMOL 750 MG: 750 TABLET ORAL at 11:21

## 2023-09-29 RX ADMIN — LISINOPRIL 20 MG: 20 TABLET ORAL at 09:34

## 2023-09-29 RX ADMIN — ACETAMINOPHEN 975 MG: 325 TABLET, FILM COATED ORAL at 13:21

## 2023-09-29 RX ADMIN — OXYCODONE HYDROCHLORIDE 5 MG: 5 TABLET ORAL at 13:20

## 2023-09-29 ASSESSMENT — ACTIVITIES OF DAILY LIVING (ADL)
ADLS_ACUITY_SCORE: 24
ADLS_ACUITY_SCORE: 23

## 2023-09-29 NOTE — PLAN OF CARE
Physical Therapy Discharge Summary    Reason for therapy discharge:    All goals and outcomes met, no further needs identified.    Progress towards therapy goal(s). See goals on Care Plan in [facility] electronic health record for goal details.  Goals met    Therapy recommendation(s):    No further therapy is recommended. Pt at increased risk for falls d/t leg weakness as a result of cord compression. Recommend pt return home with family assist, use of FWW instead of cane for improved safety. Progressed well, ambulating Mod I with  feet, navigating 12 stairs with rail/cane use.

## 2023-09-29 NOTE — DISCHARGE SUMMARY
[facility]    Discharge Summary  Neurosurgery    Date of Admission:  9/27/2023  Date of Discharge:  9/27/2023.  Discharging Provider: Anthony Freeman PA-C   Discharge Diagnoses   Patient Active Problem List   Diagnosis    Acquired kyphosis    Other secondary scoliosis, lumbar region    Spinal stenosis of lumbar region without neurogenic claudication    Status post lumbar spinal fusion    Spinal stenosis of lumbar region with radiculopathy    S/P laminectomy       Procedure/Surgery Information   Procedure: Procedure(s):  Thoracic 11 to thoracic 12 laminectomy   Surgeon(s): Surgeon(s) and Role:     * Zane Ontiveros MD - Primary   Specimens: * No specimens in log *   Non-operative procedures None performed     History of Present Illness   Neema Hunt is a 58 year old male who presented with past surgeries resulting in L2-S1 fusion, presented with acute severe progressive leg weakness and urinary incontinence.  Imaging showed severe stenosis at T11-12 with cord signal change.    Hospital Course   Neema Hunt was admitted on 9/27/2023.  The following problems were addressed during his hospitalization:  T11-12 bilateral laminectomy and resection of synovial cyst for decompression of stenosis    Patient recovered well, tolerating oral diet, reports some improvement in symptoms.  Discharging home on postoperative day 2 in good condition.    Post-operative pain control: included Hydromorphone (dilaudid) IV and will be Oxycodone on discharge.     Medications discontinued or adjusted during this hospitalization: No change     Antibiotics prescribed at discharge:      Imaging study follow up needs:   -None performed    Significant Findings:     Anthony Freeman PA-C   Neurosurgery    Discharge Disposition   Discharged to home   Condition at discharge: Stable    Pending Results   Final pathology results: No pathology submitted  These results will be followed up by   Unresulted Labs  Ordered in the Past 30 Days of this Admission       No orders found from 8/28/2023 to 9/28/2023.            Primary Care Physician   Cornel Mittal    Physical Exam   Temp: 98.6  F (37  C) Temp src: Oral BP: 133/77 Pulse: 74   Resp: 16 SpO2: 100 % O2 Device: None (Room air)    Vitals:    09/27/23 1302   Weight: 84.9 kg (187 lb 1.6 oz)     Vital Signs with Ranges  Temp:  [97.5  F (36.4  C)-98.8  F (37.1  C)] 98.6  F (37  C)  Pulse:  [63-79] 74  Resp:  [16] 16  BP: (113-133)/(70-84) 133/77  SpO2:  [97 %-100 %] 100 %  I/O last 3 completed shifts:  In: 1080 [P.O.:1080]  Out: 3555 [Urine:3450; Drains:105]    Alert and oriented no acute distress  Bilateral lower extremities appropriate strength  Incision clean dry and intact    Consultations This Hospital Stay   PHYSICAL THERAPY ADULT IP CONSULT    Time Spent on this Encounter   I have spent less than 30 minutes on this discharge.    Discharge Orders      Discharge Instructions    Review outpatient procedure discharge instructions as directed by Provider.     Discharge Instructions - Change dressing    Wash hands prior to changing dressing daily. If no drainage on dressing, may leave open to air.     No driving or operating machinery while in a cervical collar    Until follow-up appointment.     Reason for your hospital stay    T11-T12 decompression.     Follow-up and recommended labs and tests     Follow-up 2 weeks with neurosurgery clinic for incision check.     Activity    Avoid excessive bending lifting and twisting  Go for short frequent walks out the day  Okay to shower on 9/30/2023  Do not submerge incision  Okay to leave incision open to air     Diet    Follow this diet upon discharge: Orders Placed This Encounter      Advance Diet as Tolerated: Regular Diet Adult     Discharge Medications   Current Discharge Medication List        START taking these medications    Details   methocarbamol (ROBAXIN) 750 MG tablet Take 1 tablet (750 mg) by mouth every 6 hours as  needed for muscle spasms  Qty: 40 tablet, Refills: 0    Associated Diagnoses: S/P laminectomy      oxyCODONE (ROXICODONE) 5 MG tablet Take 1 tablet (5 mg) by mouth every 4 hours as needed for moderate pain  Qty: 30 tablet, Refills: 0    Associated Diagnoses: S/P laminectomy           CONTINUE these medications which have CHANGED    Details   naproxen sodium 220 MG capsule Take 220 mg by mouth 2 times daily (with meals)    Comments: May resume in 2 weeks on 10/13      senna-docusate (SENOKOT-S/PERICOLACE) 8.6-50 MG tablet Take 1-2 tablets by mouth 2 times daily as needed for constipation (While on oral opioids.)  Qty: 60 tablet, Refills: 0    Associated Diagnoses: S/P laminectomy           CONTINUE these medications which have NOT CHANGED    Details   acetaminophen (TYLENOL) 500 MG tablet Take 1,000 mg by mouth daily as needed for mild pain (2 X 500 mg = 1000 mg)      amLODIPine-benazepril (LOTREL) 10-20 MG capsule Take 1 capsule by mouth every morning       Cyanocobalamin (B-12 PO) Take 2 chew tab by mouth every morning       famotidine (PEPCID) 20 MG tablet Take 20 mg by mouth every evening      gabapentin (NEURONTIN) 300 MG capsule Take 1 capsule (300 mg) by mouth At Bedtime  Qty: 90 capsule, Refills: 3    Associated Diagnoses: Spinal stenosis of lumbar region with radiculopathy      lidocaine (LIDODERM) 5 % Patch Place 1 patch onto the skin every 24 hours  Qty: 30 patch, Refills: 2    Associated Diagnoses: Lumbar radiculopathy, acute; Post-operative state      magnesium 250 MG tablet Take 1 tablet by mouth daily Daily      Melatonin 10 MG TABS tablet Take 10 mg by mouth nightly as needed for sleep      Misc Natural Products (OSTEO BI-FLEX JOINT SHIELD) TABS Take 1 tablet by mouth daily      !! Multiple Vitamins-Minerals (AIRBORNE PO) Take 1 chew tab by mouth every morning       !! Multiple Vitamins-Minerals (MULTIVITAMIN ADULT PO) Take 2 chew tab by mouth every morning       omega 3 1000 MG CAPS Take 1 chew tab  by mouth every morning       polyethylene glycol (MIRALAX) 17 g packet Take 1 packet by mouth daily as needed for constipation      Probiotic Product (SOLUBLE FIBER/PROBIOTICS PO) Take 1 chew tab by mouth every morning       tetrahydrozoline (VISINE) 0.05 % ophthalmic solution Place 1 drop into both eyes daily as needed      valACYclovir (VALTREX) 1000 mg tablet Take 1,000 mg by mouth 2 times daily as needed       !! - Potential duplicate medications found. Please discuss with provider.        Allergies   No Known Allergies  Data

## 2023-09-29 NOTE — PROGRESS NOTES
Patient vital signs are at baseline: Yes  Patient able to ambulate as they were prior to admission or with assist devices provided by therapies during their stay:  Yes  Patient MUST void prior to discharge:  Yes  Patient able to tolerate oral intake:  Yes  Pain has adequate pain control using Oral analgesics:  Yes  Does patient have an identified :  Yes  Has goal D/C date and time been discussed with patient:  not yet

## 2023-09-29 NOTE — PLAN OF CARE
Problem: Adult Inpatient Plan of Care  Goal: Absence of Hospital-Acquired Illness or Injury  Intervention: Identify and Manage Fall Risk  Flowsheets (Taken 2/3/2023 1345)  Safety Promotion/Fall Prevention:   bed alarm set   medications reviewed   nonskid shoes/socks when out of bed     Problem: Skin Injury Risk Increased  Goal: Skin Health and Integrity  Intervention: Optimize Skin Protection  Flowsheets (Taken 2/3/2023 1345)  Pressure Reduction Techniques: pressure points protected  Pressure Reduction Devices: specialty bed utilized  Skin Protection: incontinence pads utilized  Head of Bed (HOB) Positioning: HOB at 30 degrees     Problem: Fall Injury Risk  Goal: Absence of Fall and Fall-Related Injury  Intervention: Identify and Manage Contributors  Flowsheets (Taken 2/3/2023 1345)  Self-Care Promotion: BADL personal objects within reach  Medication Review/Management: medications reviewed      Primary Concern: 9/27 Thoracic 11 to thoracic 12 laminectomy    Orientation: A&Ox4    Vitals/Tele: VSS on RA    Neuro: bilat numbness in feet    Pain: Oxy given x2, scheduled tylenol     IV Access/drains: PIV. FRANK drain, output decreasing     Diet: regular    Mobility: SBA w/ GBW    GI/: Continent, output improved    Wound/Skin: back incision site, dressing clean, dry, intact    Consults: neurosurgery following    Discharge Plan: Possible discharge 9/29, depends on FRANK tube drainage amount       See Flow sheets for assessment

## 2023-09-29 NOTE — PLAN OF CARE
Goal Outcome Evaluation:    Denies CP, SOB. All pt needs met at this time. FRANK drain removed per order. Pt and sister verbalized understanding to all discharge and med instructions. Pt left with all belongings. PIV out.

## 2023-10-02 ENCOUNTER — MYC MEDICAL ADVICE (OUTPATIENT)
Dept: NEUROSURGERY | Facility: CLINIC | Age: 58
End: 2023-10-02
Payer: COMMERCIAL

## 2023-10-02 ENCOUNTER — DOCUMENTATION ONLY (OUTPATIENT)
Dept: NEUROSURGERY | Facility: CLINIC | Age: 58
End: 2023-10-02
Payer: COMMERCIAL

## 2023-10-02 NOTE — PROGRESS NOTES
[place] unemployment insurance form completed. RTW with restrictions 10/25/23. To be signed by Dr. Ontiveros.     When done, fax to [phone]

## 2023-10-09 NOTE — TELEPHONE ENCOUNTER
Faxed  Unemployment form  October 9, 2023 to fax number [phone]    Right Fax confirmed at *** {AM/PM:[id number]}    Bandar Reed RN

## 2023-10-10 ENCOUNTER — ANCILLARY PROCEDURE (OUTPATIENT)
Dept: GENERAL RADIOLOGY | Facility: CLINIC | Age: 58
End: 2023-10-10
Payer: COMMERCIAL

## 2023-10-10 DIAGNOSIS — M25.561 BILATERAL KNEE PAIN: ICD-10-CM

## 2023-10-10 DIAGNOSIS — M25.562 BILATERAL KNEE PAIN: ICD-10-CM

## 2023-10-10 PROCEDURE — 73562 X-RAY EXAM OF KNEE 3: CPT | Mod: TC | Performed by: RADIOLOGY

## 2023-10-12 ENCOUNTER — ALLIED HEALTH/NURSE VISIT (OUTPATIENT)
Dept: NEUROSURGERY | Facility: CLINIC | Age: 58
End: 2023-10-12
Payer: COMMERCIAL

## 2023-10-12 DIAGNOSIS — Z98.890 S/P LAMINECTOMY: ICD-10-CM

## 2023-10-12 PROCEDURE — 99207 PR NO CHARGE NURSE ONLY: CPT

## 2023-10-12 RX ORDER — METHOCARBAMOL 750 MG/1
750 TABLET, FILM COATED ORAL EVERY 6 HOURS PRN
Qty: 40 TABLET | Refills: 0 | Status: SHIPPED | OUTPATIENT
Start: 2023-10-12 | End: 2023-11-20

## 2023-10-12 RX ORDER — OXYCODONE HYDROCHLORIDE 5 MG/1
5 TABLET ORAL EVERY 4 HOURS PRN
Qty: 30 TABLET | Refills: 0 | Status: SHIPPED | OUTPATIENT
Start: 2023-10-12 | End: 2023-12-20

## 2023-10-12 NOTE — PROGRESS NOTES
Post-op Nurse Visit:    Patient seen today per the order of  Zane Ontiveros MD .   DOS: 9/27/23  Procedure: T11-12 bilateral laminectomy and resection of synovial cyst     Pain/Neuro Assessment  4/10 to left of incision. Left knee pain  Numbness/tingling: bilateral from knees down, same as prior  Strength: Equal strength to bilateral lower extremities. Denies weakness.     Pain Relief Measures:  Oxycodone: occasional  Tylenol: TID  Robaxin: BID  Ice: yes    Incision   Incision inspected. Edges well-approximated. No redness, drainage, or warmth noted. Swelling to right of incision, soft. Advised to ice and let us know of any changes. Steri-strips present. Writer removed.     Activity  Following restrictions   Falls: none  Patient is walking frequently without difficulty.   Denies redness, swelling, or warmth to bilateral calves.   Wearing brace? No    GI/  Difficulty swallowing? No  Patient's appetite is normal  Bowel/bladder problems? No  Taking stool softeners? No     Refills/x-rays/return to work  Refills given at this appointment? Yes  Sent for x-rays after this appointment? No  Ordered future x-rays? No  Scheduling team notified? (Yes or No. If xr order placed)  Return to work discussed at this appointment? Yes     All of patient's questions addressed today. Patient was instructed to call with any additional questions/concerns.

## 2023-10-12 NOTE — TELEPHONE ENCOUNTER
Patient requesting refill of Oxycodone and Robaxin    DOS: 9/27/23  Procedure: T11-12 bilateral laminectomy and resection of synovial cyst   Surgeon: Dr Weston Truong Post Op: 2    See today's visit note for symptoms/management    Last fill: 9/29/23  Next visit: 11/20/23    Medication pended for your approval, if appropriate. Pharmacy verified.     Any patient questions or concerns:     Informed patient request will be forwarded to care team.

## 2023-10-12 NOTE — PATIENT INSTRUCTIONS
Instructions for Patient    Incision  Keep your incision clean and dry at all times.   It is okay to shower, just pat the incision dry   No submerging incision in water such as pools, hot tubs, or baths for at least 8 weeks and until the incision is healed  Do not apply lotions or ointments to incision    Activity  No lifting greater than 10 pounds. Limited bending, twisting, or overhead reaching.  Walking is the best way to start exercise after surgery. Take short frequent walks. You may gradually increase the distance as tolerated. If you feel pain, decrease your activity, but DO NOT stop walking. Walking will help you gain strength, prevent muscle soreness and spasms, and prevent blood clots.   Avoid bed rest and prolonged sitting for longer than 30 minutes (change positions frequently while awake)  No contact sports or high impact activities such as; running/jogging, snowmobile or 4 marte riding or any other recreational vehicles until after given clearance at one of your follow up visits    Medications   Refills of pain medication:   Please call the neurosurgery clinic to request 2-3 days before you run out  Weaning from narcotic pain medications  When it is time, start weaning by extending the time between doses.   For example, if you're taking 2 tabs every 4 hours, spread it out to 2 tabs over 4.5, 5, 6 hours. At that point you can certainly cut down to 1 tab, then wean to an as needed basis until completely done with them.Refills: call our clinic 2-3 business days before you are out of medication. A nurse will call you back to obtain a pain assessment.   Don't take more than 3000mg of Acetaminophen in 24 hours  Ok to begin taking Aspirin and NSAIDs (ex: ibuprofen/Advil)  Encouraged icing for at least 3-4 times throughout the day for 20-30 minutes at a time. Avoid heat to the incision area.   Taking stool softeners regularly can reduce constipation commonly caused by narcotic pain medications.    Contact  clinic or Emergency Room if you develop:   Infection (redness, swelling, warmth, drainage, fever over 101 F)  New injury  Bladder or bowel changes or loss of control    Signs of blood clot:  Swelling and/or warmth in one or both legs  Pain or tenderness in your leg, ankle, foot, or arm   Red or discolored skin     Go to the Emergency Room   If sudden onset of severe headache, weakness, confusion, change in level of consciousness, pain, or loss of movement.  Chest pain  Trouble breathing     Post-operative appointments  6 week and 3 months post-op    [facility]  [facility]  [address]  [place] [address]  Telephone:  [phone]   Fax:  [phone]

## 2023-10-12 NOTE — TELEPHONE ENCOUNTER
Faxed  forms for Unemployment  October 12, 2023 to fax number [phone]    Right Fax confirmed at 1:11 PM    Bandar Reed RN

## 2023-10-18 ENCOUNTER — MYC MEDICAL ADVICE (OUTPATIENT)
Dept: NEUROSURGERY | Facility: CLINIC | Age: 58
End: 2023-10-18
Payer: COMMERCIAL

## 2023-10-18 NOTE — PROGRESS NOTES
Faxed unemployment insurance October 18, 2023 to fax number [phone]    Right Fax confirmed at 9:00 AM    Ruddy Mensah

## 2023-10-18 NOTE — PROGRESS NOTES
Faxed [employer] insurance October 18, 2023 to fax number [phone] and HIM     Right Fax confirmed at 9:00 AM     Ruddy Mensah

## 2023-10-20 ENCOUNTER — DOCUMENTATION ONLY (OUTPATIENT)
Dept: NEUROSURGERY | Facility: CLINIC | Age: 58
End: 2023-10-20
Payer: COMMERCIAL

## 2023-10-26 ENCOUNTER — DOCUMENTATION ONLY (OUTPATIENT)
Dept: NEUROSURGERY | Facility: CLINIC | Age: 58
End: 2023-10-26
Payer: COMMERCIAL

## 2023-10-26 NOTE — PROGRESS NOTES
Received blank Unemployment form([id number]). Completed. In Providers folder for review and signature.

## 2023-10-27 NOTE — PROGRESS NOTES
Faxed Form October 27, 2023 to fax number [phone]    Right Fax confirmed at 1017 AM    In MA/VF box for faxing to HIM

## 2023-10-27 NOTE — PROGRESS NOTES
Faxed Form October 27, 2023 to fax number [phone]    Right Fax confirmed at 1017 AM    In MA/VF box for faxing to HIM to be scanned.

## 2023-10-30 NOTE — PROGRESS NOTES
Faxed [place] unemployment form October 30, 2023 to fax number HIM    Right Fax confirmed at 10:16 AM    Ruddy Mensah

## 2023-10-31 ENCOUNTER — TELEPHONE (OUTPATIENT)
Dept: NEUROSURGERY | Facility: CLINIC | Age: 58
End: 2023-10-31
Payer: COMMERCIAL

## 2023-10-31 NOTE — TELEPHONE ENCOUNTER
Left message for patient to call regarding 6 week post surgical visit. Offered to reschedule appointment to either 9a or 2:30 with Paola

## 2023-10-31 NOTE — TELEPHONE ENCOUNTER
Patient called back saying he is fine coming in @ 9 or 2:30, but note in chart states appt can be with 'Paola?'    Agent does not know who that is.      Please reach out to patient again to discuss appt on 11/20.

## 2023-11-02 ENCOUNTER — DOCUMENTATION ONLY (OUTPATIENT)
Dept: NEUROSURGERY | Facility: CLINIC | Age: 58
End: 2023-11-02
Payer: COMMERCIAL

## 2023-11-02 NOTE — PROGRESS NOTES
Received blank Unemployment forms(Document ID [id number]). Completed. In Providers box for signature.

## 2023-11-07 NOTE — PROGRESS NOTES
Unemployment forms(Document ID [id number]) signed by Dr. Ontiveros and faxed.     Faxed Form to [employer] November 7, 2023 to fax number [phone]    Right Fax confirmed at 3:45 PM    Copy placed in drawer/RN office at .     Original placed in bin to be sent to HIM for scanning.

## 2023-11-10 NOTE — PROGRESS NOTES
Faxed Unemployment Form November 10, 2023 to fax number HIM    Right Fax confirmed at 8:20 AM    Ayde Davis

## 2023-11-15 ENCOUNTER — TELEPHONE (OUTPATIENT)
Dept: NEUROSURGERY | Facility: CLINIC | Age: 58
End: 2023-11-15
Payer: COMMERCIAL

## 2023-11-15 NOTE — TELEPHONE ENCOUNTER
Attempted to reach out to patient, no answer. Left voice message for them to call clinic back to further discuss.      Patient should follow-up as scheduled. Patient should not follow-up with Dr. Marshall as Dr. Marshall did not perform the most recent surgery he is following up for.

## 2023-11-15 NOTE — TELEPHONE ENCOUNTER
Patient has been a [facility] patient for quite some time. Had recent surgery while Rolando was not in clinic. Patient wants to know how he can see Dr. Marshall going forward.    While rescheduling 6 week post op appointment this was mentioned.  Patient is hoping to see dr. Marshall for the 12 week post surgical visit.  Patient was advised to discuss this at the 6 week follow up appointment on 11-20-23. Patient asked that this be documented.     Please advise scheduling if the 12 week appointment that is scheduled should be changed to Dr. Jordan schedule.

## 2023-11-17 ENCOUNTER — TELEPHONE (OUTPATIENT)
Dept: NEUROSURGERY | Facility: CLINIC | Age: 58
End: 2023-11-17
Payer: COMMERCIAL

## 2023-11-17 ENCOUNTER — MYC MEDICAL ADVICE (OUTPATIENT)
Dept: NEUROSURGERY | Facility: CLINIC | Age: 58
End: 2023-11-17
Payer: COMMERCIAL

## 2023-11-17 NOTE — TELEPHONE ENCOUNTER
Patient sent unemployment forms via [employer]. Forms completed and placed in Dr. Ontiveros's bin for signature.

## 2023-11-17 NOTE — TELEPHONE ENCOUNTER
Placed call to pt -    Pt needs to STAY with Dr. Ontiveros's team as Dr. Ontiveros did the surgery. Pt is scheduled with Dr. Marshall on Monday. Pt verbalized understanding.     High priority sent to scheduling. Teams message sent as well.

## 2023-11-17 NOTE — TELEPHONE ENCOUNTER
Patient was initially scheduled with Caren Light for 11-20-23.  Message left in covered work stated patient should be seen by Dr. Marshall directly or Maico Tsang, or Anthony.    Message was left for patient to be rescheduled.

## 2023-11-20 ENCOUNTER — TELEPHONE (OUTPATIENT)
Dept: NEUROSURGERY | Facility: CLINIC | Age: 58
End: 2023-11-20

## 2023-11-20 ENCOUNTER — OFFICE VISIT (OUTPATIENT)
Dept: NEUROSURGERY | Facility: CLINIC | Age: 58
End: 2023-11-20
Payer: COMMERCIAL

## 2023-11-20 ENCOUNTER — MYC MEDICAL ADVICE (OUTPATIENT)
Dept: NEUROSURGERY | Facility: CLINIC | Age: 58
End: 2023-11-20

## 2023-11-20 VITALS — OXYGEN SATURATION: 100 % | DIASTOLIC BLOOD PRESSURE: 78 MMHG | HEART RATE: 70 BPM | SYSTOLIC BLOOD PRESSURE: 122 MMHG

## 2023-11-20 DIAGNOSIS — Z98.890 S/P LAMINECTOMY: ICD-10-CM

## 2023-11-20 DIAGNOSIS — Z98.890 S/P LAMINECTOMY: Primary | ICD-10-CM

## 2023-11-20 PROCEDURE — 99024 POSTOP FOLLOW-UP VISIT: CPT | Performed by: NURSE PRACTITIONER

## 2023-11-20 RX ORDER — METHOCARBAMOL 750 MG/1
750 TABLET, FILM COATED ORAL EVERY 6 HOURS PRN
Qty: 40 TABLET | Refills: 0 | Status: CANCELLED | OUTPATIENT
Start: 2023-11-20

## 2023-11-20 RX ORDER — METHOCARBAMOL 750 MG/1
750 TABLET, FILM COATED ORAL EVERY 6 HOURS PRN
Qty: 40 TABLET | Refills: 0 | Status: ON HOLD | OUTPATIENT
Start: 2023-11-20 | End: 2023-12-25

## 2023-11-20 ASSESSMENT — PAIN SCALES - GENERAL: PAINLEVEL: SEVERE PAIN (6)

## 2023-11-20 NOTE — PROGRESS NOTES
[facility]  Neurosurgery Follow Up:    HPI: 6 weeks s/p T11-12 laminectomy with Dr. Ontiveros on 9/27/2023.  He reports some improvement in his preoperative symptoms including strength and numbness in his legs. He has ongoing pain in his lateral calves as well. He reports some improvement in bladder function as well. Incision CDI. No new or worsening symptoms. Would like to discuss ongoing care/treatment of pseudoarthrosis and long term outlook regarding disability with Dr. Marshall.     Medical, surgical, family, and social history unchanged since prior exam.  Exam:  Constitutional:  Alert, well nourished, NAD.  HEENT: Normocephalic, atraumatic.   Pulm:  Without shortness of breath   CV:  No pitting edema of BLE.      Vital Signs:  /78   Pulse 70   SpO2 100%     Neurological:  Awake  Alert  Oriented x 3  Motor exam:        IP Q DF PF EHL  R   5  5   5   5    5  L   5  5   5   5    5     Able to spontaneously move L/E bilaterally  Sensation intact throughout all L/E dermatomes     Incisions:  Healed nicely.  Imaging: No new imaging to review.    A/P: s/p thoracic laminectomy  May gradually increase activities as tolerated. Ok to begin physical therapy exercises. Follow up in 6 weeks as scheduled. Will discuss with Dr. Marshall regarding his other concerns/questions regarding other spine findings. Likely recommend follow up with him at 3 months post op. Our office will contact him with further recommendations. He verbalized understanding and agreement.  Patient Instructions   - May increase lifting restriction to 20-25 pounds and advance as tolerated.  - May begin physical therapy exercises.   - Follow up in 6 weeks as scheduled.  - Our office will contact you regarding follow up with Dr. Marshall for your other spine concerns.    - Call the clinic at [phone] for increased pain or any other questions and concerns.    Caren Light, CNP  [facility]  [address]  [facility]  [address]  [place] [address]  Tel [phone]  Fax [phone]

## 2023-11-20 NOTE — TELEPHONE ENCOUNTER
Patient seen in clinic by Caren Light NP for 6 wk post op. Patient had T11-12 bilateral laminectomy and resection of synovial cyst for decompression of stenosis on 9/27/23 with Dr. Zane Ontiveros in Dr. Marshall's absence.     Writer spoke with Caren HOBBS today. Patient would like to continue care with Dr. Marshall and to further discuss pseudoarthrosis, long term outlook for disability. Reviewed with Dr. Marshall. He offered patient to come in and see him or have a telephone visit to discuss and wants patient's 12 week post op visit to be r/s to him. Updated Caren on above plan.     Message routed to schedulers to adjust 12 wk appt and to schedule appt with Dr. Marshall to discuss his other concerns as listed above. Dr. Marshall will resume taking over patent's care. Dr. Marshall has also discussed this with Dr. Ontiveros as well.     Called patient and reviewed above. He verbalized understanding.

## 2023-11-20 NOTE — PATIENT INSTRUCTIONS
- May increase lifting restriction to 20-25 pounds and advance as tolerated.  - May begin physical therapy exercises.   - Follow up in 6 weeks as scheduled.  - Our office will contact you regarding follow up with Dr. Marshall for your other spine concerns.    - Call the clinic at [phone] for increased pain or any other questions and concerns.

## 2023-11-20 NOTE — NURSING NOTE
"Neema Hunt is a 58 year old male who presents for:  Chief Complaint   Patient presents with    RECHECK     6 week surgery F/U - pain, tingling, numbness and weakness in both legs         Initial Vitals:  /78   Pulse 70   SpO2 100%  Estimated body mass index is 26.85 kg/m  as calculated from the following:    Height as of 9/27/23: 5' 10\" (1.778 m).    Weight as of 9/27/23: 187 lb 1.6 oz (84.9 kg).. There is no height or weight on file to calculate BSA. BP completed using cuff size: regular  Severe Pain (6)    Nursing Comments:       Safia Judge    "

## 2023-11-20 NOTE — TELEPHONE ENCOUNTER
Patient s/p T11-12 Bilateral lami and resection of synovial cyst on 9/27/23 with Dr Ontiveros.  Requesting PT referral and Robaxin refill and the OK to submerge incision.

## 2023-11-21 NOTE — TELEPHONE ENCOUNTER
Per Caren Light, NP ok for patient to soak in tub and ok for PT referral. Robaxin cancelled as it was filled yesterday, 11/20/23.    Patient updated via [employer].

## 2023-11-27 NOTE — TELEPHONE ENCOUNTER
[place] form (9/27/23 - 11/20/23) signed by Dr. Ontiveros. Placed in [employer] bin for processing.

## 2023-11-28 NOTE — TELEPHONE ENCOUNTER
Faxed Levine, Susan. \Hospital [facility] and Outlook.\""/HIM November 28, 2023 to fax number [phone]    Right Fax confirmed at 1:44 PM    Safia Judge

## 2023-12-01 ENCOUNTER — OFFICE VISIT (OUTPATIENT)
Dept: NEUROSURGERY | Facility: CLINIC | Age: 58
End: 2023-12-01
Payer: COMMERCIAL

## 2023-12-01 ENCOUNTER — THERAPY VISIT (OUTPATIENT)
Dept: PHYSICAL THERAPY | Facility: CLINIC | Age: 58
End: 2023-12-01
Attending: NURSE PRACTITIONER
Payer: COMMERCIAL

## 2023-12-01 ENCOUNTER — ANCILLARY PROCEDURE (OUTPATIENT)
Dept: GENERAL RADIOLOGY | Facility: CLINIC | Age: 58
End: 2023-12-01
Attending: NEUROLOGICAL SURGERY
Payer: COMMERCIAL

## 2023-12-01 VITALS — DIASTOLIC BLOOD PRESSURE: 86 MMHG | HEART RATE: 70 BPM | OXYGEN SATURATION: 99 % | SYSTOLIC BLOOD PRESSURE: 130 MMHG

## 2023-12-01 DIAGNOSIS — Z98.890 S/P LAMINECTOMY: ICD-10-CM

## 2023-12-01 DIAGNOSIS — M96.0 PSEUDARTHROSIS AFTER FUSION OR ARTHRODESIS: ICD-10-CM

## 2023-12-01 DIAGNOSIS — M96.0 PSEUDARTHROSIS AFTER FUSION OR ARTHRODESIS: Primary | ICD-10-CM

## 2023-12-01 DIAGNOSIS — M48.061 SPINAL STENOSIS OF LUMBAR REGION WITH RADICULOPATHY: ICD-10-CM

## 2023-12-01 DIAGNOSIS — M54.16 SPINAL STENOSIS OF LUMBAR REGION WITH RADICULOPATHY: ICD-10-CM

## 2023-12-01 PROCEDURE — 99214 OFFICE O/P EST MOD 30 MIN: CPT | Mod: 24 | Performed by: NEUROLOGICAL SURGERY

## 2023-12-01 PROCEDURE — 97110 THERAPEUTIC EXERCISES: CPT | Mod: GP | Performed by: PHYSICAL THERAPIST

## 2023-12-01 PROCEDURE — 97161 PT EVAL LOW COMPLEX 20 MIN: CPT | Mod: GP | Performed by: PHYSICAL THERAPIST

## 2023-12-01 PROCEDURE — 72070 X-RAY EXAM THORAC SPINE 2VWS: CPT | Mod: TC | Performed by: PREVENTIVE MEDICINE

## 2023-12-01 ASSESSMENT — PAIN SCALES - GENERAL: PAINLEVEL: MODERATE PAIN (5)

## 2023-12-01 NOTE — PROGRESS NOTES
"PHYSICAL THERAPY EVALUATION  Type of Visit: Evaluation    See electronic medical record for Abuse and Falls Screening details.    Subjective       Presenting condition or subjective complaint: Aching back pain from prolonged activities and inactivity.  Numbness and stabbing/throbbing pain in my lower extremities,  including my feet/toes. Difficulty with balance and stairs. Difficulty getting dressed. Difficulty lifting my legs into the car.  Pt has chronic hx of back pain with more recent T11-12 bilateral laminectomy and resection of cyst for decompression of stenosis on 9/27/23. Reports that he feels surgery was \"worth doing.\" Pending surgery for hardware removal (current fusion L2-S1) and \"screws into hips.\"  Referred to PT.     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    Prior Level of Function  Transfers:   Ambulation: Assistive equipment  ADL:   IADL:     Living Environment  Social support: With a significant other or spouse   Type of home: House; 1 level; Basement   Stairs to enter the home: Yes 2 Is there a railing: No   Ramp: No   Stairs inside the home: Yes 12 Is there a railing: Yes   Help at home: Self Cares (home health aide/personal care attendant, family, etc); Home and Yard maintenance tasks  Equipment owned: Straight Cane; Walker with wheels     Employment: No    Hobbies/Interests: Walking,  biking,  bowling, movies, cooking, baking    Patient goals for " "therapy: Gain confidence doing stairs. Balance correction. More flexibility with dressing myself. Better eange of motion.    Pain assessment: See objective evaluation for additional pain details     Objective   LUMBAR SPINE EVALUATION  PAIN: Pain Level with Use: 5/10  Pain Location: central low back, B low back (\"upside down T\")  Pain Quality: Aching and Sharp  Pain Frequency: intermittent  Pain is Worst: mornings, sleeping  Pain is Exacerbated By: sitting, lifting, certain positions, standing too long  Pain is Relieved By: rest  Pain Progression: Improved  INTEGUMENTARY (edema, incisions):  incisions normal  POSTURE: Sitting Posture: Lordosis decreased, Thoracic kyphosis increased  GAIT:   Weightbearing Status:   Assistive Device(s):   Gait Deviations: Stance time decreased  [place] decreased  TUG score 19 sec  BALANCE/PROPRIOCEPTION:   WEIGHTBEARING ALIGNMENT: Lumbar/Pelvic WB Alignment:Lordosis decreased, Convex scoliosis R  NON-WEIGHTBEARING ALIGNMENT:    ROM:   (Degrees) Left AROM Left PROM  Right AROM Right PROM   Hip Flexion       Hip Extension       Hip Abduction       Hip Adduction       Hip Internal Rotation       Hip External Rotation       Knee Flexion       Knee Extension       Lumbar Side glide Min loss Min loss   Lumbar Flexion nil   Lumbar Extension Min-mod loss   Pain:   End feel:   PELVIC/SI SCREEN:   STRENGTH:  B hip abduction 4+/5    MYOTOMES:    Left Right   T12-L3 (Hip Flexion) 4+ 4+   L2-4 (Quads)  5- 5-   L4 (Ankle DF) 5- 5-   L5 (Great Toe Ext) 5 5   S1 (Toe Raise) 4+ 4+     DTR S:   CORD SIGNS:   DERMATOMES:   NEURAL TENSION:   FLEXIBILITY:   LUMBAR/HIP Special Tests:    PELVIS/SI SPECIAL TESTS:   FUNCTIONAL TESTS:   PALPATION:   SPINAL SEGMENTAL CONCLUSIONS:       Assessment & Plan   CLINICAL IMPRESSIONS  Medical Diagnosis: S/P laminectomy    Treatment Diagnosis: s/p T11-12 laminectomy, weakness   Impression/Assessment: Patient is a 58 year old male with low back/strength/balance " complaints.  The following significant findings have been identified: Pain, Decreased ROM/flexibility, Decreased joint mobility, Decreased strength, Impaired balance, Decreased proprioception, Impaired sensation, Inflammation, Impaired gait, Impaired muscle performance, and Decreased activity tolerance. These impairments interfere with their ability to perform self care tasks, recreational activities, household chores, household mobility, and community mobility as compared to previous level of function.     Clinical Decision Making (Complexity):  Clinical Presentation: Evolving/Changing  Clinical Presentation Rationale: based on medical and personal factors listed in PT evaluation  Clinical Decision Making (Complexity): Moderate complexity    PLAN OF CARE  Treatment Interventions:  Interventions: Gait Training, Manual Therapy, Neuromuscular Re-education, Therapeutic Activity, Therapeutic Exercise    Long Term Goals     PT Goal 1  Goal Identifier: stairs  Goal Description: Pt will be able to ascend/descend 2 flights of stairs with normal reciprocal pattern  Rationale: to maximize safety and independence with performance of ADLs and functional tasks;to maximize safety and independence within the home;to maximize safety and independence within the community;to maximize safety and independence with self cares  Target Date: 02/09/24      Frequency of Treatment: 1x/wk  Duration of Treatment: 10 wks    Recommended Referrals to Other Professionals:   Education Assessment:        Risks and benefits of evaluation/treatment have been explained.   Patient/Family/caregiver agrees with Plan of Care.     Evaluation Time:     PT Eval, Low Complexity Minutes (57330): 20       Signing Clinician: Howard Orona PT

## 2023-12-01 NOTE — NURSING NOTE
"Neema Hunt is a 58 year old male who presents for:  Chief Complaint   Patient presents with    RECHECK     Talking the long-term prognosis, had a fall this morning. Bad has some pain - 5 - if he sits too long and then left leg weakness if walking too long. Knees and legs pain lvl 8 or 9         Initial Vitals:  /86   Pulse 70   SpO2 99%  Estimated body mass index is 26.85 kg/m  as calculated from the following:    Height as of 9/27/23: 5' 10\" (1.778 m).    Weight as of 9/27/23: 187 lb 1.6 oz (84.9 kg).. There is no height or weight on file to calculate BSA. BP completed using cuff size: regular  Moderate Pain (5)    Nursing Comments:     Ruddy Mensah    "

## 2023-12-01 NOTE — PROGRESS NOTES
"It was a pleasure to see Neema Hunt today in Neurosurgery Clinic. He is a 58 year old male with a history of multiple previous lumbar and thoracic operations.  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    He is making some recovery from this but is now interested in potential repair of his pseudoarthrosis.  He continues to have some radicular symptoms and foot dorsiflexion weakness related to this.    Vitals:    12/01/23 1430   BP: 130/86   Pulse: 70   SpO2: 99%     Estimated body mass index is 26.85 kg/m  as calculated from the following:    Height as of 9/27/23: 1.778 m (5' 10\").    Weight as of 9/27/23: 84.9 kg (187 lb 1.6 oz).  Moderate Pain (5)    Well-healed lumbar and and thoracic incisions  Bilateral lower extremity strength is 5 out of 5 except for 4 out of 5 left ankle dorsiflexion.    Imaging: Review of his previous CT of the lumbar spine shows pseudoarthrosis at L5-S1 with haloing around the S1 screws.  Imaging was reviewed with the patient and with the patient clinic today.    Assessment: Status post thoracic laminectomy.  L5-S1 pseudoarthrosis.    Plan: We discussed potential revision of his fusion at this level including pelvic instrumentation and a 4 ian construct and use of BMP.  We will also assess his thoracic laminectomy with flexion-extension x-rays to make sure there is no instability here that might require extension of his fusion. The risks benefits and alternatives to the procedure were discussed. Risks include, but are not limited to, bleeding, infection, neurologic injury such as spinal cord or nerve root injury, CSF leak, need for further surgery or revision of any implanted hardware, failure for symptoms to improve. Questions were solicited and answered, and the patient wishes to proceed with surgery.       "

## 2023-12-01 NOTE — LETTER
"    12/1/2023         RE: Neema Hunt  [address] [address]        Dear Colleague,    Thank you for referring your patient, Neema Hunt, to the [facility]. Please see a copy of my visit note below.    It was a pleasure to see Neema Hunt today in Neurosurgery Clinic. He is a 58 year old male with a history of multiple previous lumbar and thoracic operations.  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    He is making some recovery from this but is now interested in potential repair of his pseudoarthrosis.  He continues to have some radicular symptoms and foot dorsiflexion weakness related to this.    Vitals:    12/01/23 1430   BP: 130/86   Pulse: 70   SpO2: 99%     Estimated body mass index is 26.85 kg/m  as calculated from the following:    Height as of 9/27/23: 1.778 m (5' 10\").    Weight as of 9/27/23: 84.9 kg (187 lb 1.6 oz).  Moderate Pain (5)    Well-healed lumbar and and thoracic incisions  Bilateral lower extremity strength is 5 out of 5 except for 4 out of 5 left ankle dorsiflexion.    Imaging: Review of his previous CT of the lumbar spine shows pseudoarthrosis at L5-S1 with haloing around the S1 screws.  Imaging was reviewed with the patient and with the patient clinic today.    Assessment: Status post thoracic laminectomy.  L5-S1 pseudoarthrosis.    Plan: We discussed potential revision of his fusion at this level including pelvic instrumentation and a 4 ian construct and use of BMP.  We will also assess his thoracic laminectomy with flexion-extension x-rays to make sure there is no instability here that might require extension of his fusion. The risks benefits and alternatives to the procedure were discussed. Risks include, but are not limited to, bleeding, infection, neurologic injury such as spinal cord or nerve root injury, CSF leak, need for further surgery or revision of any " implanted hardware, failure for symptoms to improve. Questions were solicited and answered, and the patient wishes to proceed with surgery.           Again, thank you for allowing me to participate in the care of your patient.        Sincerely,        Nhan Marshall MD

## 2023-12-01 NOTE — PATIENT INSTRUCTIONS
Patient Instructions    Surgery scheduled at [facility] for Lumbar Fusion with Dr. Marshall    Pre-Operative  Surgical risks: blood clots in the leg or lung, problems urinating, nerve damage, drainage from the incision, infection,stiffness  Pre-operative physical with primary care physician within 30 days of surgical date.   4-7  night hospitalization stay  Shower procedure  Please shower with antimicrobial soap the night before surgery and morning of surgery. Please refer to showering instruction sheet in folder.  Eating/Drinking  Stop all solid foods 8 hours before surgery.  Keep drinking clear liquids until 4 hours before surgery  Clear liquids include water, clear juice, black coffee, or clear tea without milk, Gatorade, clear soda.   Medications  Hold Aspirin, NSAIDs (Advil/Ibuprofen, Indocin, Naproxen,Nuprin,Relafen/Nabumetone, Diclofenac,Meloxicam, Aleve, Celebrex) x 7 days prior to surgical date    You can take Tylenol (Acetaminophen) for pain, 1000 mg  Do not exceed 3,000 mg per day   If you are on chronic pain medication (oxycodone, Percocet, hydrocodone, Vicodin, Norco, Dilaudid, morphine, MS Contin, naltrexone, Suboxone, etc) or have a pain contract we will reach out to your pain clinic to gather your most recent records. Continue obtaining your pain medications from your current provider until surgery. Our team will manage your acute post-op pain in the hospital and during the recovery period. Your pain team will continue to manage your chronic pain. Please contact your provider who manages your pain medications to inform them of your upcoming surgery.  Any other medications prescribed, please discuss with your primary care provider at your pre-operative physical   You may NOT receive the COVID-19 vaccine 72 hours before or after surgery.        Pain Management  Dealing with pain  As your body heals, you might feel a stabbing, burning, or aching pain. You may also have some  numbness.  Everyone feels pain differently, we may ask you to rate your pain using a pain scale. This will let us know how much pain you feel.   Keep in mind that medicine won't take away all of your pain. It helps to try other ways to relax and ease pain.   Things to help with pain  After surgery, we will give you medicine for your pain. These medications work well, but they can make you drowsy, itchy, or sick to your stomach. If we give you narcotics for pain, try to take the pills with food.   For mild to moderate pain, you can take medication such as Tylenol. These can be used with narcotics, but make sure that your narcotic does not contain Tylenol.   Do NOT drive while taking narcotic pain medication  Do NOT drink alcohol while using any pain medication  You can utilize ice as needed (no longer than 20 minutes at one time)  You may resume taking NSAIDs (ex. Ibuprofen, aleve, naproxen) 12 weeks after surgery as it may cause bleeding and interfere with bone healing     Incision Care  No submerging incision in water such as pools, hot tubs, baths for at least 8 weeks or until incision is healed  You may get your incision wet in the shower. Allow water to run over incision, and gently pat dry.   Remove dressing as instructed upon discharge  Watch for signs of infection  Redness, swelling, warmth, drainage, and fever of 101 degrees or higher  Notify clinic [phone].    Activity Restrictions  For the first 6-8 weeks, no lifting > 10 pounds, no bending, twisting, or overhead reaching.  Take stairs in moderation   Ok to walk as tolerated, take short frequent walks. You may gradually increase the distance as tolerated.   Avoid bed rest and prolonged sitting for longer than 30 minutes (change positions frequently while awake)  No contact sports until after follow up visit  No high impact activities such as; running/jogging, snowmobile or 4 marte riding or any other recreational vehicles  Please call the clinic if  you develop any of the following symptoms:  Swelling and/or warmth in one or both legs  Pain or tenderness in your leg, ankle, foot, or arm   Red or discolored skin     Post-Op Follow Up Appointments  2 week staple/suture removal with RN  6 week post op with xray prior   3 months post op with xray prior   1 year post op with xray prior  Please call to schedule follow up and xray appointments at [phone]    Resources  If you are currently employed, you will need to be off work for 4-6 weeks for post op recovery and healing.  Please fax any FMLA/short term disability paperwork to [phone]  You may call our clinic when you'd like to return to work and we can provide a work letter  To allow staff adequate time to complete paperwork, please send as soon as possible     [facility]  Phone: [phone]  Fax: [phone]

## 2023-12-01 NOTE — NURSING NOTE
Reviewed pre- and post-operative instructions as outlined in the After Visit Summary/Patient Instructions with patient.   Surgery folder was given to patient    Patient Education Topic: Procedure with Dr. Marshall     Learner(s): Patient    Knowledge Level: Basic    Readiness to Learn: Ready    Method:  Verbal explanation    Outcome: Able to verbalize instructions    Barriers to Learning: No barrier        NDI/JOEL: Confirmation of completion within last 6 months    Smoking Status: Never    Pain Clinic: N/A    STD/FMLA: Yes  Job Description: Desk/Clerical Job  Time Off: 6 weeks     Patient had the opportunity for questions to be answered. Advised Patient to call clinic with any questions/concerns. Gave patient antibacterial soap for pre-surgery skin preparation.

## 2023-12-08 ENCOUNTER — THERAPY VISIT (OUTPATIENT)
Dept: PHYSICAL THERAPY | Facility: CLINIC | Age: 58
End: 2023-12-08
Attending: NURSE PRACTITIONER
Payer: COMMERCIAL

## 2023-12-08 DIAGNOSIS — Z98.890 S/P LAMINECTOMY: Primary | ICD-10-CM

## 2023-12-08 PROCEDURE — 97110 THERAPEUTIC EXERCISES: CPT | Mod: 59 | Performed by: PHYSICAL THERAPIST

## 2023-12-08 PROCEDURE — 97530 THERAPEUTIC ACTIVITIES: CPT | Mod: GP | Performed by: PHYSICAL THERAPIST

## 2023-12-13 ENCOUNTER — PREP FOR PROCEDURE (OUTPATIENT)
Dept: NEUROLOGY | Facility: CLINIC | Age: 58
End: 2023-12-13
Payer: COMMERCIAL

## 2023-12-15 ENCOUNTER — THERAPY VISIT (OUTPATIENT)
Dept: PHYSICAL THERAPY | Facility: CLINIC | Age: 58
End: 2023-12-15
Attending: NURSE PRACTITIONER
Payer: COMMERCIAL

## 2023-12-15 DIAGNOSIS — Z98.890 S/P LAMINECTOMY: Primary | ICD-10-CM

## 2023-12-15 PROCEDURE — 97530 THERAPEUTIC ACTIVITIES: CPT | Mod: GP | Performed by: PHYSICAL THERAPIST

## 2023-12-15 PROCEDURE — 97110 THERAPEUTIC EXERCISES: CPT | Mod: 59 | Performed by: PHYSICAL THERAPIST

## 2023-12-15 NOTE — PROGRESS NOTES
DISCHARGE  Reason for Discharge: surgery scheduled 12/21/23    Equipment Issued:     Discharge Plan: Patient to continue home program.   12/15/23 0500   Appointment Info   Signing clinician's name / credentials Howard Orona DPT   Total/Authorized Visits 10 (E&T)   Visits Used 3   Medical Diagnosis S/P laminectomy   PT Tx Diagnosis s/p T11-12 laminectomy, weakness   Progress Note/Certification   Onset of illness/injury or Date of Surgery 09/27/23   Therapy Frequency 1x/wk   Predicted Duration 10 wks   Progress Note Completed Date 12/01/23   PT Goal 1   Goal Identifier stairs   Goal Description Pt will be able to ascend/descend 2 flights of stairs with normal reciprocal pattern   Rationale to maximize safety and independence with performance of ADLs and functional tasks;to maximize safety and independence within the home;to maximize safety and independence within the community;to maximize safety and independence with self cares   Goal Progress able to ascend facing stairs vs going sideways as of 12/15/23   Target Date 02/09/24   Subjective Report   Subjective Report Home program going OK. Finding that performing the exercises every other day is better for managing symptoms. Surgery scheduled next week. Starting to try doing stairs facing forward.   Objective Measures   Objective Measures Objective Measure 1   Objective Measure 1   Objective Measure exercise technique/fatige   Details earlier fatigue with exercise compared to last week as of 12/15/23   Treatment Interventions (PT)   Interventions Therapeutic Procedure/Exercise;Therapeutic Activity;Neuromuscular Re-education;Manual Therapy   Therapeutic Procedure/Exercise   Therapeutic Procedures: strength, endurance, ROM, flexibillity minutes (95593) 24   PTRx Ther Proc 1 Supine Lumbar Hip Roll   PTRx Ther Proc 1 - Details x15   PTRx Ther Proc 2 Thoracic Extension   PTRx Ther Proc 2 - Details x15 for review   PTRx Ther Proc 3 Single Knee to Chest   PTRx Ther  "Proc 3 - Details x 10 bilaterally holding 5 sec   PTRx Ther Proc 4 Bridging #2A Weight Shift   PTRx Ther Proc 4 - Details x10 coming down on each rep   PTRx Ther Proc 5 Hip Abduction Straight Leg Raise   PTRx Ther Proc 5 - Details B AG x 13   PTRx Ther Proc 6 Clamshell Feet together   PTRx Ther Proc 6 - Details B x 15   Patient Response/Progress see above   Therapeutic Activity   Therapeutic Activities: dynamic activities to improve functional performance minutes (38510) 8   PTRx Ther Act 1 Sit to Stand   PTRx Ther Act 1 - Details x15 cues for nose over toes hands for balance/support but legs to do the work   PTRx Ther Act 2 Stepdown Backward   PTRx Ther Act 2 - Details 6\" height B x 15 improved pace   Patient Response/Progress see above   Plan   Home program see PTrx   Updates to plan of care none   Plan for next session d/c to [facility] as surgery scheduled 12/21/23   Total Session Time   Timed Code Treatment Minutes 32   Total Treatment Time (sum of timed and untimed services) 32         Referring Provider:  Caren Light    "

## 2023-12-20 NOTE — PROGRESS NOTES
PTA medications updated by Medication Scribe prior to surgery via phone call with patient (last doses completed by Nurse)     Medication history sources: Patient, Surescripts, and H&P  In the past week, patient estimated taking medication this percent of the time: Greater than 90%      Significant changes made to the medication list:  Patient reports no longer taking the following meds (med scribe removed from PTA med list): oxycodone      Additional medication history information:   Patient was advised to bring: Visine    Medication reconciliation completed by provider prior to medication history? No    Time spent in this activity: 40 minutes    The information provided in this note is only as accurate as the sources available at the time of update(s)      Prior to Admission medications    Medication Sig Last Dose Taking? Auth Provider Long Term End Date   acetaminophen (TYLENOL) 500 MG tablet Take 1,000 mg by mouth daily as needed for mild pain (2 X 500 mg = 1000 mg) Unknown at PRN Yes Reported, Patient     amLODIPine-benazepril (LOTREL) 10-20 MG capsule Take 1 capsule by mouth every morning   at AM Yes Reported, Patient Yes    Cyanocobalamin (B-12 PO) Take 2 chew tab by mouth every morning  12/20/2023 at AM Yes Reported, Patient     famotidine (PEPCID) 20 MG tablet Take 20 mg by mouth every evening 12/20/2023 at PM Yes Reported, Patient     gabapentin (NEURONTIN) 300 MG capsule Take 1 capsule (300 mg) by mouth At Bedtime 12/20/2023 at PM Yes Nhan Marshall MD Yes    lidocaine (LIDODERM) 5 % Patch Place 1 patch onto the skin every 24 hours Unknown at PRN Yes Jennifer Monsivais PA-C     magnesium 250 MG tablet Take 1 tablet by mouth daily Daily 12/20/2023 at AM Yes Reported, Patient     Melatonin 10 MG TABS tablet Take 10 mg by mouth nightly as needed for sleep 12/20/2023 at PM Yes Reported, Patient     methocarbamol (ROBAXIN) 750 MG tablet Take 1 tablet (750 mg) by mouth every 6 hours as needed for muscle  spasms 12/20/2023 at PRN Yes Caren Light NP     Misc Natural Products (OSTEO BI-FLEX JOINT SHIELD) TABS Take 1 tablet by mouth daily 12/20/2023 at AM Yes Reported, Patient     Multiple Vitamins-Minerals (AIRBORNE PO) Take 1 chew tab by mouth every morning  12/20/2023 at AM Yes Reported, Patient     Multiple Vitamins-Minerals (MULTIVITAMIN ADULT PO) Take 2 chew tab by mouth every morning  12/20/2023 at AM Yes Reported, Patient     naproxen sodium 220 MG capsule Take 220 mg by mouth 2 times daily (with meals) 12/14/2023 at PM Yes Anthony Freeman PA-C No    omega 3 1000 MG CAPS Take 1 chew tab by mouth every morning  12/20/2023 at AM Yes Reported, Patient No    polyethylene glycol (MIRALAX) 17 g packet Take 1 packet by mouth daily 12/20/2023 at AM Yes Reported, Patient     Probiotic Product (SOLUBLE FIBER/PROBIOTICS PO) Take 1 chew tab by mouth every morning  12/20/2023 at AM Yes Reported, Patient     senna-docusate (SENOKOT-S/PERICOLACE) 8.6-50 MG tablet Take 1-2 tablets by mouth 2 times daily as needed for constipation (While on oral opioids.) 12/19/2023 at AM Yes Anthony Freeman PA-C No    tetrahydrozoline (VISINE) 0.05 % ophthalmic solution Place 1 drop into both eyes daily as needed  at AM Yes Reported, Patient     valACYclovir (VALTREX) 1000 mg tablet Take 1,000 mg by mouth 2 times daily as needed Past Month at PRN Yes Reported, Patient Yes        Medication history completed by: Lynette Crabtree LPN

## 2023-12-21 ENCOUNTER — ANESTHESIA EVENT (OUTPATIENT)
Dept: SURGERY | Facility: CLINIC | Age: 58
End: 2023-12-21
Payer: COMMERCIAL

## 2023-12-21 ENCOUNTER — HOSPITAL ENCOUNTER (INPATIENT)
Facility: CLINIC | Age: 58
LOS: 5 days | Discharge: HOME | End: 2023-12-26
Attending: NEUROLOGICAL SURGERY | Admitting: NEUROLOGICAL SURGERY
Payer: COMMERCIAL

## 2023-12-21 ENCOUNTER — ANESTHESIA (OUTPATIENT)
Dept: SURGERY | Facility: CLINIC | Age: 58
End: 2023-12-21
Payer: COMMERCIAL

## 2023-12-21 ENCOUNTER — APPOINTMENT (OUTPATIENT)
Dept: GENERAL RADIOLOGY | Facility: CLINIC | Age: 58
End: 2023-12-21
Attending: NEUROLOGICAL SURGERY
Payer: COMMERCIAL

## 2023-12-21 DIAGNOSIS — R33.9 URINARY RETENTION WITH INCOMPLETE BLADDER EMPTYING: Primary | ICD-10-CM

## 2023-12-21 DIAGNOSIS — Z98.1 STATUS POST LUMBAR SPINAL FUSION: ICD-10-CM

## 2023-12-21 LAB
ABO/RH(D): NORMAL
ANION GAP SERPL CALCULATED.3IONS-SCNC: 8 MMOL/L (ref 7–15)
ANTIBODY SCREEN: NEGATIVE
APTT PPP: 28 SECONDS (ref 22–38)
BASOPHILS # BLD AUTO: 0 10E3/UL (ref 0–0.2)
BASOPHILS NFR BLD AUTO: 0 %
BUN SERPL-MCNC: 14.1 MG/DL (ref 6–20)
CALCIUM SERPL-MCNC: 9.2 MG/DL (ref 8.6–10)
CHLORIDE SERPL-SCNC: 104 MMOL/L (ref 98–107)
CREAT SERPL-MCNC: 0.73 MG/DL (ref 0.67–1.17)
DEPRECATED HCO3 PLAS-SCNC: 27 MMOL/L (ref 22–29)
EGFRCR SERPLBLD CKD-EPI 2021: >90 ML/MIN/1.73M2
EOSINOPHIL # BLD AUTO: 0.1 10E3/UL (ref 0–0.7)
EOSINOPHIL NFR BLD AUTO: 2 %
ERYTHROCYTE [DISTWIDTH] IN BLOOD BY AUTOMATED COUNT: 13.5 % (ref 10–15)
GLUCOSE SERPL-MCNC: 93 MG/DL (ref 70–99)
HCT VFR BLD AUTO: 33.6 % (ref 40–53)
HGB BLD-MCNC: 11.6 G/DL (ref 13.3–17.7)
IMM GRANULOCYTES # BLD: 0 10E3/UL
IMM GRANULOCYTES NFR BLD: 0 %
INR PPP: 1.02 (ref 0.85–1.15)
LYMPHOCYTES # BLD AUTO: 1.4 10E3/UL (ref 0.8–5.3)
LYMPHOCYTES NFR BLD AUTO: 30 %
MCH RBC QN AUTO: 31.3 PG (ref 26.5–33)
MCHC RBC AUTO-ENTMCNC: 34.5 G/DL (ref 31.5–36.5)
MCV RBC AUTO: 91 FL (ref 78–100)
MONOCYTES # BLD AUTO: 0.6 10E3/UL (ref 0–1.3)
MONOCYTES NFR BLD AUTO: 13 %
NEUTROPHILS # BLD AUTO: 2.5 10E3/UL (ref 1.6–8.3)
NEUTROPHILS NFR BLD AUTO: 55 %
NRBC # BLD AUTO: 0 10E3/UL
NRBC BLD AUTO-RTO: 0 /100
PLATELET # BLD AUTO: 212 10E3/UL (ref 150–450)
POTASSIUM SERPL-SCNC: 4.1 MMOL/L (ref 3.4–5.3)
RBC # BLD AUTO: 3.71 10E6/UL (ref 4.4–5.9)
SODIUM SERPL-SCNC: 139 MMOL/L (ref 135–145)
SPECIMEN EXPIRATION DATE: NORMAL
TROPONIN T SERPL HS-MCNC: 18 NG/L
WBC # BLD AUTO: 4.5 10E3/UL (ref 4–11)

## 2023-12-21 PROCEDURE — 258N000003 HC RX IP 258 OP 636: Performed by: PHYSICIAN ASSISTANT

## 2023-12-21 PROCEDURE — 258N000003 HC RX IP 258 OP 636: Performed by: NURSE ANESTHETIST, CERTIFIED REGISTERED

## 2023-12-21 PROCEDURE — 22843 INSERT SPINE FIXATION DEVICE: CPT | Mod: AS | Performed by: PHYSICIAN ASSISTANT

## 2023-12-21 PROCEDURE — 710N000009 HC RECOVERY PHASE 1, LEVEL 1, PER MIN: Performed by: NEUROLOGICAL SURGERY

## 2023-12-21 PROCEDURE — 250N000011 HC RX IP 250 OP 636: Performed by: SURGERY

## 2023-12-21 PROCEDURE — 250N000011 HC RX IP 250 OP 636: Mod: JZ | Performed by: NURSE ANESTHETIST, CERTIFIED REGISTERED

## 2023-12-21 PROCEDURE — 272N000001 HC OR GENERAL SUPPLY STERILE: Performed by: NEUROLOGICAL SURGERY

## 2023-12-21 PROCEDURE — 22843 INSERT SPINE FIXATION DEVICE: CPT | Performed by: NEUROLOGICAL SURGERY

## 2023-12-21 PROCEDURE — 22614 ARTHRD PST TQ 1NTRSPC EA ADD: CPT | Performed by: NEUROLOGICAL SURGERY

## 2023-12-21 PROCEDURE — 250N000011 HC RX IP 250 OP 636: Performed by: NEUROLOGICAL SURGERY

## 2023-12-21 PROCEDURE — 360N000086 HC SURGERY LEVEL 6 W/ FLUORO, PER MIN: Performed by: NEUROLOGICAL SURGERY

## 2023-12-21 PROCEDURE — 22848 INSERT PELV FIXATION DEVICE: CPT | Mod: AS | Performed by: PHYSICIAN ASSISTANT

## 2023-12-21 PROCEDURE — 250N000011 HC RX IP 250 OP 636: Mod: JZ | Performed by: SURGERY

## 2023-12-21 PROCEDURE — 22612 ARTHRD PST TQ 1NTRSPC LUMBAR: CPT | Mod: 78 | Performed by: NEUROLOGICAL SURGERY

## 2023-12-21 PROCEDURE — 250N000011 HC RX IP 250 OP 636: Performed by: NURSE ANESTHETIST, CERTIFIED REGISTERED

## 2023-12-21 PROCEDURE — 86900 BLOOD TYPING SEROLOGIC ABO: CPT | Performed by: PHYSICIAN ASSISTANT

## 2023-12-21 PROCEDURE — 250N000025 HC SEVOFLURANE, PER MIN: Performed by: NEUROLOGICAL SURGERY

## 2023-12-21 PROCEDURE — 61783 SCAN PROC SPINAL: CPT | Mod: 59 | Performed by: NEUROLOGICAL SURGERY

## 2023-12-21 PROCEDURE — C1713 ANCHOR/SCREW BN/BN,TIS/BN: HCPCS | Performed by: NEUROLOGICAL SURGERY

## 2023-12-21 PROCEDURE — C1889 IMPLANT/INSERT DEVICE, NOC: HCPCS | Performed by: NEUROLOGICAL SURGERY

## 2023-12-21 PROCEDURE — 250N000009 HC RX 250: Performed by: PHYSICIAN ASSISTANT

## 2023-12-21 PROCEDURE — 999N000141 HC STATISTIC PRE-PROCEDURE NURSING ASSESSMENT: Performed by: NEUROLOGICAL SURGERY

## 2023-12-21 PROCEDURE — 85730 THROMBOPLASTIN TIME PARTIAL: CPT | Performed by: PHYSICIAN ASSISTANT

## 2023-12-21 PROCEDURE — 85025 COMPLETE CBC W/AUTO DIFF WBC: CPT | Performed by: PHYSICIAN ASSISTANT

## 2023-12-21 PROCEDURE — 250N000009 HC RX 250: Performed by: NURSE ANESTHETIST, CERTIFIED REGISTERED

## 2023-12-21 PROCEDURE — 258N000003 HC RX IP 258 OP 636: Performed by: ANESTHESIOLOGY

## 2023-12-21 PROCEDURE — 250N000011 HC RX IP 250 OP 636: Performed by: PHYSICIAN ASSISTANT

## 2023-12-21 PROCEDURE — P9045 ALBUMIN (HUMAN), 5%, 250 ML: HCPCS | Mod: JZ | Performed by: NURSE ANESTHETIST, CERTIFIED REGISTERED

## 2023-12-21 PROCEDURE — 20930 SP BONE ALGRFT MORSEL ADD-ON: CPT | Performed by: NEUROLOGICAL SURGERY

## 2023-12-21 PROCEDURE — 22612 ARTHRD PST TQ 1NTRSPC LUMBAR: CPT | Mod: AS | Performed by: PHYSICIAN ASSISTANT

## 2023-12-21 PROCEDURE — 999N000179 XR SURGERY CARM FLUORO LESS THAN 5 MIN W STILLS

## 2023-12-21 PROCEDURE — 36415 COLL VENOUS BLD VENIPUNCTURE: CPT | Performed by: NURSE PRACTITIONER

## 2023-12-21 PROCEDURE — 82374 ASSAY BLOOD CARBON DIOXIDE: CPT | Performed by: PHYSICIAN ASSISTANT

## 2023-12-21 PROCEDURE — 370N000017 HC ANESTHESIA TECHNICAL FEE, PER MIN: Performed by: NEUROLOGICAL SURGERY

## 2023-12-21 PROCEDURE — 61783 SCAN PROC SPINAL: CPT | Mod: AS | Performed by: PHYSICIAN ASSISTANT

## 2023-12-21 PROCEDURE — 22848 INSERT PELV FIXATION DEVICE: CPT | Performed by: NEUROLOGICAL SURGERY

## 2023-12-21 PROCEDURE — 93010 ELECTROCARDIOGRAM REPORT: CPT | Performed by: INTERNAL MEDICINE

## 2023-12-21 PROCEDURE — 22614 ARTHRD PST TQ 1NTRSPC EA ADD: CPT | Mod: AS | Performed by: PHYSICIAN ASSISTANT

## 2023-12-21 PROCEDURE — 93005 ELECTROCARDIOGRAM TRACING: CPT

## 2023-12-21 PROCEDURE — L8699 PROSTHETIC IMPLANT NOS: HCPCS | Performed by: NEUROLOGICAL SURGERY

## 2023-12-21 PROCEDURE — 250N000013 HC RX MED GY IP 250 OP 250 PS 637: Performed by: PHYSICIAN ASSISTANT

## 2023-12-21 PROCEDURE — 250N000009 HC RX 250: Performed by: NEUROLOGICAL SURGERY

## 2023-12-21 PROCEDURE — C1762 CONN TISS, HUMAN(INC FASCIA): HCPCS | Performed by: NEUROLOGICAL SURGERY

## 2023-12-21 PROCEDURE — 250N000011 HC RX IP 250 OP 636: Mod: JZ | Performed by: PHYSICIAN ASSISTANT

## 2023-12-21 PROCEDURE — 99232 SBSQ HOSP IP/OBS MODERATE 35: CPT | Performed by: NURSE PRACTITIONER

## 2023-12-21 PROCEDURE — 84484 ASSAY OF TROPONIN QUANT: CPT | Performed by: NURSE PRACTITIONER

## 2023-12-21 PROCEDURE — 85610 PROTHROMBIN TIME: CPT | Performed by: PHYSICIAN ASSISTANT

## 2023-12-21 PROCEDURE — 36415 COLL VENOUS BLD VENIPUNCTURE: CPT | Performed by: PHYSICIAN ASSISTANT

## 2023-12-21 PROCEDURE — 120N000001 HC R&B MED SURG/OB

## 2023-12-21 DEVICE — IMP ROD MEDT SOLERA CVD 5.5X100MM CHR 1555501100: Type: IMPLANTABLE DEVICE | Site: SPINE LUMBAR | Status: FUNCTIONAL

## 2023-12-21 DEVICE — DBM 7509211 MAGNIFUSE 1 X 10CM
Type: IMPLANTABLE DEVICE | Site: SPINE LUMBAR | Status: FUNCTIONAL
Brand: MAGNIFUSE® BONE GRAFT

## 2023-12-21 DEVICE — GRAFT BONE INFUSE BMP MED 7510400: Type: IMPLANTABLE DEVICE | Site: PELVIS | Status: FUNCTIONAL

## 2023-12-21 DEVICE — IMP ROD MEDT SOLERA CVD 5.5X120MM CHR 1555501120: Type: IMPLANTABLE DEVICE | Site: SPINE LUMBAR | Status: FUNCTIONAL

## 2023-12-21 DEVICE — 10.5 MM X 90 MM IFUSE BEDROCK GRANITE IMPLANT
Type: IMPLANTABLE DEVICE | Site: PELVIS | Status: FUNCTIONAL
Brand: IFUSE GRANITE IMPLANT SYSTEM

## 2023-12-21 DEVICE — IMP SCR MEDT 5.5/6.0MM SOLERA 6.5X40MM MA 55840006540: Type: IMPLANTABLE DEVICE | Site: SPINE LUMBAR | Status: FUNCTIONAL

## 2023-12-21 DEVICE — IMPLANTABLE DEVICE: Type: IMPLANTABLE DEVICE | Site: SPINE LUMBAR | Status: FUNCTIONAL

## 2023-12-21 DEVICE — IMP SCR MEDT 5.5/6.0MM SOLERA 6.5X45MM MA 55840006545: Type: IMPLANTABLE DEVICE | Site: SPINE LUMBAR | Status: FUNCTIONAL

## 2023-12-21 DEVICE — DBM 7509215 MAGNIFUSE 1 X 5CM
Type: IMPLANTABLE DEVICE | Site: SPINE LUMBAR | Status: FUNCTIONAL
Brand: MAGNIFUSE® BONE GRAFT

## 2023-12-21 DEVICE — IMP SCR SET MEDT SOLERA BREAK OFF 5.5MM TI 5540030: Type: IMPLANTABLE DEVICE | Site: SPINE LUMBAR | Status: FUNCTIONAL

## 2023-12-21 RX ORDER — METHOCARBAMOL 750 MG/1
750 TABLET, FILM COATED ORAL EVERY 6 HOURS PRN
Status: DISCONTINUED | OUTPATIENT
Start: 2023-12-21 | End: 2023-12-26 | Stop reason: HOSPADM

## 2023-12-21 RX ORDER — ACETAMINOPHEN 325 MG/1
650 TABLET ORAL EVERY 4 HOURS PRN
Status: DISCONTINUED | OUTPATIENT
Start: 2023-12-24 | End: 2023-12-26 | Stop reason: HOSPADM

## 2023-12-21 RX ORDER — ONDANSETRON 2 MG/ML
4 INJECTION INTRAMUSCULAR; INTRAVENOUS EVERY 30 MIN PRN
Status: DISCONTINUED | OUTPATIENT
Start: 2023-12-21 | End: 2023-12-21 | Stop reason: HOSPADM

## 2023-12-21 RX ORDER — PROCHLORPERAZINE MALEATE 10 MG
10 TABLET ORAL EVERY 6 HOURS PRN
Status: DISCONTINUED | OUTPATIENT
Start: 2023-12-21 | End: 2023-12-26 | Stop reason: HOSPADM

## 2023-12-21 RX ORDER — MULTIVITAMIN,THERAPEUTIC
1 TABLET ORAL EVERY MORNING
Status: DISCONTINUED | OUTPATIENT
Start: 2023-12-22 | End: 2023-12-26 | Stop reason: HOSPADM

## 2023-12-21 RX ORDER — TETRAHYDROZOLINE HCL 0.05 %
1 DROPS OPHTHALMIC (EYE) DAILY PRN
Status: DISCONTINUED | OUTPATIENT
Start: 2023-12-21 | End: 2023-12-26 | Stop reason: HOSPADM

## 2023-12-21 RX ORDER — CEFAZOLIN SODIUM/WATER 2 G/20 ML
2 SYRINGE (ML) INTRAVENOUS SEE ADMIN INSTRUCTIONS
Status: DISCONTINUED | OUTPATIENT
Start: 2023-12-21 | End: 2023-12-21 | Stop reason: HOSPADM

## 2023-12-21 RX ORDER — CEFAZOLIN SODIUM 2 G/100ML
2 INJECTION, SOLUTION INTRAVENOUS EVERY 8 HOURS
Qty: 200 ML | Refills: 0 | Status: COMPLETED | OUTPATIENT
Start: 2023-12-21 | End: 2023-12-22

## 2023-12-21 RX ORDER — HYDROMORPHONE HCL IN WATER/PF 6 MG/30 ML
0.2 PATIENT CONTROLLED ANALGESIA SYRINGE INTRAVENOUS EVERY 5 MIN PRN
Status: DISCONTINUED | OUTPATIENT
Start: 2023-12-21 | End: 2023-12-21 | Stop reason: HOSPADM

## 2023-12-21 RX ORDER — LIDOCAINE 4 G/G
1 PATCH TOPICAL
Status: DISCONTINUED | OUTPATIENT
Start: 2023-12-21 | End: 2023-12-26 | Stop reason: HOSPADM

## 2023-12-21 RX ORDER — CALCIUM CARBONATE 500 MG/1
500 TABLET, CHEWABLE ORAL 4 TIMES DAILY PRN
Status: DISCONTINUED | OUTPATIENT
Start: 2023-12-21 | End: 2023-12-26 | Stop reason: HOSPADM

## 2023-12-21 RX ORDER — NALOXONE HYDROCHLORIDE 0.4 MG/ML
0.4 INJECTION, SOLUTION INTRAMUSCULAR; INTRAVENOUS; SUBCUTANEOUS
Status: DISCONTINUED | OUTPATIENT
Start: 2023-12-21 | End: 2023-12-26 | Stop reason: HOSPADM

## 2023-12-21 RX ORDER — ONDANSETRON 4 MG/1
4 TABLET, ORALLY DISINTEGRATING ORAL EVERY 30 MIN PRN
Status: DISCONTINUED | OUTPATIENT
Start: 2023-12-21 | End: 2023-12-21 | Stop reason: HOSPADM

## 2023-12-21 RX ORDER — METHADONE HYDROCHLORIDE 10 MG/ML
5 INJECTION, SOLUTION INTRAMUSCULAR; INTRAVENOUS; SUBCUTANEOUS ONCE
Status: COMPLETED | OUTPATIENT
Start: 2023-12-21 | End: 2023-12-21

## 2023-12-21 RX ORDER — LISINOPRIL 20 MG/1
20 TABLET ORAL DAILY
Status: DISCONTINUED | OUTPATIENT
Start: 2023-12-22 | End: 2023-12-26 | Stop reason: HOSPADM

## 2023-12-21 RX ORDER — HYDROMORPHONE HCL IN WATER/PF 6 MG/30 ML
0.4 PATIENT CONTROLLED ANALGESIA SYRINGE INTRAVENOUS EVERY 5 MIN PRN
Status: DISCONTINUED | OUTPATIENT
Start: 2023-12-21 | End: 2023-12-21 | Stop reason: HOSPADM

## 2023-12-21 RX ORDER — TRANEXAMIC ACID 10 MG/ML
1 INJECTION, SOLUTION INTRAVENOUS CONTINUOUS
Status: DISCONTINUED | OUTPATIENT
Start: 2023-12-21 | End: 2023-12-21 | Stop reason: HOSPADM

## 2023-12-21 RX ORDER — OXYCODONE HYDROCHLORIDE 5 MG/1
5 TABLET ORAL EVERY 4 HOURS PRN
Status: DISCONTINUED | OUTPATIENT
Start: 2023-12-21 | End: 2023-12-26 | Stop reason: HOSPADM

## 2023-12-21 RX ORDER — SODIUM CHLORIDE, SODIUM LACTATE, POTASSIUM CHLORIDE, CALCIUM CHLORIDE 600; 310; 30; 20 MG/100ML; MG/100ML; MG/100ML; MG/100ML
INJECTION, SOLUTION INTRAVENOUS CONTINUOUS
Status: DISCONTINUED | OUTPATIENT
Start: 2023-12-21 | End: 2023-12-21 | Stop reason: HOSPADM

## 2023-12-21 RX ORDER — NALOXONE HYDROCHLORIDE 0.4 MG/ML
0.2 INJECTION, SOLUTION INTRAMUSCULAR; INTRAVENOUS; SUBCUTANEOUS
Status: DISCONTINUED | OUTPATIENT
Start: 2023-12-21 | End: 2023-12-26 | Stop reason: HOSPADM

## 2023-12-21 RX ORDER — HYDROMORPHONE HCL IN WATER/PF 6 MG/30 ML
0.4 PATIENT CONTROLLED ANALGESIA SYRINGE INTRAVENOUS
Status: DISCONTINUED | OUTPATIENT
Start: 2023-12-21 | End: 2023-12-26 | Stop reason: HOSPADM

## 2023-12-21 RX ORDER — DEXAMETHASONE SODIUM PHOSPHATE 4 MG/ML
INJECTION, SOLUTION INTRA-ARTICULAR; INTRALESIONAL; INTRAMUSCULAR; INTRAVENOUS; SOFT TISSUE PRN
Status: DISCONTINUED | OUTPATIENT
Start: 2023-12-21 | End: 2023-12-21

## 2023-12-21 RX ORDER — AMLODIPINE AND BENAZEPRIL HYDROCHLORIDE 10; 20 MG/1; MG/1
1 CAPSULE ORAL EVERY MORNING
Status: DISCONTINUED | OUTPATIENT
Start: 2023-12-22 | End: 2023-12-21

## 2023-12-21 RX ORDER — AMOXICILLIN 250 MG
1 CAPSULE ORAL 2 TIMES DAILY
Status: DISCONTINUED | OUTPATIENT
Start: 2023-12-21 | End: 2023-12-26 | Stop reason: HOSPADM

## 2023-12-21 RX ORDER — SODIUM CHLORIDE, SODIUM LACTATE, POTASSIUM CHLORIDE, CALCIUM CHLORIDE 600; 310; 30; 20 MG/100ML; MG/100ML; MG/100ML; MG/100ML
INJECTION, SOLUTION INTRAVENOUS CONTINUOUS PRN
Status: DISCONTINUED | OUTPATIENT
Start: 2023-12-21 | End: 2023-12-21

## 2023-12-21 RX ORDER — LIDOCAINE HYDROCHLORIDE 20 MG/ML
INJECTION, SOLUTION INFILTRATION; PERINEURAL PRN
Status: DISCONTINUED | OUTPATIENT
Start: 2023-12-21 | End: 2023-12-21

## 2023-12-21 RX ORDER — FAMOTIDINE 20 MG/1
20 TABLET, FILM COATED ORAL EVERY EVENING
Status: DISCONTINUED | OUTPATIENT
Start: 2023-12-21 | End: 2023-12-26 | Stop reason: HOSPADM

## 2023-12-21 RX ORDER — OXYCODONE HYDROCHLORIDE 5 MG/1
10 TABLET ORAL EVERY 4 HOURS PRN
Status: DISCONTINUED | OUTPATIENT
Start: 2023-12-21 | End: 2023-12-26 | Stop reason: HOSPADM

## 2023-12-21 RX ORDER — POLYETHYLENE GLYCOL 3350 17 G/17G
17 POWDER, FOR SOLUTION ORAL DAILY
Status: DISCONTINUED | OUTPATIENT
Start: 2023-12-22 | End: 2023-12-24

## 2023-12-21 RX ORDER — LIDOCAINE 40 MG/G
CREAM TOPICAL
Status: DISCONTINUED | OUTPATIENT
Start: 2023-12-21 | End: 2023-12-26 | Stop reason: HOSPADM

## 2023-12-21 RX ORDER — FENTANYL CITRATE 0.05 MG/ML
50 INJECTION, SOLUTION INTRAMUSCULAR; INTRAVENOUS EVERY 5 MIN PRN
Status: DISCONTINUED | OUTPATIENT
Start: 2023-12-21 | End: 2023-12-21 | Stop reason: HOSPADM

## 2023-12-21 RX ORDER — FENTANYL CITRATE 50 UG/ML
INJECTION, SOLUTION INTRAMUSCULAR; INTRAVENOUS PRN
Status: DISCONTINUED | OUTPATIENT
Start: 2023-12-21 | End: 2023-12-21

## 2023-12-21 RX ORDER — HYDROMORPHONE HCL IN WATER/PF 6 MG/30 ML
0.2 PATIENT CONTROLLED ANALGESIA SYRINGE INTRAVENOUS
Status: DISCONTINUED | OUTPATIENT
Start: 2023-12-21 | End: 2023-12-26 | Stop reason: HOSPADM

## 2023-12-21 RX ORDER — CALCIUM CARBONATE 500 MG/1
500 TABLET, CHEWABLE ORAL 4 TIMES DAILY PRN
Status: DISCONTINUED | OUTPATIENT
Start: 2023-12-21 | End: 2023-12-21

## 2023-12-21 RX ORDER — ONDANSETRON 2 MG/ML
4 INJECTION INTRAMUSCULAR; INTRAVENOUS EVERY 6 HOURS PRN
Status: DISCONTINUED | OUTPATIENT
Start: 2023-12-21 | End: 2023-12-26 | Stop reason: HOSPADM

## 2023-12-21 RX ORDER — HYDROXYZINE HYDROCHLORIDE 25 MG/1
25 TABLET, FILM COATED ORAL EVERY 6 HOURS PRN
Status: DISCONTINUED | OUTPATIENT
Start: 2023-12-21 | End: 2023-12-26 | Stop reason: HOSPADM

## 2023-12-21 RX ORDER — TRANEXAMIC ACID 10 MG/ML
10 INJECTION, SOLUTION INTRAVENOUS ONCE
Status: COMPLETED | OUTPATIENT
Start: 2023-12-21 | End: 2023-12-21

## 2023-12-21 RX ORDER — PROPOFOL 10 MG/ML
INJECTION, EMULSION INTRAVENOUS CONTINUOUS PRN
Status: DISCONTINUED | OUTPATIENT
Start: 2023-12-21 | End: 2023-12-21

## 2023-12-21 RX ORDER — PROPOFOL 10 MG/ML
INJECTION, EMULSION INTRAVENOUS PRN
Status: DISCONTINUED | OUTPATIENT
Start: 2023-12-21 | End: 2023-12-21

## 2023-12-21 RX ORDER — ONDANSETRON 2 MG/ML
INJECTION INTRAMUSCULAR; INTRAVENOUS PRN
Status: DISCONTINUED | OUTPATIENT
Start: 2023-12-21 | End: 2023-12-21

## 2023-12-21 RX ORDER — MAGNESIUM OXIDE 400 MG/1
400 TABLET ORAL DAILY
Status: DISCONTINUED | OUTPATIENT
Start: 2023-12-22 | End: 2023-12-26 | Stop reason: HOSPADM

## 2023-12-21 RX ORDER — ONDANSETRON 4 MG/1
4 TABLET, ORALLY DISINTEGRATING ORAL EVERY 6 HOURS PRN
Status: DISCONTINUED | OUTPATIENT
Start: 2023-12-21 | End: 2023-12-26 | Stop reason: HOSPADM

## 2023-12-21 RX ORDER — EPHEDRINE SULFATE 50 MG/ML
INJECTION, SOLUTION INTRAMUSCULAR; INTRAVENOUS; SUBCUTANEOUS PRN
Status: DISCONTINUED | OUTPATIENT
Start: 2023-12-21 | End: 2023-12-21

## 2023-12-21 RX ORDER — BISACODYL 10 MG
10 SUPPOSITORY, RECTAL RECTAL DAILY PRN
Status: DISCONTINUED | OUTPATIENT
Start: 2023-12-21 | End: 2023-12-26 | Stop reason: HOSPADM

## 2023-12-21 RX ORDER — CEFAZOLIN SODIUM/WATER 2 G/20 ML
2 SYRINGE (ML) INTRAVENOUS
Status: COMPLETED | OUTPATIENT
Start: 2023-12-21 | End: 2023-12-21

## 2023-12-21 RX ORDER — ACETAMINOPHEN 325 MG/1
975 TABLET ORAL EVERY 8 HOURS
Qty: 27 TABLET | Refills: 0 | Status: COMPLETED | OUTPATIENT
Start: 2023-12-21 | End: 2023-12-24

## 2023-12-21 RX ORDER — CYCLOBENZAPRINE HCL 5 MG
5 TABLET ORAL 3 TIMES DAILY
Status: DISCONTINUED | OUTPATIENT
Start: 2023-12-21 | End: 2023-12-26 | Stop reason: HOSPADM

## 2023-12-21 RX ORDER — KETAMINE HYDROCHLORIDE 10 MG/ML
INJECTION INTRAMUSCULAR; INTRAVENOUS PRN
Status: DISCONTINUED | OUTPATIENT
Start: 2023-12-21 | End: 2023-12-21

## 2023-12-21 RX ORDER — SODIUM CHLORIDE 9 MG/ML
INJECTION, SOLUTION INTRAVENOUS CONTINUOUS
Status: DISCONTINUED | OUTPATIENT
Start: 2023-12-21 | End: 2023-12-24

## 2023-12-21 RX ORDER — FENTANYL CITRATE 0.05 MG/ML
25 INJECTION, SOLUTION INTRAMUSCULAR; INTRAVENOUS EVERY 5 MIN PRN
Status: DISCONTINUED | OUTPATIENT
Start: 2023-12-21 | End: 2023-12-21 | Stop reason: HOSPADM

## 2023-12-21 RX ORDER — GABAPENTIN 300 MG/1
300 CAPSULE ORAL AT BEDTIME
Status: DISCONTINUED | OUTPATIENT
Start: 2023-12-21 | End: 2023-12-26 | Stop reason: HOSPADM

## 2023-12-21 RX ORDER — AMLODIPINE BESYLATE 10 MG/1
10 TABLET ORAL DAILY
Status: DISCONTINUED | OUTPATIENT
Start: 2023-12-22 | End: 2023-12-26 | Stop reason: HOSPADM

## 2023-12-21 RX ORDER — SODIUM PHOSPHATE,MONO-DIBASIC 19G-7G/118
1 ENEMA (ML) RECTAL DAILY
Status: DISCONTINUED | OUTPATIENT
Start: 2023-12-22 | End: 2023-12-21

## 2023-12-21 RX ADMIN — PHENYLEPHRINE HYDROCHLORIDE 100 MCG: 10 INJECTION INTRAVENOUS at 15:35

## 2023-12-21 RX ADMIN — PHENYLEPHRINE HYDROCHLORIDE 100 MCG: 10 INJECTION INTRAVENOUS at 15:05

## 2023-12-21 RX ADMIN — PHENYLEPHRINE HYDROCHLORIDE 100 MCG: 10 INJECTION INTRAVENOUS at 15:53

## 2023-12-21 RX ADMIN — OXYCODONE HYDROCHLORIDE 5 MG: 5 TABLET ORAL at 21:50

## 2023-12-21 RX ADMIN — PHENYLEPHRINE HYDROCHLORIDE 100 MCG: 10 INJECTION INTRAVENOUS at 13:24

## 2023-12-21 RX ADMIN — Medication 5 MG: at 14:27

## 2023-12-21 RX ADMIN — Medication 10 MG: at 14:48

## 2023-12-21 RX ADMIN — ALBUMIN HUMAN: 0.05 INJECTION, SOLUTION INTRAVENOUS at 14:59

## 2023-12-21 RX ADMIN — SODIUM CHLORIDE, POTASSIUM CHLORIDE, SODIUM LACTATE AND CALCIUM CHLORIDE: 600; 310; 30; 20 INJECTION, SOLUTION INTRAVENOUS at 10:29

## 2023-12-21 RX ADMIN — PHENYLEPHRINE HYDROCHLORIDE 100 MCG: 10 INJECTION INTRAVENOUS at 16:07

## 2023-12-21 RX ADMIN — ALBUMIN HUMAN: 0.05 INJECTION, SOLUTION INTRAVENOUS at 14:32

## 2023-12-21 RX ADMIN — GABAPENTIN 300 MG: 300 CAPSULE ORAL at 21:18

## 2023-12-21 RX ADMIN — HYDROMORPHONE HYDROCHLORIDE 0.5 MG: 1 INJECTION, SOLUTION INTRAMUSCULAR; INTRAVENOUS; SUBCUTANEOUS at 13:46

## 2023-12-21 RX ADMIN — MIDAZOLAM 2 MG: 1 INJECTION INTRAMUSCULAR; INTRAVENOUS at 13:09

## 2023-12-21 RX ADMIN — CYCLOBENZAPRINE HYDROCHLORIDE 5 MG: 5 TABLET, FILM COATED ORAL at 21:18

## 2023-12-21 RX ADMIN — PHENYLEPHRINE HYDROCHLORIDE 150 MCG: 10 INJECTION INTRAVENOUS at 17:20

## 2023-12-21 RX ADMIN — PHENYLEPHRINE HYDROCHLORIDE 100 MCG: 10 INJECTION INTRAVENOUS at 16:22

## 2023-12-21 RX ADMIN — OXYCODONE HYDROCHLORIDE 5 MG: 5 TABLET ORAL at 22:39

## 2023-12-21 RX ADMIN — LIDOCAINE HYDROCHLORIDE 100 MG: 20 INJECTION, SOLUTION INFILTRATION; PERINEURAL at 13:12

## 2023-12-21 RX ADMIN — SODIUM CHLORIDE, POTASSIUM CHLORIDE, SODIUM LACTATE AND CALCIUM CHLORIDE: 600; 310; 30; 20 INJECTION, SOLUTION INTRAVENOUS at 15:54

## 2023-12-21 RX ADMIN — SODIUM CHLORIDE, POTASSIUM CHLORIDE, SODIUM LACTATE AND CALCIUM CHLORIDE: 600; 310; 30; 20 INJECTION, SOLUTION INTRAVENOUS at 13:35

## 2023-12-21 RX ADMIN — SODIUM CHLORIDE: 9 INJECTION, SOLUTION INTRAVENOUS at 21:03

## 2023-12-21 RX ADMIN — METHADONE HYDROCHLORIDE 5 MG: 10 INJECTION, SOLUTION INTRAMUSCULAR; INTRAVENOUS; SUBCUTANEOUS at 13:35

## 2023-12-21 RX ADMIN — Medication 5 MG: at 15:04

## 2023-12-21 RX ADMIN — PHENYLEPHRINE HYDROCHLORIDE 200 MCG: 10 INJECTION INTRAVENOUS at 16:54

## 2023-12-21 RX ADMIN — ONDANSETRON 4 MG: 2 INJECTION INTRAMUSCULAR; INTRAVENOUS at 16:27

## 2023-12-21 RX ADMIN — FENTANYL CITRATE 100 MCG: 50 INJECTION INTRAMUSCULAR; INTRAVENOUS at 13:12

## 2023-12-21 RX ADMIN — Medication 5 MG: at 13:59

## 2023-12-21 RX ADMIN — HYDROMORPHONE HYDROCHLORIDE 0.4 MG: 0.2 INJECTION, SOLUTION INTRAMUSCULAR; INTRAVENOUS; SUBCUTANEOUS at 18:05

## 2023-12-21 RX ADMIN — ROCURONIUM BROMIDE 30 MG: 50 INJECTION, SOLUTION INTRAVENOUS at 15:48

## 2023-12-21 RX ADMIN — CEFAZOLIN SODIUM 2 G: 2 INJECTION, SOLUTION INTRAVENOUS at 21:18

## 2023-12-21 RX ADMIN — DEXAMETHASONE SODIUM PHOSPHATE 8 MG: 4 INJECTION, SOLUTION INTRA-ARTICULAR; INTRALESIONAL; INTRAMUSCULAR; INTRAVENOUS; SOFT TISSUE at 13:46

## 2023-12-21 RX ADMIN — Medication 5 MG: at 14:23

## 2023-12-21 RX ADMIN — HYDROMORPHONE HYDROCHLORIDE 0.2 MG: 0.2 INJECTION, SOLUTION INTRAMUSCULAR; INTRAVENOUS; SUBCUTANEOUS at 19:05

## 2023-12-21 RX ADMIN — ALBUMIN HUMAN: 0.05 INJECTION, SOLUTION INTRAVENOUS at 16:03

## 2023-12-21 RX ADMIN — FENTANYL CITRATE 50 MCG: 50 INJECTION INTRAMUSCULAR; INTRAVENOUS at 16:49

## 2023-12-21 RX ADMIN — Medication 10 MG: at 13:45

## 2023-12-21 RX ADMIN — TRANEXAMIC ACID 1 MG/KG/HR: 10 INJECTION, SOLUTION INTRAVENOUS at 13:50

## 2023-12-21 RX ADMIN — FAMOTIDINE 20 MG: 20 TABLET ORAL at 21:18

## 2023-12-21 RX ADMIN — FENTANYL CITRATE 50 MCG: 50 INJECTION, SOLUTION INTRAMUSCULAR; INTRAVENOUS at 17:56

## 2023-12-21 RX ADMIN — ROCURONIUM BROMIDE 10 MG: 50 INJECTION, SOLUTION INTRAVENOUS at 15:01

## 2023-12-21 RX ADMIN — Medication 5 MG: at 14:37

## 2023-12-21 RX ADMIN — Medication 10 MG: at 16:03

## 2023-12-21 RX ADMIN — FENTANYL CITRATE 50 MCG: 50 INJECTION, SOLUTION INTRAMUSCULAR; INTRAVENOUS at 17:50

## 2023-12-21 RX ADMIN — DOCUSATE SODIUM 50 MG AND SENNOSIDES 8.6 MG 1 TABLET: 8.6; 5 TABLET, FILM COATED ORAL at 21:18

## 2023-12-21 RX ADMIN — ROCURONIUM BROMIDE 20 MG: 50 INJECTION, SOLUTION INTRAVENOUS at 14:33

## 2023-12-21 RX ADMIN — HYDROMORPHONE HYDROCHLORIDE 0.5 MG: 1 INJECTION, SOLUTION INTRAMUSCULAR; INTRAVENOUS; SUBCUTANEOUS at 17:07

## 2023-12-21 RX ADMIN — PHENYLEPHRINE HYDROCHLORIDE 100 MCG: 10 INJECTION INTRAVENOUS at 15:42

## 2023-12-21 RX ADMIN — ACETAMINOPHEN 975 MG: 325 TABLET, FILM COATED ORAL at 21:18

## 2023-12-21 RX ADMIN — PHENYLEPHRINE HYDROCHLORIDE 200 MCG: 10 INJECTION INTRAVENOUS at 16:09

## 2023-12-21 RX ADMIN — SUGAMMADEX 200 MG: 100 INJECTION, SOLUTION INTRAVENOUS at 17:24

## 2023-12-21 RX ADMIN — PHENYLEPHRINE HYDROCHLORIDE 100 MCG: 10 INJECTION INTRAVENOUS at 14:37

## 2023-12-21 RX ADMIN — HYDROMORPHONE HYDROCHLORIDE 0.4 MG: 0.2 INJECTION, SOLUTION INTRAMUSCULAR; INTRAVENOUS; SUBCUTANEOUS at 18:25

## 2023-12-21 RX ADMIN — DEXMEDETOMIDINE HYDROCHLORIDE 0.2 MCG/KG/HR: 100 INJECTION, SOLUTION INTRAVENOUS at 13:47

## 2023-12-21 RX ADMIN — HYDROMORPHONE HYDROCHLORIDE 0.2 MG: 0.2 INJECTION, SOLUTION INTRAMUSCULAR; INTRAVENOUS; SUBCUTANEOUS at 18:48

## 2023-12-21 RX ADMIN — Medication 2 G: at 13:19

## 2023-12-21 RX ADMIN — ROCURONIUM BROMIDE 80 MG: 50 INJECTION, SOLUTION INTRAVENOUS at 13:13

## 2023-12-21 RX ADMIN — TRANEXAMIC ACID 849 MG: 10 INJECTION, SOLUTION INTRAVENOUS at 13:35

## 2023-12-21 RX ADMIN — PROPOFOL 20 MCG/KG/MIN: 10 INJECTION, EMULSION INTRAVENOUS at 13:35

## 2023-12-21 RX ADMIN — PHENYLEPHRINE HYDROCHLORIDE 0.4 MCG/KG/MIN: 10 INJECTION INTRAVENOUS at 13:33

## 2023-12-21 RX ADMIN — PROPOFOL 200 MG: 10 INJECTION, EMULSION INTRAVENOUS at 13:12

## 2023-12-21 RX ADMIN — PHENYLEPHRINE HYDROCHLORIDE 100 MCG: 10 INJECTION INTRAVENOUS at 15:23

## 2023-12-21 RX ADMIN — PHENYLEPHRINE HYDROCHLORIDE 100 MCG: 10 INJECTION INTRAVENOUS at 16:51

## 2023-12-21 RX ADMIN — FENTANYL CITRATE 50 MCG: 50 INJECTION INTRAMUSCULAR; INTRAVENOUS at 16:47

## 2023-12-21 RX ADMIN — ROCURONIUM BROMIDE 20 MG: 50 INJECTION, SOLUTION INTRAVENOUS at 13:58

## 2023-12-21 ASSESSMENT — ENCOUNTER SYMPTOMS
SEIZURES: 0
DYSRHYTHMIAS: 0

## 2023-12-21 ASSESSMENT — ACTIVITIES OF DAILY LIVING (ADL)
ADLS_ACUITY_SCORE: 18

## 2023-12-21 NOTE — ANESTHESIA PREPROCEDURE EVALUATION
Anesthesia Pre-Procedure Evaluation    Patient: Neema Hunt   MRN: [id number] : 1965        Procedure : Procedure(s):  Insertion Thoracic 10 to Lumbar 1 and revision of Lumbar 2 to Sacral 1 instrumentation. Dual Pelvic Instrumentation. Revision fusion Lumbar 5 to Sacral 1 using allograft chips. Arthrodesis Thoracic 10 to Lumbar 2 using allograft cancellous chips          Past Medical History:   Diagnosis Date    Arthritis     Back pain     Gastro-oesophageal reflux disease     Hypertension     Radiculopathy     Scoliosis       Past Surgical History:   Procedure Laterality Date    DAVINCI XI HERNIORRHAPHY INGUINAL Bilateral 2022    Procedure: Robotic repair of recurrent right inguinal hernia with placement of mesh, robotic repair of left inguinal hernia with placement of mesh;  Surgeon: Mary Morales MD;  Location: [facility] OR    DAVINCI XI HERNIORRHAPHY VENTRAL N/A 2022    Procedure: Robotic repair of umbilical hernia with placement of mesh;  Surgeon: Mary Morales MD;  Location: [facility] OR    DISCECTOMY LUMBAR POSTERIOR MICROSCOPIC ONE LEVEL Right 7/15/2020    Procedure: Right L5-S1 foraminotomy and microdiskectomy;  Surgeon: Nhan Marshall MD;  Location: [facility] OR    EXPLORE SPINE, REMOVE HARDWARE, COMBINED N/A 2019    Procedure: REMOVAL LUMBAR L3-5 INSTRUMENTATION;  Surgeon: Nhan Marshall MD;  Location: [facility]    FUSION SPINE ANTERIOR MINIMALLY INVASIVE TWO LEVELS N/A 2016    Procedure: FUSION SPINE ANTERIOR MINIMALLY INVASIVE TWO LEVELS;  Surgeon: Nhan Marshall MD;  Location: [facility]    FUSION, SPINE, LUMBAR, 1 LEVEL, POSTERIOR APPROACH, ROBOTIC-ASSISTED N/A 2021    Procedure: Lumbar 5-Sacral 1 posterior segemental instrumentation, bilateral decompression and transforaminal interbody fusion using local autograft and allograft cancellous chips. Revision of Lumbar 2-4 ian. Posterior arthrodesis Lumbar 5-Sacral 1 using  local autograft and allograft cancellous chips;   Surgeon: Nhan Marshall MD;  Location: [facility] OR    HERNIA REPAIR      right inguinal hernia    LAMINECTOMY LUMBAR POSTERIOR MICROSCOPIC ONE LEVEL  4/9/2013    Procedure: LAMINECTOMY LUMBAR POSTERIOR MICROSCOPIC ONE LEVEL;  Right Lumbar 3-4 Micro Laminectomy & Foraminotomy;  Surgeon: Nhan Marshall MD;  Location: [facility]    LAMINECTOMY THORACIC ONE LEVEL N/A 9/27/2023    Procedure: Thoracic 11 to thoracic 12 laminectomy;  Surgeon: Zane Ontiveros MD;  Location: [facility]    OPTICAL TRACKING SYSTEM FUSION SPINE POSTERIOR LUMBAR PERCUTANEOUS TWO LEVELS N/A 11/16/2016    Procedure: OPTICAL TRACKING SYSTEM FUSION SPINE POSTERIOR LUMBAR PERCUTANEOUS TWO LEVELS;  Surgeon: Nhan Marshall MD;  Location: [facility]    OPTICAL TRACKING SYSTEM FUSION SPINE POSTERIOR LUMBAR THREE+ LEVELS Right 9/5/2019    Procedure: POSTERIOR SEGMENTAL INSTRUMENTATION L2-4, RIGHT LUMBAR 2-3 DISCECTOMY AND TRANSFORAMINAL INTERBODY FUSION, REDO DECOMPRESSION RIGHT L3-4, POSTERIOR ARTHRODESIS L2-4;  Surgeon: Nhan Marshall MD;  Location:  OR    ORTHOPEDIC SURGERY      left ankle, right finger      No Known Allergies   Social History     Tobacco Use    Smoking status: Never    Smokeless tobacco: Never   Substance Use Topics    Alcohol use: Yes     Comment: 3-4 drinks/week      Wt Readings from Last 1 Encounters:   12/21/23 90.7 kg (200 lb)        Anesthesia Evaluation   Pt has had prior anesthetic. Type: General.        ROS/MED HX  ENT/Pulmonary:    (-) asthma and sleep apnea   Neurologic:    (-) no seizures and migraines   Cardiovascular:     (+)  hypertension- -   -  - -                                   (-) CAD, MEEKS, arrhythmias, dyslipidemia and murmur   METS/Exercise Tolerance:     Hematologic:       Musculoskeletal:    (-) arthritis   GI/Hepatic:     (+) GERD,                   Renal/Genitourinary:    (-) renal disease   Endo:    (-) Type I DM, Type II DM and thyroid disease   Psychiatric/Substance Use: Comment: Scoliosis/back  pain/radiculopathy      Infectious Disease:       Malignancy:       Other:            Physical Exam    Airway        Mallampati: II   TM distance: > 3 FB   Neck ROM: full   Mouth opening: > 3 cm    Respiratory Devices and Support         Dental       (+) Minor Abnormalities - some fillings, tiny chips      Cardiovascular   cardiovascular exam normal       Rhythm and rate: regular and normal (-) no murmur    Pulmonary   pulmonary exam normal        breath sounds clear to auscultation           OUTSIDE LABS:  CBC:   Lab Results   Component Value Date    WBC 4.5 12/21/2023    WBC 10.2 09/11/2019    HGB 11.6 (L) 12/21/2023    HGB 10.5 (L) 05/29/2021    HCT 33.6 (L) 12/21/2023    HCT 25.1 (L) 09/11/2019     12/21/2023     09/11/2019     BMP:   Lab Results   Component Value Date     12/21/2023     09/11/2019    POTASSIUM 4.1 12/21/2023    POTASSIUM 4.1 07/26/2022    CHLORIDE 104 12/21/2023    CHLORIDE 102 09/11/2019    CO2 27 12/21/2023    CO2 29 09/11/2019    BUN 14.1 12/21/2023    BUN 10 09/11/2019    CR 0.73 12/21/2023    CR 0.64 (L) 09/28/2023    GLC 93 12/21/2023     (H) 09/11/2019     COAGS:   Lab Results   Component Value Date    PTT 28 12/21/2023    INR 1.02 12/21/2023     POC:   Lab Results   Component Value Date    BGM 94 05/29/2021     HEPATIC:   Lab Results   Component Value Date    ALBUMIN 2.9 (L) 09/11/2019    PROTTOTAL 8.4 09/11/2019    ALT 26 09/11/2019    AST 22 09/11/2019    ALKPHOS 74 09/11/2019    BILITOTAL 0.4 09/11/2019     OTHER:   Lab Results   Component Value Date    LACT 0.8 09/11/2019    JASON 9.2 12/21/2023    LIPASE 108 09/11/2019       Anesthesia Plan    ASA Status:  2    NPO Status:  NPO Appropriate    Anesthesia Type: General.     - Airway: ETT   Induction: Propofol, Intravenous.   Maintenance: Balanced.   Techniques and Equipment:     - Lines/Monitors: 2nd IV, Arterial Line     - Blood: T&S     - Drips/Meds: Ketamine, Dexmed. infusion (methadone IVP x1)  "    Consents    Anesthesia Plan(s) and associated risks, benefits, and realistic alternatives discussed. Questions answered and patient/representative(s) expressed understanding.     - Discussed:     - Discussed with:  Patient            Postoperative Care    Pain management: Multi-modal analgesia, IV analgesics.   PONV prophylaxis: Dexamethasone or Solumedrol, Ondansetron (or other 5HT-3), Background Propofol Infusion     Comments:               Primo Bautista MD    I have reviewed the pertinent notes and labs in the chart from the past 30 days and (re)examined the patient.  Any updates or changes from those notes are reflected in this note.              # Overweight: Estimated body mass index is 28.7 kg/m  as calculated from the following:    Height as of this encounter: 1.778 m (5' 10\").    Weight as of this encounter: 90.7 kg (200 lb).      "

## 2023-12-21 NOTE — ANESTHESIA PROCEDURE NOTES
Airway       Patient location during procedure: OR       Procedure Start/Stop Times: 12/21/2023 1:16 PM  Staff -        Anesthesiologist:  Primo Bautista MD       CRNA: Elise Patel APRN CRNA       Performed By: CRNA  Consent for Airway        Urgency: elective  Indications and Patient Condition       Indications for airway management: rubén-procedural       Induction type:intravenous       Mask difficulty assessment: 2 - vent by mask + OA or adjuvant +/- NMBA    Final Airway Details       Final airway type: endotracheal airway       Successful airway: ETT - single  Endotracheal Airway Details        ETT size (mm): 8.0       Cuffed: yes       Successful intubation technique: direct laryngoscopy       DL Blade Type: Painter 2       Grade View of Cords: 1       Adjucts: stylet       Position: Right       Measured from: gums/teeth       Secured at (cm): 23       Bite block used: None    Post intubation assessment        Placement verified by: capnometry, equal breath sounds and chest rise        Number of attempts at approach: 1       Number of other approaches attempted: 0       Secured with: tape       Ease of procedure: easy       Dentition: Intact and Unchanged    Medication(s) Administered   Medication Administration Time: 12/21/2023 1:16 PM

## 2023-12-21 NOTE — ANESTHESIA CARE TRANSFER NOTE
Patient: Neema Hunt    Procedure: Procedure(s):  Insertion Thoracic 10 to Lumbar 1 and revision of Lumbar 2 to Sacral 1 instrumentation. Dual Pelvic Instrumentation. Revision fusion Lumbar 5 to Sacral 1 using allograft chips. Arthrodesis Thoracic 10 to Lumbar 2 using allograft cancellous chips       Diagnosis: Pseudarthrosis after fusion or arthrodesis [M96.0]  Spinal stenosis of lumbar region with radiculopathy [M48.061, M54.16]  Diagnosis Additional Information: No value filed.    Anesthesia Type:   General     Note:    Oropharynx: oropharynx clear of all foreign objects and spontaneously breathing  Level of Consciousness: awake  Oxygen Supplementation: nasal cannula  Level of Supplemental Oxygen (L/min / FiO2): 3  Independent Airway: airway patency satisfactory and stable  Dentition: dentition unchanged  Vital Signs Stable: post-procedure vital signs reviewed and stable  Report to RN Given: handoff report given  Patient transferred to: PACU  Comments: Neuromuscular blockade reversed with sugammadex, spontaneous respirations, adequate tidal volumes, followed commands to voice, oropharynx suctioned with soft flexible catheter, extubated atraumatically, extubated with suction, airway patent after extubation.  Oxygen via nasal cannula at 3 liters per minute to PACU. Oxygen tubing connected to wall O2 in PACU, SpO2, NiBP, and EKG monitors and alarms on and functioning, Devin Hugger warmer connected to patient gown.         Handoff Report: Identifed the Patient, Identified the Reponsible Provider, Reviewed the pertinent medical history, Discussed the surgical course, Reviewed Intra-OP anesthesia mangement and issues during anesthesia, Set expectations for post-procedure period and Allowed opportunity for questions and acknowledgement of understanding      Vitals:  Vitals Value Taken Time   /87 12/21/23 1734   Temp     Pulse 90 12/21/23 1734   Resp 28 12/21/23 1739   SpO2 100 % 12/21/23 1739   Vitals shown  include unfiled device data.    Electronically Signed By: NADIR Sullivan CRNA  December 21, 2023  5:40 PM

## 2023-12-21 NOTE — OP NOTE
[facility]   Operative Note    Pre-operative diagnosis: Thoracic Instability after laminectomy  Pseudarthrosis after fusion or arthrodesis [M96.0]  Spinal stenosis of lumbar region with radiculopathy [M48.061, M54.16]   Post-operative diagnosis Same   Procedure: Procedure(s):  Insertion Thoracic 10 to Lumbar 1 and revision of Lumbar 2 to Sacral 1 instrumentation. Dual Pelvic Instrumentation. Revision fusion Lumbar 5 to Sacral 1 using allograft chips. Arthrodesis Thoracic 10 to Lumbar 2 using allograft cancellous chips    Surgeon(s): Surgeon(s) and Role:     * Nhan Marshall MD - Primary     * Prasanna Oliva PA-C - Assisting   Estimated blood loss: 1000 mL    Specimens: * No specimens in log *   Findings:   New instrumentation placed without difficulty.  Refusion at L5-S1 and primary fusion from T10-L2.       Indications: Patient is a 58-year-old male with a history of multiple previous spinal operations with a known pseudoarthrosis at L5-S1 along with instability and the thoracic spine at the site of her recent laminectomy.  He was brought for revision of his fusion and extension to the lower thoracic spine. Please note that Prasanna Oliva PA-C's assistance was needed for positioning, retraction, suctioning, and closure.     Description of procedure: Patient was brought to the operating room.  General endotracheal anesthesia was induced.  The patient was rolled in the prone position on the Xu fourposter table.  The back was prepped and draped in sterile fashion and a midline exposure was performed from T10 down to the sacrum.'s previous hardware was exposed.  The O-arm was sterilely draped and brought into the field.  Three-dimensional imaging was obtained for Stealth neuronavigation for placement of new pedicle screws from T10-L1.  These were placed using [employer] system.  Once these were in position repeat imaging was obtained showing that the left T10  screw was slightly lateral.  This was repositioned into a better position.  The reference frame was moved and then repeat three-dimensional imaging was obtained of the pelvis placement of we will bilateral S2 alar iliac screws.  Please note that this required more work than usual by approximately 50% because of 4 screws being placed instead of 2.  The SI bone Granite screws were used.  Previously the rods from the prior construct and S1 screws have been removed.  It did not appear that the S1 screw positions could be salvaged at this point.  Once the pelvic indentation was in place repeat three-dimensional imaging showed good position of the screws.  At this point then bilateral L4 screws were removed and replaced with DR MAS screws.  The left L2 and L3 screws were removed as well in order to facilitate ian placement.  Rods were contoured and seated.  2 rods and the entire construct and dual accessory rods were placed from L4 to the pelvis.  These were seated and secured.  Setscrews were final tightened.  The posterior elements were decorticated from T10-L2.  A combination of allograft cancellous chips and BMP sponges were placed in this area.  In addition a combination of BMP sponges and allograft chips and local autograft were placed from L5-S1 to repair the pseudoarthrosis.  Once this was done medium Hemovac drain was placed through a separate incision.  0 Vicryl used for the fascia.  2-0 Vicryl was used for the subcutaneous layer and 3-0 nylon was used for skin.  A sterile dressing was applied.

## 2023-12-22 ENCOUNTER — APPOINTMENT (OUTPATIENT)
Dept: PHYSICAL THERAPY | Facility: CLINIC | Age: 58
End: 2023-12-22
Attending: PHYSICIAN ASSISTANT
Payer: COMMERCIAL

## 2023-12-22 ENCOUNTER — APPOINTMENT (OUTPATIENT)
Dept: GENERAL RADIOLOGY | Facility: CLINIC | Age: 58
End: 2023-12-22
Attending: PHYSICIAN ASSISTANT
Payer: COMMERCIAL

## 2023-12-22 LAB
GLUCOSE SERPL-MCNC: 116 MG/DL (ref 70–99)
HGB BLD-MCNC: 10.2 G/DL (ref 13.3–17.7)
TROPONIN T SERPL HS-MCNC: 18 NG/L
TROPONIN T SERPL HS-MCNC: 18 NG/L

## 2023-12-22 PROCEDURE — 87040 BLOOD CULTURE FOR BACTERIA: CPT | Performed by: INTERNAL MEDICINE

## 2023-12-22 PROCEDURE — 999N000065 XR LUMBAR SPINE 2/3 VIEWS

## 2023-12-22 PROCEDURE — 36415 COLL VENOUS BLD VENIPUNCTURE: CPT | Performed by: NEUROLOGICAL SURGERY

## 2023-12-22 PROCEDURE — 85018 HEMOGLOBIN: CPT | Performed by: PHYSICIAN ASSISTANT

## 2023-12-22 PROCEDURE — 250N000013 HC RX MED GY IP 250 OP 250 PS 637: Performed by: PHYSICIAN ASSISTANT

## 2023-12-22 PROCEDURE — 82947 ASSAY GLUCOSE BLOOD QUANT: CPT | Performed by: NEUROLOGICAL SURGERY

## 2023-12-22 PROCEDURE — 120N000001 HC R&B MED SURG/OB

## 2023-12-22 PROCEDURE — 97161 PT EVAL LOW COMPLEX 20 MIN: CPT | Mod: GP

## 2023-12-22 PROCEDURE — 250N000013 HC RX MED GY IP 250 OP 250 PS 637: Performed by: NURSE PRACTITIONER

## 2023-12-22 PROCEDURE — 250N000011 HC RX IP 250 OP 636: Mod: JZ | Performed by: PHYSICIAN ASSISTANT

## 2023-12-22 PROCEDURE — 84484 ASSAY OF TROPONIN QUANT: CPT | Performed by: NURSE PRACTITIONER

## 2023-12-22 PROCEDURE — 97530 THERAPEUTIC ACTIVITIES: CPT | Mod: GP

## 2023-12-22 PROCEDURE — 97116 GAIT TRAINING THERAPY: CPT | Mod: GP

## 2023-12-22 PROCEDURE — 36415 COLL VENOUS BLD VENIPUNCTURE: CPT | Performed by: NURSE PRACTITIONER

## 2023-12-22 PROCEDURE — 36415 COLL VENOUS BLD VENIPUNCTURE: CPT | Performed by: INTERNAL MEDICINE

## 2023-12-22 RX ADMIN — HYDROXYZINE HYDROCHLORIDE 25 MG: 25 TABLET, FILM COATED ORAL at 15:16

## 2023-12-22 RX ADMIN — Medication 400 MG: at 08:30

## 2023-12-22 RX ADMIN — LIDOCAINE 1 PATCH: 4 PATCH TOPICAL at 00:29

## 2023-12-22 RX ADMIN — ACETAMINOPHEN 975 MG: 325 TABLET, FILM COATED ORAL at 22:07

## 2023-12-22 RX ADMIN — OXYCODONE HYDROCHLORIDE 10 MG: 5 TABLET ORAL at 02:26

## 2023-12-22 RX ADMIN — FAMOTIDINE 20 MG: 20 TABLET ORAL at 19:57

## 2023-12-22 RX ADMIN — OXYCODONE HYDROCHLORIDE 10 MG: 5 TABLET ORAL at 15:16

## 2023-12-22 RX ADMIN — LISINOPRIL 20 MG: 20 TABLET ORAL at 08:30

## 2023-12-22 RX ADMIN — OXYCODONE HYDROCHLORIDE 10 MG: 5 TABLET ORAL at 10:58

## 2023-12-22 RX ADMIN — HYDROXYZINE HYDROCHLORIDE 25 MG: 25 TABLET, FILM COATED ORAL at 02:48

## 2023-12-22 RX ADMIN — DOCUSATE SODIUM 50 MG AND SENNOSIDES 8.6 MG 1 TABLET: 8.6; 5 TABLET, FILM COATED ORAL at 20:10

## 2023-12-22 RX ADMIN — DOCUSATE SODIUM 50 MG AND SENNOSIDES 8.6 MG 1 TABLET: 8.6; 5 TABLET, FILM COATED ORAL at 08:31

## 2023-12-22 RX ADMIN — MULTIVITAMIN TABLET 1 TABLET: TABLET at 08:31

## 2023-12-22 RX ADMIN — GABAPENTIN 300 MG: 300 CAPSULE ORAL at 22:07

## 2023-12-22 RX ADMIN — CEFAZOLIN SODIUM 2 G: 2 INJECTION, SOLUTION INTRAVENOUS at 04:39

## 2023-12-22 RX ADMIN — CYCLOBENZAPRINE HYDROCHLORIDE 5 MG: 5 TABLET, FILM COATED ORAL at 08:30

## 2023-12-22 RX ADMIN — METHOCARBAMOL TABLETS 750 MG: 750 TABLET, COATED ORAL at 22:12

## 2023-12-22 RX ADMIN — HYDROMORPHONE HYDROCHLORIDE 0.2 MG: 0.2 INJECTION, SOLUTION INTRAMUSCULAR; INTRAVENOUS; SUBCUTANEOUS at 06:04

## 2023-12-22 RX ADMIN — OXYCODONE HYDROCHLORIDE 10 MG: 5 TABLET ORAL at 19:57

## 2023-12-22 RX ADMIN — LIDOCAINE 1 PATCH: 4 PATCH TOPICAL at 19:59

## 2023-12-22 RX ADMIN — CYCLOBENZAPRINE HYDROCHLORIDE 5 MG: 5 TABLET, FILM COATED ORAL at 13:12

## 2023-12-22 RX ADMIN — ACETAMINOPHEN 975 MG: 325 TABLET, FILM COATED ORAL at 04:38

## 2023-12-22 RX ADMIN — CYCLOBENZAPRINE HYDROCHLORIDE 5 MG: 5 TABLET, FILM COATED ORAL at 19:57

## 2023-12-22 RX ADMIN — AMLODIPINE BESYLATE 10 MG: 10 TABLET ORAL at 08:31

## 2023-12-22 RX ADMIN — POLYETHYLENE GLYCOL 3350 17 G: 17 POWDER, FOR SOLUTION ORAL at 08:32

## 2023-12-22 RX ADMIN — ACETAMINOPHEN 975 MG: 325 TABLET, FILM COATED ORAL at 13:12

## 2023-12-22 ASSESSMENT — ACTIVITIES OF DAILY LIVING (ADL)
ADLS_ACUITY_SCORE: 21
ADLS_ACUITY_SCORE: 18
ADLS_ACUITY_SCORE: 22
ADLS_ACUITY_SCORE: 21
ADLS_ACUITY_SCORE: 22
ADLS_ACUITY_SCORE: 21
ADLS_ACUITY_SCORE: 22

## 2023-12-22 NOTE — PROGRESS NOTES
Notified provider about indwelling fowler catheter discussed removal or continued need.    Did provider choose to remove indwelling fowler catheter? No    Provider's fowler indication for keeping indwelling fowler catheter: Retention.    Is there an order for indwelling fowler catheter? Yes    *If there is a plan to keep fowler catheter in place at discharge daily notification with provider is not necessary, but please add a notation in the treatment team sticky note that the patient will be discharging with the catheter.

## 2023-12-22 NOTE — PROGRESS NOTES
Neurosurgery     Paged for BLE numbness and talked to nurse via telephone.     Nurse started shift at 11pm and was given report from evening nurse of patients current exam. She notes patient states his baseline is bilateral below knee numbness and he is currently having bilateral thigh numbness to foot. States exam has not changed since earlier this evening, only that the patient reported his baseline is bilateral below knee numbness. Per nurse, 5/5 BUE/BLE strength. No saddle anesthesia. Vitals stable. Hill in place. Drain intact.     Patient continues to have sharp intermittent chest pain when taking a deep breath - previously discussed with nurse. Per nurse, patient was evaluated by in-house physician earlier. Troponins ordered x 2 - negative. EKG completed.    Drain was recorded for 375 mL between 7949-1925 and nurse has not had to empty the drain since.    Recommend continued observation and monitoring of the neurological exam. Please page if any new or concerning neurological symptoms, changes in the neuro exam, or other concerns.     Paola Henry PA-C  [facility]  [address] [place] [address]  [address] [place] [id number]

## 2023-12-22 NOTE — PROGRESS NOTES
"Neurosurgery     Dr. Marshall  POD 0: Insertion Thoracic 10 to Lumbar 1 and revision of Lumbar 2 to Sacral 1 instrumentation. Dual Pelvic Instrumentation. Revision fusion Lumbar 5 to Sacral 1 using allograft chips. Arthrodesis Thoracic 10 to Lumbar 2 using allograft cancellous chips.     Paged by nursing for chest pain.    Talked to nurse who reports:  Patient developed intermittent sharp tightness in his chest that \"takes his breath away\" around 2130 this evening. In house physician was paged but has not arrived yet. /73, pulse 84. No hospitalist consulted for patient.     On exam patient has right hand to right elbow numbness and tingling. No left arm paresthesias. Strength 5/5 BUE/BLE. Reports BLE numbness since arriving to the unit (unsure if this is new or pre-surgical). Hill in place. Patient recently given oxycodone.    Hemovac drain documented for 375 mL between 4506-2342.  Ice currently on incision site. Dressing is not saturated and surrounding skin is not taught, per nurse.     Discussed patient with Dr. Marshall who recommends continued monitoring of patients neuro exam and to contact NSGY for any neurological changes or concerns.     Paola Henry PA-C  [facility]  [address] [place] [address]  [address] [place] [id number]                      "

## 2023-12-22 NOTE — ANESTHESIA POSTPROCEDURE EVALUATION
Patient: Neema Hunt    Procedure: Procedure(s):  Insertion Thoracic 10 to Lumbar 1 and revision of Lumbar 2 to Sacral 1 instrumentation. Dual Pelvic Instrumentation. Revision fusion Lumbar 5 to Sacral 1 using allograft chips. Arthrodesis Thoracic 10 to Lumbar 2 using allograft cancellous chips       Anesthesia Type:  General    Note:     Postop Pain Control: Uneventful            Sign Out: Well controlled pain   PONV: No   Neuro/Psych: Uneventful            Sign Out: Acceptable/Baseline neuro status   Airway/Respiratory: Uneventful            Sign Out: Acceptable/Baseline resp. status   CV/Hemodynamics: Uneventful            Sign Out: Acceptable CV status   Other NRE: NONE   DID A NON-ROUTINE EVENT OCCUR?            Last vitals:  Vitals Value Taken Time   /78 12/21/23 1945   Temp 37.3  C (99.1  F) 12/21/23 1915   Pulse 93 12/21/23 1946   Resp 14 12/21/23 1946   SpO2 99 % 12/21/23 1946   Vitals shown include unfiled device data.    Electronically Signed By: Amol Killian MD  December 21, 2023  7:46 PM

## 2023-12-22 NOTE — PLAN OF CARE
"Goal Outcome Evaluation:    Pt A&O x4.. Pt reported chest pain when taking deep breaths.Tingling and numbness in his upper extremities subsided, Pt reported new numbness that travels up his thighs, neurosurgery notified order was  \"continue to monitor\".  troponin came back negative and EKG completed. Tele sinus rhythm. Pt given scheduled  tylenol, atarax, prn 10mg po oxy and prn IV dilaudid with relief of back pain. Ice also applied. Dressing and drain CDI on back with scant drainage. Drain put out 100ml  "

## 2023-12-22 NOTE — PROGRESS NOTES
12/22/23 1200   Appointment Info   Signing Clinician's Name / Credentials (PT) Howard Richter DPT   Rehab Comments (PT) spinal precautions   Living Environment   People in Home spouse   Current Living Arrangements house   Home Accessibility stairs to enter home;stairs within home   Number of Stairs, Main Entrance 3   Stair Railings, Main Entrance none   Number of Stairs, Within Home, Primary greater than 10 stairs   Stair Railings, Within Home, Primary railing on left side (ascending)   Transportation Anticipated family or friend will provide   Living Environment Comments Pt lives in house with spouse who can assist as needed, pt's sister will be coming on Xmas and staying for some time to help as well, 3 FRANCIS then 12 steps to basement bedrom   Self-Care   Usual Activity Tolerance moderate   Current Activity Tolerance fair   Equipment Currently Used at Home cane, straight   Fall history within last six months yes   Number of times patient has fallen within last six months 12   General Information   Onset of Illness/Injury or Date of Surgery 12/21/23   Referring Physician Prasanna Oliva PA-C   Patient/Family Therapy Goals Statement (PT) go home   Pertinent History of Current Problem (include personal factors and/or comorbidities that impact the POC) Patient is a 58-year-old male with a history of multiple previous spinal operations with a known pseudoarthrosis at L5-S1 along with instability and the thoracic spine at the site of her recent laminectomy.  He was brought for revision of his fusion and extension to the lower thoracic spine. Following procedure performed on 12/21/2023, POD#1: Insertion Thoracic 10 to Lumbar 1 and revision of Lumbar 2 to Sacral 1 instrumentation. Dual Pelvic Instrumentation. Revision fusion Lumbar 5 to Sacral 1 using allograft chips. Arthrodesis Thoracic 10 to Lumbar 2 using allograft cancellous chips   Existing Precautions/Restrictions fall;spinal   Weight-Bearing Status -  LLE full weight-bearing   Weight-Bearing Status - RLE full weight-bearing   General Observations pt with catheter and hemovac   Cognition   Affect/Mental Status (Cognition) WNL   Orientation Status (Cognition) oriented x 4   Follows Commands (Cognition) WNL   Pain Assessment   Patient Currently in Pain Yes, see Vital Sign flowsheet  (7/10 spinal pain)   Integumentary/Edema   Integumentary/Edema Comments dressings over spinal areas appear CDI   Posture    Posture Forward head position   Range of Motion (ROM)   Range of Motion ROM deficits secondary to surgical procedure;ROM deficits secondary to pain;ROM deficits secondary to weakness   ROM Comment Deficits with spinal mobility secondary to post surgical pain and spinal precautions, otherwise appears grossly WFL   Strength (Manual Muscle Testing)   Strength (Manual Muscle Testing) Deficits observed during functional mobility   Strength Comments not formally assessed due to pain levels, pt presents with global weakness secondary to deconditioning and post surgical pain   Bed Mobility   Comment, (Bed Mobility) supne>sit CGA, sit>supine ModA   Transfers   Comment, (Transfers) sit<>stand with FWW Patria   Gait/Stairs (Locomotion)   Harding Level (Gait) contact guard   Assistive Device (Gait) walker, front-wheeled   Distance in Feet (Gait) 10'   Pattern (Gait) step-through   Deviations/Abnormal Patterns (Gait) antalgic;margaret decreased;festinating/shuffling;gait speed decreased;stride length decreased;weight shifting decreased   Balance   Balance Comments sitting balance good, standing balance with FWW good, deficits with dynamic balance durng mobility but can walk with FWW   Sensory Examination   Sensory Perception Comments pt reports bilat LE numbness in lower legs shin area R>L and upper anterior bilat thights   Coordination   Coordination Comments slow movements secondary to pain   Clinical Impression   Criteria for Skilled Therapeutic Intervention Yes, treatment  indicated   PT Diagnosis (PT) impaired gait and mobility   Influenced by the following impairments pain, deficits with ROM, strength, balance   Functional limitations due to impairments bed mobiity, transfers, amb, ADL's, steps   Clinical Presentation (PT Evaluation Complexity) stable   Clinical Presentation Rationale PMH and clinical judgement   Clinical Decision Making (Complexity) low complexity   Planned Therapy Interventions (PT) balance training;bed mobility training;cryotherapy;gait training;home exercise program;patient/family education;ROM (range of motion);stair training;strengthening;stretching;transfer training;progressive activity/exercise   Risk & Benefits of therapy have been explained evaluation/treatment results reviewed;care plan/treatment goals reviewed;risks/benefits reviewed;current/potential barriers reviewed;participants voiced agreement with care plan;participants included;patient   PT Total Evaluation Time   PT Eval, Low Complexity Minutes (99651) 10   Physical Therapy Goals   PT Frequency 2x/day   PT Predicted Duration/Target Date for Goal Attainment 12/25/23   PT Goals Bed Mobility;Transfers;Gait;Stairs   PT: Bed Mobility Supervision/stand-by assist;Supine to/from sit;Rolling;Bridging;Within precautions   PT: Transfers Supervision/stand-by assist;Sit to/from stand;Assistive device;Within precautions   PT: Gait Supervision/stand-by assist;Rolling walker;Within precautions;100 feet   PT: Stairs Minimal assist;10 stairs;Rail on left   Interventions   Interventions Quick Adds Gait Training;Therapeutic Activity;Therapeutic Procedure   Therapeutic Procedure/Exercise   Ther. Procedure: strength, endurance, ROM, flexibillity Minutes (27195) 4   Symptoms Noted During/After Treatment increased pain   Treatment Detail/Skilled Intervention With pt sitting EOB cues for following TE for ROM, muscle activation and circulatiom prior to walking: alternating heel/toe raises x 20, bilat LE marching x 20, LAQ  bilat x 5 each side with difficulty   Therapeutic Activity   Therapeutic Activities: dynamic activities to improve functional performance Minutes (88874) 12   Symptoms Noted During/After Treatment Increased pain   Treatment Detail/Skilled Intervention Greeted pt supine in bed, reviewed spinal precautions and educated on importance of OOB activity and to ambulate throughout the day as able with nursing. With HOB flat, pt performed logroll to L side and needed CGA and min cueing for sequencing, performed well as pt was familiar with maneuver. Time spent sittng EOB to acclimate, IND sitting balance. Pt performed STS x 5 with FWW and CGA, cues for set up and hand placement, pt performed slowly with increased pain. After walking in room with pt sitting on bed performed reverse logroll sit>supine with ModA on getting both legs into bed. With pt supine in bed, Patria to roll to R side to place ice packs along spine. Pt left supine in bed with call light in reach and bed alarm on. Pt educaed on therapist recommendation for using FWW at all times, especially since he was not using an AD at home at times when he had falls, pt agreeable   Gait Training   Gait Training Minutes (85246) 8   Symptoms Noted During/After Treatment (Gait Training) fatigue;increased pain   Treatment Detail/Skilled Intervention With pt standing EOB in FWW with CGA cues for slowly marching in place and mini-squats to acclimate to WB'ing. Then pt amb in room with FWW and CGA ~40' very slowly with short shuffled stepthrough pattern, fairly steady with no overt LOB. Cues for uprightt posture as able and forward gaze   Distance in Feet 40'   [place] Level (Gait Training) contact guard   Physical Assistance Level (Gait Training) 1 person assist   Weight Bearing (Gait Training) full weight-bearing   Assistive Device (Gait Training) rolling walker   PT Discharge Planning   PT Plan progress bed mobility, progress transfers, progress gait with FWW, trial  steps as able   PT Discharge Recommendation (DC Rec) home with assist;home with home care physical therapy   PT Rationale for DC Rec Pt is below baseline of being IND with mobility, currently limited by pain and activity tolerance after spinal surgery but was able to walk room distance with FWW and CGA. Pt lives with spouse and will have sister coming to stay on Xmas for a while to help as needed. Pt will need to navigate steps in hospital in order to return home safely. Anticipate pt to progress with improved pain control and be able to discharge home with assist as needed with mobility using a FWW at all times. Pt would benefit from HHPT to assess safety moving at home and address any remaining strength, balance, activity tolerance deficits   PT Brief overview of current status bed mobility CGA-ModA, STS with FWW CGA, amb with FWW CGA   PT Equipment Needed at Discharge   (pt has FWW, has cane, walk in shower, shower chair, vanity next to toilet (standard height).)   Total Session Time   Timed Code Treatment Minutes 24   Total Session Time (sum of timed and untimed services) 34

## 2023-12-22 NOTE — PROGRESS NOTES
[facility]    Neurosurgery Progress Note    Date of Service (when I saw the patient): 12/22/2023     Assessment & Plan     Procedure(s):  Insertion Thoracic 10 to Lumbar 1 and revision of Lumbar 2 to Sacral 1 instrumentation. Dual Pelvic Instrumentation. Revision fusion Lumbar 5 to Sacral 1 using allograft chips. Arthrodesis Thoracic 10 to Lumbar 2 using allograft cancellous chips   -1 Day Post-Op  Reporting incisional back pain as well as bilateral groin and bilateral thighs, bilateral knee pain, and right ankle pain.  Hemovac drain with 120 out overnight, hemoglobin 10.2 this morning.    Plan:  -New pain in groin and thigh likely related to positioning  -Pain management as needed  -Activity as tolerated appreciate assistance from therapies  -Routine wound care  -Continue Hemovac drain for now likely able to remove tomorrow  -Continue Hill for today, likely removed tomorrow    I have discussed the following assessment and plan Dr. Marshall who is in agreement with initial plan and will follow up with further consultation recommendations.    Caren Light CNP  [facility] Neurosurgery  [place] [address]    Tel [phone]  Text page via [employer] Paging/Directory    Interval History   Stable    Physical Exam   Temp: 99.2  F (37.3  C) Temp src: Oral BP: 113/72 Pulse: 95   Resp: 20 SpO2: 97 % O2 Device: None (Room air) Oxygen Delivery: 3 LPM  Vitals:    12/21/23 1008   Weight: 90.7 kg (200 lb)     Vital Signs with Ranges  Temp:  [98.6  F (37  C)-99.7  F (37.6  C)] 99.2  F (37.3  C)  Pulse:  [] 95  Resp:  [11-22] 20  BP: (113-144)/(72-94) 113/72  MAP:  [87 mmHg-98 mmHg] 87 mmHg  Arterial Line BP: ()/(76-82) 98/82  SpO2:  [94 %-100 %] 97 %  I/O last 3 completed shifts:  In: 3697.75 [I.V.:2509.75; Other:438]  Out: 4520 [Urine:3025; Drains:495; Blood:1000]     , Blood pressure 113/72, pulse 95, temperature 99.2  F  "(37.3  C), temperature source Oral, resp. rate 20, height 1.778 m (5' 10\"), weight 90.7 kg (200 lb), SpO2 97%.  200 lbs 0 oz    NEUROLOGICAL EXAMINATION:   Mental status:  Alert and Oriented x 3, speech is fluent.  Cranial nerves:  II-XII intact.   Motor:     Hip Flexor:                Right: 5/5  Left:  5/5  Hip Adductor:             Right:  5/5  Left:  5/5  Hip Abductor:             Right:  5/5  Left:  5/5  Gastroc Soleus:        Right:  5/5  Left:  5/5  Tib/Ant:                      Right:  5/5  Left:  5/5  EHL:                     Right:  5/5  Left:  5/5  Sensation:  intact    Incision with marked drainage, Hemovac drain intact.    Medications    sodium chloride 75 mL/hr at 12/21/23 2103      acetaminophen  975 mg Oral Q8H    amLODIPine  10 mg Oral Daily    And    lisinopril  20 mg Oral Daily    cyclobenzaprine  5 mg Oral TID    famotidine  20 mg Oral QPM    gabapentin  300 mg Oral At Bedtime    lidocaine  1 patch Transdermal Q24h    magnesium oxide  400 mg Oral Daily    multivitamin, therapeutic  1 tablet Oral QAM    polyethylene glycol  17 g Oral Daily    senna-docusate  1 tablet Oral BID    sodium chloride (PF)  3 mL Intracatheter Q8H       Data     CBC RESULTS:   Recent Labs   Lab Test 12/22/23  0303 12/21/23  1030   WBC  --  4.5   RBC  --  3.71*   HGB 10.2* 11.6*   HCT  --  33.6*   MCV  --  91   MCH  --  31.3   MCHC  --  34.5   RDW  --  13.5   PLT  --  212     Basic Metabolic Panel:  Lab Results   Component Value Date     12/21/2023     09/11/2019      Lab Results   Component Value Date    POTASSIUM 4.1 12/21/2023    POTASSIUM 4.1 07/26/2022    POTASSIUM 3.4 05/27/2021     Lab Results   Component Value Date    CHLORIDE 104 12/21/2023    CHLORIDE 102 09/11/2019     Lab Results   Component Value Date    JASON 9.2 12/21/2023    JASON 9.0 09/11/2019     Lab Results   Component Value Date    CO2 27 12/21/2023    CO2 29 09/11/2019     Lab Results   Component Value Date    BUN 14.1 12/21/2023    BUN 10 " 09/11/2019     Lab Results   Component Value Date    CR 0.73 12/21/2023    CR 0.77 07/15/2020     Lab Results   Component Value Date     12/22/2023     09/11/2019     INR:  Lab Results   Component Value Date    INR 1.02 12/21/2023    INR 1.13 11/09/2016    INR 1.11 04/09/2013    INR 1.17 02/22/2013    INR 1.07 08/16/2012

## 2023-12-22 NOTE — OR NURSING
Spoke with FABY Guevara about pt UE numbness.  Pt initially stated he had numbness at baseline, but as he emerged from anesthesia he stated this numbness was new.  L hand numbness has now resolved and pt states slight residual numbness in R hand.  No new orders at this time, will continue to monitor.

## 2023-12-22 NOTE — PROVIDER NOTIFICATION
"Provider Eric paged \"Pt reporting some sharp chest pains. He is reporting loss of breath when they occur. /73, pulse 84. CMS intact. Also reporting R arm tingling but just back from Lumbar fusion.\"   "

## 2023-12-22 NOTE — PROGRESS NOTES
"House [facility] brief note:    L apical chest pain suspect 2/2 MSK in setting of recent prolonged proned position.     Was contacted by nursing at 2222 noting \"pt reporting sharp chest pains, reporting loss of breath when they occur, /73, pulse 84, CMS intact\".  Presented to pt's bedside.  At time of arrival, pt lying in bed at 40 degrees, awake, alert, in no overt distress, on his cell phone.  Pt reports pinpoint, sharp pain near cardiac apex.  Pt notes difficulty taking deep breath due to pain.  Pt's lung sounds clear throughout, normal S1S2, abdomen soft, nondistended.  Pt's skin of area examined, possible area of circular erythema, ?pressure wound (noted pt recently had lumbar surgery requiring prolonged proned position, pressure from ?electrode as circular in nature).  Pt reports no associated nausea, diaphoresis or radiation of pain.    Interventions:  - Stat EKG - NSR, TWI leads V4-V6, not previously noted on 7/20/22 EKG  - Stat trop, will repeat x3 in setting of TWI noted  - Will place pt on telemetry overnight  - Will trial lidocaine patch on L chest  - If ongoing SOB, development of hypoxia or tachycardia, could consider stat CXR vs CT PE at that time     Latest Reference Range & Units 12/21/23 22:42 12/22/23 00:58 12/22/23 03:03   Troponin T, High Sensitivity <=22 ng/L 18 18 18     NADIR Steinberg, CNP  Hospitalist-[facility]TA[facility]  Hospitalist Service  Securely message with [employer] (more info)  Text page via [facility] Paging/Directory     Medical Decision Making       25 MINUTES SPENT BY ME on the date of service doing chart review, history, exam, documentation & further activities per the note.        "

## 2023-12-22 NOTE — PROGRESS NOTES
Paged by PACU nursing staff for concern of bilateral hand numbness around 7pm.       Talked to PACU nurse via telephone. She states patient reported right hand, then left hand, then bilateral, then only right hand numbness during her exams. The left hand numbness was located in the first 3 digits and the right hand numbness is in all 5 finger tips. Per PACU nurse, strength 5/5 BUE/BLE and no other neurological concerns.     Advised nurse to continue to monitor neuro exam and to notify for any neurological changes, continued hand numbness, or other concerns. Nurse verbalized understanding and agreement.     Paola Henry PA-C  [facility] Neurosurgery  [address] [place] [id number]

## 2023-12-22 NOTE — PROGRESS NOTES
MD Notification    Notified Person: MD      Notified Person Name: Paola Henry      Notification Date/Time:  12/22/2023  0309      Notification Interaction: Amcom page    Purpose of Notification: Pt stated numbness going upwards in his lower extremities    Orders Received:      Comments:

## 2023-12-22 NOTE — PLAN OF CARE
Goal Outcome Evaluation:    Pt A&O x4. He is drinking water and eating ice chips. Pt reported chest pain that takes his breath away as well as R arm tingling to his elbow and vicente leg numbness. Pt also sweating. Jostin ISLAS paged and assessed pt. EKG, Trop, and cardiac monitoring ordered. Pt reporting R arm tingling subsided as shift went on. Pt given scheduled flexeril and tylenol as well as 10mg po oxy with some relief of back pain. Ice also applied. Dressing and drain CDI on back with scant drainage. Drain put out 375ml.

## 2023-12-23 ENCOUNTER — APPOINTMENT (OUTPATIENT)
Dept: OCCUPATIONAL THERAPY | Facility: CLINIC | Age: 58
End: 2023-12-23
Attending: PHYSICIAN ASSISTANT
Payer: COMMERCIAL

## 2023-12-23 ENCOUNTER — APPOINTMENT (OUTPATIENT)
Dept: PHYSICAL THERAPY | Facility: CLINIC | Age: 58
End: 2023-12-23
Attending: NEUROLOGICAL SURGERY
Payer: COMMERCIAL

## 2023-12-23 LAB
ALBUMIN UR-MCNC: NEGATIVE MG/DL
APPEARANCE UR: CLEAR
BILIRUB UR QL STRIP: NEGATIVE
COLOR UR AUTO: ABNORMAL
GLUCOSE BLDC GLUCOMTR-MCNC: 134 MG/DL (ref 70–99)
GLUCOSE SERPL-MCNC: 113 MG/DL (ref 70–99)
GLUCOSE UR STRIP-MCNC: NEGATIVE MG/DL
HGB UR QL STRIP: NEGATIVE
KETONES UR STRIP-MCNC: NEGATIVE MG/DL
LEUKOCYTE ESTERASE UR QL STRIP: NEGATIVE
MUCOUS THREADS #/AREA URNS LPF: PRESENT /LPF
NITRATE UR QL: NEGATIVE
PH UR STRIP: 6 [PH] (ref 5–7)
RBC URINE: 0 /HPF
SP GR UR STRIP: 1.01 (ref 1–1.03)
UROBILINOGEN UR STRIP-MCNC: NORMAL MG/DL
WBC URINE: <1 /HPF

## 2023-12-23 PROCEDURE — 97165 OT EVAL LOW COMPLEX 30 MIN: CPT | Mod: GO

## 2023-12-23 PROCEDURE — 36415 COLL VENOUS BLD VENIPUNCTURE: CPT | Performed by: NEUROLOGICAL SURGERY

## 2023-12-23 PROCEDURE — 250N000013 HC RX MED GY IP 250 OP 250 PS 637: Performed by: PHYSICIAN ASSISTANT

## 2023-12-23 PROCEDURE — 97116 GAIT TRAINING THERAPY: CPT | Mod: GP

## 2023-12-23 PROCEDURE — 81001 URINALYSIS AUTO W/SCOPE: CPT | Performed by: INTERNAL MEDICINE

## 2023-12-23 PROCEDURE — 120N000001 HC R&B MED SURG/OB

## 2023-12-23 PROCEDURE — 250N000013 HC RX MED GY IP 250 OP 250 PS 637: Performed by: NURSE PRACTITIONER

## 2023-12-23 PROCEDURE — 97530 THERAPEUTIC ACTIVITIES: CPT | Mod: GP

## 2023-12-23 PROCEDURE — 97535 SELF CARE MNGMENT TRAINING: CPT | Mod: GO

## 2023-12-23 PROCEDURE — 250N000011 HC RX IP 250 OP 636: Mod: JZ | Performed by: PHYSICIAN ASSISTANT

## 2023-12-23 PROCEDURE — 82947 ASSAY GLUCOSE BLOOD QUANT: CPT | Performed by: NEUROLOGICAL SURGERY

## 2023-12-23 RX ADMIN — HYDROXYZINE HYDROCHLORIDE 25 MG: 25 TABLET, FILM COATED ORAL at 10:26

## 2023-12-23 RX ADMIN — OXYCODONE HYDROCHLORIDE 10 MG: 5 TABLET ORAL at 10:26

## 2023-12-23 RX ADMIN — HYDROMORPHONE HYDROCHLORIDE 0.2 MG: 0.2 INJECTION, SOLUTION INTRAMUSCULAR; INTRAVENOUS; SUBCUTANEOUS at 06:31

## 2023-12-23 RX ADMIN — DOCUSATE SODIUM 50 MG AND SENNOSIDES 8.6 MG 1 TABLET: 8.6; 5 TABLET, FILM COATED ORAL at 08:13

## 2023-12-23 RX ADMIN — ACETAMINOPHEN 975 MG: 325 TABLET, FILM COATED ORAL at 04:43

## 2023-12-23 RX ADMIN — Medication 400 MG: at 08:14

## 2023-12-23 RX ADMIN — OXYCODONE HYDROCHLORIDE 10 MG: 5 TABLET ORAL at 00:24

## 2023-12-23 RX ADMIN — ACETAMINOPHEN 975 MG: 325 TABLET, FILM COATED ORAL at 21:21

## 2023-12-23 RX ADMIN — CYCLOBENZAPRINE HYDROCHLORIDE 5 MG: 5 TABLET, FILM COATED ORAL at 21:22

## 2023-12-23 RX ADMIN — HYDROXYZINE HYDROCHLORIDE 25 MG: 25 TABLET, FILM COATED ORAL at 18:50

## 2023-12-23 RX ADMIN — POLYETHYLENE GLYCOL 3350 17 G: 17 POWDER, FOR SOLUTION ORAL at 08:12

## 2023-12-23 RX ADMIN — CYCLOBENZAPRINE HYDROCHLORIDE 5 MG: 5 TABLET, FILM COATED ORAL at 08:13

## 2023-12-23 RX ADMIN — OXYCODONE HYDROCHLORIDE 10 MG: 5 TABLET ORAL at 14:47

## 2023-12-23 RX ADMIN — LISINOPRIL 20 MG: 20 TABLET ORAL at 08:13

## 2023-12-23 RX ADMIN — ACETAMINOPHEN 975 MG: 325 TABLET, FILM COATED ORAL at 14:46

## 2023-12-23 RX ADMIN — CYCLOBENZAPRINE HYDROCHLORIDE 5 MG: 5 TABLET, FILM COATED ORAL at 14:46

## 2023-12-23 RX ADMIN — AMLODIPINE BESYLATE 10 MG: 10 TABLET ORAL at 08:13

## 2023-12-23 RX ADMIN — MULTIVITAMIN TABLET 1 TABLET: TABLET at 08:13

## 2023-12-23 RX ADMIN — GABAPENTIN 300 MG: 300 CAPSULE ORAL at 21:22

## 2023-12-23 RX ADMIN — OXYCODONE HYDROCHLORIDE 10 MG: 5 TABLET ORAL at 04:43

## 2023-12-23 RX ADMIN — OXYCODONE HYDROCHLORIDE 10 MG: 5 TABLET ORAL at 18:50

## 2023-12-23 RX ADMIN — OXYCODONE HYDROCHLORIDE 5 MG: 5 TABLET ORAL at 22:51

## 2023-12-23 RX ADMIN — FAMOTIDINE 20 MG: 20 TABLET ORAL at 21:21

## 2023-12-23 RX ADMIN — DOCUSATE SODIUM 50 MG AND SENNOSIDES 8.6 MG 1 TABLET: 8.6; 5 TABLET, FILM COATED ORAL at 21:22

## 2023-12-23 ASSESSMENT — ACTIVITIES OF DAILY LIVING (ADL)
ADLS_ACUITY_SCORE: 26
ADLS_ACUITY_SCORE: 26
ADLS_ACUITY_SCORE: 22
ADLS_ACUITY_SCORE: 26
ADLS_ACUITY_SCORE: 22
ADLS_ACUITY_SCORE: 22
ADLS_ACUITY_SCORE: 26
ADLS_ACUITY_SCORE: 22
ADLS_ACUITY_SCORE: 26
ADLS_ACUITY_SCORE: 26

## 2023-12-23 NOTE — PLAN OF CARE
Goal Outcome Evaluation:  Summary: PODX2 from Spinal surgery.     Orientation: A&Ox4.    Vitals/Tele: VSS on RA, tele soft sinus tachycardic 100s    IV Access/drains : PIV SL, Hemovac drain intact & patent.    Diet: reg    Mobility: not oob this shift,     GI/: fowler in placed, per MD Notes, will remove fowler am, UA sent due to high temp- see results     Wound/Skin: back incision with dressing covered    Consults: OT/PT    Discharge Plan: TBD  CMS intact Baseline BLE numbness  Pain managed with PRN Oxycodone x2, iv dilaudid x1, scheduled Tylenol.  See Flow sheets for assessment

## 2023-12-23 NOTE — PLAN OF CARE
Goal Outcome Evaluation:    Patient vital signs are at baseline: Yes, ex Temp. On RA  Patient able to ambulate as they were prior to admission or with assist devices provided by therapies during their stay:  No,  Reason:  Not OOB this shift  Patient MUST void prior to discharge:  No,  Reason:  Hill in place w/ adequate output  Patient able to tolerate oral intake:  Yes  Pain has adequate pain control using Oral analgesics:  Yes with oxy, tylenol, robaxin,   Does patient have an identified :  No,  Reason:  TBD  Has goal D/C date and time been discussed with patient:  No,  Reason:  TBD    A&Ox4. CMS intact ex numbness to BLE. Back dressing intact w/ minimum drainage. Hemovac drain intact & patent. Tele in place. PIV SL. Temperature elevated and provider on call notified. Order in place for blood & urine culture to r/o infectious etiology. Will continue to monitor.

## 2023-12-23 NOTE — PROGRESS NOTES
Patient vital signs are at baseline: Yes, RA. Tele NSR  Patient able to ambulate as they were prior to admission or with assist devices provided by therapies during their stay:  Yes, X1 GB/w  Patient MUST void prior to discharge:  No,  Reason:  Hill in place for retention  Patient able to tolerate oral intake:  Yes  Pain has adequate pain control using Oral analgesics:  Yes  Does patient have an identified :  No,  Reason:  TBD  Has goal D/C date and time been discussed with patient:  No,  Reason:  TBD    Patient Pt POD #1 from Spinal surgery, dresing with scant drain on the drain site.. Patient Is A&Ox4. CMS intact Baseline BLE numbness, On the previous shift, pt states with New onset of Numbness from knee and radiating to thigh and groin, NS aware. Pain managed with PRN Oxycodone. , Atarax, Tylenol. PIV SL. HMV in place. Will continue to monitor.

## 2023-12-23 NOTE — PROGRESS NOTES
[facility]     Neurosurgery Progress Note     Date of Service (when I saw the patient): 12/23/2023        Assessment & Plan  Procedure(s):  Insertion Thoracic 10 to Lumbar 1 and revision of Lumbar 2 to Sacral 1 instrumentation. Dual Pelvic Instrumentation. Revision fusion Lumbar 5 to Sacral 1 using allograft chips. Arthrodesis Thoracic 10 to Lumbar 2 using allograft cancellous chips   -2 Day Post-Op  Reporting incisional back pain as well as bilateral groin and bilateral thighs, bilateral knee pain.  States the pain is slightly better today than yesterday. Hemovac drain with 50 ml over last 8 hours.    On exam, sitting up in bed getting ready to work with therapy. Moving all four extremities well.  Incision saturated. Dressing change performed.  Hemovac drain removed in usual sterile fashion.     Plan:  -New pain in groin and thigh likely related to positioning  -Pain management as needed  -Activity as tolerated appreciate assistance from therapies  -Routine wound care  -PT/OT  -Encourage gait and ambulation     Discussed with Dr. Marshall.    Jailyn Enriquez, FLACOS  [facility] Neurosurgery  [address] [address]    Tel [phone]  Pager [phone]

## 2023-12-23 NOTE — PROGRESS NOTES
12/23/23 0900   Appointment Info   Signing Clinician's Name / Credentials (OT) Barbara Waldrop, OTR/L   Rehab Comments (OT) spinal precautions   Living Environment   People in Home spouse   Current Living Arrangements house   Home Accessibility stairs to enter home;stairs within home   Number of Stairs, Main Entrance 3   Stair Railings, Main Entrance none   Number of Stairs, Within Home, Primary greater than 10 stairs   Stair Railings, Within Home, Primary railing on left side (ascending)   Transportation Anticipated family or friend will provide   Living Environment Comments Pt lives in house with spouse who can assist as needed, pt's sister will be coming on Xmas and staying for some time to help as well, 3 FRANCIS then 12 steps to basement bedroom. pt has FWW, has cane, walk in shower, shower chair, vanity next to toilet (standard height).   Self-Care   Usual Activity Tolerance moderate   Current Activity Tolerance fair   Equipment Currently Used at Home cane, straight   Fall history within last six months yes   Number of times patient has fallen within last six months 12   Activity/Exercise/Self-Care Comment Ind ADLs baseline.   Instrumental Activities of Daily Living (IADL)   IADL Comments Pt does light cooking, pt drives.   General Information   Onset of Illness/Injury or Date of Surgery 12/21/23   Referring Physician Prasanna Oliva PA-C   Patient/Family Therapy Goal Statement (OT) To go home   Additional Occupational Profile Info/Pertinent History of Current Problem Per chart: Patient is a 58-year-old male with a history of multiple previous spinal operations with a known pseudoarthrosis at L5-S1 along with instability and the thoracic spine at the site of her recent laminectomy.  He was brought for revision of his fusion and extension to the lower thoracic spine. Following procedure performed on 12/21/2023, POD#1: Insertion Thoracic 10 to Lumbar 1 and revision of Lumbar 2 to Sacral 1 instrumentation.  Dual Pelvic Instrumentation. Revision fusion Lumbar 5 to Sacral 1 using allograft chips. Arthrodesis Thoracic 10 to Lumbar 2 using allograft cancellous chips   Existing Precautions/Restrictions fall;spinal   Cognitive Status Examination   Orientation Status orientation to person, place and time   Visual Perception   Visual Impairment/Limitations WFL;corrective lenses for reading   Sensory   Sensory Comments B feet/toes numbness, L shin numbness - baseline RLE numbness   Pain Assessment   Patient Currently in Pain   (6/10 pain in spine)   Range of Motion Comprehensive   Comment, General Range of Motion BUEs WFL   Strength Comprehensive (MMT)   Comment, General Manual Muscle Testing (MMT) Assessment BUEs WFL   Coordination   Upper Extremity Coordination No deficits were identified   Bed Mobility   Comment (Bed Mobility) Min A   Transfers   Transfers sit-stand transfer;toilet transfer   Sit-Stand Transfer   Sit/Stand Transfer Comments CGA   Toilet Transfer   Toilet Transfer Comments CGA to/from [facility] per clinical judgement   Balance   Balance Comments No overt LOB noted   Activities of Daily Living   BADL Assessment/Intervention lower body dressing   Lower Body Dressing Assessment/Training   Comment, (Lower Body Dressing) Min A   Clinical Impression   Criteria for Skilled Therapeutic Interventions Met (OT) Yes, treatment indicated   OT Diagnosis Decreased ind with I/ADLs   OT Problem List-Impairments impacting ADL problems related to;activity tolerance impaired;mobility;pain;post-surgical precautions   Assessment of Occupational Performance 3-5 Performance Deficits   Identified Performance Deficits dressing, bathing, toileting, IADLs   Planned Therapy Interventions (OT) ADL retraining;IADL retraining;transfer training   Clinical Decision Making Complexity (OT) problem focused assessment/low complexity   Risk & Benefits of therapy have been explained patient   OT Total Evaluation Time   OT Eval, Low Complexity Minutes  (27427) 10   OT Goals   Therapy Frequency (OT) Daily   OT Predicted Duration/Target Date for Goal Attainment 12/29/23   OT Goals Hygiene/Grooming;Upper Body Dressing;Lower Body Dressing;Toilet Transfer/Toileting   OT: Hygiene/Grooming supervision/stand-by assist;within precautions;while standing  (FWW)   OT: Upper Body Dressing Supervision/stand-by assist;within precautions   OT: Lower Body Dressing Supervision/stand-by assist;using adaptive equipment;within precautions  (FWW)   OT: Toilet Transfer/Toileting Supervision/stand-by assist;toilet transfer;cleaning and garment management;using adaptive equipment;within precautions  (FWW, to/from BSC)   Self-Care/Home Management   Self-Care/Home Mgmt/ADL, Compensatory, Meal Prep Minutes (71657) 25   Symptoms Noted During/After Treatment (Meal Preparation/Planning Training) increased pain   Treatment Detail/Skilled Intervention Pt greeted in supine, agreeable to OT. Pt reports 6/10 pain in back - saturated dressing, RN updated. Pt educated on spinal precautions/movement and lifting restrictions. Patient completes bed mobility supine to sitting EOB with Min A to advance BLEs using logroll technique to maintain precautions. Patient educated on LB dressing with use of AE to maintain no bending precautions, including use of reacher to complete LB dressing. Patient dons brief with Min A d/t catheter, FWW. Patient demonstrates sit > stand from EOB with CGA, FWW. Patient completes room level functional mobility to chair with CGA, increased time, FWW, to simulate functional BSC transfer. Pt sits in chair with CGA, VC to reach back for chair, FWW. Pt provided with AE handout - edu how to obtain reacher/sock aid. Patient up in chair with needs met, alarm set, items in reach.   OT Discharge Planning   OT Plan LB dressing AE, progress to toilet transfer, AE for toileting   OT Discharge Recommendation (DC Rec) home with assist;home with home care occupational therapy;Transitional Care  Facility   OT Rationale for DC Rec Pt functioning below baseline, primarily limited by pain s/p spinal sx, as well as, decreased mobility, precautions, impacting I/ADL ind. Pt resides with spouse who is able to provide I/ADL assist as needed. Pt currently Ax 1 for Bed > chair transfer with FWW, requires increased assist with I/ADLs. Anticipate with continued IP OT and pain/medical management, pt may progress for d/c home with assist for all I/ADLs, [facility] OT for home safety eval. If unable to receive this level of assist, rec skilled TCU for increased I/ADL ind.   OT Brief overview of current status See above   OT Equipment Needed at Discharge other (see comments)  (Will continue to assess- pt has reacher, walk in shower, shower chair, standard toilet)   Total Session Time   Timed Code Treatment Minutes 25   Total Session Time (sum of timed and untimed services) 35

## 2023-12-24 ENCOUNTER — APPOINTMENT (OUTPATIENT)
Dept: OCCUPATIONAL THERAPY | Facility: CLINIC | Age: 58
End: 2023-12-24
Attending: NEUROLOGICAL SURGERY
Payer: COMMERCIAL

## 2023-12-24 ENCOUNTER — APPOINTMENT (OUTPATIENT)
Dept: PHYSICAL THERAPY | Facility: CLINIC | Age: 58
End: 2023-12-24
Attending: NEUROLOGICAL SURGERY
Payer: COMMERCIAL

## 2023-12-24 PROBLEM — R33.9 URINARY RETENTION WITH INCOMPLETE BLADDER EMPTYING: Status: ACTIVE | Noted: 2023-12-24

## 2023-12-24 LAB
ALBUMIN UR-MCNC: NEGATIVE MG/DL
ANION GAP SERPL CALCULATED.3IONS-SCNC: 17 MMOL/L (ref 7–15)
APPEARANCE UR: CLEAR
ATRIAL RATE - MUSE: 79 BPM
BILIRUB UR QL STRIP: NEGATIVE
BUN SERPL-MCNC: 18.9 MG/DL (ref 6–20)
CALCIUM SERPL-MCNC: 8.5 MG/DL (ref 8.6–10)
CHLORIDE SERPL-SCNC: 94 MMOL/L (ref 98–107)
COLOR UR AUTO: NORMAL
CREAT SERPL-MCNC: 1.15 MG/DL (ref 0.67–1.17)
DEPRECATED HCO3 PLAS-SCNC: 20 MMOL/L (ref 22–29)
DIASTOLIC BLOOD PRESSURE - MUSE: NORMAL MMHG
EGFRCR SERPLBLD CKD-EPI 2021: 74 ML/MIN/1.73M2
ERYTHROCYTE [DISTWIDTH] IN BLOOD BY AUTOMATED COUNT: 13.4 % (ref 10–15)
FLUAV RNA SPEC QL NAA+PROBE: NEGATIVE
FLUBV RNA RESP QL NAA+PROBE: NEGATIVE
GLUCOSE SERPL-MCNC: 144 MG/DL (ref 70–99)
GLUCOSE UR STRIP-MCNC: NEGATIVE MG/DL
HCT VFR BLD AUTO: 25.1 % (ref 40–53)
HGB BLD-MCNC: 8.6 G/DL (ref 13.3–17.7)
HGB UR QL STRIP: NEGATIVE
INTERPRETATION ECG - MUSE: NORMAL
KETONES UR STRIP-MCNC: NEGATIVE MG/DL
LEUKOCYTE ESTERASE UR QL STRIP: NEGATIVE
MCH RBC QN AUTO: 31.4 PG (ref 26.5–33)
MCHC RBC AUTO-ENTMCNC: 34.3 G/DL (ref 31.5–36.5)
MCV RBC AUTO: 92 FL (ref 78–100)
NITRATE UR QL: NEGATIVE
P AXIS - MUSE: 59 DEGREES
PH UR STRIP: 5.5 [PH] (ref 5–7)
PLATELET # BLD AUTO: 144 10E3/UL (ref 150–450)
POTASSIUM SERPL-SCNC: 3.9 MMOL/L (ref 3.4–5.3)
PR INTERVAL - MUSE: 146 MS
QRS DURATION - MUSE: 90 MS
QT - MUSE: 392 MS
QTC - MUSE: 449 MS
R AXIS - MUSE: -21 DEGREES
RBC # BLD AUTO: 2.74 10E6/UL (ref 4.4–5.9)
RBC URINE: <1 /HPF
RSV RNA SPEC NAA+PROBE: NEGATIVE
SARS-COV-2 RNA RESP QL NAA+PROBE: NEGATIVE
SODIUM SERPL-SCNC: 131 MMOL/L (ref 135–145)
SP GR UR STRIP: 1.01 (ref 1–1.03)
SYSTOLIC BLOOD PRESSURE - MUSE: NORMAL MMHG
T AXIS - MUSE: 66 DEGREES
UROBILINOGEN UR STRIP-MCNC: NORMAL MG/DL
VENTRICULAR RATE- MUSE: 79 BPM
WBC # BLD AUTO: 10.3 10E3/UL (ref 4–11)
WBC URINE: 1 /HPF

## 2023-12-24 PROCEDURE — 120N000001 HC R&B MED SURG/OB

## 2023-12-24 PROCEDURE — 250N000013 HC RX MED GY IP 250 OP 250 PS 637: Performed by: PHYSICIAN ASSISTANT

## 2023-12-24 PROCEDURE — 81001 URINALYSIS AUTO W/SCOPE: CPT | Performed by: INTERNAL MEDICINE

## 2023-12-24 PROCEDURE — 258N000003 HC RX IP 258 OP 636: Performed by: INTERNAL MEDICINE

## 2023-12-24 PROCEDURE — 0SG307J FUSION OF LUMBOSACRAL JOINT WITH AUTOLOGOUS TISSUE SUBSTITUTE, POSTERIOR APPROACH, ANTERIOR COLUMN, OPEN APPROACH: ICD-10-PCS | Performed by: INTERNAL MEDICINE

## 2023-12-24 PROCEDURE — 87040 BLOOD CULTURE FOR BACTERIA: CPT | Performed by: PHYSICIAN ASSISTANT

## 2023-12-24 PROCEDURE — 250N000011 HC RX IP 250 OP 636: Mod: JZ | Performed by: PHYSICIAN ASSISTANT

## 2023-12-24 PROCEDURE — 97535 SELF CARE MNGMENT TRAINING: CPT | Mod: GO

## 2023-12-24 PROCEDURE — 8E0WXBZ COMPUTER ASSISTED PROCEDURE OF TRUNK REGION: ICD-10-PCS | Performed by: INTERNAL MEDICINE

## 2023-12-24 PROCEDURE — XRGE058 FUSION OF RIGHT SACROILIAC JOINT USING INTERNAL FIXATION DEVICE WITH TULIP CONNECTOR, OPEN APPROACH, NEW TECHNOLOGY GROUP 8: ICD-10-PCS | Performed by: INTERNAL MEDICINE

## 2023-12-24 PROCEDURE — 85027 COMPLETE CBC AUTOMATED: CPT | Performed by: INTERNAL MEDICINE

## 2023-12-24 PROCEDURE — 80048 BASIC METABOLIC PNL TOTAL CA: CPT | Performed by: INTERNAL MEDICINE

## 2023-12-24 PROCEDURE — 36415 COLL VENOUS BLD VENIPUNCTURE: CPT | Performed by: PHYSICIAN ASSISTANT

## 2023-12-24 PROCEDURE — 250N000011 HC RX IP 250 OP 636: Performed by: NEUROLOGICAL SURGERY

## 2023-12-24 PROCEDURE — 0RGA071 FUSION OF THORACOLUMBAR VERTEBRAL JOINT WITH AUTOLOGOUS TISSUE SUBSTITUTE, POSTERIOR APPROACH, POSTERIOR COLUMN, OPEN APPROACH: ICD-10-PCS | Performed by: INTERNAL MEDICINE

## 2023-12-24 PROCEDURE — 0RG7071 FUSION OF 2 TO 7 THORACIC VERTEBRAL JOINTS WITH AUTOLOGOUS TISSUE SUBSTITUTE, POSTERIOR APPROACH, POSTERIOR COLUMN, OPEN APPROACH: ICD-10-PCS | Performed by: INTERNAL MEDICINE

## 2023-12-24 PROCEDURE — 87637 SARSCOV2&INF A&B&RSV AMP PRB: CPT | Performed by: INTERNAL MEDICINE

## 2023-12-24 PROCEDURE — 250N000013 HC RX MED GY IP 250 OP 250 PS 637: Performed by: INTERNAL MEDICINE

## 2023-12-24 PROCEDURE — 99254 IP/OBS CNSLTJ NEW/EST MOD 60: CPT | Performed by: INTERNAL MEDICINE

## 2023-12-24 PROCEDURE — 0SP004Z REMOVAL OF INTERNAL FIXATION DEVICE FROM LUMBAR VERTEBRAL JOINT, OPEN APPROACH: ICD-10-PCS | Performed by: INTERNAL MEDICINE

## 2023-12-24 PROCEDURE — 0SG0071 FUSION OF LUMBAR VERTEBRAL JOINT WITH AUTOLOGOUS TISSUE SUBSTITUTE, POSTERIOR APPROACH, POSTERIOR COLUMN, OPEN APPROACH: ICD-10-PCS | Performed by: INTERNAL MEDICINE

## 2023-12-24 PROCEDURE — 97116 GAIT TRAINING THERAPY: CPT | Mod: GP

## 2023-12-24 PROCEDURE — 258N000003 HC RX IP 258 OP 636: Performed by: PHYSICIAN ASSISTANT

## 2023-12-24 PROCEDURE — XRGF058 FUSION OF LEFT SACROILIAC JOINT USING INTERNAL FIXATION DEVICE WITH TULIP CONNECTOR, OPEN APPROACH, NEW TECHNOLOGY GROUP 8: ICD-10-PCS | Performed by: INTERNAL MEDICINE

## 2023-12-24 RX ORDER — PIPERACILLIN SODIUM, TAZOBACTAM SODIUM 4; .5 G/20ML; G/20ML
4.5 INJECTION, POWDER, LYOPHILIZED, FOR SOLUTION INTRAVENOUS EVERY 6 HOURS
Status: DISCONTINUED | OUTPATIENT
Start: 2023-12-24 | End: 2023-12-26

## 2023-12-24 RX ORDER — TAMSULOSIN HYDROCHLORIDE 0.4 MG/1
0.4 CAPSULE ORAL DAILY
Status: DISCONTINUED | OUTPATIENT
Start: 2023-12-24 | End: 2023-12-24

## 2023-12-24 RX ORDER — PIPERACILLIN SODIUM, TAZOBACTAM SODIUM 4; .5 G/20ML; G/20ML
4.5 INJECTION, POWDER, LYOPHILIZED, FOR SOLUTION INTRAVENOUS EVERY 6 HOURS
Status: DISCONTINUED | OUTPATIENT
Start: 2023-12-24 | End: 2023-12-24

## 2023-12-24 RX ORDER — TAMSULOSIN HYDROCHLORIDE 0.4 MG/1
CAPSULE ORAL
Status: DISPENSED
Start: 2023-12-24 | End: 2023-12-25

## 2023-12-24 RX ORDER — TAMSULOSIN HYDROCHLORIDE 0.4 MG/1
0.4 CAPSULE ORAL EVERY EVENING
Status: DISCONTINUED | OUTPATIENT
Start: 2023-12-25 | End: 2023-12-24

## 2023-12-24 RX ORDER — PIPERACILLIN SODIUM, TAZOBACTAM SODIUM 4; .5 G/20ML; G/20ML
INJECTION, POWDER, LYOPHILIZED, FOR SOLUTION INTRAVENOUS
Status: DISPENSED
Start: 2023-12-24 | End: 2023-12-25

## 2023-12-24 RX ORDER — SODIUM CHLORIDE 9 MG/ML
INJECTION, SOLUTION INTRAVENOUS CONTINUOUS
Status: ACTIVE | OUTPATIENT
Start: 2023-12-24 | End: 2023-12-25

## 2023-12-24 RX ORDER — POLYETHYLENE GLYCOL 3350 17 G/17G
17 POWDER, FOR SOLUTION ORAL 2 TIMES DAILY
Status: DISCONTINUED | OUTPATIENT
Start: 2023-12-24 | End: 2023-12-26 | Stop reason: HOSPADM

## 2023-12-24 RX ORDER — TAMSULOSIN HYDROCHLORIDE 0.4 MG/1
0.4 CAPSULE ORAL 2 TIMES DAILY
Status: COMPLETED | OUTPATIENT
Start: 2023-12-24 | End: 2023-12-25

## 2023-12-24 RX ADMIN — DOCUSATE SODIUM 50 MG AND SENNOSIDES 8.6 MG 1 TABLET: 8.6; 5 TABLET, FILM COATED ORAL at 20:43

## 2023-12-24 RX ADMIN — POLYETHYLENE GLYCOL 3350 17 G: 17 POWDER, FOR SOLUTION ORAL at 08:42

## 2023-12-24 RX ADMIN — METHOCARBAMOL TABLETS 750 MG: 750 TABLET, COATED ORAL at 10:41

## 2023-12-24 RX ADMIN — SODIUM CHLORIDE: 9 INJECTION, SOLUTION INTRAVENOUS at 20:44

## 2023-12-24 RX ADMIN — METHOCARBAMOL TABLETS 750 MG: 750 TABLET, COATED ORAL at 23:32

## 2023-12-24 RX ADMIN — LISINOPRIL 20 MG: 20 TABLET ORAL at 08:38

## 2023-12-24 RX ADMIN — CYCLOBENZAPRINE HYDROCHLORIDE 5 MG: 5 TABLET, FILM COATED ORAL at 14:59

## 2023-12-24 RX ADMIN — TAMSULOSIN HYDROCHLORIDE 0.4 MG: 0.4 CAPSULE ORAL at 20:43

## 2023-12-24 RX ADMIN — OXYCODONE HYDROCHLORIDE 5 MG: 5 TABLET ORAL at 03:27

## 2023-12-24 RX ADMIN — ACETAMINOPHEN 650 MG: 325 TABLET, FILM COATED ORAL at 15:59

## 2023-12-24 RX ADMIN — HYDROMORPHONE HYDROCHLORIDE 0.4 MG: 0.2 INJECTION, SOLUTION INTRAMUSCULAR; INTRAVENOUS; SUBCUTANEOUS at 00:50

## 2023-12-24 RX ADMIN — AMLODIPINE BESYLATE 10 MG: 10 TABLET ORAL at 08:39

## 2023-12-24 RX ADMIN — ACETAMINOPHEN 650 MG: 325 TABLET, FILM COATED ORAL at 23:32

## 2023-12-24 RX ADMIN — PIPERACILLIN AND TAZOBACTAM 4.5 G: 4; .5 INJECTION, POWDER, FOR SOLUTION INTRAVENOUS at 21:35

## 2023-12-24 RX ADMIN — OXYCODONE HYDROCHLORIDE 5 MG: 5 TABLET ORAL at 20:43

## 2023-12-24 RX ADMIN — ACETAMINOPHEN 975 MG: 325 TABLET, FILM COATED ORAL at 14:59

## 2023-12-24 RX ADMIN — CYCLOBENZAPRINE HYDROCHLORIDE 5 MG: 5 TABLET, FILM COATED ORAL at 20:43

## 2023-12-24 RX ADMIN — SODIUM CHLORIDE 500 ML: 9 INJECTION, SOLUTION INTRAVENOUS at 16:27

## 2023-12-24 RX ADMIN — SODIUM CHLORIDE: 9 INJECTION, SOLUTION INTRAVENOUS at 17:43

## 2023-12-24 RX ADMIN — OXYCODONE HYDROCHLORIDE 10 MG: 5 TABLET ORAL at 10:41

## 2023-12-24 RX ADMIN — GABAPENTIN 300 MG: 300 CAPSULE ORAL at 21:35

## 2023-12-24 RX ADMIN — ACETAMINOPHEN 975 MG: 325 TABLET, FILM COATED ORAL at 05:30

## 2023-12-24 RX ADMIN — DOCUSATE SODIUM 50 MG AND SENNOSIDES 8.6 MG 1 TABLET: 8.6; 5 TABLET, FILM COATED ORAL at 08:38

## 2023-12-24 RX ADMIN — Medication 400 MG: at 08:38

## 2023-12-24 RX ADMIN — FAMOTIDINE 20 MG: 20 TABLET ORAL at 20:43

## 2023-12-24 RX ADMIN — MULTIVITAMIN TABLET 1 TABLET: TABLET at 08:39

## 2023-12-24 RX ADMIN — POLYETHYLENE GLYCOL 3350 17 G: 17 POWDER, FOR SOLUTION ORAL at 20:44

## 2023-12-24 RX ADMIN — TAMSULOSIN HYDROCHLORIDE 0.4 MG: 0.4 CAPSULE ORAL at 08:38

## 2023-12-24 RX ADMIN — METHOCARBAMOL TABLETS 750 MG: 750 TABLET, COATED ORAL at 01:19

## 2023-12-24 RX ADMIN — CYCLOBENZAPRINE HYDROCHLORIDE 5 MG: 5 TABLET, FILM COATED ORAL at 08:38

## 2023-12-24 ASSESSMENT — ACTIVITIES OF DAILY LIVING (ADL)
ADLS_ACUITY_SCORE: 26

## 2023-12-24 NOTE — PROGRESS NOTES
Patient vital signs are at baseline: Yes, RA. Tele NSR  Patient able to ambulate as they were prior to admission or with assist devices provided by therapies during their stay:  Yes, X1 GB/w. Pt needs encouragment to ambulate, was able to ambulate in the room  Patient MUST void prior to discharge:  No,  Reason:  Hill removed at 1500 due to void, Pt Bladder scan for 0mL  Patient able to tolerate oral intake:  Yes  Pain has adequate pain control using Oral analgesics:  Yes  Does patient have an identified :  No,  Reason:  TBD  Has goal D/C date and time been discussed with patient:  No,  Reason:  TBD     Patient Pt POD #2 from Spinal surgery, New dressing in place DCI. Patient Is A&Ox4. CMS intact Baseline BLE numbness.  Pain managed with PRN Oxycodone , Atarax, Tylenol. PIV SL.  Will continue to monitor

## 2023-12-24 NOTE — PROGRESS NOTES
Orientation: Aox4    Vitals: VSS, RA    Tele: NSR    IV Access/drains: PIV SL    Diet: Regular    Mobility: AX1 GB/Walker    GI/: Due to void, straight cath. 600 ml, no Bm this shift.    Wound/Skin: Surgical incision CDI    Discharge Plan: TBD      See Flow sheets for assessment

## 2023-12-24 NOTE — PROGRESS NOTES
A&Ox4. VSS/RA except temperature elevated- tylenol given. A of 1 gb/w. Dressing CDI. Due to void bladder scan 171. Pain managed with oxy. Tele sinus tachy. Baseline numbness BLE.

## 2023-12-24 NOTE — PROGRESS NOTES
[facility]     Neurosurgery Progress Note     Date of Service (when I saw the patient): 12/23/2023        Assessment & Plan  Procedure(s):  Insertion Thoracic 10 to Lumbar 1 and revision of Lumbar 2 to Sacral 1 instrumentation. Dual Pelvic Instrumentation. Revision fusion Lumbar 5 to Sacral 1 using allograft chips. Arthrodesis Thoracic 10 to Lumbar 2 using allograft cancellous chips   -3 Day Post-Op  Reporting incisional back pain as well as left sided groin/thigh pain.  States he's feeling better today than yesterday.     Hill catheter removed yesterday, urinary retention requiring straight cath X1  On exam, sleeping upon entering room.  Incision C/D/I with dressing in place.  Moving all four extremities well.         Plan:  -Flomax for urinary retention.  Monitor urine output.  -Pain management as needed  -Activity as tolerated appreciate assistance from therapies  -Routine wound care  -PT/OT  -Encourage gait and ambulation  -Likely home tomorrow.    Jailyn Enriquez MPAS  [facility] Neurosurgery  [address]  [address] [place] [id number]    Tel [phone]  Pager [phone]

## 2023-12-24 NOTE — CONSULTS
[facility]  Consult Note - Hospitalist Service  Date of Admission:  12/21/2023  Consult Requested by:  Jailyn Enriquez PA-C  Reason for Consult:  Fever, Hypotension     Assessment & Plan   Neema Hunt is a 58 year old male admitted on 12/21/2023.     He  is POD#3 Insertion Thoracic 10 to Lumbar 1 and revision of Lumbar 2 to Sacral 1 instrumentation. Dual Pelvic Instrumentation. Revision fusion Lumbar 5 to Sacral 1 using allograft chips. Arthrodesis Thoracic 10 to Lumbar 2 using allograft cancellous chips        Past 48 hours, his recovery is complicated by recurrent fevers and low blood pressure.  This evening, tachycardia.     Urinary Retention   Due to anesthesia, pain medication, and likely some baseline BPH.   Will straight cath now: Place fowler if > 500  Urinary Retention can contribute to symptoms he is having.    Re evaluate after bladder is emptied tonight.   >>> PVR was 1000 cc.    He declines fowler.   Agreeable to PVR bid with teaching self cath for home (in case needed).     Will increase the tamsulosin to bid for next 3 doses:    on 12/26 should look at this order again, and decide if resume DAILY dosing.      Constipation   Due to anesthesia, pain medication  He is on aggressive bowel regimen.   Will give mom tonight  Will increase miralax to bid for now.       Fever//Tachy   Holding BP meds for now (lotrel equivalent: lisinopril and amlodipine)  IVF to resume at 100 cc/hour X 1 liter after 500 cc bolus.  Then re asses tomorrow (he is eating well)   Start broad spectrum Abx empirically, reassess tomorrow.       >Wound does not look infected.   > Check UA (negative)    Check CXR (still pending tonight)    Check CBC (see below)...  Viral panel (negative) .     Post Op Anemia  Noted later tonight:   Hgb 8.6 today.   Will recheck in am.  Could be some hemodilution due to bolus.   Has not had symptoms of bleeding (pain in flank or abdomen)  Recheck Hgb, if trend continues  "to decrease, consider imaging for blood loss, PRBCs if appropriate.     NOTE:  Patient reports his son is Sickle Cell Trait.   Patient has h/o mild/mod anemia.  Although not diagnosed, he suspects he may have Sickle Cell Trait too.            Clinically Significant Risk Factors                         # Overweight: Estimated body mass index is 28.7 kg/m  as calculated from the following:    Height as of this encounter: 1.778 m (5' 10\").    Weight as of this encounter: 90.7 kg (200 lb)., PRESENT ON ADMISSION            Paige Holland MD  Hospitalist Service  Securely message with Vocera (more info)  Text page via [facility] Paging/Directory   ______________________________________________________________________    Chief Complaint   Fever, tachycardia.       History of Present Illness   Neema Hunt is a 58 year old male who is POD#3 Insertion Thoracic 10 to Lumbar 1 and revision of Lumbar 2 to Sacral 1 instrumentation. Dual Pelvic Instrumentation. Revision fusion Lumbar 5 to Sacral 1 using allograft chips. Arthrodesis Thoracic 10 to Lumbar 2 using allograft cancellous chips    > Got a dose of Ancef  rubén operatively.   > Surgical drain removed yesterday  >ADAT today, has good appetite.   > Participating in physical therapy     Past 48 hours, his recovery is complicated by recurrent fevers and low blood pressure.  This evening, tachycardia (120-130 at rest) .   History is obtained from the patient and electronic health record  Neema denies any cough, SOB.   Some more pain at incision site, he attributes to physical therapy session  When asked, he feels he is retaining urine.   Feels he is constipated.      Past Medical History    Past Medical History:   Diagnosis Date    Arthritis     Back pain     Gastro-oesophageal reflux disease     Hypertension     Radiculopathy     Scoliosis        Past Surgical History   Past Surgical History:   Procedure Laterality Date    DAVINCI XI HERNIORRHAPHY INGUINAL Bilateral " 7/26/2022    Procedure: Robotic repair of recurrent right inguinal hernia with placement of mesh, robotic repair of left inguinal hernia with placement of mesh;  Surgeon: Mary Morales MD;  Location:  OR    DAVINCI XI HERNIORRHAPHY VENTRAL N/A 7/26/2022    Procedure: Robotic repair of umbilical hernia with placement of mesh;  Surgeon: Mary Morales MD;  Location: [facility] OR    DISCECTOMY LUMBAR POSTERIOR MICROSCOPIC ONE LEVEL Right 7/15/2020    Procedure: Right L5-S1 foraminotomy and microdiskectomy;  Surgeon: Nhan Marshall MD;  Location:  OR    EXPLORE SPINE, REMOVE HARDWARE, COMBINED N/A 9/5/2019    Procedure: REMOVAL LUMBAR L3-5 INSTRUMENTATION;  Surgeon: Nhan Marshall MD;  Location: [facility] OR    FUSION SPINE ANTERIOR MINIMALLY INVASIVE TWO LEVELS N/A 11/16/2016    Procedure: FUSION SPINE ANTERIOR MINIMALLY INVASIVE TWO LEVELS;  Surgeon: Nhan Marshall MD;  Location: [facility] OR    FUSION, SPINE, LUMBAR, 1 LEVEL, POSTERIOR APPROACH, ROBOTIC-ASSISTED N/A 5/27/2021    Procedure: Lumbar 5-Sacral 1 posterior segemental instrumentation, bilateral decompression and transforaminal interbody fusion using local autograft and allograft cancellous chips. Revision of Lumbar 2-4 ian. Posterior arthrodesis Lumbar 5-Sacral 1 using  local autograft and allograft cancellous chips;  Surgeon: Nhan Marshall MD;  Location:  OR    HERNIA REPAIR      right inguinal hernia    LAMINECTOMY LUMBAR POSTERIOR MICROSCOPIC ONE LEVEL  4/9/2013    Procedure: LAMINECTOMY LUMBAR POSTERIOR MICROSCOPIC ONE LEVEL;  Right Lumbar 3-4 Micro Laminectomy & Foraminotomy;  Surgeon: Nhan Marshall MD;  Location: [facility] OR    LAMINECTOMY THORACIC ONE LEVEL N/A 9/27/2023    Procedure: Thoracic 11 to thoracic 12 laminectomy;  Surgeon: Zane Ontiveros MD;  Location:  OR    OPTICAL TRACKING SYSTEM FUSION SPINE POSTERIOR LUMBAR PERCUTANEOUS TWO LEVELS N/A 11/16/2016    Procedure: OPTICAL TRACKING SYSTEM FUSION SPINE POSTERIOR LUMBAR  PERCUTANEOUS TWO LEVELS;  Surgeon: Nhan Marshall MD;  Location:  OR    OPTICAL TRACKING SYSTEM FUSION SPINE POSTERIOR LUMBAR THREE+ LEVELS Right 9/5/2019    Procedure: POSTERIOR SEGMENTAL INSTRUMENTATION L2-4, RIGHT LUMBAR 2-3 DISCECTOMY AND TRANSFORAMINAL INTERBODY FUSION, REDO DECOMPRESSION RIGHT L3-4, POSTERIOR ARTHRODESIS L2-4;  Surgeon: Nhan Marshall MD;  Location:  OR    OPTICAL TRACKING SYSTEM FUSION SPINE POSTERIOR LUMBAR THREE+ LEVELS N/A 12/21/2023    Procedure: Insertion Thoracic 10 to Lumbar 1 and revision of Lumbar 2 to Sacral 1 instrumentation. Dual Pelvic Instrumentation. Revision fusion Lumbar 5 to Sacral 1 using allograft chips. Arthrodesis Thoracic 10 to Lumbar 2 using allograft cancellous chips;  Surgeon: Nhan Marshall MD;  Location:  OR    ORTHOPEDIC SURGERY      left ankle, right finger       Medications   Current Facility-Administered Medications   Medication    acetaminophen (TYLENOL) tablet 650 mg    [Held by provider] amLODIPine (NORVASC) tablet 10 mg    And    [Held by provider] lisinopril (ZESTRIL) tablet 20 mg    benzocaine-menthol (CHLORASEPTIC) 6-10 MG lozenge 1 lozenge    bisacodyl (DULCOLAX) suppository 10 mg    calcium carbonate (TUMS) chewable tablet 500 mg    cyclobenzaprine (FLEXERIL) tablet 5 mg    famotidine (PEPCID) tablet 20 mg    gabapentin (NEURONTIN) capsule 300 mg    HYDROmorphone (DILAUDID) injection 0.2 mg    Or    HYDROmorphone (DILAUDID) injection 0.4 mg    hydrOXYzine HCl (ATARAX) tablet 25 mg    Lidocaine (LIDOCARE) 4 % Patch 1 patch    Lidocaine (LIDOCARE) 4 % Patch 1 patch    lidocaine (LMX4) cream    lidocaine 1 % 0.1-1 mL    magnesium hydroxide (MILK OF MAGNESIA) suspension 30 mL    magnesium oxide (MAG-OX) tablet 400 mg    methocarbamol (ROBAXIN) tablet 750 mg    multivitamin, therapeutic (THERA-VIT) tablet 1 tablet    naloxone (NARCAN) injection 0.2 mg    Or    naloxone (NARCAN) injection 0.4 mg    Or    naloxone (NARCAN) injection  0.2 mg    Or    naloxone (NARCAN) injection 0.4 mg    ondansetron (ZOFRAN ODT) ODT tab 4 mg    Or    ondansetron (ZOFRAN) injection 4 mg    oxyCODONE (ROXICODONE) tablet 5 mg    Or    oxyCODONE (ROXICODONE) tablet 10 mg    polyethylene glycol (MIRALAX) Packet 17 g    prochlorperazine (COMPAZINE) injection 10 mg    Or    prochlorperazine (COMPAZINE) tablet 10 mg    senna-docusate (SENOKOT-S/PERICOLACE) 8.6-50 MG per tablet 1 tablet    sodium chloride (PF) 0.9% PF flush 3 mL    sodium chloride (PF) 0.9% PF flush 3 mL    [START ON 12/25/2023] tamsulosin (FLOMAX) capsule 0.4 mg    tetrahydrozoline (VISINE) 0.05 % ophthalmic solution 1 drop          Social History   I have reviewed this patient's social history and updated it with pertinent information if needed.  Social History     Tobacco Use    Smoking status: Never    Smokeless tobacco: Never   Vaping Use    Vaping Use: Never used   Substance Use Topics    Alcohol use: Yes     Comment: 3-4 drinks/week    Drug use: No         Allergies   No Known Allergies     Physical Exam   Vital Signs: Temp: (!) (P) 101.6  F (38.7  C) Temp src: (P) Oral BP: (!) (P) 86/51 Pulse: (!) (P) 127   Resp: (P) 16 SpO2: (P) 95 % O2 Device: (P) None (Room air)    Weight: 200 lbs 0 oz    General:  NAD.   Eating a light dinner   ENT: normocepalic, without obvious abnormality  Respiratory: No increased work of breathing, good air exchange, clear to auscultation bilaterally, no crackles or wheezing  Cardiovascular: Normal apical impulse, tachycardic with regular rhythm,  normal S1 and S2, no S3 or S4, and no murmur noted,    no edema  GI: No scars, normal bowel sounds, soft, non-distended, non-tender, no masses palpated, no hepatosplenomegally and firm bladder area.     Musculoskeletal: well developed.   no lower extremity pitting edema present  there is no redness, warmth, or swelling of the joints  Thoracic/upper lumbar midline wound is dressed, no significant drainage (small 1 cm area of  drainage since drain removed at lower end of dressing).  No cellulitis demonstrated.     Neurologic: Awake, alert, oriented to name, place and time.  Cranial nerves II-XII are grossly intact.  Motor is 5 out of 5 bilaterally.   Neuropsychiatric: General: normal, calm, and normal eye contact  Affect: normal and pleasant  Memory and insight: normal, memory for past and recent events intact, and thought process normal    Medical Decision Making       75 MINUTES SPENT BY ME on the date of service doing chart review, history, exam, documentation & further activities per the note.      Data           FULL SET OF LABS, VIRAL PANEL, CXR, UA,    TONIGHT, there is EPIC ISSUE:   the interface for all results and vitals entry is malfunctioning.    Needing to manually call each department for results.

## 2023-12-25 ENCOUNTER — APPOINTMENT (OUTPATIENT)
Dept: GENERAL RADIOLOGY | Facility: CLINIC | Age: 58
End: 2023-12-25
Attending: INTERNAL MEDICINE
Payer: COMMERCIAL

## 2023-12-25 ENCOUNTER — APPOINTMENT (OUTPATIENT)
Dept: PHYSICAL THERAPY | Facility: CLINIC | Age: 58
End: 2023-12-25
Attending: NEUROLOGICAL SURGERY
Payer: COMMERCIAL

## 2023-12-25 LAB
ERYTHROCYTE [DISTWIDTH] IN BLOOD BY AUTOMATED COUNT: 13.2 % (ref 10–15)
HCT VFR BLD AUTO: 24.7 % (ref 40–53)
HGB BLD-MCNC: 8.7 G/DL (ref 13.3–17.7)
MCH RBC QN AUTO: 32 PG (ref 26.5–33)
MCHC RBC AUTO-ENTMCNC: 35.2 G/DL (ref 31.5–36.5)
MCV RBC AUTO: 91 FL (ref 78–100)
PLATELET # BLD AUTO: 180 10E3/UL (ref 150–450)
RBC # BLD AUTO: 2.72 10E6/UL (ref 4.4–5.9)
WBC # BLD AUTO: 9.5 10E3/UL (ref 4–11)

## 2023-12-25 PROCEDURE — 250N000013 HC RX MED GY IP 250 OP 250 PS 637: Performed by: INTERNAL MEDICINE

## 2023-12-25 PROCEDURE — 97116 GAIT TRAINING THERAPY: CPT | Mod: GP | Performed by: PHYSICAL THERAPIST

## 2023-12-25 PROCEDURE — 250N000011 HC RX IP 250 OP 636: Performed by: NEUROLOGICAL SURGERY

## 2023-12-25 PROCEDURE — 99231 SBSQ HOSP IP/OBS SF/LOW 25: CPT | Performed by: INTERNAL MEDICINE

## 2023-12-25 PROCEDURE — 250N000013 HC RX MED GY IP 250 OP 250 PS 637: Performed by: PHYSICIAN ASSISTANT

## 2023-12-25 PROCEDURE — 36415 COLL VENOUS BLD VENIPUNCTURE: CPT | Performed by: INTERNAL MEDICINE

## 2023-12-25 PROCEDURE — 71045 X-RAY EXAM CHEST 1 VIEW: CPT

## 2023-12-25 PROCEDURE — 85027 COMPLETE CBC AUTOMATED: CPT | Performed by: INTERNAL MEDICINE

## 2023-12-25 PROCEDURE — 120N000001 HC R&B MED SURG/OB

## 2023-12-25 RX ORDER — OXYCODONE HYDROCHLORIDE 5 MG/1
5-10 TABLET ORAL EVERY 4 HOURS PRN
Qty: 40 TABLET | Refills: 0 | Status: SHIPPED | OUTPATIENT
Start: 2023-12-25 | End: 2024-01-03

## 2023-12-25 RX ORDER — CYCLOBENZAPRINE HCL 5 MG
5 TABLET ORAL 3 TIMES DAILY PRN
Qty: 40 TABLET | Refills: 1 | Status: SHIPPED | OUTPATIENT
Start: 2023-12-25 | End: 2024-01-03

## 2023-12-25 RX ADMIN — PIPERACILLIN AND TAZOBACTAM 4.5 G: 4; .5 INJECTION, POWDER, FOR SOLUTION INTRAVENOUS at 16:02

## 2023-12-25 RX ADMIN — METHOCARBAMOL TABLETS 750 MG: 750 TABLET, COATED ORAL at 08:33

## 2023-12-25 RX ADMIN — OXYCODONE HYDROCHLORIDE 10 MG: 5 TABLET ORAL at 12:43

## 2023-12-25 RX ADMIN — OXYCODONE HYDROCHLORIDE 5 MG: 5 TABLET ORAL at 05:44

## 2023-12-25 RX ADMIN — HYDROXYZINE HYDROCHLORIDE 25 MG: 25 TABLET, FILM COATED ORAL at 03:45

## 2023-12-25 RX ADMIN — CYCLOBENZAPRINE HYDROCHLORIDE 5 MG: 5 TABLET, FILM COATED ORAL at 08:12

## 2023-12-25 RX ADMIN — PIPERACILLIN AND TAZOBACTAM 4.5 G: 4; .5 INJECTION, POWDER, FOR SOLUTION INTRAVENOUS at 09:51

## 2023-12-25 RX ADMIN — BISACODYL 10 MG: 10 SUPPOSITORY RECTAL at 10:24

## 2023-12-25 RX ADMIN — CYCLOBENZAPRINE HYDROCHLORIDE 5 MG: 5 TABLET, FILM COATED ORAL at 15:58

## 2023-12-25 RX ADMIN — FAMOTIDINE 20 MG: 20 TABLET ORAL at 20:19

## 2023-12-25 RX ADMIN — DOCUSATE SODIUM 50 MG AND SENNOSIDES 8.6 MG 1 TABLET: 8.6; 5 TABLET, FILM COATED ORAL at 20:19

## 2023-12-25 RX ADMIN — HYDROXYZINE HYDROCHLORIDE 25 MG: 25 TABLET, FILM COATED ORAL at 08:25

## 2023-12-25 RX ADMIN — TAMSULOSIN HYDROCHLORIDE 0.4 MG: 0.4 CAPSULE ORAL at 20:20

## 2023-12-25 RX ADMIN — POLYETHYLENE GLYCOL 3350 17 G: 17 POWDER, FOR SOLUTION ORAL at 08:10

## 2023-12-25 RX ADMIN — ACETAMINOPHEN 650 MG: 325 TABLET, FILM COATED ORAL at 16:12

## 2023-12-25 RX ADMIN — OXYCODONE HYDROCHLORIDE 10 MG: 5 TABLET ORAL at 20:20

## 2023-12-25 RX ADMIN — OXYCODONE HYDROCHLORIDE 10 MG: 5 TABLET ORAL at 15:58

## 2023-12-25 RX ADMIN — CYCLOBENZAPRINE HYDROCHLORIDE 5 MG: 5 TABLET, FILM COATED ORAL at 20:19

## 2023-12-25 RX ADMIN — ACETAMINOPHEN 650 MG: 325 TABLET, FILM COATED ORAL at 03:44

## 2023-12-25 RX ADMIN — MAGNESIUM HYDROXIDE 30 ML: 400 SUSPENSION ORAL at 08:11

## 2023-12-25 RX ADMIN — OXYCODONE HYDROCHLORIDE 5 MG: 5 TABLET ORAL at 00:50

## 2023-12-25 RX ADMIN — MULTIVITAMIN TABLET 1 TABLET: TABLET at 08:12

## 2023-12-25 RX ADMIN — PIPERACILLIN AND TAZOBACTAM 4.5 G: 4; .5 INJECTION, POWDER, FOR SOLUTION INTRAVENOUS at 20:22

## 2023-12-25 RX ADMIN — DOCUSATE SODIUM 50 MG AND SENNOSIDES 8.6 MG 1 TABLET: 8.6; 5 TABLET, FILM COATED ORAL at 08:12

## 2023-12-25 RX ADMIN — TAMSULOSIN HYDROCHLORIDE 0.4 MG: 0.4 CAPSULE ORAL at 09:51

## 2023-12-25 RX ADMIN — GABAPENTIN 300 MG: 300 CAPSULE ORAL at 21:42

## 2023-12-25 RX ADMIN — Medication 400 MG: at 08:12

## 2023-12-25 RX ADMIN — PIPERACILLIN AND TAZOBACTAM 4.5 G: 4; .5 INJECTION, POWDER, FOR SOLUTION INTRAVENOUS at 03:36

## 2023-12-25 RX ADMIN — ACETAMINOPHEN 650 MG: 325 TABLET, FILM COATED ORAL at 08:32

## 2023-12-25 ASSESSMENT — ACTIVITIES OF DAILY LIVING (ADL)
ADLS_ACUITY_SCORE: 26

## 2023-12-25 NOTE — PROGRESS NOTES
A&Ox4. VSS/RA except temperature elevated- tylenol given. A of 1 gb/w. Dressing CDI. Voidied x1 on own, straight cath x2. PVR 0. Patient refused fowler. Pain managed with oxy, atarax, tyelnol. Tele sinus tachy. Baseline numbness BLE.

## 2023-12-25 NOTE — PROGRESS NOTES
[facility]    Medicine Progress Note - Hospitalist Service    Date of Admission:  12/21/2023    Assessment & Plan   58 year old male with history of HTN, chronic back pain was admitted after Insertion Thoracic 10 to Lumbar 1 and revision of Lumbar 2 to Sacral 1 instrumentation. Dual Pelvic Instrumentation. Revision fusion Lumbar 5 to Sacral 1 using allograft chips. Arthrodesis Thoracic 10 to Lumbar 2 using allograft cancellous chips . Hospitalist service was consulted for medical management.    Problem List:  Insertion Thoracic 10 to Lumbar 1 and revision of Lumbar 2 to Sacral 1 instrumentation. Dual Pelvic Instrumentation. Revision fusion Lumbar 5 to Sacral 1 using allograft chips. Arthrodesis Thoracic 10 to Lumbar 2 using allograft cancellous chips.   Intermittent fevers (resolved)  Constipation  Urine retention  Acute blood loss anemia  Hypertension    Recommendations:  --Infectious work-up has yielded negative results thus far. UA is negative, and the chest x-ray is also negative. Blood cultures have been negative for 48 hours.  --It is reasonable to discontinue antibiotics at this time.  --Continue aggressive bowel regimen for constipation, including the possibility of an enema prior to discharge.  --Anticipate an improvement in urine retention following a bowel movement. If retention persists, consider discharging the patient with a Hill catheter and scheduling outpatient follow-up with urology.  --Resume home medications upon discharge.       Diet: Advance Diet as Tolerated: Regular Diet Adult    DVT Prophylaxis: Defer to primary service  Hill Catheter: Not present  Lines: None     Cardiac Monitoring: ACTIVE order. Indication: Chest pain/ ACS rule out (24 hours)  Code Status: Full Code      Clinically Significant Risk Factors              # Acute Kidney Injury, unspecified: based on a >150% or 0.3 mg/dL increase in last creatinine compared to past 90 day average, will monitor renal  "function         # Overweight: Estimated body mass index is 28.7 kg/m  as calculated from the following:    Height as of this encounter: 1.778 m (5' 10\").    Weight as of this encounter: 90.7 kg (200 lb).             Disposition Plan  Per primary service         Destin Brown MD  Hospitalist Service  [facility]  Securely message with [employer]missions (more info)  Text page via [email or web address] Paging/Directory   _____________________________________________________    Interval History   No acute events overnight. Has pain at the surgical site.  No BM since admission.  He continues to have urine retention.     Physical Exam   Vital Signs: Temp: 99.3  F (37.4  C) Temp src: Oral BP: 104/62 Pulse: 95   Resp: 18 SpO2: 95 % O2 Device: None (Room air)    Weight: 200 lbs 0 oz    Constitutional: Awake, alert, cooperative.  HEENT: Normocephalic.   Neck: ROM normal.  Pulmonary: No increased work of breathing, CTAB.  Cardiovascular: RRR, normal S1 and S2.  Abdominal: Soft. ND.  Musculoskletal:  No LE edema.  Skin: Warm.   Neurological: Awake, alert, oriented to name, place and time.     Psych: Appropriate mood and affect.    Data   Recent Labs   Lab 12/25/23  0959 12/24/23  1654 12/23/23  0729 12/23/23  0609 12/22/23  0733 12/22/23  0303 12/21/23  1030   WBC 9.5 10.3  --   --   --   --  4.5   HGB 8.7* 8.6*  --   --   --  10.2* 11.6*   MCV 91 92  --   --   --   --  91    144*  --   --   --   --  212   INR  --   --   --   --   --   --  1.02   NA  --  131*  --   --   --   --  139   POTASSIUM  --  3.9  --   --   --   --  4.1   CHLORIDE  --  94*  --   --   --   --  104   CO2  --  20*  --   --   --   --  27   BUN  --  18.9  --   --   --   --  14.1   CR  --  1.15  --   --   --   --  0.73   ANIONGAP  --  17*  --   --   --   --  8   JASON  --  8.5*  --   --   --   --  9.2   GLC  --  144* 113* 134*   < >  --  93    < > = values in this interval not displayed.     "

## 2023-12-25 NOTE — DISCHARGE SUMMARY
[facility]    Discharge Summary  Neurosurgery    Date of Admission:  12/21/2023  Date of Discharge:  12/26/2023  Discharging Provider: Anthony Freeman PA-C   Discharge Diagnoses   Patient Active Problem List   Diagnosis    Acquired kyphosis    Other secondary scoliosis, lumbar region    Spinal stenosis of lumbar region without neurogenic claudication    Status post lumbar spinal fusion    Spinal stenosis of lumbar region with radiculopathy    History of lumbar fusion    Urinary retention with incomplete bladder emptying       Procedure/Surgery Information   Procedure: Procedure(s):  Insertion Thoracic 10 to Lumbar 1 and revision of Lumbar 2 to Sacral 1 instrumentation. Dual Pelvic Instrumentation. Revision fusion Lumbar 5 to Sacral 1 using allograft chips. Arthrodesis Thoracic 10 to Lumbar 2 using allograft cancellous chips   Surgeon(s): Surgeon(s) and Role:     * Nhan Marshall MD - Primary     * Prasanna Oliva PA-C - Assisting   Specimens: * No specimens in log *   Non-operative procedures None performed     History of Present Illness   Neema Hunt is a 58 year old male who presented with a history of multiple previous spinal operations with a known pseudoarthrosis at L5-S1 along with instability and the thoracic spine at the site of her recent laminectomy.  He was brought for revision of his fusion and extension to the lower thoracic spine.    Hospital Course   Neema Hunt was admitted on 12/21/2023.  The following problems were addressed during his hospitalization:    Patient underwent  -Insertion Thoracic 10 to Lumbar 1 and revision of Lumbar 2 to Sacral 1 instrumentation. Dual Pelvic Instrumentation. Revision fusion Lumbar 5 to Sacral 1 using allograft chips. Arthrodesis Thoracic 10 to Lumbar 2 using allograft cancellous chips     Patient gradually improved physical therapy, however he did develop urinary retention, had multiple high PVRs, nearly 1 L at  times, a urinary catheter at discharge was recommended with follow-up with urology as an outpatient in 1 week.    Patient states he developed some new tingling in his thighs anteriorly after surgery, this gradually began to subside during his admission.    Hospitalist was consulted for hypotension, and tachycardia.  Hospitalist begin broad-spectrum antibiotics, UA was checked which was negative, chest x-ray negative, white blood cell count not elevated, viral panel negative.    Postoperative acute blood loss anemia, with hemoglobin of 8.7 at the time of discharge.    Addendum 12/26/2023    Patient ended up staying overnight, continued to have elevated postvoid residual, 800 mL on final check on the morning of 12/26/2023, patient consented to having a Hill catheter placed, and will follow-up as an outpatient with urology.  He will be discharged home today.    Post-operative pain control: included Hydromorphone (dilaudid) IV and will be Oxycodone on discharge.     Medications discontinued or adjusted during this hospitalization: No change     Antibiotics prescribed at discharge: None prescribed     Imaging study follow up needs:   -None performed    Significant Findings:     Anthony Freeman PA-C   Neurosurgery    Discharge Disposition   Discharged to home   Condition at discharge: Stable    Pending Results   Final pathology results: No pathology submitted  These results will be followed up by   Unresulted Labs Ordered in the Past 30 Days of this Admission       Date and Time Order Name Status Description    12/24/2023  4:10 PM Blood Culture Arm, Left In process     12/24/2023  4:10 PM Blood Culture Arm, Left In process     12/22/2023 11:13 PM Blood Culture Arm, Right Preliminary     12/22/2023 11:13 PM Blood Culture Arm, Left Preliminary             Primary Care Physician   Cornel Mittal    Physical Exam   Temp: 99.3  F (37.4  C) Temp src: Oral BP: 104/62 Pulse: 95   Resp: 18 SpO2: 95 % O2 Device: None (Room air)     Vitals:    12/21/23 1008   Weight: 90.7 kg (200 lb)     Vital Signs with Ranges  Temp:  [99.3  F (37.4  C)-101.6  F (38.7  C)] 99.3  F (37.4  C)  Pulse:  [] 95  Resp:  [16-18] 18  BP: ()/(58-67) 104/62  SpO2:  [93 %-98 %] 95 %  I/O last 3 completed shifts:  In: -   Out: 3050 [Urine:3050]    Alert and oriented  Bilateral lower extremities with 5/5 strength with hip flexion on the left, 5- out of 5 strength with hip flexion on the right, 5 out of 5 strength with ankle dorsiflexion plantarflexion bilaterally  Bandage clean dry and intact    Consultations This Hospital Stay   OCCUPATIONAL THERAPY ADULT IP CONSULT  PHYSICAL THERAPY ADULT IP CONSULT  HOSPITALIST IP CONSULT    Time Spent on this Encounter   I have spent less than 30 minutes on this discharge.    Discharge Orders      Adult Urology  Referral      Reason for your hospital stay    Revision of spinal fusion     Follow-up and recommended labs and tests     Follow-up in 2 weeks for incision check  Follow-up in 1 week with urology if Hill catheter is in place at discharge     Activity    Avoid excessive bending lifting and twisting  Go for short frequent walks throughout the day  Okay to shower, do not submerge incision underwater  Avoid any NSAID products     Diet    Follow this diet upon discharge: Orders Placed This Encounter      Advance Diet as Tolerated: Regular Diet Adult     Discharge Medications   Current Discharge Medication List        START taking these medications    Details   cyclobenzaprine (FLEXERIL) 5 MG tablet Take 1 tablet (5 mg) by mouth 3 times daily as needed for muscle spasms  Qty: 40 tablet, Refills: 1    Associated Diagnoses: Status post lumbar spinal fusion      oxyCODONE (ROXICODONE) 5 MG tablet Take 1-2 tablets (5-10 mg) by mouth every 4 hours as needed for moderate pain  Qty: 40 tablet, Refills: 0    Associated Diagnoses: Status post lumbar spinal fusion           CONTINUE these medications which have NOT  CHANGED    Details   acetaminophen (TYLENOL) 500 MG tablet Take 1,000 mg by mouth daily as needed for mild pain (2 X 500 mg = 1000 mg)      amLODIPine-benazepril (LOTREL) 10-20 MG capsule Take 1 capsule by mouth every morning       Cyanocobalamin (B-12 PO) Take 2 chew tab by mouth every morning       famotidine (PEPCID) 20 MG tablet Take 20 mg by mouth every evening      gabapentin (NEURONTIN) 300 MG capsule Take 1 capsule (300 mg) by mouth At Bedtime  Qty: 90 capsule, Refills: 3    Associated Diagnoses: Spinal stenosis of lumbar region with radiculopathy      lidocaine (LIDODERM) 5 % Patch Place 1 patch onto the skin every 24 hours  Qty: 30 patch, Refills: 2    Associated Diagnoses: Lumbar radiculopathy, acute; Post-operative state      magnesium 250 MG tablet Take 1 tablet by mouth daily Daily      Melatonin 10 MG TABS tablet Take 10 mg by mouth nightly as needed for sleep      Misc Natural Products (OSTEO BI-FLEX JOINT SHIELD) TABS Take 1 tablet by mouth daily      !! Multiple Vitamins-Minerals (AIRBORNE PO) Take 1 chew tab by mouth every morning       !! Multiple Vitamins-Minerals (MULTIVITAMIN ADULT PO) Take 2 chew tab by mouth every morning       omega 3 1000 MG CAPS Take 1 chew tab by mouth every morning       polyethylene glycol (MIRALAX) 17 g packet Take 1 packet by mouth daily      Probiotic Product (SOLUBLE FIBER/PROBIOTICS PO) Take 1 chew tab by mouth every morning       tetrahydrozoline (VISINE) 0.05 % ophthalmic solution Place 1 drop into both eyes daily as needed      valACYclovir (VALTREX) 1000 mg tablet Take 1,000 mg by mouth 2 times daily as needed       !! - Potential duplicate medications found. Please discuss with provider.        STOP taking these medications       methocarbamol (ROBAXIN) 750 MG tablet Comments:   Reason for Stopping:         naproxen sodium 220 MG capsule Comments:   Reason for Stopping:         senna-docusate (SENOKOT-S/PERICOLACE) 8.6-50 MG tablet Comments:   Reason for  Stopping:             Allergies   No Known Allergies  Data

## 2023-12-25 NOTE — PROGRESS NOTES
Patient POD#3 from Spinal surgery, DCI. A&Ox4. Baseline Numbness BLE. Soft BP, and Afebrile, NS paged, Hospitalist Consulted, 500 Bolus infused at 1hr ordered and administered. UA collected, COVID ordered, awaiting for both results, see results Review. Infusing NS at 100 mL/hr. Pt was Bladder scan for 899cc, and straight cath for 1000cc, PVR 0, MD initiated  Hill to be left in place but Pt refused and wanting to Void trail per self. Up x1GB/W.

## 2023-12-26 ENCOUNTER — APPOINTMENT (OUTPATIENT)
Dept: PHYSICAL THERAPY | Facility: CLINIC | Age: 58
End: 2023-12-26
Attending: NEUROLOGICAL SURGERY
Payer: COMMERCIAL

## 2023-12-26 ENCOUNTER — TELEPHONE (OUTPATIENT)
Dept: UROLOGY | Facility: CLINIC | Age: 58
End: 2023-12-26
Payer: COMMERCIAL

## 2023-12-26 ENCOUNTER — APPOINTMENT (OUTPATIENT)
Dept: OCCUPATIONAL THERAPY | Facility: CLINIC | Age: 58
End: 2023-12-26
Attending: NEUROLOGICAL SURGERY
Payer: COMMERCIAL

## 2023-12-26 VITALS
DIASTOLIC BLOOD PRESSURE: 78 MMHG | BODY MASS INDEX: 28.63 KG/M2 | HEART RATE: 98 BPM | OXYGEN SATURATION: 96 % | RESPIRATION RATE: 18 BRPM | HEIGHT: 70 IN | WEIGHT: 200 LBS | TEMPERATURE: 100.1 F | SYSTOLIC BLOOD PRESSURE: 93 MMHG

## 2023-12-26 PROCEDURE — 250N000013 HC RX MED GY IP 250 OP 250 PS 637: Performed by: PHYSICIAN ASSISTANT

## 2023-12-26 PROCEDURE — 99231 SBSQ HOSP IP/OBS SF/LOW 25: CPT | Performed by: INTERNAL MEDICINE

## 2023-12-26 PROCEDURE — 97116 GAIT TRAINING THERAPY: CPT | Mod: GP | Performed by: PHYSICAL THERAPY ASSISTANT

## 2023-12-26 PROCEDURE — 93010 ELECTROCARDIOGRAM REPORT: CPT | Performed by: INTERNAL MEDICINE

## 2023-12-26 PROCEDURE — 250N000013 HC RX MED GY IP 250 OP 250 PS 637: Performed by: INTERNAL MEDICINE

## 2023-12-26 PROCEDURE — 97535 SELF CARE MNGMENT TRAINING: CPT | Mod: GO

## 2023-12-26 PROCEDURE — 93005 ELECTROCARDIOGRAM TRACING: CPT

## 2023-12-26 PROCEDURE — 250N000011 HC RX IP 250 OP 636: Performed by: NEUROLOGICAL SURGERY

## 2023-12-26 PROCEDURE — 97530 THERAPEUTIC ACTIVITIES: CPT | Mod: GP | Performed by: PHYSICAL THERAPY ASSISTANT

## 2023-12-26 RX ADMIN — METHOCARBAMOL TABLETS 750 MG: 750 TABLET, COATED ORAL at 01:13

## 2023-12-26 RX ADMIN — DOCUSATE SODIUM 50 MG AND SENNOSIDES 8.6 MG 1 TABLET: 8.6; 5 TABLET, FILM COATED ORAL at 10:52

## 2023-12-26 RX ADMIN — OXYCODONE HYDROCHLORIDE 10 MG: 5 TABLET ORAL at 10:52

## 2023-12-26 RX ADMIN — POLYETHYLENE GLYCOL 3350 17 G: 17 POWDER, FOR SOLUTION ORAL at 10:52

## 2023-12-26 RX ADMIN — OXYCODONE HYDROCHLORIDE 10 MG: 5 TABLET ORAL at 14:37

## 2023-12-26 RX ADMIN — CYCLOBENZAPRINE HYDROCHLORIDE 5 MG: 5 TABLET, FILM COATED ORAL at 10:52

## 2023-12-26 RX ADMIN — PIPERACILLIN AND TAZOBACTAM 4.5 G: 4; .5 INJECTION, POWDER, FOR SOLUTION INTRAVENOUS at 03:12

## 2023-12-26 RX ADMIN — MULTIVITAMIN TABLET 1 TABLET: TABLET at 10:52

## 2023-12-26 RX ADMIN — Medication 400 MG: at 10:52

## 2023-12-26 RX ADMIN — ACETAMINOPHEN 650 MG: 325 TABLET, FILM COATED ORAL at 10:51

## 2023-12-26 RX ADMIN — MAGNESIUM HYDROXIDE 30 ML: 400 SUSPENSION ORAL at 04:14

## 2023-12-26 RX ADMIN — OXYCODONE HYDROCHLORIDE 10 MG: 5 TABLET ORAL at 01:08

## 2023-12-26 RX ADMIN — OXYCODONE HYDROCHLORIDE 10 MG: 5 TABLET ORAL at 05:10

## 2023-12-26 RX ADMIN — CYCLOBENZAPRINE HYDROCHLORIDE 5 MG: 5 TABLET, FILM COATED ORAL at 14:37

## 2023-12-26 RX ADMIN — ACETAMINOPHEN 650 MG: 325 TABLET, FILM COATED ORAL at 04:45

## 2023-12-26 ASSESSMENT — ACTIVITIES OF DAILY LIVING (ADL)
ADLS_ACUITY_SCORE: 26

## 2023-12-26 NOTE — DISCHARGE INSTRUCTIONS
Monitor for increased drainage, heat, and odor at incision. Call clinic if incision opens. Ok to leave incision open to air. Clean fowler twice a day as educated on unit. Keep fowler bag below bladder, empty bag when needed.

## 2023-12-26 NOTE — PROGRESS NOTES
Patient POD 4 from Spine surgery, dressing with scant dry drain. A&Ox4. Baseline Numbness BLE. Soft BP, and Afebrile, NS and MD paged, rechecked but Temp still high, per MD to continue monitoring. Pt is now voiding with increasing amount of output, plan was to place fowler but for now to keep on encourage fluid and voiding, see flowsheet for output. Up x1GB/W, ambulated in the room multiple times. PIV SL. May discharge tomorrow it voiding adequately. Milk of mag and Suppository given for Consitipation with significant effect, x2 large loose stools.

## 2023-12-26 NOTE — PROGRESS NOTES
"[facility]    Medicine Progress Note - Hospitalist Service    Date of Admission:  12/21/2023    Assessment & Plan   58 year old male with history of HTN, chronic back pain was admitted after Insertion Thoracic 10 to Lumbar 1 and revision of Lumbar 2 to Sacral 1 instrumentation. Dual Pelvic Instrumentation. Revision fusion Lumbar 5 to Sacral 1 using allograft chips. Arthrodesis Thoracic 10 to Lumbar 2 using allograft cancellous chips . Hospitalist service was consulted for medical management.    Problem List:  Insertion Thoracic 10 to Lumbar 1 and revision of Lumbar 2 to Sacral 1 instrumentation. Dual Pelvic Instrumentation. Revision fusion Lumbar 5 to Sacral 1 using allograft chips. Arthrodesis Thoracic 10 to Lumbar 2 using allograft cancellous chips.   Intermittent fevers   Constipation - resolved.   Urine retention  Acute blood loss anemia  Hypertension    Recommendations:  --Infectious work-up has yielded negative results thus far. UA is negative x 2, and the chest x-ray is also negative. Blood cultures have been negative for >48 hours.  --It is reasonable to discontinue antibiotics at this time.  --Continue bowel regimen while taking opioids.  --Continues to have urine retention. Hill catheter to be placed today with OP follow up with Urology.   --Resume home medications upon discharge.     Diet: Advance Diet as Tolerated: Regular Diet Adult  Diet    DVT Prophylaxis: Defer to primary service  Hill Catheter: PRESENT, indication: Retention  Lines: None     Cardiac Monitoring: ACTIVE order. Indication: Chest pain/ ACS rule out (24 hours)  Code Status: Full Code      Clinically Significant Risk Factors                      # Overweight: Estimated body mass index is 28.7 kg/m  as calculated from the following:    Height as of this encounter: 1.778 m (5' 10\").    Weight as of this encounter: 90.7 kg (200 lb).             Disposition Plan  Per primary service         Destin Brown, " MD  Hospitalist Service  [facility]  Securely message with Christine (more info)  Text page via [employer] Paging/Directory   _____________________________________________________    Interval History   No acute events overnight. Has pain at the surgical site.  He had BM x 2 yesterday and x 1 this morning.   He continues to have urine retention.     Physical Exam   Vital Signs: Temp: 100.1  F (37.8  C) Temp src: Oral BP: 93/78 Pulse: 98   Resp: 18 SpO2: 96 % O2 Device: None (Room air)    Weight: 200 lbs 0 oz    Constitutional: Awake, alert, cooperative.  HEENT: Normocephalic.   Neck: ROM normal.  Pulmonary: No increased work of breathing, CTAB.  Cardiovascular: RRR, normal S1 and S2.  Abdominal: Soft. ND.  Musculoskletal:  No LE edema.  Skin: Warm.   Neurological: Awake, alert, oriented to name, place and time.     Psych: Appropriate mood and affect.    Data   Recent Labs   Lab 12/25/23  0959 12/24/23  1654 12/23/23  0729 12/23/23  0609 12/22/23  0733 12/22/23  0303 12/21/23  1030   WBC 9.5 10.3  --   --   --   --  4.5   HGB 8.7* 8.6*  --   --   --  10.2* 11.6*   MCV 91 92  --   --   --   --  91    144*  --   --   --   --  212   INR  --   --   --   --   --   --  1.02   NA  --  131*  --   --   --   --  139   POTASSIUM  --  3.9  --   --   --   --  4.1   CHLORIDE  --  94*  --   --   --   --  104   CO2  --  20*  --   --   --   --  27   BUN  --  18.9  --   --   --   --  14.1   CR  --  1.15  --   --   --   --  0.73   ANIONGAP  --  17*  --   --   --   --  8   JASON  --  8.5*  --   --   --   --  9.2   GLC  --  144* 113* 134*   < >  --  93    < > = values in this interval not displayed.

## 2023-12-26 NOTE — PROVIDER NOTIFICATION
ortho spine. 2416 TB    do you want incision care on the discharge summary? ok to leave [facility]? please advise. [phone] Masood YOUNGBLOOD RN    [facility]

## 2023-12-26 NOTE — PROGRESS NOTES
Neurosurgery progress note    Postop day 5 status post Insertion Thoracic 10 to Lumbar 1 and revision of Lumbar 2 to Sacral 1 instrumentation. Dual Pelvic Instrumentation. Revision fusion Lumbar 5 to Sacral 1 using allograft chips. Arthrodesis Thoracic 10 to Lumbar 2 using allograft cancellous chips, with Dr. Marshall.    Overnight patient attempted to continue to urinate, but this morning had high PVR of over 800.  He agrees to have a Hill catheter placed.  He is neurologically intact otherwise with bilateral lower extremities with appropriate strength  Bandage clean dry and intact    Plan    Discharge home  Hill catheter, with follow-up with urology in 1 week for trial of voiding  Follow-up with neurosurgery clinic in 2 weeks for incision check and suture/staple removal  Pain medication sent to pharmacy    Appreciate hospitalist consultation, infectious workup was negative, antibiotics have been stopped.

## 2023-12-26 NOTE — PLAN OF CARE
Occupational Therapy Discharge Summary    Reason for therapy discharge:    Discharged to home with assist from family.    Progress towards therapy goal(s). See goals on Care Plan in [facility] electronic health record for goal details.  Goals partially met.  Barriers to achieving goals:   discharge from facility.    Therapy recommendation(s):    Pt functioning below baseline, improved this day, primarily limited by pain s/p spinal sx, as well as, decreased mobility, precautions, impacting I/ADL ind. Pt resides with spouse who is able to provide I/ADL assist as needed, pt reports sister plans to assist as well. Pt currently SBA with FWW with room level mobility, toileting, and LB dressing. Anticipate with continued IP OT and pain/medical management, pt may progress for d/c home with assist initially for bathing and heavy IADLs, [facility] OT for home safety eval. If unable to receive this level of assist, rec skilled TCU for increased I/ADL ind.

## 2023-12-26 NOTE — PROGRESS NOTES
A&Ox4. VSS/RA except temperature elevated- tylenol given. A of 1 gb/w. Dressing scant drainage. Voiding, PVR not greater than 500ml. Patient refuses to be straight cath, wants to void on own. Pain managed with oxy, robaxin, tyelnol. Tele sinus tachy. Discharge pending

## 2023-12-26 NOTE — TELEPHONE ENCOUNTER
[facility] Call Center    Phone Message    May a detailed message be left on voicemail: yes     Reason for Call: Other: Patient being referred for Urgent Appointment (1-2 weeks) by referring provider for urinary retention, voiding trial one week from 12/25/23. Writer sending encounter message for clinic review per guideline instructions for Dx retention. Please review and reach out to Pt for scheduling. Thank you!     Action Taken: Message routed to:  [facility]    Travel Screening: Not Applicable

## 2023-12-26 NOTE — PLAN OF CARE
Goal Outcome Evaluation:         Pt discharge to home with family. Denied SOB, CP, N/V. Pt had PVR of 800 and agreed to fowler placement. Will follow up outpatient for void trial. NSR, tachycardic at times. Pt had shower and dressing changed to back incision. Incision WNL. Pt had two BM today. When 16 F fowler 10 ml balloon placed, tolerated well. 1000ml output with in a few minutes. All discharge instructions, medication instructions and follow up appointments educated to pt and family. Able to repeat information for understanding. One dose of PRN oxycodone administered prior to leaving. MD updated. Pt demonstrated proper fowler care and management.

## 2023-12-27 NOTE — TELEPHONE ENCOUNTER
[facility] Call Center    Phone Message    May a detailed message be left on voicemail: yes     Reason for Call: Other: Patient called back after scheduling appointment and wants to know if there is anything sooner in [place]. Sending encounter message for review and follow-up with Pt to let him know. Okay to leave detailed message.      Action Taken: Message routed to:  [facility]RO    Travel Screening: Not Applicable

## 2023-12-28 LAB
ATRIAL RATE - MUSE: 101 BPM
BACTERIA BLD CULT: NO GROWTH
BACTERIA BLD CULT: NO GROWTH
DIASTOLIC BLOOD PRESSURE - MUSE: NORMAL MMHG
INTERPRETATION ECG - MUSE: NORMAL
P AXIS - MUSE: 47 DEGREES
PR INTERVAL - MUSE: 142 MS
QRS DURATION - MUSE: 86 MS
QT - MUSE: 340 MS
QTC - MUSE: 440 MS
R AXIS - MUSE: 18 DEGREES
SYSTOLIC BLOOD PRESSURE - MUSE: NORMAL MMHG
T AXIS - MUSE: 56 DEGREES
VENTRICULAR RATE- MUSE: 101 BPM

## 2023-12-28 NOTE — TELEPHONE ENCOUNTER
Mychart sent, patient is scheduled first available and waitlisted.  Darlene Zepeda RN on 12/28/2023 at 7:51 AM

## 2023-12-30 LAB
BACTERIA BLD CULT: NO GROWTH
BACTERIA BLD CULT: NO GROWTH

## 2023-12-31 ENCOUNTER — MYC MEDICAL ADVICE (OUTPATIENT)
Dept: NEUROSURGERY | Facility: CLINIC | Age: 58
End: 2023-12-31
Payer: COMMERCIAL

## 2023-12-31 DIAGNOSIS — Z98.1 STATUS POST LUMBAR SPINAL FUSION: ICD-10-CM

## 2024-01-03 RX ORDER — CYCLOBENZAPRINE HCL 5 MG
5 TABLET ORAL 3 TIMES DAILY PRN
Qty: 40 TABLET | Refills: 1 | Status: SHIPPED | OUTPATIENT
Start: 2024-01-03

## 2024-01-03 RX ORDER — OXYCODONE HYDROCHLORIDE 5 MG/1
5-10 TABLET ORAL EVERY 4 HOURS PRN
Qty: 40 TABLET | Refills: 0 | Status: SHIPPED | OUTPATIENT
Start: 2024-01-03 | End: 2024-01-17

## 2024-01-03 NOTE — TELEPHONE ENCOUNTER
Patient requesting refill of oxycodone and flexeril.     DOS: 12/21/23   Surgeon:Dr. Marshall  Procedure:Insertion Thoracic 10 to Lumbar 1 and revision of Lumbar 2 to Sacral 1 instrumentation. Dual Pelvic Instrumentation. Revision fusion Lumbar 5 to Sacral 1 using allograft chips. Arthrodesis Thoracic 10 to Lumbar 2 using allograft cancellous chips   Weeks Post op:1 week 6 days  Last refilled:  oxy 12/25 #40, flexeril 12/25 #40 with 1 refill, but patient requesting we send to new pharmacy     Pain assessment completed via [facility]. Please see encounter for further details. Refill request will be forwarded to care team.

## 2024-01-04 ENCOUNTER — MYC MEDICAL ADVICE (OUTPATIENT)
Dept: NEUROSURGERY | Facility: CLINIC | Age: 59
End: 2024-01-04

## 2024-01-04 ENCOUNTER — ALLIED HEALTH/NURSE VISIT (OUTPATIENT)
Dept: NEUROSURGERY | Facility: CLINIC | Age: 59
End: 2024-01-04
Payer: COMMERCIAL

## 2024-01-04 DIAGNOSIS — Z98.1 STATUS POST LUMBAR SPINAL FUSION: Primary | ICD-10-CM

## 2024-01-04 PROCEDURE — 99207 PR NO CHARGE NURSE ONLY: CPT

## 2024-01-04 NOTE — PATIENT INSTRUCTIONS
Instructions for Patient    Incision  Steri-strips were applied today, they will fall off in 7-10 days. Do not to pull them off.   Keep your incision clean and dry at all times.   It is okay to shower, just pat the incision dry   No submerging incision in water such as pools, hot tubs, or baths for at least 8 weeks and until the incision is healed  Do not apply lotions or ointments to incision    Activity  No lifting greater than 10 pounds. No bending, twisting, or overhead reaching.  Walking is the best way to start exercise after surgery. Take short frequent walks. You may gradually increase the distance as tolerated. If you feel pain, decrease your activity, but DO NOT stop walking. Walking will help you gain strength, prevent muscle soreness and spasms, and prevent blood clots.   Avoid bed rest and prolonged sitting for longer than 30 minutes (change positions frequently while awake)  No contact sports or high impact activities such as; running/jogging, snowmobile or 4 marte riding or any other recreational vehicles until after given clearance at one of your follow up visits    Medications   Refills of pain medication:   Please call the neurosurgery clinic to request 2-3 days before you run out. A nurse will call you back to obtain a pain assessment.   Weaning from narcotic pain medications  When it is time, start weaning by extending the time between doses.   For example, if you're taking 2 tabs every 4 hours, spread it out to 2 tabs over 4.5, 5, 6 hours. At that point you can certainly cut down to 1 tab, then wean to an as needed basis until completely done with them.  Don't take more than 3000mg of Acetaminophen in 24 hours  Avoid Aspirin and NSAIDs (ex: ibuprofen/Advil) until given clearance  Encouraged icing for at least 3-4 times throughout the day for 20-30 minutes at a time. Avoid heat to the incision area.   Taking stool softeners regularly can reduce constipation commonly caused by narcotic pain  medications.    Contact clinic or Emergency Room if you develop:   Infection (redness, swelling, warmth, drainage, fever over 101 F)  New injury  Bladder or bowel changes or loss of control    Signs of blood clot:  Swelling and/or warmth in one or both legs  Pain or tenderness in your leg, ankle, foot, or arm   Red or discolored skin     Go to the Emergency Room   If sudden onset of severe headache, weakness, confusion, change in level of consciousness, pain, or loss of movement.  Chest pain  Trouble breathing     Post-operative appointments  Arrive 30 minutes before your 6 week, 3 month, 6 month, and 1 year post-op appointments to allow time for an x-ray before each    [facility]  [place] [address] [place] [id number]  Telephone:  [phone]   Fax:  [phone]    You can access the FollowMyHealth Patient Portal offered by [facility] by registering at the following website: [email or web address]. By joining [employer]’s FollowMyHealth portal, you will also be able to view your health information using other applications (apps) compatible with our system.

## 2024-01-04 NOTE — CONFIDENTIAL NOTE
Post-op Nurse Visit:    Patient seen today per the order of  Nhan Marshall MD.   DOS: 12/21/23  Procedure: Insertion Thoracic 10 to Lumbar 1 and revision of Lumbar 2 to Sacral 1 instrumentation. Dual Pelvic Instrumentation. Revision fusion Lumbar 5 to Sacral 1 using allograft chips. Arthrodesis Thoracic 10 to Lumbar 2 using allograft cancellous chips     Pain/Neuro Assessment  Pain ranges 5-8/10. Repositioning in bed is painful. Long periods of walking or sitting is painful. Pain is located right side of mid back, low back, bilateral buttock, left thigh, knees (L > R), right ankle. Per patient, low back and buttock are the most painful. Patient states oxycodone manages most of the pain.   Numbness/tingling: continued in both thighs, decreasing. Toes continue to be numb. Right shin continues to be severe per patient, unchanged.    Strength: Equal strength to bilateral lower extremities. Denies new weakness. Baseline atrophy of left calf per patient.      Pain Relief Measures:  Oxycodone: 2 every 4 hours while awake. 1 every 4 hours overnight.  Tylenol: PRN with oxycodone.   Flexeril: 1 pill TID.  Ice: Yes     Incision  Incision inspected. Edges well-approximated. No redness, swelling, drainage, or warmth noted. Incision prepped with ChloraPrep and suture(s) removed without difficulty. Steri-strips applied.    Activity  Following restrictions   Falls: none  Patient is walking frequently. Using walker for ambulation.   Denies redness, swelling, or warmth to bilateral calves. Wearing compression stockings.    GI/  Difficulty swallowing? No - Reports dry mouth.   Patient's appetite is normal.  Bowel/bladder problems? Yes - scheduled with urology regarding catheter. On a waitlist for sooner appt. Reviewed option of calling insurance and external urology clinics to be seen sooner.   Taking stool softeners? Yes     Refills/x-rays/return to work  Refills given at this appointment? No  Sent for x-rays after this  appointment? No  Ordered future x-rays? Yes  Scheduling team notified? Yes  Return to work discussed at this appointment? Yes. Out of work. No paperwork needed today.     All of patient's questions addressed today. Patient was instructed to call with any additional questions/concerns.

## 2024-01-04 NOTE — TELEPHONE ENCOUNTER
FREYA Eldridge'd parking pass for 3 months. Routed to  RN to place pass in RN folder to be completed.

## 2024-01-05 NOTE — PROGRESS NOTES
Mailed completed application for disability parking to [facility].    Faxed Form January 5, 2024 to fax number [phone] to Disability Determination Services     Right Fax confirmed at 3:50 PM    Rubio Pérez

## 2024-01-05 NOTE — TELEPHONE ENCOUNTER
Parking pass signed by Dr. Marshall. Placed in [facility] bin for processing.

## 2024-01-17 ENCOUNTER — MYC MEDICAL ADVICE (OUTPATIENT)
Dept: NEUROSURGERY | Facility: CLINIC | Age: 59
End: 2024-01-17
Payer: COMMERCIAL

## 2024-01-17 DIAGNOSIS — Z98.1 STATUS POST LUMBAR SPINAL FUSION: ICD-10-CM

## 2024-01-17 RX ORDER — OXYCODONE HYDROCHLORIDE 5 MG/1
5-10 TABLET ORAL EVERY 6 HOURS PRN
Qty: 40 TABLET | Refills: 0 | Status: SHIPPED | OUTPATIENT
Start: 2024-01-17

## 2024-01-17 NOTE — TELEPHONE ENCOUNTER
Patient requesting refill of oxycodone.     DOS: 12/21/23  Surgeon: Dr. Marshall  Procedure: Insertion Thoracic 10 to Lumbar 1 and revision of Lumbar 2 to Sacral 1 instrumentation. Dual Pelvic Instrumentation. Revision fusion Lumbar 5 to Sacral 1 using allograft chips. Arthrodesis Thoracic 10 to Lumbar 2 using allograft cancellous chips    Weeks Post op: 3 weeks 6 days  Last refilled: 1/3/24 #40    Pain assessment completed via [employer]. Please see encounter for further details. Refill request will be forwarded to care team.

## 2024-01-29 ENCOUNTER — MYC MEDICAL ADVICE (OUTPATIENT)
Dept: NEUROSURGERY | Facility: CLINIC | Age: 59
End: 2024-01-29
Payer: COMMERCIAL

## 2024-01-30 NOTE — TELEPHONE ENCOUNTER
Patient sent myc message with letter from "[employer]" and [employer] diability determination services. They are requesting some records from our office. Last OV note and op note faxed.     Faxed  records for SSDI case  January 30, 2024 to fax number [phone]    Right Fax confirmed at 8:50 AM

## 2024-02-06 ENCOUNTER — OFFICE VISIT (OUTPATIENT)
Dept: NEUROSURGERY | Facility: CLINIC | Age: 59
End: 2024-02-06
Payer: COMMERCIAL

## 2024-02-06 ENCOUNTER — ANCILLARY PROCEDURE (OUTPATIENT)
Dept: GENERAL RADIOLOGY | Facility: CLINIC | Age: 59
End: 2024-02-06
Attending: NEUROLOGICAL SURGERY
Payer: COMMERCIAL

## 2024-02-06 VITALS
DIASTOLIC BLOOD PRESSURE: 72 MMHG | BODY MASS INDEX: 27.67 KG/M2 | HEART RATE: 89 BPM | WEIGHT: 193.25 LBS | HEIGHT: 70 IN | SYSTOLIC BLOOD PRESSURE: 123 MMHG | OXYGEN SATURATION: 100 %

## 2024-02-06 DIAGNOSIS — Z98.1 STATUS POST LUMBAR SPINAL FUSION: ICD-10-CM

## 2024-02-06 DIAGNOSIS — Z98.1 S/P LUMBAR SPINAL FUSION: Primary | ICD-10-CM

## 2024-02-06 PROCEDURE — 72100 X-RAY EXAM L-S SPINE 2/3 VWS: CPT

## 2024-02-06 PROCEDURE — 99024 POSTOP FOLLOW-UP VISIT: CPT | Performed by: PHYSICIAN ASSISTANT

## 2024-02-06 ASSESSMENT — PAIN SCALES - GENERAL: PAINLEVEL: MODERATE PAIN (5)

## 2024-02-06 NOTE — NURSING NOTE
"Neema Hunt is a 58 year old male who presents for:  Chief Complaint   Patient presents with    RECHECK     6wk follow up Revision of L2 to S1 instrumentation. Revision fusion L5 to S1        Initial Vitals:  /72   Pulse 89   Ht 5' 10\" (1.778 m)   Wt 193 lb 4 oz (87.7 kg)   SpO2 100%   BMI 27.73 kg/m   Estimated body mass index is 27.73 kg/m  as calculated from the following:    Height as of this encounter: 5' 10\" (1.778 m).    Weight as of this encounter: 193 lb 4 oz (87.7 kg).. Body surface area is 2.08 meters squared. BP completed using cuff size: small regular  Moderate Pain (5)        Kimberly Stephenson   "

## 2024-02-06 NOTE — LETTER
2/6/2024         RE: Neema Hunt  [address] [address]        Dear Colleague,    Thank you for referring your patient, Neema Hunt, to the [facility] NEUROLOGY CLINICS [facility]. Please see a copy of my visit note below.    Neurosurgery follow-up    Mr. Hunt is 6 weeks status post Insertion of Thoracic 10 to Lumbar 1 and revision of Lumbar 2 to Sacral 1 instrumentation. Dual Pelvic Instrumentation. Revision fusion Lumbar 5 to Sacral 1 using allograft chips. Arthrodesis Thoracic 10 to Lumbar 2 using allograft cancellous chips, with Dr. Marshall.    Patient states she continues to have buttock pain similar to before surgery.    He states he was not able to have his Hill removed for about 3 weeks after leaving the hospital even though it was ordered for 1 week, this was due to unavailability of the urology clinic appointments.    Ultimately he did get his Hill out, he was noticing some dribbling which is gradually improving.        Exam    B/P: 123/72, T: Data Unavailable, P: 89, R: Data Unavailable     Alert and oriented no acute distress  Incision well-healed  Bilateral lower extremities appropriate strength        Imaging    Lumbar x-rays demonstrate expected placement of T10 to sacrum hardware.  Similar to prior imaging.    Assessment    Status post T10 to pelvis revision and fusion      Plan    Okay to begin physical therapy, continue to go for short frequent walks out the day  Monitor urinary function, (patient is following up with urology and primary care regarding this.)    Follow-up in 6 weeks with Dr. Marshall.          Again, thank you for allowing me to participate in the care of your patient.        Sincerely,        Anthony Freeman PA-C

## 2024-02-06 NOTE — PROGRESS NOTES
Neurosurgery follow-up    Mr. Hunt is 6 weeks status post Insertion of Thoracic 10 to Lumbar 1 and revision of Lumbar 2 to Sacral 1 instrumentation. Dual Pelvic Instrumentation. Revision fusion Lumbar 5 to Sacral 1 using allograft chips. Arthrodesis Thoracic 10 to Lumbar 2 using allograft cancellous chips, with Dr. Marshall.    Patient states she continues to have buttock pain similar to before surgery.    He states he was not able to have his Hill removed for about 3 weeks after leaving the hospital even though it was ordered for 1 week, this was due to unavailability of the urology clinic appointments.    Ultimately he did get his Hill out, he was noticing some dribbling which is gradually improving.        Exam    B/P: 123/72, T: Data Unavailable, P: 89, R: Data Unavailable     Alert and oriented no acute distress  Incision well-healed  Bilateral lower extremities appropriate strength        Imaging    Lumbar x-rays demonstrate expected placement of T10 to sacrum hardware.  Similar to prior imaging.    Assessment    Status post T10 to pelvis revision and fusion      Plan    Okay to begin physical therapy, continue to go for short frequent walks out the day  Monitor urinary function, (patient is following up with urology and primary care regarding this.)    Follow-up in 6 weeks with Dr. Marshall.

## 2024-02-14 DIAGNOSIS — Z98.1 S/P LUMBAR SPINAL FUSION: Primary | ICD-10-CM

## 2024-02-21 ENCOUNTER — THERAPY VISIT (OUTPATIENT)
Dept: PHYSICAL THERAPY | Facility: CLINIC | Age: 59
End: 2024-02-21
Attending: PHYSICIAN ASSISTANT
Payer: COMMERCIAL

## 2024-02-21 DIAGNOSIS — M54.16 SPINAL STENOSIS OF LUMBAR REGION WITH RADICULOPATHY: Primary | ICD-10-CM

## 2024-02-21 DIAGNOSIS — Z98.1 S/P SPINAL FUSION: ICD-10-CM

## 2024-02-21 DIAGNOSIS — M48.061 SPINAL STENOSIS OF LUMBAR REGION WITH RADICULOPATHY: Primary | ICD-10-CM

## 2024-02-21 DIAGNOSIS — Z98.1 S/P LUMBAR SPINAL FUSION: ICD-10-CM

## 2024-02-21 PROCEDURE — 97161 PT EVAL LOW COMPLEX 20 MIN: CPT | Mod: GP | Performed by: PHYSICAL THERAPIST

## 2024-02-21 PROCEDURE — 97110 THERAPEUTIC EXERCISES: CPT | Mod: GP | Performed by: PHYSICAL THERAPIST

## 2024-02-21 NOTE — PROGRESS NOTES
PHYSICAL THERAPY EVALUATION  Type of Visit: Evaluation    See electronic medical record for Abuse and Falls Screening details.    Subjective       Presenting condition or subjective complaint: Back pain along with lower extremities.  Instability. Pain during sleep. Self- care. 9/27/23 had surgery for cyst but was still having issue with walking  so had 2nd surery in 12/21 with [id number]. nsertion Thoracic 10 to Lumbar 1 and revision of Lumbar 2 to Sacral 1 instrumentation. Dual Pelvic Instrumentation. Revision fusion Lumbar 5 to Sacral 1 using allograft chips. Arthrodesis Thoracic 10 to Lumbar 2 using allograft cancellous chips  Date of onset: 12/21/23    Relevant medical history: Arthritis; Bladder or bowel problems; Chest pain; High blood pressure; Significant weakness   Dates & types of surgery: Subtalar fusion-09, Hernia-22, bunion-07, spinal fusion-15    Prior diagnostic imaging/testing results: MRI; CT scan; X-ray     Prior therapy history for the same diagnosis, illness or injury: Yes PT as recently as October of 2023.    Prior Level of Function  Transfers:   Ambulation:   ADL:   IADL:     Living Environment  Social support: With a significant other or spouse   Type of home: House; 1 level; Basement   Stairs to enter the home: Yes 2 Is there a railing: No   Ramp: No   Stairs inside the home: Yes 12 Is there a railing: Yes   Help at home: Self Cares (home health aide/personal care attendant, family, etc); Home and Yard maintenance tasks  Equipment owned: Straight Cane; Walker with wheels; Grab bars; Raised toilet seat     Employment: No    Hobbies/Interests: Walking,  biking, listening to music,  cooking    Patient goals for therapy: Walk properly. Resist falling.  Better bedtime.    Pain assessment: Pain present     Objective   LUMBAR SPINE EVALUATION  PAIN: Pain Level at Rest: 2/10  Pain Level with Use: 6/10  Pain Location: lower thoracic spine , upper lumbar  Pain Quality: Aching  Pain Frequency:  intermittent  Pain is Exacerbated By: prolonged walking, or sitting  Pain is Relieved By: walk with assistive device and tyelnol  Pain Progression: has not fallen but gait feels the same.   INTEGUMENTARY (edema, incisions):   POSTURE:  right lateral shift.   GAIT:   Weightbearing Status:   Assistive Device(s): Cane (single end)  Gait Deviations: Stance time decreased  Stride length decreased  Right lateral shift lumbar.   BALANCE/PROPRIOCEPTION:   WEIGHTBEARING ALIGNMENT:   NON-WEIGHTBEARING ALIGNMENT:    ROM:   (Degrees) Left AROM Left PROM  Right AROM Right PROM   Hip Flexion 120  120    Hip Extension       Hip Abduction       Hip Adduction 27  27    Hip Internal Rotation 12  15    Hip External Rotation 22  22    Knee Flexion       Knee Extension       Lumbar Side glide Moderate limitation Min limitation   Lumbar Flexion Hands to knees   Lumbar Extension Major limitation    Pain:   End feel:   PELVIC/SI SCREEN:   STRENGTH:  DF right 5/5, left 4/5, PF 3/5 compensate by extending low back and bending knees, knee extension 4/5, knee flexion 4/5, hip flexion right 5/5, left 3+/5. Hip ER 5/5 bilat, hip abd right 5/5, left 3+/5. Hip extension 3/5 bilat.     MYOTOMES:   DTR S:   CORD SIGNS:   DERMATOMES:  hyposensation bilateral L5 distribution along anterior shin.   NEURAL TENSION:   FLEXIBILITY:   LUMBAR/HIP Special Tests:    PELVIS/SI SPECIAL TESTS:   FUNCTIONAL TESTS:   PALPATION:   SPINAL SEGMENTAL CONCLUSIONS:       Assessment & Plan   CLINICAL IMPRESSIONS  Medical Diagnosis: S/P lumbar spinal fusion    Treatment Diagnosis: low back pain s/p fusion   Impression/Assessment: Patient is a 58 year old male with low back pain / generealized weakness complaints.  The following significant findings have been identified: Pain, Decreased ROM/flexibility, Decreased joint mobility, Decreased strength, Impaired gait, and Decreased activity tolerance. These impairments interfere with their ability to perform recreational  activities, household chores, household mobility, and community mobility as compared to previous level of function.     Clinical Decision Making (Complexity):  Clinical Presentation: Stable/Uncomplicated  Clinical Presentation Rationale: based on medical and personal factors listed in PT evaluation  Clinical Decision Making (Complexity): Low complexity    PLAN OF CARE  Treatment Interventions:  Interventions: Manual Therapy, Neuromuscular Re-education, Therapeutic Activity, Therapeutic Exercise, Self-Care/Home Management    Long Term Goals     PT Goal 1  Goal Identifier: terrie  Goal Description: pt will be able to sit to stand without use of UE support  Rationale: to maximize safety and independence with performance of ADLs and functional tasks;to maximize safety and independence with transportation  Goal Progress: sit to stand with bilateral UE support  Target Date: 03/27/24  PT Goal 2  Goal Identifier: walk  Goal Description: ambulate 30 min with no AD  Rationale: to maximize safety and independence with performance of ADLs and functional tasks  Target Date: 05/01/24      Frequency of Treatment: 2 x/ week  Duration of Treatment: 10 weeks    Recommended Referrals to Other Professionals:   Education Assessment:   Learner/Method: Patient;Demonstration;Pictures/Video;No Barriers to Learning    Risks and benefits of evaluation/treatment have been explained.   Patient/Family/caregiver agrees with Plan of Care.     Evaluation Time:     PT Eval, Low Complexity Minutes (48418): 20   Present: Not applicable     Signing Clinician: Royer Bray, PT      [facility]                                                                                   OUTPATIENT PHYSICAL THERAPY      PLAN OF TREATMENT FOR OUTPATIENT REHABILITATION   Patient's Last Name, First Name, DREAJACOBY  HuntNeema Guillorymaria e YOB: 1965   Provider's Name   [facility]   Medical  Record No.  [id number]     Onset Date: 12/21/23  Start of Care Date: 02/21/24     Medical Diagnosis:  S/P lumbar spinal fusion      PT Treatment Diagnosis:  low back pain s/p fusion Plan of Treatment  Frequency/Duration: 2 x/ week/ 10 weeks    Certification date from 02/21/24 to 05/01/24         See note for plan of treatment details and functional goals     Royer Bray, PT                         I CERTIFY THE NEED FOR THESE SERVICES FURNISHED UNDER        THIS PLAN OF TREATMENT AND WHILE UNDER MY CARE     (Physician attestation of this document indicates review and certification of the therapy plan).              Referring Provider:  Trinh Deluna    Initial Assessment  See Epic Evaluation- Start of Care Date: 02/21/24

## 2024-02-28 ENCOUNTER — THERAPY VISIT (OUTPATIENT)
Dept: PHYSICAL THERAPY | Facility: CLINIC | Age: 59
End: 2024-02-28
Payer: COMMERCIAL

## 2024-02-28 DIAGNOSIS — M48.061 SPINAL STENOSIS OF LUMBAR REGION WITH RADICULOPATHY: Primary | ICD-10-CM

## 2024-02-28 DIAGNOSIS — M54.16 SPINAL STENOSIS OF LUMBAR REGION WITH RADICULOPATHY: Primary | ICD-10-CM

## 2024-02-28 DIAGNOSIS — Z98.1 S/P SPINAL FUSION: ICD-10-CM

## 2024-02-28 PROCEDURE — 97112 NEUROMUSCULAR REEDUCATION: CPT | Mod: GP | Performed by: PHYSICAL THERAPIST

## 2024-02-28 PROCEDURE — 97110 THERAPEUTIC EXERCISES: CPT | Mod: GP | Performed by: PHYSICAL THERAPIST

## 2024-03-05 ENCOUNTER — THERAPY VISIT (OUTPATIENT)
Dept: PHYSICAL THERAPY | Facility: CLINIC | Age: 59
End: 2024-03-05
Payer: COMMERCIAL

## 2024-03-05 DIAGNOSIS — M48.061 SPINAL STENOSIS OF LUMBAR REGION WITH RADICULOPATHY: Primary | ICD-10-CM

## 2024-03-05 DIAGNOSIS — M54.16 SPINAL STENOSIS OF LUMBAR REGION WITH RADICULOPATHY: Primary | ICD-10-CM

## 2024-03-05 DIAGNOSIS — Z98.1 S/P SPINAL FUSION: ICD-10-CM

## 2024-03-05 PROCEDURE — 97110 THERAPEUTIC EXERCISES: CPT | Mod: GP | Performed by: PHYSICAL THERAPIST

## 2024-03-05 PROCEDURE — 97112 NEUROMUSCULAR REEDUCATION: CPT | Mod: GP | Performed by: PHYSICAL THERAPIST

## 2024-03-14 NOTE — PROVIDER NOTIFICATION
"MD Notification    Notified Person: PA    Notified Person Name: Juan Pablo Saez    Notification Date/Time: 09/06/19 2:46 AM    Notification Interaction: online paging    Purpose of Notification: \"Temp 102.1, Tylenol scheduled no prn\"    Orders Received: no new orders, continue to monitor    Comments:  " Detail Level: Zone Photo Preface (Leave Blank If You Do Not Want): Photographs were obtained today

## 2024-03-19 ENCOUNTER — THERAPY VISIT (OUTPATIENT)
Dept: PHYSICAL THERAPY | Facility: CLINIC | Age: 59
End: 2024-03-19
Payer: COMMERCIAL

## 2024-03-19 DIAGNOSIS — M54.16 SPINAL STENOSIS OF LUMBAR REGION WITH RADICULOPATHY: Primary | ICD-10-CM

## 2024-03-19 DIAGNOSIS — M48.061 SPINAL STENOSIS OF LUMBAR REGION WITH RADICULOPATHY: Primary | ICD-10-CM

## 2024-03-19 DIAGNOSIS — Z98.1 S/P SPINAL FUSION: ICD-10-CM

## 2024-03-19 PROCEDURE — 97530 THERAPEUTIC ACTIVITIES: CPT | Mod: GP | Performed by: PHYSICAL THERAPIST

## 2024-03-19 PROCEDURE — 97110 THERAPEUTIC EXERCISES: CPT | Mod: GP | Performed by: PHYSICAL THERAPIST

## 2024-03-21 ENCOUNTER — TELEPHONE (OUTPATIENT)
Dept: NEUROSURGERY | Facility: CLINIC | Age: 59
End: 2024-03-21
Payer: COMMERCIAL

## 2024-03-22 ENCOUNTER — HOSPITAL ENCOUNTER (OUTPATIENT)
Dept: GENERAL RADIOLOGY | Facility: CLINIC | Age: 59
Discharge: HOME | End: 2024-03-22
Attending: NEUROLOGICAL SURGERY | Admitting: NEUROLOGICAL SURGERY
Payer: COMMERCIAL

## 2024-03-22 ENCOUNTER — OFFICE VISIT (OUTPATIENT)
Dept: NEUROSURGERY | Facility: CLINIC | Age: 59
End: 2024-03-22
Payer: COMMERCIAL

## 2024-03-22 VITALS — HEART RATE: 75 BPM | SYSTOLIC BLOOD PRESSURE: 119 MMHG | DIASTOLIC BLOOD PRESSURE: 71 MMHG | OXYGEN SATURATION: 100 %

## 2024-03-22 DIAGNOSIS — Z98.890 S/P LAMINECTOMY: ICD-10-CM

## 2024-03-22 DIAGNOSIS — Z98.1 S/P SPINAL FUSION: Primary | ICD-10-CM

## 2024-03-22 DIAGNOSIS — Z98.1 STATUS POST LUMBAR SPINAL FUSION: ICD-10-CM

## 2024-03-22 PROCEDURE — 99213 OFFICE O/P EST LOW 20 MIN: CPT | Performed by: NEUROLOGICAL SURGERY

## 2024-03-22 PROCEDURE — 72100 X-RAY EXAM L-S SPINE 2/3 VWS: CPT

## 2024-03-22 ASSESSMENT — PAIN SCALES - GENERAL: PAINLEVEL: SEVERE PAIN (6)

## 2024-03-22 NOTE — NURSING NOTE
"Neema Hunt is a 58 year old male who presents for:  Chief Complaint   Patient presents with    Follow Up     12 week follow up post op. Pain in morning and middle of the night in mid-back area. Pain in lower back when sitting. Pain lvl 6 mid back, lvl 4 low back.        Initial Vitals:  /71   Pulse 75   SpO2 100%  Estimated body mass index is 27.73 kg/m  as calculated from the following:    Height as of 2/6/24: 5' 10\" (1.778 m).    Weight as of 2/6/24: 193 lb 4 oz (87.7 kg).. There is no height or weight on file to calculate BSA. BP completed using cuff size: regular  Severe Pain (6)        Ani Gomez    "

## 2024-03-22 NOTE — PROGRESS NOTES
"It was a pleasure to see Neema Hunt today in Neurosurgery Clinic. He is a 58 year old male who underwent:    Dec 21, 2023  [facility]   Operative Note     Pre-operative diagnosis: Thoracic Instability after laminectomy  Pseudarthrosis after fusion or arthrodesis [M96.0]  Spinal stenosis of lumbar region with radiculopathy [M48.061, M54.16]   Post-operative diagnosis Same   Procedure: Procedure(s):  Insertion Thoracic 10 to Lumbar 1 and revision of Lumbar 2 to Sacral 1 instrumentation. Dual Pelvic Instrumentation. Revision fusion Lumbar 5 to Sacral 1 using allograft chips. Arthrodesis Thoracic 10 to Lumbar 2 using allograft cancellous chips    Surgeon(s): Surgeon(s) and Role:     * Nhan Marshall MD - Primary     * Prasanna Oliva PA-C - Assisting   Estimated blood loss: 1000 mL         Specimens: * No specimens in log *   Findings:    New instrumentation placed without difficulty.  Refusion at L5-S1 and primary fusion from T10-L2.     Overall he is doing reasonably well.  He has some back pain at the upper portion of his incision.  He continues to have some balance difficulties and weakness but overall seems to be doing reasonably well given his constellation of previous spine surgeries and problems.      Vitals:    03/22/24 1358   BP: 119/71   Pulse: 75   SpO2: 100%     Estimated body mass index is 27.73 kg/m  as calculated from the following:    Height as of 2/6/24: 1.778 m (5' 10\").    Weight as of 2/6/24: 87.7 kg (193 lb 4 oz).  Severe Pain (6)    Well-healed incision with a small fluctuant bubble near the superior end of the incision  Bilateral lower extremity strength is 5 out of 5 in all muscle groups    Imaging: Review of his x-rays today shows stable alignment of the hardware and position of the spine.  The imaging was reviewed with patient shown the patient clinic today.    Assessment: Status post revision thoracolumbar fusion with history of radiculopathy " and myelopathy.    Plan: Overall I think he is doing reasonably well.  I like to see him back in 9 months with repeat x-rays of the thoracolumbar spine.  I have told him to monitor the small area of his incision if he notices drainage she should let us know although it does not appear to be infected to me at this time.

## 2024-03-22 NOTE — LETTER
"    3/22/2024         RE: Neema Hunt  [address] [address]        Dear Colleague,    Thank you for referring your patient, Neema Hunt, to the [facility] NEUROLOGY CLINICS [facility]. Please see a copy of my visit note below.    It was a pleasure to see Neema Hunt today in Neurosurgery Clinic. He is a 58 year old male who underwent:    Dec 21, 2023  [facility]   Operative Note     Pre-operative diagnosis: Thoracic Instability after laminectomy  Pseudarthrosis after fusion or arthrodesis [M96.0]  Spinal stenosis of lumbar region with radiculopathy [M48.061, M54.16]   Post-operative diagnosis Same   Procedure: Procedure(s):  Insertion Thoracic 10 to Lumbar 1 and revision of Lumbar 2 to Sacral 1 instrumentation. Dual Pelvic Instrumentation. Revision fusion Lumbar 5 to Sacral 1 using allograft chips. Arthrodesis Thoracic 10 to Lumbar 2 using allograft cancellous chips    Surgeon(s): Surgeon(s) and Role:     * Nhan Marshall MD - Primary     * Prasanna Oliva PA-C - Assisting   Estimated blood loss: 1000 mL         Specimens: * No specimens in log *   Findings:    New instrumentation placed without difficulty.  Refusion at L5-S1 and primary fusion from T10-L2.     Overall he is doing reasonably well.  He has some back pain at the upper portion of his incision.  He continues to have some balance difficulties and weakness but overall seems to be doing reasonably well given his constellation of previous spine surgeries and problems.      Vitals:    03/22/24 1358   BP: 119/71   Pulse: 75   SpO2: 100%     Estimated body mass index is 27.73 kg/m  as calculated from the following:    Height as of 2/6/24: 1.778 m (5' 10\").    Weight as of 2/6/24: 87.7 kg (193 lb 4 oz).  Severe Pain (6)    Well-healed incision with a small fluctuant bubble near the superior end of the incision  Bilateral lower extremity strength is 5 out of 5 in all muscle " groups    Imaging: Review of his x-rays today shows stable alignment of the hardware and position of the spine.  The imaging was reviewed with patient shown the patient clinic today.    Assessment: Status post revision thoracolumbar fusion with history of radiculopathy and myelopathy.    Plan: Overall I think he is doing reasonably well.  I like to see him back in 9 months with repeat x-rays of the thoracolumbar spine.  I have told him to monitor the small area of his incision if he notices drainage she should let us know although it does not appear to be infected to me at this time.         Again, thank you for allowing me to participate in the care of your patient.        Sincerely,        Nhan Marshall MD

## 2024-04-02 ENCOUNTER — THERAPY VISIT (OUTPATIENT)
Dept: PHYSICAL THERAPY | Facility: CLINIC | Age: 59
End: 2024-04-02
Payer: COMMERCIAL

## 2024-04-02 DIAGNOSIS — Z98.1 S/P SPINAL FUSION: ICD-10-CM

## 2024-04-02 DIAGNOSIS — M48.061 SPINAL STENOSIS OF LUMBAR REGION WITH RADICULOPATHY: Primary | ICD-10-CM

## 2024-04-02 DIAGNOSIS — M54.16 SPINAL STENOSIS OF LUMBAR REGION WITH RADICULOPATHY: Primary | ICD-10-CM

## 2024-04-02 PROCEDURE — 97530 THERAPEUTIC ACTIVITIES: CPT | Mod: GP | Performed by: PHYSICAL THERAPIST

## 2024-04-02 PROCEDURE — 97112 NEUROMUSCULAR REEDUCATION: CPT | Mod: GP | Performed by: PHYSICAL THERAPIST

## 2024-04-02 PROCEDURE — 97110 THERAPEUTIC EXERCISES: CPT | Mod: GP | Performed by: PHYSICAL THERAPIST

## 2024-04-15 ENCOUNTER — MYC MEDICAL ADVICE (OUTPATIENT)
Dept: NEUROSURGERY | Facility: CLINIC | Age: 59
End: 2024-04-15

## 2024-04-15 ENCOUNTER — THERAPY VISIT (OUTPATIENT)
Dept: PHYSICAL THERAPY | Facility: CLINIC | Age: 59
End: 2024-04-15
Payer: COMMERCIAL

## 2024-04-15 DIAGNOSIS — M48.061 SPINAL STENOSIS OF LUMBAR REGION WITH RADICULOPATHY: Primary | ICD-10-CM

## 2024-04-15 DIAGNOSIS — Z98.890 S/P LAMINECTOMY: ICD-10-CM

## 2024-04-15 DIAGNOSIS — M25.562 KNEE PAIN, BILATERAL: ICD-10-CM

## 2024-04-15 DIAGNOSIS — M54.16 SPINAL STENOSIS OF LUMBAR REGION WITH RADICULOPATHY: Primary | ICD-10-CM

## 2024-04-15 DIAGNOSIS — Z98.1 S/P SPINAL FUSION: Primary | ICD-10-CM

## 2024-04-15 DIAGNOSIS — Z98.1 S/P SPINAL FUSION: ICD-10-CM

## 2024-04-15 DIAGNOSIS — M25.561 KNEE PAIN, BILATERAL: ICD-10-CM

## 2024-04-15 PROCEDURE — 97112 NEUROMUSCULAR REEDUCATION: CPT | Mod: GP | Performed by: PHYSICAL THERAPIST

## 2024-04-15 PROCEDURE — 97110 THERAPEUTIC EXERCISES: CPT | Mod: GP | Performed by: PHYSICAL THERAPIST

## 2024-04-15 NOTE — CONFIDENTIAL NOTE
"Last Visit: 2/6/24    Next Visit: 12/20/24    Name of Provider:  Dr. Marshall     Assessment:   Called patient to further discuss [employer] message.     S/p  Insertion of Thoracic 10 to Lumbar 1 and revision of Lumbar 2 to Sacral 1 instrumentation. Dual Pelvic Instrumentation. Revision fusion Lumbar 5 to Sacral 1 using allograft chips. Arthrodesis Thoracic 10 to Lumbar 2 using allograft cancellous chips, with Dr. Marshall on 12/21/23.     Patient fell a few days ago d/t ankles giving out while on the stairs.Landed on buttocks on step. He did not tumble down the stairs.Left buttock pain for a day or so but getting better. Knees achy and \"buckle\" from time to time. C/o burning itching type pain in both knees. Continues to have back pain, toes in both feet continue to be numb, RLE numbness for > 5 years, LLE numbness but < than RLE. Walks with cane. Said PT going well and making improvements.     Patient wanted to update on fall and see about extension for handicap parking permit. He's also wondering if he should get his knees looked at.     -if parking pass extension approved patient would like form mailed to him. Current address on file is correct.     Recommendation given:   -Reviewed red flag symptoms. Patient denies new numbness, tingling, weakness, foot drag/drop, saddle anesthesia, incontinence, intractable pain, or shortness of breath. Informed patient to seek emergency care should these symptoms arise.    -Will route enc to Dr Marshall for review    Action needed from provider:   Any concerns regarding recent fall? Do you want updated T/L XR?   Ortho referral for bilateral knee pain?  Ok for Extension for disability parking certificate?        "

## 2024-04-16 NOTE — TELEPHONE ENCOUNTER
Per Dr. Marshall:  Lets get some xrays to be sure.     I am ok with a 5 year handicap permit.     Ortho for his knee pain would be good.       Called patient and reviewed above. Patient verbalized understanding and in agreement with plan.  XR and Ortho referrals placed. Disability parking certificate completed and signed by chris Rooney for 5 years. Copy placed in RN drawer at , copy sent to [facility] rec to be scanned, original copy placed in mail to patient.

## 2024-04-18 ENCOUNTER — ANCILLARY PROCEDURE (OUTPATIENT)
Dept: GENERAL RADIOLOGY | Facility: CLINIC | Age: 59
End: 2024-04-18
Attending: NEUROLOGICAL SURGERY
Payer: COMMERCIAL

## 2024-04-18 DIAGNOSIS — Z98.890 S/P LAMINECTOMY: ICD-10-CM

## 2024-04-18 DIAGNOSIS — Z98.1 S/P SPINAL FUSION: ICD-10-CM

## 2024-04-18 PROCEDURE — 72080 X-RAY EXAM THORACOLMB 2/> VW: CPT | Mod: TC | Performed by: RADIOLOGY

## 2024-04-24 NOTE — PROGRESS NOTES
Neema Hunt  :  1965  DOS: 2024  MRN: [id number]  PCP: Cornel Mittal    Sports Medicine Clinic Visit      HPI  Neema Hunt is a 58 year old male who is seen in consultation at the request of  Nhan Marshall M.D. presenting with bilateral knee pain    - Mechanism of Injury:    - No inciting injury. Chronic symptoms that worsened in 2023 after a fall onto his anterior knees  - Pertinent history and prior evaluations:    - XR B/L knees 10/10/2023 shows mild medial compartment narrowing on the right, no acute fractures, dislocations, or effusion bilaterally.    - Hx of Right lower extremity radiculopathy and lumbar spinal stenosis s/p lumbar fusion.    - 2023 with Dr. Nhan Marshall in Neurosurgery: Insertion Thoracic 10 to Lumbar 1 and revision of Lumbar 2 to Sacral 1 instrumentation. Dual Pelvic Instrumentation. Revision fusion Lumbar 5 to Sacral 1 using allograft chips. Arthrodesis   Thoracic 10 to Lumbar 2 using allograft cancellous chips   - Subtalar fusion on the left in     - Pain Character:    - Location:  bilateral anterior knees  - Character:   achy, burning, itching  - Duration:  7 months  - Course:  steady  - Endorses:    - itching pain with palpation, buckling of the knees. Numbness in the RLE for 5+ years, LLE numbness as well, both from knees down. Walks with a cane due to ankle and knee buckling. Feeling of hyperextension.  Radicular shooting pain bilaterally, mild swelling of the knee joints, ankle instability, weakness of the legs that has gotten worse since spinal surgeries.  - Denies:    - mechanical locking symptoms, large effusions, erythma, warmth, fever, chills  - Alleviating factors:    -  Slightly with physical therapy , rest, use of a cane  - Aggravating factors:    -  ambulation, wakes him up in the middle of the night  - Other treatments tried:    - Physical therapy is helping. 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       Review of Systems  Musculoskeletal: as above  Remainder of review of systems is negative including constitutional, CV, pulmonary, GI, Skin and Neurologic except as noted in HPI or medical history.    Past Medical History:   Diagnosis Date    Arthritis     Back pain     Gastro-oesophageal reflux disease     Hypertension     Radiculopathy     Scoliosis      Past Surgical History:   Procedure Laterality Date    DAVINCI XI HERNIORRHAPHY INGUINAL Bilateral 7/26/2022    Procedure: Robotic repair of recurrent right inguinal hernia with placement of mesh, robotic repair of left inguinal hernia with placement of mesh;  Surgeon: Mary Morales MD;  Location:  OR    DAVINCI XI HERNIORRHAPHY VENTRAL N/A 7/26/2022    Procedure: Robotic repair of umbilical hernia with placement of mesh;  Surgeon: Mary Morales MD;  Location: [facility]    DISCECTOMY LUMBAR POSTERIOR MICROSCOPIC ONE LEVEL Right 7/15/2020    Procedure: Right L5-S1 foraminotomy and microdiskectomy;  Surgeon: Nhan Marshall MD;  Location:  OR    EXPLORE SPINE, REMOVE HARDWARE, COMBINED N/A 9/5/2019    Procedure: REMOVAL LUMBAR L3-5 INSTRUMENTATION;  Surgeon: Nhan Marshall MD;  Location: [facility]    FUSION SPINE ANTERIOR MINIMALLY INVASIVE TWO LEVELS N/A 11/16/2016    Procedure: FUSION SPINE ANTERIOR MINIMALLY INVASIVE TWO LEVELS;  Surgeon: Nhan Marshall MD;  [facility]    FUSION, SPINE, LUMBAR, 1 LEVEL, POSTERIOR APPROACH, ROBOTIC-ASSISTED N/A 5/27/2021    Procedure: Lumbar 5-Sacral 1 posterior segemental instrumentation, bilateral decompression and transforaminal interbody fusion using local autograft and allograft cancellous chips. Revision of Lumbar 2-4 ian. Posterior arthrodesis Lumbar 5-Sacral 1 using  local autograft and allograft cancellous chips;  Surgeon: Nhan Marshall MD;  Location[facility]    HERNIA REPAIR      right  inguinal hernia    LAMINECTOMY LUMBAR POSTERIOR MICROSCOPIC ONE LEVEL  4/9/2013    Procedure: LAMINECTOMY LUMBAR POSTERIOR MICROSCOPIC ONE LEVEL;  Right Lumbar 3-4 Micro Laminectomy & Foraminotomy;  Surgeon: Nhan Marshall MD;  [facility]: [facility]    LAMINECTOMY THORACIC ONE LEVEL N/A 9/27/2023    Procedure: Thoracic 11 to thoracic 12 laminectomy;  Surgeon: Zane Ontiveros MD;  [facility]    OPTICAL TRACKING SYSTEM FUSION SPINE POSTERIOR LUMBAR PERCUTANEOUS TWO LEVELS N/A 11/16/2016    Procedure: OPTICAL TRACKING SYSTEM FUSION SPINE POSTERIOR LUMBAR PERCUTANEOUS TWO LEVELS;  Surgeon: Nhan Marshall MD;  Location:  OR    OPTICAL TRACKING SYSTEM FUSION SPINE POSTERIOR LUMBAR THREE+ LEVELS Right 9/5/2019    Procedure: POSTERIOR SEGMENTAL INSTRUMENTATION L2-4, RIGHT LUMBAR 2-3 DISCECTOMY AND TRANSFORAMINAL INTERBODY FUSION, REDO DECOMPRESSION RIGHT L3-4, POSTERIOR ARTHRODESIS L2-4;  Surgeon: Nhan Marshall MD;  [facility]: [facility]    OPTICAL TRACKING SYSTEM FUSION SPINE POSTERIOR LUMBAR THREE+ LEVELS N/A 12/21/2023    Procedure: Insertion Thoracic 10 to Lumbar 1 and revision of Lumbar 2 to Sacral 1 instrumentation. Dual Pelvic Instrumentation. Revision fusion Lumbar 5 to Sacral 1 using allograft chips. Arthrodesis Thoracic 10 to Lumbar 2 using allograft cancellous chips;  Surgeon: Nhan Marshall MD;  Location:  OR    ORTHOPEDIC SURGERY      left ankle, right finger     Family History   Problem Relation Age of Onset    Asthma Mother     Hypertension Mother     Arthritis Mother          Objective  /71   Wt 87.7 kg (193 lb 4 oz)   BMI 27.73 kg/m      General: healthy, alert and in no acute distress.    HEENT: no scleral icterus or conjunctival erythema.   Skin: no suspicious lesions or rash. No jaundice.   CV: regular rhythm by palpation, 2+ distal pulses.  Resp: normal respiratory effort without conversational dyspnea.   Psych: normal mood and affect.    Gait: antalgic, appropriate  coordination and balance while using a single-point cane, slow margaret.    Neuro:        - Sensation to light touch:    - Diminished sensation in the bilateral medial knees and distal to the knees. Otherwise SILT in all other nerve distributions.        - MSR: Physiologic and symmetric       - Special tests:   - Slump/SLR: Positive bilaterally    MSK - Knee:       - Inspection:    -Mild effusions bilaterally without surrounding erythema, ecchymosis, lesion.        - ROM:    - Full AROM/PROM       - Palpation:    - TTP at the medial joint lines, quadriceps tendons, medial patellar borders.   - No warmth  - NTTP elsewhere.        - Strength:  (*antalgic)   - Knee Flexion  5-*   - Knee Extension 5-*         - Special tests:        - Lachman:  Neg        - A/P drawer:  Neg        - Pivot shift:  Neg    - Amparo:       - Varus stress:  Neg for laxity or pain     - Valgus stress:  Neg for laxity or pain    - Patellar grind:  Neg    - Thessaly:  Neg      Radiology  I independently reviewed the available relevant imaging in the chart with my interpretations as above in HPI.       Assessment  1. Quadriceps tendinitis    2. Instability of both knee joints    3. Chondromalacia, patella, left    4. Chondromalacia, patella, right        Plan  Neema Hunt is a pleasant 58 year old male that presents with acute on chronic bilateral knee pain.     Bilateral anteromedial knee pain and instability frequently in the setting of bilateral radiculopathies with distal weakness and numbness after multiple extensive spinal fusion surgeries.  He does have deep achy pain and frequent feelings of buckling in the knees.  TTP at the distal quadriceps tendons bilaterally, painful crepitus in the patellofemoral compartment.  Negative other testing at the knees.      I suspect a great majority of his knee pain is multifactorial in nature with contributions from chondromalacia patella, quadriceps tendinopathy, and radicular symptoms of  neuropathic pain/paresthesias and weakness.    Based on his frequent instability and buckling episodes with organic knee pain and pathology that appears to separate from his radiculopathy, we discussed that it would be beneficial to evaluate the soft tissue integrity of the knees with advanced imaging (MRI).  Order was placed today and instructions given for scheduling with me afterward to discuss results and next steps in treatment.  Also discussed neuropathic pain that seems to be getting worse.  He has been on gabapentin 300 mg at bedtime for years, no increases recently.  Will increase to 600 mg at bedtime and evaluate for response since he has a lot of burning/itching at the anterosuperior knees and we will see if this helps.  I can refill the medication if the primary prescriber is not able to.    We will follow-up when results of the advanced imaging are available for discussion of treatment options.  Patient has clinic contact information for questions/concerns.      Scar Day DO, FESTUS  [facility]  [facility] - Department of Orthopedic Surgery       Disclaimer:  This note was prepared and written using Dragon Medical dictation software. As a result, there may be errors in the script that have gone undetected. Please consider this when interpreting the information in this note.

## 2024-04-26 ENCOUNTER — OFFICE VISIT (OUTPATIENT)
Dept: ORTHOPEDICS | Facility: CLINIC | Age: 59
End: 2024-04-26
Payer: COMMERCIAL

## 2024-04-26 VITALS
SYSTOLIC BLOOD PRESSURE: 119 MMHG | DIASTOLIC BLOOD PRESSURE: 71 MMHG | BODY MASS INDEX: 27.73 KG/M2 | WEIGHT: 193.25 LBS

## 2024-04-26 DIAGNOSIS — M76.899 QUADRICEPS TENDINITIS: Primary | ICD-10-CM

## 2024-04-26 DIAGNOSIS — M25.362 INSTABILITY OF BOTH KNEE JOINTS: ICD-10-CM

## 2024-04-26 DIAGNOSIS — M22.42 CHONDROMALACIA, PATELLA, LEFT: ICD-10-CM

## 2024-04-26 DIAGNOSIS — M25.361 INSTABILITY OF BOTH KNEE JOINTS: ICD-10-CM

## 2024-04-26 DIAGNOSIS — M22.41 CHONDROMALACIA, PATELLA, RIGHT: ICD-10-CM

## 2024-04-26 PROCEDURE — 99204 OFFICE O/P NEW MOD 45 MIN: CPT | Performed by: STUDENT IN AN ORGANIZED HEALTH CARE EDUCATION/TRAINING PROGRAM

## 2024-04-26 ASSESSMENT — PAIN SCALES - GENERAL: PAINLEVEL: MODERATE PAIN (4)

## 2024-04-26 NOTE — LETTER
2024         RE: Neema Hunt  [address] [address]        Dear Colleague,    Thank you for referring your patient, Neema Hunt, to the [facility] [place]. Please see a copy of my visit note below.    Neema Hunt  :  1965  DOS: 2024  MRN: [id number]  PCP: Cornel Mittal    Sports Medicine Clinic Visit      HPI  Neema Hunt is a 58 year old male who is seen in consultation at the request of  Nhan Marshall M.D. presenting with bilateral knee pain    - Mechanism of Injury:    - No inciting injury. Chronic symptoms that worsened in 2023 after a fall onto his anterior knees  - Pertinent history and prior evaluations:    - XR B/L knees 10/10/2023 shows mild medial compartment narrowing on the right, no acute fractures, dislocations, or effusion bilaterally.    - Hx of Right lower extremity radiculopathy and lumbar spinal stenosis s/p lumbar fusion.    - 2023 with Dr. Nhan Marshall in Neurosurgery: Insertion Thoracic 10 to Lumbar 1 and revision of Lumbar 2 to Sacral 1 instrumentation. Dual Pelvic Instrumentation. Revision fusion Lumbar 5 to Sacral 1 using allograft chips. Arthrodesis   Thoracic 10 to Lumbar 2 using allograft cancellous chips   - Subtalar fusion on the left in     - Pain Character:    - Location:  bilateral anterior knees  - Character:   achy, burning, itching  - Duration:  7 months  - Course:  steady  - Endorses:    - itching pain with palpation, buckling of the knees. Numbness in the RLE for 5+ years, LLE numbness as well, both from knees down. Walks with a cane due to ankle and knee buckling. Feeling of hyperextension.  Radicular shooting pain bilaterally, mild swelling of the knee joints, ankle instability, weakness of the legs that has gotten worse since spinal surgeries.  - Denies:    - mechanical locking symptoms, large effusions, erythma, warmth, fever, chills  - Alleviating  factors:    -  Slightly with physical therapy , rest, use of a cane  - Aggravating factors:    -  ambulation, wakes him up in the middle of the night  - Other treatments tried:    - Physical therapy is helping. 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       Review of Systems  Musculoskeletal: as above  Remainder of review of systems is negative including constitutional, CV, pulmonary, GI, Skin and Neurologic except as noted in HPI or medical history.    Past Medical History:   Diagnosis Date     Arthritis      Back pain      Gastro-oesophageal reflux disease      Hypertension      Radiculopathy      Scoliosis      Past Surgical History:   Procedure Laterality Date     DAVINCI XI HERNIORRHAPHY INGUINAL Bilateral 7/26/2022    Procedure: Robotic repair of recurrent right inguinal hernia with placement of mesh, robotic repair of left inguinal hernia with placement of mesh;  Surgeon: Mary Morales MD;  Location:  OR     DAVINCI XI HERNIORRHAPHY VENTRAL N/A 7/26/2022    Procedure: Robotic repair of umbilical hernia with placement of mesh;  Surgeon: Mary Morales MD;  [facility]     DISCECTOMY LUMBAR POSTERIOR MICROSCOPIC ONE LEVEL Right 7/15/2020    Procedure: Right L5-S1 foraminotomy and microdiskectomy;  Surgeon: Nhan Marshall MD;  Location:  OR     EXPLORE SPINE, REMOVE HARDWARE, COMBINED N/A 9/5/2019    Procedure: REMOVAL LUMBAR L3-5 INSTRUMENTATION;  Surgeon: Nhan Marshall MD;  [facility]: [facility]     FUSION SPINE ANTERIOR MINIMALLY INVASIVE TWO LEVELS N/A 11/16/2016    Procedure: FUSION SPINE ANTERIOR MINIMALLY INVASIVE TWO LEVELS;  Surgeon: Nhan Marshall MD;  [facility]     FUSION, SPINE, LUMBAR, 1 LEVEL, POSTERIOR APPROACH, ROBOTIC-ASSISTED N/A 5/27/2021    Procedure: Lumbar 5-Sacral 1 posterior segemental instrumentation, bilateral decompression and  transforaminal interbody fusion using local autograft and allograft cancellous chips. Revision of Lumbar 2-4 ian. Posterior arthrodesis Lumbar 5-Sacral 1 using  local autograft and allograft cancellous chips;  Surgeon: Nhan Marshall MD;  Location:  OR     HERNIA REPAIR      right inguinal hernia     LAMINECTOMY LUMBAR POSTERIOR MICROSCOPIC ONE LEVEL  4/9/2013    Procedure: LAMINECTOMY LUMBAR POSTERIOR MICROSCOPIC ONE LEVEL;  Right Lumbar 3-4 Micro Laminectomy & Foraminotomy;  Surgeon: Nhan Marshall MD;  [facility]     LAMINECTOMY THORACIC ONE LEVEL N/A 9/27/2023    Procedure: Thoracic 11 to thoracic 12 laminectomy;  Surgeon: Zane Ontiveros MD;  Location:  OR     OPTICAL TRACKING SYSTEM FUSION SPINE POSTERIOR LUMBAR PERCUTANEOUS TWO LEVELS N/A 11/16/2016    Procedure: OPTICAL TRACKING SYSTEM FUSION SPINE POSTERIOR LUMBAR PERCUTANEOUS TWO LEVELS;  Surgeon: Nhan Marshall MD;  Location:  OR     OPTICAL TRACKING SYSTEM FUSION SPINE POSTERIOR LUMBAR THREE+ LEVELS Right 9/5/2019    Procedure: POSTERIOR SEGMENTAL INSTRUMENTATION L2-4, RIGHT LUMBAR 2-3 DISCECTOMY AND TRANSFORAMINAL INTERBODY FUSION, REDO DECOMPRESSION RIGHT L3-4, POSTERIOR ARTHRODESIS L2-4;  Surgeon: Nhan Marshall MD;  Location:  OR     OPTICAL TRACKING SYSTEM FUSION SPINE POSTERIOR LUMBAR THREE+ LEVELS N/A 12/21/2023    Procedure: Insertion Thoracic 10 to Lumbar 1 and revision of Lumbar 2 to Sacral 1 instrumentation. Dual Pelvic Instrumentation. Revision fusion Lumbar 5 to Sacral 1 using allograft chips. Arthrodesis Thoracic 10 to Lumbar 2 using allograft cancellous chips;  Surgeon: Nhan Marshall MD;  Location:  OR     ORTHOPEDIC SURGERY      left ankle, right finger     Family History   Problem Relation Age of Onset     Asthma Mother      Hypertension Mother      Arthritis Mother          Objective  /71   Wt 87.7 kg (193 lb 4 oz)   BMI 27.73 kg/m      General: healthy, alert and in no acute  distress.    HEENT: no scleral icterus or conjunctival erythema.   Skin: no suspicious lesions or rash. No jaundice.   CV: regular rhythm by palpation, 2+ distal pulses.  Resp: normal respiratory effort without conversational dyspnea.   Psych: normal mood and affect.    Gait: antalgic, appropriate coordination and balance while using a single-point cane, slow margaret.    Neuro:        - Sensation to light touch:    - Diminished sensation in the bilateral medial knees and distal to the knees. Otherwise SILT in all other nerve distributions.        - MSR: Physiologic and symmetric       - Special tests:   - Slump/SLR: Positive bilaterally    MSK - Knee:       - Inspection:    -Mild effusions bilaterally without surrounding erythema, ecchymosis, lesion.        - ROM:    - Full AROM/PROM       - Palpation:    - TTP at the medial joint lines, quadriceps tendons, medial patellar borders.   - No warmth  - NTTP elsewhere.        - Strength:  (*antalgic)   - Knee Flexion  5-*   - Knee Extension 5-*         - Special tests:        - Lachman:  Neg        - A/P drawer:  Neg        - Pivot shift:  Neg    - Amparo:       - Varus stress:  Neg for laxity or pain     - Valgus stress:  Neg for laxity or pain    - Patellar grind:  Neg    - Thessaly:  Neg      Radiology  I independently reviewed the available relevant imaging in the chart with my interpretations as above in HPI.       Assessment  1. Quadriceps tendinitis    2. Instability of both knee joints    3. Chondromalacia, patella, left    4. Chondromalacia, patella, right        Jaime Hunt is a pleasant 58 year old male that presents with acute on chronic bilateral knee pain.     Bilateral anteromedial knee pain and instability frequently in the setting of bilateral radiculopathies with distal weakness and numbness after multiple extensive spinal fusion surgeries.  He does have deep achy pain and frequent feelings of buckling in the knees.  TTP at the distal  quadriceps tendons bilaterally, painful crepitus in the patellofemoral compartment.  Negative other testing at the knees.      I suspect a great majority of his knee pain is multifactorial in nature with contributions from chondromalacia patella, quadriceps tendinopathy, and radicular symptoms of neuropathic pain/paresthesias and weakness.    Based on his frequent instability and buckling episodes with organic knee pain and pathology that appears to separate from his radiculopathy, we discussed that it would be beneficial to evaluate the soft tissue integrity of the knees with advanced imaging (MRI).  Order was placed today and instructions given for scheduling with me afterward to discuss results and next steps in treatment.  Also discussed neuropathic pain that seems to be getting worse.  He has been on gabapentin 300 mg at bedtime for years, no increases recently.  Will increase to 600 mg at bedtime and evaluate for response since he has a lot of burning/itching at the anterosuperior knees and we will see if this helps.  I can refill the medication if the primary prescriber is not able to.    We will follow-up when results of the advanced imaging are available for discussion of treatment options.  Patient has clinic contact information for questions/concerns.      Scar Day DO CAQSM  [facility] Sports Medicine  [facility] - Department of Orthopedic Surgery       Disclaimer:  This note was prepared and written using Dragon Medical dictation software. As a result, there may be errors in the script that have gone undetected. Please consider this when interpreting the information in this note.       Again, thank you for allowing me to participate in the care of your patient.        Sincerely,        Scar Day DO

## 2024-04-26 NOTE — PATIENT INSTRUCTIONS
MRI Scheduling Instructions  Please follow both steps below    1.  Advanced imaging is done by appointment. Please call [facility] ([facility]) [phone] to schedule your MRI at your earliest convenience.   - Some insurance companies may require a prior authorization to be completed which can delay the time until you are able to schedule your appointment.     - If you are active on MyChart, you may have access to your test results before your provider is able to review the study and advise on next steps.      2. After your MRI has been scheduled, please call [phone] to get on my schedule for an in-person or telephone follow up appointment to discuss the results and updated treatment recommendations. Follow up in 1-2 days after the MRI date.

## 2024-04-30 ENCOUNTER — THERAPY VISIT (OUTPATIENT)
Dept: PHYSICAL THERAPY | Facility: CLINIC | Age: 59
End: 2024-04-30
Payer: COMMERCIAL

## 2024-04-30 DIAGNOSIS — M48.061 SPINAL STENOSIS OF LUMBAR REGION WITH RADICULOPATHY: Primary | ICD-10-CM

## 2024-04-30 DIAGNOSIS — Z98.1 S/P SPINAL FUSION: ICD-10-CM

## 2024-04-30 DIAGNOSIS — M54.16 SPINAL STENOSIS OF LUMBAR REGION WITH RADICULOPATHY: Primary | ICD-10-CM

## 2024-04-30 PROCEDURE — 97110 THERAPEUTIC EXERCISES: CPT | Mod: GP | Performed by: PHYSICAL THERAPIST

## 2024-04-30 PROCEDURE — 97112 NEUROMUSCULAR REEDUCATION: CPT | Mod: GP | Performed by: PHYSICAL THERAPIST

## 2024-04-30 NOTE — PROGRESS NOTES
[facility]                                                                                   OUTPATIENT PHYSICAL THERAPY    PLAN OF TREATMENT FOR OUTPATIENT REHABILITATION   Patient's Last Name, First Name, Neema Medeiros YOB: 1965   Provider's Name   [facility]   Medical Record No.  [id number]     Onset Date: 12/21/23  Start of Care Date: 02/21/24     Medical Diagnosis:  S/P lumbar spinal fusion      PT Treatment Diagnosis:  low back pain s/p fusion Plan of Treatment  Frequency/Duration: 1x/month/ 12 weeks    Certification date from 05/02/24 to 07/25/24         See note for plan of treatment details and functional goals     Howard Orona, PT                         I CERTIFY THE NEED FOR THESE SERVICES FURNISHED UNDER        THIS PLAN OF TREATMENT AND WHILE UNDER MY CARE     (Physician attestation of this document indicates review and certification of the therapy plan).              Referring Provider:  Trinh Deluna    Initial Assessment  See Epic Evaluation- Start of Care Date: 02/21/24            PLAN  Continue therapy per current plan of care.   04/30/24 0500   Appointment Info   Signing clinician's name / credentials Howard Orona DPT   Total/Authorized Visits 20   Visits Used 7   Medical Diagnosis S/P lumbar spinal fusion   PT Tx Diagnosis low back pain s/p fusion   Other pertinent information 1. glute, hamstring and low back for one routine  2. abdominal and quad for another 3. ankle and balance /proprioception   Quick Adds Certification   Progress Note/Certification   Start of Care Date 02/21/24   Onset of illness/injury or Date of Surgery 12/21/23   Therapy Frequency 1x/month   Predicted Duration 12 weeks   Certification date from 05/02/24   Certification date to 07/25/24   Progress Note Completed Date 02/21/24       Present No   GOALS   PT Goals 2   PT Goal 1   Goal Identifier tranfers  Physical Therapy  Visit Type: treatment  Precautions:  Medical precautions:  fall risk; standard precautions.    Lines:     Basic: IV, telemetry, continuous pulse oximetry, capped IV and urinary catheter    Complex Lines: bilidrain.      Lines in chart and on patient reviewed, cautions maintained throughout session.  Hearing: no hearing deficits  Vision:     Current vision: wears glasses only for reading  Safety Measures: bed rails and bed alarm      SUBJECTIVE                                                                                                            Patient agreed to participate in therapy this date.  Pt. Was willing to sit at edge of bed during therapy but reported fatigue and requested to lay down after five minutes.    ALIVIA Hermosillo okayed session- reported CT liver biopsy at 15:00 and requested pt. Return to bed at end of session.  Patient / Family Goal: maximize function      OBJECTIVE                                                                                                                Oriented to person and place     Affect/Behavior: confused, cooperative and flat  Patient activity tolerance: 1 to 2 activity to rest and 1 to 1 activity to rest (Fair minus activity tolerance)  Functional Communication/Cognition    Overall status:  Impaired    Form of communication:  Verbal and delayed responses     Attention span:  Attends with cues to redirect    Attention Span Impairment: fatigue and internal factors    Commands: follows one step commands with increased time and follows one step commands with repetition.    Transition between tasks: transition with cues.    Safety judgement: decreased awareness of need for assistance.    Awareness of deficits: decreased awareness of deficits.  Balance    Sitting: Static: minimal assist, maximal assist and moderate assist, Dynamic: minimal assist, moderate assist and maximal assist  Balance Details: Sitting on edge of bed x five minutes with initial Min.  "  Goal Description pt will be able to sit to stand without use of UE support   Rationale to maximize safety and independence with performance of ADLs and functional tasks;to maximize safety and independence with transportation   Goal Progress not able to stand with hands on lap, needs to use chair arms as of 4/30/24   Target Date 07/25/24   PT Goal 2   Goal Identifier walk   Goal Description ambulate 30 min with no AD   Rationale to maximize safety and independence with performance of ADLs and functional tasks   Goal Progress 45-50 minutes with cane, cumulatively in several chunks of time at home as of 4/30/24   Target Date 07/25/24   Date Met   (goal extended)   Subjective Report   Subjective Report Feels the HEP is going well, is targeting the weakness and is getting stronger. Balance continues to be a struggle. Sometimes walking without cane in house.   Objective Measures   Objective Measures Objective Measure 2   Objective Measure 1   Objective Measure strength   Details B hip abduction 4+/5, L knee extension 5-/5, L knee flexion 5-/5   Objective Measure 2   Objective Measure gait   Details R trunk lean due to L leg weakness   Treatment Interventions (PT)   Interventions Therapeutic Procedure/Exercise;Therapeutic Activity;Neuromuscular Re-education;Manual Therapy;Gait Training   Therapeutic Procedure/Exercise   Therapeutic Procedures: strength, endurance, ROM, flexibility minutes (12962) 25   PTRx Ther Proc 1 Supine Lumbar Hip Roll   PTRx Ther Proc 1 - Details HEP   PTRx Ther Proc 2 Single Knee to Chest   PTRx Ther Proc 2 - Details HEP   PTRx Ther Proc 3 Clamshell Feet Apart   PTRx Ther Proc 3 - Details B x 10 continued cues to keep feet close together when performing hip ER   PTRx Ther Proc 4 Bridging #2A Weight Shift   PTRx Ther Proc 4 - Details No Notes   PTRx Ther Proc 5 Stair Step Ups   PTRx Ther Proc 5 - Details 8\" step x 12 on L discussed possibility of progressing toward alternating legs   PTRx Ther Proc " Assist to correct posterior lean, but Mod. Assist during meeta LE exercises, and Max. Assist at end of five minutes due to fatigue.    Bed Mobility:    Rolling left: total assist - dependent, with verbal cues, with tactile cues and 2 persons    Repositioning in bed: total assist - dependent, with verbal cues, with tactile cues and 2 persons      Side-lying to sit: total assist - dependent, 2 persons, with verbal cues and with tactile cues    Sit to side-lying: total assist - dependent, with tactile cues, with verbal cues and 2 persons    Sit to supine: total assist - dependent, with tactile cues, with verbal cues and 2 persons  Training completed:    Tasks: all aspects of bed mobility    Education details: body mechanics, patient requires additional training and patient safety    Supine to left sidelying with Total- Max. Assist of 2, one to position legs and turn onto side with another to turn trunk and initiate reach across body with left hand for bed rail. Left sidelying to sit with Total- Max. Assist of 1 to move legs over edge of bed and Max. Of another to laterally weight-shift trunk.    Sit to left sidelying to supine with Total- Max. Assist of 1 to lower trunk and Max. Of another to lift legs.  Transfers:      Sit to stand: not attempted due to safety concerns and not attempted due to medical condition        Interventions                                                                                                       Seated    Lower Extremity: Bilateral: heel raises, toe raises and knee extensions, AROM, 10 reps, 1 sets  Neuromuscular Re-Education: Facilitation at trunk for anterior weight-shift to maintain upright trunk stability; pt. Used meeta UE support on bed. Increased posterior lean during exercises.  Training provided: activity tolerance, balance retraining, bed mobility training, body mechanics, safety training and HEP training    Skilled input: Verbal instruction/cues, tactile instruction/cues,  6 Sit to Stand   PTRx Ther Proc 6 - Details x12 with hands on chair arms   PTRx Ther Proc 7 Toe Raises   PTRx Ther Proc 7 - Details HEP   Therapeutic Activity   PTRx Ther Act 1 Body Mechanics - Half Kneel Lift   PTRx Ther Act 1 - Details HEP   PTRx Ther Act 2 Stair Step Ups   Neuromuscular Re-education   Neuromuscular re-ed of mvmt, balance, coord, kinesthetic sense, posture, proprioception minutes (81179) 10   PTRx Neuro Re-ed 1 Standing Hip Flexion   PTRx Neuro Re-ed 1 - Details x10 bilaterally finger tip support with focus on slowing down pace to challenge balance   PTRx Neuro Re-ed 2 Balance Single Leg Stance Supported and Unsupported   PTRx Neuro Re-ed 2 - Details HEP   PTRx Neuro Re-ed 3 Sideways Walking   PTRx Neuro Re-ed 3 - Details 3' full 360 deg turn around when changing directions holding cane in hands   PTRx Neuro Re-ed 4 Walking On a Line   PTRx Neuro Re-ed 4 - Details 2' with cane   Skilled Intervention HEP   Patient Response/Progress tolerated well   Plan   Home program see PTRx   Updates to plan of care sit to stand, bridge progression   Plan for next session progress balance/strength 1x/month   Total Session Time   Timed Code Treatment Minutes 35   Total Treatment Time (sum of timed and untimed services) 35       Beginning/End Dates of Progress Note Reporting Period:  02/21/24 to 04/30/2024    Referring Provider:  Trinh Deluna     posture correction and facilitation  Verbal Consent: Writer verbally educated and received verbal consent for hand placement, positioning of patient, and techniques to be performed today from patient for clothing adjustments for techniques, therapist position for techniques and hand placement and palpation for techniques as described above and how they are pertinent to the patient's plan of care.        ASSESSMENT                                                                                                                Impairments: range of motion, strength, balance deficits, cognition, safety awareness, pain, activity tolerance and endurance  Functional Limitations: all functional mobility  Pt. Continues to present with deficits in all functional mobility due to generalized weakness, imbalance, and decreased activity tolerance due to medical acuity. Session focused on sitting balance at edge of bed x 5 minutes with varying assist from Min. To Max. Assist to correct posterior lean with increased assist required during meeta LE exercises and as pt. Fatigued. Pt. Completed meeta LE exercises x 10 reps. Goals revised until 1/11/21. Will continue skilled PT to increase activity tolerance and progress transfers as tolerated.       Discharge Recommendations  Recommendation for Discharge: PT WI: Sub-acute nursing home   Recommendation Comments: Maxi-Move for mobility to recliner     PT/OT Mobility Equipment for Discharge: Will continue to monitor needs; has cane and WW from her spouse, but they are too tall for her. (may be adjustable?)   PT/OT ADL Equipment for Discharge: Continue to monitor needs  PT Identified Barriers to Discharge: pain, weakness, balance and safety deficits, decreased activity tolerance, lives alone     Skilled therapy is required to address these limitations in attempt to maximize the patient's independence.    End of Session:   Location: in bed  Safety measures: alarm system in place/re-engaged,  call light within reach and lines intact  Handoff to: nurse            PLAN                                                                                                                            Suggestions for next session as indicated: Focus on sitting balance, progress to standing with SHRUTHI Stedy, marching as tolerated, sitting meeta LE strengthening exercises    Frequency Comments: M-F, bring rehab aide or dove-tail (1/5)      Interventions: balance, bed mobility, body mechanics, gait training, neuromuscular re-education, strengthening, patient/family training, safety education, endurance training, functional transfer training and stairs retraining  Agreement to plan and goals: patient agrees with goals and treatment plan        GOALS:  Review Date: 1/11/2021  Short Term Goals:   STGs to be reviewed 1/11/20:    Pt to be mod assist with bed mobility.  Pt to be Mod. Assist with transfers.  Pt to ambulate 50ft  with 2WW and Mod. Assist  Long Term Goals: (to be met by time of discharge from hospital)  Sit to supine: Patient will complete sit to supine minimal assist.  Status: revised, this goal discontinued, new goal written  Supine to sit: Patient will complete supine to sit minimal assist.  Status: revised, this goal discontinued, new goal written  Sit to stand: Patient will complete sit to stand transfer with minimal assist.  Status: revised, this goal discontinued, new goal written  Stand to sit: Patient will complete stand to sit transfer with minimal assist.  Status: revised, this goal discontinued, new goal written  Ambulation (even): Patient will ambulate on even surface for 150 feet with 2-wheeled walker (least restrictive device), minimal assist.   Status: revised, this goal modified  3-4 steps: Patient will ambulate 3-4 steps with using one rail, minimal assist.  Status: revised, this goal modified    Documented in the chart in the following areas: Pain. Assessment.

## 2024-05-06 ENCOUNTER — MYC MEDICAL ADVICE (OUTPATIENT)
Dept: ORTHOPEDICS | Facility: CLINIC | Age: 59
End: 2024-05-06
Payer: COMMERCIAL

## 2024-05-06 DIAGNOSIS — M76.899 QUADRICEPS TENDINITIS: Primary | ICD-10-CM

## 2024-05-06 DIAGNOSIS — M22.41 CHONDROMALACIA, PATELLA, RIGHT: ICD-10-CM

## 2024-05-06 DIAGNOSIS — M22.42 CHONDROMALACIA, PATELLA, LEFT: ICD-10-CM

## 2024-05-06 RX ORDER — GABAPENTIN 300 MG/1
600 CAPSULE ORAL AT BEDTIME
Qty: 180 CAPSULE | Refills: 0 | Status: SHIPPED | OUTPATIENT
Start: 2024-05-06 | End: 2024-08-15

## 2024-05-26 ENCOUNTER — HEALTH MAINTENANCE LETTER (OUTPATIENT)
Age: 59
End: 2024-05-26

## 2024-05-28 ENCOUNTER — ANCILLARY PROCEDURE (OUTPATIENT)
Dept: MRI IMAGING | Facility: CLINIC | Age: 59
End: 2024-05-28
Attending: STUDENT IN AN ORGANIZED HEALTH CARE EDUCATION/TRAINING PROGRAM
Payer: COMMERCIAL

## 2024-05-28 DIAGNOSIS — M76.899 QUADRICEPS TENDINITIS: ICD-10-CM

## 2024-05-28 DIAGNOSIS — M25.361 INSTABILITY OF BOTH KNEE JOINTS: ICD-10-CM

## 2024-05-28 DIAGNOSIS — M22.42 CHONDROMALACIA, PATELLA, LEFT: ICD-10-CM

## 2024-05-28 DIAGNOSIS — M22.41 CHONDROMALACIA, PATELLA, RIGHT: ICD-10-CM

## 2024-05-28 DIAGNOSIS — M25.362 INSTABILITY OF BOTH KNEE JOINTS: ICD-10-CM

## 2024-05-28 PROCEDURE — 73721 MRI JNT OF LWR EXTRE W/O DYE: CPT | Mod: LT | Performed by: RADIOLOGY

## 2024-05-28 PROCEDURE — 73721 MRI JNT OF LWR EXTRE W/O DYE: CPT | Mod: RT | Performed by: RADIOLOGY

## 2024-05-30 ENCOUNTER — THERAPY VISIT (OUTPATIENT)
Dept: PHYSICAL THERAPY | Facility: CLINIC | Age: 59
End: 2024-05-30
Payer: COMMERCIAL

## 2024-05-30 DIAGNOSIS — M48.061 SPINAL STENOSIS OF LUMBAR REGION WITH RADICULOPATHY: Primary | ICD-10-CM

## 2024-05-30 DIAGNOSIS — Z98.1 S/P SPINAL FUSION: ICD-10-CM

## 2024-05-30 DIAGNOSIS — M54.16 SPINAL STENOSIS OF LUMBAR REGION WITH RADICULOPATHY: Primary | ICD-10-CM

## 2024-05-30 PROCEDURE — 97112 NEUROMUSCULAR REEDUCATION: CPT | Mod: 59 | Performed by: PHYSICAL THERAPIST

## 2024-05-30 PROCEDURE — 97530 THERAPEUTIC ACTIVITIES: CPT | Mod: GP | Performed by: PHYSICAL THERAPIST

## 2024-05-30 PROCEDURE — 97110 THERAPEUTIC EXERCISES: CPT | Mod: 59 | Performed by: PHYSICAL THERAPIST

## 2024-06-05 NOTE — PROGRESS NOTES
Neema Hunt  :  1965  DOS: 2024  MRN: [id number]  PCP: Cornel Mittal    Sports Medicine Clinic Visit    Interim History - 2024  - Last seen on 2024 for bilateral knee instability, quadriceps tendonitis and bilateral chondromalacia patella.     - 2024 right knee MRI   1. Horizontal oblique tear of the medial and lateral menisci. There is a small medial meniscal flap adjacent to the posterior horn root ligament junction.   2. Tricompartmental chondromalacia including areas of grade 4 changes,  greatest in the medial and lateral patellar facets and medial/lateral compartments.   3. Intact visualized quadriceps tendon.  4. Edema deep aspect of Hoffa's fat pad, query Hoffa's impingement disease.  5. Small joint effusion and Baker's cyst with internal debris.       - 2024 left knee MRI   1. Horizontal tear of the posterior horn of the medial meniscus, with  tiny parameniscal cyst.  2. Grade III chondromalacia along the central trochlear surface with  diffuse moderate grade cartilage fissuring in the medial femorotibial  joint compartment.  3. The anterior and posterior cruciate ligaments, medial and lateral  supporting structures, and lateral meniscus are intact.     - Since the last visit, he notes no change in symptoms. Presents to discuss bilateral knee MRIs.   - No interim injury.       Initial Visit: 2024  HPI  Neema Hunt is a 58 year old male who is seen in consultation at the request of  Nhan Marshall M.D. presenting with bilateral knee pain    - Mechanism of Injury:    - No inciting injury. Chronic symptoms that worsened in 2023 after a fall onto his anterior knees  - Pertinent history and prior evaluations:    - XR B/L knees 10/10/2023 shows mild medial compartment narrowing on the right, no acute fractures, dislocations, or effusion bilaterally.    - Hx of Right lower extremity radiculopathy and lumbar spinal stenosis s/p lumbar fusion.     - 12/21/2023 with Dr. Nhan Marshall in Neurosurgery: Insertion Thoracic 10 to Lumbar 1 and revision of Lumbar 2 to Sacral 1 instrumentation. Dual Pelvic Instrumentation. Revision fusion Lumbar 5 to Sacral 1 using allograft chips. Arthrodesis   Thoracic 10 to Lumbar 2 using allograft cancellous chips   - Subtalar fusion on the left in 2009    - Pain Character:    - Location:  bilateral anterior knees  - Character:   achy, burning, itching  - Duration:  7 months  - Course:  steady  - Endorses:    - itching pain with palpation, buckling of the knees. Numbness in the RLE for 5+ years, LLE numbness as well, both from knees down. Walks with a cane due to ankle and knee buckling. Feeling of hyperextension.  Radicular shooting pain bilaterally, mild swelling of the knee joints, ankle instability, weakness of the legs that has gotten worse since spinal surgeries.  - Denies:    - mechanical locking symptoms, large effusions, erythma, warmth, fever, chills  - Alleviating factors:    -  Slightly with physical therapy , rest, use of a cane  - Aggravating factors:    -  ambulation, wakes him up in the middle of the night  - Other treatments tried:    - Physical therapy is helping. 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       Review of Systems  Musculoskeletal: as above  Remainder of review of systems is negative including constitutional, CV, pulmonary, GI, Skin and Neurologic except as noted in HPI or medical history.    Past Medical History:   Diagnosis Date    Arthritis     Back pain     Gastro-oesophageal reflux disease     Hypertension     Radiculopathy     Scoliosis      Past Surgical History:   Procedure Laterality Date    DAVINCI XI HERNIORRHAPHY INGUINAL Bilateral 7/26/2022    Procedure: Robotic repair of recurrent right inguinal hernia with placement of mesh, robotic repair of left inguinal hernia  with placement of mesh;  Surgeon: Mary Morales MD;  Location:  OR    DAVINCI XI HERNIORRHAPHY VENTRAL N/A 7/26/2022    Procedure: Robotic repair of umbilical hernia with placement of mesh;  Surgeon: Mary Morales MD;  Location: [facility] OR    DISCECTOMY LUMBAR POSTERIOR MICROSCOPIC ONE LEVEL Right 7/15/2020    Procedure: Right L5-S1 foraminotomy and microdiskectomy;  Surgeon: Nhan Marshall MD;  Location:  OR    EXPLORE SPINE, REMOVE HARDWARE, COMBINED N/A 9/5/2019    Procedure: REMOVAL LUMBAR L3-5 INSTRUMENTATION;  Surgeon: Nhan Marshall MD;  Location: [facility] OR    FUSION SPINE ANTERIOR MINIMALLY INVASIVE TWO LEVELS N/A 11/16/2016    Procedure: FUSION SPINE ANTERIOR MINIMALLY INVASIVE TWO LEVELS;  Surgeon: Nhan Marshall MD;  Location: [facility] OR    FUSION, SPINE, LUMBAR, 1 LEVEL, POSTERIOR APPROACH, ROBOTIC-ASSISTED N/A 5/27/2021    Procedure: Lumbar 5-Sacral 1 posterior segemental instrumentation, bilateral decompression and transforaminal interbody fusion using local autograft and allograft cancellous chips. Revision of Lumbar 2-4 ian. Posterior arthrodesis Lumbar 5-Sacral 1 using  local autograft and allograft cancellous chips;  Surgeon: Nhan Marshall MD;  Location:  OR    HERNIA REPAIR      right inguinal hernia    LAMINECTOMY LUMBAR POSTERIOR MICROSCOPIC ONE LEVEL  4/9/2013    Procedure: LAMINECTOMY LUMBAR POSTERIOR MICROSCOPIC ONE LEVEL;  Right Lumbar 3-4 Micro Laminectomy & Foraminotomy;  Surgeon: Nhan Marshall MD;  Location: [facility] OR    LAMINECTOMY THORACIC ONE LEVEL N/A 9/27/2023    Procedure: Thoracic 11 to thoracic 12 laminectomy;  Surgeon: Zane Ontiveros MD;  Location:  OR    OPTICAL TRACKING SYSTEM FUSION SPINE POSTERIOR LUMBAR PERCUTANEOUS TWO LEVELS N/A 11/16/2016    Procedure: OPTICAL TRACKING SYSTEM FUSION SPINE POSTERIOR LUMBAR PERCUTANEOUS TWO LEVELS;  Surgeon: Nhan Marshall MD;  Location: [facility]    OPTICAL TRACKING SYSTEM FUSION SPINE POSTERIOR LUMBAR  THREE+ LEVELS Right 9/5/2019    Procedure: POSTERIOR SEGMENTAL INSTRUMENTATION L2-4, RIGHT LUMBAR 2-3 DISCECTOMY AND TRANSFORAMINAL INTERBODY FUSION, REDO DECOMPRESSION RIGHT L3-4, POSTERIOR ARTHRODESIS L2-4;  Surgeon: Nhan Marshall MD;  Location:  OR    OPTICAL TRACKING SYSTEM FUSION SPINE POSTERIOR LUMBAR THREE+ LEVELS N/A 12/21/2023    Procedure: Insertion Thoracic 10 to Lumbar 1 and revision of Lumbar 2 to Sacral 1 instrumentation. Dual Pelvic Instrumentation. Revision fusion Lumbar 5 to Sacral 1 using allograft chips. Arthrodesis Thoracic 10 to Lumbar 2 using allograft cancellous chips;  Surgeon: Nhan Marshall MD;  Location:  OR    ORTHOPEDIC SURGERY      left ankle, right finger     Family History   Problem Relation Age of Onset    Asthma Mother     Hypertension Mother     Arthritis Mother          Objective  Wt 87.7 kg (193 lb 4 oz)   BMI 27.73 kg/m      General: healthy, alert and in no acute distress.    HEENT: no scleral icterus or conjunctival erythema.   Skin: no suspicious lesions or rash. No jaundice.   CV: regular rhythm by palpation, 2+ distal pulses.  Resp: normal respiratory effort without conversational dyspnea.   Psych: normal mood and affect.    Gait: antalgic, appropriate coordination and balance while using a single-point cane, slow margaret.    Neuro:        - Sensation to light touch:    - Diminished sensation in the bilateral medial knees and distal to the knees. Otherwise SILT in all other nerve distributions.        - MSR: Physiologic and symmetric       - Special tests:   - Slump/SLR: Positive bilaterally    MSK - Knee:       - Inspection:    -Mild effusions bilaterally without surrounding erythema, ecchymosis, lesion.        - ROM:    - Full AROM/PROM       - Palpation:    - TTP at the medial joint lines, quadriceps tendons, medial patellar borders.   - No warmth  - NTTP elsewhere.        - Strength:  (*antalgic)   - Knee Flexion  5-*   - Knee Extension 5-*         -  Special tests:        - Lachman:  Neg        - A/P drawer:  Neg        - Pivot shift:  Neg    - Amparo:       - Varus stress:  Neg for laxity or pain     - Valgus stress:  Neg for laxity or pain    - Patellar grind:  Neg    - Thessaly:  Neg    Procedures  Large Joint Injection/Arthocentesis: bilateral knee    Date/Time: 6/7/2024 9:30 AM    Performed by: Scar Day DO  Authorized by: Scar Day DO    Indications:  Osteoarthritis  Needle Size:  22 G  Guidance: ultrasound    Approach:  Anterolateral  Location:  Knee  Laterality:  Bilateral      Medications (Right):  40 mg triamcinolone 40 MG/ML; 2 mL lidocaine 1 %; 2 mL BUPivacaine (PF) 0.5 %  Medications (Left):  40 mg triamcinolone 40 MG/ML; 2 mL lidocaine 1 %; 2 mL BUPivacaine (PF) 0.5 %  Outcome:  Tolerated well, no immediate complications  Procedure discussed: discussed risks, benefits, and alternatives    Consent Given by:  Patient  Prep: patient was prepped and draped in usual sterile fashion     Ultrasound images of procedure were permanently stored.        Ultrasound Guided Intra-articular Knee Injection  The knee was prepped and draped in a sterile manner.  Ultrasound identification of the patella, suprapatellar pouch, quadriceps tendon and femur in both long and short axis was obtained. The probe was placed in short axis to the femur. A 1.5 inch 22 gauge needle was placed under ultrasound guidance into the superior knee joint using a lateral approach.  A mixture of 2 mL of 1% lidocaine, 2 mL of 0.5% bupivacaine and 1 ml kenalog (40mg/ml) was injected without difficulty. The needle was removed and there was good hemostasis without complications.  Patient tolerated procedure well.  There was ultrasound documentation of needle placement and injection.  The procedure was duplicated on the contralateral side using an identical protocol.         Radiology  I independently reviewed the available relevant imaging in the chart with my interpretations as  above in HPI.   - MRI R knee 5/28/2024 shows horizontal tearing of the medial and lateral menisci with involvement of a meniscal flap and posterior horn root ligament junction on the medial meniscus.  Also shows grade IV chondromalacia tricompartmental, worst in the patellofemoral and medial compartments.  He has some nonspecific edema at the superior half facet at pad and suprapatellar fat pad.  Also with a small joint effusion and Baker's cyst.  - MRI L knee 5/20/2024 shows horizontal tearing of the posterior horn of the medial meniscus and a very small parameniscal cyst.  There is grade III chondromalacia along the trochlear surface and medial compartment.  No other major findings.      Assessment  1. Primary osteoarthritis of knees, bilateral    2. Tear of medial meniscus of right knee, current, unspecified tear type, initial encounter    3. Tear of medial meniscus of left knee, current, unspecified tear type, initial encounter    4. Instability of both knee joints    5. Spinal stenosis of lumbar region with radiculopathy          Plan  Neema Hunt is a pleasant 58 year old male that presents for follow-up of his acute on chronic bilateral knee pain.     Originally presented with complaints of bilateral anteromedial knee pain and instability frequently in the setting of bilateral radiculopathies with distal weakness and numbness after multiple extensive spinal fusion surgeries.  He does have deep achy pain and frequent feelings of buckling in the knees.  TTP at the distal quadriceps tendons and suprapatellar area bilaterally, painful crepitus in the patellofemoral compartment.  Negative other testing at the knees at original visit.      I suspect a great majority of his knee pain is multifactorial in nature with contributions from chondromalacia patella, quadriceps tendinopathy, and radicular symptoms of neuropathic pain/paresthesias and weakness.    Based on his frequent instability and buckling episodes  with organic knee pain and pathology that appears to separate from his radiculopathy, we discussed that it would be beneficial to evaluate the soft tissue integrity of the knees with advanced imaging (MRI).      MRIs of each knee was obtained, the left knee showed horizontal tearing of the posterior horn of the medial meniscus with a small parameniscal cyst and grade III chondromalacia primarily present in the patellofemoral and medial compartments.  Right knee showed more extensive internal derangement with horizontal tears of the medial and lateral menisci and the medial meniscus has a small flap near the posterior horn root ligament junction, this is in the setting of grade 4 Tricompartmental chondromalacia worst in the patellofemoral medial compartments.  There is also a small joint effusion and Baker's cyst with nonspecific edema in the Hoffa's fat pad and suprapatellar fat pad.    The information from the MRIs certainly lend itself to his organic knee pain and swelling and he may benefit from corticosteroid injection into the knee joint to help with swelling and pain.  Offered bilateral corticosteroid injections today and these injections were performed in clinic without complication, patient tolerated procedures well.  See him procedure note above for details.    His neuropathic symptoms such as itching, burning, numbness/tingling and paresthesias in the lower extremities is likely not to improve from these corticosteroid injections, as there are likely contributions from his lumbar disease.  He is following up with neurosurgery to discuss treatment options for this.  Gabapentin increased dose has been helpful, recommended to continue.    Plan to follow-up for the knees as needed in the future.  Repeat corticosteroid injections could be considered in 3+ months.  Additionally treatment options could include formal physical therapy, knee bracing, other intra-articular knee joint injections such as  viscosupplementation or PRP.      Scar Day DO, CAQSM  [facility]  [facility] - Department of Orthopedic Surgery       Disclaimer:  This note was prepared and written using Dragon Medical dictation software. As a result, there may be errors in the script that have gone undetected. Please consider this when interpreting the information in this note.

## 2024-06-07 ENCOUNTER — OFFICE VISIT (OUTPATIENT)
Dept: ORTHOPEDICS | Facility: CLINIC | Age: 59
End: 2024-06-07
Payer: COMMERCIAL

## 2024-06-07 VITALS — WEIGHT: 193.25 LBS | BODY MASS INDEX: 27.73 KG/M2

## 2024-06-07 DIAGNOSIS — S83.242A TEAR OF MEDIAL MENISCUS OF LEFT KNEE, CURRENT, UNSPECIFIED TEAR TYPE, INITIAL ENCOUNTER: ICD-10-CM

## 2024-06-07 DIAGNOSIS — M25.361 INSTABILITY OF BOTH KNEE JOINTS: ICD-10-CM

## 2024-06-07 DIAGNOSIS — M54.16 SPINAL STENOSIS OF LUMBAR REGION WITH RADICULOPATHY: ICD-10-CM

## 2024-06-07 DIAGNOSIS — M17.0 PRIMARY OSTEOARTHRITIS OF KNEES, BILATERAL: Primary | ICD-10-CM

## 2024-06-07 DIAGNOSIS — M25.362 INSTABILITY OF BOTH KNEE JOINTS: ICD-10-CM

## 2024-06-07 DIAGNOSIS — M48.061 SPINAL STENOSIS OF LUMBAR REGION WITH RADICULOPATHY: ICD-10-CM

## 2024-06-07 DIAGNOSIS — S83.241A TEAR OF MEDIAL MENISCUS OF RIGHT KNEE, CURRENT, UNSPECIFIED TEAR TYPE, INITIAL ENCOUNTER: ICD-10-CM

## 2024-06-07 PROCEDURE — 99214 OFFICE O/P EST MOD 30 MIN: CPT | Mod: 25 | Performed by: STUDENT IN AN ORGANIZED HEALTH CARE EDUCATION/TRAINING PROGRAM

## 2024-06-07 PROCEDURE — 20611 DRAIN/INJ JOINT/BURSA W/US: CPT | Mod: 50 | Performed by: STUDENT IN AN ORGANIZED HEALTH CARE EDUCATION/TRAINING PROGRAM

## 2024-06-07 RX ORDER — TRIAMCINOLONE ACETONIDE 40 MG/ML
40 INJECTION, SUSPENSION INTRA-ARTICULAR; INTRAMUSCULAR
Status: SHIPPED | OUTPATIENT
Start: 2024-06-07

## 2024-06-07 RX ORDER — BUPIVACAINE HYDROCHLORIDE 5 MG/ML
2 INJECTION, SOLUTION EPIDURAL; INTRACAUDAL
Status: SHIPPED | OUTPATIENT
Start: 2024-06-07

## 2024-06-07 RX ORDER — LIDOCAINE HYDROCHLORIDE 10 MG/ML
2 INJECTION, SOLUTION INFILTRATION; PERINEURAL
Status: SHIPPED | OUTPATIENT
Start: 2024-06-07

## 2024-06-07 RX ADMIN — TRIAMCINOLONE ACETONIDE 40 MG: 40 INJECTION, SUSPENSION INTRA-ARTICULAR; INTRAMUSCULAR at 09:30

## 2024-06-07 RX ADMIN — BUPIVACAINE HYDROCHLORIDE 2 ML: 5 INJECTION, SOLUTION EPIDURAL; INTRACAUDAL at 09:30

## 2024-06-07 RX ADMIN — LIDOCAINE HYDROCHLORIDE 2 ML: 10 INJECTION, SOLUTION INFILTRATION; PERINEURAL at 09:30

## 2024-06-07 NOTE — LETTER
2024      Neema Hunt  [address] [facility] [address]      Dear Colleague,    Thank you for referring your patient, Neema Hunt, to the [facility] [place]. Please see a copy of my visit note below.    Neema Hunt  :  1965  DOS: 2024  MRN: [id number]  PCP: Cornel Mittal    Sports Medicine Clinic Visit    Interim History - 2024  - Last seen on 2024 for bilateral knee instability, quadriceps tendonitis and bilateral chondromalacia patella.     - 2024 right knee MRI   1. Horizontal oblique tear of the medial and lateral menisci. There is a small medial meniscal flap adjacent to the posterior horn root ligament junction.   2. Tricompartmental chondromalacia including areas of grade 4 changes,  greatest in the medial and lateral patellar facets and medial/lateral compartments.   3. Intact visualized quadriceps tendon.  4. Edema deep aspect of Hoffa's fat pad, query Hoffa's impingement disease.  5. Small joint effusion and Baker's cyst with internal debris.       - 2024 left knee MRI   1. Horizontal tear of the posterior horn of the medial meniscus, with  tiny parameniscal cyst.  2. Grade III chondromalacia along the central trochlear surface with  diffuse moderate grade cartilage fissuring in the medial femorotibial  joint compartment.  3. The anterior and posterior cruciate ligaments, medial and lateral  supporting structures, and lateral meniscus are intact.     - Since the last visit, he notes no change in symptoms. Presents to discuss bilateral knee MRIs.   - No interim injury.       Initial Visit: 2024  HPI  Neema Hunt is a 58 year old male who is seen in consultation at the request of  Nhan Marshall M.D. presenting with bilateral knee pain    - Mechanism of Injury:    - No inciting injury. Chronic symptoms that worsened in 2023 after a fall onto his anterior knees  - Pertinent  history and prior evaluations:    - XR B/L knees 10/10/2023 shows mild medial compartment narrowing on the right, no acute fractures, dislocations, or effusion bilaterally.    - Hx of Right lower extremity radiculopathy and lumbar spinal stenosis s/p lumbar fusion.    - 12/21/2023 with Dr. Nhan Marshall in Neurosurgery: Insertion Thoracic 10 to Lumbar 1 and revision of Lumbar 2 to Sacral 1 instrumentation. Dual Pelvic Instrumentation. Revision fusion Lumbar 5 to Sacral 1 using allograft chips. Arthrodesis   Thoracic 10 to Lumbar 2 using allograft cancellous chips   - Subtalar fusion on the left in 2009    - Pain Character:    - Location:  bilateral anterior knees  - Character:   achy, burning, itching  - Duration:  7 months  - Course:  steady  - Endorses:    - itching pain with palpation, buckling of the knees. Numbness in the RLE for 5+ years, LLE numbness as well, both from knees down. Walks with a cane due to ankle and knee buckling. Feeling of hyperextension.  Radicular shooting pain bilaterally, mild swelling of the knee joints, ankle instability, weakness of the legs that has gotten worse since spinal surgeries.  - Denies:    - mechanical locking symptoms, large effusions, erythma, warmth, fever, chills  - Alleviating factors:    -  Slightly with physical therapy , rest, use of a cane  - Aggravating factors:    -  ambulation, wakes him up in the middle of the night  - Other treatments tried:    - Physical therapy is helping. 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       Review of Systems  Musculoskeletal: as above  Remainder of review of systems is negative including constitutional, CV, pulmonary, GI, Skin and Neurologic except as noted in HPI or medical history.    Past Medical History:   Diagnosis Date     Arthritis      Back pain      Gastro-oesophageal reflux disease      Hypertension       Radiculopathy      Scoliosis      Past Surgical History:   Procedure Laterality Date     DAVINCI XI HERNIORRHAPHY INGUINAL Bilateral 7/26/2022    Procedure: Robotic repair of recurrent right inguinal hernia with placement of mesh, robotic repair of left inguinal hernia with placement of mesh;  Surgeon: Mary Morales MD;  Location:  OR     DAVINCI XI HERNIORRHAPHY VENTRAL N/A 7/26/2022    Procedure: Robotic repair of umbilical hernia with placement of mesh;  Surgeon: Mary Morales MD;  Location:  OR     DISCECTOMY LUMBAR POSTERIOR MICROSCOPIC ONE LEVEL Right 7/15/2020    Procedure: Right L5-S1 foraminotomy and microdiskectomy;  Surgeon: Nhan Marshall MD;  Location:  OR     EXPLORE SPINE, REMOVE HARDWARE, COMBINED N/A 9/5/2019    Procedure: REMOVAL LUMBAR L3-5 INSTRUMENTATION;  Surgeon: Nhan Marshall MD;  Location: [facility] OR     FUSION SPINE ANTERIOR MINIMALLY INVASIVE TWO LEVELS N/A 11/16/2016    Procedure: FUSION SPINE ANTERIOR MINIMALLY INVASIVE TWO LEVELS;  Surgeon: Nhan Marshall MD;  Location: [facility] OR     FUSION, SPINE, LUMBAR, 1 LEVEL, POSTERIOR APPROACH, ROBOTIC-ASSISTED N/A 5/27/2021    Procedure: Lumbar 5-Sacral 1 posterior segemental instrumentation, bilateral decompression and transforaminal interbody fusion using local autograft and allograft cancellous chips. Revision of Lumbar 2-4 ian. Posterior arthrodesis Lumbar 5-Sacral 1 using  local autograft and allograft cancellous chips;  Surgeon: Nhan Marshall MD;  Location:  OR     HERNIA REPAIR      right inguinal hernia     LAMINECTOMY LUMBAR POSTERIOR MICROSCOPIC ONE LEVEL  4/9/2013    Procedure: LAMINECTOMY LUMBAR POSTERIOR MICROSCOPIC ONE LEVEL;  Right Lumbar 3-4 Micro Laminectomy & Foraminotomy;  Surgeon: hNan Marshall MD;  Location: [facility] OR     LAMINECTOMY THORACIC ONE LEVEL N/A 9/27/2023    Procedure: Thoracic 11 to thoracic 12 laminectomy;  Surgeon: Zane Ontiveros MD;  Location:  OR     OPTICAL TRACKING  SYSTEM FUSION SPINE POSTERIOR LUMBAR PERCUTANEOUS TWO LEVELS N/A 11/16/2016    Procedure: OPTICAL TRACKING SYSTEM FUSION SPINE POSTERIOR LUMBAR PERCUTANEOUS TWO LEVELS;  Surgeon: Nhan Marshall MD;  Location:  OR     OPTICAL TRACKING SYSTEM FUSION SPINE POSTERIOR LUMBAR THREE+ LEVELS Right 9/5/2019    Procedure: POSTERIOR SEGMENTAL INSTRUMENTATION L2-4, RIGHT LUMBAR 2-3 DISCECTOMY AND TRANSFORAMINAL INTERBODY FUSION, REDO DECOMPRESSION RIGHT L3-4, POSTERIOR ARTHRODESIS L2-4;  Surgeon: Nhan Marshall MD;  Location:  OR     OPTICAL TRACKING SYSTEM FUSION SPINE POSTERIOR LUMBAR THREE+ LEVELS N/A 12/21/2023    Procedure: Insertion Thoracic 10 to Lumbar 1 and revision of Lumbar 2 to Sacral 1 instrumentation. Dual Pelvic Instrumentation. Revision fusion Lumbar 5 to Sacral 1 using allograft chips. Arthrodesis Thoracic 10 to Lumbar 2 using allograft cancellous chips;  Surgeon: Nhan Marshall MD;  Location:  OR     ORTHOPEDIC SURGERY      left ankle, right finger     Family History   Problem Relation Age of Onset     Asthma Mother      Hypertension Mother      Arthritis Mother          Objective  Wt 87.7 kg (193 lb 4 oz)   BMI 27.73 kg/m      General: healthy, alert and in no acute distress.    HEENT: no scleral icterus or conjunctival erythema.   Skin: no suspicious lesions or rash. No jaundice.   CV: regular rhythm by palpation, 2+ distal pulses.  Resp: normal respiratory effort without conversational dyspnea.   Psych: normal mood and affect.    Gait: antalgic, appropriate coordination and balance while using a single-point cane, slow margaret.    Neuro:        - Sensation to light touch:    - Diminished sensation in the bilateral medial knees and distal to the knees. Otherwise SILT in all other nerve distributions.        - MSR: Physiologic and symmetric       - Special tests:   - Slump/SLR: Positive bilaterally    MSK - Knee:       - Inspection:    -Mild effusions bilaterally without surrounding  erythema, ecchymosis, lesion.        - ROM:    - Full AROM/PROM       - Palpation:    - TTP at the medial joint lines, quadriceps tendons, medial patellar borders.   - No warmth  - NTTP elsewhere.        - Strength:  (*antalgic)   - Knee Flexion  5-*   - Knee Extension 5-*         - Special tests:        - Lachman:  Neg        - A/P drawer:  Neg        - Pivot shift:  Neg    - Amparo:       - Varus stress:  Neg for laxity or pain     - Valgus stress:  Neg for laxity or pain    - Patellar grind:  Neg    - Thessaly:  Neg    Procedures  Large Joint Injection/Arthocentesis: bilateral knee    Date/Time: 6/7/2024 9:30 AM    Performed by: Scar Day DO  Authorized by: Scar Day DO    Indications:  Osteoarthritis  Needle Size:  22 G  Guidance: ultrasound    Approach:  Anterolateral  Location:  Knee  Laterality:  Bilateral      Medications (Right):  40 mg triamcinolone 40 MG/ML; 2 mL lidocaine 1 %; 2 mL BUPivacaine (PF) 0.5 %  Medications (Left):  40 mg triamcinolone 40 MG/ML; 2 mL lidocaine 1 %; 2 mL BUPivacaine (PF) 0.5 %  Outcome:  Tolerated well, no immediate complications  Procedure discussed: discussed risks, benefits, and alternatives    Consent Given by:  Patient  Prep: patient was prepped and draped in usual sterile fashion     Ultrasound images of procedure were permanently stored.        Ultrasound Guided Intra-articular Knee Injection  The knee was prepped and draped in a sterile manner.  Ultrasound identification of the patella, suprapatellar pouch, quadriceps tendon and femur in both long and short axis was obtained. The probe was placed in short axis to the femur. A 1.5 inch 22 gauge needle was placed under ultrasound guidance into the superior knee joint using a lateral approach.  A mixture of 2 mL of 1% lidocaine, 2 mL of 0.5% bupivacaine and 1 ml kenalog (40mg/ml) was injected without difficulty. The needle was removed and there was good hemostasis without complications.  Patient tolerated  procedure well.  There was ultrasound documentation of needle placement and injection.  The procedure was duplicated on the contralateral side using an identical protocol.         Radiology  I independently reviewed the available relevant imaging in the chart with my interpretations as above in HPI.   - MRI R knee 5/28/2024 shows horizontal tearing of the medial and lateral menisci with involvement of a meniscal flap and posterior horn root ligament junction on the medial meniscus.  Also shows grade IV chondromalacia tricompartmental, worst in the patellofemoral and medial compartments.  He has some nonspecific edema at the superior half facet at pad and suprapatellar fat pad.  Also with a small joint effusion and Baker's cyst.  - MRI L knee 5/20/2024 shows horizontal tearing of the posterior horn of the medial meniscus and a very small parameniscal cyst.  There is grade III chondromalacia along the trochlear surface and medial compartment.  No other major findings.      Assessment  1. Primary osteoarthritis of knees, bilateral    2. Tear of medial meniscus of right knee, current, unspecified tear type, initial encounter    3. Tear of medial meniscus of left knee, current, unspecified tear type, initial encounter    4. Instability of both knee joints    5. Spinal stenosis of lumbar region with radiculopathy          Plan  Neema Hunt is a pleasant 58 year old male that presents for follow-up of his acute on chronic bilateral knee pain.     Originally presented with complaints of bilateral anteromedial knee pain and instability frequently in the setting of bilateral radiculopathies with distal weakness and numbness after multiple extensive spinal fusion surgeries.  He does have deep achy pain and frequent feelings of buckling in the knees.  TTP at the distal quadriceps tendons and suprapatellar area bilaterally, painful crepitus in the patellofemoral compartment.  Negative other testing at the knees at original  visit.      I suspect a great majority of his knee pain is multifactorial in nature with contributions from chondromalacia patella, quadriceps tendinopathy, and radicular symptoms of neuropathic pain/paresthesias and weakness.    Based on his frequent instability and buckling episodes with organic knee pain and pathology that appears to separate from his radiculopathy, we discussed that it would be beneficial to evaluate the soft tissue integrity of the knees with advanced imaging (MRI).      MRIs of each knee was obtained, the left knee showed horizontal tearing of the posterior horn of the medial meniscus with a small parameniscal cyst and grade III chondromalacia primarily present in the patellofemoral and medial compartments.  Right knee showed more extensive internal derangement with horizontal tears of the medial and lateral menisci and the medial meniscus has a small flap near the posterior horn root ligament junction, this is in the setting of grade 4 Tricompartmental chondromalacia worst in the patellofemoral medial compartments.  There is also a small joint effusion and Baker's cyst with nonspecific edema in the Hoffa's fat pad and suprapatellar fat pad.    The information from the MRIs certainly lend itself to his organic knee pain and swelling and he may benefit from corticosteroid injection into the knee joint to help with swelling and pain.  Offered bilateral corticosteroid injections today and these injections were performed in clinic without complication, patient tolerated procedures well.  See him procedure note above for details.    His neuropathic symptoms such as itching, burning, numbness/tingling and paresthesias in the lower extremities is likely not to improve from these corticosteroid injections, as there are likely contributions from his lumbar disease.  He is following up with neurosurgery to discuss treatment options for this.  Gabapentin increased dose has been helpful, recommended to  continue.    Plan to follow-up for the knees as needed in the future.  Repeat corticosteroid injections could be considered in 3+ months.  Additionally treatment options could include formal physical therapy, knee bracing, other intra-articular knee joint injections such as viscosupplementation or PRP.      Scar Day DO, CAQSM  [facility]  [facility] - Department of Orthopedic Surgery       Disclaimer:  This note was prepared and written using Dragon Medical dictation software. As a result, there may be errors in the script that have gone undetected. Please consider this when interpreting the information in this note.       Again, thank you for allowing me to participate in the care of your patient.        Sincerely,        Scar Day DO

## 2024-06-11 ENCOUNTER — MYC MEDICAL ADVICE (OUTPATIENT)
Dept: NEUROSURGERY | Facility: CLINIC | Age: 59
End: 2024-06-11
Payer: COMMERCIAL

## 2024-06-11 ENCOUNTER — TELEPHONE (OUTPATIENT)
Dept: NEUROSURGERY | Facility: CLINIC | Age: 59
End: 2024-06-11
Payer: COMMERCIAL

## 2024-06-11 NOTE — TELEPHONE ENCOUNTER
"Per chart review patient scheduled to see Prasanna Oliva PA-C tomorrow for \"continued numbness in both legs from the knees down. Patient also notes pain and numbness in the arms, especially at night\".     Called patient to discuss, he reports improvement with his arm symptoms, but continued concerns with his legs. He would like to see Prasanna Oliva PA-C as scheduled to further discuss.   "

## 2024-06-11 NOTE — TELEPHONE ENCOUNTER
Spoke with the patient to remind him about tomorrow's appt. He reported that his symptoms are improved and wondering whether he needs tomorrow's appointment. Also he stated that he has got some new symptoms and would like to discuss this with Dr. Marshall. Requesting a call back from Dr. Marshall's team. Call back # [phone] Thank you

## 2024-06-12 ENCOUNTER — OFFICE VISIT (OUTPATIENT)
Dept: NEUROSURGERY | Facility: CLINIC | Age: 59
End: 2024-06-12
Payer: COMMERCIAL

## 2024-06-12 VITALS
DIASTOLIC BLOOD PRESSURE: 78 MMHG | OXYGEN SATURATION: 100 % | BODY MASS INDEX: 29.49 KG/M2 | SYSTOLIC BLOOD PRESSURE: 116 MMHG | WEIGHT: 206 LBS | HEIGHT: 70 IN | HEART RATE: 64 BPM

## 2024-06-12 DIAGNOSIS — R29.898 WEAKNESS OF BOTH LOWER LIMBS: Primary | ICD-10-CM

## 2024-06-12 PROCEDURE — 99214 OFFICE O/P EST MOD 30 MIN: CPT | Performed by: PHYSICIAN ASSISTANT

## 2024-06-12 ASSESSMENT — PAIN SCALES - GENERAL: PAINLEVEL: MODERATE PAIN (4)

## 2024-06-12 NOTE — NURSING NOTE
"Neema Hunt is a 59 year old male who presents for:  Chief Complaint   Patient presents with    RECHECK     Pain and numbness in arms         Initial Vitals:  /78   Pulse 64   Ht 5' 10\" (1.778 m)   Wt 206 lb (93.4 kg)   SpO2 100%   BMI 29.56 kg/m   Estimated body mass index is 29.56 kg/m  as calculated from the following:    Height as of this encounter: 5' 10\" (1.778 m).    Weight as of this encounter: 206 lb (93.4 kg).. Body surface area is 2.15 meters squared. BP completed using cuff size: regular  Moderate Pain (4)        Kimberly Stephenson   "

## 2024-06-12 NOTE — LETTER
6/12/2024      Neema Hunt  [address] [facility] [address]      Dear Colleague,    Thank you for referring your patient, Neema Hunt, to the [facility]. Please see a copy of my visit note below.    NEUROSURGERY CLINIC PROGRESS NOTE    DATE OF VISIT: 6/12/2024    HPI:     Neema Hunt is a pleasant 59 year old male who is well known to our team as Dr. Marshall performed a T10-L1 fusion on 12/21/2023 who presents to clinic today with a concern of minimal lumbar spine pain and more bilateral lower extremity numbness and weakness. He describes the pain as minimal but daily that seems to  a dull aching pain in the midline lumbar spine pain that radiates to his feet in not specific distribution. This pain is accompanied with numbness and perceived weakness. The symptoms have been present for three years approximately. Neither a traumatic or provocative mechanism can be appreciated. Prolonged walking and lifting aggravate the symptoms,  while alleviation is obtained by rest sitting and lying down. There are no bowel or bladder changes. No other concerns are voiced.      Current Outpatient Medications   Medication Sig Dispense Refill     acetaminophen (TYLENOL) 500 MG tablet Take 1,000 mg by mouth daily as needed for mild pain (2 X 500 mg = 1000 mg)       amLODIPine-benazepril (LOTREL) 10-20 MG capsule Take 1 capsule by mouth every morning        Cyanocobalamin (B-12 PO) Take 2 chew tab by mouth every morning        famotidine (PEPCID) 20 MG tablet Take 20 mg by mouth every evening       gabapentin (NEURONTIN) 300 MG capsule Take 2 capsules (600 mg) by mouth at bedtime for 90 days 180 capsule 0     gabapentin (NEURONTIN) 300 MG capsule Take 1 capsule (300 mg) by mouth At Bedtime 90 capsule 3     lidocaine (LIDODERM) 5 % Patch Place 1 patch onto the skin every 24 hours 30 patch 2     magnesium 250 MG tablet Take 1 tablet by mouth daily Daily       Melatonin 10 MG TABS  tablet Take 10 mg by mouth nightly as needed for sleep       Misc Natural Products (OSTEO BI-FLEX JOINT SHIELD) TABS Take 1 tablet by mouth daily       Multiple Vitamins-Minerals (AIRBORNE PO) Take 1 chew tab by mouth every morning        Multiple Vitamins-Minerals (MULTIVITAMIN ADULT PO) Take 2 chew tab by mouth every morning        omega 3 1000 MG CAPS Take 1 chew tab by mouth every morning        polyethylene glycol (MIRALAX) 17 g packet Take 1 packet by mouth daily       Probiotic Product (SOLUBLE FIBER/PROBIOTICS PO) Take 1 chew tab by mouth every morning        tetrahydrozoline (VISINE) 0.05 % ophthalmic solution Place 1 drop into both eyes daily as needed       valACYclovir (VALTREX) 1000 mg tablet Take 1,000 mg by mouth 2 times daily as needed       cyclobenzaprine (FLEXERIL) 5 MG tablet Take 1 tablet (5 mg) by mouth 3 times daily as needed for muscle spasms 40 tablet 1     oxyCODONE (ROXICODONE) 5 MG tablet Take 1-2 tablets (5-10 mg) by mouth every 6 hours as needed for moderate pain 40 tablet 0     Current Facility-Administered Medications   Medication Dose Route Frequency Provider Last Rate Last Admin     2 mL bupivacaine (MARCAINE) preservative free injection 0.5% (20 mL vial)  2 mL      2 mL at 06/07/24 0930     2 mL bupivacaine (MARCAINE) preservative free injection 0.5% (20 mL vial)  2 mL      2 mL at 06/07/24 0930     lidocaine 1 % injection 2 mL  2 mL      2 mL at 06/07/24 0930     lidocaine 1 % injection 2 mL  2 mL      2 mL at 06/07/24 0930     triamcinolone (KENALOG-40) injection 40 mg  40 mg      40 mg at 06/07/24 0930     triamcinolone (KENALOG-40) injection 40 mg  40 mg      40 mg at 06/07/24 0930       No Known Allergies    Past Medical History:   Diagnosis Date     Arthritis      Back pain      Gastro-oesophageal reflux disease      Hypertension      Radiculopathy      Scoliosis        Review Of Systems    Skin: negative  Eyes: negative  Ears/Nose/Throat: negative  Respiratory: No shortness  "of breath, dyspnea on exertion, cough, or hemoptysis  Cardiovascular: negative  Gastrointestinal: negative  Musculoskeletal: back pain  Neurologic: numbness or tingling of feet  Psychiatric: negative  Hematologic/Lymphatic/Immunologic: negative  Endocrine: negative    OBJECTIVE:    /78   Pulse 64   Ht 1.778 m (5' 10\")   Wt 93.4 kg (206 lb)   SpO2 100%   BMI 29.56 kg/m      Imaging:    MRI LUMBAR SPINE WITHOUT CONTRAST  9/13/2023 7:48 AM      1. Postoperative changes spanning L2-S1, as before. Please see the  body of the report for details.  2. Degenerative changes, as described.  3. No high-grade central spinal canal stenosis.  4. Assessment of the neural foramina is somewhat limited due to  artifacts. There appears to be at least moderate to severe right and  moderate left L5-S1 neural foraminal stenosis. Mild neural foraminal  narrowing elsewhere.    XR LUMBAR SPINE 2/3 VIEWS  3/22/2024 1:45 PM      Postsurgical changes of instrumented posterior spinal  fusion T10-pelvis and anterior spinal fusion L2-S1. Surgical hardware  is intact with no evidence of fracture or loosening. There appears to  be complete anterior bony fusion across fused levels. No loss of  vertebral body height. Multilevel disc height loss and osteophyte  formation.     Radiographic Findings: Full radiological report in chart. I personally reviewed the images with the patient today.    Exam:    Patient appears comfortable and in no apparent distress. Moving all extremities.  Gait is non-antalgic.  CN II-XII grossly intact, alert and appropriate with conversation and following  commands  Bilateral upper extremities with full strength including hand intrinsics and grasp.  Sensation intact throughout.  Bilateral lower extremities 5/5 strength including plantar and dorsiflexion.  Normal sensation throughout bilaterally.      ASSESSMENT:    1. Weakness of both lower limbs        PLAN:    Neema Hunt is well known to our team as  " Rolando performed a T10-L1 fusion on 12/21/2023 who presents to clinic today with a concern of minimal lumbar spine pain and more bilateral lower extremity numbness and weakness. He describes the pain as minimal but daily that seems to be a dull aching pain in the midline lumbar spine pain that radiates to his feet in no specific distribution. This pain is accompanied with numbness and perceived weakness that make his legs feel as if they are going to give our.     He also has bilateral horizontal oblique tears of the medial and lateral menisci with tricompartmental chondromalacia including areas of grade 4 changes, greatest in the patella. He is being followed by Orthopedics.     We will obtain an electromyography of the bilateral lower extremities to evaluate the nerve conductivity.     The patient gave verbal understanding and is in agreement with the above plan. He will call or return to the clinic for any worsening or changes in symptoms.      Respectfully,     JENNIFER Guardado PA-C      Again, thank you for allowing me to participate in the care of your patient.        Sincerely,        Prasanna Oliva PA-C

## 2024-06-12 NOTE — PROGRESS NOTES
NEUROSURGERY CLINIC PROGRESS NOTE    DATE OF VISIT: 6/12/2024    HPI:     Neema Hunt is a pleasant 59 year old male who is well known to our team as Dr. Marshall performed a T10-L1 fusion on 12/21/2023 who presents to clinic today with a concern of minimal lumbar spine pain and more bilateral lower extremity numbness and weakness. He describes the pain as minimal but daily that seems to  a dull aching pain in the midline lumbar spine pain that radiates to his feet in not specific distribution. This pain is accompanied with numbness and perceived weakness. The symptoms have been present for three years approximately. Neither a traumatic or provocative mechanism can be appreciated. Prolonged walking and lifting aggravate the symptoms,  while alleviation is obtained by rest sitting and lying down. There are no bowel or bladder changes. No other concerns are voiced.      Current Outpatient Medications   Medication Sig Dispense Refill    acetaminophen (TYLENOL) 500 MG tablet Take 1,000 mg by mouth daily as needed for mild pain (2 X 500 mg = 1000 mg)      amLODIPine-benazepril (LOTREL) 10-20 MG capsule Take 1 capsule by mouth every morning       Cyanocobalamin (B-12 PO) Take 2 chew tab by mouth every morning       famotidine (PEPCID) 20 MG tablet Take 20 mg by mouth every evening      gabapentin (NEURONTIN) 300 MG capsule Take 2 capsules (600 mg) by mouth at bedtime for 90 days 180 capsule 0    gabapentin (NEURONTIN) 300 MG capsule Take 1 capsule (300 mg) by mouth At Bedtime 90 capsule 3    lidocaine (LIDODERM) 5 % Patch Place 1 patch onto the skin every 24 hours 30 patch 2    magnesium 250 MG tablet Take 1 tablet by mouth daily Daily      Melatonin 10 MG TABS tablet Take 10 mg by mouth nightly as needed for sleep      Misc Natural Products (OSTEO BI-FLEX JOINT SHIELD) TABS Take 1 tablet by mouth daily      Multiple Vitamins-Minerals (AIRBORNE PO) Take 1 chew tab by mouth every morning       Multiple Vitamins-Minerals  (MULTIVITAMIN ADULT PO) Take 2 chew tab by mouth every morning       omega 3 1000 MG CAPS Take 1 chew tab by mouth every morning       polyethylene glycol (MIRALAX) 17 g packet Take 1 packet by mouth daily      Probiotic Product (SOLUBLE FIBER/PROBIOTICS PO) Take 1 chew tab by mouth every morning       tetrahydrozoline (VISINE) 0.05 % ophthalmic solution Place 1 drop into both eyes daily as needed      valACYclovir (VALTREX) 1000 mg tablet Take 1,000 mg by mouth 2 times daily as needed      cyclobenzaprine (FLEXERIL) 5 MG tablet Take 1 tablet (5 mg) by mouth 3 times daily as needed for muscle spasms 40 tablet 1    oxyCODONE (ROXICODONE) 5 MG tablet Take 1-2 tablets (5-10 mg) by mouth every 6 hours as needed for moderate pain 40 tablet 0     Current Facility-Administered Medications   Medication Dose Route Frequency Provider Last Rate Last Admin    2 mL bupivacaine (MARCAINE) preservative free injection 0.5% (20 mL vial)  2 mL      2 mL at 06/07/24 0930    2 mL bupivacaine (MARCAINE) preservative free injection 0.5% (20 mL vial)  2 mL      2 mL at 06/07/24 0930    lidocaine 1 % injection 2 mL  2 mL      2 mL at 06/07/24 0930    lidocaine 1 % injection 2 mL  2 mL      2 mL at 06/07/24 0930    triamcinolone (KENALOG-40) injection 40 mg  40 mg      40 mg at 06/07/24 0930    triamcinolone (KENALOG-40) injection 40 mg  40 mg      40 mg at 06/07/24 0930       No Known Allergies    Past Medical History:   Diagnosis Date    Arthritis     Back pain     Gastro-oesophageal reflux disease     Hypertension     Radiculopathy     Scoliosis        Review Of Systems    Skin: negative  Eyes: negative  Ears/Nose/Throat: negative  Respiratory: No shortness of breath, dyspnea on exertion, cough, or hemoptysis  Cardiovascular: negative  Gastrointestinal: negative  Musculoskeletal: back pain  Neurologic: numbness or tingling of feet  Psychiatric: negative  Hematologic/Lymphatic/Immunologic: negative  Endocrine: negative    OBJECTIVE:    BP  "116/78   Pulse 64   Ht 1.778 m (5' 10\")   Wt 93.4 kg (206 lb)   SpO2 100%   BMI 29.56 kg/m      Imaging:    MRI LUMBAR SPINE WITHOUT CONTRAST  9/13/2023 7:48 AM      1. Postoperative changes spanning L2-S1, as before. Please see the  body of the report for details.  2. Degenerative changes, as described.  3. No high-grade central spinal canal stenosis.  4. Assessment of the neural foramina is somewhat limited due to  artifacts. There appears to be at least moderate to severe right and  moderate left L5-S1 neural foraminal stenosis. Mild neural foraminal  narrowing elsewhere.    XR LUMBAR SPINE 2/3 VIEWS  3/22/2024 1:45 PM      Postsurgical changes of instrumented posterior spinal  fusion T10-pelvis and anterior spinal fusion L2-S1. Surgical hardware  is intact with no evidence of fracture or loosening. There appears to  be complete anterior bony fusion across fused levels. No loss of  vertebral body height. Multilevel disc height loss and osteophyte  formation.     Radiographic Findings: Full radiological report in chart. I personally reviewed the images with the patient today.    Exam:    Patient appears comfortable and in no apparent distress. Moving all extremities.  Gait is non-antalgic.  CN II-XII grossly intact, alert and appropriate with conversation and following  commands  Bilateral upper extremities with full strength including hand intrinsics and grasp.  Sensation intact throughout.  Bilateral lower extremities 5/5 strength including plantar and dorsiflexion.  Normal sensation throughout bilaterally.      ASSESSMENT:    1. Weakness of both lower limbs        PLAN:    Neema Hunt is well known to our team as Dr. Marshall performed a T10-L1 fusion on 12/21/2023 who presents to clinic today with a concern of minimal lumbar spine pain and more bilateral lower extremity numbness and weakness. He describes the pain as minimal but daily that seems to be a dull aching pain in the midline lumbar spine pain " that radiates to his feet in no specific distribution. This pain is accompanied with numbness and perceived weakness that make his legs feel as if they are going to give our.     He also has bilateral horizontal oblique tears of the medial and lateral menisci with tricompartmental chondromalacia including areas of grade 4 changes, greatest in the patella. He is being followed by Orthopedics.     We will obtain an electromyography of the bilateral lower extremities to evaluate the nerve conductivity.     The patient gave verbal understanding and is in agreement with the above plan. He will call or return to the clinic for any worsening or changes in symptoms.      Respectfully,     JENNIFER Guardado, PA-C

## 2024-06-25 ENCOUNTER — MYC MEDICAL ADVICE (OUTPATIENT)
Dept: ORTHOPEDICS | Facility: CLINIC | Age: 59
End: 2024-06-25
Payer: COMMERCIAL

## 2024-06-25 DIAGNOSIS — S83.241A TEAR OF MEDIAL MENISCUS OF RIGHT KNEE, CURRENT, UNSPECIFIED TEAR TYPE, INITIAL ENCOUNTER: ICD-10-CM

## 2024-06-25 DIAGNOSIS — S83.242A TEAR OF MEDIAL MENISCUS OF LEFT KNEE, CURRENT, UNSPECIFIED TEAR TYPE, INITIAL ENCOUNTER: ICD-10-CM

## 2024-06-25 DIAGNOSIS — M17.0 PRIMARY OSTEOARTHRITIS OF KNEES, BILATERAL: Primary | ICD-10-CM

## 2024-06-25 NOTE — TELEPHONE ENCOUNTER
"Patient looking for next steps after no relief from bilateral knee corticosteroid injections.   Patient saw neurosurgery on 6/12/2024 and bilateral LE EMGs discussed but does not seem to be ordered or scheduled at this time.      From 6/7/2024 office visit plan:     \"His neuropathic symptoms such as itching, burning, numbness/tingling and paresthesias in the lower extremities is likely not to improve from these corticosteroid injections, as there are likely contributions from his lumbar disease.  He is following up with neurosurgery to discuss treatment options for this.  Gabapentin increased dose has been helpful, recommended to continue.     Plan to follow-up for the knees as needed in the future.  Repeat corticosteroid injections could be considered in 3+ months.  Additionally treatment options could include formal physical therapy, knee bracing, other intra-articular knee joint injections such as viscosupplementation or PRP.\"    Please advise on next steps. Kaylee Good, ATC    "

## 2024-07-01 NOTE — TELEPHONE ENCOUNTER
This note is for purposes of medical documentation of prior treatment course for consideration of approval of bilateral Synvisc injections.    Mr. Hunt has participated in physical therapy multiple times for his low back and knee pain s/p lumbar spine surgery, as recently as April 2024.  He has tried other conservative care options such as NSAIDs, acetaminophen, gabapentin, topical medications, ice/heat, braces, rest, time, activity modifications, and ultrasound guided corticosteroid injections.  He has yet to find lasting relief from any of the above measures and would like to try viscosupplementation injections in hopes of symptom relief.      Scar Day DO, TONYM  [facility]  [facility] - Department of Orthopedic Surgery

## 2024-07-02 ENCOUNTER — TELEPHONE (OUTPATIENT)
Dept: ORTHOPEDICS | Facility: CLINIC | Age: 59
End: 2024-07-02
Payer: COMMERCIAL

## 2024-07-02 DIAGNOSIS — M25.361 INSTABILITY OF BOTH KNEE JOINTS: ICD-10-CM

## 2024-07-02 DIAGNOSIS — S83.242A TEAR OF MEDIAL MENISCUS OF LEFT KNEE, CURRENT, UNSPECIFIED TEAR TYPE, INITIAL ENCOUNTER: ICD-10-CM

## 2024-07-02 DIAGNOSIS — S83.241A TEAR OF MEDIAL MENISCUS OF RIGHT KNEE, CURRENT, UNSPECIFIED TEAR TYPE, INITIAL ENCOUNTER: ICD-10-CM

## 2024-07-02 DIAGNOSIS — M25.362 INSTABILITY OF BOTH KNEE JOINTS: ICD-10-CM

## 2024-07-02 DIAGNOSIS — M17.0 PRIMARY OSTEOARTHRITIS OF KNEES, BILATERAL: Primary | ICD-10-CM

## 2024-07-02 NOTE — TELEPHONE ENCOUNTER
Received confirmation that Synvisc injections have been denied by patient's  insurance.  Recommended pursuing physical therapy for the next 4 weeks and then we can resubmit an authorization request.  Communicated this with patient and order has been placed and information given to patient for scheduling.     Scar Day DO, TONYM  [facility] - Sports Medicine  [facility] - Department of Orthopedic Surgery

## 2024-07-15 ASSESSMENT — ACTIVITIES OF DAILY LIVING (ADL)
RUNNING_ON_EVEN_GROUND: EXTREME DIFFICULTY OR UNABLE TO PERFORM ACTIVITY
SQUAT: ACTIVITY IS VERY DIFFICULT
RAW_SCORE: 31.23
PUTTING_ON_YOUR_SHOES_OR_SOCKS: MODERATE DIFFICULTY
HOW_WOULD_YOU_RATE_THE_CURRENT_FUNCTION_OF_YOUR_KNEE_DURING_YOUR_USUAL_DAILY_ACTIVITIES_ON_A_SCALE_FROM_0_TO_100_WITH_100_BEING_YOUR_LEVEL_OF_KNEE_FUNCTION_PRIOR_TO_YOUR_INJURY_AND_0_BEING_THE_INABILITY_TO_PERFORM_ANY_OF_YOUR_USUAL_DAILY_ACTIVITIES?: 60
HOW_WOULD_YOU_RATE_THE_OVERALL_FUNCTION_OF_YOUR_KNEE_DURING_YOUR_USUAL_DAILY_ACTIVITIES?: ABNORMAL
LEFS_RAW_SCORE: 0
PLEASE_INDICATE_YOR_PRIMARY_REASON_FOR_REFERRAL_TO_THERAPY:: KNEE
GO UP STAIRS: ACTIVITY IS SOMEWHAT DIFFICULT
KNEE_ACTIVITY_OF_DAILY_LIVING_SCORE: 44.61
MAKING_SHARP_TURNS_WHILE_RUNNING_FAST: EXTREME DIFFICULTY OR UNABLE TO PERFORM ACTIVITY
ROLLING_OVER_IN_BED: MODERATE DIFFICULTY
GO UP STAIRS: ACTIVITY IS SOMEWHAT DIFFICULT
WALKING_2_BLOCKS: QUITE A BIT OF DIFFICULTY
SIT WITH YOUR KNEE BENT: ACTIVITY IS MINIMALLY DIFFICULT
SHOPPING: EXTREME DIFFICULTY OR UNABLE TO PERFORM ACTIVITY
AS_A_RESULT_OF_YOUR_KNEE_INJURY,_HOW_WOULD_YOU_RATE_YOUR_CURRENT_LEVEL_OF_DAILY_ACTIVITY?: NEARLY NORMAL
HOW_WOULD_YOU_RATE_THE_OVERALL_FUNCTION_OF_YOUR_KNEE_DURING_YOUR_USUAL_DAILY_ACTIVITIES?: ABNORMAL
WEAKNESS: THE SYMPTOM AFFECTS MY ACTIVITY SEVERELY
SQUAT: ACTIVITY IS VERY DIFFICULT
WEAKNESS: THE SYMPTOM AFFECTS MY ACTIVITY SEVERELY
STAND: ACTIVITY IS SOMEWHAT DIFFICULT
GO DOWN STAIRS: ACTIVITY IS FAIRLY DIFFICULT
WALKING_A_MILE: EXTREME DIFFICULTY OR UNABLE TO PERFORM ACTIVITY
RUNNING_ON_UNEVEN_GROUND: EXTREME DIFFICULTY OR UNABLE TO PERFORM ACTIVITY
STANDING_FOR_1_HOUR: EXTREME DIFFICULTY OR UNABLE TO PERFORM ACTIVITY
PAIN: THE SYMPTOM AFFECTS MY ACTIVITY MODERATELY
PERFORMING_LIGHT_ACTIVITIES_AROUND_YOUR_HOME: MODERATE DIFFICULTY
SITTING_FOR_1_HOUR: QUITE A BIT OF DIFFICULTY
LIFTING_AN_OBJECT,_LIKE_A_BAG_OF_GROCERIES_FROM_THE_FLOOR: MODERATE DIFFICULTY
ANY_OF_YOUR_USUAL_WORK,_HOUSEWORK_OR_SCHOOL_ACTIVITIES: QUITE A BIT OF DIFFICULTY
HOW_WOULD_YOU_RATE_THE_CURRENT_FUNCTION_OF_YOUR_KNEE_DURING_YOUR_USUAL_DAILY_ACTIVITIES_ON_A_SCALE_FROM_0_TO_100_WITH_100_BEING_YOUR_LEVEL_OF_KNEE_FUNCTION_PRIOR_TO_YOUR_INJURY_AND_0_BEING_THE_INABILITY_TO_PERFORM_ANY_OF_YOUR_USUAL_DAILY_ACTIVITIES?: 60
SWELLING: THE SYMPTOM AFFECTS MY ACTIVITY SLIGHTLY
YOUR_USUAL_HOBBIES,_RECREATIONAL_OR_SPORTING_ACTIVITIES: QUITE A BIT OF DIFFICULTY
RISE FROM A CHAIR: ACTIVITY IS SOMEWHAT DIFFICULT
GIVING WAY, BUCKLING OR SHIFTING OF KNEE: THE SYMPTOM AFFECTS MY ACTIVITY SEVERELY
STIFFNESS: THE SYMPTOM AFFECTS MY ACTIVITY SLIGHTLY
LEFS_SCORE(%): 0
STAND: ACTIVITY IS SOMEWHAT DIFFICULT
PLEASE_INDICATE_YOR_PRIMARY_REASON_FOR_REFERRAL_TO_THERAPY:: FOOT AND/OR ANKLE
GO DOWN STAIRS: ACTIVITY IS FAIRLY DIFFICULT
RISE FROM A CHAIR: ACTIVITY IS SOMEWHAT DIFFICULT
STIFFNESS: THE SYMPTOM AFFECTS MY ACTIVITY SLIGHTLY
GETTING_INTO_OR_OUT_OF_A_CAR: MODERATE DIFFICULTY
GIVING WAY, BUCKLING OR SHIFTING OF KNEE: THE SYMPTOM AFFECTS MY ACTIVITY SEVERELY
GOING_UP_OR_DOWN_10_STAIRS: MODERATE DIFFICULTY
AS_A_RESULT_OF_YOUR_KNEE_INJURY,_HOW_WOULD_YOU_RATE_YOUR_CURRENT_LEVEL_OF_DAILY_ACTIVITY?: NEARLY NORMAL
LIMPING: THE SYMPTOM AFFECTS MY ACTIVITY MODERATELY
SQUATTING: QUITE A BIT OF DIFFICULTY
PAIN: THE SYMPTOM AFFECTS MY ACTIVITY MODERATELY
SIT WITH YOUR KNEE BENT: ACTIVITY IS MINIMALLY DIFFICULT
WALK: ACTIVITY IS VERY DIFFICULT
GETTING_INTO_AND_OUT_OF_A_BATH: MODERATE DIFFICULTY
LIMPING: THE SYMPTOM AFFECTS MY ACTIVITY MODERATELY
KNEE_ACTIVITY_OF_DAILY_LIVING_SUM: 29
WALK: ACTIVITY IS VERY DIFFICULT
SWELLING: THE SYMPTOM AFFECTS MY ACTIVITY SLIGHTLY
PERFORMING_HEAVY_ACTIVITIES_AROUND_YOUR_HOME: EXTREME DIFFICULTY OR UNABLE TO PERFORM ACTIVITY
WALKING_BETWEEN_ROOMS: MODERATE DIFFICULTY

## 2024-07-16 ENCOUNTER — THERAPY VISIT (OUTPATIENT)
Dept: PHYSICAL THERAPY | Facility: CLINIC | Age: 59
End: 2024-07-16
Attending: STUDENT IN AN ORGANIZED HEALTH CARE EDUCATION/TRAINING PROGRAM
Payer: COMMERCIAL

## 2024-07-16 DIAGNOSIS — M48.061 SPINAL STENOSIS OF LUMBAR REGION WITH RADICULOPATHY: Primary | ICD-10-CM

## 2024-07-16 DIAGNOSIS — M25.362 INSTABILITY OF BOTH KNEE JOINTS: ICD-10-CM

## 2024-07-16 DIAGNOSIS — M25.361 INSTABILITY OF BOTH KNEE JOINTS: ICD-10-CM

## 2024-07-16 DIAGNOSIS — S83.242A TEAR OF MEDIAL MENISCUS OF LEFT KNEE, CURRENT, UNSPECIFIED TEAR TYPE, INITIAL ENCOUNTER: ICD-10-CM

## 2024-07-16 DIAGNOSIS — M17.0 PRIMARY OSTEOARTHRITIS OF BOTH KNEES: ICD-10-CM

## 2024-07-16 DIAGNOSIS — S83.241A TEAR OF MEDIAL MENISCUS OF RIGHT KNEE, CURRENT, UNSPECIFIED TEAR TYPE, INITIAL ENCOUNTER: ICD-10-CM

## 2024-07-16 DIAGNOSIS — Z98.1 S/P SPINAL FUSION: ICD-10-CM

## 2024-07-16 DIAGNOSIS — M17.0 PRIMARY OSTEOARTHRITIS OF KNEES, BILATERAL: ICD-10-CM

## 2024-07-16 DIAGNOSIS — M54.16 SPINAL STENOSIS OF LUMBAR REGION WITH RADICULOPATHY: Primary | ICD-10-CM

## 2024-07-16 DIAGNOSIS — R26.9 ABNORMAL GAIT: ICD-10-CM

## 2024-07-16 PROCEDURE — 97161 PT EVAL LOW COMPLEX 20 MIN: CPT | Mod: GP | Performed by: PHYSICAL THERAPIST

## 2024-07-16 PROCEDURE — 97110 THERAPEUTIC EXERCISES: CPT | Mod: GP | Performed by: PHYSICAL THERAPIST

## 2024-07-16 NOTE — PROGRESS NOTES
"PHYSICAL THERAPY EVALUATION  Type of Visit: Evaluation       Fall Risk Screen:  Fall screen completed by: PT  Have you fallen 2 or more times in the past year?: Yes  Have you fallen and had an injury in the past year?: No  Timed Up and Go score (seconds): 18  Is patient a fall risk?: Yes  Fall screen comments: did not use cane (uses outside usually only)    Subjective       Presenting condition or subjective complaint: My knees are weak. They buckle intermediately. The crackle when straightening up.  This extends to my ankles which also weakens from time to time. Neema reports about a year of B knee pain. Took a fall in September 2023 and the knees worsened. In Dec 2023 had lumbar fusion surgery and focused a lot of PT this winter and spring on that. Saw DO on 6/7/24 due to ongoing knee pain. (MRI results below). Had injections in both knees which reportedly provided a little \"stability\" but didn't change much pain. Referred to PT.      MRI L knee 5/28/24:  1. Horizontal oblique tear of the medial and lateral menisci.   2. Tricompartmental chondromalacia including areas of grade 4 changes,   greatest in the patella.   3. Intact visualized quadriceps tendon.   4. Edema deep aspect of Hoffa's fat pad, query Hoffa's disease.     R knee:  1. Horizontal tear of the posterior horn of the medial meniscus, with  tiny parameniscal cyst.    2. Grade III chondromalacia along the central trochlear surface with  diffuse moderate grade cartilage fissuring in the medial femorotibial  joint compartment.    3. The anterior and posterior cruciate ligaments, medial and lateral  supporting structures, and lateral meniscus are intact.     Date of onset: 06/07/24    Relevant medical history: Arthritis; Bladder or bowel problems; Dizziness; High blood pressure; Pain at night or rest; Progressive neurological deficits   Dates & types of surgery: Left Subtalar fusion and tendon repair-2009    Prior diagnostic imaging/testing results: MRI; " Other Ultrasound for injection purposes.   Prior therapy history for the same diagnosis, illness or injury: No      Prior Level of Function  Transfers:   Ambulation: Assistive equipment  ADL:   IADL:     Living Environment  Social support: With a significant other or spouse   Type of home: House; 1 level; Basement   Stairs to enter the home: Yes 2 Is there a railing: No     Ramp: No   Stairs inside the home: Yes 12 Is there a railing: Yes     Help at home: Medication and/or finances; Home and Yard maintenance tasks  Equipment owned: Straight Cane; Walker; Raised toilet seat; Bath bench     Employment: No    Hobbies/Interests: Walking, bowling,  biking, movies    Patient goals for therapy: Strengthen my knees  to enable activities such as squatting and getting up from a sitting position without much arm help. Also to stablelize my stance and walk. Increase endurance.    Pain assessment: See objective evaluation for additional pain details     Objective   KNEE EVALUATION  PAIN: Pain Level at Rest: 4/10  Pain Level with Use: 7/10  Pain Location: anterior, L medial and posterior, R anterior medial  Pain Quality: Stabbing  Pain Frequency: constant  Pain is Worst: wakes him up at night  Pain is Exacerbated By: walking, standing, squatting  Pain is Relieved By: not much  Pain Progression: unchanged  INTEGUMENTARY (edema, incisions):   POSTURE:   GAIT:  Weightbearing Status:   Assistive Device(s): Crutch (one)  Gait Deviations: Base of support decreased  Stride length decreased  [place] decreased  BALANCE/PROPRIOCEPTION:   WEIGHTBEARING ALIGNMENT:   NON-WEIGHTBEARING ALIGNMENT:   ROM:   (Degrees) Left AROM Left PROM  Right AROM Right PROM   Knee Flexion 121  120    Knee Extension 11 hyper  10 hyper    Pain:   End feel:     STRENGTH:   Pain: - none + mild ++ moderate +++ severe  Strength Scale: 0-5/5 Left Right   Knee Flexion 5- 5   Knee Extension 5 5-   Quad Set     R hip abduction 4+/5  L hip abduction 4+/5    FLEXIBILITY:    SPECIAL TESTS:   FUNCTIONAL TESTS:   PALPATION:   JOINT MOBILITY:     Assessment & Plan   CLINICAL IMPRESSIONS  Medical Diagnosis: Primary osteoarthritis of knees, bilateral  Tear of medial meniscus of right knee, current, unspecified tear type, initial encounter  Tear of medial meniscus of left knee, current, unspecified tear type, initial encounter  Instability of both knee joints    Treatment Diagnosis: B knee pain, meniscal tears with DJD   Impression/Assessment: Patient is a 59 year old male with B knee complaints.  The following significant findings have been identified: Pain, Decreased ROM/flexibility, Decreased joint mobility, Decreased strength, Impaired balance, Inflammation, Impaired gait, Impaired muscle performance, and Decreased activity tolerance. These impairments interfere with their ability to perform recreational activities, household chores, household mobility, and community mobility as compared to previous level of function.     Clinical Decision Making (Complexity):  Clinical Presentation: Stable/Uncomplicated  Clinical Presentation Rationale: based on medical and personal factors listed in PT evaluation  Clinical Decision Making (Complexity): Low complexity    PLAN OF CARE  Treatment Interventions:  Interventions: Gait Training, Manual Therapy, Neuromuscular Re-education, Therapeutic Activity, Therapeutic Exercise    Long Term Goals     PT Goal 1  Goal Identifier: ambulation  Goal Description: Pt will be able to walk 4 blocks with SEC without increased knee pain  Rationale: to maximize safety and independence with performance of ADLs and functional tasks;to maximize safety and independence within the home;to maximize safety and independence within the community;to maximize safety and independence with self cares  Target Date: 10/08/24      Frequency of Treatment: 1x/wk  Duration of Treatment: 12 wks    Recommended Referrals to Other Professionals:   Education Assessment:        Risks and  benefits of evaluation/treatment have been explained.   Patient/Family/caregiver agrees with Plan of Care.     Evaluation Time:     PT Eval, Low Complexity Minutes (47794): 25       Signing Clinician: SHELIA Crisostomo [facility]                                                                                   OUTPATIENT PHYSICAL THERAPY      PLAN OF TREATMENT FOR OUTPATIENT REHABILITATION   Patient's Last Name, First Name, Neema Medeiros YOB: 1965   Provider's [facility]   [facility]   Medical Record No.  [id number]     Onset Date: 06/07/24  Start of Care Date: 07/16/24     Medical Diagnosis:  Primary osteoarthritis of knees, bilateral  Tear of medial meniscus of right knee, current, unspecified tear type, initial encounter  Tear of medial meniscus of left knee, current, unspecified tear type, initial encounter  Instability of both knee joints      PT Treatment Diagnosis:  B knee pain, meniscal tears with DJD Plan of Treatment  Frequency/Duration: 1x/wk/ 12 wks    Certification date from 07/16/24 to 10/08/24         See note for plan of treatment details and functional goals     Howard Orona, PT                         I CERTIFY THE NEED FOR THESE SERVICES FURNISHED UNDER        THIS PLAN OF TREATMENT AND WHILE UNDER MY CARE     (Physician attestation of this document indicates review and certification of the therapy plan).              Referring Provider:  Scar Day    Initial Assessment  See Epic Evaluation- Start of Care Date: 07/16/24                 Errol Ramirez(Attending)

## 2024-07-23 ENCOUNTER — THERAPY VISIT (OUTPATIENT)
Dept: PHYSICAL THERAPY | Facility: CLINIC | Age: 59
End: 2024-07-23
Payer: COMMERCIAL

## 2024-07-23 DIAGNOSIS — M25.362 INSTABILITY OF BOTH KNEE JOINTS: ICD-10-CM

## 2024-07-23 DIAGNOSIS — S83.241A TEAR OF MEDIAL MENISCUS OF RIGHT KNEE, CURRENT, UNSPECIFIED TEAR TYPE, INITIAL ENCOUNTER: ICD-10-CM

## 2024-07-23 DIAGNOSIS — R26.9 ABNORMAL GAIT: ICD-10-CM

## 2024-07-23 DIAGNOSIS — M25.361 INSTABILITY OF BOTH KNEE JOINTS: ICD-10-CM

## 2024-07-23 DIAGNOSIS — S83.242A TEAR OF MEDIAL MENISCUS OF LEFT KNEE, CURRENT, UNSPECIFIED TEAR TYPE, INITIAL ENCOUNTER: ICD-10-CM

## 2024-07-23 DIAGNOSIS — M17.0 PRIMARY OSTEOARTHRITIS OF BOTH KNEES: Primary | ICD-10-CM

## 2024-07-23 PROCEDURE — 97530 THERAPEUTIC ACTIVITIES: CPT | Mod: GP | Performed by: PHYSICAL THERAPIST

## 2024-07-23 PROCEDURE — 97110 THERAPEUTIC EXERCISES: CPT | Mod: GP | Performed by: PHYSICAL THERAPIST

## 2024-08-02 ENCOUNTER — THERAPY VISIT (OUTPATIENT)
Dept: PHYSICAL THERAPY | Facility: CLINIC | Age: 59
End: 2024-08-02
Payer: COMMERCIAL

## 2024-08-02 DIAGNOSIS — M25.361 INSTABILITY OF BOTH KNEE JOINTS: ICD-10-CM

## 2024-08-02 DIAGNOSIS — M25.362 INSTABILITY OF BOTH KNEE JOINTS: ICD-10-CM

## 2024-08-02 DIAGNOSIS — M17.0 PRIMARY OSTEOARTHRITIS OF BOTH KNEES: Primary | ICD-10-CM

## 2024-08-02 DIAGNOSIS — R26.9 ABNORMAL GAIT: ICD-10-CM

## 2024-08-02 DIAGNOSIS — S83.242A TEAR OF MEDIAL MENISCUS OF LEFT KNEE, CURRENT, UNSPECIFIED TEAR TYPE, INITIAL ENCOUNTER: ICD-10-CM

## 2024-08-02 DIAGNOSIS — S83.241A TEAR OF MEDIAL MENISCUS OF RIGHT KNEE, CURRENT, UNSPECIFIED TEAR TYPE, INITIAL ENCOUNTER: ICD-10-CM

## 2024-08-02 PROCEDURE — 97530 THERAPEUTIC ACTIVITIES: CPT | Mod: GP | Performed by: PHYSICAL THERAPIST

## 2024-08-02 PROCEDURE — 97110 THERAPEUTIC EXERCISES: CPT | Mod: GP | Performed by: PHYSICAL THERAPIST

## 2024-08-06 ENCOUNTER — TELEPHONE (OUTPATIENT)
Dept: ORTHOPEDICS | Facility: CLINIC | Age: 59
End: 2024-08-06
Payer: COMMERCIAL

## 2024-08-06 NOTE — TELEPHONE ENCOUNTER
"Appeal for coverage of SynviscOne bilateral knee injections. Previously denied because there is \"no other evidence that patient does not have other joint disease\".     Verbage from 6/25/2024 from Dr. Day states:   \"Mr. Hunt has participated in physical therapy multiple times for his low back and knee pain s/p lumbar spine surgery, as recently as April 2024. He has tried other conservative care options such as NSAIDs, acetaminophen, gabapentin, topical medications, ice/heat, braces, rest, time, activity modifications. He has yet to find lasting relief from any of the above measures and would like to try viscosupplementation injections in hopes of symptom relief.\"    Please advise on requested letter of medical necessity. Thank you. Kaylee Good, ATC    "

## 2024-08-08 NOTE — TELEPHONE ENCOUNTER
Managing this issue in MyChart Message encounter. Closing this duplicate encounter.     Scar Day DO, CAQSM [facility]  [facility] - Department of Orthopedic Surgery

## 2024-08-15 DIAGNOSIS — M22.41 CHONDROMALACIA, PATELLA, RIGHT: ICD-10-CM

## 2024-08-15 DIAGNOSIS — M22.42 CHONDROMALACIA, PATELLA, LEFT: ICD-10-CM

## 2024-08-15 DIAGNOSIS — M76.899 QUADRICEPS TENDINITIS: ICD-10-CM

## 2024-08-15 RX ORDER — GABAPENTIN 300 MG/1
CAPSULE ORAL
Qty: 180 CAPSULE | Refills: 0 | Status: SHIPPED | OUTPATIENT
Start: 2024-08-15

## 2024-08-16 ENCOUNTER — THERAPY VISIT (OUTPATIENT)
Dept: PHYSICAL THERAPY | Facility: CLINIC | Age: 59
End: 2024-08-16
Payer: COMMERCIAL

## 2024-08-16 DIAGNOSIS — M17.0 PRIMARY OSTEOARTHRITIS OF BOTH KNEES: Primary | ICD-10-CM

## 2024-08-16 DIAGNOSIS — M25.362 INSTABILITY OF BOTH KNEE JOINTS: ICD-10-CM

## 2024-08-16 DIAGNOSIS — M25.361 INSTABILITY OF BOTH KNEE JOINTS: ICD-10-CM

## 2024-08-16 DIAGNOSIS — S83.241A TEAR OF MEDIAL MENISCUS OF RIGHT KNEE, CURRENT, UNSPECIFIED TEAR TYPE, INITIAL ENCOUNTER: ICD-10-CM

## 2024-08-16 DIAGNOSIS — R26.9 ABNORMAL GAIT: ICD-10-CM

## 2024-08-16 DIAGNOSIS — S83.242A TEAR OF MEDIAL MENISCUS OF LEFT KNEE, CURRENT, UNSPECIFIED TEAR TYPE, INITIAL ENCOUNTER: ICD-10-CM

## 2024-08-16 PROCEDURE — 97530 THERAPEUTIC ACTIVITIES: CPT | Mod: GP | Performed by: PHYSICAL THERAPIST

## 2024-08-16 PROCEDURE — 97110 THERAPEUTIC EXERCISES: CPT | Mod: GP | Performed by: PHYSICAL THERAPIST

## 2024-08-22 ENCOUNTER — OFFICE VISIT (OUTPATIENT)
Dept: NEUROLOGY | Facility: CLINIC | Age: 59
End: 2024-08-22
Attending: PHYSICIAN ASSISTANT
Payer: OTHER MISCELLANEOUS

## 2024-08-22 DIAGNOSIS — R29.898 WEAKNESS OF BOTH LOWER LIMBS: Primary | ICD-10-CM

## 2024-08-22 PROCEDURE — 95911 NRV CNDJ TEST 9-10 STUDIES: CPT | Performed by: INTERNAL MEDICINE

## 2024-08-22 PROCEDURE — 95886 MUSC TEST DONE W/N TEST COMP: CPT | Performed by: INTERNAL MEDICINE

## 2024-08-22 NOTE — PROGRESS NOTES
[facility]  Electrodiagnostic Laboratory                 Department of Neurology                                                                                                         Test Date:  2024    Patient: Neema Hunt : 1965 Physician: Scar Medina MD   Sex: Male AGE: 59 year Ref Phys:    ID#: [id number]   Technician: Addy Ramirez     History and Examination:  Patient is a 60 yo male who presents for evaluation of bilateral leg pain and numbness. EMG ordered to evaluate for radiculopathy and polyneuropathy.     Techniques:  Motor conduction studies were done with surface recording electrodes. Sensory conduction studies were performed with surface electrodes, unless indicated otherwise by (n), designating the use of subdermal recording electrodes. Temperature was monitored and recorded throughout the study. Upper extremities were maintained at a temperature of 32 degrees Centigrade or higher.  EMG was done with a concentric needle electrode.     Results:  Evaluation of the right Fibular (EDB) motor nerve showed decreased conduction velocity (Fibular Bel Fibular Head-Ankle, 37 m/s).  The left Tibial (AHB) motor, the left Superficial Fibular sensory, the left sural sensory, and the right sural sensory nerves showed reduced amplitude (L0.60, L2, R4, L4, R4  V).  The right Tibial (AHB) motor nerve showed reduced amplitude (0.40 mV) and decreased conduction velocity (32 m/s).  All remaining nerves (as indicated in the following tables) were within normal limits.      Needle evaluation of the right Tibialis Anterior muscle showed Incr Ins Act, moderately increased Fibs/PSW, complex repetitive discharge HF, moderately increased motor unit amplitude, moderately increased motor unit duration, and moderately reduced recruitment.  The right Gastrocnemius muscle showed moderately increased motor unit amplitude, slightly increased motor unit duration, and mildly reduced  recruitment.  The right Vastus Medius muscle showed Incr Ins Act, moderately increased Fibs/PSW, moderately increased motor unit amplitude, moderately increased motor unit duration, and moderately reduced recruitment.  The right iliopsoas muscle showed complex repetitive discharge HF, moderately increased motor unit amplitude, slightly increased motor unit duration, slightly increased polyphasic potentials, and moderately reduced recruitment.  The right Adductor Longus muscle showed Incr Ins Act, moderately increased Fibs/PSW, moderately increased motor unit amplitude, slightly increased motor unit duration, and moderately reduced recruitment.  The right Gluteus Medius muscle showed slightly increased motor unit amplitude, slightly increased motor unit duration, and mildly reduced recruitment.  The left Tibialis Anterior and the left Vastus Medius muscles showed Incr Ins Act, moderately increased Fibs/PSW, moderately increased motor unit amplitude, moderately increased motor unit duration, slightly increased polyphasic potentials, and mildly reduced recruitment.  The left Gastrocnemius muscle showed Incr Ins Act, moderately increased Fibs/PSW, slightly increased motor unit amplitude, slightly increased motor unit duration, and mildly reduced recruitment.  The left Gluteus Medius muscle showed Incr Ins Act, moderately increased motor unit amplitude, slightly increased motor unit duration, and recruitment.  All remaining muscles (as indicated in the following table) showed no evidence of electrical instability.        Interpretation:  This is an abnormal examination. 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 Amp Neg. Amp Diff Segment Distance Velocity Neg. Dur Neg Area Diff Temperature Comment   Site (ms) Norm (mV) Norm (%)  cm m/s Norm (ms) (%) ( C)    Right Dp Branch Fibular (TA) Motor   Fibular Head 3.9  < 6.0 3.2 -      10.7  31    Pop Fossa 6.2  < 5.7 3.1 - -3 Pop Fossa-Fibular Head 10.5 46 - 11.0 -3 31    Left Fibular (EDB) Motor   Ankle 4.5  < 6.0 2.8 -  Ankle-EDB 8   5.8  30.3    Bel Fibular Head 14.2 - 1.90 - -32 Bel Fibular Head-Ankle 36.5 38  > 38 7.7 -13 30.3    Pop Fossa 16.8 - 1.87 - -2 Pop Fossa-Bel Fibular Head 10 38  > 38 8.0 -3 30.3    Right Fibular (EDB) Motor        Fibular (EDB)   Ankle 5.8  < 6.0 0.62 -  Ankle-EDB 8   4.2  23.9    Bel Fibular Head 15.7 - 0.64 - 3 Bel Fibular Head-Ankle 37 37  > 38 6.3 22 23.9    Pop Fossa 18.4 - 0.67 - 5 Pop Fossa-Bel Fibular Head 10.5 39  > 38 7.3 6 23.8         Tibial (EDB)   Ankle+1 NR - NR -      NR  24.7    Left Tibial (AHB) Motor   Ankle 4.5  < 6.5 0.60  > 5.0  Ankle-AH 8   15.4  31    Right Tibial (AHB) Motor   Ankle 5.0  < 6.5 0.40  > 5.0  Ankle-AH 8   -  31.1    Knee 20.1 - 0.43 - 7 Knee-Ankle 48 32  > 38 19.5 - 31      Sensory Sites      Onset Lat Peak Lat Amp (O-P) Amp (P-P) Segment Distance Velocity Temperature Comment   Site ms (ms)  V Norm ( V)  cm m/s Norm ( C)    Right Radial Sensory   Forearm-Wrist 1.85 2.7 19  > 15 14 Forearm-Wrist 10 54 - 25    Left Superficial Fibular Sensory   Lower Leg-Ankle 3.3 4.2 2  > 3 5 Lower Leg-Ankle 12.5 38 - 30.8    Right Superficial Fibular Sensory   Lower Leg-Ankle 3.5 4.1 3  > 3 4 Lower Leg-Ankle 12.5 36 - 30.9    Left Sural Sensory   Calf-Lat Malleolus 3.5 4.2 4  > 5 5 Calf-Lat Malleolus 14 40  > 38 30.8    Right Sural Sensory   Calf-Lat Malleolus 3.3 4.1 4  > 5 10 Calf-Lat Malleolus 14 42  > 38 30.9        Electromyography     Side Muscle Ins Act Fibs/PSW Fasc HF Amp Dur Poly Recrt Int Pat   Right Iliopsoas Nml None Nml CRD 2+ 1+ 1+ ModRed Nml    Right Add longus Incr 2+ Nml 0 2+ 1+ 0 ModRed Nml   Right Gluteus med Nml None Nml 0 1+ 1+ 0 Iris Nml   Left Gluteus med Incr None Nml 0 2+ 1+ 0 Iris Nml   Left Gastroc Incr 2+ Nml 0 1+ 1+ 0 Iris Nml   Right Gastroc Nml None Nml 0 2+ 1+ 0 Iris Nml   Right Vastus med Incr 2+ Nml 0 2+ 2+ 0 ModRed Nml   Left Vastus med Incr 2+ Nml 0 2+ 2+ 1+ Iris Nml   Left Tib ant Incr 2+ Nml 0 2+ 2+ 1+ Iris Nml   Right Tib ant Incr 2+ Nml CRD 2+ 2+ 0 ModRed Nml   Left Iliopsoas Nml None Nml 0 Nml Nml 0 Nml Nml         NCS Waveforms:    Motor                  Sensory

## 2024-08-22 NOTE — LETTER
2024      Neema Hunt  [address] [facility] [address]      Dear Colleague,    Thank you for referring your patient, Neema Hunt, to the [facility]UR[facility] [place]. Please see a copy of my visit note below.                        [facility]  Electrodiagnostic Laboratory                 Department of Neurology                                                                                                         Test Date:  2024    Patient: Neema Hunt : 1965 Physician: Scar Medina MD   Sex: Male AGE: 59 year Ref Phys:    ID#: [id number]   Technician: Addy Ramirez     History and Examination:  Patient is a 60 yo male who presents for evaluation of bilateral leg pain and numbness. EMG ordered to evaluate for radiculopathy and polyneuropathy.     Techniques:  Motor conduction studies were done with surface recording electrodes. Sensory conduction studies were performed with surface electrodes, unless indicated otherwise by (n), designating the use of subdermal recording electrodes. Temperature was monitored and recorded throughout the study. Upper extremities were maintained at a temperature of 32 degrees Centigrade or higher.  EMG was done with a concentric needle electrode.     Results:  Evaluation of the right Fibular (EDB) motor nerve showed decreased conduction velocity (Fibular Bel Fibular Head-Ankle, 37 m/s).  The left Tibial (AHB) motor, the left Superficial Fibular sensory, the left sural sensory, and the right sural sensory nerves showed reduced amplitude (L0.60, L2, R4, L4, R4  V).  The right Tibial (AHB) motor nerve showed reduced amplitude (0.40 mV) and decreased conduction velocity (32 m/s).  All remaining nerves (as indicated in the following tables) were within normal limits.      Needle evaluation of the right Tibialis Anterior muscle showed Incr Ins Act, moderately increased Fibs/PSW, complex repetitive discharge HF, moderately  increased motor unit amplitude, moderately increased motor unit duration, and moderately reduced recruitment.  The right Gastrocnemius muscle showed moderately increased motor unit amplitude, slightly increased motor unit duration, and mildly reduced recruitment.  The right Vastus Medius muscle showed Incr Ins Act, moderately increased Fibs/PSW, moderately increased motor unit amplitude, moderately increased motor unit duration, and moderately reduced recruitment.  The right iliopsoas muscle showed complex repetitive discharge HF, moderately increased motor unit amplitude, slightly increased motor unit duration, slightly increased polyphasic potentials, and moderately reduced recruitment.  The right Adductor Longus muscle showed Incr Ins Act, moderately increased Fibs/PSW, moderately increased motor unit amplitude, slightly increased motor unit duration, and moderately reduced recruitment.  The right Gluteus Medius muscle showed slightly increased motor unit amplitude, slightly increased motor unit duration, and mildly reduced recruitment.  The left Tibialis Anterior and the left Vastus Medius muscles showed Incr Ins Act, moderately increased Fibs/PSW, moderately increased motor unit amplitude, moderately increased motor unit duration, slightly increased polyphasic potentials, and mildly reduced recruitment.  The left Gastrocnemius muscle showed Incr Ins Act, moderately increased Fibs/PSW, slightly increased motor unit amplitude, slightly increased motor unit duration, and mildly reduced recruitment.  The left Gluteus Medius muscle showed Incr Ins Act, moderately increased motor unit amplitude, slightly increased motor unit duration, and recruitment.  All remaining muscles (as indicated in the following table) showed no evidence of electrical instability.        Interpretation:  This is an abnormal examination. 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 Amp Neg. Amp Diff Segment Distance Velocity Neg. Dur Neg Area Diff Temperature Comment   Site (ms) Norm (mV) Norm (%)  cm m/s Norm (ms) (%) ( C)    Right Dp Branch Fibular (TA) Motor   Fibular Head 3.9  < 6.0 3.2 -      10.7  31    Pop Fossa 6.2  < 5.7 3.1 - -3 Pop Fossa-Fibular Head 10.5 46 - 11.0 -3 31    Left Fibular (EDB) Motor   Ankle 4.5  < 6.0 2.8 -  Ankle-EDB 8   5.8  30.3    Bel Fibular Head 14.2 - 1.90 - -32 Bel Fibular Head-Ankle 36.5 38  > 38 7.7 -13 30.3    Pop Fossa 16.8 - 1.87 - -2 Pop Fossa-Bel Fibular Head 10 38  > 38 8.0 -3 30.3    Right Fibular (EDB) Motor        Fibular (EDB)   Ankle 5.8  < 6.0 0.62 -  Ankle-EDB 8   4.2  23.9    Bel Fibular Head 15.7 - 0.64 - 3 Bel Fibular Head-Ankle 37 37  > 38 6.3 22 23.9    Pop Fossa 18.4 - 0.67 - 5 Pop Fossa-Bel Fibular Head 10.5 39  > 38 7.3 6 23.8         Tibial (EDB)   Ankle+1 NR - NR -      NR  24.7    Left Tibial (AHB) Motor   Ankle 4.5  < 6.5 0.60  > 5.0  Ankle-AH 8   15.4  31    Right Tibial (AHB) Motor   Ankle 5.0  < 6.5 0.40  > 5.0  Ankle-AH 8   -  31.1    Knee 20.1 - 0.43 - 7 Knee-Ankle 48 32  > 38 19.5 - 31      Sensory Sites      Onset Lat Peak Lat Amp (O-P) Amp (P-P) Segment Distance Velocity Temperature Comment   Site ms (ms)  V Norm ( V)  cm m/s Norm ( C)    Right Radial Sensory   Forearm-Wrist 1.85 2.7 19  > 15 14 Forearm-Wrist 10 54 - 25    Left Superficial Fibular Sensory   Lower Leg-Ankle 3.3 4.2 2  > 3 5 Lower Leg-Ankle 12.5 38 - 30.8    Right Superficial Fibular Sensory   Lower Leg-Ankle 3.5 4.1 3  > 3 4 Lower Leg-Ankle 12.5 36 - 30.9    Left Sural Sensory   Calf-Lat Malleolus 3.5 4.2 4  > 5 5 Calf-Lat Malleolus 14 40  > 38  30.8    Right Sural Sensory   Calf-Lat Malleolus 3.3 4.1 4  > 5 10 Calf-Lat Malleolus 14 42  > 38 30.9        Electromyography     Side Muscle Ins Act Fibs/PSW Fasc HF Amp Dur Poly Recrt Int Pat   Right Iliopsoas Nml None Nml CRD 2+ 1+ 1+ ModRed Nml   Right Add longus Incr 2+ Nml 0 2+ 1+ 0 ModRed Nml   Right Gluteus med Nml None Nml 0 1+ 1+ 0 Iris Nml   Left Gluteus med Incr None Nml 0 2+ 1+ 0 Iris Nml   Left Gastroc Incr 2+ Nml 0 1+ 1+ 0 Iris Nml   Right Gastroc Nml None Nml 0 2+ 1+ 0 Iris Nml   Right Vastus med Incr 2+ Nml 0 2+ 2+ 0 ModRed Nml   Left Vastus med Incr 2+ Nml 0 2+ 2+ 1+ Iris Nml   Left Tib ant Incr 2+ Nml 0 2+ 2+ 1+ Iris Nml   Right Tib ant Incr 2+ Nml CRD 2+ 2+ 0 ModRed Nml   Left Iliopsoas Nml None Nml 0 Nml Nml 0 Nml Nml         NCS Waveforms:    Motor                  Sensory                      Again, thank you for allowing me to participate in the care of your patient.        Sincerely,        Scar Medina MD

## 2024-08-23 ENCOUNTER — THERAPY VISIT (OUTPATIENT)
Dept: PHYSICAL THERAPY | Facility: CLINIC | Age: 59
End: 2024-08-23
Payer: COMMERCIAL

## 2024-08-23 DIAGNOSIS — M25.362 INSTABILITY OF BOTH KNEE JOINTS: ICD-10-CM

## 2024-08-23 DIAGNOSIS — M17.0 PRIMARY OSTEOARTHRITIS OF BOTH KNEES: Primary | ICD-10-CM

## 2024-08-23 DIAGNOSIS — S83.242A TEAR OF MEDIAL MENISCUS OF LEFT KNEE, CURRENT, UNSPECIFIED TEAR TYPE, INITIAL ENCOUNTER: ICD-10-CM

## 2024-08-23 DIAGNOSIS — S83.241A TEAR OF MEDIAL MENISCUS OF RIGHT KNEE, CURRENT, UNSPECIFIED TEAR TYPE, INITIAL ENCOUNTER: ICD-10-CM

## 2024-08-23 DIAGNOSIS — R26.9 ABNORMAL GAIT: ICD-10-CM

## 2024-08-23 DIAGNOSIS — M25.361 INSTABILITY OF BOTH KNEE JOINTS: ICD-10-CM

## 2024-08-23 PROCEDURE — 97530 THERAPEUTIC ACTIVITIES: CPT | Mod: GP | Performed by: PHYSICAL THERAPIST

## 2024-08-23 PROCEDURE — 97110 THERAPEUTIC EXERCISES: CPT | Mod: GP | Performed by: PHYSICAL THERAPIST

## 2024-08-23 ASSESSMENT — ACTIVITIES OF DAILY LIVING (ADL)
WALK: ACTIVITY IS FAIRLY DIFFICULT
WEAKNESS: THE SYMPTOM AFFECTS MY ACTIVITY SEVERELY
KNEEL ON THE FRONT OF YOUR KNEE: ACTIVITY IS FAIRLY DIFFICULT
RAW_SCORE: 26
HOW_WOULD_YOU_RATE_THE_OVERALL_FUNCTION_OF_YOUR_KNEE_DURING_YOUR_USUAL_DAILY_ACTIVITIES?: ABNORMAL
AS_A_RESULT_OF_YOUR_KNEE_INJURY,_HOW_WOULD_YOU_RATE_YOUR_CURRENT_LEVEL_OF_DAILY_ACTIVITY?: ABNORMAL
GO DOWN STAIRS: ACTIVITY IS FAIRLY DIFFICULT
KNEE_ACTIVITY_OF_DAILY_LIVING_SCORE: 37.14
PAIN: THE SYMPTOM AFFECTS MY ACTIVITY SEVERELY
HOW_WOULD_YOU_RATE_THE_CURRENT_FUNCTION_OF_YOUR_KNEE_DURING_YOUR_USUAL_DAILY_ACTIVITIES_ON_A_SCALE_FROM_0_TO_100_WITH_100_BEING_YOUR_LEVEL_OF_KNEE_FUNCTION_PRIOR_TO_YOUR_INJURY_AND_0_BEING_THE_INABILITY_TO_PERFORM_ANY_OF_YOUR_USUAL_DAILY_ACTIVITIES?: 60
SQUAT: ACTIVITY IS FAIRLY DIFFICULT
LIMPING: THE SYMPTOM AFFECTS MY ACTIVITY SEVERELY
STIFFNESS: THE SYMPTOM AFFECTS MY ACTIVITY MODERATELY
RISE FROM A CHAIR: ACTIVITY IS FAIRLY DIFFICULT
STAND: ACTIVITY IS FAIRLY DIFFICULT
GO UP STAIRS: ACTIVITY IS FAIRLY DIFFICULT
SIT WITH YOUR KNEE BENT: ACTIVITY IS FAIRLY DIFFICULT
GIVING WAY, BUCKLING OR SHIFTING OF KNEE: THE SYMPTOM AFFECTS MY ACTIVITY MODERATELY
KNEE_ACTIVITY_OF_DAILY_LIVING_SUM: 26
SWELLING: THE SYMPTOM AFFECTS MY ACTIVITY SLIGHTLY

## 2024-08-23 NOTE — PROGRESS NOTES
"    DISCHARGE  Reason for Discharge: No further expectation of progress.    Equipment Issued:     Discharge Plan: Patient to continue home program.   08/23/24 0500   Appointment Info   Signing clinician's name / credentials Howard Orona DPT   Total/Authorized Visits 12 (E&T)   Visits Used 5   Medical Diagnosis Primary osteoarthritis of knees, bilateral  Tear of medial meniscus of right knee, current, unspecified tear type, initial encounter  Tear of medial meniscus of left knee, current, unspecified tear type, initial encounter  Instability of both knee joints   PT Tx Diagnosis B knee pain, meniscal tears with DJD   Precautions/Limitations T10-S1 fused   Other pertinent information EMG scheduled 8/22   Progress Note/Certification   Start of Care Date 07/16/24   Onset of illness/injury or Date of Surgery 06/07/24   Therapy Frequency 1x/wk   Predicted Duration 12 wks   Certification date from 07/16/24   Certification date to 10/08/24   Progress Note Completed Date 07/16/24   PT Goal 1   Goal Identifier ambulation   Goal Description Pt will be able to walk 4 blocks with SEC without increased knee pain   Rationale to maximize safety and independence with performance of ADLs and functional tasks;to maximize safety and independence within the home;to maximize safety and independence within the community;to maximize safety and independence with self cares   Goal Progress can go around the block 1 time with legs getting achey (gets achey custodial through) as 8/23/24   Target Date 10/08/24   Subjective Report   Subjective Report EMG showed \"abnormal\" nerve conduction from lumbar spine. Reports that his HEP is going well. Scheduled for the gel injections in his knees next week. Ready to self manage.   Objective Measures   Objective Measures Objective Measure 1;Objective Measure 2   Objective Measure 1   Objective Measure knee ROM   Details AAROM L knee heelslide 112, R 120   Objective Measure 2   Objective Measure B knee " "strength   Details L hip flexion 4/5, L knee flexion 4+/5, L knee flexion 5-/5   Treatment Interventions (PT)   Interventions Manual Therapy;Therapeutic Procedure/Exercise;Neuromuscular Re-education;Therapeutic Activity   Therapeutic Procedure/Exercise   Therapeutic Procedures: strength, endurance, ROM, flexibility minutes (71166) 23   PTRx Ther Proc 1 Heel Slides   PTRx Ther Proc 1 - Details B x 5   PTRx Ther Proc 2 Standing Gastroc Stretch   PTRx Ther Proc 2 - Details B x 30\" needed cues to keep heel down   PTRx Ther Proc 3 Clamshell Feet Apart   PTRx Ther Proc 3 - Details L x 13 R x 13   PTRx Ther Proc 4 Bridging #2A Weight Shift   PTRx Ther Proc 4 - Details x 8 staying up on bridge   PTRx Ther Proc 5 Sit to Stand   PTRx Ther Proc 5 - Details x10 difficultly starting with first rep   PTRx Ther Proc 6 Toe Raises   PTRx Ther Proc 6 - Details HEP   PTRx Ther Proc 7 Ankle Eversion Strengthening With Theraband   PTRx Ther Proc 7 - Details HEP   PTRx Ther Proc 8 Stepdown Backward   PTRx Ther Proc 8 - Details B x 5 on 8\" step   Patient Response/Progress see above   Therapeutic Activity   Therapeutic Activities: dynamic activities to improve functional performance minutes (72297) 10   Ther Act 1 10' TA pt demonstrates understanding of self management plan, nature of nervce conduction restuls and how they shape expectatiosn for gaining strength/endurance, continued focus on general activity with biking vs walking (adjusted different cane to make sure he does't have to sidebend)   Patient Response/Progress see above   Neuromuscular Re-education   Patient Response/Progress see above   Plan   Home program see PTRx   Updates to plan of care d/c SLR abduction, education   Plan for next session continue HEP for endurance independently   Total Session Time   Timed Code Treatment Minutes 33   Total Treatment Time (sum of timed and untimed services) 33         Referring Provider:  Scar Day    "

## 2024-08-26 ENCOUNTER — MYC MEDICAL ADVICE (OUTPATIENT)
Dept: NEUROSURGERY | Facility: CLINIC | Age: 59
End: 2024-08-26
Payer: COMMERCIAL

## 2024-08-26 NOTE — CONFIDENTIAL NOTE
LOV 6/12/24 with Prasanna Oliva PA-C and SHAUNA EMG was ordered. Patient sent myc inquiring about his EMG results. EMG completed 8/22/24, results in chart. Enc routed to Maico to review and advise on plan/next steps.

## 2024-08-27 NOTE — PROGRESS NOTES
Neema Hunt  :  1965  DOS: 2024  MRN: [id number]    Sports Medicine Clinic Procedure    Ultrasound Guided Bilateral Intra-Articular Knee SynviscOne Injection, +/- Aspiration    Clinical History: Primary OA of the knees with bilateral medial meniscus tears.  Also has significant lumbosacral radiculopathy bilaterally, s/p surgery and follows with neurosurgery.    Also discussed his recent EMG, that confirms active denervation at L4-5 on the right and L4-S1 on the left with a mild length-dependent sensory axonal PPN.  - EMG Interpretation:   This is an abnormal examination. 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 Primary osteoarthritis of knees, bilateral    2. Tear of medial meniscus of right knee, current, unspecified tear type, initial encounter    3. Tear of medial meniscus of left knee, current, unspecified tear type, initial encounter        Large Joint Injection: bilateral knee    Date/Time: 2024 2:43 PM    Performed by: Scar Day DO  Authorized by: Scar Day DO    Indications:  Pain and osteoarthritis  Needle Size:  22 G  Guidance: ultrasound    Approach:  Anterolateral  Location:  Knee  Laterality:  Bilateral      Medications (Right):  48 mg hylan 48 MG/6ML  Medications (Left):  48 mg hylan 48 MG/6ML  Outcome:  Tolerated well, no immediate complications  Procedure discussed: discussed risks, benefits, and alternatives    Consent Given by:  Patient  Timeout: timeout called immediately prior to procedure    Prep: patient was prepped and draped in usual sterile fashion        Ultrasound Guided Intra-articular Knee Viscosupplementation Injection - bilateral  The knee was prepped and  draped in a sterile manner.  Ultrasound identification of the patella, suprapatellar pouch, quadriceps tendon and femur in both long and short axis was obtained. The probe was placed in short axis to the femur.  A 1.5 inch 22 gauge needle was placed under ultrasound guidance into the superior knee joint using a lateral approach.  A prefilled syringe of SynviscOne solution was injected without difficulty. The needle was removed and there was good hemostasis without complications.  Patient tolerated procedure well.  There was ultrasound documentation of needle placement and injection.       Impression:  Successful ultrasound-guided bilateral knee joint viscosupplementation injections.    Plan:  - Injection:    - Expectations and goals of the injection were discussed and verbal and written consent was obtained.  - Performed the above procedure today in clinic. Patient tolerated the procedure well without complications.    - Post-procedure instructions:    - Keep the injection site clean and dry.   - Do not submerge the injection site for 24 hours (no baths, pools). Showers are ok.   - Rest the area for 24-48 hours before resuming normal activities. Avoid overexerting the area for the first few weeks.   - It may take up to 3-4 weeks to feel significant benefits.   - Follow up:          - In 6+ months as needed for updates to treatment plan, or sooner for new/worsening symptoms.   - Encouraged to contact his neurosurgical team for continued/worsening radiculopathy symptoms that have been contributing to instability and falls, with active denervation as noted above on his recent EMG.  Appointment scheduled on 9/6/2024.  [employer] message sent to his team.   - Discussed dosing of his gabapentin for continued neuropathic pain, he feels like it has been somewhat helpful and he would like to try an increased dose which is reasonable.  Increase dose to 900 mg at bedtime and instructed to monitor for side effects. Rx placed today.  -  Patient has clinic contact information for questions or concerns.      Scar Day DO, TONYM  [facility] - Department of Orthopedic Surgery     Disclaimer:  This note was prepared and written using Dragon Medical dictation software. As a result, there may be errors in the script that have gone undetected. Please consider this when interpreting the information in this note.

## 2024-08-30 ENCOUNTER — OFFICE VISIT (OUTPATIENT)
Dept: ORTHOPEDICS | Facility: CLINIC | Age: 59
End: 2024-08-30
Payer: OTHER MISCELLANEOUS

## 2024-08-30 VITALS — WEIGHT: 206 LBS | HEIGHT: 70 IN | BODY MASS INDEX: 29.49 KG/M2

## 2024-08-30 DIAGNOSIS — S83.242A TEAR OF MEDIAL MENISCUS OF LEFT KNEE, CURRENT, UNSPECIFIED TEAR TYPE, INITIAL ENCOUNTER: ICD-10-CM

## 2024-08-30 DIAGNOSIS — S83.241A TEAR OF MEDIAL MENISCUS OF RIGHT KNEE, CURRENT, UNSPECIFIED TEAR TYPE, INITIAL ENCOUNTER: ICD-10-CM

## 2024-08-30 DIAGNOSIS — M17.0 PRIMARY OSTEOARTHRITIS OF KNEES, BILATERAL: Primary | ICD-10-CM

## 2024-08-30 PROCEDURE — 20611 DRAIN/INJ JOINT/BURSA W/US: CPT | Mod: 50 | Performed by: STUDENT IN AN ORGANIZED HEALTH CARE EDUCATION/TRAINING PROGRAM

## 2024-08-30 PROCEDURE — 99214 OFFICE O/P EST MOD 30 MIN: CPT | Mod: 25 | Performed by: STUDENT IN AN ORGANIZED HEALTH CARE EDUCATION/TRAINING PROGRAM

## 2024-08-30 RX ORDER — GABAPENTIN 300 MG/1
900 CAPSULE ORAL AT BEDTIME
Qty: 90 CAPSULE | Refills: 1 | Status: SHIPPED | OUTPATIENT
Start: 2024-08-30 | End: 2024-09-29

## 2024-08-30 NOTE — LETTER
2024      Neema Hunt  [address] [facility] [address]      Dear Colleague,    Thank you for referring your patient, Neema Hunt, to the [facility] SP[facility] MEDIC[facility] [place]. Please see a copy of my visit note below.    Neema Hunt  :  1965  DOS: 2024  MRN: [id number]    Sports Medicine Clinic Procedure    Ultrasound Guided Bilateral Intra-Articular Knee SynviscOne Injection, +/- Aspiration    Clinical History: Primary OA of the knees with bilateral medial meniscus tears.  Also has significant lumbosacral radiculopathy bilaterally, s/p surgery and follows with neurosurgery.    Also discussed his recent EMG, that confirms active denervation at L4-5 on the right and L4-S1 on the left with a mild length-dependent sensory axonal PPN.  - EMG Interpretation:   This is an abnormal examination. 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 Primary osteoarthritis of knees, bilateral    2. Tear of medial meniscus of right knee, current, unspecified tear type, initial encounter    3. Tear of medial meniscus of left knee, current, unspecified tear type, initial encounter        Large Joint Injection: bilateral knee    Date/Time: 2024 2:43 PM    Performed by: Scar Day DO  Authorized by: Scar Day DO    Indications:  Pain and osteoarthritis  Needle Size:  22 G  Guidance: ultrasound    Approach:  Anterolateral  Location:  Knee  Laterality:  Bilateral      Medications (Right):  48 mg hylan 48 MG/6ML  Medications (Left):  48 mg hylan 48 MG/6ML  Outcome:  Tolerated well, no immediate complications  Procedure discussed: discussed risks, benefits, and alternatives     Consent Given by:  Patient  Timeout: timeout called immediately prior to procedure    Prep: patient was prepped and draped in usual sterile fashion        Ultrasound Guided Intra-articular Knee Viscosupplementation Injection - bilateral  The knee was prepped and draped in a sterile manner.  Ultrasound identification of the patella, suprapatellar pouch, quadriceps tendon and femur in both long and short axis was obtained. The probe was placed in short axis to the femur.  A 1.5 inch 22 gauge needle was placed under ultrasound guidance into the superior knee joint using a lateral approach.  A prefilled syringe of SynviscOne solution was injected without difficulty. The needle was removed and there was good hemostasis without complications.  Patient tolerated procedure well.  There was ultrasound documentation of needle placement and injection.       Impression:  Successful ultrasound-guided bilateral knee joint viscosupplementation injections.    Plan:  - Injection:    - Expectations and goals of the injection were discussed and verbal and written consent was obtained.  - Performed the above procedure today in clinic. Patient tolerated the procedure well without complications.    - Post-procedure instructions:    - Keep the injection site clean and dry.   - Do not submerge the injection site for 24 hours (no baths, pools). Showers are ok.   - Rest the area for 24-48 hours before resuming normal activities. Avoid overexerting the area for the first few weeks.   - It may take up to 3-4 weeks to feel significant benefits.   - Follow up:          - In 6+ months as needed for updates to treatment plan, or sooner for new/worsening symptoms.   - Encouraged to contact his neurosurgical team for continued/worsening radiculopathy symptoms that have been contributing to instability and falls, with active denervation as noted above on his recent EMG.  Appointment scheduled on 9/6/2024.  [employer] message sent to his team.   -  Discussed dosing of his gabapentin for continued neuropathic pain, he feels like it has been somewhat helpful and he would like to try an increased dose which is reasonable.  Increase dose to 900 mg at bedtime and instructed to monitor for side effects. Rx placed today.  - Patient has clinic contact information for questions or concerns.      Scar Day DO, CAQSM  [facility]  [facility] - Department of Orthopedic Surgery     Disclaimer:  This note was prepared and written using Dragon Medical dictation software. As a result, there may be errors in the script that have gone undetected. Please consider this when interpreting the information in this note.       Again, thank you for allowing me to participate in the care of your patient.        Sincerely,        Scar Day DO

## 2024-08-30 NOTE — NURSING NOTE
[address] [address]  Dept: [phone]  ______________________________________________________________________________    Patient: Neema Hunt   : 1965   MRN: [id number]   2024    INVASIVE PROCEDURE SAFETY CHECKLIST    Date: 2024   Procedure: bilateral knee joint injection with Synvisc-One  Patient Name: Neema Hunt  MRN: [id number]  YOB: 1965    Action: Complete sections as appropriate. Any discrepancy results in a HARD COPY until resolved.     PRE PROCEDURE:  Patient ID verified with 2 identifiers (name and  or MRN): Yes  Procedure and site verified with patient/designee (when able): Yes  Accurate consent documentation in medical record: Yes  H&P (or appropriate assessment) documented in medical record: Yes  H&P must be up to 20 days prior to procedure and updates within 24 hours of procedure as applicable: NA  Relevant diagnostic and radiology test results appropriately labeled and displayed as applicable: NA  Procedure site(s) marked with provider initials: NA    TIMEOUT:  Time-Out performed immediately prior to starting procedure, including verbal and active participation of all team members addressing the following:Yes  * Correct patient identify  * Confirmed that the correct side and site are marked  * An accurate procedure consent form  * Agreement on the procedure to be done  * Correct patient position  * Relevant images and results are properly labeled and appropriately displayed  * The need to administer antibiotics or fluids for irrigation purposes during the procedure as applicable   * Safety precautions based on patient history or medication use    DURING PROCEDURE: Verification of correct person, site, and procedures any time the responsibility for care of the patient is transferred to another member of the care team.       Prior to injection, verified patient identity using  patient's name and date of birth.  Due to injection administration, patient instructed to remain in clinic for 15 minutes  afterwards, and to report any adverse reaction to me immediately.    Joint injection was performed.      Synvisc-One x2  Drug Amount Wasted:  None.  Vial/Syringe: Syringe  Expiration Date:  02/28/2027    Meeta Slater, ATC  August 30, 2024

## 2024-09-05 DIAGNOSIS — M51.27 HERNIATED NUCLEUS PULPOSUS, L5-S1: Primary | ICD-10-CM

## 2024-09-10 DIAGNOSIS — Z98.1 STATUS POST LUMBAR SPINAL FUSION: Primary | ICD-10-CM

## 2024-09-10 DIAGNOSIS — M54.16 LUMBAR RADICULOPATHY: ICD-10-CM

## 2024-09-11 DIAGNOSIS — M51.27 HERNIATED NUCLEUS PULPOSUS, L5-S1: Primary | ICD-10-CM

## 2024-09-12 ENCOUNTER — TELEPHONE (OUTPATIENT)
Dept: PHYSICAL MEDICINE AND REHAB | Facility: CLINIC | Age: 59
End: 2024-09-12
Payer: COMMERCIAL

## 2024-09-12 PROBLEM — M54.16 LUMBAR RADICULOPATHY: Status: ACTIVE | Noted: 2024-09-10

## 2024-09-12 NOTE — TELEPHONE ENCOUNTER
Called patient to schedule procedure with Dr. Verma    Date of Procedure: 11/1/24    Arrival time given: Yes: Arrival Time 12:45pm       Procedure Location: [facility] [place]     Verified Location with Patient:  Yes  Address provided to the patient     Pre-op H&P Required:  No: Local anesthesia        Post-Op/Follow Up Appt:  Not Indicated in Request      Informed patient they will need a  to drive them home:  Yes    Patients : Spouse    Patient is aware that pre-op RN from the procedure center will call 2-3 days prior to scheduled procedure to confirm arrival time and review any instructions:  Yes       Additional Comments: N/a        Purnima Manley MA on 9/12/2024 at 1:18 PM      P: [phone]*

## 2024-09-12 NOTE — PROGRESS NOTES
Patient with history of T10-pelvis fusion with persistent symptoms. Per neurosurgery, case request place for Bilateral L5-S1 transforaminal epidural steroid injection. However, I have requested that he have additional post-operative imaging completed prior to the injection for review.

## 2024-09-21 ENCOUNTER — HOSPITAL ENCOUNTER (OUTPATIENT)
Dept: MRI IMAGING | Facility: CLINIC | Age: 59
Discharge: HOME | End: 2024-09-21
Attending: PHYSICIAN ASSISTANT | Admitting: PHYSICIAN ASSISTANT
Payer: OTHER MISCELLANEOUS

## 2024-09-21 DIAGNOSIS — M51.27 HERNIATED NUCLEUS PULPOSUS, L5-S1: ICD-10-CM

## 2024-09-21 PROCEDURE — 255N000002 HC RX 255 OP 636: Performed by: PHYSICIAN ASSISTANT

## 2024-09-21 PROCEDURE — A9585 GADOBUTROL INJECTION: HCPCS | Performed by: PHYSICIAN ASSISTANT

## 2024-09-21 PROCEDURE — 72158 MRI LUMBAR SPINE W/O & W/DYE: CPT

## 2024-09-21 RX ORDER — GADOBUTROL 604.72 MG/ML
9 INJECTION INTRAVENOUS ONCE
Status: COMPLETED | OUTPATIENT
Start: 2024-09-21 | End: 2024-09-21

## 2024-09-21 RX ADMIN — GADOBUTROL 9 ML: 604.72 INJECTION INTRAVENOUS at 16:15

## 2024-09-27 ENCOUNTER — OFFICE VISIT (OUTPATIENT)
Dept: NEUROSURGERY | Facility: CLINIC | Age: 59
End: 2024-09-27
Payer: OTHER MISCELLANEOUS

## 2024-09-27 VITALS — DIASTOLIC BLOOD PRESSURE: 80 MMHG | SYSTOLIC BLOOD PRESSURE: 118 MMHG | OXYGEN SATURATION: 90 % | HEART RATE: 79 BPM

## 2024-09-27 DIAGNOSIS — M96.0 PSEUDARTHROSIS AFTER FUSION OR ARTHRODESIS: Primary | ICD-10-CM

## 2024-09-27 DIAGNOSIS — M47.14 THORACIC SPONDYLOSIS WITH MYELOPATHY: ICD-10-CM

## 2024-09-27 PROCEDURE — 99213 OFFICE O/P EST LOW 20 MIN: CPT | Performed by: NEUROLOGICAL SURGERY

## 2024-09-27 ASSESSMENT — PAIN SCALES - GENERAL: PAINLEVEL: MODERATE PAIN (5)

## 2024-09-27 NOTE — PROGRESS NOTES
"It was a pleasure to see Neema Hunt today in Neurosurgery Clinic. He is a 59 year old male with a history of multiple previous lumbar  surgeries.    His most recent surgery:    [facility]   Operative Note     Pre-operative diagnosis: Thoracic Instability after laminectomy  Pseudarthrosis after fusion or arthrodesis [M96.0]  Spinal stenosis of lumbar region with radiculopathy [M48.061, M54.16]   Post-operative diagnosis Same   Procedure: Procedure(s):  Insertion Thoracic 10 to Lumbar 1 and revision of Lumbar 2 to Sacral 1 instrumentation. Dual Pelvic Instrumentation. Revision fusion Lumbar 5 to Sacral 1 using allograft chips. Arthrodesis Thoracic 10 to Lumbar 2 using allograft cancellous chips    Surgeon(s): Surgeon(s) and Role:     * Nhan Marshall MD - Primary     * Prasanna Oliva PA-C - Assisting   Estimated blood loss: 1000 mL         Specimens: * No specimens in log *   Findings:    New instrumentation placed without difficulty.  Refusion at L5-S1 and primary fusion from T10-L2.   He returns today with ongoing issues with increasing numbness and weakness of the lower extremities.  He has chronic right lower extremity numbness  .  He has numbness of the toes and a distal to proximal gradient.  He describes weakness of the lower extremities and where his legs particularly his left leg will give way.  He is also dealing with some knee issues as well.  His walking is limited because he developed significant back pain the further he walks.        Vitals:    09/27/24 1331   BP: 118/80   Pulse: 79   SpO2: 90%     Estimated body mass index is 29.56 kg/m  as calculated from the following:    Height as of 8/30/24: 1.778 m (5' 10\").    Weight as of 8/30/24: 93.4 kg (206 lb).  Moderate Pain (5)    Well-healed thoracolumbar incision    Posture erect  Bilateral lower extremity strength is 5 out of 5 in all muscle groups        Imaging: Review of his lumbar MRI shows no obvious " areas of stenosis although this is limited by the hardware artifact.  His most recent lumbar x-rays show stable alignment of the construct without obvious ian breakage.  He had an EMG nerve conduction study done recently that shows evidence both of neuropathy as well as chronic and active radiculopathies in the lumbar spine.    Assessment: 1.  Status post thoracolumbar fusion.  2.  Possible pseudoarthrosis.  3.  Possible thoracic myelopathy    Plan: I would like to obtain a CT of the thoracic and lumbar spine to evaluate his fusion to make sure that pseudoarthrosis is not a reason for his ongoing lumbar radicular symptoms.  I would also like to visualize the thoracic spine since his fusion extends upwards and it is possible he could have proximal junctional issues that might be causing some of his symptomatology.  We will see him back once these imaging studies are done to further discuss findings and plan.

## 2024-09-27 NOTE — PATIENT INSTRUCTIONS
Patient Next Steps:    Order placed for imaging (thoracic and lumbar CT and thoracic MRI). You can schedule at our  today([place] location only), or Central Scheduling will call you to make the appointment. If you do not hear from them within 1-2 business days you can call the numbers below.   [phone]     Follow-up with Dr. Marshall in clinic after CT Thoracic and Lumbar, and Thoracic MRI.     We will cancel your follow-up and XR in December, as we are doing workup now    Please call us if you have any further questions or concerns.    [facility]   Phone: [phone]  Fax: [phone]

## 2024-09-27 NOTE — NURSING NOTE
"Neema Hunt is a 59 year old male who presents for:  Chief Complaint   Patient presents with    RECHECK     EMG results and next steps - numbness in both, right has pain in foot.          Initial Vitals:  /80   Pulse 79   SpO2 90%  Estimated body mass index is 29.56 kg/m  as calculated from the following:    Height as of 8/30/24: 5' 10\" (1.778 m).    Weight as of 8/30/24: 206 lb (93.4 kg).. There is no height or weight on file to calculate BSA. BP completed using cuff size: regular  Moderate Pain (5)    Nursing Comments:       Elysia Knight   "

## 2024-09-27 NOTE — LETTER
9/27/2024      Neema Hunt  [address] [facility] [address]      Dear Colleague,    Thank you for referring your patient, Neema Hunt, to the [facility] NEUROLOGY [facility]INICS [facility]. Please see a copy of my visit note below.    It was a pleasure to see Neema Hunt today in Neurosurgery Clinic. He is a 59 year old male with a history of multiple previous lumbar  surgeries.    His most recent surgery:    [facility]   Operative Note     Pre-operative diagnosis: Thoracic Instability after laminectomy  Pseudarthrosis after fusion or arthrodesis [M96.0]  Spinal stenosis of lumbar region with radiculopathy [M48.061, M54.16]   Post-operative diagnosis Same   Procedure: Procedure(s):  Insertion Thoracic 10 to Lumbar 1 and revision of Lumbar 2 to Sacral 1 instrumentation. Dual Pelvic Instrumentation. Revision fusion Lumbar 5 to Sacral 1 using allograft chips. Arthrodesis Thoracic 10 to Lumbar 2 using allograft cancellous chips    Surgeon(s): Surgeon(s) and Role:     * Nhan Marshall MD - Primary     * Prasanna Oliva PA-C - Assisting   Estimated blood loss: 1000 mL         Specimens: * No specimens in log *   Findings:    New instrumentation placed without difficulty.  Refusion at L5-S1 and primary fusion from T10-L2.   He returns today with ongoing issues with increasing numbness and weakness of the lower extremities.  He has chronic right lower extremity numbness  .  He has numbness of the toes and a distal to proximal gradient.  He describes weakness of the lower extremities and where his legs particularly his left leg will give way.  He is also dealing with some knee issues as well.  His walking is limited because he developed significant back pain the further he walks.        Vitals:    09/27/24 1331   BP: 118/80   Pulse: 79   SpO2: 90%     Estimated body mass index is 29.56 kg/m  as calculated from the following:    Height as of 8/30/24: 1.778 m (5'  "10\").    Weight as of 8/30/24: 93.4 kg (206 lb).  Moderate Pain (5)    Well-healed thoracolumbar incision    Posture erect  Bilateral lower extremity strength is 5 out of 5 in all muscle groups        Imaging: Review of his lumbar MRI shows no obvious areas of stenosis although this is limited by the hardware artifact.  His most recent lumbar x-rays show stable alignment of the construct without obvious ian breakage.  He had an EMG nerve conduction study done recently that shows evidence both of neuropathy as well as chronic and active radiculopathies in the lumbar spine.    Assessment: 1.  Status post thoracolumbar fusion.  2.  Possible pseudoarthrosis.  3.  Possible thoracic myelopathy    Plan: I would like to obtain a CT of the thoracic and lumbar spine to evaluate his fusion to make sure that pseudoarthrosis is not a reason for his ongoing lumbar radicular symptoms.  I would also like to visualize the thoracic spine since his fusion extends upwards and it is possible he could have proximal junctional issues that might be causing some of his symptomatology.  We will see him back once these imaging studies are done to further discuss findings and plan.         Again, thank you for allowing me to participate in the care of your patient.        Sincerely,        Nhan Marshall MD  "

## 2024-09-28 ENCOUNTER — MYC MEDICAL ADVICE (OUTPATIENT)
Dept: NEUROSURGERY | Facility: CLINIC | Age: 59
End: 2024-09-28
Payer: COMMERCIAL

## 2024-09-28 DIAGNOSIS — M47.14 THORACIC SPONDYLOSIS WITH MYELOPATHY: ICD-10-CM

## 2024-09-28 DIAGNOSIS — R29.898 WEAKNESS OF BOTH LOWER LIMBS: ICD-10-CM

## 2024-09-28 DIAGNOSIS — M43.25 FUSION OF SPINE, THORACOLUMBAR REGION: Primary | ICD-10-CM

## 2024-10-07 PROBLEM — Z98.1 S/P SPINAL FUSION: Status: RESOLVED | Noted: 2024-02-21 | Resolved: 2024-10-07

## 2024-10-07 PROBLEM — M54.16 SPINAL STENOSIS OF LUMBAR REGION WITH RADICULOPATHY: Status: RESOLVED | Noted: 2020-06-05 | Resolved: 2024-10-07

## 2024-10-07 PROBLEM — M48.061 SPINAL STENOSIS OF LUMBAR REGION WITH RADICULOPATHY: Status: RESOLVED | Noted: 2020-06-05 | Resolved: 2024-10-07

## 2024-10-09 ENCOUNTER — HOSPITAL ENCOUNTER (OUTPATIENT)
Dept: MRI IMAGING | Facility: CLINIC | Age: 59
Discharge: HOME | End: 2024-10-09
Attending: NEUROLOGICAL SURGERY | Admitting: NEUROLOGICAL SURGERY
Payer: COMMERCIAL

## 2024-10-09 DIAGNOSIS — M96.0 PSEUDARTHROSIS AFTER FUSION OR ARTHRODESIS: ICD-10-CM

## 2024-10-09 DIAGNOSIS — M47.14 THORACIC SPONDYLOSIS WITH MYELOPATHY: ICD-10-CM

## 2024-10-09 PROCEDURE — 72157 MRI CHEST SPINE W/O & W/DYE: CPT

## 2024-10-09 PROCEDURE — 255N000002 HC RX 255 OP 636: Performed by: NEUROLOGICAL SURGERY

## 2024-10-09 PROCEDURE — A9585 GADOBUTROL INJECTION: HCPCS | Performed by: NEUROLOGICAL SURGERY

## 2024-10-09 RX ORDER — GADOBUTROL 604.72 MG/ML
9 INJECTION INTRAVENOUS ONCE
Status: COMPLETED | OUTPATIENT
Start: 2024-10-09 | End: 2024-10-09

## 2024-10-09 RX ADMIN — GADOBUTROL 9 ML: 604.72 INJECTION INTRAVENOUS at 07:27

## 2024-10-10 ENCOUNTER — ANCILLARY PROCEDURE (OUTPATIENT)
Dept: CT IMAGING | Facility: CLINIC | Age: 59
End: 2024-10-10
Attending: NEUROLOGICAL SURGERY
Payer: OTHER MISCELLANEOUS

## 2024-10-10 DIAGNOSIS — M96.0 PSEUDARTHROSIS AFTER FUSION OR ARTHRODESIS: ICD-10-CM

## 2024-10-10 DIAGNOSIS — M47.14 THORACIC SPONDYLOSIS WITH MYELOPATHY: ICD-10-CM

## 2024-10-10 PROCEDURE — 72128 CT CHEST SPINE W/O DYE: CPT | Performed by: STUDENT IN AN ORGANIZED HEALTH CARE EDUCATION/TRAINING PROGRAM

## 2024-10-10 PROCEDURE — 72131 CT LUMBAR SPINE W/O DYE: CPT | Performed by: STUDENT IN AN ORGANIZED HEALTH CARE EDUCATION/TRAINING PROGRAM

## 2024-10-11 ENCOUNTER — OFFICE VISIT (OUTPATIENT)
Dept: NEUROSURGERY | Facility: CLINIC | Age: 59
End: 2024-10-11
Payer: OTHER MISCELLANEOUS

## 2024-10-11 VITALS — SYSTOLIC BLOOD PRESSURE: 115 MMHG | DIASTOLIC BLOOD PRESSURE: 79 MMHG | OXYGEN SATURATION: 93 % | HEART RATE: 70 BPM

## 2024-10-11 DIAGNOSIS — Z98.1 S/P SPINAL FUSION: ICD-10-CM

## 2024-10-11 DIAGNOSIS — G56.01 CARPAL TUNNEL SYNDROME ON RIGHT: Primary | ICD-10-CM

## 2024-10-11 PROCEDURE — 99214 OFFICE O/P EST MOD 30 MIN: CPT | Performed by: NEUROLOGICAL SURGERY

## 2024-10-11 ASSESSMENT — PAIN SCALES - GENERAL: PAINLEVEL: MODERATE PAIN (5)

## 2024-10-11 NOTE — PROGRESS NOTES
"It was a pleasure to see Neema Hunt today in Neurosurgery Clinic. He is a 59 year old male who is here to follow-up his recent imaging studies.  Overall his back and lower extremity symptoms have been stable.    He also describes some symptoms in the right hand with numbness at night and when he is using his cane.  These sound suggestive of right carpal tunnel syndrome.      Vitals:    10/11/24 0837   BP: 115/79   Pulse: 70   SpO2: 93%     Estimated body mass index is 29.56 kg/m  as calculated from the following:    Height as of 8/30/24: 1.778 m (5' 10\").    Weight as of 8/30/24: 93.4 kg (206 lb).  Moderate Pain (5)    Exam stable    Imaging: Review of his CT of the thoracic and lumbar spine does not show any obvious hardware failure.  There does appear to be some areas where the discs are still moving to a certain degree and may be a few screws that have slight halos around them.  The lumbosacral junction appears to be healing reasonably well at this time.    His thoracic MRI demonstrates degenerative changes at multiple levels.  There is some mild stenosis at the level above his fusion but I am not terribly concerned about this at the current moment.  The imaging was reviewed with the patient shown to the patient clinic today.    Assessment: 1.  Status post thoracolumbar fusion.  2.  Carpal tunnel syndrome on the right    Plan: I would like to get an EMG nerve conduction study of the right upper extremity to further elucidate his right upper extremity symptoms.  Once we get these results back I will call him to discuss.  For his spinal fusion, I would like to see him back in 6 months with standing scoliosis films.     "

## 2024-10-11 NOTE — LETTER
"10/11/2024      Neema Hunt  [address] [address]      Dear Colleague,    Thank you for referring your patient, Neema Hunt, to the [facility] NEUROLOGY CLINICS [facility]. Please see a copy of my visit note below.    It was a pleasure to see Neema Hunt today in Neurosurgery Clinic. He is a 59 year old male who is here to follow-up his recent imaging studies.  Overall his back and lower extremity symptoms have been stable.    He also describes some symptoms in the right hand with numbness at night and when he is using his cane.  These sound suggestive of right carpal tunnel syndrome.      Vitals:    10/11/24 0837   BP: 115/79   Pulse: 70   SpO2: 93%     Estimated body mass index is 29.56 kg/m  as calculated from the following:    Height as of 8/30/24: 1.778 m (5' 10\").    Weight as of 8/30/24: 93.4 kg (206 lb).  Moderate Pain (5)    Exam stable    Imaging: Review of his CT of the thoracic and lumbar spine does not show any obvious hardware failure.  There does appear to be some areas where the discs are still moving to a certain degree and may be a few screws that have slight halos around them.  The lumbosacral junction appears to be healing reasonably well at this time.    His thoracic MRI demonstrates degenerative changes at multiple levels.  There is some mild stenosis at the level above his fusion but I am not terribly concerned about this at the current moment.  The imaging was reviewed with the patient shown to the patient clinic today.    Assessment: 1.  Status post thoracolumbar fusion.  2.  Carpal tunnel syndrome on the right    Plan: I would like to get an EMG nerve conduction study of the right upper extremity to further elucidate his right upper extremity symptoms.  Once we get these results back I will call him to discuss.  For his spinal fusion, I would like to see him back in 6 months with standing scoliosis films.         Again, thank you for allowing me to " participate in the care of your patient.        Sincerely,        Nhan Marshall MD

## 2024-10-11 NOTE — NURSING NOTE
"Neema Hunt is a 59 year old male who presents for:  Chief Complaint   Patient presents with    RECHECK     Physically he feels about the same with pain from his knees to toes bilat - lvl 5        Initial Vitals:  /79   Pulse 70   SpO2 93%  Estimated body mass index is 29.56 kg/m  as calculated from the following:    Height as of 8/30/24: 5' 10\" (1.778 m).    Weight as of 8/30/24: 206 lb (93.4 kg).. There is no height or weight on file to calculate BSA. BP completed using cuff size: regular  Moderate Pain (5)    Nursing Comments:     Ruddy Mensah    "

## 2024-10-11 NOTE — PATIENT INSTRUCTIONS
Patient Next Steps:    Order placed for x-ray to be completed in 6 months on EOS machine at [facility]. Central Scheduling will call you to make the appointment. If you do not hear from them within 1-2 business days you can call the numbers below. Schedule follow-up with Dr. Marshall to review imaging results.  [phone] (Imaging scheduling)  [phone] (Dr. Marshall scheduling)    Order placed for right arm EMG. You can call the phone number highlighted in the order to schedule your appointment. We will call you with the results once EMG is completed.  [facility] will call you to coordinate your care as prescribed by your provider. If you don't hear from a representative within 2 business days, please call [phone].  If you would like to inquire on a sooner Neurology appointment, I will list a few external options and their phone numbers. You can call and ask them how far out they are booking.  Please let us know if you wish to go to one of these external options and we can fax them the order.   [place]: [phone]  [facility]: [phone]  [place]: [phone]     Please call us if you have any further questions or concerns.    [facility]   Phone: [phone]  Fax: [phone]

## 2024-11-01 ENCOUNTER — ANCILLARY PROCEDURE (OUTPATIENT)
Dept: GENERAL RADIOLOGY | Facility: CLINIC | Age: 59
End: 2024-11-01
Attending: PHYSICAL MEDICINE & REHABILITATION
Payer: COMMERCIAL

## 2024-11-01 ENCOUNTER — HOSPITAL ENCOUNTER (OUTPATIENT)
Facility: AMBULATORY SURGERY CENTER | Age: 59
Discharge: HOME | End: 2024-11-01
Attending: PHYSICAL MEDICINE & REHABILITATION | Admitting: PHYSICAL MEDICINE & REHABILITATION
Payer: COMMERCIAL

## 2024-11-01 VITALS
HEART RATE: 64 BPM | RESPIRATION RATE: 16 BRPM | SYSTOLIC BLOOD PRESSURE: 136 MMHG | OXYGEN SATURATION: 100 % | DIASTOLIC BLOOD PRESSURE: 89 MMHG | TEMPERATURE: 97 F

## 2024-11-01 DIAGNOSIS — R52 PAIN: ICD-10-CM

## 2024-11-01 PROCEDURE — G8907 PT DOC NO EVENTS ON DISCHARG: HCPCS

## 2024-11-01 PROCEDURE — G8918 PT W/O PREOP ORDER IV AB PRO: HCPCS

## 2024-11-01 PROCEDURE — 64483 NJX AA&/STRD TFRM EPI L/S 1: CPT | Mod: 50

## 2024-11-01 RX ORDER — IOPAMIDOL 408 MG/ML
INJECTION, SOLUTION INTRATHECAL PRN
Status: DISCONTINUED | OUTPATIENT
Start: 2024-11-01 | End: 2024-11-01 | Stop reason: HOSPADM

## 2024-11-01 RX ORDER — LIDOCAINE HYDROCHLORIDE 10 MG/ML
INJECTION, SOLUTION EPIDURAL; INFILTRATION; INTRACAUDAL; PERINEURAL PRN
Status: DISCONTINUED | OUTPATIENT
Start: 2024-11-01 | End: 2024-11-01 | Stop reason: HOSPADM

## 2024-11-01 RX ORDER — DEXAMETHASONE SODIUM PHOSPHATE 10 MG/ML
INJECTION INTRAMUSCULAR; INTRAVENOUS PRN
Status: DISCONTINUED | OUTPATIENT
Start: 2024-11-01 | End: 2024-11-01 | Stop reason: HOSPADM

## 2024-11-01 NOTE — OP NOTE
PROCEDURE NOTE: Lumbar Transforaminal Epidural Steroid Injection Under Fluoroscopic Guidance    PROCEDURE DATE: 11/1/2024    PATIENT NAME: Neema Hunt   YOB: 1965    ATTENDING PHYSICIAN:  Nicola Verma MD   RESIDENT/FELLOW PHYSICIAN: None    PREOPERATIVE DIAGNOSIS:   Lumbar radiculopathy  Status post lumbar spinal fusion  POSTOPERATIVE DIAGNOSIS: same    PROCEDURE PERFORMED: Bilateral Lumbar Transforaminal Epidural Steroid Injection at the L5-S1 level(s)    FLUOROSCOPY WAS USED.    INDICATIONS FOR PROCEDURE:   Neema Hunt is a 59 year old male with a clinical picture consistent with the above-mentioned diagnosis, resulting in radicular pain to the Bilateral lower extremities.    PROCEDURE AND FINDINGS:    He was greeted in the pre-procedure holding area. The risk, benefits and alternatives to the procedure were again reviewed with the patient and written informed consent was placed in the chart. An IV line was not placed.  A 500 mL bag of NS was not connected to the patient. He was taken to the procedure room and positioned prone on the fluoroscopy table.  Routine monitors were applied including EKG leads (with sedation only), blood pressure cuff, and pulse oximetry. Prior to the procedure a time out was completed, verifying correct patient, procedure, site, positioning, and implants and/or special equipment.     An AP fluoroscopic  film was taken to identify the L5 pedicle (screw) and the S1 superior articulating process. The skin was prepped with chlorhexidine and draped in the usual sterile fashion. The skin and subcutaneous tissue overlying the aforementioned anatomic targets were anesthetized using a 27-gauge 1-1/4 inch needle with 1% preservative-free lidocaine for a total volume of 3mls.      Then a 22-gauge 5 inch Quincke spinal needle was advanced under fluoroscopic guidance using an oblique view just inferior to the pedicle of the L5 level(s) on the Bilateral side(s).   Then, utilizing AP and lateral fluoroscopic views, we confirmed the position of the needle tip to be within the neural foramen. After negative aspiration, 1 mls of Isovue-M contrast was injected under AP view at each level and confirmed adequate spread along the nerve root and in the epidural space. There was no evidence of intravascular uptake or intrathecal spread on imaging.     At this point, after negative aspiration, a total 1.5mL volume of treatment injectate, consisting of 0.75mL Dexamethasone (10mg/mL) (15mg total dose; 5mg wasted), and 0.75mL of 1% Lidocaine, was injected easily, per level.  The needle was then flushed with 0.5 ml of local anesthetic and removed. The needle insertion site was dressed appropriately.     Neema Hunt was taken to the recovery room where he was monitored for a brief period of time. He tolerated the procedure well and was discharged home in stable condition with post procedural instructions.     Before the procedure, he reported a pain score of 4/10.   After the procedure, he reported a pain score of 3/10.      Follow-up will be in neurosurgery clinic.     COMPLICATIONS:  None    Comment(s):  None

## 2024-11-01 NOTE — DISCHARGE INSTRUCTIONS
PAIN INJECTION HOME CARE INSTRUCTIONS  Activity  -Rest today  -Do not work today  -Resume normal activity tomorrow  -DO NOT shower for 24 hours  -DO NOT remove bandaid for 24 hours    Pain  -You may experience soreness at the injection site for one or two days  -You may use an ice pack for 20 minutes every 2 hours for the first 24 hours  -You may use a heating pad after the first 24 hours  -You may use Tylenol (acetaminophen) every 4 hours or other pain medicines as directed by your physician    You may experience numbness radiating into your legs or arms (depending on the procedure location). This numbness may last several hours. Until sensation returns to normal; please use caution in walking, climbing stairs, and stepping out of your vehicle, etc.    Common side effects of steroids:  Not everyone will experience corticosteroid side effects. If side effects are experienced, they will gradually subside in the 7-10 day period following an injection. Most common side effects include:  -Flushed face and/or chest  -Feeling of warmth, particularly in the face but could be an overall feeling of warmth  -Increased blood sugar in diabetic patients  -Menstrual irregularities my occur. If taking hormone-based birth control an alternate method of birth control is recommended  -Sleep disturbances and/or mood swings are possible  -Leg cramps    Please contact us if you have:  -Severe pain  -Fever more than 101.5 degrees Fahrenheit  -Signs of infection at the injection site (redness, swelling, or drainage)    FOR PM&R PATIENTS:  For patients seen by the PM and R service, please call [phone]. (Monday through Friday 8a-5p.  After business hours and weekends call [phone] and ask for the PM and R doctor on call). If you need to fax a pain diary to PM&R the fax number is [phone]. If you are unable to fax, uploading to [employer]uma is encouraged, then send to provider. If you have procedure scheduling questions please call  [phone] Option #2.

## 2024-11-05 ENCOUNTER — OFFICE VISIT (OUTPATIENT)
Dept: NEUROLOGY | Facility: CLINIC | Age: 59
End: 2024-11-05
Attending: NEUROLOGICAL SURGERY
Payer: COMMERCIAL

## 2024-11-05 DIAGNOSIS — G56.01 CARPAL TUNNEL SYNDROME ON RIGHT: ICD-10-CM

## 2024-11-05 DIAGNOSIS — G56.11 MEDIAN NEUROPATHY AT FOREARM, RIGHT: ICD-10-CM

## 2024-11-05 DIAGNOSIS — G60.8 SENSORY POLYNEUROPATHY: ICD-10-CM

## 2024-11-05 DIAGNOSIS — G56.21 ULNAR NEUROPATHY AT ELBOW, RIGHT: Primary | ICD-10-CM

## 2024-11-05 PROCEDURE — 95910 NRV CNDJ TEST 7-8 STUDIES: CPT | Performed by: PSYCHIATRY & NEUROLOGY

## 2024-11-05 NOTE — PROGRESS NOTES
[facility]  Electrodiagnostic Laboratory                 Department of Neurology                                                                                                         Test Date:  2024    Patient: Neema Hunt : 1965 Physician: Rodolfo Santamaria MD   Sex: Male AGE: 59 year Ref Phys: Nhan Marshall MD   ID#: [id number]   Technician: Ingrid Diaz     History and Examination:    59 year old man with history of previous lumbar spine surgery for lumbar spondylosis with radiculopathy. His chief complaint is right hand numbness, tingling, and weakness. Examination shows weakness of the right APB and FDI (both 4- to 4/5). Right FPL and flexors of digits II-III are normal. 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 Query right carpal tunnel syndrome.     Techniques:    Motor and sensory nerve conduction studies were done with surface recording electrodes. Temperature was monitored and recorded throughout the study. Upper extremities were maintained at a temperature of 32 degrees Centigrade or higher.      Results:    Right median (APB) motor NCS showed markedly prolonged distal latency and mildly attenuated distal CMAP amplitude. Proximal nerve stimulation consistently yielded a motor response with higher amplitude compared to the distal and a positive initial deflection. 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 However, the right median motor conduction velocity at the forearm was markedly attenuated (unlike what would be expected with Theo Basilio anastomosis and coexistent carpal tunnel syndrome, where the conduction velocity at the forearm is typically markedly increased).   Right ulnar (ADM) motor NCS showed prolonged distal latency, normal distal CMAP amplitudes, a 45% amplitude drop between the wrist and below  elbow stimulation sites, and a further 38% amplitude drop across the elbow. Conduction velocities were attenuated both at the forearm and across the elbow (41 and 38 m/s respectively). Right ulnar (FDI) motor NCS showed prolonged distal latency, normal distal CMAP amplitude, no significant amplitude change between the wrist and below elbow stimulation sites, and a motor conduction block with amplitude drop >60% across the elbow. Conduction velocities were mildly reduced at the forearm and markedly reduced across the elbow (45 and 26 m/s respectively).     Right median antidromic sensory and orthodromic (palmar) mixed NCSs showed no responses. Right ulnar orthodromic (palmar) mixed NCS showed no response. Right ulnar antidromic sensory NCS showed attenuated SNAP amplitude, and markedly attenuated conduction velocity. Right radial antidromic sensory NCS showed markedly attenuated SNAP amplitude, and normal conduction velocity.    Based upon the nerve conduction studies the patient has a well defined median neuropathy at the wrist as specified within this report. In accordance with AANEM and CMS policies no needle EMG was performed.      Interpretation:    Abnormal study. 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 3) Attenuated right radial sensory response consistent with generalized sensory polyneuropathy. Correlate with results of previous lower extremity nerve conduction studies. 4) Possible superimposed right median neuropathy at the forearm vs generalized demyelinating polyneuropathy.    Recommendations: 1) Referral for right carpal and cubital tunnel decompression surgery, 2) Ultrasonographic study of at least the right and ideally bilateral upper extremities, to confirm the localization of the focal neuropathies diagnosed above, and to exclude enlargement of  nerves at non-entrapment sites, that would suggest a demyelinating polyneuropathy 3) Neurology referral to determine etiology of polyneuropathy.       ___________________________  Rodolfo Santamaria MD        Nerve Conduction Studies  Motor Sites      Latency Neg. Amp Neg. Amp Diff Segment Distance Velocity Neg. Dur Neg Area Diff Temperature Comment   Site (ms) Norm (mV) Norm (%)  cm m/s Norm (ms) (%) ( C)    Right Median (APB) Motor   Wrist 8.1  < 4.4 4.6  > 5.0  Wrist-APB 8   8.3  23.3    Elbow 16.0 - 6.2  > 5.0 35 Elbow-Wrist 23.5 30  > 48 7.4 39 23.3    Right Ulnar (ADM) Motor   Wrist 4.0  < 3.5 5.3  > 5.0  Wrist-ADM 8   7.9  30.5    Below Elbow 9.9 - 2.9 - -45 Below Elbow-Wrist 24 41  > 48 9.5 -31 30.5    Above Elbow 12.6 - 1.79 - -38 Above Elbow-Below Elbow 10.2 38  > 48 11.2 -17 30.5    Right Ulnar (FDI) Motor   Wrist 5.2 - 6.7 -      4.8  23.3    Below Elbow 10.3 - 6.1 - -9 Below Elbow-Wrist 23 45  > 48 5.9 -16 23.6    Above Elbow 14.4 - 1.43 - -77 Above Elbow-Below Elbow 10.5 26  > 48 5.5 -69 23.7      Sensory Sites      Onset Lat Peak Lat Amp (O-P) Amp (P-P) Segment Distance Velocity Temperature Comment   Site ms (ms)  V Norm ( V)  cm m/s Norm ( C)    Right Median Sensory   Wrist-Dig II NR NR NR  > 10 NR Wrist-Dig II 14 NR  > 48 30.9    Right Median-Ulnar Palmar Sensory        Median   Palm-Wrist NR NR NR - NR Palm-Wrist - NR - 31         Ulnar   Palm-Wrist NR NR NR - NR Palm-Wrist - NR - 31.2    Right Radial Sensory   Forearm-Wrist 1.98 2.8 7  > 15 11 Forearm-Wrist 10 51 - 30.5    Right Ulnar Sensory   Wrist-Dig V 3.5 4.7 5  > 8 9 Wrist-Dig V 12.5 36  > 48 31.3      Inter-Nerve Comparisons     Nerve 1 Value 1 Nerve 2 Value 2 Parameter Result Normal   Sensory Sites   R Median Palm-Wrist - R Ulnar Palm-Wrist - Peak Lat Diff - <0.40           NCS Waveforms:    Motor           Sensory                  Ultrasound Images:

## 2024-11-05 NOTE — LETTER
2024      Neema Hunt  [address] [address]      Dear Colleague,    Thank you for referring your patient, Neema Hunt, to the [facility]. Please see a copy of my visit note below.                        [facility]  Electrodiagnostic Laboratory                 Department of Neurology                                                                                                         Test Date:  2024    Patient: Neema Hunt : 1965 Physician: Rodolfo Santamaria MD   Sex: Male AGE: 59 year Ref Phys: Nhan Marshall MD   ID#: [id number]   Technician: Ingrid Diaz     History and Examination:    59 year old man with history of previous lumbar spine surgery for lumbar spondylosis with radiculopathy. His chief complaint is right hand numbness, tingling, and weakness. Examination shows weakness of the right APB and FDI (both 4- to 4/5). Right FPL and flexors of digits II-III are normal. 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 Query right carpal tunnel syndrome.     Techniques:    Motor and sensory nerve conduction studies were done with surface recording electrodes. Temperature was monitored and recorded throughout the study. Upper extremities were maintained at a temperature of 32 degrees Centigrade or higher.      Results:    Right median (APB) motor NCS showed markedly prolonged distal latency and mildly attenuated distal CMAP amplitude. Proximal nerve stimulation consistently yielded a motor response with higher amplitude compared to the distal and a positive initial deflection. 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 However, the right median motor conduction velocity at the forearm was markedly attenuated (unlike what would be expected with Theo Basilio anastomosis and  coexistent carpal tunnel syndrome, where the conduction velocity at the forearm is typically markedly increased).   Right ulnar (ADM) motor NCS showed prolonged distal latency, normal distal CMAP amplitudes, a 45% amplitude drop between the wrist and below elbow stimulation sites, and a further 38% amplitude drop across the elbow. Conduction velocities were attenuated both at the forearm and across the elbow (41 and 38 m/s respectively). Right ulnar (FDI) motor NCS showed prolonged distal latency, normal distal CMAP amplitude, no significant amplitude change between the wrist and below elbow stimulation sites, and a motor conduction block with amplitude drop >60% across the elbow. Conduction velocities were mildly reduced at the forearm and markedly reduced across the elbow (45 and 26 m/s respectively).     Right median antidromic sensory and orthodromic (palmar) mixed NCSs showed no responses. Right ulnar orthodromic (palmar) mixed NCS showed no response. Right ulnar antidromic sensory NCS showed attenuated SNAP amplitude, and markedly attenuated conduction velocity. Right radial antidromic sensory NCS showed markedly attenuated SNAP amplitude, and normal conduction velocity.    Based upon the nerve conduction studies the patient has a well defined median neuropathy at the wrist as specified within this report. In accordance with AANEM and CMS policies no needle EMG was performed.      Interpretation:    Abnormal study. 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 3) Attenuated right radial sensory response consistent with generalized sensory polyneuropathy. Correlate with results of previous lower extremity nerve conduction studies. 4) Possible superimposed right median neuropathy at the forearm vs generalized demyelinating polyneuropathy.    Recommendations: 1)  Referral for right carpal and cubital tunnel decompression surgery, 2) Ultrasonographic study of at least the right and ideally bilateral upper extremities, to confirm the localization of the focal neuropathies diagnosed above, and to exclude enlargement of nerves at non-entrapment sites, that would suggest a demyelinating polyneuropathy 3) Neurology referral to determine etiology of polyneuropathy.       ___________________________  Rodolfo Santamaria MD        Nerve Conduction Studies  Motor Sites      Latency Neg. Amp Neg. Amp Diff Segment Distance Velocity Neg. Dur Neg Area Diff Temperature Comment   Site (ms) Norm (mV) Norm (%)  cm m/s Norm (ms) (%) ( C)    Right Median (APB) Motor   Wrist 8.1  < 4.4 4.6  > 5.0  Wrist-APB 8   8.3  23.3    Elbow 16.0 - 6.2  > 5.0 35 Elbow-Wrist 23.5 30  > 48 7.4 39 23.3    Right Ulnar (ADM) Motor   Wrist 4.0  < 3.5 5.3  > 5.0  Wrist-ADM 8   7.9  30.5    Below Elbow 9.9 - 2.9 - -45 Below Elbow-Wrist 24 41  > 48 9.5 -31 30.5    Above Elbow 12.6 - 1.79 - -38 Above Elbow-Below Elbow 10.2 38  > 48 11.2 -17 30.5    Right Ulnar (FDI) Motor   Wrist 5.2 - 6.7 -      4.8  23.3    Below Elbow 10.3 - 6.1 - -9 Below Elbow-Wrist 23 45  > 48 5.9 -16 23.6    Above Elbow 14.4 - 1.43 - -77 Above Elbow-Below Elbow 10.5 26  > 48 5.5 -69 23.7      Sensory Sites      Onset Lat Peak Lat Amp (O-P) Amp (P-P) Segment Distance Velocity Temperature Comment   Site ms (ms)  V Norm ( V)  cm m/s Norm ( C)    Right Median Sensory   Wrist-Dig II NR NR NR  > 10 NR Wrist-Dig II 14 NR  > 48 30.9    Right Median-Ulnar Palmar Sensory        Median   Palm-Wrist NR NR NR - NR Palm-Wrist - NR - 31         Ulnar   Palm-Wrist NR NR NR - NR Palm-Wrist - NR - 31.2    Right Radial Sensory   Forearm-Wrist 1.98 2.8 7  > 15 11 Forearm-Wrist 10 51 - 30.5    Right Ulnar Sensory   Wrist-Dig V 3.5 4.7 5  > 8 9 Wrist-Dig V 12.5 36  > 48 31.3      Inter-Nerve Comparisons     Nerve 1 Value 1 Nerve 2 Value 2 Parameter Result  Normal   Sensory Sites   R Median Palm-Wrist - R Ulnar Palm-Wrist - Peak Lat Diff - <0.40           NCS Waveforms:    Motor           Sensory                  Ultrasound Images:            Again, thank you for allowing me to participate in the care of your patient.        Sincerely,        Rodolfo Santamaria MD

## 2024-11-06 ENCOUNTER — PREP FOR PROCEDURE (OUTPATIENT)
Dept: NEUROLOGY | Facility: CLINIC | Age: 59
End: 2024-11-06
Payer: COMMERCIAL

## 2024-11-06 ENCOUNTER — TELEPHONE (OUTPATIENT)
Dept: NEUROLOGY | Facility: CLINIC | Age: 59
End: 2024-11-06
Payer: COMMERCIAL

## 2024-11-06 DIAGNOSIS — G56.01 CARPAL TUNNEL SYNDROME OF RIGHT WRIST: Primary | ICD-10-CM

## 2024-11-06 DIAGNOSIS — G56.21 ULNAR NEUROPATHY AT ELBOW, RIGHT: ICD-10-CM

## 2024-11-06 NOTE — TELEPHONE ENCOUNTER
Spoke to patient about EMG results.    We reviewed his carpal tunnel and ulnar neuropathy.  We discussed surgery for these and we will plan on surgery in the next few months.    He also has follow-up with neurology scheduled for December.  He we will need to discuss with them workup for his underlying polyneuropathy as well.

## 2024-11-08 ENCOUNTER — TELEPHONE (OUTPATIENT)
Dept: NEUROSURGERY | Facility: CLINIC | Age: 59
End: 2024-11-08
Payer: COMMERCIAL

## 2024-11-08 NOTE — PATIENT INSTRUCTIONS
Patient Instructions    -Surgery scheduled at [facility] Right open Carpal Tunnel Release and Right open Ulnar nerve release with Dr. Marshall  Pre-Operative:    Surgical risks: blood clots in the leg or lung, problems urinating, nerve damage, drainage from the incision, infection,stiffness  Pre-operative physical with primary care physician within 30 days of surgical date.  Likely same day procedure with discharge home day of surgery, may stay for 23 hour observation hospitalization for monitoring.    Surgery takes about 1 hour. You will be completely asleep (general anesthesia)     Shower procedure  Please shower with antimicrobial soap the night before surgery and morning of surgery. Please refer to showering instruction sheet in folder.    Eating/Drinking  Stop all solid foods 8 hours before surgery.  Keep drinking clear liquids until 4 hours before surgery  Clear liquids include water, clear juice, black coffee, or clear tea without milk, Gatorade, clear soda.   Medications  Hold Aspirin, NSAIDs (Advil/Ibuprofen, Indocin, Naproxen,Nuprin,Relafen/Nabumetone, Diclofenac,Meloxicam, Aleve, Celebrex) x 7 days prior to surgical date  You can take Tylenol (Acetaminophen) for pain, 1000 mg  Do not exceed 3,000 mg per day   Any other medications prescribed, please discuss with your primary care provider at your pre-operative physical      You may NOT receive the COVID-19 vaccine 72 hours before or after surgery.    Post-Operative:  You may resume regular diet after surgery  Dr. Marshall's team may order outpatient hand therapy post op. (Per Dr. Marshall OT future order for hand therapy will be placed at the time of surgery)  Pain Management  You will have post-operative incisional pain which may require pain medications and muscle relaxants. You will receive medication upon discharge.  You may resume taking NSAIDs (ex. Ibuprofen, aleve, naproxen) immediately post-op   Do NOT drive while taking narcotic pain  medication  Do NOT drink alcohol while using any pain medication  You can utilize ice as needed (no longer than 20 minutes at one time)  Ice and elevation to control swelling can also be effective in helping to control pain     Incision Care  No submerging incision in water such as pools, hot tubs, baths for at least 8 weeks or until incision is healed  It is okay to shower, just pat the incision dry   Remove dressing as instructed upon discharge  Watch for signs of infection  Redness, swelling, warmth, drainage, and fever of 101 degrees or higher  [facility] [phone].  Ulnar Nerve Release: You will have two incision sites on your arm. Dr. Marshall will close your incision with a dissolvable suture underneath your skin and skin glue (Exofin as a dressing).    Right carpal tunnel release : sutures will be used. Incision will be on your right palm.         Activity Restrictions  For up to 2 weeks after surgery, avoid lifting things heavier than 1 to 2 pounds and using your hand. This includes doing repeated arm or hand movements, such as typing or using a computer mouse, washing windows, vacuuming, or chopping food. Do not use power tools, and avoid activities that cause vibration. Use pain as your guide.   Rest when you feel tired. Getting enough sleep will help you recover   Try to walk each day. Start by walking a little more than you did the day before. Bit by bit, increase the amount you walk   Avoid bed rest and prolonged sitting for longer than 30 minutes (change positions frequently while awake)  No contact sports until after follow up visit  You may do heavier tasks about 4 weeks after surgery. These include vacuuming, mowing the lawn, and gardening   You may shower 24 to 48 hours after surgery, if your doctor okays it. Keep your bandage dry by taping a sheet of plastic to cover it. If you have a splint, keep it dry. Your doctor will tell you if you can remove it when you shower. Be careful not to put the  splint on too tight. Do not take a bath until the incision heals, or until your doctor tells you it is okay.  Avoid letting your hand hang down. This can cause swelling.   You may drive when you are fully able to use your hand and no longer taking narcotic pain medication.  Please call the clinic if you develop any of the following symptoms:  Swelling and/or warmth in one or both legs  Pain or tenderness in your leg, ankle, foot, or arm   Red or discolored skin         Post-Op Follow Up Appointments  2 week suture removal/incision check with RN  6 week post op follow up visit with Physician Assistant or Nurse Practitioner.   12 week follow up with Dr. Marshall   Please call to schedule follow up appointment at [phone]      Resources  If you are currently employed, you will need to be off work for 2-4 weeks for post op recovery and healing (2 weeks for office job with minimal use of your arm/hand; 4 weeks if you have a physically heavy job).  Please fax any FMLA/short term disability paperwork to [phone]  You may call our clinic when you'd like to return to work and we can provide a work letter  To allow staff adequate time to complete paperwork, please send as soon as possible      Carpal Tunnel Release: Before Your Surgery  What is carpal tunnel release?     Carpal tunnel surgery reduces the pressure on a nerve in the wrist. Your doctor will cut a ligament that presses on the nerve. This lets the nerve pass freely through the tunnel without being squeezed. This is also called carpal tunnel release surgery.  The surgery can be open or endoscopic. In open surgery, your doctor makes a small cut in the palm of your hand. This cut is called an incision. In endoscopic surgery, your doctor makes one small incision in the wrist. Or you may have one small incision in the wrist and one in the palm. Your doctor puts a thin tube with a camera attached (endoscope) into the incision. Surgical tools are put in along with  the endoscope.  In both types of surgeries, the incisions are closed with stitches. The incisions leave scars that usually fade in time.  You may be asleep during the surgery. Or you may be awake and have medicine to numb your hand and arm so you won't feel pain.  After surgery, your wrist and hand pain should start to go away. It usually takes 3 to 4 months to recover and 1 year before your hand strength returns. How much hand strength returns is different for each person.  You will go home the same day as the surgery. When you can go back to work depends on the type of work you do.  How do you prepare for surgery?  Surgery can be stressful. This information will help you understand what you can expect. And it will help you safely prepare for surgery.  Preparing for surgery    You may need to shower or bathe with a special soap the night before and the morning of your surgery. The soap contains chlorhexidine. It reduces the amount of bacteria on your skin that could cause an infection after surgery.     Be sure you have someone to take you home. Anesthesia and pain medicine will make it unsafe for you to drive or get home on your own.     Understand exactly what surgery is planned, along with the risks, benefits, and other options.     Tell your doctor ALL the medicines, vitamins, supplements, and herbal remedies you take. Some may increase the risk of problems during your surgery. Your doctor will tell you if you should stop taking any of them before the surgery and how soon to do it.     If you take a medicine that prevents blood clots, your doctor may tell you to stop taking it before your surgery. Or your doctor may tell you to keep taking it. (These medicines include aspirin and other blood thinners.) Make sure that you understand exactly what your doctor wants you to do.     Make sure your doctor and the hospital have a copy of your advance directive. If you don't have one, you may want to prepare one. It  "lets others know your health care wishes. It's a good thing to have before any type of surgery or procedure.   What happens on the day of surgery?   Follow the instructions exactly about when to stop eating and drinking. If you don't, your surgery may be canceled. If your doctor told you to take your medicines on the day of surgery, take them with only a sip of water.     Take a bath or shower before you come in for your surgery. Do not apply lotions, perfumes, deodorants, or nail polish.     Do not shave the surgical site yourself.     Take off all jewelry and piercings. And take out contact lenses, if you wear them.   At the hospital or surgery center   Bring a picture ID.     The area for surgery is often marked to make sure there are no errors.     You will be kept comfortable and safe by your anesthesia provider. The anesthesia may make you sleep. Or it may just numb the area being worked on.     The surgery will take about 15 to 60 minutes.   When should you call your doctor?   You have questions or concerns.     You don't understand how to prepare for your surgery.     You become ill before the surgery (such as fever, flu, or a cold).     You need to reschedule or have changed your mind about having the surgery.   Where can you learn more?  Go to https://www.[email or web address]/patiented  Enter X027 in the search box to learn more about \"Carpal Tunnel Release: Before Your Surgery.\"  Current as of: November 9, 2022               Content Version: 13.7    8108-1884 [employer].   Care instructions adapted under license by your healthcare professional. If you have questions about a medical condition or this instruction, always ask your healthcare professional. [employer] disclaims any warranty or liability for your use of this information.     Carpal Tunnel Release: What to Expect at Home  Your Recovery  Carpal tunnel reduces the pressure on a nerve in the wrist. Your doctor cut a ligament " that presses on the nerve. This lets the nerve pass freely through the tunnel without being squeezed.  Your hand will hurt and may feel weak with some numbness. This usually goes away in a few days, but it may take several months. Your doctor may remove the large bandage, or the doctor will tell you when and how to remove it yourself. In some cases, you may have a splint. If you have one, you will wear it for about 2 weeks.  Your doctor will take out your stitches in 1 to 2 weeks. Your hand and wrist may feel worse than they used to feel. But the pain should start to go away. It usually takes 3 to 4 months to recover and up to 1 year before hand strength returns. How much strength returns will vary.  The timing of your return to work depends on the type of surgery you had, whether the surgery was on your dominant hand (the hand you use most), and your work activities.  If you had open surgery on your dominant hand and you do repeated actions at work, you may be able to go back to work in 6 to 8 weeks. Repeated motions include typing or assembly-line work. If the surgery was on the other hand and you don't do repeated actions at work, you may be able to return to work in 7 to 14 days.  If you had endoscopic surgery, you may be able to go back to work sooner than with open surgery.  This care sheet gives you a general idea about how long it will take for you to recover. But each person recovers at a different pace. Follow the steps below to get better as quickly as possible.  How can you care for yourself at home?  Activity    Rest when you feel tired. Getting enough sleep will help you recover.     Try to walk each day. Start by walking a little more than you did the day before. Bit by bit, increase the amount you walk.     For up to 2 weeks after surgery, avoid lifting things heavier than 1 to 2 pounds and using your hand. This includes doing repeated arm or hand movements, such as typing or using a computer mouse,  washing windows, vacuuming, or chopping food. Do not use power tools, and avoid activities that cause vibration.     You may do heavier tasks about 4 weeks after surgery. These include vacuuming, mowing the lawn, and gardening.     You may shower 24 to 48 hours after surgery, if your doctor okays it. Keep your bandage dry by taping a sheet of plastic to cover it. If you have a splint, keep it dry. Your doctor will tell you if you can remove it when you shower. Be careful not to put the splint on too tight. Do not take a bath until the incision heals, or until your doctor tells you it is okay.     You may drive when you are fully able to use your hand.   Diet    You can eat your normal diet. If your stomach is upset, try bland, low-fat foods like plain rice, broiled chicken, toast, and yogurt.   Medicines    Your doctor will tell you if and when you can restart your medicines. The doctor will also give you instructions about taking any new medicines.     If you stopped taking aspirin or some other blood thinner, your doctor will tell you when to start taking it again.     Take pain medicines exactly as directed.  If the doctor gave you a prescription medicine for pain, take it as prescribed.  If you are not taking a prescription pain medicine, take an over-the-counter medicine such as acetaminophen (Tylenol), ibuprofen (Advil, Motrin), or naproxen (Aleve). Read and follow all instructions on the label.  Do not take two or more pain medicines at the same time unless the doctor told you to. Many pain medicines have acetaminophen, which is Tylenol. Too much acetaminophen (Tylenol) can be harmful.     If you think your pain medicine is making you sick to your stomach:  Take your medicine after meals (unless your doctor has told you not to).  Ask your doctor for a different pain medicine.     If your doctor prescribed antibiotics, take them as directed. Do not stop taking them just because you feel better. You need to  take the full course of antibiotics.   Incision and splint care    Keep your bandage dry. If it gets dirty, you may change it.     If you have a splint, talk to your doctor about when you should wear it.   Exercise    You may need wrist and hand rehabilitation. This is a series of exercises you do after your surgery. This helps you get back your wrist's and hand's range of motion, strength, and . You will work with your doctor and physical or occupational therapist to plan this exercise program. To get the best results, you need to do the exercises correctly and as often and as long as your doctor tells you.   Ice and elevation    Put ice or a cold pack on your wrist for 10 to 20 minutes at a time. Try to do this every 1 to 2 hours for the next 3 days (when you are awake) or until the swelling goes down. Put a thin cloth between the ice and your skin.     Prop up the sore wrist on a pillow when you ice it or anytime you sit or lie down during the next 3 days. Try to keep it above the level of your heart. This will help reduce swelling.   Other instructions    Avoid letting your hand hang down. This can cause swelling.   Follow-up care is a key part of your treatment and safety. Be sure to make and go to all appointments, and call your doctor if you are having problems. It's also a good idea to know your test results and keep a list of the medicines you take.  When should you call for help?   Call 911 anytime you think you may need emergency care. For example, call if:    You passed out (lost consciousness).   Call your doctor now or seek immediate medical care if:    You have pain that does not get better after you take pain medicine.     Your hand is cool or pale or changes color.     You have a cast or splint and it feels too tight.     You have new or worse tingling, weakness, or numbness in your hand or fingers.     You are sick to your stomach or cannot drink fluids.     You have loose stitches, or your  "incision comes open.     You have signs of infection, such as:  Increased pain, swelling, warmth, or redness.  Red streaks leading from the incision.  Pus draining from the incision.  A fever.     Bright red blood has soaked through the bandage over your incision.   Watch closely for any changes in your health, and be sure to contact your doctor if:    You have a problem with your cast or splint.     You do not get better as expected.   Where can you learn more?  Go to https://www.[facility].net/patiented  Enter N388 in the search box to learn more about \"Carpal Tunnel Release: What to Expect at Home.\"  Current as of: November 9, 2022               Content Version: 13.7    8676-2745 [facility].   Care instructions adapted under license by your healthcare professional. If you have questions about a medical condition or this instruction, always ask your healthcare professional. [facility] disclaims any warranty or liability for your use of this information.         "

## 2024-11-11 ENCOUNTER — TELEPHONE (OUTPATIENT)
Dept: NEUROSURGERY | Facility: CLINIC | Age: 59
End: 2024-11-11
Payer: COMMERCIAL

## 2024-11-11 ENCOUNTER — MYC MEDICAL ADVICE (OUTPATIENT)
Dept: NEUROSURGERY | Facility: CLINIC | Age: 59
End: 2024-11-11
Payer: COMMERCIAL

## 2024-11-11 NOTE — CONFIDENTIAL NOTE
Message was sent to Dr. Marshall to clarify if he wants nursing to place OT hand therapy orders when patient is seen at 2 weeks for suture removal visit and if Dr. Marshall would like to see him at 12 wk post op.     Per Dr. Marshall  We will order OT at the time of surgery. I do want to see him at 12 weeks. Writer updated patient instructions.         No

## 2024-11-11 NOTE — TELEPHONE ENCOUNTER
Talked with patient on 11/8 and gave him surgery details for his surgery with Dr. Marshall on 1/13

## 2024-11-12 NOTE — TELEPHONE ENCOUNTER
Patient would like to  surgery folder and soap at our [place] office.     Sent message to  RNs to place [facility]  surgery folder and soap at the .

## 2024-11-13 ENCOUNTER — MYC MEDICAL ADVICE (OUTPATIENT)
Dept: NEUROSURGERY | Facility: CLINIC | Age: 59
End: 2024-11-13
Payer: COMMERCIAL

## 2024-11-13 DIAGNOSIS — M47.14 THORACIC SPONDYLOSIS WITH MYELOPATHY: Primary | ICD-10-CM

## 2024-11-13 NOTE — CONFIDENTIAL NOTE
PRE-SURGERY EDUCATION  Reviewed pre- and post-operative instructions/Patient Instructions with patient over the phone.  Surgery folder and soap was  placed at  in [place] and picked up by patient.      Patient Education Topic: Procedure with Dr. Marshall   Learner(s): Patient  Knowledge Level: Basic  Readiness to Learn: Ready  Method:  Verbal explanation and Written material   Outcome: Able to verbalize instructions  Barriers to Learning: No barrier  STD/FMLA: N/A currenlty not working for the past year per patient due to his ongoing health issues.  Job Description: Not applicable   Time Off: Not applicable      Patient had the opportunity for questions to be answered. Advised Patient to call clinic with any questions/concerns.     DOS: 1/13/25  Procedure: Right open carpal tunnel release and Right open ulnar nerve release.

## 2024-11-13 NOTE — CONFIDENTIAL NOTE
Patient sent myc wanting to discuss thoracic pain and possible injections.    Patient reports continued low back and thoracic back pain.     Patient had Bilateral L5-S1 transforaminal epidural steroid injection with Dr. Verma 11/1/24 and notes great relief from it making his thoracic pain more prominent and uncomforatble. He notes the pain is around T10/11. He denies any new symptoms but wants to see if Dr. Marshall would recommend and order thoracic injection for him.     He also wanted to see what Dr. Marshall thoughts are regarding the lack of sensation or communication from his brain to his GI tract to have a bowel movement. He said it'll be 2 days and realizes he has not had a bowel movement. Takes otc products to help. Is able to pass gas.      He denies urgency or incontinence, no saddle anesthesia, can feel when he wipes and defecates. But wants to know if somehow the communication was disrupted from prior surgery or nerve injury.     Enc routed to Dr. Marshall to review and advise on patient's above concerns.

## 2024-11-14 NOTE — CONFIDENTIAL NOTE
Per Dr. Marshall- I am not concerned about the symptoms, given lack of other symptoms. Ok to try T10-11 bilateral transforaminal epidurals     Updated patient on above info per Dr. Marshall via [employer] message. Order placed for injection.    No

## 2024-11-20 ENCOUNTER — TELEPHONE (OUTPATIENT)
Dept: ANESTHESIOLOGY | Facility: CLINIC | Age: 59
End: 2024-11-20
Payer: COMMERCIAL

## 2024-11-20 DIAGNOSIS — M54.14 THORACIC RADICULOPATHY: Primary | ICD-10-CM

## 2024-11-20 RX ORDER — DIAZEPAM 5 MG/1
5-10 TABLET ORAL
OUTPATIENT
Start: 2024-11-20

## 2024-11-20 NOTE — TELEPHONE ENCOUNTER
RN reviewed patient chart. Pre procedure instructions were sent to the patient.    Debbie Kevin RNCC

## 2024-11-20 NOTE — TELEPHONE ENCOUNTER
Called patient to schedule procedure with Dr. Mejia    Date of Procedure: 12/12/24    Arrival time given: Yes: Arrival Time 11am       Procedure Location: [facility]     Verified Location with Patient:  Yes  Address provided to the patient     Pre-op H&P Required:  No: Local anesthesia        Post-Op/Follow Up Appt:  Not Indicated in Request      Informed patient they will need a  to drive them home:  Yes    Patients : Spouse     Patient is aware that pre-op RN from the procedure center will call 2-3 days prior to scheduled procedure to confirm arrival time and review any instructions:  Yes       Additional Comments: N/A        Purnima Manley MA on 11/20/2024 at 2:34 PM      P: [phone]*

## 2024-12-02 NOTE — TELEPHONE ENCOUNTER
REASON FOR VISIT: leg weakness    DATE OF APPT: 12/04/2024   NOTES (FOR ALL VISITS) STATUS DETAILS   OFFICE NOTE from referring provider [facility] Nhan Case MD 9/30/2024   MEDICATION LIST N/A    IMAGING  (FOR ALL VISITS)     XR N/A    MRI (HEAD, NECK, SPINE) [facility]  MR Thoracic spine 10/09/2024    [facility]  MR Lumbar spine 9/21/2024   CT (HEAD, NECK, SPINE) Internal [facility]  CT Lumbar spine 10/10/2024  CT Thoracic spine 10/10/2024

## 2024-12-03 NOTE — PROGRESS NOTES
[facility] Neurology Consultation    Neema Hunt MRN# [id number]   Age: 59 year old YOB: 1965     Requesting physician: Nhan Marshall  No Ref-Primary, Physician     Reason for Consultation: numbness/weakness           Assessment and Plan:   Assessment:  Neema Hunt is a 59 year old male who presents today for evaluation of numbness and weakness. Based on history and examination I suspect that leg weakness/numbness is a combination of chronic radiculopathy and prior thoracic myelopathy. Recent MRI images did not show any residual stenosis. Patient also has mild length dependent sensory polyneuropathy. Neuropathy labs were ordered today. Ultrasound also ordered as recommended by Dr Santamaria to look for features of acquired demyelinating polyneuropathy.    Patient had right sided carpal tunnel / cubital syndrome on recent EMG. Left upper extremity EMG was also ordered to evaluate for carpal tunnel syndrome.      Plan:  - A1c, B12, SSA, SSB, immunofixation, ELP, hepatic panel  - Left upper extremity EMG  - Ultrasound bilateral median/ulnar nerves  - Lidocaine ointment prn    Follow up in Neurology clinic pending above    Scar Medina MD   of Neurology  [facility]  ---------------------------------------------------------------------------------------------------------------------------        History of Presenting Symptoms:   Neema Hunt is a 59 year old male who presents today for evaluation of numbness and weakness.     He has numbness in the legs (right > left). The right leg has been numb for 5-6 years. The left leg numbness started about year ago before 9/2023 surgery. Leg symptoms have not worsened in the last 6 months.     Hands have been numb/weak for about 6 months or at least more pronounced in this period.     He has right hand numbness and is scheduled to get carpal tunnel and cubital tunnel release. He does have numbness in the left hand,  mostly the 4th fingers.      He has had 7 back surgeries previously. His last surgery was December 2023 (T10-S1 instrumentation). Prior to that he had synovial cyst removal in 9/2023. His urination / bowel movements haven't been the same since issues with synovial cyst.     He has pains in the leg. He takes tylenol as needed. He does gabapentin 900 mg at bedtime which helps with nerve pain and sleep. He is planning on starting Cymbalta soon for nerve pain and depression.     He had gastroc tendon surgery in 2009 and he has had issues walking on left toes since then.       Past Medical History:     Patient Active Problem List   Diagnosis    Acquired kyphosis    Other secondary scoliosis, lumbar region    Spinal stenosis of lumbar region without neurogenic claudication    Status post lumbar spinal fusion    History of lumbar fusion    Urinary retention with incomplete bladder emptying    Lumbar radiculopathy    Thoracic radiculopathy     Past Medical History:   Diagnosis Date    Arthritis     Back pain     Gastro-oesophageal reflux disease     Hypertension     Radiculopathy     Scoliosis         Past Surgical History:     Past Surgical History:   Procedure Laterality Date    DAVINCI XI HERNIORRHAPHY INGUINAL Bilateral 7/26/2022    Procedure: Robotic repair of recurrent right inguinal hernia with placement of mesh, robotic repair of left inguinal hernia with placement of mesh;  Surgeon: Mayr Morales MD;  Location:  OR    DAVINCI XI HERNIORRHAPHY VENTRAL N/A 7/26/2022    Procedure: Robotic repair of umbilical hernia with placement of mesh;  Surgeon: Mary Morales MD;  Location:  OR    DISCECTOMY LUMBAR POSTERIOR MICROSCOPIC ONE LEVEL Right 7/15/2020    Procedure: Right L5-S1 foraminotomy and microdiskectomy;  Surgeon: Nhan Marshall MD;  Location:  OR    EXPLORE SPINE, REMOVE HARDWARE, COMBINED N/A 9/5/2019    Procedure: REMOVAL LUMBAR L3-5 INSTRUMENTATION;  Surgeon: Nhan Marshall MD;   Location: [facility] OR    FUSION SPINE ANTERIOR MINIMALLY INVASIVE TWO LEVELS N/A 11/16/2016    Procedure: FUSION SPINE ANTERIOR MINIMALLY INVASIVE TWO LEVELS;  Surgeon: Nhan Marshall MD;  Location: [facility] OR    FUSION, SPINE, LUMBAR, 1 LEVEL, POSTERIOR APPROACH, ROBOTIC-ASSISTED N/A 5/27/2021    Procedure: Lumbar 5-Sacral 1 posterior segemental instrumentation, bilateral decompression and transforaminal interbody fusion using local autograft and allograft cancellous chips. Revision of Lumbar 2-4 ian. Posterior arthrodesis Lumbar 5-Sacral 1 using  local autograft and allograft cancellous chips;  Surgeon: Nhan Marshall MD;  Location: [facility] OR    HERNIA REPAIR      right inguinal hernia    INJECT EPIDURAL TRANSFORAMINAL Bilateral 11/1/2024    Procedure: Bilateral L5-S1 transforaminal epidural steroid injection;  Surgeon: Nicola Verma MD;  Location: [facility] OR    LAMINECTOMY LUMBAR POSTERIOR MICROSCOPIC ONE LEVEL  4/9/2013    Procedure: LAMINECTOMY LUMBAR POSTERIOR MICROSCOPIC ONE LEVEL;  Right Lumbar 3-4 Micro Laminectomy & Foraminotomy;  Surgeon: Nhan Marshall MD;  Location: [facility] OR    LAMINECTOMY THORACIC ONE LEVEL N/A 9/27/2023    Procedure: Thoracic 11 to thoracic 12 laminectomy;  Surgeon: Zane Ontiveros MD;  Location: [facility] OR    OPTICAL TRACKING SYSTEM FUSION SPINE POSTERIOR LUMBAR PERCUTANEOUS TWO LEVELS N/A 11/16/2016    Procedure: OPTICAL TRACKING SYSTEM FUSION SPINE POSTERIOR LUMBAR PERCUTANEOUS TWO LEVELS;  Surgeon: Nhan Marshall MD;  Location: [facility]    OPTICAL TRACKING SYSTEM FUSION SPINE POSTERIOR LUMBAR THREE+ LEVELS Right 9/5/2019    Procedure: POSTERIOR SEGMENTAL INSTRUMENTATION L2-4, RIGHT LUMBAR 2-3 DISCECTOMY AND TRANSFORAMINAL INTERBODY FUSION, REDO DECOMPRESSION RIGHT L3-4, POSTERIOR ARTHRODESIS L2-4;  Surgeon: Nhan Marshall MD;  Location: [facility] OR    OPTICAL TRACKING SYSTEM FUSION SPINE POSTERIOR LUMBAR THREE+ LEVELS N/A 12/21/2023    Procedure: Insertion Thoracic 10 to Lumbar 1  and revision of Lumbar 2 to Sacral 1 instrumentation. Dual Pelvic Instrumentation. Revision fusion Lumbar 5 to Sacral 1 using allograft chips. Arthrodesis Thoracic 10 to Lumbar 2 using allograft cancellous chips;  Surgeon: Nhan Marshall MD;  Location: [facility] OR    ORTHOPEDIC SURGERY      left ankle, right finger        Social History:     Social History     Tobacco Use    Smoking status: Never    Smokeless tobacco: Never   Vaping Use    Vaping status: Never Used   Substance Use Topics    Alcohol use: Yes     Comment: 3-4 drinks/week    Drug use: No        Family History:     Family History   Problem Relation Age of Onset    Asthma Mother     Hypertension Mother     Arthritis Mother         Medications:     Current Outpatient Medications   Medication Sig Dispense Refill    acetaminophen (TYLENOL) 500 MG tablet Take 1,000 mg by mouth daily as needed for mild pain (2 X 500 mg = 1000 mg)      amLODIPine-benazepril (LOTREL) 10-20 MG capsule Take 1 capsule by mouth every morning       Cyanocobalamin (B-12 PO) Take 2 chew tab by mouth every morning       cyclobenzaprine (FLEXERIL) 5 MG tablet Take 1 tablet (5 mg) by mouth 3 times daily as needed for muscle spasms 40 tablet 1    famotidine (PEPCID) 20 MG tablet Take 20 mg by mouth every evening      gabapentin (NEURONTIN) 300 MG capsule Take 3 capsules (900 mg) by mouth at bedtime. 90 capsule 1    gabapentin (NEURONTIN) 300 MG capsule TAKE 2 CAPSULES(600 MG) BY MOUTH AT BEDTIME 180 capsule 0    gabapentin (NEURONTIN) 300 MG capsule Take 1 capsule (300 mg) by mouth At Bedtime 90 capsule 3    lidocaine (LIDODERM) 5 % Patch Place 1 patch onto the skin every 24 hours (Patient taking differently: Place onto the skin every 24 hours.) 30 patch 2    magnesium 250 MG tablet Take 1 tablet by mouth daily Daily      Melatonin 10 MG TABS tablet Take 10 mg by mouth nightly as needed for sleep      Misc Natural Products (OSTEO BI-FLEX JOINT SHIELD) TABS Take 1 tablet by mouth daily       Multiple Vitamins-Minerals (AIRBORNE PO) Take 1 chew tab by mouth every morning       Multiple Vitamins-Minerals (MULTIVITAMIN ADULT PO) Take 2 chew tab by mouth every morning       omega 3 1000 MG CAPS Take 1 chew tab by mouth every morning       oxyCODONE (ROXICODONE) 5 MG tablet Take 1-2 tablets (5-10 mg) by mouth every 6 hours as needed for moderate pain 40 tablet 0    polyethylene glycol (MIRALAX) 17 g packet Take 1 packet by mouth daily      Probiotic Product (SOLUBLE FIBER/PROBIOTICS PO) Take 1 chew tab by mouth every morning       tetrahydrozoline (VISINE) 0.05 % ophthalmic solution Place 1 drop into both eyes daily as needed      valACYclovir (VALTREX) 1000 mg tablet Take 1,000 mg by mouth 2 times daily as needed       Current Facility-Administered Medications   Medication Dose Route Frequency Provider Last Rate Last Admin    2 mL bupivacaine (MARCAINE) preservative free injection 0.5% (20 mL vial)  2 mL      2 mL at 06/07/24 0930    2 mL bupivacaine (MARCAINE) preservative free injection 0.5% (20 mL vial)  2 mL      2 mL at 06/07/24 0930    hylan (SYNVISC ONE) injection 48 mg  48 mg      48 mg at 08/30/24 1443    hylan (SYNVISC ONE) injection 48 mg  48 mg      48 mg at 08/30/24 1443    lidocaine 1 % injection 2 mL  2 mL      2 mL at 06/07/24 0930    lidocaine 1 % injection 2 mL  2 mL      2 mL at 06/07/24 0930    triamcinolone (KENALOG-40) injection 40 mg  40 mg      40 mg at 06/07/24 0930    triamcinolone (KENALOG-40) injection 40 mg  40 mg      40 mg at 06/07/24 0930        Allergies:   No Known Allergies     Review of Systems:   As above     Physical Exam:   Vitals: BP (!) 146/94 (BP Location: Right arm, Patient Position: Sitting, Cuff Size: Adult Regular)   Pulse 59   Wt 93 kg (205 lb)   BMI 29.41 kg/m     General: Seated comfortably in no acute distress.  Lungs: breathing comfortably  Neurologic:     Mental Status: Fully alert, attentive. Language normal, speech clear and fluent, no paraphasic  errors.      Cranial Nerves: Visual fields intact. PERRL. EOMI with normal smooth pursuit. Facial sensation intact/symmetric. Facial movements symmetric. Hearing not formally tested but intact to conversation. Palate elevation symmetric, uvula midline. No dysarthria. Shoulder shrug strong bilaterally. Tongue protrusion midline.     Motor: No resting tremors, mild action tremors. Muscle tone normal throughout. Strength as below. Decreased ROM in bilateral inversion, but inversion/eversion appears 5/5.       Right Left   Shoulder abduction        5 5   Elbow extension 5 5   Elbow flexion 5 5   Wrist extension         5 5   Finger extension 5 5   FDP/FPL 5 5   ADM 4 5   FDI 4+ 5   APB 4+ 5   Hip flexion 5 5   Knee flexion 5- 5-   Knee extension 5 5   Dorsiflexion 5 5   Plantar flexion 5 5        Deep Tendon Reflexes:     Right Left   Biceps 2 2   BRD 2 2   Triceps 2 2   Patellar absent 2   Achilles 2 2   Plantar Flexor Flexor   Clonus Absent Absent        Sensory: Decreased sensation to light touch throughout entire lower extremities, worse in the feet. Temperature sensation is decreased in the feet. Proprioception is absent in the right toes, otherwise intact. Vibration is absent in the great toes and right ankle, ~5 seconds in the left ankle and normal in the hands. Negative Romberg.      Coordination: Finger-nose-finger and heel-shin intact without dysmetria.      Gait: Antalgic and mildly wide based casual gait. Not able to do heel, toe, or tandem gaits.          Data: Pertinent prior to visit   Imaging:  MRI thoracic 10/2024  IMPRESSION: Postoperative and degenerative change of the thoracic  spine as detailed above. No high-grade spinal canal stenosis. Mild to  moderate degenerative neural foraminal stenosis on the left at T6-T7,  T7-T8 and T8-T9.    MRI lumbar 9/2024  IMPRESSION:  1.  Extensive postoperative changes throughout the lumbar spine. Metal hardware causes artifact and makes evaluation difficult.  2.   No definite spinal canal narrowing appreciated at any level.  3.  Apparent moderate bilateral neural foraminal narrowings at L5-S1.    MRI cervical 9/2023  IMPRESSION:  1. Multilevel degenerative changes of the cervical spine, as  described.  2. No high-grade central spinal canal stenosis. No spinal cord signal  abnormality.  3. Varying degrees of multilevel degenerative neural foraminal  stenosis, including moderate to severe neural foraminal stenosis at  multiple levels. Please see the body of the report for details.    CT T/L 10/2024  IMPRESSION:  1. Status post posterior fusion from T10 down to sacrum, bilateral  sacroiliac joint fusion, intervertebral fusion at L2-3, L3-4, L4-5 and  L5-S1. Removal of previous S1 posterior screws.  2. Bilateral lucency surrounding sacroiliac joint fusion screws,  worrisome for screw loosening.  3. Absence of bridging ossification at L5-S1 disc space. Otherwise  L2-3, L3-4 and L4-5 disc spaces are completely fused.  4. No lytic bone lesion. No acute fracture or subluxation.    Procedures:  EMG 11/2024  Interpretation:  Abnormal study. 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 3) Attenuated right radial sensory response consistent with generalized sensory polyneuropathy. Correlate with results of previous lower extremity nerve conduction studies. 4) Possible superimposed right median neuropathy at the forearm vs generalized demyelinating polyneuropathy.  Recommendations: 1) Referral for right carpal and cubital tunnel decompression surgery, 2) Ultrasonographic study of at least the right and ideally bilateral upper extremities, to confirm the localization of the focal neuropathies diagnosed above, and to exclude enlargement of nerves at non-entrapment sites, that would suggest a demyelinating polyneuropathy 3) Neurology referral to  determine etiology of polyneuropathy.     EMG 8/2024  Interpretation:  This is an abnormal examination. 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 This time included time spent with the patient, prep work, ordering tests, and performing post visit documentation.

## 2024-12-04 ENCOUNTER — PRE VISIT (OUTPATIENT)
Dept: NEUROLOGY | Facility: CLINIC | Age: 59
End: 2024-12-04

## 2024-12-04 ENCOUNTER — OFFICE VISIT (OUTPATIENT)
Dept: NEUROLOGY | Facility: CLINIC | Age: 59
End: 2024-12-04
Payer: COMMERCIAL

## 2024-12-04 VITALS
HEART RATE: 59 BPM | BODY MASS INDEX: 29.41 KG/M2 | SYSTOLIC BLOOD PRESSURE: 146 MMHG | WEIGHT: 205 LBS | DIASTOLIC BLOOD PRESSURE: 94 MMHG

## 2024-12-04 DIAGNOSIS — G62.9 NEUROPATHY: Primary | ICD-10-CM

## 2024-12-04 DIAGNOSIS — R20.2 NUMBNESS AND TINGLING IN LEFT HAND: ICD-10-CM

## 2024-12-04 DIAGNOSIS — M47.14 THORACIC SPONDYLOSIS WITH MYELOPATHY: ICD-10-CM

## 2024-12-04 DIAGNOSIS — R29.898 WEAKNESS OF BOTH LOWER LIMBS: ICD-10-CM

## 2024-12-04 DIAGNOSIS — R20.0 NUMBNESS AND TINGLING IN LEFT HAND: ICD-10-CM

## 2024-12-04 DIAGNOSIS — M43.25 FUSION OF SPINE, THORACOLUMBAR REGION: ICD-10-CM

## 2024-12-04 LAB
ALBUMIN SERPL BCG-MCNC: 4.8 G/DL (ref 3.5–5.2)
ALP SERPL-CCNC: 55 U/L (ref 40–150)
ALT SERPL W P-5'-P-CCNC: 16 U/L (ref 0–70)
AST SERPL W P-5'-P-CCNC: 28 U/L (ref 0–45)
BILIRUB DIRECT SERPL-MCNC: <0.2 MG/DL (ref 0–0.3)
BILIRUB SERPL-MCNC: 0.3 MG/DL
EST. AVERAGE GLUCOSE BLD GHB EST-MCNC: 100 MG/DL
HBA1C MFR BLD: 5.1 % (ref 0–5.6)
PROT SERPL-MCNC: 7.9 G/DL (ref 6.4–8.3)
TOTAL PROTEIN SERUM FOR ELP: 7.8 G/DL (ref 6.4–8.3)

## 2024-12-04 PROCEDURE — 99205 OFFICE O/P NEW HI 60 MIN: CPT | Performed by: INTERNAL MEDICINE

## 2024-12-04 PROCEDURE — 36415 COLL VENOUS BLD VENIPUNCTURE: CPT | Performed by: INTERNAL MEDICINE

## 2024-12-04 PROCEDURE — 80076 HEPATIC FUNCTION PANEL: CPT | Performed by: INTERNAL MEDICINE

## 2024-12-04 PROCEDURE — 84155 ASSAY OF PROTEIN SERUM: CPT | Mod: 59 | Performed by: INTERNAL MEDICINE

## 2024-12-04 PROCEDURE — 99417 PROLNG OP E/M EACH 15 MIN: CPT | Performed by: INTERNAL MEDICINE

## 2024-12-04 PROCEDURE — 83036 HEMOGLOBIN GLYCOSYLATED A1C: CPT | Performed by: INTERNAL MEDICINE

## 2024-12-04 RX ORDER — LIDOCAINE 50 MG/G
OINTMENT TOPICAL PRN
Qty: 50 G | Refills: 11 | Status: SHIPPED | OUTPATIENT
Start: 2024-12-04

## 2024-12-04 NOTE — LETTER
12/4/2024      Neema Hunt  [address] [facility] [address]      Dear Colleague,    Thank you for referring your patient, Neema Hunt, to the [facility]UR[facility] [place]. Please see a copy of my visit note below.    [facility] Neurology Consultation    Neema Hunt MRN# [id number]   Age: 59 year old YOB: 1965     Requesting physician: Nhan Marshall  No Ref-Primary, Physician     Reason for Consultation: numbness/weakness           Assessment and Plan:   Assessment:  Neema Hunt is a 59 year old male who presents today for evaluation of numbness and weakness. Based on history and examination I suspect that leg weakness/numbness is a combination of chronic radiculopathy and prior thoracic myelopathy. Recent MRI images did not show any residual stenosis. Patient also has mild length dependent sensory polyneuropathy. Neuropathy labs were ordered today. Ultrasound also ordered as recommended by Dr Santamaria to look for features of acquired demyelinating polyneuropathy.    Patient had right sided carpal tunnel / cubital syndrome on recent EMG. Left upper extremity EMG was also ordered to evaluate for carpal tunnel syndrome.      Plan:  - A1c, B12, SSA, SSB, immunofixation, ELP, hepatic panel  - Left upper extremity EMG  - Ultrasound bilateral median/ulnar nerves  - Lidocaine ointment prn    Follow up in Neurology clinic pending above    Scar Medina MD   of Neurology  [facility]  ---------------------------------------------------------------------------------------------------------------------------        History of Presenting Symptoms:   Neema Hunt is a 59 year old male who presents today for evaluation of numbness and weakness.     He has numbness in the legs (right > left). The right leg has been numb for 5-6 years. The left leg numbness started about year ago before 9/2023 surgery. Leg symptoms have not worsened  in the last 6 months.     Hands have been numb/weak for about 6 months or at least more pronounced in this period.     He has right hand numbness and is scheduled to get carpal tunnel and cubital tunnel release. He does have numbness in the left hand, mostly the 4th fingers.      He has had 7 back surgeries previously. His last surgery was December 2023 (T10-S1 instrumentation). Prior to that he had synovial cyst removal in 9/2023. His urination / bowel movements haven't been the same since issues with synovial cyst.     He has pains in the leg. He takes tylenol as needed. He does gabapentin 900 mg at bedtime which helps with nerve pain and sleep. He is planning on starting Cymbalta soon for nerve pain and depression.     He had gastroc tendon surgery in 2009 and he has had issues walking on left toes since then.       Past Medical History:     Patient Active Problem List   Diagnosis     Acquired kyphosis     Other secondary scoliosis, lumbar region     Spinal stenosis of lumbar region without neurogenic claudication     Status post lumbar spinal fusion     History of lumbar fusion     Urinary retention with incomplete bladder emptying     Lumbar radiculopathy     Thoracic radiculopathy     Past Medical History:   Diagnosis Date     Arthritis      Back pain      Gastro-oesophageal reflux disease      Hypertension      Radiculopathy      Scoliosis         Past Surgical History:     Past Surgical History:   Procedure Laterality Date     DAVINCI XI HERNIORRHAPHY INGUINAL Bilateral 7/26/2022    Procedure: Robotic repair of recurrent right inguinal hernia with placement of mesh, robotic repair of left inguinal hernia with placement of mesh;  Surgeon: Mary Morales MD;  Location:  OR     DAVINCI XI HERNIORRHAPHY VENTRAL N/A 7/26/2022    Procedure: Robotic repair of umbilical hernia with placement of mesh;  Surgeon: Mary Morales MD;  Location:  OR     DISCECTOMY LUMBAR POSTERIOR MICROSCOPIC ONE LEVEL Right  7/15/2020    Procedure: Right L5-S1 foraminotomy and microdiskectomy;  Surgeon: Nhan Marshall MD;  Location: [facility] OR     EXPLORE SPINE, REMOVE HARDWARE, COMBINED N/A 9/5/2019    Procedure: REMOVAL LUMBAR L3-5 INSTRUMENTATION;  Surgeon: Nhan Marshall MD;  Location: [facility] OR     FUSION SPINE ANTERIOR MINIMALLY INVASIVE TWO LEVELS N/A 11/16/2016    Procedure: FUSION SPINE ANTERIOR MINIMALLY INVASIVE TWO LEVELS;  Surgeon: Nhan Marshall MD;  Location: [facility] OR     FUSION, SPINE, LUMBAR, 1 LEVEL, POSTERIOR APPROACH, ROBOTIC-ASSISTED N/A 5/27/2021    Procedure: Lumbar 5-Sacral 1 posterior segemental instrumentation, bilateral decompression and transforaminal interbody fusion using local autograft and allograft cancellous chips. Revision of Lumbar 2-4 ian. Posterior arthrodesis Lumbar 5-Sacral 1 using  local autograft and allograft cancellous chips;  Surgeon: Nhan Marshall MD;  Location: [facility] OR     HERNIA REPAIR      right inguinal hernia     INJECT EPIDURAL TRANSFORAMINAL Bilateral 11/1/2024    Procedure: Bilateral L5-S1 transforaminal epidural steroid injection;  Surgeon: Nicola Verma MD;  Location: [facility] OR     LAMINECTOMY LUMBAR POSTERIOR MICROSCOPIC ONE LEVEL  4/9/2013    Procedure: LAMINECTOMY LUMBAR POSTERIOR MICROSCOPIC ONE LEVEL;  Right Lumbar 3-4 Micro Laminectomy & Foraminotomy;  Surgeon: Nhan Marshall MD;  Location: [facility] OR     LAMINECTOMY THORACIC ONE LEVEL N/A 9/27/2023    Procedure: Thoracic 11 to thoracic 12 laminectomy;  Surgeon: Zane Ontiveros MD;  Location: [facility] OR     OPTICAL TRACKING SYSTEM FUSION SPINE POSTERIOR LUMBAR PERCUTANEOUS TWO LEVELS N/A 11/16/2016    Procedure: OPTICAL TRACKING SYSTEM FUSION SPINE POSTERIOR LUMBAR PERCUTANEOUS TWO LEVELS;  Surgeon: Nhan Marshall MD;  Location: [facility] OR     OPTICAL TRACKING SYSTEM FUSION SPINE POSTERIOR LUMBAR THREE+ LEVELS Right 9/5/2019    Procedure: POSTERIOR SEGMENTAL INSTRUMENTATION L2-4, RIGHT LUMBAR 2-3 DISCECTOMY AND  TRANSFORAMINAL INTERBODY FUSION, REDO DECOMPRESSION RIGHT L3-4, POSTERIOR ARTHRODESIS L2-4;  Surgeon: Nhan Marshall MD;  Location:  OR     OPTICAL TRACKING SYSTEM FUSION SPINE POSTERIOR LUMBAR THREE+ LEVELS N/A 12/21/2023    Procedure: Insertion Thoracic 10 to Lumbar 1 and revision of Lumbar 2 to Sacral 1 instrumentation. Dual Pelvic Instrumentation. Revision fusion Lumbar 5 to Sacral 1 using allograft chips. Arthrodesis Thoracic 10 to Lumbar 2 using allograft cancellous chips;  Surgeon: Nhan Marshall MD;  Location:  OR     ORTHOPEDIC SURGERY      left ankle, right finger        Social History:     Social History     Tobacco Use     Smoking status: Never     Smokeless tobacco: Never   Vaping Use     Vaping status: Never Used   Substance Use Topics     Alcohol use: Yes     Comment: 3-4 drinks/week     Drug use: No        Family History:     Family History   Problem Relation Age of Onset     Asthma Mother      Hypertension Mother      Arthritis Mother         Medications:     Current Outpatient Medications   Medication Sig Dispense Refill     acetaminophen (TYLENOL) 500 MG tablet Take 1,000 mg by mouth daily as needed for mild pain (2 X 500 mg = 1000 mg)       amLODIPine-benazepril (LOTREL) 10-20 MG capsule Take 1 capsule by mouth every morning        Cyanocobalamin (B-12 PO) Take 2 chew tab by mouth every morning        cyclobenzaprine (FLEXERIL) 5 MG tablet Take 1 tablet (5 mg) by mouth 3 times daily as needed for muscle spasms 40 tablet 1     famotidine (PEPCID) 20 MG tablet Take 20 mg by mouth every evening       gabapentin (NEURONTIN) 300 MG capsule Take 3 capsules (900 mg) by mouth at bedtime. 90 capsule 1     gabapentin (NEURONTIN) 300 MG capsule TAKE 2 CAPSULES(600 MG) BY MOUTH AT BEDTIME 180 capsule 0     gabapentin (NEURONTIN) 300 MG capsule Take 1 capsule (300 mg) by mouth At Bedtime 90 capsule 3     lidocaine (LIDODERM) 5 % Patch Place 1 patch onto the skin every 24 hours (Patient taking  differently: Place onto the skin every 24 hours.) 30 patch 2     magnesium 250 MG tablet Take 1 tablet by mouth daily Daily       Melatonin 10 MG TABS tablet Take 10 mg by mouth nightly as needed for sleep       Misc Natural Products (OSTEO BI-FLEX JOINT SHIELD) TABS Take 1 tablet by mouth daily       Multiple Vitamins-Minerals (AIRBORNE PO) Take 1 chew tab by mouth every morning        Multiple Vitamins-Minerals (MULTIVITAMIN ADULT PO) Take 2 chew tab by mouth every morning        omega 3 1000 MG CAPS Take 1 chew tab by mouth every morning        oxyCODONE (ROXICODONE) 5 MG tablet Take 1-2 tablets (5-10 mg) by mouth every 6 hours as needed for moderate pain 40 tablet 0     polyethylene glycol (MIRALAX) 17 g packet Take 1 packet by mouth daily       Probiotic Product (SOLUBLE FIBER/PROBIOTICS PO) Take 1 chew tab by mouth every morning        tetrahydrozoline (VISINE) 0.05 % ophthalmic solution Place 1 drop into both eyes daily as needed       valACYclovir (VALTREX) 1000 mg tablet Take 1,000 mg by mouth 2 times daily as needed       Current Facility-Administered Medications   Medication Dose Route Frequency Provider Last Rate Last Admin     2 mL bupivacaine (MARCAINE) preservative free injection 0.5% (20 mL vial)  2 mL      2 mL at 06/07/24 0930     2 mL bupivacaine (MARCAINE) preservative free injection 0.5% (20 mL vial)  2 mL      2 mL at 06/07/24 0930     hylan (SYNVISC ONE) injection 48 mg  48 mg      48 mg at 08/30/24 1443     hylan (SYNVISC ONE) injection 48 mg  48 mg      48 mg at 08/30/24 1443     lidocaine 1 % injection 2 mL  2 mL      2 mL at 06/07/24 0930     lidocaine 1 % injection 2 mL  2 mL      2 mL at 06/07/24 0930     triamcinolone (KENALOG-40) injection 40 mg  40 mg      40 mg at 06/07/24 0930     triamcinolone (KENALOG-40) injection 40 mg  40 mg      40 mg at 06/07/24 0930        Allergies:   No Known Allergies     Review of Systems:   As above     Physical Exam:   Vitals: BP (!) 146/94 (BP  Location: Right arm, Patient Position: Sitting, Cuff Size: Adult Regular)   Pulse 59   Wt 93 kg (205 lb)   BMI 29.41 kg/m     General: Seated comfortably in no acute distress.  Lungs: breathing comfortably  Neurologic:     Mental Status: Fully alert, attentive. Language normal, speech clear and fluent, no paraphasic errors.      Cranial Nerves: Visual fields intact. PERRL. EOMI with normal smooth pursuit. Facial sensation intact/symmetric. Facial movements symmetric. Hearing not formally tested but intact to conversation. Palate elevation symmetric, uvula midline. No dysarthria. Shoulder shrug strong bilaterally. Tongue protrusion midline.     Motor: No resting tremors, mild action tremors. Muscle tone normal throughout. Strength as below. Decreased ROM in bilateral inversion, but inversion/eversion appears 5/5.       Right Left   Shoulder abduction        5 5   Elbow extension 5 5   Elbow flexion 5 5   Wrist extension         5 5   Finger extension 5 5   FDP/FPL 5 5   ADM 4 5   FDI 4+ 5   APB 4+ 5   Hip flexion 5 5   Knee flexion 5- 5-   Knee extension 5 5   Dorsiflexion 5 5   Plantar flexion 5 5        Deep Tendon Reflexes:     Right Left   Biceps 2 2   BRD 2 2   Triceps 2 2   Patellar absent 2   Achilles 2 2   Plantar Flexor Flexor   Clonus Absent Absent        Sensory: Decreased sensation to light touch throughout entire lower extremities, worse in the feet. Temperature sensation is decreased in the feet. Proprioception is absent in the right toes, otherwise intact. Vibration is absent in the great toes and right ankle, ~5 seconds in the left ankle and normal in the hands. Negative Romberg.      Coordination: Finger-nose-finger and heel-shin intact without dysmetria.      Gait: Antalgic and mildly wide based casual gait. Not able to do heel, toe, or tandem gaits.          Data: Pertinent prior to visit   Imaging:  MRI thoracic 10/2024  IMPRESSION: Postoperative and degenerative change of the thoracic  spine  as detailed above. No high-grade spinal canal stenosis. Mild to  moderate degenerative neural foraminal stenosis on the left at T6-T7,  T7-T8 and T8-T9.    MRI lumbar 9/2024  IMPRESSION:  1.  Extensive postoperative changes throughout the lumbar spine. Metal hardware causes artifact and makes evaluation difficult.  2.  No definite spinal canal narrowing appreciated at any level.  3.  Apparent moderate bilateral neural foraminal narrowings at L5-S1.    MRI cervical 9/2023  IMPRESSION:  1. Multilevel degenerative changes of the cervical spine, as  described.  2. No high-grade central spinal canal stenosis. No spinal cord signal  abnormality.  3. Varying degrees of multilevel degenerative neural foraminal  stenosis, including moderate to severe neural foraminal stenosis at  multiple levels. Please see the body of the report for details.    CT T/L 10/2024  IMPRESSION:  1. Status post posterior fusion from T10 down to sacrum, bilateral  sacroiliac joint fusion, intervertebral fusion at L2-3, L3-4, L4-5 and  L5-S1. Removal of previous S1 posterior screws.  2. Bilateral lucency surrounding sacroiliac joint fusion screws,  worrisome for screw loosening.  3. Absence of bridging ossification at L5-S1 disc space. Otherwise  L2-3, L3-4 and L4-5 disc spaces are completely fused.  4. No lytic bone lesion. No acute fracture or subluxation.    Procedures:  EMG 11/2024  Interpretation:  Abnormal study. 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 3) Attenuated right radial sensory response consistent with generalized sensory polyneuropathy. Correlate with results of previous lower extremity nerve conduction studies. 4) Possible superimposed right median neuropathy at the forearm vs generalized demyelinating polyneuropathy.  Recommendations: 1) Referral for right carpal and cubital tunnel  decompression surgery, 2) Ultrasonographic study of at least the right and ideally bilateral upper extremities, to confirm the localization of the focal neuropathies diagnosed above, and to exclude enlargement of nerves at non-entrapment sites, that would suggest a demyelinating polyneuropathy 3) Neurology referral to determine etiology of polyneuropathy.     EMG 8/2024  Interpretation:  This is an abnormal examination. 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 This time included time spent with the patient, prep work, ordering tests, and performing post visit documentation.      Again, thank you for allowing me to participate in the care of your patient.        Sincerely,        Scar Medina MD

## 2024-12-05 LAB
ALBUMIN SERPL ELPH-MCNC: 4.8 G/DL (ref 3.7–5.1)
ALPHA1 GLOB SERPL ELPH-MCNC: 0.3 G/DL (ref 0.2–0.4)
ALPHA2 GLOB SERPL ELPH-MCNC: 0.5 G/DL (ref 0.5–0.9)
B-GLOBULIN SERPL ELPH-MCNC: 0.9 G/DL (ref 0.6–1)
ENA SS-A AB SER IA-ACNC: 0.9 U/ML
ENA SS-A AB SER IA-ACNC: NEGATIVE
ENA SS-B IGG SER IA-ACNC: 2.1 U/ML
ENA SS-B IGG SER IA-ACNC: NEGATIVE
GAMMA GLOB SERPL ELPH-MCNC: 1.3 G/DL (ref 0.7–1.6)
M PROTEIN SERPL ELPH-MCNC: 0 G/DL
PROT PATTERN SERPL ELPH-IMP: NORMAL
PROT PATTERN SERPL IFE-IMP: NORMAL
VIT B12 SERPL-MCNC: >4000 PG/ML (ref 232–1245)

## 2024-12-09 DIAGNOSIS — M17.0 PRIMARY OSTEOARTHRITIS OF KNEES, BILATERAL: ICD-10-CM

## 2024-12-10 RX ORDER — GABAPENTIN 300 MG/1
CAPSULE ORAL
Qty: 90 CAPSULE | Refills: 1 | Status: SHIPPED | OUTPATIENT
Start: 2024-12-10

## 2024-12-12 ENCOUNTER — MYC MEDICAL ADVICE (OUTPATIENT)
Dept: NEUROSURGERY | Facility: CLINIC | Age: 59
End: 2024-12-12

## 2024-12-12 ENCOUNTER — HOSPITAL ENCOUNTER (OUTPATIENT)
Facility: AMBULATORY SURGERY CENTER | Age: 59
Discharge: HOME | End: 2024-12-12
Attending: ANESTHESIOLOGY
Payer: COMMERCIAL

## 2024-12-12 ENCOUNTER — ANCILLARY PROCEDURE (OUTPATIENT)
Dept: RADIOLOGY | Facility: AMBULATORY SURGERY CENTER | Age: 59
End: 2024-12-12
Attending: ANESTHESIOLOGY
Payer: COMMERCIAL

## 2024-12-12 VITALS
BODY MASS INDEX: 29.35 KG/M2 | DIASTOLIC BLOOD PRESSURE: 90 MMHG | WEIGHT: 205 LBS | HEART RATE: 63 BPM | TEMPERATURE: 97.8 F | HEIGHT: 70 IN | RESPIRATION RATE: 15 BRPM | OXYGEN SATURATION: 98 % | SYSTOLIC BLOOD PRESSURE: 136 MMHG

## 2024-12-12 DIAGNOSIS — M54.14 THORACIC RADICULOPATHY: ICD-10-CM

## 2024-12-12 RX ORDER — LIDOCAINE HYDROCHLORIDE 10 MG/ML
INJECTION, SOLUTION EPIDURAL; INFILTRATION; INTRACAUDAL; PERINEURAL PRN
Status: DISCONTINUED | OUTPATIENT
Start: 2024-12-12 | End: 2024-12-12 | Stop reason: HOSPADM

## 2024-12-12 RX ORDER — DIAZEPAM 5 MG/1
5-10 TABLET ORAL
Status: ACTIVE | OUTPATIENT
Start: 2024-12-12

## 2024-12-12 RX ORDER — NAPROXEN SODIUM 220 MG/1
220 TABLET, FILM COATED ORAL 2 TIMES DAILY WITH MEALS
COMMUNITY

## 2024-12-12 RX ORDER — DEXAMETHASONE SODIUM PHOSPHATE 10 MG/ML
INJECTION INTRAMUSCULAR; INTRAVENOUS PRN
Status: DISCONTINUED | OUTPATIENT
Start: 2024-12-12 | End: 2024-12-12 | Stop reason: HOSPADM

## 2024-12-12 RX ORDER — IOPAMIDOL 408 MG/ML
INJECTION, SOLUTION INTRATHECAL PRN
Status: DISCONTINUED | OUTPATIENT
Start: 2024-12-12 | End: 2024-12-12 | Stop reason: HOSPADM

## 2024-12-12 RX ORDER — TAMSULOSIN HYDROCHLORIDE 0.4 MG/1
0.4 CAPSULE ORAL DAILY
COMMUNITY
Start: 2024-10-14

## 2024-12-12 RX ORDER — DULOXETIN HYDROCHLORIDE 20 MG/1
CAPSULE, DELAYED RELEASE ORAL
COMMUNITY
Start: 2024-12-06

## 2024-12-12 RX ORDER — BUPIVACAINE HYDROCHLORIDE 2.5 MG/ML
INJECTION, SOLUTION EPIDURAL; INFILTRATION; INTRACAUDAL PRN
Status: DISCONTINUED | OUTPATIENT
Start: 2024-12-12 | End: 2024-12-12 | Stop reason: HOSPADM

## 2024-12-12 NOTE — TELEPHONE ENCOUNTER
Patient has history of Insertion Thoracic 10 to Lumbar 1 and revision of Lumbar 2 to Sacral 1 instrumentation. Dual Pelvic Instrumentation. Revision fusion Lumbar 5 to Sacral 1 using allograft chips. Arthrodesis Thoracic 10 to Lumbar 2 using allograft cancellous chips with Dr. Marshall on 12/21/2023.     Patient sent MyC today reporting a fall in the shower a couple days ago leading to some bruising on right side ribs. See MyChart for details. Will see if team wants XR to check hardware status or continue to monitor as no new focal deficits per patient.

## 2024-12-12 NOTE — DISCHARGE INSTRUCTIONS
Home Care Instructions after an Epidural Steroid Pain Injection    A lumbar epidural steroid injection delivers steroid medication directly into the area that may be causing your lower back pain and/or leg pain. A cervical or thoracic epidural steroid injection delivers steroids into the epidural space surrounding spinal nerve roots to help relieve pain in the upper spine/neck.    Activity  -Rest today  -Do not work today  -Resume normal activity tomorrow  -DO NOT shower for 24 hours  -DO NOT remove bandaid for 24 hours    Pain  -You may experience soreness at the injection site for one or two days  -You may use an ice pack for 20 minutes every 2 hours for the first 24 hours  -You may use a heating pad after the first 24 hours  -You may use Tylenol (acetaminophen) every 4 hours or other pain medicines as     directed by your physician    You may experience numbness radiating into your legs or arms (depending on the procedure location). This numbness may last several hours. Until sensation returns to normal; please use caution in walking, climbing stairs, and stepping out of your vehicle, etc.    Common side effects of steroids:  Not everyone will experience corticosteroid side effects. If side effects are experienced, they will gradually subside in the 7-10 day period following an injection. Most common side effects include:  -Flushed face and/or chest  -Feeling of warmth, particularly in the face but could be an overall feeling of warmth  -Increased blood sugar in diabetic patients  -Menstrual irregularities my occur. If taking hormone-based birth control an alternate method of birth control is recommended  -Sleep disturbances and/or mood swings are possible  -Leg cramps    Please contact us if you have:  -Severe pain  -Fever more than 101.5 degrees Fahrenheit  -Signs of infection at the injection site (redness, swelling, or drainage)    FOR PAIN CENTER PATIENTS:  If you have questions, please contact the Pain  Clinic at [phone] Option #1 between the hours of 7:00 am and 3:00 pm Monday through Friday. After office hours you can contact the on call provider by dialing [phone]. If you need immediate attention, we recommend that you go to a hospital emergency room or dial [phone].

## 2024-12-12 NOTE — OP NOTE
Patient: Neema Hunt Age: 59 year old   MRN: [id number] Attending: Dr. Mejia     Date of Visit: December 12, 2024      PAIN MEDICINE CLINIC PROCEDURE NOTE    ATTENDING CLINICIAN:    Fabi Mejia MD    ASSISTANT CLINICIAN:  Cherelle Villalobos DO      PREPROCEDURE DIAGNOSES:  1.  Thoracic radiculopathy  2.  Mi-thoracic back pain       PROCEDURE(S) PERFORMED:  1.  Thoracic interlaminar epidural steroid injection.  2.  Fluoroscopic guidance for the above-named procedure(s).      ANESTHESIA:  Local. See nursing notes for pre and post procedure vitals    BLOOD LOSS:  Minimal.    DRAINS AND SPECIMENS:  None.    COMPLICATIONS:  None.    INDICATIONS:  Neema Hunt is a 59 year old male with a history of mid-thoracic back pain with radicular symptoms to anterior chest wall.  The patient stated that the patient was in their usual state of health and denied recent anticoagulant use or recent infections.  Therefore, the plan is to perform above mentioned procedure.     Procedure Details:  The patient was met in the procedure room, where the patient was identified by name, medical record number and date of birth.  All of the patient s last minute questions were answered. Written informed consent was obtained and saved in the electronic medical record, after the risks, benefits, and alternatives were discussed with the patient.      A formal time-out procedure was performed, as per protocol, including patient name, title of procedure, and site of procedure, and all in the room concurred.  Routine monitors were applied.      The patient was placed in the prone position on the procedure room table.  All pressure points were checked and comfortably padded.  Routine monitors were placed.  Vital signs were stable.    A chlorhexidine prep was completed followed by sterile draping per standard procedure.     The AP fluoroscopic view was optimized for approach at T8-T9 interspace.  The skin over the interspace was infiltrated  with 4-5 mL of 1% Lidocaine using a 25 gauge, 1.5 inch needle.  A 18-gauge 3-1/2 inch Tuohy needle was advanced under fluoroscopic guidance with left paramedial approach until it touched lamina. Mutiple AP, contralateral oblique, and lateral fluoroscopic images at this time  are taken as Tuohy needle was advanced to the epidural space. The epidural space was identified, without evidence of blood, cerebrospinal fluid, or parasthesia throughout. Needle tip placement within the epidural space was further confirmed with 1-2 mL of nonionic contrast agent, with the epidural space visualized in the AP and lateral fluoroscopic view(s) with appropriate spread of the agent with fat vacuolization and no intravascular or intrathecal spread noted. Next, 10 mL of a treatment solution containing 2 mL of preservative free 0.25% bupivacaine, 0.5 ml of 10mg/mL dexamethasone and 4 mL preservative free normal saline was administered slowly. The needle was withdrawn.  Light pressure was held at the puncture site(s) to prevent ecchymosis and oozing.  The patient's skin was cleansed, and hemostasis was confirmed.  Band-aids were applied to the needle injection site(s).      Condition:    The patient remained awake and alert throughout the procedure.  The patient tolerated the procedure well and was monitored for approximately 15 minutes afterward in the post procedure area.  There were no immediate post procedure complications noted.  The patient was then discharged to home as per protocol.  The patient will follow up in the outpatient clinic in 4 week(s), unless otherwise clinically indicated.      After the procedure patient stated slight improvement of his pain.

## 2024-12-17 NOTE — TELEPHONE ENCOUNTER
Twin Leonard DNP recommends patient continue to monitor his symptoms. Updated patient via [employer].

## 2024-12-24 ENCOUNTER — ANCILLARY PROCEDURE (OUTPATIENT)
Dept: ULTRASOUND IMAGING | Facility: CLINIC | Age: 59
End: 2024-12-24
Attending: INTERNAL MEDICINE
Payer: COMMERCIAL

## 2024-12-24 DIAGNOSIS — G62.9 NEUROPATHY: ICD-10-CM

## 2024-12-24 PROCEDURE — 76882 US LMTD JT/FCL EVL NVASC XTR: CPT | Performed by: RADIOLOGY

## 2025-01-03 NOTE — PROGRESS NOTES
Neema Hunt  :  1965  DOS: 2025  MRN: [id number]  PCP: Cornel Mittal    Sports Medicine Clinic Visit    Interim History - 2025  - Last seen on 2024 for bilateral SynviscOne knee injections which offered moderate relief in symptoms. Affected his stability and comfort more than the pain itself. Allowed for a few months of relief. Left knee is worse then right. Is having burning, itching, tingling, stabbing sensation around the left knee in addition to deep joint aching. Tried Voltaren with differing results. Usually takes Aleve but is off of it due to upcoming carpal tunnel surgery on 2024.  Interested in repeat bilateral knee joint corticosteroid injections today.      Interim History - 2024  - Last seen on 2024 for bilateral knee instability, quadriceps tendonitis and bilateral chondromalacia patella.     - 2024 right knee MRI   1. Horizontal oblique tear of the medial and lateral menisci. There is a small medial meniscal flap adjacent to the posterior horn root ligament junction.   2. Tricompartmental chondromalacia including areas of grade 4 changes,  greatest in the medial and lateral patellar facets and medial/lateral compartments.   3. Intact visualized quadriceps tendon.  4. Edema deep aspect of Hoffa's fat pad, query Hoffa's impingement disease.  5. Small joint effusion and Baker's cyst with internal debris.       - 2024 left knee MRI   1. Horizontal tear of the posterior horn of the medial meniscus, with  tiny parameniscal cyst.  2. Grade III chondromalacia along the central trochlear surface with  diffuse moderate grade cartilage fissuring in the medial femorotibial  joint compartment.  3. The anterior and posterior cruciate ligaments, medial and lateral  supporting structures, and lateral meniscus are intact.     - Since the last visit, he notes no change in symptoms. Presents to discuss bilateral knee MRIs.   - No interim injury.        Initial Visit: April 26, 2024  HPI  Neema Hunt is a 58 year old male who is seen in consultation at the request of  Nhan Marshall M.D. presenting with bilateral knee pain    - Mechanism of Injury:    - No inciting injury. Chronic symptoms that worsened in September 2023 after a fall onto his anterior knees  - Pertinent history and prior evaluations:    - XR B/L knees 10/10/2023 shows mild medial compartment narrowing on the right, no acute fractures, dislocations, or effusion bilaterally.    - Hx of Right lower extremity radiculopathy and lumbar spinal stenosis s/p lumbar fusion.    - 12/21/2023 with Dr. Nhan Marshall in Neurosurgery: Insertion Thoracic 10 to Lumbar 1 and revision of Lumbar 2 to Sacral 1 instrumentation. Dual Pelvic Instrumentation. Revision fusion Lumbar 5 to Sacral 1 using allograft chips. Arthrodesis   Thoracic 10 to Lumbar 2 using allograft cancellous chips   - Subtalar fusion on the left in 2009    - Pain Character:    - Location:  bilateral anterior knees  - Character:   achy, burning, itching  - Duration:  7 months  - Course:  steady  - Endorses:    - itching pain with palpation, buckling of the knees. Numbness in the RLE for 5+ years, LLE numbness as well, both from knees down. Walks with a cane due to ankle and knee buckling. Feeling of hyperextension.  Radicular shooting pain bilaterally, mild swelling of the knee joints, ankle instability, weakness of the legs that has gotten worse since spinal surgeries.  - Denies:    - mechanical locking symptoms, large effusions, erythma, warmth, fever, chills  - Alleviating factors:    -  Slightly with physical therapy , rest, use of a cane  - Aggravating factors:    -  ambulation, wakes him up in the middle of the night  - Other treatments tried:    - Physical therapy is helping. 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       Review of Systems  Musculoskeletal: as above  Remainder of review of systems is negative including constitutional, CV, pulmonary, GI, Skin and Neurologic except as noted in HPI or medical history.    Past Medical History:   Diagnosis Date    Arthritis     Back pain     Gastro-oesophageal reflux disease     Hypertension     Radiculopathy     Scoliosis      Past Surgical History:   Procedure Laterality Date    DAVINCI XI HERNIORRHAPHY INGUINAL Bilateral 7/26/2022    Procedure: Robotic repair of recurrent right inguinal hernia with placement of mesh, robotic repair of left inguinal hernia with placement of mesh;  Surgeon: Mary Morales MD;  Location:  OR    DAVINCI XI HERNIORRHAPHY VENTRAL N/A 7/26/2022    Procedure: Robotic repair of umbilical hernia with placement of mesh;  Surgeon: Mary Morales MD;  Location: [facility] OR    DISCECTOMY LUMBAR POSTERIOR MICROSCOPIC ONE LEVEL Right 7/15/2020    Procedure: Right L5-S1 foraminotomy and microdiskectomy;  Surgeon: Nhan Marshall MD;  Location:  OR    EXPLORE SPINE, REMOVE HARDWARE, COMBINED N/A 9/5/2019    Procedure: REMOVAL LUMBAR L3-5 INSTRUMENTATION;  Surgeon: Nhan Marshall MD;  Location: [facility] OR    FUSION SPINE ANTERIOR MINIMALLY INVASIVE TWO LEVELS N/A 11/16/2016    Procedure: FUSION SPINE ANTERIOR MINIMALLY INVASIVE TWO LEVELS;  Surgeon: Nhan Marshall MD;  Location: [facility] OR    FUSION, SPINE, LUMBAR, 1 LEVEL, POSTERIOR APPROACH, ROBOTIC-ASSISTED N/A 5/27/2021    Procedure: Lumbar 5-Sacral 1 posterior segemental instrumentation, bilateral decompression and transforaminal interbody fusion using local autograft and allograft cancellous chips. Revision of Lumbar 2-4 ian. Posterior arthrodesis Lumbar 5-Sacral 1 using  local autograft and allograft cancellous chips;  Surgeon: Nhan Marshall MD;  Location: [facility] OR    HERNIA REPAIR      right inguinal hernia    INJECT EPIDURAL THORACIC Bilateral 12/12/2024    Procedure: INJECTION, SPINE,  THORACIC, EPIDURAL (T9/10 vs T10/T11);  Surgeon: Fabi Mejia MD;  Location: [facility] OR    INJECT EPIDURAL TRANSFORAMINAL Bilateral 11/1/2024    Procedure: Bilateral L5-S1 transforaminal epidural steroid injection;  Surgeon: Nicola Verma MD;  Location: [facility] OR    LAMINECTOMY LUMBAR POSTERIOR MICROSCOPIC ONE LEVEL  4/9/2013    Procedure: LAMINECTOMY LUMBAR POSTERIOR MICROSCOPIC ONE LEVEL;  Right Lumbar 3-4 Micro Laminectomy & Foraminotomy;  Surgeon: Nhan Marshall MD;  Location: [facility] OR    LAMINECTOMY THORACIC ONE LEVEL N/A 9/27/2023    Procedure: Thoracic 11 to thoracic 12 laminectomy;  Surgeon: Zane Ontiveros MD;  Location: [facility]    OPTICAL TRACKING SYSTEM FUSION SPINE POSTERIOR LUMBAR PERCUTANEOUS TWO LEVELS N/A 11/16/2016    Procedure: OPTICAL TRACKING SYSTEM FUSION SPINE POSTERIOR LUMBAR PERCUTANEOUS TWO LEVELS;  Surgeon: Nhan Marshall MD;  Location:  OR    OPTICAL TRACKING SYSTEM FUSION SPINE POSTERIOR LUMBAR THREE+ LEVELS Right 9/5/2019    Procedure: POSTERIOR SEGMENTAL INSTRUMENTATION L2-4, RIGHT LUMBAR 2-3 DISCECTOMY AND TRANSFORAMINAL INTERBODY FUSION, REDO DECOMPRESSION RIGHT L3-4, POSTERIOR ARTHRODESIS L2-4;  Surgeon: Nhan Marshall MD;  Location: [facility] OR    OPTICAL TRACKING SYSTEM FUSION SPINE POSTERIOR LUMBAR THREE+ LEVELS N/A 12/21/2023    Procedure: Insertion Thoracic 10 to Lumbar 1 and revision of Lumbar 2 to Sacral 1 instrumentation. Dual Pelvic Instrumentation. Revision fusion Lumbar 5 to Sacral 1 using allograft chips. Arthrodesis Thoracic 10 to Lumbar 2 using allograft cancellous chips;  Surgeon: Nhan Marshall MD;  Location:  OR    ORTHOPEDIC SURGERY      left ankle, right finger     Family History   Problem Relation Age of Onset    Asthma Mother     Hypertension Mother     Arthritis Mother          Objective  There were no vitals taken for this visit.    General: healthy, alert and in no acute distress.    HEENT: no scleral icterus or conjunctival erythema.    Skin: no suspicious lesions or rash. No jaundice.   CV: regular rhythm by palpation, 2+ distal pulses.  Resp: normal respiratory effort without conversational dyspnea.   Psych: normal mood and affect.    Gait: antalgic, appropriate coordination and balance while using a single-point cane, slow margaret.    Neuro:        - Sensation to light touch:    - Diminished sensation in the bilateral medial knees and distal to the knees. Otherwise SILT in all other nerve distributions.        - MSR: Physiologic and symmetric       - Special tests:   - Slump/SLR: Positive bilaterally    MSK - Knee:       - Inspection:    -Mild effusions bilaterally without surrounding erythema, ecchymosis, lesion.        - ROM:    - Full AROM/PROM       - Palpation:    - TTP at the medial joint lines, quadriceps tendons, medial patellar borders.   - No warmth  - NTTP elsewhere.        - Strength:  (*antalgic)   - Knee Flexion  5-*   - Knee Extension 5-*         - Special tests:        - Lachman:  Neg        - A/P drawer:  Neg        - Pivot shift:  Neg    - Amparo:  Neg     - Varus stress:  Neg for laxity or pain     - Valgus stress:  Neg for laxity or pain    - Patellar grind:  Neg    - Thessaly:  Neg    Procedures  Large Joint Injection/Arthocentesis: bilateral knee    Date/Time: 1/7/2025 11:38 AM    Performed by: Scar Day DO  Authorized by: Scar Day DO    Indications:  Pain  Needle Size:  22 G  Guidance: landmark guided    Approach:  Anterolateral  Location:  Knee  Laterality:  Bilateral      Medications (Right):  40 mg triamcinolone 40 MG/ML; 2 mL lidocaine 1 %; 2 mL BUPivacaine 0.5 %  Medications (Left):  40 mg triamcinolone 40 MG/ML; 2 mL lidocaine 1 %; 2 mL BUPivacaine 0.5 %  Outcome:  Tolerated well, no immediate complications  Procedure discussed: discussed risks, benefits, and alternatives    Consent Given by:  Patient  Prep: patient was prepped and draped in usual sterile fashion            PROCEDURE  Intraarticular  Knee Joint Injection - Bilateral  The patient was informed of the risks and benefits of the procedure and alternatives were discussed. A written consent was signed by the patient.   The injection site was prepped with chlorhexidine in sterile fashion.   An injectate solution containing 2 mL of 1% lidocaine, 2 mL of 0.5% bupivacaine, and 1 mL of Kenalog (40 mg/mL) was drawn up into a 5 mL syringe.  Injection was performed using sterile technique.  A 1.5-inch 22-gauge needle was used to enter the knee joint using a lateral infrapatellar approach and injectate was injected successfully. After the injection, the site was cleaned and a bandage applied. The patient tolerated the procedure well without complications.       Radiology  I independently reviewed the available relevant imaging in the chart with my interpretations as above in HPI.   - MRI R knee 5/28/2024 shows horizontal tearing of the medial and lateral menisci with involvement of a meniscal flap and posterior horn root ligament junction on the medial meniscus.  Also shows grade IV chondromalacia tricompartmental, worst in the patellofemoral and medial compartments.  He has some nonspecific edema at the superior half facet at pad and suprapatellar fat pad.  Also with a small joint effusion and Baker's cyst.  - MRI L knee 5/20/2024 shows horizontal tearing of the posterior horn of the medial meniscus and a very small parameniscal cyst.  There is grade III chondromalacia along the trochlear surface and medial compartment.  No other major findings.      Assessment  1. Primary osteoarthritis of both knees        Jaime Hunt is a pleasant 58 year old male that presents for follow-up of his acute on chronic bilateral knee pain.     Originally presented with complaints of bilateral anteromedial knee pain and instability frequently in the setting of bilateral radiculopathies with distal weakness and numbness after multiple extensive spinal fusion  surgeries.  He does have deep achy pain and frequent feelings of buckling in the knees.  TTP at the distal quadriceps tendons and suprapatellar area bilaterally, painful crepitus in the patellofemoral compartment.  Negative other testing at the knees at original visit.      I suspect a great majority of his knee pain is multifactorial in nature with contributions from chondromalacia patella, quadriceps tendinopathy, and radicular symptoms of neuropathic pain/paresthesias and weakness.    Based on his frequent instability and buckling episodes with organic knee pain and pathology that appears to separate from his radiculopathy, we discussed that it would be beneficial to evaluate the soft tissue integrity of the knees with advanced imaging (MRI).      MRIs of each knee was obtained, the left knee showed horizontal tearing of the posterior horn of the medial meniscus with a small parameniscal cyst and grade III chondromalacia primarily present in the patellofemoral and medial compartments.  Right knee showed more extensive internal derangement with horizontal tears of the medial and lateral menisci and the medial meniscus has a small flap near the posterior horn root ligament junction, this is in the setting of grade 4 Tricompartmental chondromalacia worst in the patellofemoral medial compartments.  There is also a small joint effusion and Baker's cyst with nonspecific edema in the Hoffa's fat pad and suprapatellar fat pad.    The information from the MRIs certainly lend itself to his organic knee pain and swelling and he may benefit from corticosteroid injection into the knee joint to help with swelling and pain.  Offered bilateral corticosteroid injections today and these injections were performed in clinic without complication, patient tolerated procedures well.  See him procedure note above for details.    His neuropathic symptoms such as itching, burning, numbness/tingling and paresthesias in the lower  extremities is likely not to improve from these corticosteroid injections, as there are likely contributions from his lumbar disease.  He is following up with neurosurgery to discuss treatment options for this.  Gabapentin increased dose has been helpful, recommended to continue.    Plan to follow-up for the knees as needed in the future.  Repeat corticosteroid injections could be considered in 3+ months.  Additionally treatment options could include formal physical therapy, knee bracing, other intra-articular knee joint injections such as viscosupplementation or PRP.      Update - 1/7/25:  Neema presents for follow-up of his bilateral knee pain secondary to primary osteoarthritis and medial meniscus tears.  He still feels a good amount of instability and buckling at the knees and neurogenic pain stemming from his bilateral lumbar radiculopathies and PPN.  Currently being worked up with neurology and neurosurgery which is including lab work, subsequent and EMG and ultrasound evaluations to assess for demyelinating conditions.  In regards to his knees, he is interested in repeat corticosteroid injections for additional relief.  Moderate response to viscosupplementation injections, no side effects.  He is also interested in discussing additional treatment options for the knees due to the neuropathic pain.  In addition to neurology and neurosurgery recommendations, genicular nerve block could be considered for the left knee and offered a referral.  He will consider this after his upcoming carpal tunnel and cubital tunnel surgery next week.  Bilateral knee joint corticosteroid injections given today with no complications.  Patient tolerated procedures well.  Requested refills of gabapentin and lidocaine patches, which were provided today.      Scar Day DO, TONYM  [facility]  [facility] - Department of Orthopedic Surgery       Disclaimer:  This note was prepared and  written using Dragon Medical dictation software. As a result, there may be errors in the script that have gone undetected. Please consider this when interpreting the information in this note.     35 minutes were spent on the date of the encounter doing chart review, history and exam, documentation and further activities per the note.

## 2025-01-07 ENCOUNTER — OFFICE VISIT (OUTPATIENT)
Dept: ORTHOPEDICS | Facility: CLINIC | Age: 60
End: 2025-01-07
Payer: OTHER MISCELLANEOUS

## 2025-01-07 DIAGNOSIS — M17.0 PRIMARY OSTEOARTHRITIS OF BOTH KNEES: Primary | ICD-10-CM

## 2025-01-07 DIAGNOSIS — M17.0 PRIMARY OSTEOARTHRITIS OF KNEES, BILATERAL: ICD-10-CM

## 2025-01-07 PROCEDURE — 99214 OFFICE O/P EST MOD 30 MIN: CPT | Mod: 25 | Performed by: STUDENT IN AN ORGANIZED HEALTH CARE EDUCATION/TRAINING PROGRAM

## 2025-01-07 PROCEDURE — 20610 DRAIN/INJ JOINT/BURSA W/O US: CPT | Mod: 50 | Performed by: STUDENT IN AN ORGANIZED HEALTH CARE EDUCATION/TRAINING PROGRAM

## 2025-01-07 RX ORDER — BUPIVACAINE HYDROCHLORIDE 5 MG/ML
2 INJECTION, SOLUTION PERINEURAL
Status: COMPLETED | OUTPATIENT
Start: 2025-01-07 | End: 2025-01-07

## 2025-01-07 RX ORDER — LIDOCAINE HYDROCHLORIDE 10 MG/ML
2 INJECTION, SOLUTION INFILTRATION; PERINEURAL
Status: COMPLETED | OUTPATIENT
Start: 2025-01-07 | End: 2025-01-07

## 2025-01-07 RX ORDER — TRIAMCINOLONE ACETONIDE 40 MG/ML
40 INJECTION, SUSPENSION INTRA-ARTICULAR; INTRAMUSCULAR
Status: COMPLETED | OUTPATIENT
Start: 2025-01-07 | End: 2025-01-07

## 2025-01-07 RX ORDER — GABAPENTIN 300 MG/1
CAPSULE ORAL
Qty: 90 CAPSULE | Refills: 1 | Status: SHIPPED | OUTPATIENT
Start: 2025-01-07

## 2025-01-07 RX ORDER — LIDOCAINE 50 MG/G
1 PATCH TOPICAL EVERY 24 HOURS
Qty: 30 PATCH | Refills: 2 | Status: SHIPPED | OUTPATIENT
Start: 2025-01-07 | End: 2025-02-06

## 2025-01-07 RX ADMIN — BUPIVACAINE HYDROCHLORIDE 2 ML: 5 INJECTION, SOLUTION PERINEURAL at 11:38

## 2025-01-07 RX ADMIN — LIDOCAINE HYDROCHLORIDE 2 ML: 10 INJECTION, SOLUTION INFILTRATION; PERINEURAL at 11:38

## 2025-01-07 RX ADMIN — TRIAMCINOLONE ACETONIDE 40 MG: 40 INJECTION, SUSPENSION INTRA-ARTICULAR; INTRAMUSCULAR at 11:38

## 2025-01-07 NOTE — LETTER
2025      Neema Hunt  [address] [facility] [address]      Dear Colleague,    Thank you for referring your patient, Neema Hunt, to the [facility]. Please see a copy of my visit note below.    Neema Hunt  :  1965  DOS: 2025  MRN: [id number]  PCP: Cornel Mittal    Sports Medicine Clinic Visit    Interim History - 2025  - Last seen on 2024 for bilateral SynviscOne knee injections which offered moderate relief in symptoms. Affected his stability and comfort more than the pain itself. Allowed for a few months of relief. Left knee is worse then right. Is having burning, itching, tingling, stabbing sensation around the left knee in addition to deep joint aching. Tried Voltaren with differing results. Usually takes Aleve but is off of it due to upcoming carpal tunnel surgery on 2024.  Interested in repeat bilateral knee joint corticosteroid injections today.      Interim History - 2024  - Last seen on 2024 for bilateral knee instability, quadriceps tendonitis and bilateral chondromalacia patella.     - 2024 right knee MRI   1. Horizontal oblique tear of the medial and lateral menisci. There is a small medial meniscal flap adjacent to the posterior horn root ligament junction.   2. Tricompartmental chondromalacia including areas of grade 4 changes,  greatest in the medial and lateral patellar facets and medial/lateral compartments.   3. Intact visualized quadriceps tendon.  4. Edema deep aspect of Hoffa's fat pad, query Hoffa's impingement disease.  5. Small joint effusion and Baker's cyst with internal debris.       - 2024 left knee MRI   1. Horizontal tear of the posterior horn of the medial meniscus, with  tiny parameniscal cyst.  2. Grade III chondromalacia along the central trochlear surface with  diffuse moderate grade cartilage fissuring in the medial femorotibial  joint compartment.  3. The  anterior and posterior cruciate ligaments, medial and lateral  supporting structures, and lateral meniscus are intact.     - Since the last visit, he notes no change in symptoms. Presents to discuss bilateral knee MRIs.   - No interim injury.       Initial Visit: April 26, 2024  HPI  Neema Hunt is a 58 year old male who is seen in consultation at the request of  Nhan Marshall M.D. presenting with bilateral knee pain    - Mechanism of Injury:    - No inciting injury. Chronic symptoms that worsened in September 2023 after a fall onto his anterior knees  - Pertinent history and prior evaluations:    - XR B/L knees 10/10/2023 shows mild medial compartment narrowing on the right, no acute fractures, dislocations, or effusion bilaterally.    - Hx of Right lower extremity radiculopathy and lumbar spinal stenosis s/p lumbar fusion.    - 12/21/2023 with Dr. Nhan Marshall in Neurosurgery: Insertion Thoracic 10 to Lumbar 1 and revision of Lumbar 2 to Sacral 1 instrumentation. Dual Pelvic Instrumentation. Revision fusion Lumbar 5 to Sacral 1 using allograft chips. Arthrodesis   Thoracic 10 to Lumbar 2 using allograft cancellous chips   - Subtalar fusion on the left in 2009    - Pain Character:    - Location:  bilateral anterior knees  - Character:   achy, burning, itching  - Duration:  7 months  - Course:  steady  - Endorses:    - itching pain with palpation, buckling of the knees. Numbness in the RLE for 5+ years, LLE numbness as well, both from knees down. Walks with a cane due to ankle and knee buckling. Feeling of hyperextension.  Radicular shooting pain bilaterally, mild swelling of the knee joints, ankle instability, weakness of the legs that has gotten worse since spinal surgeries.  - Denies:    - mechanical locking symptoms, large effusions, erythma, warmth, fever, chills  - Alleviating factors:    -  Slightly with physical therapy , rest, use of a cane  - Aggravating factors:    -  ambulation, wakes him up  in the middle of the night  - Other treatments tried:    - Physical therapy is helping. 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       Review of Systems  Musculoskeletal: as above  Remainder of review of systems is negative including constitutional, CV, pulmonary, GI, Skin and Neurologic except as noted in HPI or medical history.    Past Medical History:   Diagnosis Date     Arthritis      Back pain      Gastro-oesophageal reflux disease      Hypertension      Radiculopathy      Scoliosis      Past Surgical History:   Procedure Laterality Date     DAVINCI XI HERNIORRHAPHY INGUINAL Bilateral 7/26/2022    Procedure: Robotic repair of recurrent right inguinal hernia with placement of mesh, robotic repair of left inguinal hernia with placement of mesh;  Surgeon: Mary Morales MD;  Location: [facility] OR     DAVINCI XI HERNIORRHAPHY VENTRAL N/A 7/26/2022    Procedure: Robotic repair of umbilical hernia with placement of mesh;  Surgeon: Mary Morales MD;  Location: [facility] OR     DISCECTOMY LUMBAR POSTERIOR MICROSCOPIC ONE LEVEL Right 7/15/2020    Procedure: Right L5-S1 foraminotomy and microdiskectomy;  Surgeon: Nhan Marshall MD;  Location: [facility] OR     EXPLORE SPINE, REMOVE HARDWARE, COMBINED N/A 9/5/2019    Procedure: REMOVAL LUMBAR L3-5 INSTRUMENTATION;  Surgeon: Nhan Marshall MD;  Location: [facility] OR     FUSION SPINE ANTERIOR MINIMALLY INVASIVE TWO LEVELS N/A 11/16/2016    Procedure: FUSION SPINE ANTERIOR MINIMALLY INVASIVE TWO LEVELS;  Surgeon: Nhan Marshall MD;  Location: [facility] OR     FUSION, SPINE, LUMBAR, 1 LEVEL, POSTERIOR APPROACH, ROBOTIC-ASSISTED N/A 5/27/2021    Procedure: Lumbar 5-Sacral 1 posterior segemental instrumentation, bilateral decompression and transforaminal interbody fusion using local autograft and allograft cancellous chips. Revision of Lumbar 2-4 ian. Posterior  arthrodesis Lumbar 5-Sacral 1 using  local autograft and allograft cancellous chips;  Surgeon: Nhan Marshall MD;  Location: [facility] OR     HERNIA REPAIR      right inguinal hernia     INJECT EPIDURAL THORACIC Bilateral 12/12/2024    Procedure: INJECTION, SPINE, THORACIC, EPIDURAL (T9/10 vs T10/T11);  Surgeon: Fabi Mejia MD;  Location: [facility] OR     INJECT EPIDURAL TRANSFORAMINAL Bilateral 11/1/2024    Procedure: Bilateral L5-S1 transforaminal epidural steroid injection;  Surgeon: Nicola Verma MD;  Location: [facility] OR     LAMINECTOMY LUMBAR POSTERIOR MICROSCOPIC ONE LEVEL  4/9/2013    Procedure: LAMINECTOMY LUMBAR POSTERIOR MICROSCOPIC ONE LEVEL;  Right Lumbar 3-4 Micro Laminectomy & Foraminotomy;  Surgeon: Nhan Marshall MD;  Location: [facility] OR     LAMINECTOMY THORACIC ONE LEVEL N/A 9/27/2023    Procedure: Thoracic 11 to thoracic 12 laminectomy;  Surgeon: Zane Ontiveros MD;  Location:  OR     OPTICAL TRACKING SYSTEM FUSION SPINE POSTERIOR LUMBAR PERCUTANEOUS TWO LEVELS N/A 11/16/2016    Procedure: OPTICAL TRACKING SYSTEM FUSION SPINE POSTERIOR LUMBAR PERCUTANEOUS TWO LEVELS;  Surgeon: Nhan Marshall MD;  Location:  OR     OPTICAL TRACKING SYSTEM FUSION SPINE POSTERIOR LUMBAR THREE+ LEVELS Right 9/5/2019    Procedure: POSTERIOR SEGMENTAL INSTRUMENTATION L2-4, RIGHT LUMBAR 2-3 DISCECTOMY AND TRANSFORAMINAL INTERBODY FUSION, REDO DECOMPRESSION RIGHT L3-4, POSTERIOR ARTHRODESIS L2-4;  Surgeon: Nhan Marshall MD;  Location:  OR     OPTICAL TRACKING SYSTEM FUSION SPINE POSTERIOR LUMBAR THREE+ LEVELS N/A 12/21/2023    Procedure: Insertion Thoracic 10 to Lumbar 1 and revision of Lumbar 2 to Sacral 1 instrumentation. Dual Pelvic Instrumentation. Revision fusion Lumbar 5 to Sacral 1 using allograft chips. Arthrodesis Thoracic 10 to Lumbar 2 using allograft cancellous chips;  Surgeon: Nhan Marshall MD;  Location:  OR     ORTHOPEDIC SURGERY      left ankle, right finger     Family History    Problem Relation Age of Onset     Asthma Mother      Hypertension Mother      Arthritis Mother          Objective  There were no vitals taken for this visit.    General: healthy, alert and in no acute distress.    HEENT: no scleral icterus or conjunctival erythema.   Skin: no suspicious lesions or rash. No jaundice.   CV: regular rhythm by palpation, 2+ distal pulses.  Resp: normal respiratory effort without conversational dyspnea.   Psych: normal mood and affect.    Gait: antalgic, appropriate coordination and balance while using a single-point cane, slow margaret.    Neuro:        - Sensation to light touch:    - Diminished sensation in the bilateral medial knees and distal to the knees. Otherwise SILT in all other nerve distributions.        - MSR: Physiologic and symmetric       - Special tests:   - Slump/SLR: Positive bilaterally    MSK - Knee:       - Inspection:    -Mild effusions bilaterally without surrounding erythema, ecchymosis, lesion.        - ROM:    - Full AROM/PROM       - Palpation:    - TTP at the medial joint lines, quadriceps tendons, medial patellar borders.   - No warmth  - NTTP elsewhere.        - Strength:  (*antalgic)   - Knee Flexion  5-*   - Knee Extension 5-*         - Special tests:        - Lachman:  Neg        - A/P drawer:  Neg        - Pivot shift:  Neg    - Amparo:  Neg     - Varus stress:  Neg for laxity or pain     - Valgus stress:  Neg for laxity or pain    - Patellar grind:  Neg    - Thessaly:  Neg    Procedures  Large Joint Injection/Arthocentesis: bilateral knee    Date/Time: 1/7/2025 11:38 AM    Performed by: Scar Day DO  Authorized by: Scar Day DO    Indications:  Pain  Needle Size:  22 G  Guidance: landmark guided    Approach:  Anterolateral  Location:  Knee  Laterality:  Bilateral      Medications (Right):  40 mg triamcinolone 40 MG/ML; 2 mL lidocaine 1 %; 2 mL BUPivacaine 0.5 %  Medications (Left):  40 mg triamcinolone 40 MG/ML; 2 mL lidocaine 1 %; 2 mL  BUPivacaine 0.5 %  Outcome:  Tolerated well, no immediate complications  Procedure discussed: discussed risks, benefits, and alternatives    Consent Given by:  Patient  Prep: patient was prepped and draped in usual sterile fashion            PROCEDURE  Intraarticular Knee Joint Injection - Bilateral  The patient was informed of the risks and benefits of the procedure and alternatives were discussed. A written consent was signed by the patient.   The injection site was prepped with chlorhexidine in sterile fashion.   An injectate solution containing 2 mL of 1% lidocaine, 2 mL of 0.5% bupivacaine, and 1 mL of Kenalog (40 mg/mL) was drawn up into a 5 mL syringe.  Injection was performed using sterile technique.  A 1.5-inch 22-gauge needle was used to enter the knee joint using a lateral infrapatellar approach and injectate was injected successfully. After the injection, the site was cleaned and a bandage applied. The patient tolerated the procedure well without complications.       Radiology  I independently reviewed the available relevant imaging in the chart with my interpretations as above in HPI.   - MRI R knee 5/28/2024 shows horizontal tearing of the medial and lateral menisci with involvement of a meniscal flap and posterior horn root ligament junction on the medial meniscus.  Also shows grade IV chondromalacia tricompartmental, worst in the patellofemoral and medial compartments.  He has some nonspecific edema at the superior half facet at pad and suprapatellar fat pad.  Also with a small joint effusion and Baker's cyst.  - MRI L knee 5/20/2024 shows horizontal tearing of the posterior horn of the medial meniscus and a very small parameniscal cyst.  There is grade III chondromalacia along the trochlear surface and medial compartment.  No other major findings.      Assessment  1. Primary osteoarthritis of both knees        Plan  Neema Hunt is a pleasant 58 year old male that presents for follow-up of his  acute on chronic bilateral knee pain.     Originally presented with complaints of bilateral anteromedial knee pain and instability frequently in the setting of bilateral radiculopathies with distal weakness and numbness after multiple extensive spinal fusion surgeries.  He does have deep achy pain and frequent feelings of buckling in the knees.  TTP at the distal quadriceps tendons and suprapatellar area bilaterally, painful crepitus in the patellofemoral compartment.  Negative other testing at the knees at original visit.      I suspect a great majority of his knee pain is multifactorial in nature with contributions from chondromalacia patella, quadriceps tendinopathy, and radicular symptoms of neuropathic pain/paresthesias and weakness.    Based on his frequent instability and buckling episodes with organic knee pain and pathology that appears to separate from his radiculopathy, we discussed that it would be beneficial to evaluate the soft tissue integrity of the knees with advanced imaging (MRI).      MRIs of each knee was obtained, the left knee showed horizontal tearing of the posterior horn of the medial meniscus with a small parameniscal cyst and grade III chondromalacia primarily present in the patellofemoral and medial compartments.  Right knee showed more extensive internal derangement with horizontal tears of the medial and lateral menisci and the medial meniscus has a small flap near the posterior horn root ligament junction, this is in the setting of grade 4 Tricompartmental chondromalacia worst in the patellofemoral medial compartments.  There is also a small joint effusion and Baker's cyst with nonspecific edema in the Hoffa's fat pad and suprapatellar fat pad.    The information from the MRIs certainly lend itself to his organic knee pain and swelling and he may benefit from corticosteroid injection into the knee joint to help with swelling and pain.  Offered bilateral corticosteroid injections today  and these injections were performed in clinic without complication, patient tolerated procedures well.  See him procedure note above for details.    His neuropathic symptoms such as itching, burning, numbness/tingling and paresthesias in the lower extremities is likely not to improve from these corticosteroid injections, as there are likely contributions from his lumbar disease.  He is following up with neurosurgery to discuss treatment options for this.  Gabapentin increased dose has been helpful, recommended to continue.    Plan to follow-up for the knees as needed in the future.  Repeat corticosteroid injections could be considered in 3+ months.  Additionally treatment options could include formal physical therapy, knee bracing, other intra-articular knee joint injections such as viscosupplementation or PRP.      Update - 1/7/25:  Neema presents for follow-up of his bilateral knee pain secondary to primary osteoarthritis and medial meniscus tears.  He still feels a good amount of instability and buckling at the knees and neurogenic pain stemming from his bilateral lumbar radiculopathies and PPN.  Currently being worked up with neurology and neurosurgery which is including lab work, subsequent and EMG and ultrasound evaluations to assess for demyelinating conditions.  In regards to his knees, he is interested in repeat corticosteroid injections for additional relief.  Moderate response to viscosupplementation injections, no side effects.  He is also interested in discussing additional treatment options for the knees due to the neuropathic pain.  In addition to neurology and neurosurgery recommendations, genicular nerve block could be considered for the left knee and offered a referral.  He will consider this after his upcoming carpal tunnel and cubital tunnel surgery next week.  Bilateral knee joint corticosteroid injections given today with no complications.  Patient tolerated procedures well.  Requested refills  of gabapentin and lidocaine patches, which were provided today.      Scar Day DO, CAQSM  [facility] - Department of Orthopedic Surgery       Disclaimer:  This note was prepared and written using Dragon Medical dictation software. As a result, there may be errors in the script that have gone undetected. Please consider this when interpreting the information in this note.     35 minutes were spent on the date of the encounter doing chart review, history and exam, documentation and further activities per the note.        Again, thank you for allowing me to participate in the care of your patient.        Sincerely,        Scar Day DO    Electronically signed

## 2025-01-08 ENCOUNTER — TELEPHONE (OUTPATIENT)
Dept: ORTHOPEDICS | Facility: CLINIC | Age: 60
End: 2025-01-08
Payer: COMMERCIAL

## 2025-01-08 NOTE — TELEPHONE ENCOUNTER
lidocaine (LIDODERM) 5 % patch     Prior Authorization Retail Medication Request    Medication/Dose: lidocaine (LIDODERM) 5 % patch  Diagnosis and ICD code (if different than what is on RX):    New/renewal/insurance change PA/secondary ins. PA:  Previously Tried and Failed:    Rationale:      Insurance   Primary:   Insurance ID:      Secondary (if applicable):  Insurance ID:      Pharmacy Information (if different than what is on RX)  Name:    Phone:    Fax:    Clinic Information  Preferred routing pool for dept communication:

## 2025-01-09 NOTE — TELEPHONE ENCOUNTER
Retail Pharmacy Prior Authorization Team   Phone: [phone]    PA Initiation    Medication: LIDOCAINE 5 % EX PTCH  Insurance Company: [employer] - Phone [phone] Fax [phone]  Pharmacy Filling the Rx: [id number] [address] [facility]  Filling Pharmacy Phone: [phone]  Filling Pharmacy Fax:    Start Date: 1/9/2025    [id number]

## 2025-01-11 NOTE — TELEPHONE ENCOUNTER
PRIOR AUTHORIZATION DENIED    Medication: LIDOCAINE 5 % EX PTCH  Insurance Company: [email or web address] - Phone [phone] Fax [phone]  Denial Date: 1/10/2025  Denial Reason(s):             Appeal Information:         Patient Notified: No

## 2025-01-12 ENCOUNTER — ANESTHESIA EVENT (OUTPATIENT)
Dept: SURGERY | Facility: CLINIC | Age: 60
End: 2025-01-12
Payer: COMMERCIAL

## 2025-01-12 NOTE — ANESTHESIA PREPROCEDURE EVALUATION
Anesthesia Pre-Procedure Evaluation    Patient: Neema Hunt   MRN: [id number] : 1965        Procedure : Procedure(s):  Right open carpal tunnel release  Right open ulnar nerve release          Past Medical History:   Diagnosis Date    Arthritis     Back pain     Dermatophytosis of nail     Eczema     Gastro-oesophageal reflux disease     Herpes     Hypertension     Insomnia     Lactose intolerance     Lumbar disc disease     Radiculopathy     Scoliosis     Spermatocele       Past Surgical History:   Procedure Laterality Date    5TH FINGER SURGERY Right     ANKLE SURGERY Left     DAVINCI XI HERNIORRHAPHY INGUINAL Bilateral 2022    Procedure: Robotic repair of recurrent right inguinal hernia with placement of mesh, robotic repair of left inguinal hernia with placement of mesh;  Surgeon: Mary Morales MD;  Location: [facility]    DAVINCI XI HERNIORRHAPHY VENTRAL N/A 2022    Procedure: Robotic repair of umbilical hernia with placement of mesh;  Surgeon: Mary Morales MD;  Location: [facility]    DISCECTOMY LUMBAR POSTERIOR MICROSCOPIC ONE LEVEL Right 07/15/2020    Procedure: Right L5-S1 foraminotomy and microdiskectomy;  Surgeon: Nhan Marshall MD;  Location: [facility]    EXPLORE SPINE, REMOVE HARDWARE, COMBINED N/A 2019    Procedure: REMOVAL LUMBAR L3-5 INSTRUMENTATION;  Surgeon: Nhan Marshall MD;  Location: [facility]    EXTENSION OF LUMBAR FUSION FROM L5-S1 W/ REVISION OF PEDICLE RODS FROM L2-S1      FOOT BUNIONECTOMY Left     FUSION SPINE ANTERIOR MINIMALLY INVASIVE TWO LEVELS N/A 2016    Procedure: FUSION SPINE ANTERIOR MINIMALLY INVASIVE TWO LEVELS;  Surgeon: Nhan Marshall MD;  Location: [facility]    FUSION, SPINE, LUMBAR, 1 LEVEL, POSTERIOR APPROACH, ROBOTIC-ASSISTED N/A 2021    Procedure: Lumbar 5-Sacral 1 posterior segemental instrumentation, bilateral decompression and transforaminal interbody fusion using local autograft and allograft cancellous chips. Revision  of Lumbar 2-4 ian. Posterior arthrodesis Lumbar 5-Sacral 1 using  local autograft and allograft cancellous chips;  Surgeon: Nhan Marshall MD;  Location: [facility]    HERNIA REPAIR Right     INJECT EPIDURAL THORACIC Bilateral 12/12/2024    Procedure: INJECTION, SPINE, THORACIC, EPIDURAL (T9/10 vs T10/T11);  Surgeon: Fabi Mejia MD;  Location: [facility] OR    INJECT EPIDURAL TRANSFORAMINAL Bilateral 11/01/2024    Procedure: Bilateral L5-S1 transforaminal epidural steroid injection;  Surgeon: Nicola Verma MD;  Location: [facility]    INSERTION OF THORACIC 10 TO LUMBAR 1 AND REVISION OF LUMBAR 2 TO SACRAL1 INSTRUMENTATION  12/21/2023    L3-4 DECOMPRESSION      L3-L4, L4-L5 FUSION W/ SCREWS      LAMINECTOMY LUMBAR POSTERIOR MICROSCOPIC ONE LEVEL  04/09/2013    Procedure: LAMINECTOMY LUMBAR POSTERIOR MICROSCOPIC ONE LEVEL;  Right Lumbar 3-4 Micro Laminectomy & Foraminotomy;  Surgeon: Nhan Marshall MD;  Location: [facility] OR    LAMINECTOMY THORACIC ONE LEVEL N/A 09/27/2023    Procedure: Thoracic 11 to thoracic 12 laminectomy;  Surgeon: Zane Ontiveros MD;  Location:  OR    OPTICAL TRACKING SYSTEM FUSION SPINE POSTERIOR LUMBAR PERCUTANEOUS TWO LEVELS N/A 11/16/2016    Procedure: OPTICAL TRACKING SYSTEM FUSION SPINE POSTERIOR LUMBAR PERCUTANEOUS TWO LEVELS;  Surgeon: Nhan Marshall MD;  [facility]: [facility]    OPTICAL TRACKING SYSTEM FUSION SPINE POSTERIOR LUMBAR THREE+ LEVELS Right 09/05/2019    Procedure: POSTERIOR SEGMENTAL INSTRUMENTATION L2-4, RIGHT LUMBAR 2-3 DISCECTOMY AND TRANSFORAMINAL INTERBODY FUSION, REDO DECOMPRESSION RIGHT L3-4, POSTERIOR ARTHRODESIS L2-4;  Surgeon: Nhan Marshall MD;  Location: [facility]    OPTICAL TRACKING SYSTEM FUSION SPINE POSTERIOR LUMBAR THREE+ LEVELS N/A 12/21/2023    Procedure: Insertion Thoracic 10 to Lumbar 1 and revision of Lumbar 2 to Sacral 1 instrumentation. Dual Pelvic Instrumentation. Revision fusion Lumbar 5 to Sacral 1 using allograft chips. Arthrodesis Thoracic 10  to Lumbar 2 using allograft cancellous chips;  Surgeon: Nhan Marshall MD;  Location: [facility]    R L5-S1 FORAMINOTOMY  & MICRODISCECTOMY      UMBILICAL HERNIA AND RIGHT INGUINAL HERNIA REPAIR        Allergies   Allergen Reactions    Lactose Diarrhea and GI Disturbance      Social History     Tobacco Use    Smoking status: Never    Smokeless tobacco: Never   Substance Use Topics    Alcohol use: Yes     Comment: 3-4 drinks/week      Wt Readings from Last 1 Encounters:   12/12/24 93 kg (205 lb)        HGB - 12.7  K 4.5  EKG - SB, LAD    Anesthesia Evaluation   Pt has had prior anesthetic.     No history of anesthetic complications       ROS/MED HX  ENT/Pulmonary:  - neg pulmonary ROS  (-) sleep apnea and recent URI   Neurologic:     (+)    peripheral neuropathy, - hands and feet.                        (-) no seizures, no CVA and migraines   Cardiovascular:     (+)  hypertension- -   -  - -                                   (-) CHF, orthopnea/PND and arrhythmias   METS/Exercise Tolerance:     Hematologic:  - neg hematologic  ROS     Musculoskeletal: Comment: Back pain - s/p multiple lumbar spine surgeries   Neck pain  scoliosis  (+)  arthritis,             GI/Hepatic:     (+) GERD,                   Renal/Genitourinary:  - neg Renal ROS     Endo:  - neg endo ROS  (-) Type II DM and thyroid disease   Psychiatric/Substance Use:  - neg psychiatric ROS     Infectious Disease:  - neg infectious disease ROS     Malignancy:       Other:            Physical Exam    Airway        Mallampati: II   TM distance: > 3 FB   Neck ROM: full   Mouth opening: > 3 cm    Respiratory Devices and Support         Dental           Cardiovascular   cardiovascular exam normal       Rhythm and rate: regular and normal     Pulmonary   pulmonary exam normal        breath sounds clear to auscultation           OUTSIDE LABS:  CBC:   Lab Results   Component Value Date    WBC 9.5 12/25/2023    WBC 10.3 12/24/2023    HGB 8.7 (L) 12/25/2023    HGB  8.6 (L) 12/24/2023    HCT 24.7 (L) 12/25/2023    HCT 25.1 (L) 12/24/2023     12/25/2023     (L) 12/24/2023     BMP:   Lab Results   Component Value Date     (L) 12/24/2023     12/21/2023    POTASSIUM 3.9 12/24/2023    POTASSIUM 4.1 12/21/2023    CHLORIDE 94 (L) 12/24/2023    CHLORIDE 104 12/21/2023    CO2 20 (L) 12/24/2023    CO2 27 12/21/2023    BUN 18.9 12/24/2023    BUN 14.1 12/21/2023    CR 1.15 12/24/2023    CR 0.73 12/21/2023     (H) 12/24/2023     (H) 12/23/2023     COAGS:   Lab Results   Component Value Date    PTT 28 12/21/2023    INR 1.02 12/21/2023     POC:   Lab Results   Component Value Date    BGM 94 05/29/2021     HEPATIC:   Lab Results   Component Value Date    ALBUMIN 4.8 12/04/2024    PROTTOTAL 7.9 12/04/2024    ALT 16 12/04/2024    AST 28 12/04/2024    ALKPHOS 55 12/04/2024    BILITOTAL 0.3 12/04/2024     OTHER:   Lab Results   Component Value Date    LACT 0.8 09/11/2019    A1C 5.1 12/04/2024    JASON 8.5 (L) 12/24/2023    LIPASE 108 09/11/2019       Anesthesia Plan    ASA Status:  2    NPO Status:  NPO Appropriate    Anesthesia Type: General.     - Airway: LMA   Induction: Propofol.   Maintenance: Balanced.        Consents    Anesthesia Plan(s) and associated risks, benefits, and realistic alternatives discussed. Questions answered and patient/representative(s) expressed understanding.     - Discussed:     - Discussed with:  Patient            Postoperative Care    Pain management: Multi-modal analgesia.   PONV prophylaxis: Ondansetron (or other 5HT-3), Dexamethasone or Solumedrol     Comments:    Other Comments: Please bring patient to room in a wheelchair           Masood Peres MD    I have reviewed the pertinent notes and labs in the chart from the past 30 days and (re)examined the patient.  Any updates or changes from those notes are reflected in this note.

## 2025-01-13 ENCOUNTER — MYC MEDICAL ADVICE (OUTPATIENT)
Dept: ORTHOPEDICS | Facility: CLINIC | Age: 60
End: 2025-01-13

## 2025-01-13 ENCOUNTER — ANESTHESIA (OUTPATIENT)
Dept: SURGERY | Facility: CLINIC | Age: 60
End: 2025-01-13
Payer: COMMERCIAL

## 2025-01-13 ENCOUNTER — HOSPITAL ENCOUNTER (OUTPATIENT)
Facility: CLINIC | Age: 60
Discharge: HOME | End: 2025-01-13
Attending: NEUROLOGICAL SURGERY | Admitting: NEUROLOGICAL SURGERY
Payer: COMMERCIAL

## 2025-01-13 VITALS
TEMPERATURE: 97 F | DIASTOLIC BLOOD PRESSURE: 64 MMHG | HEIGHT: 70 IN | HEART RATE: 63 BPM | SYSTOLIC BLOOD PRESSURE: 115 MMHG | BODY MASS INDEX: 29.56 KG/M2 | RESPIRATION RATE: 16 BRPM | OXYGEN SATURATION: 99 % | WEIGHT: 206.5 LBS

## 2025-01-13 DIAGNOSIS — G56.01 CARPAL TUNNEL SYNDROME OF RIGHT WRIST: Primary | ICD-10-CM

## 2025-01-13 DIAGNOSIS — G56.21 ULNAR NEUROPATHY OF RIGHT UPPER EXTREMITY: ICD-10-CM

## 2025-01-13 PROCEDURE — 64718 REVISE ULNAR NERVE AT ELBOW: CPT | Mod: RT | Performed by: NEUROLOGICAL SURGERY

## 2025-01-13 PROCEDURE — 710N000012 HC RECOVERY PHASE 2, PER MINUTE: Performed by: NEUROLOGICAL SURGERY

## 2025-01-13 PROCEDURE — 250N000025 HC SEVOFLURANE, PER MIN: Performed by: NEUROLOGICAL SURGERY

## 2025-01-13 PROCEDURE — 370N000017 HC ANESTHESIA TECHNICAL FEE, PER MIN: Performed by: NEUROLOGICAL SURGERY

## 2025-01-13 PROCEDURE — 999N000141 HC STATISTIC PRE-PROCEDURE NURSING ASSESSMENT: Performed by: NEUROLOGICAL SURGERY

## 2025-01-13 PROCEDURE — 64721 CARPAL TUNNEL SURGERY: CPT | Mod: 51 | Performed by: NEUROLOGICAL SURGERY

## 2025-01-13 PROCEDURE — 64718 REVISE ULNAR NERVE AT ELBOW: CPT | Mod: AS | Performed by: PHYSICIAN ASSISTANT

## 2025-01-13 PROCEDURE — 258N000003 HC RX IP 258 OP 636: Performed by: REGISTERED NURSE

## 2025-01-13 PROCEDURE — 360N000076 HC SURGERY LEVEL 3, PER MIN: Performed by: NEUROLOGICAL SURGERY

## 2025-01-13 PROCEDURE — 272N000001 HC OR GENERAL SUPPLY STERILE: Performed by: NEUROLOGICAL SURGERY

## 2025-01-13 PROCEDURE — 710N000009 HC RECOVERY PHASE 1, LEVEL 1, PER MIN: Performed by: NEUROLOGICAL SURGERY

## 2025-01-13 PROCEDURE — 250N000011 HC RX IP 250 OP 636: Performed by: REGISTERED NURSE

## 2025-01-13 PROCEDURE — 250N000009 HC RX 250: Performed by: NEUROLOGICAL SURGERY

## 2025-01-13 PROCEDURE — 250N000011 HC RX IP 250 OP 636: Performed by: PHYSICIAN ASSISTANT

## 2025-01-13 PROCEDURE — 250N000013 HC RX MED GY IP 250 OP 250 PS 637: Performed by: PHYSICIAN ASSISTANT

## 2025-01-13 PROCEDURE — 250N000009 HC RX 250: Performed by: REGISTERED NURSE

## 2025-01-13 RX ORDER — NALOXONE HYDROCHLORIDE 0.4 MG/ML
0.1 INJECTION, SOLUTION INTRAMUSCULAR; INTRAVENOUS; SUBCUTANEOUS
Status: DISCONTINUED | OUTPATIENT
Start: 2025-01-13 | End: 2025-01-13 | Stop reason: HOSPADM

## 2025-01-13 RX ORDER — CEFAZOLIN SODIUM/WATER 2 G/20 ML
2 SYRINGE (ML) INTRAVENOUS
Status: COMPLETED | OUTPATIENT
Start: 2025-01-13 | End: 2025-01-13

## 2025-01-13 RX ORDER — DEXAMETHASONE SODIUM PHOSPHATE 4 MG/ML
4 INJECTION, SOLUTION INTRA-ARTICULAR; INTRALESIONAL; INTRAMUSCULAR; INTRAVENOUS; SOFT TISSUE
Status: DISCONTINUED | OUTPATIENT
Start: 2025-01-13 | End: 2025-01-13 | Stop reason: HOSPADM

## 2025-01-13 RX ORDER — SODIUM CHLORIDE, SODIUM LACTATE, POTASSIUM CHLORIDE, CALCIUM CHLORIDE 600; 310; 30; 20 MG/100ML; MG/100ML; MG/100ML; MG/100ML
INJECTION, SOLUTION INTRAVENOUS CONTINUOUS PRN
Status: DISCONTINUED | OUTPATIENT
Start: 2025-01-13 | End: 2025-01-13

## 2025-01-13 RX ORDER — HYDROMORPHONE HCL IN WATER/PF 6 MG/30 ML
0.2 PATIENT CONTROLLED ANALGESIA SYRINGE INTRAVENOUS
Status: DISCONTINUED | OUTPATIENT
Start: 2025-01-13 | End: 2025-01-13 | Stop reason: HOSPADM

## 2025-01-13 RX ORDER — FENTANYL CITRATE 50 UG/ML
25 INJECTION, SOLUTION INTRAMUSCULAR; INTRAVENOUS
Status: DISCONTINUED | OUTPATIENT
Start: 2025-01-13 | End: 2025-01-13 | Stop reason: HOSPADM

## 2025-01-13 RX ORDER — ONDANSETRON 4 MG/1
4 TABLET, ORALLY DISINTEGRATING ORAL EVERY 6 HOURS PRN
Status: DISCONTINUED | OUTPATIENT
Start: 2025-01-13 | End: 2025-01-13 | Stop reason: HOSPADM

## 2025-01-13 RX ORDER — BUPIVACAINE HYDROCHLORIDE AND EPINEPHRINE 2.5; 5 MG/ML; UG/ML
INJECTION, SOLUTION INFILTRATION; PERINEURAL PRN
Status: DISCONTINUED | OUTPATIENT
Start: 2025-01-13 | End: 2025-01-13 | Stop reason: HOSPADM

## 2025-01-13 RX ORDER — IBUPROFEN 600 MG/1
600 TABLET, FILM COATED ORAL EVERY 6 HOURS PRN
Status: DISCONTINUED | OUTPATIENT
Start: 2025-01-13 | End: 2025-01-13 | Stop reason: HOSPADM

## 2025-01-13 RX ORDER — EPHEDRINE SULFATE 50 MG/ML
INJECTION, SOLUTION INTRAMUSCULAR; INTRAVENOUS; SUBCUTANEOUS PRN
Status: DISCONTINUED | OUTPATIENT
Start: 2025-01-13 | End: 2025-01-13

## 2025-01-13 RX ORDER — PROPOFOL 10 MG/ML
INJECTION, EMULSION INTRAVENOUS PRN
Status: DISCONTINUED | OUTPATIENT
Start: 2025-01-13 | End: 2025-01-13

## 2025-01-13 RX ORDER — SODIUM CHLORIDE, SODIUM LACTATE, POTASSIUM CHLORIDE, CALCIUM CHLORIDE 600; 310; 30; 20 MG/100ML; MG/100ML; MG/100ML; MG/100ML
INJECTION, SOLUTION INTRAVENOUS CONTINUOUS
Status: DISCONTINUED | OUTPATIENT
Start: 2025-01-13 | End: 2025-01-13 | Stop reason: HOSPADM

## 2025-01-13 RX ORDER — FENTANYL CITRATE 50 UG/ML
INJECTION, SOLUTION INTRAMUSCULAR; INTRAVENOUS PRN
Status: DISCONTINUED | OUTPATIENT
Start: 2025-01-13 | End: 2025-01-13

## 2025-01-13 RX ORDER — HYDROMORPHONE HCL IN WATER/PF 6 MG/30 ML
0.4 PATIENT CONTROLLED ANALGESIA SYRINGE INTRAVENOUS
Status: DISCONTINUED | OUTPATIENT
Start: 2025-01-13 | End: 2025-01-13 | Stop reason: HOSPADM

## 2025-01-13 RX ORDER — OXYCODONE HYDROCHLORIDE 5 MG/1
5 TABLET ORAL EVERY 6 HOURS PRN
Qty: 40 TABLET | Refills: 0 | Status: SHIPPED | OUTPATIENT
Start: 2025-01-13

## 2025-01-13 RX ORDER — FENTANYL CITRATE 50 UG/ML
25 INJECTION, SOLUTION INTRAMUSCULAR; INTRAVENOUS EVERY 5 MIN PRN
Status: DISCONTINUED | OUTPATIENT
Start: 2025-01-13 | End: 2025-01-13 | Stop reason: HOSPADM

## 2025-01-13 RX ORDER — LIDOCAINE 40 MG/G
CREAM TOPICAL
Status: DISCONTINUED | OUTPATIENT
Start: 2025-01-13 | End: 2025-01-13 | Stop reason: HOSPADM

## 2025-01-13 RX ORDER — METHOCARBAMOL 500 MG/1
500 TABLET, FILM COATED ORAL 4 TIMES DAILY PRN
Qty: 40 TABLET | Refills: 0 | Status: SHIPPED | OUTPATIENT
Start: 2025-01-13

## 2025-01-13 RX ORDER — ONDANSETRON 4 MG/1
4 TABLET, ORALLY DISINTEGRATING ORAL EVERY 30 MIN PRN
Status: DISCONTINUED | OUTPATIENT
Start: 2025-01-13 | End: 2025-01-13 | Stop reason: HOSPADM

## 2025-01-13 RX ORDER — CEFAZOLIN SODIUM/WATER 2 G/20 ML
2 SYRINGE (ML) INTRAVENOUS SEE ADMIN INSTRUCTIONS
Status: DISCONTINUED | OUTPATIENT
Start: 2025-01-13 | End: 2025-01-13 | Stop reason: HOSPADM

## 2025-01-13 RX ORDER — PROCHLORPERAZINE MALEATE 10 MG
10 TABLET ORAL EVERY 6 HOURS PRN
Status: DISCONTINUED | OUTPATIENT
Start: 2025-01-13 | End: 2025-01-13 | Stop reason: HOSPADM

## 2025-01-13 RX ORDER — DEXAMETHASONE SODIUM PHOSPHATE 4 MG/ML
INJECTION, SOLUTION INTRA-ARTICULAR; INTRALESIONAL; INTRAMUSCULAR; INTRAVENOUS; SOFT TISSUE PRN
Status: DISCONTINUED | OUTPATIENT
Start: 2025-01-13 | End: 2025-01-13

## 2025-01-13 RX ORDER — ONDANSETRON 2 MG/ML
INJECTION INTRAMUSCULAR; INTRAVENOUS PRN
Status: DISCONTINUED | OUTPATIENT
Start: 2025-01-13 | End: 2025-01-13

## 2025-01-13 RX ORDER — BUPIVACAINE HYDROCHLORIDE AND EPINEPHRINE 5; 5 MG/ML; UG/ML
INJECTION, SOLUTION EPIDURAL; INTRACAUDAL; PERINEURAL PRN
Status: DISCONTINUED | OUTPATIENT
Start: 2025-01-13 | End: 2025-01-13 | Stop reason: HOSPADM

## 2025-01-13 RX ORDER — FENTANYL CITRATE 50 UG/ML
50 INJECTION, SOLUTION INTRAMUSCULAR; INTRAVENOUS EVERY 5 MIN PRN
Status: DISCONTINUED | OUTPATIENT
Start: 2025-01-13 | End: 2025-01-13 | Stop reason: HOSPADM

## 2025-01-13 RX ORDER — OXYCODONE HYDROCHLORIDE 5 MG/1
5 TABLET ORAL EVERY 4 HOURS PRN
Status: DISCONTINUED | OUTPATIENT
Start: 2025-01-13 | End: 2025-01-13 | Stop reason: HOSPADM

## 2025-01-13 RX ORDER — ACETAMINOPHEN 325 MG/1
975 TABLET ORAL EVERY 8 HOURS
Status: DISCONTINUED | OUTPATIENT
Start: 2025-01-13 | End: 2025-01-13 | Stop reason: HOSPADM

## 2025-01-13 RX ORDER — LIDOCAINE HYDROCHLORIDE 20 MG/ML
INJECTION, SOLUTION INFILTRATION; PERINEURAL PRN
Status: DISCONTINUED | OUTPATIENT
Start: 2025-01-13 | End: 2025-01-13

## 2025-01-13 RX ORDER — ONDANSETRON 2 MG/ML
4 INJECTION INTRAMUSCULAR; INTRAVENOUS EVERY 30 MIN PRN
Status: DISCONTINUED | OUTPATIENT
Start: 2025-01-13 | End: 2025-01-13 | Stop reason: HOSPADM

## 2025-01-13 RX ORDER — HYDROMORPHONE HCL IN WATER/PF 6 MG/30 ML
0.4 PATIENT CONTROLLED ANALGESIA SYRINGE INTRAVENOUS EVERY 5 MIN PRN
Status: DISCONTINUED | OUTPATIENT
Start: 2025-01-13 | End: 2025-01-13 | Stop reason: HOSPADM

## 2025-01-13 RX ORDER — OMEGA-3 FATTY ACIDS/FISH OIL 300-1000MG
800 CAPSULE ORAL EVERY 8 HOURS PRN
Qty: 40 CAPSULE | Refills: 0 | Status: SHIPPED | OUTPATIENT
Start: 2025-01-13

## 2025-01-13 RX ORDER — ONDANSETRON 2 MG/ML
4 INJECTION INTRAMUSCULAR; INTRAVENOUS EVERY 6 HOURS PRN
Status: DISCONTINUED | OUTPATIENT
Start: 2025-01-13 | End: 2025-01-13 | Stop reason: HOSPADM

## 2025-01-13 RX ORDER — SENNA AND DOCUSATE SODIUM 50; 8.6 MG/1; MG/1
1 TABLET, FILM COATED ORAL 2 TIMES DAILY PRN
Qty: 40 TABLET | Refills: 0 | Status: SHIPPED | OUTPATIENT
Start: 2025-01-13

## 2025-01-13 RX ORDER — VASOPRESSIN IN 0.9 % NACL 2 UNIT/2ML
SYRINGE (ML) INTRAVENOUS PRN
Status: DISCONTINUED | OUTPATIENT
Start: 2025-01-13 | End: 2025-01-13

## 2025-01-13 RX ORDER — HYDROMORPHONE HCL IN WATER/PF 6 MG/30 ML
0.2 PATIENT CONTROLLED ANALGESIA SYRINGE INTRAVENOUS EVERY 5 MIN PRN
Status: DISCONTINUED | OUTPATIENT
Start: 2025-01-13 | End: 2025-01-13 | Stop reason: HOSPADM

## 2025-01-13 RX ORDER — OXYCODONE HYDROCHLORIDE 5 MG/1
10 TABLET ORAL EVERY 4 HOURS PRN
Status: DISCONTINUED | OUTPATIENT
Start: 2025-01-13 | End: 2025-01-13 | Stop reason: HOSPADM

## 2025-01-13 RX ADMIN — Medication 5 MG: at 07:45

## 2025-01-13 RX ADMIN — Medication 0.5 UNITS: at 08:04

## 2025-01-13 RX ADMIN — Medication 2 G: at 07:31

## 2025-01-13 RX ADMIN — Medication 0.5 UNITS: at 08:17

## 2025-01-13 RX ADMIN — PHENYLEPHRINE HYDROCHLORIDE 100 MCG: 10 INJECTION INTRAVENOUS at 07:44

## 2025-01-13 RX ADMIN — Medication 10 MG: at 07:52

## 2025-01-13 RX ADMIN — PROPOFOL 180 MG: 10 INJECTION, EMULSION INTRAVENOUS at 07:35

## 2025-01-13 RX ADMIN — PHENYLEPHRINE HYDROCHLORIDE 200 MCG: 10 INJECTION INTRAVENOUS at 07:58

## 2025-01-13 RX ADMIN — Medication 10 MG: at 07:58

## 2025-01-13 RX ADMIN — MIDAZOLAM 2 MG: 1 INJECTION INTRAMUSCULAR; INTRAVENOUS at 07:33

## 2025-01-13 RX ADMIN — ONDANSETRON 4 MG: 2 INJECTION INTRAMUSCULAR; INTRAVENOUS at 08:17

## 2025-01-13 RX ADMIN — SODIUM CHLORIDE, POTASSIUM CHLORIDE, SODIUM LACTATE AND CALCIUM CHLORIDE: 600; 310; 30; 20 INJECTION, SOLUTION INTRAVENOUS at 07:31

## 2025-01-13 RX ADMIN — DEXAMETHASONE SODIUM PHOSPHATE 4 MG: 4 INJECTION, SOLUTION INTRA-ARTICULAR; INTRALESIONAL; INTRAMUSCULAR; INTRAVENOUS; SOFT TISSUE at 07:50

## 2025-01-13 RX ADMIN — OXYCODONE HYDROCHLORIDE 5 MG: 5 TABLET ORAL at 09:06

## 2025-01-13 RX ADMIN — ACETAMINOPHEN 975 MG: 325 TABLET, FILM COATED ORAL at 09:08

## 2025-01-13 RX ADMIN — FENTANYL CITRATE 50 MCG: 50 INJECTION INTRAMUSCULAR; INTRAVENOUS at 08:40

## 2025-01-13 RX ADMIN — PHENYLEPHRINE HYDROCHLORIDE 100 MCG: 10 INJECTION INTRAVENOUS at 07:54

## 2025-01-13 RX ADMIN — LIDOCAINE HYDROCHLORIDE 100 MG: 20 INJECTION, SOLUTION INFILTRATION; PERINEURAL at 07:35

## 2025-01-13 RX ADMIN — PHENYLEPHRINE HYDROCHLORIDE 100 MCG: 10 INJECTION INTRAVENOUS at 07:50

## 2025-01-13 RX ADMIN — PHENYLEPHRINE HYDROCHLORIDE 100 MCG: 10 INJECTION INTRAVENOUS at 07:59

## 2025-01-13 RX ADMIN — FENTANYL CITRATE 50 MCG: 50 INJECTION INTRAMUSCULAR; INTRAVENOUS at 07:35

## 2025-01-13 RX ADMIN — PHENYLEPHRINE HYDROCHLORIDE 0.4 MCG/KG/MIN: 10 INJECTION INTRAVENOUS at 07:50

## 2025-01-13 ASSESSMENT — ACTIVITIES OF DAILY LIVING (ADL)
ADLS_ACUITY_SCORE: 57

## 2025-01-13 ASSESSMENT — ENCOUNTER SYMPTOMS
DYSRHYTHMIAS: 0
SEIZURES: 0
ORTHOPNEA: 0

## 2025-01-13 NOTE — DISCHARGE INSTRUCTIONS
Same Day Surgery Discharge Instructions for  Sedation and General Anesthesia     It's not unusual to feel dizzy, light-headed or faint for up to 24 hours after surgery or while taking pain medication.  If you have these symptoms: sit for a few minutes before standing and have someone assist you when you get up to walk or use the bathroom.    You should rest and relax for the next 24 hours. We recommend you make arrangements to have an adult stay with you for at least 24 hours after your discharge.  Avoid hazardous and strenuous activity.    DO NOT DRIVE any vehicle or operate mechanical equipment for 24 hours following the end of your surgery.  Even though you may feel normal, your reactions may be affected by the medication you have received.    Do not drink alcoholic beverages for 24 hours following surgery.     Slowly progress to your regular diet as you feel able. It's not unusual to feel nauseated and/or vomit after receiving anesthesia.  If you develop these symptoms, drink clear liquids (apple juice, ginger ale, broth, 7-up, etc. ) until you feel better.  If your nausea and vomiting persists for 24 hours, please notify your surgeon.      All narcotic pain medications, along with inactivity and anesthesia, can cause constipation. Drinking plenty of liquids and increasing fiber intake will help.    For any questions of a medical nature, call your surgeon.    Do not make important decisions for 24 hours.    If you had general anesthesia, you may have a sore throat for a couple of days related to the breathing tube used during surgery.  You may use Cepacol lozenges to help with this discomfort.  If it worsens or if you develop a fever, contact your surgeon.     If you feel your pain is not well managed with the pain medications prescribed by your surgeon, please contact your surgeon's office to let them know so they can address your concerns.       Liquid Adhesive   General Care:  Your incision was closed using a  liquid adhesive. Do not pick, scratch or rub the site.    Most  wounds heal without problems. However, an infection sometimes occurs despite proper treatment. Therefore, watch for the signs of infection listed below.  Keep the wound clean and dry however, shower or bathe as instructed by your surgeon.  Do not use soaps, lotions, or ointments on the wound area. Do not scrub the wound. After bathing, pat the wound dry with a soft towel.  Call your provider if you have:  Temperature of 101 F or higher,    Redness, swelling, drainage or warmth at incision site.   Wound bleeds more than a small amount or bleeding doesn t stop  Wound edges come apart    Today you were given 975 mg of Tylenol at 9:00am. You can take the next dose at 5:00pm. 650 mg every 6 hours or 1000mg every 8 hours as needed for pain. The recommended daily maximum dose is 4000 mg.      Ok to take Ibuprofen (Aleve, Naproxen, Advil) products for pain. 600mg-800mg every 8 hours as needed. The recommended daily maximum dose is 3200 mg per day.      You have been prescribed Oxycodone for pain. Start with alternating Tylenol/Ibuprofen and use Oxycodone for breakthrough pain. You were given Oxycodone at 9am. Your next dose can be taken at 3:00pm. Post op day 1-2 after surgery is usually the worst and should start seeing improvements in pain after that.        **If you have questions or concerns about your procedure,   call Dr. Prince Marshall at [phone]**

## 2025-01-13 NOTE — ANESTHESIA CARE TRANSFER NOTE
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    Anesthesia Type:   General     Note:    Oropharynx: oropharynx clear of all foreign objects and spontaneously breathing  Level of Consciousness: awake  Oxygen Supplementation: face mask    Independent Airway: airway patency satisfactory and stable  Dentition: dentition unchanged  Vital Signs Stable: post-procedure vital signs reviewed and stable    Patient transferred to: PACU    Handoff Report: Identifed the Patient, Identified the Reponsible Provider, Reviewed the pertinent medical history, Discussed the surgical course, Reviewed Intra-OP anesthesia mangement and issues during anesthesia, Set expectations for post-procedure period and Allowed opportunity for questions and acknowledgement of understanding      Vitals:  Vitals Value Taken Time   /64 01/13/25 0845   Temp 36.3  C (97.4  F) 01/13/25 0836   Pulse 64 01/13/25 0846   Resp 15 01/13/25 0846   SpO2 97 % 01/13/25 0846   Vitals shown include unfiled device data.    Electronically Signed By: NADIR Marie CRNA  January 13, 2025  8:47 AM

## 2025-01-13 NOTE — OP NOTE
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         Indications: Patient is a 59-year-old male with signs and symptoms of right ulnar neuropathy at the elbow and carpal tunnel syndrome.  He is brought for the above-mentioned procedure. Please note that Trinh Deluna PA-C's assistance was needed for positioning, retraction, suctioning, and closure.     Description of procedure: Patient was brought to the operating room.  General endotracheal anesthesia was induced.  The right arm was prepped and draped in sterile fashion.  Starting on the right hand a standard incision was made at the base of the palm between the thenar and hypothenar eminences after local anesthetic was injected.  The palmaris tendon and flexor retinaculum were divided sharply and the underlying median nerve identified.  The flexor retinaculum was then divided proximally and distally until no further nerve compression was seen or palpated.  Once this was done hemostasis was achieved and the wound was closed in a single layer with 3-0 nylon vertical mattress sutures.  Sterile dressing was applied.  The area over the medial right elbow was then opened in a curvilinear fashion just anterior to the medial epicondyle.  The dissection was carried posterior to the medial epicondyle and between the heads of the flexor carpi ulnaris the ulnar nerve was identified.  The dissection was then carried proximally back through the  cubital tunnel to the arm until the ulnar nerve was well decompressed.  Hemostasis was achieved.  The subcutaneous layers were closed with 2-0 Vicryl.  4-0 subcuticular Monocryl was used for skin with a superglue dressing.

## 2025-01-13 NOTE — ANESTHESIA POSTPROCEDURE EVALUATION
Patient: Neema Hunt    Procedure: Procedure(s):  Right open carpal tunnel release  Right open ulnar nerve release       Anesthesia Type:  General    Note:     Postop Pain Control: Uneventful            Sign Out: Well controlled pain   PONV: No   Neuro/Psych: Uneventful            Sign Out: Acceptable/Baseline neuro status   Airway/Respiratory: Uneventful            Sign Out: Acceptable/Baseline resp. status   CV/Hemodynamics: Uneventful            Sign Out: Acceptable CV status; No obvious hypovolemia; No obvious fluid overload   Other NRE: NONE   DID A NON-ROUTINE EVENT OCCUR? No           Last vitals:  Vitals Value Taken Time   /64 01/13/25 0915   Temp 36.1  C (97  F) 01/13/25 0915   Pulse 60 01/13/25 0916   Resp 15 01/13/25 0916   SpO2 98 % 01/13/25 0921   Vitals shown include unfiled device data.    Electronically Signed By: Masood Peres MD  January 13, 2025  5:43 PM

## 2025-01-14 ENCOUNTER — TELEPHONE (OUTPATIENT)
Dept: ANESTHESIOLOGY | Facility: CLINIC | Age: 60
End: 2025-01-14
Payer: COMMERCIAL

## 2025-01-14 ASSESSMENT — ANXIETY QUESTIONNAIRES
4. TROUBLE RELAXING: SEVERAL DAYS
6. BECOMING EASILY ANNOYED OR IRRITABLE: NOT AT ALL
GAD7 TOTAL SCORE: 5
IF YOU CHECKED OFF ANY PROBLEMS ON THIS QUESTIONNAIRE, HOW DIFFICULT HAVE THESE PROBLEMS MADE IT FOR YOU TO DO YOUR WORK, TAKE CARE OF THINGS AT HOME, OR GET ALONG WITH OTHER PEOPLE: SOMEWHAT DIFFICULT
5. BEING SO RESTLESS THAT IT IS HARD TO SIT STILL: SEVERAL DAYS
3. WORRYING TOO MUCH ABOUT DIFFERENT THINGS: SEVERAL DAYS
2. NOT BEING ABLE TO STOP OR CONTROL WORRYING: SEVERAL DAYS
GAD7 TOTAL SCORE: 5
7. FEELING AFRAID AS IF SOMETHING AWFUL MIGHT HAPPEN: NOT AT ALL
GAD7 TOTAL SCORE: 5
8. IF YOU CHECKED OFF ANY PROBLEMS, HOW DIFFICULT HAVE THESE MADE IT FOR YOU TO DO YOUR WORK, TAKE CARE OF THINGS AT HOME, OR GET ALONG WITH OTHER PEOPLE?: SOMEWHAT DIFFICULT
7. FEELING AFRAID AS IF SOMETHING AWFUL MIGHT HAPPEN: NOT AT ALL
1. FEELING NERVOUS, ANXIOUS, OR ON EDGE: SEVERAL DAYS

## 2025-01-14 ASSESSMENT — PAIN SCALES - PAIN ENJOYMENT GENERAL ACTIVITY SCALE (PEG)
PEG_TOTALSCORE: 5
PEG_TOTALSCORE: 5
INTERFERED_GENERAL_ACTIVITY: 5
INTERFERED_ENJOYMENT_LIFE: 6
AVG_PAIN_PASTWEEK: 4
INTERFERED_GENERAL_ACTIVITY: 5
AVG_PAIN_PASTWEEK: 4
INTERFERED_ENJOYMENT_LIFE: 6

## 2025-01-14 NOTE — TELEPHONE ENCOUNTER
Left Voicemail (1st Attempt) and Sent Mychart (1st Attempt) for waitlisted patient to offer the following appointment:    Provider: Dr. Paris  Appointment Type: New Patient  Date: 1/15/25  Time: 12:45 PM  Location: [facility]     Please note there is no guarantee this appointment will be available as it is first come first serve.      Additional appointments available. Please contact Arlin Love via Teams or refer patient to Arlin directly at [phone].

## 2025-01-14 NOTE — TELEPHONE ENCOUNTER
Patient confirmed scheduled appointment:     Date: 1/15/25  Time: 12:45 PM  Visit type: New Patient  Visit mode: In Person  Provider: Dr. Regina Paris  Location: [facility]    Additional Notes: Rescheduled from 3/4/25 appointment with Dr. Hoang

## 2025-01-15 ENCOUNTER — OFFICE VISIT (OUTPATIENT)
Dept: ANESTHESIOLOGY | Facility: CLINIC | Age: 60
End: 2025-01-15
Attending: NEUROLOGICAL SURGERY
Payer: COMMERCIAL

## 2025-01-15 VITALS
RESPIRATION RATE: 16 BRPM | SYSTOLIC BLOOD PRESSURE: 126 MMHG | HEART RATE: 67 BPM | DIASTOLIC BLOOD PRESSURE: 86 MMHG | OXYGEN SATURATION: 99 %

## 2025-01-15 DIAGNOSIS — M47.14 THORACIC SPONDYLOSIS WITH MYELOPATHY: ICD-10-CM

## 2025-01-15 DIAGNOSIS — Z98.1 S/P LUMBAR SPINAL FUSION: ICD-10-CM

## 2025-01-15 DIAGNOSIS — G89.29 CHRONIC PAIN OF LEFT KNEE: Primary | ICD-10-CM

## 2025-01-15 DIAGNOSIS — M25.562 CHRONIC PAIN OF LEFT KNEE: Primary | ICD-10-CM

## 2025-01-15 PROCEDURE — 99204 OFFICE O/P NEW MOD 45 MIN: CPT | Mod: GC | Performed by: ANESTHESIOLOGY

## 2025-01-15 RX ORDER — LIDOCAINE 4 G/G
1 PATCH TOPICAL EVERY 24 HOURS
Qty: 30 PATCH | Refills: 3 | Status: SHIPPED | OUTPATIENT
Start: 2025-01-15

## 2025-01-15 ASSESSMENT — PAIN SCALES - GENERAL: PAINLEVEL_OUTOF10: MODERATE PAIN (5)

## 2025-01-15 NOTE — PATIENT INSTRUCTIONS
Procedures:    Call to schedule your procedure: [phone] option #2  Diagnostic Genicular Nerve Block     Your pre-procedure instructions are below, please call our clinic if you have any questions.      Recommended Follow up:      Follow up with pain diary.       To speak with a nurse, schedule/reschedule/cancel a clinic appointment, or request a medication refill call: [phone].    You can also reach us by [employer]: [email or web address]      Procedure Information:     Please call [phone] option #2 to schedule, reschedule, or cancel your procedure appointment.   Phones are answered Monday - Friday from 08:00 - 4:30pm.  Leave a voicemail with your name, birth date, and phone number if no one is available to take your call.     The procedure center staff will call or send a MyChart several days before the procedure to review important information that you will need to know for the day of the procedure.     Please contact the clinic if you have further questions about this information [phone].        Information related to Scheduling and Pre-Procedure Instructions:  If you must reschedule your procedure more than two times, you must follow up in clinic before rescheduling again.      Preparing for your procedure    CAUTION - FAILURE TO FOLLOW THESE PRE-PROCEDURE INSTRUCTIONS WILL RESULT IN YOUR PROCEDURE BEING RESCHEDULED.    Your Procedure: Diagnostic Genicular Nerve Block         You must have a  take you home after your procedure. Transportation by taxi or para-transit is okay as long as you have a responsible adult accompany you. You must provide your 's full name and contact number at time of check in.   Fasting Protocol Please have nothing to eat or drink 2 hour prior to arrival.     Medications If you take any medications, DO NOT STOP. Take your medications as usual the day of your procedure with a sip of water AT LEAST 2 HOURS PRIOR TO ARRIVAL.    Vaccines  You must complete all vaccines more than 2 weeks prior to your scheduled procedure. No vaccines until 2 weeks after your procedure.   Antibiotics If you are currently taking antibiotics, you must complete the entire dose 7 days prior to your scheduled procedure. You must be clear of any signs or symptoms of infection. If you begin antibiotics, please contact our clinic for instructions.   Fever, Chills, Rash, or COVID If you experience a fever of higher than 100 degrees, chills, rash, open wounds, develop COVID symptoms or test positive during the one week before your procedure, please call the clinic to see if you may proceed with your procedure.          To speak with a nurse, schedule/reschedule/cancel a clinic appointment, or request a medication refill call: [phone]    You can also reach us by [employer]: [email or web address]

## 2025-01-15 NOTE — NURSING NOTE
Patient presents with:  Consult: Referred by Dr Marshall for pain injections  Pain      Moderate Pain (5)     Pain Medications       Analgesics Other Refills Start End     acetaminophen (TYLENOL) 500 MG tablet --  --    Sig - Route: Take 1,000 mg by mouth daily as needed for mild pain (2 X 500 mg = 1000 mg) - Oral    Class: Historical       Opioid Agonists Refills Start End     oxyCODONE (ROXICODONE) 5 MG tablet 0 1/13/2025 --    Sig - Route: Take 1 tablet (5 mg) by mouth every 6 hours as needed for moderate to severe pain. - Oral    Class: Local Print    Earliest Fill Date: 1/13/2025            What medications are you using for pain? Tylenol, oxy for hand surgery (recent)    Have you been seen by another pain clinic/ provider? no    Expectations: point in the right direction.  Nerve block on knee?    Estella Del Cid, SORAYAN

## 2025-01-15 NOTE — LETTER
1/15/2025       RE: Neema Hunt  [address]  [facility] [address]     Dear Colleague,    Thank you for referring your patient, Neema Hunt, to the [facility] [place] at [facility]. Please see a copy of my visit note below.                          [facility] Consultation    Date of visit: 1/15/2025    Reason for consultation:    Neema Hunt is a 59 year old male who is seen in consultation today at the request of his provider, Nhan Marshall.    Primary Care Provider is No Ref-Primary, Physician.  Pain medications are being prescribed by Scar Day.    Please see the [facility] [place] health questionnaire which the patient completed and reviewed with me in detail.    Chief Complaint:    Chief Complaint   Patient presents with     Consult     Referred by Dr Marshall for pain injections     Pain       Pain history:  Neema Hunt is a 59 year old male who first started having problems with pain in his left knee several years ago.  The pain worsened after he fell in September 2023. He describes the pain as dull and achy with intermittent sharp twinges/burning pain over the left knee. The pain comes on randomly but also feels slightly worse with movement. He reports that he has had a few episodes of left knee pain only with light touch. He also reports that sometimes his knees feel like they are going to give out. He follows with sports medicine and has received several steroid injections as well as SynviscOne injections with only partial relief. He presents to discuss possible nerve block.    Balance difficulties, uses a cane at baseline. Has done balance PT in the past.    Also reports chronic upper and lower back pain. History of recent lumbar steroid injection. Also recently had a thoracic saline injection.    Pain rating: intensity ranges from 4/10 to 7/10, and  Averages 6/10 on a 0-10 scale.  Aggravating factors include: movement  Relieving factors include: rest  Any bowel or bladder incontinence: n/a    Current treatments include:  Tylenol 1000 mg daily  Gabapentin 900 mg at night - makes him sleepy during the day  Lidocaine patch  Methocarbamol 500 mg QID PRN - recently started after hand surgery  Oxycodone 5 mg - recently started after hand surgery  Aleve - BID    Previous medication treatments included:  Duloxetine - sexual dysfunction  Ibuprofen     Other treatments have included:  Neema Hunt has been seen at a pain clinic in the past.    PT: has done in the past, does home exercises  Acupuncture: none  TENs Unit: n/a  Injections:   12/12/24 Dr. Mejia - T8-T9 interlaminar SAW - saline injected  1/7/24 Dr. Day sports med- bilateral intraarticular knee steroid injections  11/1/2024 L5-S1 transforaminal SAW - steroid injected    Past Medical History:  Past Medical History:   Diagnosis Date     Arthritis      Back pain      Dermatophytosis of nail      Eczema      Gastro-oesophageal reflux disease      Herpes      Hypertension      Insomnia      Lactose intolerance      Lumbar disc disease      Radiculopathy      Scoliosis      Spermatocele      Patient Active Problem List    Diagnosis Date Noted     Thoracic radiculopathy 11/20/2024     Priority: Medium     Lumbar radiculopathy 09/10/2024     Priority: Medium     Urinary retention with incomplete bladder emptying 12/24/2023     Priority: Medium     History of lumbar fusion 12/21/2023     Priority: Medium     Status post lumbar spinal fusion 09/05/2019     Priority: Medium     Acquired kyphosis 09/18/2016     Priority: Medium     Other secondary scoliosis, lumbar region 09/18/2016     Priority: Medium     Spinal stenosis of lumbar region without neurogenic claudication 09/18/2016     Priority: Medium       Past Surgical History:  Past Surgical History:   Procedure Laterality Date     5TH FINGER SURGERY Right       ANKLE SURGERY Left      DAVINCI XI HERNIORRHAPHY INGUINAL Bilateral 07/26/2022    Procedure: Robotic repair of recurrent right inguinal hernia with placement of mesh, robotic repair of left inguinal hernia with placement of mesh;  Surgeon: Mary Morales MD;  Location: [facility] OR     DAVINCI XI HERNIORRHAPHY VENTRAL N/A 07/26/2022    Procedure: Robotic repair of umbilical hernia with placement of mesh;  Surgeon: Mary Morales MD;  Location: [facility] OR     DISCECTOMY LUMBAR POSTERIOR MICROSCOPIC ONE LEVEL Right 07/15/2020    Procedure: Right L5-S1 foraminotomy and microdiskectomy;  Surgeon: Nhan Marshall MD;  Location: [facility] OR     EXPLORE SPINE, REMOVE HARDWARE, COMBINED N/A 09/05/2019    Procedure: REMOVAL LUMBAR L3-5 INSTRUMENTATION;  Surgeon: Nhan Marshall MD;  Location: [facility] OR     EXTENSION OF LUMBAR FUSION FROM L5-S1 W/ REVISION OF PEDICLE RODS FROM L2-S1       FOOT BUNIONECTOMY Left      FUSION SPINE ANTERIOR MINIMALLY INVASIVE TWO LEVELS N/A 11/16/2016    Procedure: FUSION SPINE ANTERIOR MINIMALLY INVASIVE TWO LEVELS;  Surgeon: Nhan Marshall MD;  Location: [facility] OR     FUSION, SPINE, LUMBAR, 1 LEVEL, POSTERIOR APPROACH, ROBOTIC-ASSISTED N/A 05/27/2021    Procedure: Lumbar 5-Sacral 1 posterior segemental instrumentation, bilateral decompression and transforaminal interbody fusion using local autograft and allograft cancellous chips. Revision of Lumbar 2-4 ian. Posterior arthrodesis Lumbar 5-Sacral 1 using  local autograft and allograft cancellous chips;  Surgeon: Nhan Marshall MD;  Location: [facility] OR     HERNIA REPAIR Right      INJECT EPIDURAL THORACIC Bilateral 12/12/2024    Procedure: INJECTION, SPINE, THORACIC, EPIDURAL (T9/10 vs T10/T11);  Surgeon: Fabi Mejia MD;  Location: [facility] OR     INJECT EPIDURAL TRANSFORAMINAL Bilateral 11/01/2024    Procedure: Bilateral L5-S1 transforaminal epidural steroid injection;  Surgeon: Nicola Verma MD;  Location: [facility] OR     INSERTION OF THORACIC 10  TO LUMBAR 1 AND REVISION OF LUMBAR 2 TO SACRAL1 INSTRUMENTATION  12/21/2023     L3-4 DECOMPRESSION       L3-L4, L4-L5 FUSION W/ SCREWS       LAMINECTOMY LUMBAR POSTERIOR MICROSCOPIC ONE LEVEL  04/09/2013    Procedure: LAMINECTOMY LUMBAR POSTERIOR MICROSCOPIC ONE LEVEL;  Right Lumbar 3-4 Micro Laminectomy & Foraminotomy;  Surgeon: Nhan Marshall MD;  Location: [facility] OR     LAMINECTOMY THORACIC ONE LEVEL N/A 09/27/2023    Procedure: Thoracic 11 to thoracic 12 laminectomy;  Surgeon: Zane Ontiveros MD;  Location: [facility] OR     OPTICAL TRACKING SYSTEM FUSION SPINE POSTERIOR LUMBAR PERCUTANEOUS TWO LEVELS N/A 11/16/2016    Procedure: OPTICAL TRACKING SYSTEM FUSION SPINE POSTERIOR LUMBAR PERCUTANEOUS TWO LEVELS;  Surgeon: Nhan Marshall MD;  Location: [facility]     OPTICAL TRACKING SYSTEM FUSION SPINE POSTERIOR LUMBAR THREE+ LEVELS Right 09/05/2019    Procedure: POSTERIOR SEGMENTAL INSTRUMENTATION L2-4, RIGHT LUMBAR 2-3 DISCECTOMY AND TRANSFORAMINAL INTERBODY FUSION, REDO DECOMPRESSION RIGHT L3-4, POSTERIOR ARTHRODESIS L2-4;  Surgeon: Nhan Marshall MD;  Location: [facility] OR     OPTICAL TRACKING SYSTEM FUSION SPINE POSTERIOR LUMBAR THREE+ LEVELS N/A 12/21/2023    Procedure: Insertion Thoracic 10 to Lumbar 1 and revision of Lumbar 2 to Sacral 1 instrumentation. Dual Pelvic Instrumentation. Revision fusion Lumbar 5 to Sacral 1 using allograft chips. Arthrodesis Thoracic 10 to Lumbar 2 using allograft cancellous chips;  Surgeon: Nhan Marshall MD;  Location:  OR     R L5-S1 FORAMINOTOMY  & MICRODISCECTOMY       RELEASE CARPAL TUNNEL Right 1/13/2025    Procedure: Right open carpal tunnel release;  Surgeon: Nhan Marshall MD;  Location:  OR     RELEASE ULNAR NERVE (ELBOW) Right 1/13/2025    Procedure: Right open ulnar nerve release;  Surgeon: Nhan Marshall MD;  Location:  OR     UMBILICAL HERNIA AND RIGHT INGUINAL HERNIA REPAIR       Medications:  Current Outpatient Medications   Medication Sig  Dispense Refill     acetaminophen (TYLENOL) 500 MG tablet Take 1,000 mg by mouth daily as needed for mild pain (2 X 500 mg = 1000 mg)       amLODIPine-benazepril (LOTREL) 10-20 MG capsule Take 1 capsule by mouth every morning        Cyanocobalamin (B-12 PO) Take 2 chew tab by mouth every morning        DULoxetine (CYMBALTA) 20 MG capsule        famotidine (PEPCID) 20 MG tablet Take 20 mg by mouth every evening       gabapentin (NEURONTIN) 300 MG capsule TAKE 3 CAPSULES(900 MG) BY MOUTH AT BEDTIME 90 capsule 1     gabapentin (NEURONTIN) 300 MG capsule TAKE 2 CAPSULES(600 MG) BY MOUTH AT BEDTIME (Patient not taking: Reported on 12/4/2024) 180 capsule 0     gabapentin (NEURONTIN) 300 MG capsule Take 1 capsule (300 mg) by mouth At Bedtime (Patient not taking: Reported on 12/4/2024) 90 capsule 3     ibuprofen (ADVIL/MOTRIN) 200 MG capsule Take 4 capsules (800 mg) by mouth every 8 hours as needed for inflammatory pain. 40 capsule 0     lidocaine (LIDODERM) 5 % patch Place 1 patch over 12 hours onto the skin every 24 hours. To prevent lidocaine toxicity, patient should be patch free for 12 hrs daily. 30 patch 2     lidocaine (LIDODERM) 5 % Patch Place 1 patch onto the skin every 24 hours (Patient taking differently: Place onto the skin every 24 hours.) 30 patch 2     lidocaine (XYLOCAINE) 5 % external ointment Apply topically as needed for moderate pain (nerve pain). 50 g 11     magnesium 250 MG tablet Take 1 tablet by mouth daily Daily       Melatonin 10 MG TABS tablet Take 10 mg by mouth nightly as needed for sleep       methocarbamol (ROBAXIN) 500 MG tablet Take 1 tablet (500 mg) by mouth 4 times daily as needed for muscle spasms. 40 tablet 0     Misc Natural Products (OSTEO BI-FLEX JOINT SHIELD) TABS Take 1 tablet by mouth daily       Multiple Vitamins-Minerals (AIRBORNE PO) Take 1 chew tab by mouth every morning        Multiple Vitamins-Minerals (MULTIVITAMIN ADULT PO) Take 2 chew tab by mouth every morning         naproxen sodium (ANAPROX) 220 MG tablet Take 220 mg by mouth 2 times daily (with meals).       omega 3 1000 MG CAPS Take 1 chew tab by mouth every morning        oxyCODONE (ROXICODONE) 5 MG tablet Take 1 tablet (5 mg) by mouth every 6 hours as needed for moderate to severe pain. 40 tablet 0     polyethylene glycol (MIRALAX) 17 g packet Take 1 packet by mouth daily       Probiotic Product (SOLUBLE FIBER/PROBIOTICS PO) Take 1 chew tab by mouth every morning        SENNA-docusate sodium (SENNA S) 8.6-50 MG tablet Take 1 tablet by mouth 2 times daily as needed (take while on oxy). 40 tablet 0     tamsulosin (FLOMAX) 0.4 MG capsule Take 0.4 mg by mouth daily.       tetrahydrozoline (VISINE) 0.05 % ophthalmic solution Place 1 drop into both eyes daily as needed       valACYclovir (VALTREX) 1000 mg tablet Take 1,000 mg by mouth 2 times daily as needed       Allergies:     Allergies   Allergen Reactions     Lactose Diarrhea and GI Disturbance     Pt states he is not lactose intolerant     Social History:  Home situation: lives with his wife  Tobacco use: none  Alcohol use: 1 daily  Drug use: THC drinks occasionally    Family history:  Family History   Problem Relation Age of Onset     Asthma Mother      Hypertension Mother      Arthritis Mother        Review of Systems:    POSTIVE IN BOLD  GENERAL: fever/chills, fatigue, general unwell feeling, weight gain/loss.  HEAD/EYES:  headache, dizziness, or vision changes.    EARS/NOSE/THROAT:  Nosebleeds, hearing loss, sinus infection, earache, tinnitus.  IMMUNE:  Allergies, cancer, immune deficiency, or infections.  SKIN:  Urticaria, rash, hives  HEME/Lymphatic:   anemia, easy bruising, easy bleeding.  RESPIRATORY:  cough, wheezing, or shortness of breath  CARDIOVASCULAR/Circulation:  Extremity edema, syncope, hypertension, tachycardia, or angina.  GASTROINTESTINAL:  abdominal pain, nausea/emesis, diarrhea, constipation,  hematochezia, or melena.  ENDOCRINE:  Diabetes, steroid  "use,  thyroid disease or osteoporosis.  MUSCULOSKELETAL: neck pain, back pain, arthralgia, arthritis, or gout.  GENITOURINARY:  frequency, urgency, dysuria, difficulty voiding, hematuria or incontinence.  NEUROLOGIC:  weakness, numbness, paresthesias, seizure, tremor, stroke or memory loss.  PSYCHIATRIC:  depression, anxiety, stress, suicidal thoughts or mood swings.     Physical Exam:  Vitals:    01/15/25 1259   BP: 126/86   Pulse: 67   Resp: 16   SpO2: 99%     Exam:  Constitutional: healthy, alert, and no distress  Head: normocephalic. Atraumatic.   Eyes: no redness or jaundice noted   Skin: no suspicious lesions or rashes  Psychiatric: mentation appears normal and affect normal/bright    Musculoskeletal exam:  Gait/Station/Posture: Normal  Left knee: mild ttp over medial and lateral joint lines, positive crepitus, normal ROM    Neurologic exam:  Motor:  5/5 LE strength  Sensory:  (lower extremities):   Light touch: normal     Diagnostic tests:  MRI of Left knee was completed on 5/28/24 showing:  \"Impression:  1. Horizontal tear of the posterior horn of the medial meniscus, with  tiny parameniscal cyst.     2. Grade III chondromalacia along the central trochlear surface with  diffuse moderate grade cartilage fissuring in the medial femorotibial  joint compartment.     3. The anterior and posterior cruciate ligaments, medial and lateral  supporting structures, and lateral meniscus are intact.\"    Personally reviewed imaging on day of visit    Other testing (labs, diagnostics) reviewed:  Labs  Lab Results   Component Value Date    A1C 5.1 12/04/2024     Last Comprehensive Metabolic Panel:  Lab Results   Component Value Date     (L) 12/24/2023    POTASSIUM 3.9 12/24/2023    CHLORIDE 94 (L) 12/24/2023    CO2 20 (L) 12/24/2023    ANIONGAP 17 (H) 12/24/2023     (H) 12/24/2023    BUN 18.9 12/24/2023    CR 1.15 12/24/2023    GFRESTIMATED 74 12/24/2023    JASON 8.5 (L) 12/24/2023         [place] Prescription " Monitoring Program reviewed    Outside records reviewed        Assessment:  Chronic left knee pain  Thoracic spondylosis with myelopathy  S/p lumbar fusion    Neema Hunt is a 59 year old male who presents with the complaints of constant dull left knee pain with intermittent burning/stabbing pinpoint pain. Pain is slightly worse with movement but seems to happen randomly. MR L knee showed minor horizontal medial meniscus tear along with chronic degenerative changes. He follows closely with sports med and has received multiple interarticular steroid injections as well as SynviscOne injections with only minimal relief. On exam, his chronic knee pain is consistent with degenerative changes. He is currently taking gabapentin, Aleve and tylenol for his knee pain with some relief. Discussed diagnostic genicular nerve block.     Plan:  Diagnosis reviewed, treatment option addressed, and risk/benefits discussed.  Self-care instructions given.  I am recommending a multidisciplinary treatment plan to help this patient better manage his pain.      Physical Therapy: n/a  Pain Psychologist to address issues of relaxation, behavioral change, coping style, and other factors important to improvement: n/a  Diagnostic Studies: n/a  Medication Management: Lidocaine 4% for back pain  Further procedures recommended: left diagnostic genicular nerve block  Other treatments: n/a  Urine toxicology screen: n/a   Recommendations/follow-up for PCP:  n/a  Follow up: for genicular nerve block    Total time spent was 50 minutes, and more than 50% of face to face time was spent in counseling and/or coordination of care regarding principles of multidisciplinary care, medication management, and therapeutic options    Aleyda Mckee, MS4  [employer]    I saw and examined the patient with the medical student. I have reviewed and agree with the student's note and plan of care and made changes and corrections directly to  the body of the note.    TIME SPENT:  BY STUDENT ALONE 20 MIN  BY MYSELF AND MEDICAL STUDENT 30 MIN        Regina Paris MD  Pain Medicine, Department of Anesthesiology  , [facility]      Again, thank you for allowing me to participate in the care of your patient.      Sincerely,    Regina Paris MD

## 2025-01-15 NOTE — PROGRESS NOTES
[facility] Consultation    Date of visit: 1/15/2025    Reason for consultation:    Neema Hunt is a 59 year old male who is seen in consultation today at the request of his provider, Nhan Marshall.    Primary Care Provider is No Ref-Primary, Physician.  Pain medications are being prescribed by Scar Day.    Please see the [facility] health questionnaire which the patient completed and reviewed with me in detail.    Chief Complaint:    Chief Complaint   Patient presents with    Consult     Referred by Dr Marshall for pain injections    Pain       Pain history:  Neema Hunt is a 59 year old male who first started having problems with pain in his left knee several years ago.  The pain worsened after he fell in September 2023. He describes the pain as dull and achy with intermittent sharp twinges/burning pain over the left knee. The pain comes on randomly but also feels slightly worse with movement. He reports that he has had a few episodes of left knee pain only with light touch. He also reports that sometimes his knees feel like they are going to give out. He follows with sports medicine and has received several steroid injections as well as SynviscOne injections with only partial relief. He presents to discuss possible nerve block.    Balance difficulties, uses a cane at baseline. Has done balance PT in the past.    Also reports chronic upper and lower back pain. History of recent lumbar steroid injection. Also recently had a thoracic saline injection.    Pain rating: intensity ranges from 4/10 to 7/10, and Averages 6/10 on a 0-10 scale.  Aggravating factors include: movement  Relieving factors include: rest  Any bowel or bladder incontinence: n/a    Current treatments include:  Tylenol 1000 mg daily  Gabapentin 900 mg at night - makes him sleepy during the day  Lidocaine patch  Methocarbamol 500 mg QID PRN - recently started after hand  surgery  Oxycodone 5 mg - recently started after hand surgery  Aleve - BID    Previous medication treatments included:  Duloxetine - sexual dysfunction  Ibuprofen     Other treatments have included:  Neema Hunt has been seen at a pain clinic in the past.    PT: has done in the past, does home exercises  Acupuncture: none  TENs Unit: n/a  Injections:   12/12/24 Dr. Mejia - T8-T9 interlaminar SAW - saline injected  1/7/24 Dr. Day sports med- bilateral intraarticular knee steroid injections  11/1/2024 L5-S1 transforaminal SAW - steroid injected    Past Medical History:  Past Medical History:   Diagnosis Date    Arthritis     Back pain     Dermatophytosis of nail     Eczema     Gastro-oesophageal reflux disease     Herpes     Hypertension     Insomnia     Lactose intolerance     Lumbar disc disease     Radiculopathy     Scoliosis     Spermatocele      Patient Active Problem List    Diagnosis Date Noted    Thoracic radiculopathy 11/20/2024     Priority: Medium    Lumbar radiculopathy 09/10/2024     Priority: Medium    Urinary retention with incomplete bladder emptying 12/24/2023     Priority: Medium    History of lumbar fusion 12/21/2023     Priority: Medium    Status post lumbar spinal fusion 09/05/2019     Priority: Medium    Acquired kyphosis 09/18/2016     Priority: Medium    Other secondary scoliosis, lumbar region 09/18/2016     Priority: Medium    Spinal stenosis of lumbar region without neurogenic claudication 09/18/2016     Priority: Medium       Past Surgical History:  Past Surgical History:   Procedure Laterality Date    5TH FINGER SURGERY Right     ANKLE SURGERY Left     DAVINCI XI HERNIORRHAPHY INGUINAL Bilateral 07/26/2022    Procedure: Robotic repair of recurrent right inguinal hernia with placement of mesh, robotic repair of left inguinal hernia with placement of mesh;  Surgeon: Mary Morales MD;  Location: [facility] OR    DAVINCI XI HERNIORRHAPHY VENTRAL N/A 07/26/2022    Procedure: Robotic repair  of umbilical hernia with placement of mesh;  Surgeon: Mary Morales MD;  Location: [facility] OR    DISCECTOMY LUMBAR POSTERIOR MICROSCOPIC ONE LEVEL Right 07/15/2020    Procedure: Right L5-S1 foraminotomy and microdiskectomy;  Surgeon: Nhan Marshall MD;  Location: [facility] OR    EXPLORE SPINE, REMOVE HARDWARE, COMBINED N/A 09/05/2019    Procedure: REMOVAL LUMBAR L3-5 INSTRUMENTATION;  Surgeon: Nhan Marshall MD;  Location: [facility] OR    EXTENSION OF LUMBAR FUSION FROM L5-S1 W/ REVISION OF PEDICLE RODS FROM L2-S1      FOOT BUNIONECTOMY Left     FUSION SPINE ANTERIOR MINIMALLY INVASIVE TWO LEVELS N/A 11/16/2016    Procedure: FUSION SPINE ANTERIOR MINIMALLY INVASIVE TWO LEVELS;  Surgeon: Nhan Marshall MD;  Location: [facility]    FUSION, SPINE, LUMBAR, 1 LEVEL, POSTERIOR APPROACH, ROBOTIC-ASSISTED N/A 05/27/2021    Procedure: Lumbar 5-Sacral 1 posterior segemental instrumentation, bilateral decompression and transforaminal interbody fusion using local autograft and allograft cancellous chips. Revision of Lumbar 2-4 ian. Posterior arthrodesis Lumbar 5-Sacral 1 using  local autograft and allograft cancellous chips;  Surgeon: Nhan Marshall MD;  Location: [facility] OR    HERNIA REPAIR Right     INJECT EPIDURAL THORACIC Bilateral 12/12/2024    Procedure: INJECTION, SPINE, THORACIC, EPIDURAL (T9/10 vs T10/T11);  Surgeon: Fabi Mejia MD;  Location: [facility] OR    INJECT EPIDURAL TRANSFORAMINAL Bilateral 11/01/2024    Procedure: Bilateral L5-S1 transforaminal epidural steroid injection;  Surgeon: Nicola Verma MD;  Location: [facility] OR    INSERTION OF THORACIC 10 TO LUMBAR 1 AND REVISION OF LUMBAR 2 TO SACRAL1 INSTRUMENTATION  12/21/2023    L3-4 DECOMPRESSION      L3-L4, L4-L5 FUSION W/ SCREWS      LAMINECTOMY LUMBAR POSTERIOR MICROSCOPIC ONE LEVEL  04/09/2013    Procedure: LAMINECTOMY LUMBAR POSTERIOR MICROSCOPIC ONE LEVEL;  Right Lumbar 3-4 Micro Laminectomy & Foraminotomy;  Surgeon: Nhan Marshall MD;  Location: [facility] OR     LAMINECTOMY THORACIC ONE LEVEL N/A 09/27/2023    Procedure: Thoracic 11 to thoracic 12 laminectomy;  Surgeon: Zane Ontiveros MD;  [facility]: [facility]    OPTICAL TRACKING SYSTEM FUSION SPINE POSTERIOR LUMBAR PERCUTANEOUS TWO LEVELS N/A 11/16/2016    Procedure: OPTICAL TRACKING SYSTEM FUSION SPINE POSTERIOR LUMBAR PERCUTANEOUS TWO LEVELS;  Surgeon: Nhan Marshall MD;  [facility]: [facility]    OPTICAL TRACKING SYSTEM FUSION SPINE POSTERIOR LUMBAR THREE+ LEVELS Right 09/05/2019    Procedure: POSTERIOR SEGMENTAL INSTRUMENTATION L2-4, RIGHT LUMBAR 2-3 DISCECTOMY AND TRANSFORAMINAL INTERBODY FUSION, REDO DECOMPRESSION RIGHT L3-4, POSTERIOR ARTHRODESIS L2-4;  Surgeon: Nhan Marshall MD;  Location: [facility]    OPTICAL TRACKING SYSTEM FUSION SPINE POSTERIOR LUMBAR THREE+ LEVELS N/A 12/21/2023    Procedure: Insertion Thoracic 10 to Lumbar 1 and revision of Lumbar 2 to Sacral 1 instrumentation. Dual Pelvic Instrumentation. Revision fusion Lumbar 5 to Sacral 1 using allograft chips. Arthrodesis Thoracic 10 to Lumbar 2 using allograft cancellous chips;  Surgeon: Nhan Marshall MD;  Location:  OR    R L5-S1 FORAMINOTOMY  & MICRODISCECTOMY      RELEASE CARPAL TUNNEL Right 1/13/2025    Procedure: Right open carpal tunnel release;  Surgeon: Nhan Marshall MD;  Location:  OR    RELEASE ULNAR NERVE (ELBOW) Right 1/13/2025    Procedure: Right open ulnar nerve release;  Surgeon: Nhan Marshall MD;  Location: [facility]    UMBILICAL HERNIA AND RIGHT INGUINAL HERNIA REPAIR       Medications:  Current Outpatient Medications   Medication Sig Dispense Refill    acetaminophen (TYLENOL) 500 MG tablet Take 1,000 mg by mouth daily as needed for mild pain (2 X 500 mg = 1000 mg)      amLODIPine-benazepril (LOTREL) 10-20 MG capsule Take 1 capsule by mouth every morning       Cyanocobalamin (B-12 PO) Take 2 chew tab by mouth every morning       DULoxetine (CYMBALTA) 20 MG capsule       famotidine (PEPCID) 20 MG tablet Take 20  mg by mouth every evening      gabapentin (NEURONTIN) 300 MG capsule TAKE 3 CAPSULES(900 MG) BY MOUTH AT BEDTIME 90 capsule 1    gabapentin (NEURONTIN) 300 MG capsule TAKE 2 CAPSULES(600 MG) BY MOUTH AT BEDTIME (Patient not taking: Reported on 12/4/2024) 180 capsule 0    gabapentin (NEURONTIN) 300 MG capsule Take 1 capsule (300 mg) by mouth At Bedtime (Patient not taking: Reported on 12/4/2024) 90 capsule 3    ibuprofen (ADVIL/MOTRIN) 200 MG capsule Take 4 capsules (800 mg) by mouth every 8 hours as needed for inflammatory pain. 40 capsule 0    lidocaine (LIDODERM) 5 % patch Place 1 patch over 12 hours onto the skin every 24 hours. To prevent lidocaine toxicity, patient should be patch free for 12 hrs daily. 30 patch 2    lidocaine (LIDODERM) 5 % Patch Place 1 patch onto the skin every 24 hours (Patient taking differently: Place onto the skin every 24 hours.) 30 patch 2    lidocaine (XYLOCAINE) 5 % external ointment Apply topically as needed for moderate pain (nerve pain). 50 g 11    magnesium 250 MG tablet Take 1 tablet by mouth daily Daily      Melatonin 10 MG TABS tablet Take 10 mg by mouth nightly as needed for sleep      methocarbamol (ROBAXIN) 500 MG tablet Take 1 tablet (500 mg) by mouth 4 times daily as needed for muscle spasms. 40 tablet 0    Misc Natural Products (OSTEO BI-FLEX JOINT SHIELD) TABS Take 1 tablet by mouth daily      Multiple Vitamins-Minerals (AIRBORNE PO) Take 1 chew tab by mouth every morning       Multiple Vitamins-Minerals (MULTIVITAMIN ADULT PO) Take 2 chew tab by mouth every morning       naproxen sodium (ANAPROX) 220 MG tablet Take 220 mg by mouth 2 times daily (with meals).      omega 3 1000 MG CAPS Take 1 chew tab by mouth every morning       oxyCODONE (ROXICODONE) 5 MG tablet Take 1 tablet (5 mg) by mouth every 6 hours as needed for moderate to severe pain. 40 tablet 0    polyethylene glycol (MIRALAX) 17 g packet Take 1 packet by mouth daily      Probiotic Product (SOLUBLE  FIBER/PROBIOTICS PO) Take 1 chew tab by mouth every morning       SENNA-docusate sodium (SENNA S) 8.6-50 MG tablet Take 1 tablet by mouth 2 times daily as needed (take while on oxy). 40 tablet 0    tamsulosin (FLOMAX) 0.4 MG capsule Take 0.4 mg by mouth daily.      tetrahydrozoline (VISINE) 0.05 % ophthalmic solution Place 1 drop into both eyes daily as needed      valACYclovir (VALTREX) 1000 mg tablet Take 1,000 mg by mouth 2 times daily as needed       Allergies:     Allergies   Allergen Reactions    Lactose Diarrhea and GI Disturbance     Pt states he is not lactose intolerant     Social History:  Home situation: lives with his wife  Tobacco use: none  Alcohol use: 1 daily  Drug use: THC drinks occasionally    Family history:  Family History   Problem Relation Age of Onset    Asthma Mother     Hypertension Mother     Arthritis Mother        Review of Systems:    POSTIVE IN BOLD  GENERAL: fever/chills, fatigue, general unwell feeling, weight gain/loss.  HEAD/EYES:  headache, dizziness, or vision changes.    EARS/NOSE/THROAT:  Nosebleeds, hearing loss, sinus infection, earache, tinnitus.  IMMUNE:  Allergies, cancer, immune deficiency, or infections.  SKIN:  Urticaria, rash, hives  HEME/Lymphatic:   anemia, easy bruising, easy bleeding.  RESPIRATORY:  cough, wheezing, or shortness of breath  CARDIOVASCULAR/Circulation:  Extremity edema, syncope, hypertension, tachycardia, or angina.  GASTROINTESTINAL:  abdominal pain, nausea/emesis, diarrhea, constipation,  hematochezia, or melena.  ENDOCRINE:  Diabetes, steroid use,  thyroid disease or osteoporosis.  MUSCULOSKELETAL: neck pain, back pain, arthralgia, arthritis, or gout.  GENITOURINARY:  frequency, urgency, dysuria, difficulty voiding, hematuria or incontinence.  NEUROLOGIC:  weakness, numbness, paresthesias, seizure, tremor, stroke or memory loss.  PSYCHIATRIC:  depression, anxiety, stress, suicidal thoughts or mood swings.     Physical Exam:  Vitals:    01/15/25  "[id number]   BP: 126/86   Pulse: 67   Resp: 16   SpO2: 99%     Exam:  Constitutional: healthy, alert, and no distress  Head: normocephalic. Atraumatic.   Eyes: no redness or jaundice noted   Skin: no suspicious lesions or rashes  Psychiatric: mentation appears normal and affect normal/bright    Musculoskeletal exam:  Gait/Station/Posture: Normal  Left knee: mild ttp over medial and lateral joint lines, positive crepitus, normal ROM    Neurologic exam:  Motor:  5/5 LE strength  Sensory:  (lower extremities):   Light touch: normal     Diagnostic tests:  MRI of Left knee was completed on 5/28/24 showing:  \"Impression:  1. Horizontal tear of the posterior horn of the medial meniscus, with  tiny parameniscal cyst.     2. Grade III chondromalacia along the central trochlear surface with  diffuse moderate grade cartilage fissuring in the medial femorotibial  joint compartment.     3. The anterior and posterior cruciate ligaments, medial and lateral  supporting structures, and lateral meniscus are intact.\"    Personally reviewed imaging on day of visit    Other testing (labs, diagnostics) reviewed:  Labs  Lab Results   Component Value Date    A1C 5.1 12/04/2024     Last Comprehensive Metabolic Panel:  Lab Results   Component Value Date     (L) 12/24/2023    POTASSIUM 3.9 12/24/2023    CHLORIDE 94 (L) 12/24/2023    CO2 20 (L) 12/24/2023    ANIONGAP 17 (H) 12/24/2023     (H) 12/24/2023    BUN 18.9 12/24/2023    CR 1.15 12/24/2023    GFRESTIMATED 74 12/24/2023    JASON 8.5 (L) 12/24/2023         [place] Prescription Monitoring Program reviewed    Outside records reviewed        Assessment:  Chronic left knee pain  Thoracic spondylosis with myelopathy  S/p lumbar fusion    Neema Hunt is a 59 year old male who presents with the complaints of constant dull left knee pain with intermittent burning/stabbing pinpoint pain. Pain is slightly worse with movement but seems to happen randomly. MR L knee showed minor horizontal " medial meniscus tear along with chronic degenerative changes. He follows closely with sports med and has received multiple interarticular steroid injections as well as SynviscOne injections with only minimal relief. On exam, his chronic knee pain is consistent with degenerative changes. He is currently taking gabapentin, Aleve and tylenol for his knee pain with some relief. Discussed diagnostic genicular nerve block.     Plan:  Diagnosis reviewed, treatment option addressed, and risk/benefits discussed.  Self-care instructions given.  I am recommending a multidisciplinary treatment plan to help this patient better manage his pain.      Physical Therapy: n/a  Pain Psychologist to address issues of relaxation, behavioral change, coping style, and other factors important to improvement: n/a  Diagnostic Studies: n/a  Medication Management: Lidocaine 4% for back pain  Further procedures recommended: left diagnostic genicular nerve block  Other treatments: n/a  Urine toxicology screen: n/a   Recommendations/follow-up for PCP:  n/a  Follow up: for genicular nerve block    Total time spent was 50 minutes, and more than 50% of face to face time was spent in counseling and/or coordination of care regarding principles of multidisciplinary care, medication management, and therapeutic options    Aleyda Mckee, MS4  [employer] [facility] [employer]    I saw and examined the patient with the medical student. I have reviewed and agree with the student's note and plan of care and made changes and corrections directly to the body of the note.    TIME SPENT:  BY STUDENT ALONE 20 MIN  BY MYSELF AND MEDICAL STUDENT 30 MIN        Regina Paris MD  Pain Medicine, Department of Anesthesiology  , [facility]

## 2025-01-16 ENCOUNTER — TELEPHONE (OUTPATIENT)
Dept: PALLIATIVE MEDICINE | Facility: CLINIC | Age: 60
End: 2025-01-16
Payer: COMMERCIAL

## 2025-01-16 PROBLEM — M25.562 CHRONIC PAIN OF LEFT KNEE: Status: ACTIVE | Noted: 2025-01-15

## 2025-01-16 PROBLEM — G89.29 CHRONIC PAIN OF LEFT KNEE: Status: ACTIVE | Noted: 2025-01-15

## 2025-01-16 NOTE — TELEPHONE ENCOUNTER
Called patient to schedule procedure with Dr. Paris    Date of Procedure: 1/31/25    Arrival time given: Yes: Arrival Time 11am       Procedure Location: [facility]     Verified Location with Patient:  Yes  Address provided to the patient     Pre-op H&P Required:  No: Local anesthesia        Post-Op/Follow Up Appt:  Not Indicated in Request      Informed patient they will need a  to drive them home:  Yes    Patients : Spouse     Patient is aware that pre-op RN from the procedure center will call 2-3 days prior to scheduled procedure to confirm arrival time and review any instructions:  Yes       Additional Comments: N/A        Purnima Manley MA on 1/16/2025 at 12:06 PM      P: [phone]*

## 2025-01-16 NOTE — TELEPHONE ENCOUNTER
RN reviewed chart. Pre-procedure instructions reviewed with patient 1/15/25.     Elise Hobson RN

## 2025-01-27 ENCOUNTER — ALLIED HEALTH/NURSE VISIT (OUTPATIENT)
Dept: NEUROSURGERY | Facility: CLINIC | Age: 60
End: 2025-01-27
Payer: COMMERCIAL

## 2025-01-27 DIAGNOSIS — Z98.890 S/P CARPAL TUNNEL RELEASE: Primary | ICD-10-CM

## 2025-01-27 PROCEDURE — 99207 PR NO CHARGE NURSE ONLY: CPT

## 2025-01-27 NOTE — PATIENT INSTRUCTIONS
Instructions for Patient    Incision  Steri-strips were applied today, they will fall off in 7-10 days. Do not to pull them off.   Keep your incision clean and dry at all times.   It is okay to shower, just pat the incision dry   No submerging incision in water such as pools, hot tubs, or baths for at least 8 weeks and until the incision is healed  Do not apply lotions or ointments to incision    Activity  Activity Restrictions  For up to 2 weeks after surgery, avoid lifting things heavier than 1 to 2 pounds and using your hand. This includes doing repeated arm or hand movements, such as typing or using a computer mouse, washing windows, vacuuming, or chopping food. Do not use power tools, and avoid activities that cause vibration. Use pain as your guide.   Rest when you feel tired. Getting enough sleep will help you recover   Try to walk each day. Start by walking a little more than you did the day before. Bit by bit, increase the amount you walk   Avoid bed rest and prolonged sitting for longer than 30 minutes (change positions frequently while awake)  No contact sports until after follow up visit  You may do heavier tasks about 4 weeks after surgery. These include vacuuming, mowing the lawn, and gardening   You may shower 24 to 48 hours after surgery, if your doctor okays it. Keep your bandage dry by taping a sheet of plastic to cover it. If you have a splint, keep it dry. Your doctor will tell you if you can remove it when you shower. Be careful not to put the splint on too tight. Do not take a bath until the incision heals, or until your doctor tells you it is okay.  Avoid letting your hand hang down. This can cause swelling.   Walking is the best way to start exercise after surgery. Take short frequent walks. You may gradually increase the distance as tolerated. If you feel pain, decrease your activity, but DO NOT stop walking. Walking will help you gain strength, prevent muscle soreness and spasms, and  prevent blood clots.   Avoid bed rest and prolonged sitting for longer than 30 minutes (change positions frequently while awake)  No contact sports or high impact activities such as; running/jogging, snowmobile or 4 marte riding or any other recreational vehicles until after given clearance at one of your follow up visits    Medications   Refills of pain medication:   Please call the neurosurgery clinic to request 2-3 days before you run out. A nurse will call you back to obtain a pain assessment.   Weaning from narcotic pain medications  When it is time, start weaning by extending the time between doses.   For example, if you're taking 2 tabs every 4 hours, spread it out to 2 tabs over 4.5, 5, 6 hours. At that point you can certainly cut down to 1 tab, then wean to an as needed basis until completely done with them.  Don't take more than 3000mg of Acetaminophen in 24 hours  Ok to begin taking Aspirin and NSAIDs (ex: ibuprofen/Advil)  Encouraged icing for at least 3-4 times throughout the day for 20-30 minutes at a time. Avoid heat to the incision area.   Taking stool softeners regularly can reduce constipation commonly caused by narcotic pain medications.    Contact clinic or Emergency Room if you develop:   Infection (redness, swelling, warmth, drainage, fever over 101 F)  New injury  Bladder or bowel changes or loss of control    Signs of blood clot:  Swelling and/or warmth in one or both legs  Pain or tenderness in your leg, ankle, foot, or arm   Red or discolored skin     Go to the Emergency Room   If sudden onset of severe headache, weakness, confusion, change in level of consciousness, pain, or loss of movement.  Chest pain  Trouble breathing     Post-operative appointments  6 week and 3 months post-op    [facility]  [facility]  [address]  [place] [address]  Telephone:  [phone]  Fax:  [phone]

## 2025-01-27 NOTE — PROGRESS NOTES
"Post-op Nurse Visit:    Patient seen today per the order of  Nhan Marshall MD.   DOS: 1/13/25  Procedure:   Right open carpal tunnel release     Pain/Neuro Assessment  2-3/10 RUE hand and elbow, elbow has gotten better. Using hand more. Pushing and pulling causes increases pain.   Numbness/tingling: NA > improved greatly since preop  Strength: Equal strength to bilateral upper extremities. Does report weakness to LLE > he has EMG for this in May, ordered by Neurology    Preop: RUE numbness    Lumbar s/s: Pt states going up stairs is he has pain to RLE ankle. Gets a sharp pain that shoots up the leg, mostly in the AM or after resting, then leg gets weak and gives out.     Reports dizziness with moving too fast or removing clothing > this was discussed in myc 1/20 we recc he discuss with his PCP. Pt states he did so and they recc contacting neurology which pt has not done yet. Recommneded he follow-up with them.     Pain Relief Measures:  Oxycodone: x1/day  Tylenol: TID  Robaxin: x1 last night, took for BLE spasms, not surgery related   Aleeve: BID  Ice: NA > did the first few days      Incision   X2 Incision inspected. Edges well-approximated. No redness, swelling, drainage, or warmth noted. Right upper arm incision with super glue, knot of dissolvable suture about to fall out of skin. Pt aware this may happen with the knots on the ends. Hand incision CTD with no infection s/s.  Hand incision prepped with alcohol wipes  and suture(s) removed with some difficulty due to tight mattress sutures. Steri-strips applied.  Activity  Following restrictions   Falls:  x2, myc on 1/20 >   Patient is walking occasionally  without difficulty. Using cane when out of the house. Pt has BL BLE issues.   Wearing brace? Sling at HS    GI/  Difficulty swallowing? N/A  Patient's appetite is normal  Bowel/bladder problems? Yes > pt had bowel and bladder issues after last surgery. States he has BL residual \"dribbles\" with urinating. Last " BM Saturday 1/25. Taking prune juice, lactulose, Senna, mirilax. Has suppositories at home, has had to use in the past. Pt updated to CTM and contact PCP if needed to explore other tx options. Pt only taking oxycodone x1/day  Taking stool softeners? Yes     Refills/x-rays/return to work  Refills given at this appointment? No > pt states he does not need any, he will myc/call if needed refills in the future  Sent for x-rays after this appointment? No  Ordered future x-rays? No  Scheduling team notified? NA  Return to work discussed at this appointment? Yes, pt not current working. His last job ([employer]) let him go after his fall with his lumbar spine surgery. Pt applied for SSD, he has a  that he works with for that.     All of patient's questions addressed today. Patient was instructed to call with any additional questions/concerns.

## 2025-01-31 ENCOUNTER — HOSPITAL ENCOUNTER (OUTPATIENT)
Facility: AMBULATORY SURGERY CENTER | Age: 60
Discharge: HOME | End: 2025-01-31
Attending: ANESTHESIOLOGY
Payer: COMMERCIAL

## 2025-01-31 ENCOUNTER — ANCILLARY PROCEDURE (OUTPATIENT)
Dept: RADIOLOGY | Facility: AMBULATORY SURGERY CENTER | Age: 60
End: 2025-01-31
Attending: ANESTHESIOLOGY
Payer: COMMERCIAL

## 2025-01-31 VITALS
RESPIRATION RATE: 15 BRPM | WEIGHT: 206 LBS | DIASTOLIC BLOOD PRESSURE: 81 MMHG | HEIGHT: 70 IN | BODY MASS INDEX: 29.49 KG/M2 | OXYGEN SATURATION: 97 % | TEMPERATURE: 98.9 F | HEART RATE: 62 BPM | SYSTOLIC BLOOD PRESSURE: 128 MMHG

## 2025-01-31 DIAGNOSIS — M25.562 CHRONIC PAIN OF LEFT KNEE: ICD-10-CM

## 2025-01-31 DIAGNOSIS — G89.29 CHRONIC PAIN OF LEFT KNEE: ICD-10-CM

## 2025-01-31 RX ORDER — IOPAMIDOL 408 MG/ML
INJECTION, SOLUTION INTRATHECAL DAILY PRN
Status: DISCONTINUED | OUTPATIENT
Start: 2025-01-31 | End: 2025-01-31 | Stop reason: HOSPADM

## 2025-01-31 RX ORDER — BUPIVACAINE HYDROCHLORIDE 2.5 MG/ML
INJECTION, SOLUTION INFILTRATION; PERINEURAL DAILY PRN
Status: DISCONTINUED | OUTPATIENT
Start: 2025-01-31 | End: 2025-01-31 | Stop reason: HOSPADM

## 2025-01-31 NOTE — DISCHARGE INSTRUCTIONS
Home Care Instructions after a Genicular Nerve Block      In a genicular nerve block, a local anesthetic (numbing medicine) is injected near sensory nerves which carry pain signals to the brain. This procedure is a diagnostic procedure and is typically short lasting. With this injection a steroid to increase the longevity of the blocks effect may be used.    Activity  -You may resume most normal activity levels with the exception of strenuous activity. It is important for us to know if your pain with normal activity is relieved after this injection.  -DO NOT shower for 24 hours  -DO NOT remove bandaid for 24 hours    Pain  -You may experience soreness at the injection site for one or two days  -You may use an ice pack for 20 minutes every 2 hours for the first 24 hours  -You may use a heating pad after the first 24 hours  -You may use Tylenol (acetaminophen) every 4 hours or other pain medicines as     directed by your physician    You may experience numbness radiating into your legs or arms (depending on the procedure location). This numbness may last several hours. Until sensation returns to normal; please use caution in walking, climbing stairs, and stepping out of your vehicle, etc.        PLEASE KEEP TRACK OF YOUR SYMPTOMS AND NOTE YOUR IMPROVEMENT FOR YOUR DOCTOR.     Please contact us if you have:  -Severe pain  -Fever more than 101.5 degrees Fahrenheit  -Signs of infection at the injection site (redness, swelling, or drainage)    FOR PAIN CENTER PATIENTS:  If you have questions, please contact the Pain Clinic at [phone] Option #1 between the hours of 7:00 am and 3:00 pm Monday through Friday. After office hours you can contact the on call provider by dialing [phone]. If you need immediate attention, we recommend that you go to a hospital emergency room or dial [phone].

## 2025-02-03 ENCOUNTER — DOCUMENTATION ONLY (OUTPATIENT)
Dept: ANESTHESIOLOGY | Facility: CLINIC | Age: 60
End: 2025-02-03
Payer: COMMERCIAL

## 2025-02-03 NOTE — PROGRESS NOTES
Purpose of call: Follow up after Diagnostic Genicular Nerve Block    Date of service: 1/31/25  Spoke with: Pain diary and patient    Location of pain: Knee - Left  Percentage of pain relief after procedure: 85-90%  Duration of pain relief: 5 hours    Follow up: Routing to provider to review and place next case request if appropriate.    Debbie Kevin RNCC

## 2025-02-04 ENCOUNTER — THERAPY VISIT (OUTPATIENT)
Dept: OCCUPATIONAL THERAPY | Facility: CLINIC | Age: 60
End: 2025-02-04
Attending: PHYSICIAN ASSISTANT
Payer: COMMERCIAL

## 2025-02-04 DIAGNOSIS — G56.21 ULNAR NEUROPATHY OF RIGHT UPPER EXTREMITY: ICD-10-CM

## 2025-02-04 DIAGNOSIS — G56.01 CARPAL TUNNEL SYNDROME OF RIGHT WRIST: ICD-10-CM

## 2025-02-04 PROCEDURE — 97530 THERAPEUTIC ACTIVITIES: CPT | Mod: GO

## 2025-02-04 PROCEDURE — 97165 OT EVAL LOW COMPLEX 30 MIN: CPT | Mod: GO

## 2025-02-04 NOTE — PROGRESS NOTES
OCCUPATIONAL THERAPY EVALUATION  Type of Visit: Evaluation     Fall Risk Screen:  Fall screen completed by: OT  Have you fallen 2 or more times in the past year?: No  Have you fallen and had an injury in the past year?: No  Is patient a fall risk?: No    Subjective        Presenting condition or subjective complaint: Rehab, post-op nerve decompression of R wrist and elbow.  Date of onset: 01/13/25    Relevant medical history: Arthritis; Bladder or bowel problems; Depression; Dizziness; High blood pressure; Osteoarthritis; Pain at night or rest; Progressive neurological deficits   Dates & types of surgery: L subtalar fusion-09, RUE boutonniere infection-85, spinal cyst-23, sevral spinal fusions    Prior diagnostic imaging/testing results: EMG; Other Ultrasound   Prior therapy history for the same diagnosis, illness or injury: No      Prior Level of Function  Transfers: Independent  Ambulation: Assistive equipment  ADL: Independent  IADL:  Independent    Living Environment  Social support: With a significant other or spouse   Type of home: House   Stairs to enter the home: Yes 2 Is there a railing: No     Ramp: No   Stairs inside the home: Yes 12 Is there a railing: Yes     Help at home: Home management tasks (cooking, cleaning); Home and Yard maintenance tasks  Equipment owned: Straight Cane; Walker; Raised toilet seat; Bath bench     Employment: No    Hobbies/Interests: Walking, bowling, movies, cooking    Patient goals for therapy: Pain reduction and strengthening.    Pain assessment: Pain present  See objective evaluation for additional pain details     Objective   ADDITIONAL HISTORY:  Right hand dominant  Patient reports symptoms of pain, weakness/loss of strength, numbness, and tingling   Transportation: drives    Functional Outcome Measure:   Upper Extremity Functional Index Score:  SCORE:   Column Totals: /80: (Patient-Rptd) 63   (A lower score indicates greater disability.)    PAIN:  Pain Level at Rest:  0/10  Pain Level with Use: 4/10  Pain Location: CT and ulnar nerve scars  Pain Quality: Dull  Pain Frequency: intermittent  Pain is Worst: daytime  Pain is Exacerbated By: weightbearing, leaning on elbow  Pain is Relieved By: otc medications, rest, stretch, use, and massage  Pain Progression: Improved    POSTURE: Forward Neck Posture and Rounded Forward Shoulders     EDEMA: None     SCAR/WOUND:   Elbow: Wound/Scar Signs: tender, adhered, and closed  Wrist: Wound/Scar Signs: adhered, clean without drainage, and dry    SENSATION: Decreased Median Nerve distribution per pt report and Decreased Ulnar Nerve distribution per pt report     ROM:   Wrist ROM  Left AROM Right AROM    Extension 75 67   Flexion 81 79   Radial Deviation (RD) 15 19   Ulnar Deviation (UD) 45 50   Supination 85 80   Pronation 80 88   Per observation, elbow and finger WNL across all planes.     STRENGTH: Contraindicated    Assessment & Plan   CLINICAL IMPRESSIONS  Medical Diagnosis: R CTS & ulnar neuropathy    Treatment Diagnosis: R CTS & ulnar neuropathy    Impression/Assessment: Pt is a 59 year old male presenting to Occupational Therapy due to R CTS & ulnar neuropathy.  The following significant findings have been identified: Impaired ROM, Impaired sensation, Impaired strength, Pain, and Post-surgical precautions.  These identified deficits interfere with their ability to perform self care tasks, recreational activities, household chores, driving ,  yard work, and meal planning and preparation as compared to previous level of function.   Patient's limitations or Problem List includes: Pain, Decreased ROM/motion, Weakness, Sensory disturbance, Adherent scarring, Decreased , Decreased pinch, Decreased coordination, Decreased dexterity, Tightness in musculature, and Adherence in connective tissue of the right arm  which interferes with the patient's ability to perform Self Care Tasks (dressing), Sleep Patterns, Recreational Activities, Household  Chores, and Driving  as compared to previous level of function.    Clinical Decision Making (Complexity):  Assessment of Occupational Performance: 5 or more Performance Deficits  Occupational Performance Limitations: dressing, driving and community mobility, health management and maintenance, home establishment and management, meal preparation and cleanup, shopping, sleep, and leisure activities  Clinical Decision Making (Complexity): Low complexity    PLAN OF CARE  Treatment Interventions:  Modalities:  US and Paraffin  Therapeutic Exercise:  AROM, AAROM, PROM, Tendon Gliding, Blocking, Reverse Blocking, Place and Hold, Contract Relax, Extensor Tracking, Isotonics, Isometrics, and Stabilization  Neuromuscular re-education:  Nerve Gliding, Desensitization, Kinesthetic Training, Proprioceptive Training, Posture, Kinesiotaping, Isometrics, and Stabilization  Manual Techniques:  Coordination/Dexterity, Joint mobilization, Scar mobilization, Friction massage, Myofascial release, and Manual edema mobilization  Orthotic Fabrication:  Static, Static progressive, Dynamic, Finger based, Hand based, and Forearm based  Self Care:  Self Care Tasks and Ergonomic Considerations    Long Term Goals   OT Goal 1  Goal Identifier: Lifting  Goal Description: Pt will report no difficulty with gripping and lifting a grocery bag  Rationale: In order to maximize safety and independence with ADL/IADLs  Goal Progress: Maximum difficulty  Target Date: 04/15/25      Frequency of Treatment: 1x weekly, tapering  Duration of Treatment: 10 weeks     Education Assessment: Learner/Method: Patient;Demonstration;Pictures/Video  Education Comments: PTRX via phone     Risks and benefits of evaluation/treatment have been explained.   Patient/Family/caregiver agrees with Plan of Care.     Evaluation Time:    OT Eval, Low Complexity Minutes (94770): 24  Signing Clinician: Deirdre Covarrubias OT        [facility]                                                                                    OUTPATIENT OCCUPATIONAL THERAPY      PLAN OF TREATMENT FOR OUTPATIENT REHABILITATION   Patient's Last Name, First Name, Neema Medeiros YOB: 1965   Provider's Name   [facility]   Medical Record No.  [id number]     Onset Date: 01/13/25 Start of Care Date: 02/04/25     Medical Diagnosis:  R CTS & ulnar neuropathy      OT Treatment Diagnosis:  R CTS & ulnar neuropathy Plan of Treatment  Frequency/Duration:1x weekly, tapering/10 weeks    Certification date from 02/04/25   To 03/18/25        See note for plan of treatment details and functional goals     Deirdre Covarrubias OT                         I CERTIFY THE NEED FOR THESE SERVICES FURNISHED UNDER        THIS PLAN OF TREATMENT AND WHILE UNDER MY CARE     (Physician attestation of this document indicates review and certification of the therapy plan).              Referring Provider:  Trinh Deluna    Initial Assessment  See Epic Evaluation- 02/04/25

## 2025-02-06 DIAGNOSIS — M25.562 CHRONIC PAIN OF LEFT KNEE: Primary | ICD-10-CM

## 2025-02-06 DIAGNOSIS — G89.29 CHRONIC PAIN OF LEFT KNEE: Primary | ICD-10-CM

## 2025-02-11 ENCOUNTER — TELEPHONE (OUTPATIENT)
Dept: PALLIATIVE MEDICINE | Facility: CLINIC | Age: 60
End: 2025-02-11

## 2025-02-11 ENCOUNTER — THERAPY VISIT (OUTPATIENT)
Dept: OCCUPATIONAL THERAPY | Facility: CLINIC | Age: 60
End: 2025-02-11
Attending: PHYSICIAN ASSISTANT
Payer: COMMERCIAL

## 2025-02-11 DIAGNOSIS — G56.21 ULNAR NEUROPATHY OF RIGHT UPPER EXTREMITY: ICD-10-CM

## 2025-02-11 DIAGNOSIS — G56.01 CARPAL TUNNEL SYNDROME OF RIGHT WRIST: Primary | ICD-10-CM

## 2025-02-11 PROCEDURE — 97140 MANUAL THERAPY 1/> REGIONS: CPT | Mod: GO

## 2025-02-11 PROCEDURE — 97035 APP MDLTY 1+ULTRASOUND EA 15: CPT | Mod: GO

## 2025-02-11 PROCEDURE — 97112 NEUROMUSCULAR REEDUCATION: CPT | Mod: GO

## 2025-02-11 NOTE — TELEPHONE ENCOUNTER
Second attempt: Phoned the patient and left VM. Stated to call the pain clinic to schedule injection procedure at [phone].

## 2025-02-11 NOTE — TELEPHONE ENCOUNTER
Called patient to schedule procedure with Dr. Paris    Date of Procedure: 3/7/25    Arrival time given: Yes: Arrival Time 12pm       Procedure Location: [facility]     Verified Location with Patient:  Yes  Address provided to the patient     Pre-op H&P Required:  No: Local anesthesia        Post-Op/Follow Up Appt:  Not Indicated in Request      Informed patient they will need a  to drive them home:  Yes    Patients : Spouse     Patient is aware that pre-op RN from the procedure center will call 2-3 days prior to scheduled procedure to confirm arrival time and review any instructions:  Yes       Additional Comments: N/A        Purnima Manley MA on 2/11/2025 at 1:14 PM      P: [phone]*

## 2025-02-17 ENCOUNTER — MYC MEDICAL ADVICE (OUTPATIENT)
Dept: ORTHOPEDICS | Facility: CLINIC | Age: 60
End: 2025-02-17
Payer: COMMERCIAL

## 2025-02-18 ENCOUNTER — THERAPY VISIT (OUTPATIENT)
Dept: OCCUPATIONAL THERAPY | Facility: CLINIC | Age: 60
End: 2025-02-18
Attending: PHYSICIAN ASSISTANT
Payer: COMMERCIAL

## 2025-02-18 DIAGNOSIS — G56.21 ULNAR NEUROPATHY OF RIGHT UPPER EXTREMITY: ICD-10-CM

## 2025-02-18 DIAGNOSIS — G56.01 CARPAL TUNNEL SYNDROME OF RIGHT WRIST: Primary | ICD-10-CM

## 2025-02-18 PROCEDURE — 97140 MANUAL THERAPY 1/> REGIONS: CPT | Mod: GO

## 2025-02-18 PROCEDURE — 97112 NEUROMUSCULAR REEDUCATION: CPT | Mod: GO

## 2025-02-18 PROCEDURE — 97035 APP MDLTY 1+ULTRASOUND EA 15: CPT | Mod: GO

## 2025-02-20 ENCOUNTER — OFFICE VISIT (OUTPATIENT)
Dept: NEUROLOGY | Facility: CLINIC | Age: 60
End: 2025-02-20
Payer: COMMERCIAL

## 2025-02-20 DIAGNOSIS — G56.23 CUBITAL TUNNEL SYNDROME, BILATERAL: ICD-10-CM

## 2025-02-20 DIAGNOSIS — G56.03 BILATERAL CARPAL TUNNEL SYNDROME: Primary | ICD-10-CM

## 2025-02-20 DIAGNOSIS — R29.818: ICD-10-CM

## 2025-02-20 DIAGNOSIS — G60.8 SENSORY POLYNEUROPATHY: ICD-10-CM

## 2025-02-20 NOTE — PROGRESS NOTES
[facility]  Electrodiagnostic Laboratory                 Department of Neurology                                                                                                         Test Date:  2025    Patient: Neema Hunt : 1965 Physician: Rodolfo Santamaria MD   Sex: Male AGE: 59 year Ref Phys: Scar Medina MD   ID#: [id number]   Technician: Fellow     History and Examination:    59 year old male with PMH of a sensorimotor axonal polyneuropathy affecting the LE, complaining of numbness in the fingers of his left hand.  He had a previous EMG study in our institution 2024 which confirmed the diagnosis of right carpal tunnel and cubital tunnel syndrome, but there was also notable slowing of the right median motor conduction velocity of the forearm (40 m/s), the etiology of which was unclear.  Subsequently, he underwent surgical release of the right carpal and cubital tunnel, with some improvement.  Repeat NCS/EMG ordered to evaluate for LEFT-sided median neuropathy at the wrist, and/or ulnar neuropathy at the elbow, and to assess for interval changes of the right median and ulnar motor nerve conduction studies after the surgical release.    Techniques:    Motor and sensory conduction studies were done with surface recording electrodes. Upper extremities were maintained at a temperature of 32 degrees Centigrade or higher and Lower extremities were maintained at a temperature of 31 degrees Centigrade or higher.      Results    Nerve conduction studies showed absent left ulnar-D5 sensory response. Left radial-D1 sensory response showed reduced amplitude, and normal conduction velocity. Left median-D2 sensory response showed low-normal amplitude, and moderate to severely slow conduction velocity.    Left median (APB) motor response showed prolonged distal latency, normal amplitudes, and moderate to severe conduction velocity slowing at the forearm (38 m/s).  Right (APB) motor response showed prolonged distal latency, mildly reduced amplitudes without major segmental changes, and moderate conduction velocity slowing at the forearm (43 m/s). Left ulnar (ADM) motor response showed normal distal latency, normal amplitudes, mild conduction velocity slowing at the forearm and moderate to severe slowing across the elbow (45 and 37 m/s, respectively).  Right ulnar (ADM) motor response showed slightly prolonged distal latency, mildly reduced distal CMAP amplitude, with an amplitude drop of approximately 40% between the wrist and below elbow, and further 25% amplitude drop between the below and above elbow stimulation sites, and mildly to moderately reduced conduction velocities in all segments. Left median/ulnar 2nd lumbrical/palmar interossei comparison study showed increased interlatency difference.       Interpretation:    This is an abnormal study. There is electrophysiologic evidence of 1) bilateral ulnar neuropathies at the elbow (cubital tunnel syndrome).  The right shows mild improvement compared to the preoperative EMG/NCS, mostly regarding the conduction velocities at the elbow.  The right ulnar CMAP amplitudes are unchanged. 2) bilateral moderate median neuropathies at the wrist, as seen with carpal tunnel syndrome.  The right sided median neuropathy at the wrist appears improved compared to the preoperative study.  3) a coexistent sensory polyneuropathy, based on the attenuated radial sensory response on the left.  Please note, that the right radial sensory study that was done in 11/2024 showed similar findings. 4) attenuated median motor conduction velocities at the forearm.  This slowing cannot be attributed to carpal tunnel syndrome, and therefore the possibility of a generalized demyelinating polyneuropathy cannot be excluded.  Correlation with clinical, and ultrasonographic findings is recommended.    Masood Lee,    Neurophysiology Fellow    ATTENDING  ADDENDUM: I was present during the entire study and directly supervised Fellow Dr Lee, who performed it. I agree with the analysis of results and interpretation as above, which I personally reviewed and edited. Rodolfo Santamaria MD           Nerve Conduction Studies  Motor Sites      Latency Neg. Amp Neg. Amp Diff Segment Distance Velocity Neg. Dur Neg Area Diff Temperature Comment   Site (ms) Norm (mV) Norm (%)  cm m/s Norm (ms) (%) ( C)    Left Median (APB) Motor   Wrist 5.0  < 4.4 6.6  > 5.0  Wrist-APB 8   6.9  34.8    Elbow 11.5 - 5.5  > 5.0 -17 Elbow-Wrist 25 38  > 48 7.4 -19 34.9    Right Median (APB) Motor   Wrist 5.2  < 4.4 4.7  > 5.0  Wrist-APB 8   6.2  30.5    Elbow 11.0 - 4.0  > 5.0 -15 Elbow-Wrist 25 43  > 48 7.5 -7 30.7    Left Median/Ulnar (Lumb-Dors Int II) Motor        Median (Lumb I)   Wrist 5.1 - 0.76 -  Wrist-Lumb I 10   6.5  29.7         Ulnar (Dors int II (pedis))   Wrist 3.3 - 0.12 -  Wrist-Dors int II (pedis) 10   3.6  30.2    Left Ulnar (ADM) Motor   Wrist 3.3  < 3.5 6.5  > 5.0  Wrist-ADM 8   6.6  30.5    Below Elbow 8.6 - 5.8 - -11 Below Elbow-Wrist 24 45  > 48 7.7 0 28.3    Above Elbow 11.3 - 5.6 - -3 Above Elbow-Below Elbow 10 37  > 48 8.3 -2 29.3    Right Ulnar (ADM) Motor   Wrist 3.7  < 3.5 4.7  > 5.0  Wrist-ADM 8   5.7  28.2    Below Elbow 9.2 - 2.6 - -45 Below Elbow-Wrist 24.5 45  > 48 6.5 -32 27.2    Above Elbow 11.5 - 1.94 - -25 Above Elbow-Below Elbow 10 43  > 48 8.3 -23 26.5      Sensory Sites      Onset Lat Peak Lat Amp (O-P) Amp (P-P) Segment Distance Velocity Temperature Comment   Site ms (ms)  V Norm ( V)  cm m/s Norm ( C)    Left Median Sensory   Wrist 3.9 4.9 10  > 10 16 Wrist-Dig II 14 36  > 48 31.4    Left Radial Sensory   Forearm-Wrist 2.1 2.9 9  > 15 11 Forearm-Wrist 10 48 - 29.8    Left Ulnar Sensory   Wrist-Dig V NR NR NR  > 8 NR Wrist-Dig V 12.5 NR  > 48 31.5            Waveforms / Images:    Motor                  Sensory

## 2025-02-20 NOTE — PROGRESS NOTES
Neuromuscular Ultrasound Report   Patient: Neema Hunt  Patient ID: [id number]  YOB: 1965  Age: 59 years  Referring Physician: Scar Medina MD  History & Examination:   59-year-old man with numbness and tingling in both hands, and a known polyneuropathy affecting the lower extremities.  Previous EMG study of the right upper extremity done on November 2024 showed moderate to severe right carpal tunnel syndrome and a right ulnar neuropathy at the elbow.  However, there was also slowing of the right median motor conduction velocity at the forearm to 40 m/s,  which would not be expected with a median neuropathy at the wrist.  He underwent release of the right carpal and cubital tunnel a month ago.  He has similar symptoms at the left hand, but he has not undergone any release surgery yet. Another ultrasonographic study done by radiology department on December 2024 showed prominent enlargement of bilateral ulnar nerves at the elbow (right>left) consistent with cubital tunnel syndrome, and right carpal tunnel syndrome.  On the left side however, there was mild increase in the median nerve cross-sectional area at the wrist, with no major change compared to the forearm (wrist CSA: 12.6 to 14.1 mm , proximal to wrist crease: 12.0 mm^2).  The right median nerve CSA proximal to the wrist crease was also increased (13.5 mm ).  Repeat ultrasonographic study of bilateral upper extremity median and ulnar nerves was requested, to specifically evaluate for enlargement at non-entrapment sites that would suggest a generalized demyelinating neuropathy.  Techniques:   Ultrasound of the bilateral median and ulnar nerves from the wrist to the upper arm was performed with a 15 Mhz transducer.   Results:   Bilateral ulnar nerves showed increased cross sectional area (CSA), reduced echogenicity and disturbed fascicular architecture at the proximal cubital tunnel, next to the medial epicondyle (right side: 0.24 cm , left side:  0.12-0.13 cm , normal is <0.09 cm ).  Bilateral median nerves showed increased CSA, reduced echogenicity, and disturbed fascicular architecture at the carpal tunnel (right side CSA 0.14 cm , left sided CSA: 0.11-0.12 cm , normal is <0.11 cm ).  Importantly, both median and ulnar nerves showed normal CSA, echogenicity, and fascicular architecture at several sites of the forearm, and upper arm.  There was no evidence of median or ulnar nerve enlargement at non-entrapment sites from this study.  Compared to the previous study, the right median nerve CSA at the wrist is improved after the recent surgery.  The right ulnar nerve CSA is unchanged.  Interpretation:   Abnormal study.  There is ultrasonographic evidence of bilateral carpal tunnel syndrome, and bilateral cubital tunnel syndrome, right>left.  The right carpal tunnel syndrome appears improved compared to the preoperative study.  The right ulnar neuropathy at the elbow is unchanged, although ultrasonographic improvement can lag several months behind the clinical improvement.  There was no ultrasonographic evidence of enlargement of the median or ulnar nerves at non-entrapment sites that would suggest a generalized demyelinating polyneuropathy.   Ultrasonographer:   Rodolfo Santamaria MD, FAAN

## 2025-02-20 NOTE — LETTER
2025       RE: Neema Hunt  [address] [facility] [address]     Dear Colleague,    Thank you for referring your patient, Neema Hunt, to the [facility] EMG CLINIC [place] at [facility]. Please see a copy of my visit note below.                        [facility]  Electrodiagnostic Laboratory                 Department of Neurology                                                                                                         Test Date:  2025    Patient: Neema Hunt : 1965 Physician: Rodolfo Santamaria MD   Sex: Male AGE: 59 year Ref Phys: Scar Medina MD   ID#: [id number]   Technician: Fellow     History and Examination:    59 year old male with PMH of a sensorimotor axonal polyneuropathy affecting the LE, complaining of numbness in the fingers of his left hand.  He had a previous EMG study in our institution 2024 which confirmed the diagnosis of right carpal tunnel and cubital tunnel syndrome, but there was also notable slowing of the right median motor conduction velocity of the forearm (40 m/s), the etiology of which was unclear.  Subsequently, he underwent surgical release of the right carpal and cubital tunnel, with some improvement.  Repeat NCS/EMG ordered to evaluate for LEFT-sided median neuropathy at the wrist, and/or ulnar neuropathy at the elbow, and to assess for interval changes of the right median and ulnar motor nerve conduction studies after the surgical release.    Techniques:    Motor and sensory conduction studies were done with surface recording electrodes. Upper extremities were maintained at a temperature of 32 degrees Centigrade or higher and Lower extremities were maintained at a temperature of 31 degrees Centigrade or higher.      Results    Nerve conduction studies showed absent left ulnar-D5 sensory response. Left radial-D1 sensory response showed reduced  amplitude, and normal conduction velocity. Left median-D2 sensory response showed low-normal amplitude, and moderate to severely slow conduction velocity.    Left median (APB) motor response showed prolonged distal latency, normal amplitudes, and moderate to severe conduction velocity slowing at the forearm (38 m/s). Right (APB) motor response showed prolonged distal latency, mildly reduced amplitudes without major segmental changes, and moderate conduction velocity slowing at the forearm (43 m/s). Left ulnar (ADM) motor response showed normal distal latency, normal amplitudes, mild conduction velocity slowing at the forearm and moderate to severe slowing across the elbow (45 and 37 m/s, respectively).  Right ulnar (ADM) motor response showed slightly prolonged distal latency, mildly reduced distal CMAP amplitude, with an amplitude drop of approximately 40% between the wrist and below elbow, and further 25% amplitude drop between the below and above elbow stimulation sites, and mildly to moderately reduced conduction velocities in all segments. Left median/ulnar 2nd lumbrical/palmar interossei comparison study showed increased interlatency difference.       Interpretation:    This is an abnormal study. There is electrophysiologic evidence of 1) bilateral ulnar neuropathies at the elbow (cubital tunnel syndrome).  The right shows mild improvement compared to the preoperative EMG/NCS, mostly regarding the conduction velocities at the elbow.  The right ulnar CMAP amplitudes are unchanged. 2) bilateral moderate median neuropathies at the wrist, as seen with carpal tunnel syndrome.  The right sided median neuropathy at the wrist appears improved compared to the preoperative study.  3) a coexistent sensory polyneuropathy, based on the attenuated radial sensory response on the left.  Please note, that the right radial sensory study that was done in 11/2024 showed similar findings. 4) attenuated median motor conduction  velocities at the forearm.  This slowing cannot be attributed to carpal tunnel syndrome, and therefore the possibility of a generalized demyelinating polyneuropathy cannot be excluded.  Correlation with clinical, and ultrasonographic findings is recommended.    Masood Lee DO   Neurophysiology Fellow    ATTENDING ADDENDUM: I was present during the entire study and directly supervised Fellow Dr Lee, who performed it. I agree with the analysis of results and interpretation as above, which I personally reviewed and edited. Rodolfo Santamaria MD           Nerve Conduction Studies  Motor Sites      Latency Neg. Amp Neg. Amp Diff Segment Distance Velocity Neg. Dur Neg Area Diff Temperature Comment   Site (ms) Norm (mV) Norm (%)  cm m/s Norm (ms) (%) ( C)    Left Median (APB) Motor   Wrist 5.0  < 4.4 6.6  > 5.0  Wrist-APB 8   6.9  34.8    Elbow 11.5 - 5.5  > 5.0 -17 Elbow-Wrist 25 38  > 48 7.4 -19 34.9    Right Median (APB) Motor   Wrist 5.2  < 4.4 4.7  > 5.0  Wrist-APB 8   6.2  30.5    Elbow 11.0 - 4.0  > 5.0 -15 Elbow-Wrist 25 43  > 48 7.5 -7 30.7    Left Median/Ulnar (Lumb-Dors Int II) Motor        Median (Lumb I)   Wrist 5.1 - 0.76 -  Wrist-Lumb I 10   6.5  29.7         Ulnar (Dors int II (pedis))   Wrist 3.3 - 0.12 -  Wrist-Dors int II (pedis) 10   3.6  30.2    Left Ulnar (ADM) Motor   Wrist 3.3  < 3.5 6.5  > 5.0  Wrist-ADM 8   6.6  30.5    Below Elbow 8.6 - 5.8 - -11 Below Elbow-Wrist 24 45  > 48 7.7 0 28.3    Above Elbow 11.3 - 5.6 - -3 Above Elbow-Below Elbow 10 37  > 48 8.3 -2 29.3    Right Ulnar (ADM) Motor   Wrist 3.7  < 3.5 4.7  > 5.0  Wrist-ADM 8   5.7  28.2    Below Elbow 9.2 - 2.6 - -45 Below Elbow-Wrist 24.5 45  > 48 6.5 -32 27.2    Above Elbow 11.5 - 1.94 - -25 Above Elbow-Below Elbow 10 43  > 48 8.3 -23 26.5      Sensory Sites      Onset Lat Peak Lat Amp (O-P) Amp (P-P) Segment Distance Velocity Temperature Comment   Site ms (ms)  V Norm ( V)  cm m/s Norm ( C)    Left Median Sensory   Wrist 3.9 4.9  10  > 10 16 Wrist-Dig II 14 36  > 48 31.4    Left Radial Sensory   Forearm-Wrist 2.1 2.9 9  > 15 11 Forearm-Wrist 10 48 - 29.8    Left Ulnar Sensory   Wrist-Dig V NR NR NR  > 8 NR Wrist-Dig V 12.5 NR  > 48 31.5            Waveforms / Images:    Motor                  Sensory                     Again, thank you for allowing me to participate in the care of your patient.      Sincerely,    Rodolfo Santamaria MD

## 2025-02-20 NOTE — LETTER
2/20/2025       RE: Neema Hunt  [address]  [place] [address]     Dear Colleague,    Thank you for referring your patient, Neema Hunt, to the [facility] EMG CLINIC [place] at [facility]. Please see a copy of my visit note below.    Neuromuscular Ultrasound Report   Patient: Neema Hunt  Patient ID: [id number]  YOB: 1965  Age: 59 years  Referring Physician: Scar Medina MD  History & Examination:   59-year-old man with numbness and tingling in both hands, and a known polyneuropathy affecting the lower extremities.  Previous EMG study of the right upper extremity done on November 2024 showed moderate to severe right carpal tunnel syndrome and a right ulnar neuropathy at the elbow.  However, there was also slowing of the right median motor conduction velocity at the forearm to 40 m/s,  which would not be expected with a median neuropathy at the wrist.  He underwent release of the right carpal and cubital tunnel a month ago.  He has similar symptoms at the left hand, but he has not undergone any release surgery yet. Another ultrasonographic study done by radiology department on December 2024 showed prominent enlargement of bilateral ulnar nerves at the elbow (right>left) consistent with cubital tunnel syndrome, and right carpal tunnel syndrome.  On the left side however, there was mild increase in the median nerve cross-sectional area at the wrist, with no major change compared to the forearm (wrist CSA: 12.6 to 14.1 mm , proximal to wrist crease: 12.0 mm^2).  The right median nerve CSA proximal to the wrist crease was also increased (13.5 mm ).  Repeat ultrasonographic study of bilateral upper extremity median and ulnar nerves was requested, to specifically evaluate for enlargement at non-entrapment sites that would suggest a generalized demyelinating neuropathy.  Techniques:   Ultrasound of the bilateral median and ulnar  nerves from the wrist to the upper arm was performed with a 15 Mhz transducer.   Results:   Bilateral ulnar nerves showed increased cross sectional area (CSA), reduced echogenicity and disturbed fascicular architecture at the proximal cubital tunnel, next to the medial epicondyle (right side: 0.24 cm , left side: 0.12-0.13 cm , normal is <0.09 cm ).  Bilateral median nerves showed increased CSA, reduced echogenicity, and disturbed fascicular architecture at the carpal tunnel (right side CSA 0.14 cm , left sided CSA: 0.11-0.12 cm , normal is <0.11 cm ).  Importantly, both median and ulnar nerves showed normal CSA, echogenicity, and fascicular architecture at several sites of the forearm, and upper arm.  There was no evidence of median or ulnar nerve enlargement at non-entrapment sites from this study.  Compared to the previous study, the right median nerve CSA at the wrist is improved after the recent surgery.  The right ulnar nerve CSA is unchanged.  Interpretation:   Abnormal study.  There is ultrasonographic evidence of bilateral carpal tunnel syndrome, and bilateral cubital tunnel syndrome, right>left.  The right carpal tunnel syndrome appears improved compared to the preoperative study.  The right ulnar neuropathy at the elbow is unchanged, although ultrasonographic improvement can lag several months behind the clinical improvement.  There was no ultrasonographic evidence of enlargement of the median or ulnar nerves at non-entrapment sites that would suggest a generalized demyelinating polyneuropathy.   Ultrasonographer:   Rodolfo Santamaria MD, FAAN           Again, thank you for allowing me to participate in the care of your patient.      Sincerely,    Rodolfo Santamaria MD

## 2025-02-25 ENCOUNTER — THERAPY VISIT (OUTPATIENT)
Dept: OCCUPATIONAL THERAPY | Facility: CLINIC | Age: 60
End: 2025-02-25
Payer: COMMERCIAL

## 2025-02-25 ENCOUNTER — OFFICE VISIT (OUTPATIENT)
Dept: NEUROSURGERY | Facility: CLINIC | Age: 60
End: 2025-02-25
Payer: COMMERCIAL

## 2025-02-25 VITALS — DIASTOLIC BLOOD PRESSURE: 71 MMHG | SYSTOLIC BLOOD PRESSURE: 108 MMHG | OXYGEN SATURATION: 100 % | HEART RATE: 64 BPM

## 2025-02-25 DIAGNOSIS — G56.01 CARPAL TUNNEL SYNDROME OF RIGHT WRIST: Primary | ICD-10-CM

## 2025-02-25 DIAGNOSIS — G56.21 ULNAR NEUROPATHY OF RIGHT UPPER EXTREMITY: ICD-10-CM

## 2025-02-25 DIAGNOSIS — Z98.890 S/P CARPAL TUNNEL RELEASE: Primary | ICD-10-CM

## 2025-02-25 PROCEDURE — 97035 APP MDLTY 1+ULTRASOUND EA 15: CPT | Mod: GO

## 2025-02-25 PROCEDURE — 99024 POSTOP FOLLOW-UP VISIT: CPT

## 2025-02-25 PROCEDURE — 97110 THERAPEUTIC EXERCISES: CPT | Mod: GO

## 2025-02-25 ASSESSMENT — PAIN SCALES - GENERAL: PAINLEVEL_OUTOF10: MODERATE PAIN (4)

## 2025-02-25 NOTE — PROGRESS NOTES
[facility] Neurosurgery Follow-Up:     HPI: 59M s/p R carpal tunnel release with Dr. Marshall on 1/13/25. Patient reports intermittent burning pain when working out. Improvement in pain. Denies any incision concerns. Denies any new or worsening symptoms. Patient has been following post op activity restrictions.     Current pain management:   Gabapentin  Robaxin  Oxy  Tylenol    Exam:  Constitutional:  Alert, well nourished, NAD.  HEENT: Normocephalic, atraumatic.   Pulm:  Without shortness of breath   CV:  No pitting edema of BLE.      /71   Pulse 64   SpO2 100%     Neurological:  Awake  Alert  Oriented x 3  Motor exam:  Hand            Right:  5/5   Left:  5/5  Intrinsics                   Right:  5/5   Left:  5/5    Incision: Well healed     Assessment:  59M s/p right carpal tunnel release and ulnar nerve release with normal post-operative course.     Plan:   -Continue with routine post op care plan   -May gradually increase activity and lifting restriction   -Follow up in 6 weeks with Dr. Marshall   -Call our clinic for any incisional concerns, increased pain, new symptoms, or any other post operative questions or concerns    Patient voiced understanding and agreement.      Ingrid Le, CNP  [facility] Neurosurgery  [address] [facility]  [address] [place] [id number]  Tel [phone]  Fax [phone]

## 2025-02-25 NOTE — NURSING NOTE
"Neema Hunt is a 59 year old male who presents for:  Chief Complaint   Patient presents with    Surgical Followup     Right open carpal tunnel release 6 week follow up DOS 1-13-25        Initial Vitals:  /71   Pulse 64   SpO2 100%  Estimated body mass index is 29.98 kg/m  as calculated from the following:    Height as of 1/31/25: 5' 9.5\" (1.765 m).    Weight as of 1/31/25: 206 lb (93.4 kg).. There is no height or weight on file to calculate BSA. BP completed using cuff size: regular  Moderate Pain (4)    Nursing Comments:     Alexsandra Jorge   "

## 2025-02-25 NOTE — LETTER
2/25/2025      Neema Hunt  [address] [facility] [address]      Dear Colleague,    Thank you for referring your patient, Neema Hunt, to the [facility] NEUROLOGY CLINICS [facility]. Please see a copy of my visit note below.    [facility] Neurosurgery Follow-Up:     HPI: 59M s/p R carpal tunnel release with Dr. Marshall on 1/13/25. Patient reports intermittent burning pain when working out. Improvement in pain. Denies any incision concerns. Denies any new or worsening symptoms. Patient has been following post op activity restrictions.     Current pain management:   Gabapentin  Robaxin  Oxy  Tylenol    Exam:  Constitutional:  Alert, well nourished, NAD.  HEENT: Normocephalic, atraumatic.   Pulm:  Without shortness of breath   CV:  No pitting edema of BLE.      /71   Pulse 64   SpO2 100%     Neurological:  Awake  Alert  Oriented x 3  Motor exam:  Hand            Right:  5/5   Left:  5/5  Intrinsics                   Right:  5/5   Left:  5/5    Incision: Well healed     Assessment:  59M s/p right carpal tunnel release and ulnar nerve release with normal post-operative course.     Plan:   -Continue with routine post op care plan   -May gradually increase activity and lifting restriction   -Follow up in 6 weeks with Dr. Marshall   -Call our clinic for any incisional concerns, increased pain, new symptoms, or any other post operative questions or concerns    Patient voiced understanding and agreement.      Ingrid Le CNP  [facility] Neurosurgery  [address] [place] [id number]  Tel [phone]  Fax [phone]         Again, thank you for allowing me to participate in the care of your patient.        Sincerely,        NADIR Kennedy CNP    Electronically signed

## 2025-02-26 ENCOUNTER — MYC MEDICAL ADVICE (OUTPATIENT)
Dept: ORTHOPEDICS | Facility: CLINIC | Age: 60
End: 2025-02-26
Payer: COMMERCIAL

## 2025-02-26 NOTE — TELEPHONE ENCOUNTER
Plan to reply to patient via [employer] to facilitate scheduling a visit.  Please advise if you'd like a longer appointment time to follow up regarding knees and begin addressing ankles.    Susan Ware, ATC

## 2025-02-26 NOTE — TELEPHONE ENCOUNTER
Spoke to patient to schedule visit.  Scheduled on 3/6/25.  No further questions at this time.    Susan Ware, ATC

## 2025-02-27 ENCOUNTER — OFFICE VISIT (OUTPATIENT)
Dept: NEUROLOGY | Facility: CLINIC | Age: 60
End: 2025-02-27
Payer: OTHER MISCELLANEOUS

## 2025-02-27 VITALS
HEIGHT: 70 IN | HEART RATE: 77 BPM | DIASTOLIC BLOOD PRESSURE: 87 MMHG | SYSTOLIC BLOOD PRESSURE: 127 MMHG | WEIGHT: 206 LBS | BODY MASS INDEX: 29.49 KG/M2

## 2025-02-27 DIAGNOSIS — G56.23 CUBITAL TUNNEL SYNDROME, BILATERAL: ICD-10-CM

## 2025-02-27 DIAGNOSIS — G56.03 BILATERAL CARPAL TUNNEL SYNDROME: ICD-10-CM

## 2025-02-27 DIAGNOSIS — G60.8 SENSORY POLYNEUROPATHY: Primary | ICD-10-CM

## 2025-02-27 DIAGNOSIS — M43.25 FUSION OF SPINE, THORACOLUMBAR REGION: ICD-10-CM

## 2025-02-27 NOTE — LETTER
2/27/2025      Neema Hunt  [address] [facility] [address]      Dear Colleague,    Thank you for referring your patient, Neema Hunt, to the [facility] [place]. Please see a copy of my visit note below.    [facility] Neurology Follow Up Visit    Neema Hunt MRN# [id number]   Age: 59 year old YOB: 1965     Brief history of symptoms: The patient was initially seen in neurologic consultation on 12/4/2024 for evaluation of numbness/weakness. Please see the comprehensive neurologic consultation note from that date in the Epic records for details.          Assessment and Plan:   Assessment:  Neema Hunt is a 59 year old male who presents today for evaluation of numbness and weakness. Based on EMG, history, and examination I suspect that leg weakness/numbness largely related to chronic radiculopathy and prior thoracic myelopathy. Examination is grossly stable from last visit. Prior MRI images did not show any residual stenosis.     Patient has had benefit from right sided carpal tunnel / cubital tunnel release. He has left sided carpal / cubital tunnel syndrome, but is going to monitor these symptoms for now.     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 I have a lower suspicion for etiology such as CIDP, based on history and exam. HNPP is a consideration given specific EMG findings. Patient would like to see genetic counselor to discuss testing.      Plan:  - Genetic counselor referral  - Continue to follow with pain management team     Follow up in Neurology clinic pending above     Scar Medina MD   of Neurology  [facility]  ---------------------------------------------------------------------------------------------------------------------------           Interval History:   Patient had surgery for carpal tunnel syndrome and cubital tunnel syndrome. This was helpful.      He is going to monitor left arm symptoms for now.     He is still having issues with back pains and leg pains.     He has nerve pains below the knees on both sides.     Both legs are numb, but the right side is worse.       Past Medical History:     Patient Active Problem List   Diagnosis     Acquired kyphosis     Other secondary scoliosis, lumbar region     Spinal stenosis of lumbar region without neurogenic claudication     Status post lumbar spinal fusion     History of lumbar fusion     Urinary retention with incomplete bladder emptying     Lumbar radiculopathy     Thoracic radiculopathy     Chronic pain of left knee     Carpal tunnel syndrome of right wrist     Ulnar neuropathy of right upper extremity     Past Medical History:   Diagnosis Date     Arthritis      Back pain      Dermatophytosis of nail      Eczema      Gastro-oesophageal reflux disease      Herpes      Hypertension      Insomnia      Lactose intolerance      Lumbar disc disease      Radiculopathy      Scoliosis      Spermatocele         Past Surgical History:     Past Surgical History:   Procedure Laterality Date     5TH FINGER SURGERY Right      ANKLE SURGERY Left      BLOCK, NERVE, GENICULAR Left 1/31/2025    Procedure: Diagnostic Genicular Nerve Block;  Surgeon: Regina Paris MD;  Location: [facility]     DAVINCI XI HERNIORRHAPHY INGUINAL Bilateral 07/26/2022    Procedure: Robotic repair of recurrent right inguinal hernia with placement of mesh, robotic repair of left inguinal hernia with placement of mesh;  Surgeon: Mary Morales MD;  Location:  OR     DAVINCI XI HERNIORRHAPHY VENTRAL N/A 07/26/2022    Procedure: Robotic repair of umbilical hernia with placement of mesh;  Surgeon: Mary Morales MD;  Location:  OR     DISCECTOMY LUMBAR POSTERIOR MICROSCOPIC ONE LEVEL Right 07/15/2020    Procedure: Right L5-S1 foraminotomy and microdiskectomy;  Surgeon: Nhan Marshall MD;  Location:  OR     EXPLORE SPINE, REMOVE HARDWARE,  COMBINED N/A 09/05/2019    Procedure: REMOVAL LUMBAR L3-5 INSTRUMENTATION;  Surgeon: Nhan Marshall MD;  Location: [facility] OR     EXTENSION OF LUMBAR FUSION FROM L5-S1 W/ REVISION OF PEDICLE RODS FROM L2-S1       FOOT BUNIONECTOMY Left      FUSION SPINE ANTERIOR MINIMALLY INVASIVE TWO LEVELS N/A 11/16/2016    Procedure: FUSION SPINE ANTERIOR MINIMALLY INVASIVE TWO LEVELS;  Surgeon: Nhan Marshall MD;  Location: [facility] OR     FUSION, SPINE, LUMBAR, 1 LEVEL, POSTERIOR APPROACH, ROBOTIC-ASSISTED N/A 05/27/2021    Procedure: Lumbar 5-Sacral 1 posterior segemental instrumentation, bilateral decompression and transforaminal interbody fusion using local autograft and allograft cancellous chips. Revision of Lumbar 2-4 ian. Posterior arthrodesis Lumbar 5-Sacral 1 using  local autograft and allograft cancellous chips;  Surgeon: Nhan Marshall MD;  Location: [facility] OR     HERNIA REPAIR Right      INJECT EPIDURAL THORACIC Bilateral 12/12/2024    Procedure: INJECTION, SPINE, THORACIC, EPIDURAL (T9/10 vs T10/T11);  Surgeon: Fabi Mejia MD;  Location: [facility] OR     INJECT EPIDURAL TRANSFORAMINAL Bilateral 11/01/2024    Procedure: Bilateral L5-S1 transforaminal epidural steroid injection;  Surgeon: Nicola Verma MD;  Location: [facility] OR     INSERTION OF THORACIC 10 TO LUMBAR 1 AND REVISION OF LUMBAR 2 TO SACRAL1 INSTRUMENTATION  12/21/2023     L3-4 DECOMPRESSION       L3-L4, L4-L5 FUSION W/ SCREWS       LAMINECTOMY LUMBAR POSTERIOR MICROSCOPIC ONE LEVEL  04/09/2013    Procedure: LAMINECTOMY LUMBAR POSTERIOR MICROSCOPIC ONE LEVEL;  Right Lumbar 3-4 Micro Laminectomy & Foraminotomy;  Surgeon: Nhan Marshall MD;  Location: [facility] OR     LAMINECTOMY THORACIC ONE LEVEL N/A 09/27/2023    Procedure: Thoracic 11 to thoracic 12 laminectomy;  Surgeon: Zane Ontiveros MD;  Location: [facility]     OPTICAL TRACKING SYSTEM FUSION SPINE POSTERIOR LUMBAR PERCUTANEOUS TWO LEVELS N/A 11/16/2016    Procedure: OPTICAL TRACKING SYSTEM FUSION  SPINE POSTERIOR LUMBAR PERCUTANEOUS TWO LEVELS;  Surgeon: Nhan Marshall MD;  Location: [facility] OR     OPTICAL TRACKING SYSTEM FUSION SPINE POSTERIOR LUMBAR THREE+ LEVELS Right 09/05/2019    Procedure: POSTERIOR SEGMENTAL INSTRUMENTATION L2-4, RIGHT LUMBAR 2-3 DISCECTOMY AND TRANSFORAMINAL INTERBODY FUSION, REDO DECOMPRESSION RIGHT L3-4, POSTERIOR ARTHRODESIS L2-4;  Surgeon: Nhan Marshall MD;  Location: [facility] OR     OPTICAL TRACKING SYSTEM FUSION SPINE POSTERIOR LUMBAR THREE+ LEVELS N/A 12/21/2023    Procedure: Insertion Thoracic 10 to Lumbar 1 and revision of Lumbar 2 to Sacral 1 instrumentation. Dual Pelvic Instrumentation. Revision fusion Lumbar 5 to Sacral 1 using allograft chips. Arthrodesis Thoracic 10 to Lumbar 2 using allograft cancellous chips;  Surgeon: Nhan Marshall MD;  Location: [facility] OR     R L5-S1 FORAMINOTOMY  & MICRODISCECTOMY       RELEASE CARPAL TUNNEL Right 1/13/2025    Procedure: Right open carpal tunnel release;  Surgeon: Nhan Marshall MD;  Location: [facility] OR     RELEASE ULNAR NERVE (ELBOW) Right 1/13/2025    Procedure: Right open ulnar nerve release;  Surgeon: Nhan Marshall MD;  Location: [facility] OR     UMBILICAL HERNIA AND RIGHT INGUINAL HERNIA REPAIR          Social History:     Social History     Tobacco Use     Smoking status: Never     Smokeless tobacco: Never   Vaping Use     Vaping status: Never Used   Substance Use Topics     Alcohol use: Yes     Comment: 3-4 drinks/week     Drug use: No        Family History:     Family History   Problem Relation Age of Onset     Asthma Mother      Hypertension Mother      Arthritis Mother         Medications:     Current Outpatient Medications   Medication Sig Dispense Refill     acetaminophen (TYLENOL) 500 MG tablet Take 1,000 mg by mouth daily as needed for mild pain (2 X 500 mg = 1000 mg)       amLODIPine-benazepril (LOTREL) 10-20 MG capsule Take 1 capsule by mouth every morning        Cyanocobalamin (B-12 PO) Take 2 chew tab by  mouth every morning        DULoxetine (CYMBALTA) 20 MG capsule        famotidine (PEPCID) 20 MG tablet Take 20 mg by mouth every evening       gabapentin (NEURONTIN) 300 MG capsule TAKE 3 CAPSULES(900 MG) BY MOUTH AT BEDTIME 90 capsule 1     gabapentin (NEURONTIN) 300 MG capsule TAKE 2 CAPSULES(600 MG) BY MOUTH AT BEDTIME (Patient not taking: Reported on 2/25/2025) 180 capsule 0     gabapentin (NEURONTIN) 300 MG capsule Take 1 capsule (300 mg) by mouth At Bedtime (Patient not taking: Reported on 2/25/2025) 90 capsule 3     Lidocaine (LIDOCARE) 4 % Patch Place 1 patch over 12 hours onto the skin every 24 hours. To prevent lidocaine toxicity, patient should be patch free for 12 hrs daily. 30 patch 3     lidocaine (LIDODERM) 5 % Patch Place 1 patch onto the skin every 24 hours (Patient not taking: Reported on 2/25/2025) 30 patch 2     lidocaine (XYLOCAINE) 5 % external ointment Apply topically as needed for moderate pain (nerve pain). (Patient not taking: Reported on 2/25/2025) 50 g 11     magnesium 250 MG tablet Take 1 tablet by mouth daily Daily       Melatonin 10 MG TABS tablet Take 10 mg by mouth nightly as needed for sleep       methocarbamol (ROBAXIN) 500 MG tablet Take 1 tablet (500 mg) by mouth 4 times daily as needed for muscle spasms. (Patient not taking: Reported on 2/25/2025) 40 tablet 0     Misc Natural Products (OSTEO BI-FLEX JOINT SHIELD) TABS Take 1 tablet by mouth daily       Multiple Vitamins-Minerals (AIRBORNE PO) Take 1 chew tab by mouth every morning        Multiple Vitamins-Minerals (MULTIVITAMIN ADULT PO) Take 2 chew tab by mouth every morning        naproxen sodium (ANAPROX) 220 MG tablet Take 220 mg by mouth 2 times daily (with meals).       omega 3 1000 MG CAPS Take 1 chew tab by mouth every morning        oxyCODONE (ROXICODONE) 5 MG tablet Take 1 tablet (5 mg) by mouth every 6 hours as needed for moderate to severe pain. (Patient not taking: Reported on 2/25/2025) 40 tablet 0     polyethylene  "glycol (MIRALAX) 17 g packet Take 1 packet by mouth daily       Probiotic Product (SOLUBLE FIBER/PROBIOTICS PO) Take 1 chew tab by mouth every morning        SENNA-docusate sodium (SENNA S) 8.6-50 MG tablet Take 1 tablet by mouth 2 times daily as needed (take while on oxy). 40 tablet 0     tamsulosin (FLOMAX) 0.4 MG capsule Take 0.4 mg by mouth daily.       tetrahydrozoline (VISINE) 0.05 % ophthalmic solution Place 1 drop into both eyes daily as needed       valACYclovir (VALTREX) 1000 mg tablet Take 1,000 mg by mouth 2 times daily as needed       No current facility-administered medications for this visit.        Allergies:   No Known Allergies     Review of Systems:   As above     Physical Exam:   Vitals: /87 (BP Location: Right arm, Patient Position: Sitting, Cuff Size: Adult Regular)   Pulse 77   Ht 1.778 m (5' 10\")   Wt 93.4 kg (206 lb)   BMI 29.56 kg/m     General: Seated comfortably in no acute distress.  Lungs: breathing comfortably  Neurologic:     Mental Status: Fully alert, attentive. Language normal, speech clear and fluent, no paraphasic errors.      Cranial Nerves: No dysarthria. Shoulder shrug strong bilaterally.     Motor: No resting tremors. Muscle tone normal throughout. Strength as below. Decreased ROM in bilateral inversion/eversion, but inversion/eversion appears 5/5.        Right Left   Shoulder abduction        5 5   Elbow extension 5 5   Elbow flexion 5 5   Wrist extension         5 5   Finger extension 5 5   FDP/FPL 5 5   ADM 4 5   FDI 4+ 5   APB 4+ 5   Hip flexion 5- 5-   Knee flexion 5 5   Knee extension 5 5   Dorsiflexion 5 5   Plantar flexion 5 5        Deep Tendon Reflexes:     Right Left   Biceps 2 2   BRD 2 2   Triceps 2 2   Patellar absent 2   Achilles 2 2   Plantar Flexor Flexor   Clonus Absent Absent       Sensory: Decreased sensation to light touch throughout entire lower extremities (right > left), worse in the feet. Temperature sensation is decreased in the right foot. " Proprioception is decreased in distal toes, otherwise intact. Vibration is absent in the great toes and right ankle, ~5 seconds in the left ankle and normal in the hands. Negative Romberg.      Coordination: Finger-nose-finger and heel-shin intact without dysmetria. Rapid alternating movements intact/symmetric with normal speed and rhythm.     Gait: Normal, steady casual gait. Able to walk on toes, heels and tandem without difficulty.       Data reviewed on previous visits    Imaging:  MRI thoracic 10/2024  IMPRESSION: Postoperative and degenerative change of the thoracic  spine as detailed above. No high-grade spinal canal stenosis. Mild to  moderate degenerative neural foraminal stenosis on the left at T6-T7,  T7-T8 and T8-T9.     MRI lumbar 9/2024  IMPRESSION:  1.  Extensive postoperative changes throughout the lumbar spine. Metal hardware causes artifact and makes evaluation difficult.  2.  No definite spinal canal narrowing appreciated at any level.  3.  Apparent moderate bilateral neural foraminal narrowings at L5-S1.     MRI cervical 9/2023  IMPRESSION:  1. Multilevel degenerative changes of the cervical spine, as  described.  2. No high-grade central spinal canal stenosis. No spinal cord signal  abnormality.  3. Varying degrees of multilevel degenerative neural foraminal  stenosis, including moderate to severe neural foraminal stenosis at  multiple levels. Please see the body of the report for details.     CT T/L 10/2024  IMPRESSION:  1. Status post posterior fusion from T10 down to sacrum, bilateral  sacroiliac joint fusion, intervertebral fusion at L2-3, L3-4, L4-5 and  L5-S1. Removal of previous S1 posterior screws.  2. Bilateral lucency surrounding sacroiliac joint fusion screws,  worrisome for screw loosening.  3. Absence of bridging ossification at L5-S1 disc space. Otherwise  L2-3, L3-4 and L4-5 disc spaces are completely fused.  4. No lytic bone lesion. No acute fracture or subluxation.      Procedures:  EMG 11/2024  Interpretation:  Abnormal study. 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 3) Attenuated right radial sensory response consistent with generalized sensory polyneuropathy. Correlate with results of previous lower extremity nerve conduction studies. 4) Possible superimposed right median neuropathy at the forearm vs generalized demyelinating polyneuropathy.  Recommendations: 1) Referral for right carpal and cubital tunnel decompression surgery, 2) Ultrasonographic study of at least the right and ideally bilateral upper extremities, to confirm the localization of the focal neuropathies diagnosed above, and to exclude enlargement of nerves at non-entrapment sites, that would suggest a demyelinating polyneuropathy 3) Neurology referral to determine etiology of polyneuropathy.      EMG 8/2024  Interpretation:  This is an abnormal examination. There is electrophysiologic evidence of the following:  - Chronic right sided L2-S1 radiculopathy with active denervation changes noted at L4 and L5  - Chronic left sided L4-S1 radiculopathy with active denervation changes noted at L4, L5, and S1  - Mild length dependent symmetric sensory axonal polyneuropathy    Laboratory:  CBC with Hgb 8.7, BMP with elevated glucose (12/2023)    Pertinent Investigations since last visit:   Unremarkable A1c, B12, SSA, SSB, immunofixation, ELP, hepatic panel     EMG 2/2025  Interpretation:  This is an abnormal study. There is electrophysiologic evidence of 1) bilateral ulnar neuropathies at the elbow (cubital tunnel syndrome).  The right shows mild improvement compared to the preoperative EMG/NCS, mostly regarding the conduction velocities at the elbow.  The right ulnar CMAP amplitudes are unchanged. 2) bilateral moderate median neuropathies at the wrist, as seen with carpal  tunnel syndrome.  The right sided median neuropathy at the wrist appears improved compared to the preoperative study.  3) a coexistent sensory polyneuropathy, based on the attenuated radial sensory response on the left.  Please note, that the right radial sensory study that was done in 11/2024 showed similar findings. 4) attenuated median motor conduction velocities at the forearm.  This slowing cannot be attributed to carpal tunnel syndrome, and therefore the possibility of a generalized demyelinating polyneuropathy cannot be excluded.  Correlation with clinical, and ultrasonographic findings is recommended.    US bilateral upper extremities 2/2025 Interpretation: Abnormal study.  There is ultrasonographic evidence of bilateral carpal tunnel syndrome, and bilateral cubital tunnel syndrome, right>left.  The right carpal tunnel syndrome appears improved compared to the preoperative study.  The right ulnar neuropathy at the elbow is unchanged, although ultrasonographic improvement can lag several months behind the clinical improvement.  There was no ultrasonographic evidence of enlargement of the median or ulnar nerves at non-entrapment sites that would suggest a generalized demyelinating polyneuropathy.     The total time of this encounter today amounted to 45 minutes. This time included time spent with the patient, prep work, ordering tests, and performing post visit documentation.      Again, thank you for allowing me to participate in the care of your patient.        Sincerely,        Scar Medina MD    Electronically signed

## 2025-02-27 NOTE — NURSING NOTE
"Neema Hunt's goals for this visit include:   Chief Complaint   Patient presents with    RECHECK     Bilateral carpal tunnel syndrome       He requests these members of his care team be copied on today's visit information: yes    PCP: No Ref-Primary, Physician    Referring Provider:  Referred Self, MD  No address on file    /87 (BP Location: Right arm, Patient Position: Sitting, Cuff Size: Adult Regular)   Pulse 77   Ht 1.778 m (5' 10\")   Wt 93.4 kg (206 lb)   BMI 29.56 kg/m      Do you need any medication refills at today's visit? No  JAMI Bolton., CMA ([facility])      "

## 2025-02-27 NOTE — PROGRESS NOTES
[facility] Neurology Follow Up Visit    Neema Hunt MRN# [id number]   Age: 59 year old YOB: 1965     Brief history of symptoms: The patient was initially seen in neurologic consultation on 12/4/2024 for evaluation of numbness/weakness. Please see the comprehensive neurologic consultation note from that date in the Epic records for details.          Assessment and Plan:   Assessment:  Neema Hunt is a 59 year old male who presents today for evaluation of numbness and weakness. Based on EMG, history, and examination I suspect that leg weakness/numbness largely related to chronic radiculopathy and prior thoracic myelopathy. Examination is grossly stable from last visit. Prior MRI images did not show any residual stenosis.     Patient has had benefit from right sided carpal tunnel / cubital tunnel release. He has left sided carpal / cubital tunnel syndrome, but is going to monitor these symptoms for now.     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 I have a lower suspicion for etiology such as CIDP, based on history and exam. HNPP is a consideration given specific EMG findings. Patient would like to see genetic counselor to discuss testing.      Plan:  - Genetic counselor referral  - Continue to follow with pain management team     Follow up in Neurology clinic pending above     Scar Medina MD   of Neurology  [facility]  ---------------------------------------------------------------------------------------------------------------------------           Interval History:   Patient had surgery for carpal tunnel syndrome and cubital tunnel syndrome. This was helpful.     He is going to monitor left arm symptoms for now.     He is still having issues with back pains and leg pains.     He has nerve pains below the knees on both sides.     Both legs are numb, but the right side is worse.       Past Medical History:      Patient Active Problem List   Diagnosis    Acquired kyphosis    Other secondary scoliosis, lumbar region    Spinal stenosis of lumbar region without neurogenic claudication    Status post lumbar spinal fusion    History of lumbar fusion    Urinary retention with incomplete bladder emptying    Lumbar radiculopathy    Thoracic radiculopathy    Chronic pain of left knee    Carpal tunnel syndrome of right wrist    Ulnar neuropathy of right upper extremity     Past Medical History:   Diagnosis Date    Arthritis     Back pain     Dermatophytosis of nail     Eczema     Gastro-oesophageal reflux disease     Herpes     Hypertension     Insomnia     Lactose intolerance     Lumbar disc disease     Radiculopathy     Scoliosis     Spermatocele         Past Surgical History:     Past Surgical History:   Procedure Laterality Date    5TH FINGER SURGERY Right     ANKLE SURGERY Left     BLOCK, NERVE, GENICULAR Left 1/31/2025    Procedure: Diagnostic Genicular Nerve Block;  Surgeon: Regina Paris MD;  Location: [facility] OR    DAVINCI XI HERNIORRHAPHY INGUINAL Bilateral 07/26/2022    Procedure: Robotic repair of recurrent right inguinal hernia with placement of mesh, robotic repair of left inguinal hernia with placement of mesh;  Surgeon: Mary Morales MD;  Location: [facility] OR    DAVINCI XI HERNIORRHAPHY VENTRAL N/A 07/26/2022    Procedure: Robotic repair of umbilical hernia with placement of mesh;  Surgeon: Mary Morales MD;  Location: [facility] OR    DISCECTOMY LUMBAR POSTERIOR MICROSCOPIC ONE LEVEL Right 07/15/2020    Procedure: Right L5-S1 foraminotomy and microdiskectomy;  Surgeon: Nhan Marshall MD;  Location:  OR    EXPLORE SPINE, REMOVE HARDWARE, COMBINED N/A 09/05/2019    Procedure: REMOVAL LUMBAR L3-5 INSTRUMENTATION;  Surgeon: Nhan Marshall MD;  Location: [facility] OR    EXTENSION OF LUMBAR FUSION FROM L5-S1 W/ REVISION OF PEDICLE RODS FROM L2-S1      FOOT BUNIONECTOMY Left     FUSION SPINE ANTERIOR MINIMALLY INVASIVE  TWO LEVELS N/A 11/16/2016    Procedure: FUSION SPINE ANTERIOR MINIMALLY INVASIVE TWO LEVELS;  Surgeon: Nhan Marshall MD;  Location: [facility] OR    FUSION, SPINE, LUMBAR, 1 LEVEL, POSTERIOR APPROACH, ROBOTIC-ASSISTED N/A 05/27/2021    Procedure: Lumbar 5-Sacral 1 posterior segemental instrumentation, bilateral decompression and transforaminal interbody fusion using local autograft and allograft cancellous chips. Revision of Lumbar 2-4 ian. Posterior arthrodesis Lumbar 5-Sacral 1 using  local autograft and allograft cancellous chips;  Surgeon: Nhan Marshall MD;  Location: [facility] OR    HERNIA REPAIR Right     INJECT EPIDURAL THORACIC Bilateral 12/12/2024    Procedure: INJECTION, SPINE, THORACIC, EPIDURAL (T9/10 vs T10/T11);  Surgeon: Fabi Mejia MD;  Location: [facility] OR    INJECT EPIDURAL TRANSFORAMINAL Bilateral 11/01/2024    Procedure: Bilateral L5-S1 transforaminal epidural steroid injection;  Surgeon: Nicola Verma MD;  Location: [facility] OR    INSERTION OF THORACIC 10 TO LUMBAR 1 AND REVISION OF LUMBAR 2 TO SACRAL1 INSTRUMENTATION  12/21/2023    L3-4 DECOMPRESSION      L3-L4, L4-L5 FUSION W/ SCREWS      LAMINECTOMY LUMBAR POSTERIOR MICROSCOPIC ONE LEVEL  04/09/2013    Procedure: LAMINECTOMY LUMBAR POSTERIOR MICROSCOPIC ONE LEVEL;  Right Lumbar 3-4 Micro Laminectomy & Foraminotomy;  Surgeon: Nhan Marshall MD;  Location: [facility] OR    LAMINECTOMY THORACIC ONE LEVEL N/A 09/27/2023    Procedure: Thoracic 11 to thoracic 12 laminectomy;  Surgeon: Zane Ontiveros MD;  Location: [facility] OR    OPTICAL TRACKING SYSTEM FUSION SPINE POSTERIOR LUMBAR PERCUTANEOUS TWO LEVELS N/A 11/16/2016    Procedure: OPTICAL TRACKING SYSTEM FUSION SPINE POSTERIOR LUMBAR PERCUTANEOUS TWO LEVELS;  Surgeon: Nhan Marshall MD;  Location: [facility] OR    OPTICAL TRACKING SYSTEM FUSION SPINE POSTERIOR LUMBAR THREE+ LEVELS Right 09/05/2019    Procedure: POSTERIOR SEGMENTAL INSTRUMENTATION L2-4, RIGHT LUMBAR 2-3 DISCECTOMY AND TRANSFORAMINAL  INTERBODY FUSION, REDO DECOMPRESSION RIGHT L3-4, POSTERIOR ARTHRODESIS L2-4;  Surgeon: Nhan Marshall MD;  Location: [facility] OR    OPTICAL TRACKING SYSTEM FUSION SPINE POSTERIOR LUMBAR THREE+ LEVELS N/A 12/21/2023    Procedure: Insertion Thoracic 10 to Lumbar 1 and revision of Lumbar 2 to Sacral 1 instrumentation. Dual Pelvic Instrumentation. Revision fusion Lumbar 5 to Sacral 1 using allograft chips. Arthrodesis Thoracic 10 to Lumbar 2 using allograft cancellous chips;  Surgeon: Nhan Marshall MD;  Location: [facility] OR    R L5-S1 FORAMINOTOMY  & MICRODISCECTOMY      RELEASE CARPAL TUNNEL Right 1/13/2025    Procedure: Right open carpal tunnel release;  Surgeon: Nhan Marshall MD;  Location: [facility] OR    RELEASE ULNAR NERVE (ELBOW) Right 1/13/2025    Procedure: Right open ulnar nerve release;  Surgeon: Nhan Marshall MD;  Location: [facility] OR    UMBILICAL HERNIA AND RIGHT INGUINAL HERNIA REPAIR          Social History:     Social History     Tobacco Use    Smoking status: Never    Smokeless tobacco: Never   Vaping Use    Vaping status: Never Used   Substance Use Topics    Alcohol use: Yes     Comment: 3-4 drinks/week    Drug use: No        Family History:     Family History   Problem Relation Age of Onset    Asthma Mother     Hypertension Mother     Arthritis Mother         Medications:     Current Outpatient Medications   Medication Sig Dispense Refill    acetaminophen (TYLENOL) 500 MG tablet Take 1,000 mg by mouth daily as needed for mild pain (2 X 500 mg = 1000 mg)      amLODIPine-benazepril (LOTREL) 10-20 MG capsule Take 1 capsule by mouth every morning       Cyanocobalamin (B-12 PO) Take 2 chew tab by mouth every morning       DULoxetine (CYMBALTA) 20 MG capsule       famotidine (PEPCID) 20 MG tablet Take 20 mg by mouth every evening      gabapentin (NEURONTIN) 300 MG capsule TAKE 3 CAPSULES(900 MG) BY MOUTH AT BEDTIME 90 capsule 1    gabapentin (NEURONTIN) 300 MG capsule TAKE 2 CAPSULES(600 MG) BY MOUTH AT  BEDTIME (Patient not taking: Reported on 2/25/2025) 180 capsule 0    gabapentin (NEURONTIN) 300 MG capsule Take 1 capsule (300 mg) by mouth At Bedtime (Patient not taking: Reported on 2/25/2025) 90 capsule 3    Lidocaine (LIDOCARE) 4 % Patch Place 1 patch over 12 hours onto the skin every 24 hours. To prevent lidocaine toxicity, patient should be patch free for 12 hrs daily. 30 patch 3    lidocaine (LIDODERM) 5 % Patch Place 1 patch onto the skin every 24 hours (Patient not taking: Reported on 2/25/2025) 30 patch 2    lidocaine (XYLOCAINE) 5 % external ointment Apply topically as needed for moderate pain (nerve pain). (Patient not taking: Reported on 2/25/2025) 50 g 11    magnesium 250 MG tablet Take 1 tablet by mouth daily Daily      Melatonin 10 MG TABS tablet Take 10 mg by mouth nightly as needed for sleep      methocarbamol (ROBAXIN) 500 MG tablet Take 1 tablet (500 mg) by mouth 4 times daily as needed for muscle spasms. (Patient not taking: Reported on 2/25/2025) 40 tablet 0    Misc Natural Products (OSTEO BI-FLEX JOINT SHIELD) TABS Take 1 tablet by mouth daily      Multiple Vitamins-Minerals (AIRBORNE PO) Take 1 chew tab by mouth every morning       Multiple Vitamins-Minerals (MULTIVITAMIN ADULT PO) Take 2 chew tab by mouth every morning       naproxen sodium (ANAPROX) 220 MG tablet Take 220 mg by mouth 2 times daily (with meals).      omega 3 1000 MG CAPS Take 1 chew tab by mouth every morning       oxyCODONE (ROXICODONE) 5 MG tablet Take 1 tablet (5 mg) by mouth every 6 hours as needed for moderate to severe pain. (Patient not taking: Reported on 2/25/2025) 40 tablet 0    polyethylene glycol (MIRALAX) 17 g packet Take 1 packet by mouth daily      Probiotic Product (SOLUBLE FIBER/PROBIOTICS PO) Take 1 chew tab by mouth every morning       SENNA-docusate sodium (SENNA S) 8.6-50 MG tablet Take 1 tablet by mouth 2 times daily as needed (take while on oxy). 40 tablet 0    tamsulosin (FLOMAX) 0.4 MG capsule Take  "0.4 mg by mouth daily.      tetrahydrozoline (VISINE) 0.05 % ophthalmic solution Place 1 drop into both eyes daily as needed      valACYclovir (VALTREX) 1000 mg tablet Take 1,000 mg by mouth 2 times daily as needed       No current facility-administered medications for this visit.        Allergies:   No Known Allergies     Review of Systems:   As above     Physical Exam:   Vitals: /87 (BP Location: Right arm, Patient Position: Sitting, Cuff Size: Adult Regular)   Pulse 77   Ht 1.778 m (5' 10\")   Wt 93.4 kg (206 lb)   BMI 29.56 kg/m     General: Seated comfortably in no acute distress.  Lungs: breathing comfortably  Neurologic:     Mental Status: Fully alert, attentive. Language normal, speech clear and fluent, no paraphasic errors.      Cranial Nerves: No dysarthria. Shoulder shrug strong bilaterally.     Motor: No resting tremors. Muscle tone normal throughout. Strength as below. Decreased ROM in bilateral inversion/eversion, but inversion/eversion appears 5/5.        Right Left   Shoulder abduction        5 5   Elbow extension 5 5   Elbow flexion 5 5   Wrist extension         5 5   Finger extension 5 5   FDP/FPL 5 5   ADM 4 5   FDI 4+ 5   APB 4+ 5   Hip flexion 5- 5-   Knee flexion 5 5   Knee extension 5 5   Dorsiflexion 5 5   Plantar flexion 5 5        Deep Tendon Reflexes:     Right Left   Biceps 2 2   BRD 2 2   Triceps 2 2   Patellar absent 2   Achilles 2 2   Plantar Flexor Flexor   Clonus Absent Absent       Sensory: Decreased sensation to light touch throughout entire lower extremities (right > left), worse in the feet. Temperature sensation is decreased in the right foot. Proprioception is decreased in distal toes, otherwise intact. Vibration is absent in the great toes and right ankle, ~5 seconds in the left ankle and normal in the hands. Negative Romberg.      Coordination: Finger-nose-finger and heel-shin intact without dysmetria. Rapid alternating movements intact/symmetric with normal speed " and rhythm.     Gait: Normal, steady casual gait. Able to walk on toes, heels and tandem without difficulty.       Data reviewed on previous visits    Imaging:  MRI thoracic 10/2024  IMPRESSION: Postoperative and degenerative change of the thoracic  spine as detailed above. No high-grade spinal canal stenosis. Mild to  moderate degenerative neural foraminal stenosis on the left at T6-T7,  T7-T8 and T8-T9.     MRI lumbar 9/2024  IMPRESSION:  1.  Extensive postoperative changes throughout the lumbar spine. Metal hardware causes artifact and makes evaluation difficult.  2.  No definite spinal canal narrowing appreciated at any level.  3.  Apparent moderate bilateral neural foraminal narrowings at L5-S1.     MRI cervical 9/2023  IMPRESSION:  1. Multilevel degenerative changes of the cervical spine, as  described.  2. No high-grade central spinal canal stenosis. No spinal cord signal  abnormality.  3. Varying degrees of multilevel degenerative neural foraminal  stenosis, including moderate to severe neural foraminal stenosis at  multiple levels. Please see the body of the report for details.     CT T/L 10/2024  IMPRESSION:  1. Status post posterior fusion from T10 down to sacrum, bilateral  sacroiliac joint fusion, intervertebral fusion at L2-3, L3-4, L4-5 and  L5-S1. Removal of previous S1 posterior screws.  2. Bilateral lucency surrounding sacroiliac joint fusion screws,  worrisome for screw loosening.  3. Absence of bridging ossification at L5-S1 disc space. Otherwise  L2-3, L3-4 and L4-5 disc spaces are completely fused.  4. No lytic bone lesion. No acute fracture or subluxation.     Procedures:  EMG 11/2024  Interpretation:  Abnormal study. 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 3) Attenuated right radial sensory response consistent with generalized sensory  polyneuropathy. Correlate with results of previous lower extremity nerve conduction studies. 4) Possible superimposed right median neuropathy at the forearm vs generalized demyelinating polyneuropathy.  Recommendations: 1) Referral for right carpal and cubital tunnel decompression surgery, 2) Ultrasonographic study of at least the right and ideally bilateral upper extremities, to confirm the localization of the focal neuropathies diagnosed above, and to exclude enlargement of nerves at non-entrapment sites, that would suggest a demyelinating polyneuropathy 3) Neurology referral to determine etiology of polyneuropathy.      EMG 8/2024  Interpretation:  This is an abnormal examination. There is electrophysiologic evidence of the following:  - Chronic right sided L2-S1 radiculopathy with active denervation changes noted at L4 and L5  - Chronic left sided L4-S1 radiculopathy with active denervation changes noted at L4, L5, and S1  - Mild length dependent symmetric sensory axonal polyneuropathy    Laboratory:  CBC with Hgb 8.7, BMP with elevated glucose (12/2023)    Pertinent Investigations since last visit:   Unremarkable A1c, B12, SSA, SSB, immunofixation, ELP, hepatic panel     EMG 2/2025  Interpretation:  This is an abnormal study. There is electrophysiologic evidence of 1) bilateral ulnar neuropathies at the elbow (cubital tunnel syndrome).  The right shows mild improvement compared to the preoperative EMG/NCS, mostly regarding the conduction velocities at the elbow.  The right ulnar CMAP amplitudes are unchanged. 2) bilateral moderate median neuropathies at the wrist, as seen with carpal tunnel syndrome.  The right sided median neuropathy at the wrist appears improved compared to the preoperative study.  3) a coexistent sensory polyneuropathy, based on the attenuated radial sensory response on the left.  Please note, that the right radial sensory study that was done in 11/2024 showed similar findings. 4) attenuated  median motor conduction velocities at the forearm.  This slowing cannot be attributed to carpal tunnel syndrome, and therefore the possibility of a generalized demyelinating polyneuropathy cannot be excluded.  Correlation with clinical, and ultrasonographic findings is recommended.    US bilateral upper extremities 2/2025 Interpretation: Abnormal study.  There is ultrasonographic evidence of bilateral carpal tunnel syndrome, and bilateral cubital tunnel syndrome, right>left.  The right carpal tunnel syndrome appears improved compared to the preoperative study.  The right ulnar neuropathy at the elbow is unchanged, although ultrasonographic improvement can lag several months behind the clinical improvement.  There was no ultrasonographic evidence of enlargement of the median or ulnar nerves at non-entrapment sites that would suggest a generalized demyelinating polyneuropathy.     The total time of this encounter today amounted to 45 minutes. This time included time spent with the patient, prep work, ordering tests, and performing post visit documentation.

## 2025-03-04 ENCOUNTER — VIRTUAL VISIT (OUTPATIENT)
Dept: CONSULT | Facility: CLINIC | Age: 60
End: 2025-03-04
Attending: GENETIC COUNSELOR, MS
Payer: COMMERCIAL

## 2025-03-04 DIAGNOSIS — G56.01 CARPAL TUNNEL SYNDROME OF RIGHT WRIST: ICD-10-CM

## 2025-03-04 DIAGNOSIS — Z71.83 ENCOUNTER FOR NONPROCREATIVE GENETIC COUNSELING: ICD-10-CM

## 2025-03-04 DIAGNOSIS — G56.21 ULNAR NEUROPATHY OF RIGHT UPPER EXTREMITY: Primary | ICD-10-CM

## 2025-03-04 PROCEDURE — 96041 GENETIC COUNSELING SVC EA 30: CPT | Mod: GT,95 | Performed by: GENETIC COUNSELOR, MS

## 2025-03-04 NOTE — TELEPHONE ENCOUNTER
REASON FOR VISIT: New bilateral ankle exam    DATE OF APPT: 3/14/2025   NOTES (FOR ALL VISITS) STATUS DETAILS   OFFICE NOTE from referring provider N/A    EMG [facility]  EMG 11/05/2024   MEDICATION LIST N/A    IMAGING  (FOR ALL VISITS)     XR N/A    MRI (HEAD, NECK, SPINE) N/A    CT (HEAD, NECK, SPINE) N/A

## 2025-03-04 NOTE — PROGRESS NOTES
Neema Hunt was seen for a genetic counseling appointment at the request of Dr. Medina today given his suspected diagnosis of HNPP.     Pertinent Medical History: Neema is a 59 year old male with a history of numbness and weakness. EMG findings are suggestive of HNPP. See Dr. Medina's note for additional details.     Family History: A three generation pedigree was obtained today and scanned into the EMR. This family history is by patient report only and has not been verified with medical records except where noted. The following information is significant:   Children-   Daughter (age 26) has a history of a blood clot. She has one daughter (age 4) with behavioral concerns and one son (age 2) who is alive and well  Son (age 22) is alive and well  Siblings-   Paternal half sister in her 60s-limited information is available regarding her health  Maternal half sister (age 68) has high blood pressure, blood clots, pulmonary problems including nodule on her lung  Parents-   Father  at age 85 due to prostate cancer  Mother  at age 87 and had a history of Alzheimers disease, COPD and smoking.  Maternal Relatives-   Aunts, uncles and cousins have no history of neurologic concerns  Grandfather is   Grandmother is  and had a history of a stroke  Paternal Relatives-   Limited information is available regarding paternal family      Family history is otherwise largely non-contributory.     Discussion:     Hereditary neuropathy with liability to pressure palsy (HNPP)  HNPP is a genetic condition that affects the peripheral nervous system. The nerves in your body are responsible for carrying messages from your brain to your spine, your spine to the rest of your body and from your body back to your spine and brain. There are two types of nerves and each type carries a different type of message. Your motor nerves are responsible for carrying messages that help with movement. Your sensory nerves are responsible  for carrying messages related to touch, temperature and sensation. These nerves have two major components, the myelin and the axon. The axon is the main part of the nerve and it controls the strength of the messages. The myelin is the coating that wraps around the axon and controls the speed of the message. HNPP causes damage to the myelin part of both the sensory and motor nerves. When the myelin is damaged it creates higher sensitivity to pressure that is placed on the nerves.     People put pressure on their nerves every day when completing many different activities. These might be as simple as crossing your legs or leaning on a desk. When people with HNPP put pressure on their nerves, they experience difficulties with muscle movement such as muscle weakness and loss of muscle controll as well as sensory changes such as numbness, tingling or a burning sensation. These symptoms can occur anywhere but most often occur in areas that are frequently exposed to pressure including the elbows and knees.    The amount of time that these symptoms last is different for different people. Some people may experience sensory and movement difficulties for a few minutes while others may have these symptoms of months at a time. It is impossible to predict the length of time someone will experience these symptoms. These symptoms often resolve on their own and individuals with HNPP frequently recover all sensation and function after a certain period of time. However, repeated pressure on the same areas of the body for many years can lead to permanent changes in some individuals.    Individuals with HNPP typically begin to experience the symptoms described above in their second or third decade of life. However, these symptoms can begin at any time in a person's life. This age of onset is different in every person, even members of the same family.     Our genes are sequences of letters that provide instructions that help our body  grow, develop and function. Sometimes a change occurs in one of our genes that causes the body to be unable to read these instructions. This results in a genetic condition.    HNPP is caused by deletions in a gene called PMP22 and is inherited in an autosomal dominant manner. This means that only one deletion in one copy of the PMP22 gene is needed to cause symptoms of this condition. For individuals with HNPP, with each pregnancy, there is a 50% chance to have a child with HNPP and a 50% chance to have a child without this condition. Genetic testing is necessary to determine if an individual is affected with this condition.    Confirmatory genetic testing  Genetic testing for HNPP looks at the PMP22 gene to determine if any deletions or duplications of this gene are present.    There are three possible results for this testing:    Positive: A positive result indicates that a genetic variant has been identified that explains the cause of Neema s symptoms. A positive result may provide information on prognosis and other symptoms related to the genetic change. It will also help guide medical management for Neema and will provide information to other family members regarding their risk.   Negative: A negative result indicates that a disease causing genetic variant was not identified  Variant of uncertain significance (VUS): While rare, it is possible to get this result on PMP22 genetic testing. A VUS is an uncertain result that indicates a genetic change was identified, but it is currently unknown if that change is associated with a genetic disorder.    Risks benefits and limitations of this testing were reviewed. Neema decided to pursue genetic testing for HNPP today pending insurance approval.    Plan:  1. PMP22 sequencing and deletion/duplication analysis at the [facility] molecular diagnostics lab pending insurance approval  2. Return pending results of above testing  3. Contact information was provided should any  questions arise in the future.     Luana Giordano MS [facility]  Genetic Counselor  Division of Genetics and Metabolism  (p) [phone]  (f) [phone]     26 minutes spent on the date of the encounter in chart review, patient visit, test coordination/review, documentation, and/or discussion with other providers about the issues documented above      Cc: No Letter

## 2025-03-04 NOTE — Clinical Note
3/4/2025      RE: Neema Hunt  [address] [address]     Dear Colleague,    Thank you for the opportunity to participate in the care of your patient, Neema Hunt, at the [facility] at [facility]. Please see a copy of my visit note below.    No notes on file    Please do not hesitate to contact me if you have any questions/concerns.     Sincerely,       Luana Giordano GC

## 2025-03-07 ENCOUNTER — ANCILLARY PROCEDURE (OUTPATIENT)
Dept: RADIOLOGY | Facility: AMBULATORY SURGERY CENTER | Age: 60
End: 2025-03-07
Attending: ANESTHESIOLOGY
Payer: COMMERCIAL

## 2025-03-07 ENCOUNTER — HOSPITAL ENCOUNTER (OUTPATIENT)
Facility: AMBULATORY SURGERY CENTER | Age: 60
Discharge: HOME | End: 2025-03-07
Attending: ANESTHESIOLOGY
Payer: COMMERCIAL

## 2025-03-07 VITALS
HEIGHT: 70 IN | WEIGHT: 206 LBS | TEMPERATURE: 97.3 F | BODY MASS INDEX: 29.49 KG/M2 | HEART RATE: 55 BPM | SYSTOLIC BLOOD PRESSURE: 137 MMHG | OXYGEN SATURATION: 99 % | DIASTOLIC BLOOD PRESSURE: 85 MMHG | RESPIRATION RATE: 14 BRPM

## 2025-03-07 DIAGNOSIS — G89.29 CHRONIC PAIN OF LEFT KNEE: ICD-10-CM

## 2025-03-07 DIAGNOSIS — M25.562 CHRONIC PAIN OF LEFT KNEE: ICD-10-CM

## 2025-03-07 RX ORDER — IOPAMIDOL 408 MG/ML
INJECTION, SOLUTION INTRATHECAL PRN
Status: DISCONTINUED | OUTPATIENT
Start: 2025-03-07 | End: 2025-03-07 | Stop reason: HOSPADM

## 2025-03-07 RX ORDER — BUPIVACAINE HYDROCHLORIDE 2.5 MG/ML
INJECTION, SOLUTION INFILTRATION; PERINEURAL PRN
Status: DISCONTINUED | OUTPATIENT
Start: 2025-03-07 | End: 2025-03-07 | Stop reason: HOSPADM

## 2025-03-07 NOTE — DISCHARGE INSTRUCTIONS
Home Care Instructions after a Genicular Nerve Block      In a genicular nerve block, a local anesthetic (numbing medicine) is injected near sensory nerves which carry pain signals to the brain. This procedure is a diagnostic procedure and is typically short lasting. With this injection a steroid to increase the longevity of the blocks effect may be used.    Activity  -You may resume most normal activity levels with the exception of strenuous activity. It is important for us to know if your pain with normal activity is relieved after this injection.  -DO NOT shower for 24 hours  -DO NOT remove bandaid for 24 hours    Pain  -You may experience soreness at the injection site for one or two days  -You may use an ice pack for 20 minutes every 2 hours for the first 24 hours  -You may use a heating pad after the first 24 hours  -You may use Tylenol (acetaminophen) every 4 hours or other pain medicines as     directed by your physician    You may experience numbness radiating into your legs or arms (depending on the procedure location). This numbness may last several hours. Until sensation returns to normal; please use caution in walking, climbing stairs, and stepping out of your vehicle, etc.      PLEASE KEEP TRACK OF YOUR SYMPTOMS AND NOTE YOUR IMPROVEMENT FOR YOUR DOCTOR.     Please contact us if you have:  -Severe pain  -Fever more than 101.5 degrees Fahrenheit  -Signs of infection at the injection site (redness, swelling, or drainage)    FOR PAIN CENTER PATIENTS:  If you have questions, please contact the Pain Clinic at [phone] Option #1 between the hours of 7:00 am and 3:00 pm Monday through Friday. After office hours you can contact the on call provider by dialing [phone]. If you need immediate attention, we recommend that you go to a hospital emergency room or dial [phone].

## 2025-03-10 ENCOUNTER — LAB (OUTPATIENT)
Dept: LAB | Facility: CLINIC | Age: 60
End: 2025-03-10
Payer: COMMERCIAL

## 2025-03-10 DIAGNOSIS — G56.21 ULNAR NEUROPATHY OF RIGHT UPPER EXTREMITY: ICD-10-CM

## 2025-03-10 DIAGNOSIS — G56.01 CARPAL TUNNEL SYNDROME OF RIGHT WRIST: ICD-10-CM

## 2025-03-11 ENCOUNTER — DOCUMENTATION ONLY (OUTPATIENT)
Dept: ANESTHESIOLOGY | Facility: CLINIC | Age: 60
End: 2025-03-11

## 2025-03-11 ENCOUNTER — DOCUMENTATION ONLY (OUTPATIENT)
Dept: LAB | Facility: CLINIC | Age: 60
End: 2025-03-11

## 2025-03-11 ENCOUNTER — LAB (OUTPATIENT)
Dept: LAB | Facility: CLINIC | Age: 60
End: 2025-03-11
Payer: COMMERCIAL

## 2025-03-11 DIAGNOSIS — G56.01 CARPAL TUNNEL SYNDROME OF RIGHT WRIST: ICD-10-CM

## 2025-03-11 DIAGNOSIS — G56.21 ULNAR NEUROPATHY OF RIGHT UPPER EXTREMITY: Primary | ICD-10-CM

## 2025-03-11 PROCEDURE — 81479 UNLISTED MOLECULAR PATHOLOGY: CPT | Performed by: INTERNAL MEDICINE

## 2025-03-11 PROCEDURE — 81325 PMP22 GENE FULL SEQUENCE: CPT | Performed by: INTERNAL MEDICINE

## 2025-03-11 PROCEDURE — G0452 MOLECULAR PATHOLOGY INTERPR: HCPCS | Mod: 26 | Performed by: STUDENT IN AN ORGANIZED HEALTH CARE EDUCATION/TRAINING PROGRAM

## 2025-03-11 NOTE — PROGRESS NOTES
Prior authorization for Neema's genetic testing is approved. eOOP $0 so [facility] will proceed with testing. We will reach out with results when they are available in 4-6 weeks.    Arlin Singh  Genomics Billing    [facility]   [facility]  [address] [place] [id number]

## 2025-03-11 NOTE — PROGRESS NOTES
Purpose of call: Follow up after Diagnostic Genicular Nerve Block    Date of service: 3/7/25  Spoke with: Pain diary and patient    Location of pain: Knee - Left  Percentage of pain relief after procedure: 80%  Duration of pain relief: 3.5 hours    Follow up: Routing to provider to review and advise.    Debbie Kevin RNCC

## 2025-03-13 DIAGNOSIS — M25.562 CHRONIC PAIN OF LEFT KNEE: Primary | ICD-10-CM

## 2025-03-13 DIAGNOSIS — G89.29 CHRONIC PAIN OF LEFT KNEE: Primary | ICD-10-CM

## 2025-03-14 ENCOUNTER — ANCILLARY PROCEDURE (OUTPATIENT)
Dept: GENERAL RADIOLOGY | Facility: CLINIC | Age: 60
End: 2025-03-14
Attending: STUDENT IN AN ORGANIZED HEALTH CARE EDUCATION/TRAINING PROGRAM
Payer: COMMERCIAL

## 2025-03-14 ENCOUNTER — PRE VISIT (OUTPATIENT)
Dept: ORTHOPEDICS | Facility: CLINIC | Age: 60
End: 2025-03-14

## 2025-03-14 DIAGNOSIS — M25.579 PAIN IN JOINT, ANKLE AND FOOT: ICD-10-CM

## 2025-03-14 PROCEDURE — 73610 X-RAY EXAM OF ANKLE: CPT | Mod: LT | Performed by: INTERNAL MEDICINE

## 2025-03-18 ENCOUNTER — THERAPY VISIT (OUTPATIENT)
Dept: OCCUPATIONAL THERAPY | Facility: CLINIC | Age: 60
End: 2025-03-18
Payer: COMMERCIAL

## 2025-03-18 DIAGNOSIS — G56.21 ULNAR NEUROPATHY OF RIGHT UPPER EXTREMITY: ICD-10-CM

## 2025-03-18 DIAGNOSIS — G56.01 CARPAL TUNNEL SYNDROME OF RIGHT WRIST: Primary | ICD-10-CM

## 2025-03-18 LAB — INTERPRETATION SERPL IEP-IMP: NORMAL

## 2025-03-18 PROCEDURE — 97140 MANUAL THERAPY 1/> REGIONS: CPT | Mod: GO

## 2025-03-18 PROCEDURE — 97112 NEUROMUSCULAR REEDUCATION: CPT | Mod: GO

## 2025-03-18 PROCEDURE — 97110 THERAPEUTIC EXERCISES: CPT | Mod: GO

## 2025-03-18 NOTE — PROGRESS NOTES
"   03/18/25 0500   Appointment Info   Treating Provider Deirdre Covarrubias, OTR/L   Total/Authorized Visits 6 (POC)   Visits Used 5   Medical Diagnosis R CTS & ulnar neuropathy   OT Tx Diagnosis R CTS & ulnar neuropathy   Precautions/Limitations Post-op: 9w1d   Progress Note/Certification   Start Of Care Date 02/04/25   Onset of Illness/Injury or Date of Surgery 01/13/25   Therapy Frequency 1x weekly, tapering   Predicted Duration 10 weeks   Certification date from 03/19/25   Certification date to 04/15/25   Progress Note Due Date 03/18/25   Progress Note Completed Date 02/04/25   Goals   OT Goals 1   OT Goal 1   Goal Identifier Lifting   Goal Description Pt will report no difficulty with gripping and lifting a grocery bag   Rationale In order to maximize safety and independence with ADL/IADLs   Goal Progress Moderate difficulty   Target Date 04/15/25   Subjective Report   Subjective Report \"The right thumb has become a problem. I had this clicking. I got an injection. The clicking is going away. But it made it hard to work on the exercises. I think sometimes I aggravate it. For the longest time, it would just burn right here (volar wrist). It's gotten better. I sometimes get numbness if I sleep on it.\"   Objective Measures   Objective Measures Hand Obj Measures   Hand Objective Measures Strength   Strength ;Lateral Pinch;3 Point Pinch    (measured in pounds)    Average Strength R: 76lbs   Lateral Pinch (measured in pounds)   Lateral Pinch Average Strength R: 14lbs   3 Point Pinch (measured in pounds)   3 Point Pinch Average Strength R: 12lbs   Treatment Interventions (OT)   Interventions Neuromuscular Re-education;Manual Therapy;Therapeutic Procedure/Exercise   Neuromuscular Re-education   Neuromuscular Re-ed Minutes (91991) 8   Neuro Re-ed 1 Passive median nerve gliding   Neuro Re-ed 1 - Details 1 set, 20 reps, 5 sec hold, sitting   PTRx Neuro Re-ed 1 Median Nerve Mobility   PTRx Neuro Re-ed 1 - Details " HEP    Skilled Intervention Nerve gliding encouraged to help stretch or release nerves that are trapped, compressed, or injured.   Patient Response/Progress Tolerates well - decrease tension post-treatment   Therapeutic Procedure/Exercise   Therapeutic Procedure: strength, endurance, ROM, flexibillity minutes (84373) 10   Ther Proc 1 Progress Note   Ther Proc 1 - Details Please refer to the objective data gathered for today's progress note.   PTRx Ther Proc 1 Finger Active Range of Motion Tendon Glides Fist Series   PTRx Ther Proc 1 - Details HEP    PTRx Ther Proc 2 Finger Active Range of Motion FDS Flexor Tendon Gliding   PTRx Ther Proc 2 - Details HEP    PTRx Ther Proc 3 Hand Strengthening Gripping   PTRx Ther Proc 3 - Details HEP   PTRx Ther Proc 4 Hand Strengthening 3 Point Pinch   PTRx Ther Proc 4 - Details HEP   PTRx Ther Proc 5 Hand Strengthening Key Pinch   PTRx Ther Proc 5 - Details HEP   Skilled Intervention To track therapy progress   Patient Response/Progress Pt had a setback due to developing a R trigger thumb. Pt would like to check in once more to see if strength improves now that trigger thumb has been treated.   Therapeutic Activity   PTRx Ther Act 1 Warmth   PTRx Ther Act 1 - Details HEP    PTRx Ther Act 2 scar massage-circular   PTRx Ther Act 2 - Details HEP     Manual Therapy   Manual Therapy Minutes (78777) 8   Manual Therapy 1 Scar massage   Manual Therapy 1 - Details Circular with moderate pressure; provided pt with dycem to assist with scar massage at home   Skilled Intervention To soften scars   Patient Response/Progress Tolerates well   Education   Learner/Method Patient;Demonstration;Pictures/Video   Education Comments PTRX via phone   Plan   Home program See PTRX   Plan for next session Prepare for discharge   Total Session Time   Timed Code Treatment Minutes 26   Total Treatment Time (sum of timed and untimed services) 26     Upper Extremity Functional Index Score:  SCORE:   Column  Totals: /80: 49   (A lower score indicates greater disability.)    PLAN  Continue therapy per current plan of care.    Beginning/End Dates of Progress Note Reporting Period:  (P) 02/04/25 to 03/18/2025    Referring Provider:  Trinh Deluna

## 2025-03-25 ENCOUNTER — TELEPHONE (OUTPATIENT)
Dept: CONSULT | Facility: CLINIC | Age: 60
End: 2025-03-25
Payer: COMMERCIAL

## 2025-03-25 NOTE — TELEPHONE ENCOUNTER
Reason for Call  Called Neema to discuss the results of Neema's genetic testing for PMP22.     Results  Next Generation Sequencing (NGS) for PMP22  was completed at [facility] the [facility]. These results were negative.    Interpretation  Neema did not have any pathogenic/likely pathogenic variants identified in PMP22. This significantly reduces the likelihood of [id number] neuropathy. The genetic test did not analyze other genes. I defer to his genetic counselor, Luana Giordano, and neurologists on whether additional testing is recommended. She will contact him when she is back in office.    Plan  We will contact Neema to let him know if more testing is recommended or not  Results shared over BBOXXt  No additional questions or concerns.     Jigna Painter [facility]  Genetic Counselor   [facility]   Phone: [phone]      Next Generation [id number]  Order: [id number]  Status: Final result       Visible to patient: Yes (seen)       Dx: Ulnar neuropathy of right upper extre...    0 Result Notes      Component    Specimen Description    Blood: ACD   Significant Results    TEST REQUESTED:  Next generation sequencing and copy number variation analysis of the PMP22 gene     RESULTS: NEGATIVE  Pathogenic/Likely Pathogenic Variant(s): None Detected  Variant(s) of Uncertain Significance: None Detected   Interpretation    No pathogenic or likely pathogenic variants were detected in the genes analyzed. Therefore, a genetic cause for this patient's symptoms was not identified. Genetic counseling regarding these results is recommended.

## 2025-04-07 ENCOUNTER — ANCILLARY PROCEDURE (OUTPATIENT)
Dept: MRI IMAGING | Facility: CLINIC | Age: 60
End: 2025-04-07
Attending: STUDENT IN AN ORGANIZED HEALTH CARE EDUCATION/TRAINING PROGRAM
Payer: COMMERCIAL

## 2025-04-07 DIAGNOSIS — M25.373 CHRONIC INSTABILITY OF ANKLE: ICD-10-CM

## 2025-04-07 PROCEDURE — 73721 MRI JNT OF LWR EXTRE W/O DYE: CPT | Mod: RT | Performed by: RADIOLOGY

## 2025-04-09 ASSESSMENT — PATIENT HEALTH QUESTIONNAIRE - PHQ9
SUM OF ALL RESPONSES TO PHQ QUESTIONS 1-9: 8
SUM OF ALL RESPONSES TO PHQ QUESTIONS 1-9: 8
10. IF YOU CHECKED OFF ANY PROBLEMS, HOW DIFFICULT HAVE THESE PROBLEMS MADE IT FOR YOU TO DO YOUR WORK, TAKE CARE OF THINGS AT HOME, OR GET ALONG WITH OTHER PEOPLE: SOMEWHAT DIFFICULT

## 2025-04-11 ENCOUNTER — OFFICE VISIT (OUTPATIENT)
Dept: NEUROSURGERY | Facility: CLINIC | Age: 60
End: 2025-04-11
Payer: COMMERCIAL

## 2025-04-11 VITALS — DIASTOLIC BLOOD PRESSURE: 73 MMHG | OXYGEN SATURATION: 96 % | SYSTOLIC BLOOD PRESSURE: 112 MMHG | HEART RATE: 86 BPM

## 2025-04-11 DIAGNOSIS — Z98.1 S/P SPINAL FUSION: ICD-10-CM

## 2025-04-11 DIAGNOSIS — Z98.890 S/P LAMINECTOMY: ICD-10-CM

## 2025-04-11 DIAGNOSIS — M54.14 THORACIC RADICULOPATHY: Primary | ICD-10-CM

## 2025-04-11 PROCEDURE — 99024 POSTOP FOLLOW-UP VISIT: CPT | Performed by: NEUROLOGICAL SURGERY

## 2025-04-11 ASSESSMENT — PAIN SCALES - GENERAL: PAINLEVEL_OUTOF10: SEVERE PAIN (8)

## 2025-04-11 NOTE — PROGRESS NOTES
Neema Hunt  :  1965  DOS: 4/15/2025  MRN: [id number]  PCP: No Ref-Primary, Physician    Sports Medicine Clinic Visit    Interim History - April 15, 2025  - Last seen on 2025 for bilateral chronic ankle instability.  - Left ankle corticosteroid injection completed on 3/14/2025 provided 3 weeks of moderate relief. Ok with the progress so far, feels better than it did before the injection.    - Also obtained an MRI of the right ankle for lateral ankle pain and instability.  Results below.  Since last visit, he continues to have pain in the lateral ankle with sharp stabbing pain near the sinus tarsi and ATFL region.    - MR right ankle shows:  IMPRESSION:  1. Lateral hindfoot impingement with severe hindfoot valgus measuring  35 degrees. There is associated bone marrow edema and cystic changes  at the lateral talar process and at the angle of Gissane with bone  marrow edema and cystic changes in the lateral calcaneus. Additionally  there is mild soft tissue edema in the region of the sinus tarsi. The  tibialis posterior tendon is intact, although there is tenosynovitis  surrounding the tibialis posterior and flexor hallucis longus tendons.     2. No marrow signal abnormalities to suggest fracture.     3. The tendinous and ligamentous structures about the right ankle are  intact.      Initial Visit: 2025  HPI  Neema Hunt is a 59 year old male who is seen as a self referral presenting with bilateral ankle pain.    - Mechanism of Injury:    - No inciting injury  - Pertinent history and prior evaluations:    - Active lumbar radiculopathies and PPN.   - S/p left subtalar fusion, followed later by excisions of the cuboid and navicular bones (possibly cuneiforms?). Also s/p lateral ankle tendon repair x2. These were done by Dr. Hart at [facility] around .     - EMG 2024  Interpretation:  This is an abnormal examination. 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 Achey pain in the dorsal left ankle.  Sharp pain and clicking with the right thumb flexion at the A1 pulley  - Duration:  Chronic, but recently worsening  - Course:  becoming more noticeable, and causing falls due to the ankle pain  - Endorses:    - weakness of ankles.  Sharp stabbing pain on the dorsal portion of the left ankle and lateral aspect of the right ankle.  May be leading to falls.  BLE neuropathy and radiculopathy at baseline.  Currently working with neurology and neurosurgery for both.  - Denies:    - swelling, clicking/popping, grinding, mechanical locking symptoms at the ankles.  - Alleviating factors:    - rest, cane  - Aggravating factors:    - getting in & out of chairs, prolonged walking, fatigue can make it worse.  - Other treatments tried:    - NSAIDs    - Patient Goals:    - understand the cause of the symptoms, be able to manage the symptoms successfully  - Social History:   -  Not working.       Review of Systems  Musculoskeletal: as above  Remainder of review of systems is negative including constitutional, CV, pulmonary, GI, Skin and Neurologic except as noted in HPI or medical history.    Past Medical History:   Diagnosis Date    Arthritis     Back pain     Dermatophytosis of nail     Eczema     Gastro-oesophageal reflux disease     Herpes     Hypertension     Insomnia     Lactose intolerance     Lumbar disc disease     Radiculopathy     Scoliosis     Spermatocele      Past Surgical History:   Procedure Laterality Date    5TH FINGER SURGERY Right     ANKLE SURGERY Left      BLOCK, NERVE, GENICULAR Left 1/31/2025    Procedure: Diagnostic Genicular Nerve Block;  Surgeon: Regina Paris MD;  Location: [facility] OR    BLOCK, NERVE, GENICULAR Left 3/7/2025    Procedure: Diagnostic Genicular Nerve Block;  Surgeon: Regina Paris MD;  Location: [facility] OR    DAVINCI XI HERNIORRHAPHY INGUINAL Bilateral 07/26/2022    Procedure: Robotic repair of recurrent right inguinal hernia with placement of mesh, robotic repair of left inguinal hernia with placement of mesh;  Surgeon: Mary Morales MD;  Location: [facility]    DAVINCI XI HERNIORRHAPHY VENTRAL N/A 07/26/2022    Procedure: Robotic repair of umbilical hernia with placement of mesh;  Surgeon: Mary Morales MD;  Location: [facility]    DISCECTOMY LUMBAR POSTERIOR MICROSCOPIC ONE LEVEL Right 07/15/2020    Procedure: Right L5-S1 foraminotomy and microdiskectomy;  Surgeon: Nhan Marshall MD;  Location:  OR    EXPLORE SPINE, REMOVE HARDWARE, COMBINED N/A 09/05/2019    Procedure: REMOVAL LUMBAR L3-5 INSTRUMENTATION;  Surgeon: Nhan Marshall MD;  Location: [facility]    EXTENSION OF LUMBAR FUSION FROM L5-S1 W/ REVISION OF PEDICLE RODS FROM L2-S1      FOOT BUNIONECTOMY Left     FUSION SPINE ANTERIOR MINIMALLY INVASIVE TWO LEVELS N/A 11/16/2016    Procedure: FUSION SPINE ANTERIOR MINIMALLY INVASIVE TWO LEVELS;  Surgeon: Nhan Marshall MD;  Location: [facility]    FUSION, SPINE, LUMBAR, 1 LEVEL, POSTERIOR APPROACH, ROBOTIC-ASSISTED N/A 05/27/2021    Procedure: Lumbar 5-Sacral 1 posterior segemental instrumentation, bilateral decompression and transforaminal interbody fusion using local autograft and allograft cancellous chips. Revision of Lumbar 2-4 ian. Posterior arthrodesis Lumbar 5-Sacral 1 using  local autograft and allograft cancellous chips;  Surgeon: Nhan Marshall MD;  Location: [facility] OR    HERNIA REPAIR Right     INJECT EPIDURAL THORACIC Bilateral 12/12/2024    Procedure: INJECTION, SPINE, THORACIC, EPIDURAL (T9/10 vs T10/T11);  Surgeon:  Fabi Mejia MD;  Location: [facility] OR    INJECT EPIDURAL TRANSFORAMINAL Bilateral 11/01/2024    Procedure: Bilateral L5-S1 transforaminal epidural steroid injection;  Surgeon: Nicola Verma MD;  Location: [facility] OR    INSERTION OF THORACIC 10 TO LUMBAR 1 AND REVISION OF LUMBAR 2 TO SACRAL1 INSTRUMENTATION  12/21/2023    L3-4 DECOMPRESSION      L3-L4, L4-L5 FUSION W/ SCREWS      LAMINECTOMY LUMBAR POSTERIOR MICROSCOPIC ONE LEVEL  04/09/2013    Procedure: LAMINECTOMY LUMBAR POSTERIOR MICROSCOPIC ONE LEVEL;  Right Lumbar 3-4 Micro Laminectomy & Foraminotomy;  Surgeon: Nhan Marshall MD;  Location: [facility]    LAMINECTOMY THORACIC ONE LEVEL N/A 09/27/2023    Procedure: Thoracic 11 to thoracic 12 laminectomy;  Surgeon: Zane Ontiveros MD;  [facility]    OPTICAL TRACKING SYSTEM FUSION SPINE POSTERIOR LUMBAR PERCUTANEOUS TWO LEVELS N/A 11/16/2016    Procedure: OPTICAL TRACKING SYSTEM FUSION SPINE POSTERIOR LUMBAR PERCUTANEOUS TWO LEVELS;  Surgeon: Nhan Marshall MD;  [facility]    OPTICAL TRACKING SYSTEM FUSION SPINE POSTERIOR LUMBAR THREE+ LEVELS Right 09/05/2019    Procedure: POSTERIOR SEGMENTAL INSTRUMENTATION L2-4, RIGHT LUMBAR 2-3 DISCECTOMY AND TRANSFORAMINAL INTERBODY FUSION, REDO DECOMPRESSION RIGHT L3-4, POSTERIOR ARTHRODESIS L2-4;  Surgeon: Nhan Marshall MD;  Location: [facility]    OPTICAL TRACKING SYSTEM FUSION SPINE POSTERIOR LUMBAR THREE+ LEVELS N/A 12/21/2023    Procedure: Insertion Thoracic 10 to Lumbar 1 and revision of Lumbar 2 to Sacral 1 instrumentation. Dual Pelvic Instrumentation. Revision fusion Lumbar 5 to Sacral 1 using allograft chips. Arthrodesis Thoracic 10 to Lumbar 2 using allograft cancellous chips;  Surgeon: Nhan Marshall MD;  [facility]    R L5-S1 FORAMINOTOMY  & MICRODISCECTOMY      RELEASE CARPAL TUNNEL Right 1/13/2025    Procedure: Right open carpal tunnel release;  Surgeon: Nhan Marshall MD;  Location:  OR    RELEASE ULNAR NERVE (ELBOW) Right  1/13/2025    Procedure: Right open ulnar nerve release;  Surgeon: Nhan Marshall MD;  [facility]: [facility]    UMBILICAL HERNIA AND RIGHT INGUINAL HERNIA REPAIR       Family History   Problem Relation Age of Onset    Asthma Mother     Hypertension Mother     Arthritis Mother          Objective  There were no vitals taken for this visit.    General: healthy, alert and in no acute distress.    HEENT: no scleral icterus or conjunctival erythema.   Skin: no suspicious lesions or rash. No jaundice.   CV: regular rhythm by palpation, 2+ distal pulses.  Resp: normal respiratory effort without conversational dyspnea.   Psych: normal mood and affect.    Gait: nonantalgic, appropriate coordination and balance.    Neuro:        - Sensation to light touch: Diminished sensation distal to the knees bilaterally.       - MSR:  1+ in bilateral patella, achilles.        - Special tests:   - Slump/SLR: Positive bilaterally    MSK - Ankle/Foot:        - Inspection:    - Pes planus of both feet with mild bunions, multiple surgical scars of the left anterior and medial ankle and foot, CDI.  No open wounds or lesions.       - ROM:    - Limited in dorsiflexion, plantarflexion, EV, INV on the left by postsurgical stiffness and limitations and slightly by pain.   - Limited in DF, PF, EV on the right by pain.        - Palpation:    - TTP at the right lateral ankle over the ATFL and CFL, sinus tarsi, and slightl at the posterior tibialisy .  TTP at the left anterior ankle joint line.   - No warmth  - NTTP elsewhere including the right peroneal tendons, base of the fifth metatarsal, calcaneus.        - Strength:  (*antalgic)  - Intact activation of DF, PF, EV, INV for bilateral ankles with 5-/5 strength throughout and within limited range on the left ankle        - Special tests:        - Anterior drawer: Positive on the right   - Talar tilt: Positive on the right   - Syndesmotic squeeze: Neg   - Klevelma:  Neg   - Dariel:  Neg   - Jeff:   Neg   - Metatarsal squeeze:  Neg    Radiology  I independently reviewed the available relevant imaging in the chart, with my interpretations as above in HPI.  - XR B/L ankles 3/14/2025 shows intact ankle mortise with no acute fracture or major degenerative changes of the right ankle, pes planus present.  Left ankle shows postoperative hardware from subtalar fusion which is intact.  Mild degenerative changes in the tibiotalar joint on the left.  Pes planus present.    I independently reviewed today's new relevant imaging, with the following interpretation:  - MR R ankle 4/7/2025 shows intact ATFL, CFL.  It does show bone marrow edema in the lateral process of the talus and edema within the sinus tarsi.  Overall, there is severe hindfoot valgus of 35 degrees.  Also shows some tenosynovitis of the posterior tibialis and flexor houses longus tendons.      Procedure  Medium Joint Injection/Arthrocentesis: R ankle    Date/Time: 4/15/2025 1:57 PM    Performed by: Scar Day DO  Authorized by: Scar Day DO    Indications:  Pain  Needle Size:  25 G  Guidance: ultrasound    Approach:  Anterior  Location:  Ankle  Location comment:  Sinus Tarsi  Site:  R ankle  Medications:  40 mg triamcinolone 40 MG/ML; 1 mL BUPivacaine 0.5 %; 1 mL lidocaine 1 %  Outcome:  Tolerated well, no immediate complications  Procedure discussed: discussed risks, benefits, and alternatives    Consent Given by:  Patient  Prep: patient was prepped and draped in usual sterile fashion     Ultrasound images of procedure were permanently stored.       Ultrasound-guided Sinus Tarsi corticosteroid injection  The risks of the procedure were explained to the patient.  A consent was signed for the intra-articular ankle injection.  The patient was evaluated with a BUKA ultrasound machine using a 12 MHz linear probe.     By palpation and ultrasound guidance, the sinus tarsi space was identified in short and long axis and marked. Injection site was prepared  with chlorhexidine solution in sterile fashion.  A 1.5 inch 25 gauge needle was used to enter the sinus tarsi via anterior approach under ultrasound guidance and long axis out of plane technique.  A mixture of 1ml 1% lidocaine, 1ml of 0.5% bupivacaine, and 1ml of kenalog (40mg/ml) was injected without difficulty.  After removal of the needle, the area was cleaned, hemostasis achieved, and bandage applied. There was ultrasound documentation of needle placement and injection.  Patient tolerated the procedure well, no complications.      Assessment  1. Sinus tarsi syndrome, right          Jaime Hunt is a pleasant 59 year old male that presents with chronic pain of bilateral ankles.  He feels achy deep joint pain on the left that is intermittently sharp at times.  This is in the anterior ankle at the joint line.  This is in the setting of multiple surgeries throughout the left ankle and foot including a subtalar fusion, tarsectomy, and bunionectomy. 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    Also wishes to discuss sharp intermittent lateral right ankle pain that we will be severe enough to cause buckling and falls.  He is TTP over the ATFL, CFL of this ankle without major tenderness or pain with activation of the peroneal tendons.  Positive anterior drawer and tarsal tilt on the right.  Mild swelling over the ATFL as well.  History and physical exam appear most consistent with chronic ankle instability and possibility for ATFL tear on the right.    He also asked to evaluate his right thumb, which has significant pain and TTP over the volar thumb at the A1 pulley.  Confirms that there has been triggering, clicking, locking of the thumb which is severely painful.  Triggering is confirmed on exam, and consistent with a right trigger thumb.    We discussed the nature of the condition and available treatment options, and mutually agreed upon  the following plan:    - Imaging:          - Reviewed and independently interpreted the relevant imaging in the chart, including any imaging ordered for today's clinic.  - Reviewed results and images with patient.   - MRI order placed for the right ankle to evaluate integrity of the stabilizing ligaments of the lateral ankle.  Instructions given for scheduling the image and follow-up.  - Medications:          - Discussed pharmacologic options for pain relief.   - May use NSAIDs (Ibuprofen, Naproxen) or Acetaminophen (Tylenol) as needed for pain control. Do not take these if previously advised to avoid them for other medical conditions.  - May also use topical medications such as lidocaine, IcyHot, BioFreeze, or Voltaren gel as needed for pain control. Voltaren gel is an anti-inflammatory cream that may be used up to 4 times per day over the painful area.   - Injections:          - Discussed possible injection options and alternatives.    - Injection options include: Corticosteroid injection of the left tibiotalar joint, and the right thumb A1 pulley.  - Performed the above injections today in clinic. Patient tolerated the procedure well without complications.     - Post-procedure instructions:    - Keep the injection site clean and dry.   - Do not submerge the injection site for 24 hours (no baths, pools). Showers are ok.   - Rest the area for 24-48 hours before resuming normal activities. Avoid overexerting the area for the first few weeks.   - It may take 2-3 days to start noticing the effects of the injection and up to 3-4 weeks to feel significant benefits.   - Therapy:          - Discussed the benefits of therapy vs home exercise program for optimization of range of motion, flexibility, strength, stability and function.   - Recommended to continue with physical therapy and home exercise program for BLE, balance, strength and instability of the ankles.  - Modalities:          - May use ice, heat, massage or  other modalities as needed.   - Bracing:          - Discussed bracing options and they consider using an ankle stability brace for the right ankle for instability.  He has custom orthotics that work well for bilateral feet.  - Surgery:          - Discussed non-operative and operative treatment options for the patient's condition. Goal is to continue conservative care for as long as possible before surgical intervention would need to be considered.  - Activity:          - Encouraged to remain active and participate in regular activities as symptoms allow.   Avoid or modify exacerbating activities as needed.  - Follow up:          - When results of the advanced imaging are available to discuss the results and next steps in treatment.   - May follow up sooner for new/worsening symptoms.  - May contact clinic by phone or MyChart for questions or concerns.       Update - 4/15/25:  Neema presents for follow-up of his chronic bilateral ankle pain.  He continues to have significant pes planus, hindfoot valgus, and pain and instability in bilateral ankles.  Left ankle is feeling better after an ankle joint injection at last visit.  Right ankle continues to give him sharp pain at the lateral aspect under the ATFL region and the sinus tarsi.  We elected to obtain an MRI that revealed sequelae of hindfoot valgus stress causing sinus tarsi syndrome.  No acute fractures or ligamentous tears.  Associated injuries of periosteal edema of the lateral talus, soft tissue edema within the sinus tarsi, and tenosynovitis of the posterior tibialis and FHL tendons.  We discussed the results of the MRI in detail and how they correlate well with his symptoms.  We discussed nonoperative and operative treatment options and elected to proceed with a corticosteroid injection of the sinus tarsi.  The injection was given today without complication, patient tolerated procedure well.  See procedure note above for details.  We discussed a referral to  podiatry for overall management, he would like to stick with our treatment plan above and will consider referral in the future if needed.  He currently has a pair of orthotics that he likes to use and does not want to make changes, also not interested in surgery.      Scar Day DO, CAQSM  [facility] - Department of Orthopedic Surgery       Disclaimer:  This note was prepared and written using Dragon Medical dictation software. As a result, there may be errors in the script that have gone undetected. Please consider this when interpreting the information in this note.

## 2025-04-11 NOTE — NURSING NOTE
"Neema Hnut is a 59 year old male who presents for:  Chief Complaint   Patient presents with    RECHECK        Initial Vitals:  /73 (BP Location: Left arm, Patient Position: Sitting, Cuff Size: Adult Regular)   Pulse 86   SpO2 96%  Estimated body mass index is 29.56 kg/m  as calculated from the following:    Height as of 3/7/25: 5' 10\" (1.778 m).    Weight as of 3/7/25: 206 lb (93.4 kg).. There is no height or weight on file to calculate BSA. BP completed using cuff size: regular  Severe Pain (8)    Rubio Pérez    "

## 2025-04-11 NOTE — LETTER
"4/11/2025      Neema Hunt  [address] [address]      Dear Colleague,    Thank you for referring your patient, Neema Hunt, to the [facility]. Please see a copy of my visit note below.    It was a pleasure to see Neema Hunt today in Neurosurgery Clinic. He is a 59 year old male who returns today for routine follow-up after right carpal tunnel and ulnar nerve releases.    Feels like his right upper extremity is doing better after surgery.  He continues to have multiple issues causing pain including issues with his knee in particular his right ankle.    He has pain in the lower thoracic, upper lumbar area as well as right thoracic radicular symptoms approximately T9 or 10.    Vitals:    04/11/25 0918   BP: 112/73   BP Location: Left arm   Patient Position: Sitting   Cuff Size: Adult Regular   Pulse: 86   SpO2: 96%     Estimated body mass index is 29.56 kg/m  as calculated from the following:    Height as of 3/7/25: 1.778 m (5' 10\").    Weight as of 3/7/25: 93.4 kg (206 lb).  Severe Pain (8)    Well-healed incisions  Neurologic exam is stable.    Imaging: Review of his previous injection in the thoracic spine in December shows a thoracic epidural approximately the T8-9 level.    Assessment: 1.  Status post carpal tunnel and ulnar nerve release  2.  Right sided thoracic radiculopathy, T9-10.    Plan: Would like to try a repeat transforaminal epidural for him to see if this will relieve some of his thoracic radicular symptoms.  We will see how he does with this.  In addition some of his issues with his right ankle given his neurologic issues may represent Charcot joint phenomenon.         Again, thank you for allowing me to participate in the care of your patient.        Sincerely,        Nhan Marshall MD    Electronically signed"

## 2025-04-11 NOTE — PROGRESS NOTES
"It was a pleasure to see Neema Hunt today in Neurosurgery Clinic. He is a 59 year old male who returns today for routine follow-up after right carpal tunnel and ulnar nerve releases.    Feels like his right upper extremity is doing better after surgery.  He continues to have multiple issues causing pain including issues with his knee in particular his right ankle.    He has pain in the lower thoracic, upper lumbar area as well as right thoracic radicular symptoms approximately T9 or 10.    Vitals:    04/11/25 0918   BP: 112/73   BP Location: Left arm   Patient Position: Sitting   Cuff Size: Adult Regular   Pulse: 86   SpO2: 96%     Estimated body mass index is 29.56 kg/m  as calculated from the following:    Height as of 3/7/25: 1.778 m (5' 10\").    Weight as of 3/7/25: 93.4 kg (206 lb).  Severe Pain (8)    Well-healed incisions  Neurologic exam is stable.    Imaging: Review of his previous injection in the thoracic spine in December shows a thoracic epidural approximately the T8-9 level.    Assessment: 1.  Status post carpal tunnel and ulnar nerve release  2.  Right sided thoracic radiculopathy, T9-10.    Plan: Would like to try a repeat transforaminal epidural for him to see if this will relieve some of his thoracic radicular symptoms.  We will see how he does with this.  In addition some of his issues with his right ankle given his neurologic issues may represent Charcot joint phenomenon.     "

## 2025-04-11 NOTE — PATIENT INSTRUCTIONS
Patient Next Steps:    Order placed for epidural steroid injection  The steroid can take 10-14 days to reach max effect  Please call our clinic if symptoms persist after this timeframe.  You can call [facility] to schedule your injection: [phone]      Please call us if you have any further questions or concerns.    [facility]   Phone: [phone]  Fax: [phone]

## 2025-04-15 ENCOUNTER — OFFICE VISIT (OUTPATIENT)
Dept: ORTHOPEDICS | Facility: CLINIC | Age: 60
End: 2025-04-15
Payer: COMMERCIAL

## 2025-04-15 ENCOUNTER — THERAPY VISIT (OUTPATIENT)
Dept: OCCUPATIONAL THERAPY | Facility: CLINIC | Age: 60
End: 2025-04-15
Payer: COMMERCIAL

## 2025-04-15 ENCOUNTER — ANCILLARY PROCEDURE (OUTPATIENT)
Dept: ULTRASOUND IMAGING | Facility: HOSPITAL | Age: 60
End: 2025-04-15
Attending: STUDENT IN AN ORGANIZED HEALTH CARE EDUCATION/TRAINING PROGRAM
Payer: COMMERCIAL

## 2025-04-15 DIAGNOSIS — M25.571 SINUS TARSI SYNDROME, RIGHT: Primary | ICD-10-CM

## 2025-04-15 DIAGNOSIS — G56.01 CARPAL TUNNEL SYNDROME OF RIGHT WRIST: Primary | ICD-10-CM

## 2025-04-15 DIAGNOSIS — M25.571 SINUS TARSI SYNDROME, RIGHT: ICD-10-CM

## 2025-04-15 DIAGNOSIS — G56.21 ULNAR NEUROPATHY OF RIGHT UPPER EXTREMITY: ICD-10-CM

## 2025-04-15 PROCEDURE — 97140 MANUAL THERAPY 1/> REGIONS: CPT | Mod: GO

## 2025-04-15 PROCEDURE — 97035 APP MDLTY 1+ULTRASOUND EA 15: CPT | Mod: GO

## 2025-04-15 PROCEDURE — 97110 THERAPEUTIC EXERCISES: CPT | Mod: GO

## 2025-04-15 PROCEDURE — 99213 OFFICE O/P EST LOW 20 MIN: CPT | Mod: 25 | Performed by: STUDENT IN AN ORGANIZED HEALTH CARE EDUCATION/TRAINING PROGRAM

## 2025-04-15 PROCEDURE — 20606 DRAIN/INJ JOINT/BURSA W/US: CPT | Mod: RT | Performed by: STUDENT IN AN ORGANIZED HEALTH CARE EDUCATION/TRAINING PROGRAM

## 2025-04-15 RX ORDER — TRIAMCINOLONE ACETONIDE 40 MG/ML
40 INJECTION, SUSPENSION INTRA-ARTICULAR; INTRAMUSCULAR
Status: COMPLETED | OUTPATIENT
Start: 2025-04-15 | End: 2025-04-15

## 2025-04-15 RX ORDER — BUPIVACAINE HYDROCHLORIDE 5 MG/ML
1 INJECTION, SOLUTION PERINEURAL
Status: COMPLETED | OUTPATIENT
Start: 2025-04-15 | End: 2025-04-15

## 2025-04-15 RX ORDER — LIDOCAINE HYDROCHLORIDE 10 MG/ML
1 INJECTION, SOLUTION INFILTRATION; PERINEURAL
Status: COMPLETED | OUTPATIENT
Start: 2025-04-15 | End: 2025-04-15

## 2025-04-15 RX ADMIN — BUPIVACAINE HYDROCHLORIDE 1 ML: 5 INJECTION, SOLUTION PERINEURAL at 13:57

## 2025-04-15 RX ADMIN — TRIAMCINOLONE ACETONIDE 40 MG: 40 INJECTION, SUSPENSION INTRA-ARTICULAR; INTRAMUSCULAR at 13:57

## 2025-04-15 RX ADMIN — LIDOCAINE HYDROCHLORIDE 1 ML: 10 INJECTION, SOLUTION INFILTRATION; PERINEURAL at 13:57

## 2025-04-15 NOTE — LETTER
4/15/2025      Neema Hunt  [address] [address]      Dear Colleague,    Thank you for referring your patient, Neema Hunt, to the [facility]. Please see a copy of my visit note below.    Neema Hunt  :  1965  DOS: 4/15/2025  MRN: [id number]  PCP: No Ref-Primary, Physician    Sports Medicine Clinic Visit    Interim History - April 15, 2025  - Last seen on 2025 for bilateral chronic ankle instability.  - Left ankle corticosteroid injection completed on 3/14/2025 provided 3 weeks of moderate relief. Ok with the progress so far, feels better than it did before the injection.    - Also obtained an MRI of the right ankle for lateral ankle pain and instability.  Results below.  Since last visit, he continues to have pain in the lateral ankle with sharp stabbing pain near the sinus tarsi and ATFL region.    - MR right ankle shows:  IMPRESSION:  1. Lateral hindfoot impingement with severe hindfoot valgus measuring  35 degrees. There is associated bone marrow edema and cystic changes  at the lateral talar process and at the angle of Gissane with bone  marrow edema and cystic changes in the lateral calcaneus. Additionally  there is mild soft tissue edema in the region of the sinus tarsi. The  tibialis posterior tendon is intact, although there is tenosynovitis  surrounding the tibialis posterior and flexor hallucis longus tendons.     2. No marrow signal abnormalities to suggest fracture.     3. The tendinous and ligamentous structures about the right ankle are  intact.      Initial Visit: 2025  HPI  Neema Hunt is a 59 year old male who is seen as a self referral presenting with bilateral ankle pain.    - Mechanism of Injury:    - No inciting injury  - Pertinent history and prior evaluations:    - Active lumbar radiculopathies and PPN.   - S/p left subtalar fusion, followed later by excisions of the cuboid and navicular bones  (possibly cuneiforms?). Also s/p lateral ankle tendon repair x2. These were done by Dr. Hart at [facility], [place] around 2009.     - EMG 8/2024  Interpretation:  This is an abnormal examination. 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 Achey pain in the dorsal left ankle.  Sharp pain and clicking with the right thumb flexion at the A1 pulley  - Duration:  Chronic, but recently worsening  - Course:  becoming more noticeable, and causing falls due to the ankle pain  - Endorses:    - weakness of ankles.  Sharp stabbing pain on the dorsal portion of the left ankle and lateral aspect of the right ankle.  May be leading to falls.  BLE neuropathy and radiculopathy at baseline.  Currently working with neurology and neurosurgery for both.  - Denies:    - swelling, clicking/popping, grinding, mechanical locking symptoms at the ankles.  - Alleviating factors:    - rest, cane  - Aggravating factors:    - getting in & out of chairs, prolonged walking, fatigue can make it worse.  - Other treatments tried:    - NSAIDs    - Patient Goals:    - understand the cause of the symptoms, be able to manage the symptoms successfully  - Social History:   -  Not working.       Review of Systems  Musculoskeletal: as above  Remainder of review of systems is negative including constitutional, CV, pulmonary, GI, Skin and Neurologic except as noted in HPI or medical history.    Past Medical History:   Diagnosis Date     Arthritis      Back pain      Dermatophytosis of nail      Eczema      Gastro-oesophageal reflux  disease      Herpes      Hypertension      Insomnia      Lactose intolerance      Lumbar disc disease      Radiculopathy      Scoliosis      Spermatocele      Past Surgical History:   Procedure Laterality Date     5TH FINGER SURGERY Right      ANKLE SURGERY Left      BLOCK, NERVE, GENICULAR Left 1/31/2025    Procedure: Diagnostic Genicular Nerve Block;  Surgeon: Regina Paris MD;  Location: [facility] OR     BLOCK, NERVE, GENICULAR Left 3/7/2025    Procedure: Diagnostic Genicular Nerve Block;  Surgeon: Regina Paris MD;  Location: [facility] OR     DAVINCI XI HERNIORRHAPHY INGUINAL Bilateral 07/26/2022    Procedure: Robotic repair of recurrent right inguinal hernia with placement of mesh, robotic repair of left inguinal hernia with placement of mesh;  Surgeon: Mary Morales MD;  Location: [facility] OR     DAVINCI XI HERNIORRHAPHY VENTRAL N/A 07/26/2022    Procedure: Robotic repair of umbilical hernia with placement of mesh;  Surgeon: Mary Morales MD;  Location: [facility]     DISCECTOMY LUMBAR POSTERIOR MICROSCOPIC ONE LEVEL Right 07/15/2020    Procedure: Right L5-S1 foraminotomy and microdiskectomy;  Surgeon: Nhan Marshall MD;  Location: [facility] OR     EXPLORE SPINE, REMOVE HARDWARE, COMBINED N/A 09/05/2019    Procedure: REMOVAL LUMBAR L3-5 INSTRUMENTATION;  Surgeon: Nhan Marshall MD;  Location: [facility]     EXTENSION OF LUMBAR FUSION FROM L5-S1 W/ REVISION OF PEDICLE RODS FROM L2-S1       FOOT BUNIONECTOMY Left      FUSION SPINE ANTERIOR MINIMALLY INVASIVE TWO LEVELS N/A 11/16/2016    Procedure: FUSION SPINE ANTERIOR MINIMALLY INVASIVE TWO LEVELS;  Surgeon: Nhan Marshall MD;  Location: [facility]     FUSION, SPINE, LUMBAR, 1 LEVEL, POSTERIOR APPROACH, ROBOTIC-ASSISTED N/A 05/27/2021    Procedure: Lumbar 5-Sacral 1 posterior segemental instrumentation, bilateral decompression and transforaminal interbody fusion using local autograft and allograft cancellous chips. Revision of Lumbar 2-4 ian. Posterior arthrodesis  Lumbar 5-Sacral 1 using  local autograft and allograft cancellous chips;  Surgeon: Nhan Marshall MD;  Location: [facility] OR     HERNIA REPAIR Right      INJECT EPIDURAL THORACIC Bilateral 12/12/2024    Procedure: INJECTION, SPINE, THORACIC, EPIDURAL (T9/10 vs T10/T11);  Surgeon: Fabi Mejia MD;  Location: [facility] OR     INJECT EPIDURAL TRANSFORAMINAL Bilateral 11/01/2024    Procedure: Bilateral L5-S1 transforaminal epidural steroid injection;  Surgeon: Nicola Verma MD;  Location: [facility] OR     INSERTION OF THORACIC 10 TO LUMBAR 1 AND REVISION OF LUMBAR 2 TO SACRAL1 INSTRUMENTATION  12/21/2023     L3-4 DECOMPRESSION       L3-L4, L4-L5 FUSION W/ SCREWS       LAMINECTOMY LUMBAR POSTERIOR MICROSCOPIC ONE LEVEL  04/09/2013    Procedure: LAMINECTOMY LUMBAR POSTERIOR MICROSCOPIC ONE LEVEL;  Right Lumbar 3-4 Micro Laminectomy & Foraminotomy;  Surgeon: Nhan Marshall MD;  Location: [facility]     LAMINECTOMY THORACIC ONE LEVEL N/A 09/27/2023    Procedure: Thoracic 11 to thoracic 12 laminectomy;  Surgeon: Zane Ontiveros MD;  [facility]     OPTICAL TRACKING SYSTEM FUSION SPINE POSTERIOR LUMBAR PERCUTANEOUS TWO LEVELS N/A 11/16/2016    Procedure: OPTICAL TRACKING SYSTEM FUSION SPINE POSTERIOR LUMBAR PERCUTANEOUS TWO LEVELS;  Surgeon: Nhan Marshall MD;  [facility]     OPTICAL TRACKING SYSTEM FUSION SPINE POSTERIOR LUMBAR THREE+ LEVELS Right 09/05/2019    Procedure: POSTERIOR SEGMENTAL INSTRUMENTATION L2-4, RIGHT LUMBAR 2-3 DISCECTOMY AND TRANSFORAMINAL INTERBODY FUSION, REDO DECOMPRESSION RIGHT L3-4, POSTERIOR ARTHRODESIS L2-4;  Surgeon: Nhan Marshall MD;  Location: [facility]     OPTICAL TRACKING SYSTEM FUSION SPINE POSTERIOR LUMBAR THREE+ LEVELS N/A 12/21/2023    Procedure: Insertion Thoracic 10 to Lumbar 1 and revision of Lumbar 2 to Sacral 1 instrumentation. Dual Pelvic Instrumentation. Revision fusion Lumbar 5 to Sacral 1 using allograft chips. Arthrodesis Thoracic 10 to Lumbar 2 using allograft  cancellous chips;  Surgeon: Nhan Marshall MD;  Location: [facility]     R L5-S1 FORAMINOTOMY  & MICRODISCECTOMY       RELEASE CARPAL TUNNEL Right 1/13/2025    Procedure: Right open carpal tunnel release;  Surgeon: Nhan Marshall MD;  [facility]: [facility]     RELEASE ULNAR NERVE (ELBOW) Right 1/13/2025    Procedure: Right open ulnar nerve release;  Surgeon: Nhan Marshall MD;  Location: [facility]     UMBILICAL HERNIA AND RIGHT INGUINAL HERNIA REPAIR       Family History   Problem Relation Age of Onset     Asthma Mother      Hypertension Mother      Arthritis Mother          Objective  There were no vitals taken for this visit.    General: healthy, alert and in no acute distress.    HEENT: no scleral icterus or conjunctival erythema.   Skin: no suspicious lesions or rash. No jaundice.   CV: regular rhythm by palpation, 2+ distal pulses.  Resp: normal respiratory effort without conversational dyspnea.   Psych: normal mood and affect.    Gait: nonantalgic, appropriate coordination and balance.    Neuro:        - Sensation to light touch: Diminished sensation distal to the knees bilaterally.       - MSR:  1+ in bilateral patella, achilles.        - Special tests:   - Slump/SLR: Positive bilaterally    MSK - Ankle/Foot:        - Inspection:    - Pes planus of both feet with mild bunions, multiple surgical scars of the left anterior and medial ankle and foot, CDI.  No open wounds or lesions.       - ROM:    - Limited in dorsiflexion, plantarflexion, EV, INV on the left by postsurgical stiffness and limitations and slightly by pain.   - Limited in DF, PF, EV on the right by pain.        - Palpation:    - TTP at the right lateral ankle over the ATFL and CFL, sinus tarsi, and slightl at the posterior tibialisy .  TTP at the left anterior ankle joint line.   - No warmth  - NTTP elsewhere including the right peroneal tendons, base of the fifth metatarsal, calcaneus.        - Strength:  (*antalgic)  - Intact activation of  DF, PF, EV, INV for bilateral ankles with 5-/5 strength throughout and within limited range on the left ankle        - Special tests:        - Anterior drawer: Positive on the right   - Talar tilt: Positive on the right   - Syndesmotic squeeze: Neg   - Kleiger:  Neg   - Dariel:  Neg   - Aguilra:  Neg   - Metatarsal squeeze:  Neg    Radiology  I independently reviewed the available relevant imaging in the chart, with my interpretations as above in HPI.  - XR B/L ankles 3/14/2025 shows intact ankle mortise with no acute fracture or major degenerative changes of the right ankle, pes planus present.  Left ankle shows postoperative hardware from subtalar fusion which is intact.  Mild degenerative changes in the tibiotalar joint on the left.  Pes planus present.    I independently reviewed today's new relevant imaging, with the following interpretation:  - MR R ankle 4/7/2025 shows intact ATFL, CFL.  It does show bone marrow edema in the lateral process of the talus and edema within the sinus tarsi.  Overall, there is severe hindfoot valgus of 35 degrees.  Also shows some tenosynovitis of the posterior tibialis and flexor houses longus tendons.      Procedure  Medium Joint Injection/Arthrocentesis: R ankle    Date/Time: 4/15/2025 1:57 PM    Performed by: Scar Day DO  Authorized by: Scar Day DO    Indications:  Pain  Needle Size:  25 G  Guidance: ultrasound    Approach:  Anterior  Location:  Ankle  Location comment:  Sinus Tarsi  Site:  R ankle  Medications:  40 mg triamcinolone 40 MG/ML; 1 mL BUPivacaine 0.5 %; 1 mL lidocaine 1 %  Outcome:  Tolerated well, no immediate complications  Procedure discussed: discussed risks, benefits, and alternatives    Consent Given by:  Patient  Prep: patient was prepped and draped in usual sterile fashion     Ultrasound images of procedure were permanently stored.       Ultrasound-guided Sinus Tarsi corticosteroid injection  The risks of the procedure were explained to the  patient.  A consent was signed for the intra-articular ankle injection.  The patient was evaluated with a Jaree ultrasound machine using a 12 MHz linear probe.     By palpation and ultrasound guidance, the sinus tarsi space was identified in short and long axis and marked. Injection site was prepared with chlorhexidine solution in sterile fashion.  A 1.5 inch 25 gauge needle was used to enter the sinus tarsi via anterior approach under ultrasound guidance and long axis out of plane technique.  A mixture of 1ml 1% lidocaine, 1ml of 0.5% bupivacaine, and 1ml of kenalog (40mg/ml) was injected without difficulty.  After removal of the needle, the area was cleaned, hemostasis achieved, and bandage applied. There was ultrasound documentation of needle placement and injection.  Patient tolerated the procedure well, no complications.      Assessment  1. Sinus tarsi syndrome, right          Jaime Hunt is a pleasant 59 year old male that presents with chronic pain of bilateral ankles.  He feels achy deep joint pain on the left that is intermittently sharp at times.  This is in the anterior ankle at the joint line.  This is in the setting of multiple surgeries throughout the left ankle and foot including a subtalar fusion, tarsectomy, and bunionectomy. 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    Also wishes to discuss sharp intermittent lateral right ankle pain that we will be severe enough to cause buckling and falls.  He is TTP over the ATFL, CFL of this ankle without major tenderness or pain with activation of the peroneal tendons.  Positive anterior drawer and tarsal tilt on the right.  Mild swelling over the ATFL as well.  History and physical exam appear most consistent with chronic ankle instability and possibility for ATFL tear on the right.    He also asked to evaluate his right thumb, which has significant pain and TTP over the volar thumb  at the A1 pulley.  Confirms that there has been triggering, clicking, locking of the thumb which is severely painful.  Triggering is confirmed on exam, and consistent with a right trigger thumb.    We discussed the nature of the condition and available treatment options, and mutually agreed upon the following plan:    - Imaging:          - Reviewed and independently interpreted the relevant imaging in the chart, including any imaging ordered for today's clinic.  - Reviewed results and images with patient.   - MRI order placed for the right ankle to evaluate integrity of the stabilizing ligaments of the lateral ankle.  Instructions given for scheduling the image and follow-up.  - Medications:          - Discussed pharmacologic options for pain relief.   - May use NSAIDs (Ibuprofen, Naproxen) or Acetaminophen (Tylenol) as needed for pain control. Do not take these if previously advised to avoid them for other medical conditions.  - May also use topical medications such as lidocaine, IcyHot, BioFreeze, or Voltaren gel as needed for pain control. Voltaren gel is an anti-inflammatory cream that may be used up to 4 times per day over the painful area.   - Injections:          - Discussed possible injection options and alternatives.    - Injection options include: Corticosteroid injection of the left tibiotalar joint, and the right thumb A1 pulley.  - Performed the above injections today in clinic. Patient tolerated the procedure well without complications.     - Post-procedure instructions:    - Keep the injection site clean and dry.   - Do not submerge the injection site for 24 hours (no baths, pools). Showers are ok.   - Rest the area for 24-48 hours before resuming normal activities. Avoid overexerting the area for the first few weeks.   - It may take 2-3 days to start noticing the effects of the injection and up to 3-4 weeks to feel significant benefits.   - Therapy:          - Discussed the benefits of therapy vs  home exercise program for optimization of range of motion, flexibility, strength, stability and function.   - Recommended to continue with physical therapy and home exercise program for BLE, balance, strength and instability of the ankles.  - Modalities:          - May use ice, heat, massage or other modalities as needed.   - Bracing:          - Discussed bracing options and they consider using an ankle stability brace for the right ankle for instability.  He has custom orthotics that work well for bilateral feet.  - Surgery:          - Discussed non-operative and operative treatment options for the patient's condition. Goal is to continue conservative care for as long as possible before surgical intervention would need to be considered.  - Activity:          - Encouraged to remain active and participate in regular activities as symptoms allow.   Avoid or modify exacerbating activities as needed.  - Follow up:          - When results of the advanced imaging are available to discuss the results and next steps in treatment.   - May follow up sooner for new/worsening symptoms.  - May contact clinic by phone or MyChart for questions or concerns.       Update - 4/15/25:  Neema presents for follow-up of his chronic bilateral ankle pain.  He continues to have significant pes planus, hindfoot valgus, and pain and instability in bilateral ankles.  Left ankle is feeling better after an ankle joint injection at last visit.  Right ankle continues to give him sharp pain at the lateral aspect under the ATFL region and the sinus tarsi.  We elected to obtain an MRI that revealed sequelae of hindfoot valgus stress causing sinus tarsi syndrome.  No acute fractures or ligamentous tears.  Associated injuries of periosteal edema of the lateral talus, soft tissue edema within the sinus tarsi, and tenosynovitis of the posterior tibialis and FHL tendons.  We discussed the results of the MRI in detail and how they correlate well with his  symptoms.  We discussed nonoperative and operative treatment options and elected to proceed with a corticosteroid injection of the sinus tarsi.  The injection was given today without complication, patient tolerated procedure well.  See procedure note above for details.  We discussed a referral to podiatry for overall management, he would like to stick with our treatment plan above and will consider referral in the future if needed.  He currently has a pair of orthotics that he likes to use and does not want to make changes, also not interested in surgery.      Scar Day DO, CAQSM  [facility] - Department of Orthopedic Surgery       Disclaimer:  This note was prepared and written using Dragon Medical dictation software. As a result, there may be errors in the script that have gone undetected. Please consider this when interpreting the information in this note.          Again, thank you for allowing me to participate in the care of your patient.        Sincerely,        Scar Day DO    Electronically signed

## 2025-04-15 NOTE — PROGRESS NOTES
"   04/15/25 0500   Appointment Info   Treating Provider Deirdre Covarrubias OTR/L   Total/Authorized Visits 6 (POC)   Visits Used 6   Medical Diagnosis R CTS & ulnar neuropathy   OT Tx Diagnosis R CTS & ulnar neuropathy   Progress Note/Certification   Start Of Care Date 02/04/25   Onset of Illness/Injury or Date of Surgery 01/13/25   Therapy Frequency 1x weekly, tapering   Predicted Duration 10 weeks   Certification date from 03/19/25   Certification date to 04/15/25   Progress Note Due Date 04/15/25   Progress Note Completed Date 04/15/25   Goals   OT Goals 1   OT Goal 1   Goal Identifier Lifting   Goal Description Pt will report no difficulty with gripping and lifting a grocery bag   Rationale In order to maximize safety and independence with ADL/IADLs   Goal Progress Mild difficulty   Target Date 04/15/25   Subjective Report   Subjective Report \"The elbow is not painful. I overworked this [place] (CT). It hurt a little. I think I'm doing well. I kind of felt like today would be my last appointment.\"   Objective Measures   Objective Measures Hand Obj Measures   Hand Objective Measures Strength   Strength ;Lateral Pinch;3 Point Pinch    (measured in pounds)    Average Strength 90lbs   Lateral Pinch (measured in pounds)   Lateral Pinch Average Strength 18lbs   3 Point Pinch (measured in pounds)   3 Point Pinch Average Strength 17lbs   OT Modalities   OT Modalities Ultrasound    Ultrasound   Ultrasound, Minutes (98430) 8 Minutes   Ultrasound -Type (does not include 3-5 min prep/cleanup time) Continuous;2 cm sound head   Intensity 1.4   Duration (does not include the 3-5 min set up/clean up time) 8 min   Frequency 1 MHz   Location CT and medial elbow scars   Positioning sitting   Medication 4 minutes each   Patient Response/Progress Tolerates well   Treatment Interventions (OT)   Interventions Neuromuscular Re-education;Manual Therapy;Therapeutic Procedure/Exercise   Neuromuscular Re-education   PTRx Neuro Re-ed " 1 Median Nerve Mobility   PTRx Neuro Re-ed 1 - Details HEP    Therapeutic Procedure/Exercise   Therapeutic Procedure: strength, endurance, ROM, flexibillity minutes (57865) 8   Ther Proc 1 Discharge Note   Ther Proc 1 - Details Please refer to the objective data gathered for today's progress note.   PTRx Ther Proc 1 Finger Active Range of Motion Tendon Glides Fist Series   PTRx Ther Proc 1 - Details HEP    PTRx Ther Proc 2 Finger Active Range of Motion FDS Flexor Tendon Gliding   PTRx Ther Proc 2 - Details HEP    PTRx Ther Proc 3 Hand Strengthening Gripping   PTRx Ther Proc 3 - Details HEP   PTRx Ther Proc 4 Hand Strengthening 3 Point Pinch   PTRx Ther Proc 4 - Details HEP   PTRx Ther Proc 5 Hand Strengthening Key Pinch   PTRx Ther Proc 5 - Details HEP   Skilled Intervention To track therapy progress   Patient Response/Progress Pt is ready to independently manage their symptoms and HEP. Strength has significantly improved since their last appointment. They will return, as needed.   Therapeutic Activity   PTRx Ther Act 1 Warmth   PTRx Ther Act 1 - Details HEP    PTRx Ther Act 2 scar massage-circular   PTRx Ther Act 2 - Details HEP     Manual Therapy   Manual Therapy Minutes (45475) 10   Manual Therapy 1 Scar massage   Manual Therapy 1 - Details Circular with moderate pressure; provided pt with dycem to assist with scar massage at home   Skilled Intervention To soften scars   Patient Response/Progress Tolerates well   Education   Learner/Method Patient;Demonstration;Pictures/Video   Education Comments PTRX via phone   Plan   Home program See PTRX   Plan for next session Prepare for discharge   Total Session Time   Timed Code Treatment Minutes 26   Total Treatment Time (sum of timed and untimed services) 26     DISCHARGE  Reason for Discharge: Patient chooses to discontinue therapy.  Patient no longer requires therapy to meet their goals.    Discharge Plan: Patient to continue home program.    Referring  Provider:  Trinh Deluna

## 2025-04-21 DIAGNOSIS — M54.14 THORACIC RADICULOPATHY: Primary | ICD-10-CM

## 2025-04-21 DIAGNOSIS — Z98.1 S/P SPINAL FUSION: ICD-10-CM

## 2025-04-21 NOTE — PROGRESS NOTES
Per Dr. Marshall in neurosurgery clinic, case request placed for Right T9-10 transforaminal epidural steroid injection in the setting of T10-pelvis fusion.

## 2025-05-03 ENCOUNTER — MYC MEDICAL ADVICE (OUTPATIENT)
Dept: NEUROSURGERY | Facility: CLINIC | Age: 60
End: 2025-05-03
Payer: COMMERCIAL

## 2025-05-06 ENCOUNTER — TELEPHONE (OUTPATIENT)
Dept: PHYSICAL MEDICINE AND REHAB | Facility: CLINIC | Age: 60
End: 2025-05-06
Payer: COMMERCIAL

## 2025-05-06 PROBLEM — M54.14 THORACIC RADICULOPATHY: Status: ACTIVE | Noted: 2024-11-20

## 2025-05-06 PROBLEM — Z98.1 S/P SPINAL FUSION: Status: ACTIVE | Noted: 2025-04-21

## 2025-05-06 NOTE — TELEPHONE ENCOUNTER
Called patient to schedule procedure with Dr. Verma    Date of Procedure: 5/14/25    Arrival time given: Yes: Arrival Time 8:45am       Procedure Location: [facility]     Verified Location with Patient:  Yes  Address provided to the patient    Pre-op H&P Required:  No: Local anesthesia       Post-Op/Follow Up Appt:  Not Indicated in Request      Informed patient they will need a  to drive them home:  Yes    Patients : Spouse     Patient is aware that pre-op RN from the procedure center will call 2-3 days prior to scheduled procedure to confirm arrival time and review any instructions:  Yes       Additional Comments: N/A        Purnima Manley MA on 5/6/2025 at 4:44 PM      P: [phone]

## 2025-05-06 NOTE — TELEPHONE ENCOUNTER
Phoned the patient and left VM. Stated to call the pain clinic to schedule injection procedure at [phone].

## 2025-05-13 ENCOUNTER — TELEPHONE (OUTPATIENT)
Dept: ORTHOPEDICS | Facility: CLINIC | Age: 60
End: 2025-05-13
Payer: COMMERCIAL

## 2025-05-13 ENCOUNTER — TELEPHONE (OUTPATIENT)
Dept: PHYSICAL MEDICINE AND REHAB | Facility: CLINIC | Age: 60
End: 2025-05-13
Payer: COMMERCIAL

## 2025-05-13 DIAGNOSIS — M17.0 PRIMARY OSTEOARTHRITIS OF KNEES, BILATERAL: Primary | ICD-10-CM

## 2025-05-13 NOTE — TELEPHONE ENCOUNTER
Patient scheduled for appointment on 5/27/2025 @ [facility] for discussion of viscosupplementation injection vs steroid injection of bilateral knee.        SynviscOne injection last completed 8/30/2025.  Patient reports relief for 3 months     Patient has failed 3 month trial of Pharmacologic Approach (e.g., topical NSAIDs, oral NSAIDs with or without oral proton pump inhibitors, GUNTER-2 inhibitors, topical capsaicin, acetaminophen, tramadol, duloxetine, etc.):  Yes     Patient has completed 3 month trial of Non-Pharmacologic treatments (i.e., physical, psychosocial, or mind-body approach (e.g., exercise-land based or aquatic, physical therapy, armond chi, yoga, weight management, cognitive behavioral therapy, knee brace or cane, etc).  Yes    Has patient had prior reaction to Synvisc/SynviscOne or any alternative HA product?: Yes    Prior authorization referral for SynviscOne injection pended.    Please advise

## 2025-05-13 NOTE — PROGRESS NOTES
Neema Hunt  :  1965  DOS: 2025  MRN: [id number]    Sports Medicine Clinic Procedure    Ultrasound Guided Bilateral Intra-Articular Knee SynviscOne Injection, +/- Aspiration    Clinical History: Bilateral primary osteoarthritis of knees.     Diagnosis:   1. Primary osteoarthritis of both knees        Large Joint Injection/Arthocentesis: bilateral knee    Date/Time: 2025 11:34 AM    Performed by: Scar Day DO  Authorized by: Scar Day DO    Indications:  Osteoarthritis  Needle Size:  22 G  Guidance: ultrasound    Approach:  Anterolateral  Location:  Knee  Laterality:  Bilateral      Medications (Right):  48 mg hylan 48 MG/6ML  Medications (Left):  48 mg hylan 48 MG/6ML  Outcome:  Tolerated well, no immediate complications  Procedure discussed: discussed risks, benefits, and alternatives    Consent Given by:  Patient  Prep: patient was prepped and draped in usual sterile fashion     Ultrasound images of procedure were permanently stored.     Ultrasound Guided Intra-articular Knee Viscosupplementation Injection   The knee was prepped and draped in a sterile manner.  Ultrasound identification of the patella, suprapatellar pouch, quadriceps tendon and femur in both long and short axis was obtained. The probe was placed in short axis to the femur.  A 1.5 inch 22 gauge needle was placed under ultrasound guidance into the superior knee joint using a lateral approach.  A prefilled syringe of SynviscOne solution was injected without difficulty. The needle was removed and there was good hemostasis without complications.  Patient tolerated procedure well.  There was ultrasound documentation of needle placement and injection.          Impression:  Successful ultrasound-guided bilateral knee joint viscosupplementation injection.    Plan:  - Injection:    - Expectations and goals of the injection were discussed and verbal and written consent was obtained.  - Performed the above procedures today  in clinic. Patient tolerated the procedure well without complications.    - Post-procedure instructions:    - Keep the injection site clean and dry.   - Do not submerge the injection site for 24 hours (no baths, pools). Showers are ok.   - Rest the area for 24-48 hours before resuming normal activities. Avoid overexerting the area for the first few weeks.   - It may take 2-3 days to start noticing the effects of the injection and up to 3-4 weeks to feel significant benefits.   - Follow up:          - In 6+ months for updates to treatment plan, or sooner for new/worsening symptoms.  - Patient has clinic contact information for questions or concerns.      Scar Day DO, FESTUS  [facility] - Sports Medicine  [facility] - Department of Orthopedic Surgery     Disclaimer:  This note was prepared and written using Dragon Medical dictation software. As a result, there may be errors in the script that have gone undetected. Please consider this when interpreting the information in this note.

## 2025-05-13 NOTE — TELEPHONE ENCOUNTER
Phoned the patient and left VM. Stated to call the pain clinic to reschedule injection procedure with dr. Verma due to insurance pending.

## 2025-05-16 ENCOUNTER — HOSPITAL ENCOUNTER (OUTPATIENT)
Facility: AMBULATORY SURGERY CENTER | Age: 60
Discharge: HOME | End: 2025-05-16
Attending: ANESTHESIOLOGY | Admitting: ANESTHESIOLOGY
Payer: COMMERCIAL

## 2025-05-16 ENCOUNTER — ANCILLARY PROCEDURE (OUTPATIENT)
Dept: RADIOLOGY | Facility: AMBULATORY SURGERY CENTER | Age: 60
End: 2025-05-16
Attending: ANESTHESIOLOGY
Payer: COMMERCIAL

## 2025-05-16 VITALS
WEIGHT: 200 LBS | TEMPERATURE: 97.2 F | BODY MASS INDEX: 28.63 KG/M2 | HEART RATE: 53 BPM | HEIGHT: 70 IN | DIASTOLIC BLOOD PRESSURE: 71 MMHG | SYSTOLIC BLOOD PRESSURE: 95 MMHG | OXYGEN SATURATION: 99 % | RESPIRATION RATE: 15 BRPM

## 2025-05-16 DIAGNOSIS — M25.562 CHRONIC PAIN OF LEFT KNEE: ICD-10-CM

## 2025-05-16 DIAGNOSIS — G89.29 CHRONIC PAIN OF LEFT KNEE: ICD-10-CM

## 2025-05-16 PROCEDURE — 64624 DSTRJ NULYT AGT GNCLR NRV: CPT | Mod: LT | Performed by: ANESTHESIOLOGY

## 2025-05-16 PROCEDURE — 64624 DSTRJ NULYT AGT GNCLR NRV: CPT | Mod: LT

## 2025-05-16 RX ORDER — NALOXONE HYDROCHLORIDE 0.4 MG/ML
INJECTION, SOLUTION INTRAMUSCULAR; INTRAVENOUS; SUBCUTANEOUS DAILY PRN
Status: DISCONTINUED | OUTPATIENT
Start: 2025-05-16 | End: 2025-05-16 | Stop reason: HOSPADM

## 2025-05-16 RX ORDER — FENTANYL CITRATE 50 UG/ML
INJECTION, SOLUTION INTRAMUSCULAR; INTRAVENOUS DAILY PRN
Status: DISCONTINUED | OUTPATIENT
Start: 2025-05-16 | End: 2025-05-16 | Stop reason: HOSPADM

## 2025-05-16 RX ORDER — LIDOCAINE HYDROCHLORIDE 10 MG/ML
INJECTION, SOLUTION EPIDURAL; INFILTRATION; INTRACAUDAL; PERINEURAL PRN
Status: DISCONTINUED | OUTPATIENT
Start: 2025-05-16 | End: 2025-05-16 | Stop reason: HOSPADM

## 2025-05-16 RX ORDER — LIDOCAINE HYDROCHLORIDE 20 MG/ML
INJECTION, SOLUTION INFILTRATION; PERINEURAL PRN
Status: DISCONTINUED | OUTPATIENT
Start: 2025-05-16 | End: 2025-05-16 | Stop reason: HOSPADM

## 2025-05-16 NOTE — H&P
ABBREVIATED H&P [facility]UL[facility]GERY [facility]      Patient Name: Neema Hunt   MRN: [id number]   YOB: 1965     1. Reason for Procedure:  Procedure Summary       Date: 05/16/25 Room / Location: [facility] PROCEDURE ROOM 06 / [facility]    Anesthesia Start:  Anesthesia Stop:     Procedure: Genicular Nerve Radiofrequency Ablation (Left: Knee) Diagnosis:       Chronic pain of left knee      (Chronic pain of left knee [M25.562, G89.29])    Providers: Regina Paris MD Responsible Provider:     Anesthesia Type: Not recorded ASA Status: Not recorded            2. History:   Past Medical History:   Diagnosis Date    Arthritis     Back pain     Dermatophytosis of nail     Eczema     Gastro-oesophageal reflux disease     Herpes     Hypertension     Insomnia     Lactose intolerance     Lumbar disc disease     Radiculopathy     Scoliosis     Spermatocele        Comorbidities: None    Any history of sleep apnea? No    Any history of problems with sedation? No    3. Physical:    General: Normal  Skin:  Normal.  Respiratory: Clear to auscultation bilateral, no wheezing  Cardio:  Regular rate and rhythm  Abdomen: Soft, nontender, nondistended, no palpable masses.  Musculoskeletal: Normal  Neuro: Sensory exam normal, motor exam 5/5, bilateral upper and lower extremities         4. Current Medications (if not in Epic):   Current Outpatient Medications   Medication Sig Dispense Refill    acetaminophen (TYLENOL) 500 MG tablet Take 1,000 mg by mouth daily as needed for mild pain (2 X 500 mg = 1000 mg)      amLODIPine-benazepril (LOTREL) 10-20 MG capsule Take 1 capsule by mouth every morning       azelastine (ASTELIN) 0.1 % nasal spray 1 SPRAY IN AFFECTED NOSTRILS EVERY 12 HOURS AS NEEDED FOR ALLERGY      Cyanocobalamin (B-12 PO) Take 2 chew tab by mouth every morning       famotidine (PEPCID) 20 MG tablet Take 20 mg by mouth every evening      gabapentin (NEURONTIN)  300 MG capsule Take 3 capsules (900 mg) by mouth at bedtime. 90 capsule 3    gabapentin (NEURONTIN) 300 MG capsule TAKE 3 CAPSULES(900 MG) BY MOUTH AT BEDTIME 90 capsule 1    gabapentin (NEURONTIN) 300 MG capsule TAKE 2 CAPSULES(600 MG) BY MOUTH AT BEDTIME 180 capsule 0    ipratropium (ATROVENT) 0.06 % nasal spray 1 SPRAY IN AFFECTED NOSTRILS EVERY 12 HOURS AS NEEDED      lactulose encephalopathy (CHRONULAC) 10 GM/15ML SOLUTION TAKE 15 ML BY MOUTH ONCE DAILY WITH DAIRY      Lidocaine (LIDOCARE) 4 % Patch Place 1 patch over 12 hours onto the skin every 24 hours. To prevent lidocaine toxicity, patient should be patch free for 12 hrs daily. 30 patch 3    lidocaine (LIDODERM) 5 % Patch Place 1 patch onto the skin every 24 hours (Patient taking differently: Place onto the skin every 24 hours.) 30 patch 2    lidocaine (XYLOCAINE) 5 % external ointment Apply topically as needed for moderate pain (nerve pain). 50 g 11    magnesium 250 MG tablet Take 1 tablet by mouth daily Daily      Melatonin 10 MG TABS tablet Take 10 mg by mouth nightly as needed for sleep      Misc Natural Products (OSTEO BI-FLEX JOINT SHIELD) TABS Take 1 tablet by mouth daily      Multiple Vitamins-Minerals (AIRBORNE PO) Take 1 chew tab by mouth every morning       Multiple Vitamins-Minerals (MULTIVITAMIN ADULT PO) Take 2 chew tab by mouth every morning       omega 3 1000 MG CAPS Take 1 chew tab by mouth every morning       polyethylene glycol (MIRALAX) 17 g packet Take 1 packet by mouth daily      Probiotic Product (SOLUBLE FIBER/PROBIOTICS PO) Take 1 chew tab by mouth every morning       tamsulosin (FLOMAX) 0.4 MG capsule Take 0.4 mg by mouth daily.      terbinafine (LAMISIL) 250 MG tablet       tetrahydrozoline (VISINE) 0.05 % ophthalmic solution Place 1 drop into both eyes daily as needed      valACYclovir (VALTREX) 1000 mg tablet Take 1,000 mg by mouth 2 times daily as needed      naproxen sodium (ANAPROX) 220 MG tablet Take 220 mg by mouth 2 times  74M getting work up for severe left Hip OA and replacement found with elevated liver chemistries, Right lower costal pain, with sono noted for liver mass, portal vein thrombosis. CT in hospital shows lung masses in addition to liver mass and ascites.  Afib with better rapid rate, relatively new onset (was in [facility] 3/13/2018), chronic edema Acute on chronic diastolic HF (HFpEF), diffuse ST and T abnormalities on EKG, could be ischemic changes. These were noted before.        AFIB  -  still with rapid ventricular rates prolonged up to 110-130s.  Increase metoprolol to 50 mg q6h with hold parameters.  Limited by low BP with systolics in the high 90s to low 100.  If rate still fast or unable to tolerate full metoprolol due to low BP, might need to add digoxin.  -  Continue with Lovenox full dose for Afib and primary stroke prophylaxis    ischemic ST T changes  -  These were present prior to afib  However pt has extensive tob hx, with significant risk for CAD.  Troponin negative for MI.  Echo with rapid heart rates and possible anterior wall hypokinesis with EF 50-55%.  continue asa 81 and metop      Edema Ascites Pleural effusion/ CHF?  - chronic edema despite lasix.  Continue with Lasix 40 mg qam.  elevated pBNP.  Third spacing could also be due to cirrhosis.      Awaiting work up of liver mass and portal vein thrombosis.  for biopsy today.  Then determine additional ischemic cardiac workup,   Will do SHARA DCCV if BP drops too much with increased metoprolol.  OVerall guarded prognosis. daily (with meals).          5. Allergies and Reactions:  has no known allergies.

## 2025-05-16 NOTE — OP NOTE
Patient: Neema Hunt Age: 60 year old   MRN: [id number] Attending: Dr. Paris     Date of Visit: May 16, 2025      PAIN MEDICINE CLINIC PROCEDURE NOTE    ATTENDING CLINICIAN:    Regina Paris MD    PREPROCEDURE DIAGNOSES:  1.  Left Knee pain  2.  Left Knee joint osteoarthritis    POSTPROCEDURE DIAGNOSES:  1.  Left knee pain   2.  Left Knee joint osteoarthritis      PROCEDURE(S) PERFORMED:  1.  Left knee genicular nerves radiofrequency ablation  2.  Fluoroscopic guidance for the above-named procedure(s)      ANESTHESIA:  Local.    BLOOD LOSS:  Minimal.    DRAINS AND SPECIMENS:  None.    COMPLICATIONS:  None.    INDICATIONS:  Neema Hunt is a 60 year old male with a history of  chronic knee pain secondary to chronic pain and osteoarthritis .  The patient stated that the patient was in their usual state of health and denied recent anticoagulant use or recent infections.  The patient had good pain relief with genicular nerve blocks. Therefore, the plan is to perform above mentioned procedure.       Procedure Details:  The patient was met in the procedure room, where the patient was identified by name, medical record number and date of birth.  All of the patient s last minute questions were answered. Written informed consent was obtained and saved in the electronic medical record, after the risks, benefits, and alternatives were discussed with the patient.      A formal time-out procedure was performed, as per protocol, including patient name, title of procedure, and site of procedure, and all in the room concurred.  Routine monitors were applied.      The patient was placed in the supine position on the procedure room table with affected knee slightly flexed and rested on pillows.  All pressure points were checked and comfortably padded.  Routine monitors were placed.  Vital signs were stable.    A chlorhexidine prep was completed followed by sterile draping per standard procedure.     The anterior left  knee was cleaned with chloraprep. Sterile drapes placed. The skin over the planned insertion sites was anesthetized with 1% lidocaine. Then using a 20G 100mm with 10mm active tip radiofrequency cannulas, one cannula was placed along the distal shaft of the femur directly superior to the lateral condylar flare.  A second cannula was placed distal shaft of the femur directly superior to the medial condylar flare.  The 3rd cannula was placed along the proximal tibial shaft directly inferior to the medial condylar flare.  AP views and lateral views taken. Then radiofrequency probes were inserted into the cannulae. Sensory and motor testing was undertaken at each cannula site individually, with no noted motor stimulation. Then , 1.0ml lof lidocaine 1% was injected at each cannula site. After an appropriate amount of time passed, the radiofrequency ablation was performed at 80 degrees C for 90 seconds at each site. The needle was then removed.      Light pressure was held at the puncture site(s) to prevent ecchymosis and oozing.  The patient's skin was cleansed, and hemostasis was confirmed.  Band-aids were applied to the needle injection site(s).      Condition:    The patient remained awake and alert throughout the procedure.  The patient tolerated the procedure well and was monitored for approximately 15 minutes afterward in the post procedure area.  There were no immediate post procedure complications noted.  The patient was then discharged to home as per protocol.      Pre-procedure pain score: 4/10  Post-procedure pain score: 4/10

## 2025-05-16 NOTE — DISCHARGE INSTRUCTIONS
Home Care Instructions after a Radio Frequency Ablation      Activity  -Rest today  -Do not work today  -Resume normal activity in 2-3 days  -No heavy lifting, turning or twisting for 24 hours  -DO NOT shower or soak in tub for 24 hours  -DO NOT remove bandaid for 24 hours    Pain  -You may experience soreness at the injection site for one or two days  -You may use an ice pack for 20 minutes every 2 hours for the first 24 hours, longer if needed for comfort, do not use heat  -You may use Tylenol (acetaminophen) every 4 hours or other pain medicines as directed by your physician  -If other pain medications are prescribed by your physician, please follow dosing instructions carefully.    What to expect  You may experience numbness radiating into your legs or arms (depending on the procedure location). This numbness may last several hours. Until sensation returns to normal, please use caution in walking, climbing stairs, and stepping out of your vehicle, etc. Relief of your initial symptoms can take up to 4 weeks to feel better, this is normal and due to the healing process. The procedure site may feel like a deep burn. Using ice will greatly minimize this discomfort. Do not use numbing creams or patches over your injection sites immediately following your procedure. Please keep injection sites covered, clean and dry for 24 hours.    DID YOU RECEIVE SEDATION TODAY?  Yes    Safety  Sedation medicine, if given, may remain active for many hours. It is important for the next 24 hours that you do not:  -Drive a car  -Operate machines or power tools  -Consume alcohol, including beer  -Sign any important papers or legal documents  -Please have a responsible adult with you for 24 hours following any sedation    DID YOU RECEIVE STEROIDS TODAY?  Yes    Common side effects of steroids:  Not everyone will experience corticosteroid side effects. If side effects are experienced, they will gradually subside in the 7-10 day period  following an injection. Most common side effects include:  -Flushed face and/or chest  -Feeling of warmth, particularly in the face but could be an overall feeling of warmth  -Increased blood sugar in diabetic patients  -Menstrual irregularities my occur. If taking hormone-based birth control an alternate method of birth control is recommended  -Sleep disturbances and/or mood swings are possible  -Leg cramps      Please contact us if you have:  -Severe pain  -Fever more than 101.5 degrees Fahrenheit  -Signs of infection at the injection site (redness, swelling, or drainage)    For Pain Center Patients:   If you have questions, please contact our office at [phone] Option #1 between the hours of 7:00 am and 3:00 pm Monday through Friday. After office hours you can contact the on call provider by dialing [phone]. If you need immediate attention, we recommend that you go to a hospital emergency room or dial [phone].     If you have procedure scheduling questions please call [phone] Option #2.     FOR PM&R PATIENTS:  For patients seen by the PM and R service, please call [phone]. (Monday through Friday 8a-5p.  After business hours and weekends call [phone] and ask for the PM and R doctor on call). If you need to fax a pain diary to PM&R the fax number is [phone]. If you are unable to fax, uploading to [employer] is encouraged, then send to provider. If you have procedure scheduling questions please call [phone] Option #2.

## 2025-05-26 SDOH — HEALTH STABILITY: PHYSICAL HEALTH: ON AVERAGE, HOW MANY DAYS PER WEEK DO YOU ENGAGE IN MODERATE TO STRENUOUS EXERCISE (LIKE A BRISK WALK)?: 2 DAYS

## 2025-05-26 SDOH — HEALTH STABILITY: PHYSICAL HEALTH: ON AVERAGE, HOW MANY MINUTES DO YOU ENGAGE IN EXERCISE AT THIS LEVEL?: 20 MIN

## 2025-05-27 ENCOUNTER — ANCILLARY PROCEDURE (OUTPATIENT)
Dept: ULTRASOUND IMAGING | Facility: HOSPITAL | Age: 60
End: 2025-05-27
Attending: STUDENT IN AN ORGANIZED HEALTH CARE EDUCATION/TRAINING PROGRAM
Payer: COMMERCIAL

## 2025-05-27 ENCOUNTER — OFFICE VISIT (OUTPATIENT)
Dept: ORTHOPEDICS | Facility: CLINIC | Age: 60
End: 2025-05-27
Payer: COMMERCIAL

## 2025-05-27 DIAGNOSIS — M17.0 PRIMARY OSTEOARTHRITIS OF BOTH KNEES: ICD-10-CM

## 2025-05-27 DIAGNOSIS — M17.0 PRIMARY OSTEOARTHRITIS OF BOTH KNEES: Primary | ICD-10-CM

## 2025-05-27 PROCEDURE — 20611 DRAIN/INJ JOINT/BURSA W/US: CPT | Mod: 50 | Performed by: STUDENT IN AN ORGANIZED HEALTH CARE EDUCATION/TRAINING PROGRAM

## 2025-05-27 NOTE — LETTER
2025      Neema Hunt  [address] [address]      Dear Colleague,    Thank you for referring your patient, Neema Hunt, to the [facility] SPORTS MEDICINE [facility]. Please see a copy of my visit note below.    Neema Hunt  :  1965  DOS: 2025  MRN: [id number]    Sports Medicine Clinic Procedure    Ultrasound Guided Bilateral Intra-Articular Knee SynviscOne Injection, +/- Aspiration    Clinical History: Bilateral primary osteoarthritis of knees.     Diagnosis:   1. Primary osteoarthritis of both knees        Large Joint Injection/Arthocentesis: bilateral knee    Date/Time: 2025 11:34 AM    Performed by: Scar Day DO  Authorized by: Scar Day DO    Indications:  Osteoarthritis  Needle Size:  22 G  Guidance: ultrasound    Approach:  Anterolateral  Location:  Knee  Laterality:  Bilateral      Medications (Right):  48 mg hylan 48 MG/6ML  Medications (Left):  48 mg hylan 48 MG/6ML  Outcome:  Tolerated well, no immediate complications  Procedure discussed: discussed risks, benefits, and alternatives    Consent Given by:  Patient  Prep: patient was prepped and draped in usual sterile fashion     Ultrasound images of procedure were permanently stored.     Ultrasound Guided Intra-articular Knee Viscosupplementation Injection   The knee was prepped and draped in a sterile manner.  Ultrasound identification of the patella, suprapatellar pouch, quadriceps tendon and femur in both long and short axis was obtained. The probe was placed in short axis to the femur.  A 1.5 inch 22 gauge needle was placed under ultrasound guidance into the superior knee joint using a lateral approach.  A prefilled syringe of SynviscOne solution was injected without difficulty. The needle was removed and there was good hemostasis without complications.  Patient tolerated procedure well.  There was ultrasound documentation of needle placement and injection.           Impression:  Successful ultrasound-guided bilateral knee joint viscosupplementation injection.    Plan:  - Injection:    - Expectations and goals of the injection were discussed and verbal and written consent was obtained.  - Performed the above procedures today in clinic. Patient tolerated the procedure well without complications.    - Post-procedure instructions:    - Keep the injection site clean and dry.   - Do not submerge the injection site for 24 hours (no baths, pools). Showers are ok.   - Rest the area for 24-48 hours before resuming normal activities. Avoid overexerting the area for the first few weeks.   - It may take 2-3 days to start noticing the effects of the injection and up to 3-4 weeks to feel significant benefits.   - Follow up:          - In 6+ months for updates to treatment plan, or sooner for new/worsening symptoms.  - Patient has clinic contact information for questions or concerns.      Scar Day DO, CAQSM  [facility] Sports Medicine  [facility] - Department of Orthopedic Surgery     Disclaimer:  This note was prepared and written using Dragon Medical dictation software. As a result, there may be errors in the script that have gone undetected. Please consider this when interpreting the information in this note.       Again, thank you for allowing me to participate in the care of your patient.        Sincerely,        Scar Day DO    Electronically signed

## 2025-06-12 ENCOUNTER — ANCILLARY PROCEDURE (OUTPATIENT)
Dept: RADIOLOGY | Facility: AMBULATORY SURGERY CENTER | Age: 60
End: 2025-06-12
Attending: PHYSICAL MEDICINE & REHABILITATION
Payer: COMMERCIAL

## 2025-06-12 DIAGNOSIS — M54.14 RADICULOPATHY, THORACIC REGION: ICD-10-CM

## 2025-06-12 NOTE — PROGRESS NOTES
Neema Hunt  :  1965  DOS: 2025  MRN: [id number]    Patient presented today for possible right trigger thumb and left ankle joint corticosteroid injections.  He is currently scheduled for bunion surgery today as well and we discussed the risks/benefits of corticosteroid injections on the day of surgery.  Although in a different anatomic location from his surgical site, I believe it would be best to avoid anything that could increase risk of postoperative infection or complication on the day of surgery.  I recommend that we reschedule injections for at least a week after surgery at the earliest, preferably 2 weeks.  He would like them as soon as possible since he will be utilizing crutches for his surgical recovery. We discussed available dates and we will currently slot him into 2025 for the injections.  No need for a billing charge for today's appointment.      Scar Day DO, CAQSM  [facility] Sports Medicine  [facility] - Department of Orthopedic Surgery     Disclaimer:  This note was prepared and written using Dragon Medical dictation software. As a result, there may be errors in the script that have gone undetected. Please consider this when interpreting the information in this note.

## 2025-06-14 ENCOUNTER — HEALTH MAINTENANCE LETTER (OUTPATIENT)
Age: 60
End: 2025-06-14

## 2025-06-14 SDOH — HEALTH STABILITY: PHYSICAL HEALTH: ON AVERAGE, HOW MANY DAYS PER WEEK DO YOU ENGAGE IN MODERATE TO STRENUOUS EXERCISE (LIKE A BRISK WALK)?: 2 DAYS

## 2025-06-14 SDOH — HEALTH STABILITY: PHYSICAL HEALTH: ON AVERAGE, HOW MANY MINUTES DO YOU ENGAGE IN EXERCISE AT THIS LEVEL?: 20 MIN

## 2025-06-17 ENCOUNTER — ANCILLARY PROCEDURE (OUTPATIENT)
Dept: ULTRASOUND IMAGING | Facility: HOSPITAL | Age: 60
End: 2025-06-17
Attending: STUDENT IN AN ORGANIZED HEALTH CARE EDUCATION/TRAINING PROGRAM
Payer: COMMERCIAL

## 2025-06-17 ENCOUNTER — OFFICE VISIT (OUTPATIENT)
Dept: ORTHOPEDICS | Facility: CLINIC | Age: 60
End: 2025-06-17
Payer: COMMERCIAL

## 2025-06-17 DIAGNOSIS — M65.311 TRIGGER FINGER OF RIGHT THUMB: Primary | ICD-10-CM

## 2025-06-17 DIAGNOSIS — M65.311 TRIGGER FINGER OF RIGHT THUMB: ICD-10-CM

## 2025-06-17 PROCEDURE — 99207 PR NO CHARGE LOS: CPT | Performed by: STUDENT IN AN ORGANIZED HEALTH CARE EDUCATION/TRAINING PROGRAM

## 2025-06-17 NOTE — LETTER
2025      Neema Hunt  [address] [address]      Dear Colleague,    Thank you for referring your patient, Neema Hunt, to the [facility]. Please see a copy of my visit note below.    Neema Hunt  :  1965  DOS: 2025  MRN: [id number]    Patient presented today for possible right trigger thumb and left ankle joint corticosteroid injections.  He is currently scheduled for bunion surgery today as well and we discussed the risks/benefits of corticosteroid injections on the day of surgery.  Although in a different anatomic location from his surgical site, I believe it would be best to avoid anything that could increase risk of postoperative infection or complication on the day of surgery.  I recommend that we reschedule injections for at least a week after surgery at the earliest, preferably 2 weeks.  He would like them as soon as possible since he will be utilizing crutches for his surgical recovery. We discussed available dates and we will currently slot him into 2025 for the injections.  No need for a billing charge for today's appointment.      Scar Day DO, CAQSM  [facility] - Sports Medicine  [facility] - Department of Orthopedic Surgery     Disclaimer:  This note was prepared and written using Dragon Medical dictation software. As a result, there may be errors in the script that have gone undetected. Please consider this when interpreting the information in this note.       Again, thank you for allowing me to participate in the care of your patient.        Sincerely,        Scar Day DO    Electronically signed

## 2025-06-18 NOTE — PROGRESS NOTES
Neema Hunt  :  1965  DOS: 2025  MRN: [id number]    Sports Medicine Clinic Procedure    Ultrasound Guided Left Intra-Articular Ankle Injection and Right Thumb Trigger Finger Injection    Clinical History: Right trigger thumb.  Posttraumatic osteoarthritis of the left ankle.    Diagnosis:   1. Trigger finger of right thumb    2. Ankle arthropathy        Ultrasound-guided intra-articular ankle joint corticosteroid injection  The risks of the procedure were explained to the patient.  A consent was signed for the intra-articular ankle injection.  The patient was evaluated with a [employer] ultrasound machine using a 12 MHz linear probe.     By palpation and ultrasound guidance, the tibiotalar joint space was identified medial to the tibialis anterior tendon and long axis and marked. Injection site was prepared with chlorhexidine solution in sterile fashion.  A 1.5 inch 25 gauge needle was used to enter the ankle joint via anterior approach under ultrasound guidance and long axis in-plane.  A mixture of 1ml 1% lidocaine, 1ml of 0.5% bupivacaine, and 1ml of kenalog (40mg/ml) was injected without difficulty.  After removal of the needle, the area was cleaned, hemostasis achieved, and bandage applied. There was ultrasound documentation of needle placement and injection.  Patient tolerated the procedure well, no complications.    Procedure: Trigger Finger Injection   Consent given, and signed on chart.  Sterile prepping.    Ultrasound identification of flexor tendons and A1 pulley on both long and short axis.    The probe was placed in short axis view to the flexor digitorum tendons at the A1 pulley at the level of the metacarpophalangeal joint.  A 1.5 inch 27 gauge needle was placed under ultrasound guidance between the flexor tendon sheath and the flexor digitorum tendons.     A mixture of 1 cc 1% xylocaine and 0.5 cc 40 mg kenalog was injected without difficulty on the short axis view.    Good hemostasis and no  complications.  Ultrasound documentation of needle placement and injection.    Medium Joint Injection/Arthrocentesis: L ankle    Date/Time: 6/24/2025 11:07 AM    Performed by: Scar Day DO  Authorized by: Scar Day DO    Indications:  Pain  Needle Size:  25 G  Guidance: ultrasound    Approach:  Lateral  Location:  Ankle  Site:  L ankle  Medications:  40 mg triamcinolone 40 MG/ML; 1 mL lidocaine 1 %; 1 mL BUPivacaine 0.5 %  Outcome:  Tolerated well, no immediate complications  Procedure discussed: discussed risks, benefits, and alternatives    Consent Given by:  Patient  Prep: patient was prepped and draped in usual sterile fashion     Ultrasound images of procedure were permanently stored.     Hand / Upper Extremity Injection/Arthrocentesis: R thumb A1    Date/Time: 6/24/2025 11:08 AM    Performed by: Scar Day DO  Authorized by: Scar Day DO    Indications:  Pain  Needle Size:  27 G  Guidance: ultrasound    Approach:  Radial  Condition: trigger finger    Location:  Thumb    Site:  R thumb A1  Medications:  20 mg triamcinolone 40 MG/ML; 0.5 mL lidocaine 1 %  Outcome:  Tolerated well, no immediate complications  Procedure discussed: discussed risks, benefits, and alternatives    Consent Given by:  Patient  Prep: patient was prepped and draped in usual sterile fashion     Ultrasound images of procedure were permanently stored.       Tendon sheath injection was performed.     Drug Amount Wasted:  Yes: 20 mg/ml   Vial/Syringe: Single dose vial  Expiration Date:  11/1/2026      Scar Day DO  June 24, 2025    Impression:  Successful ultrasound-guided right trigger thumb and left ankle joint corticosteroid injections.    Plan:  - Injection:    - Expectations and goals of the injection were discussed and verbal and written consent was obtained.  - Performed the above procedures today in clinic. Patient tolerated the procedure well without complications.    - Post-procedure instructions:    -  Keep the injection site clean and dry.   - Do not submerge the injection site for 24 hours (no baths, pools). Showers are ok.   - Rest the area for 24-48 hours before resuming normal activities. Avoid overexerting the area for the first few weeks.   - It may take 2-3 days to start noticing the effects of the injection and up to 3-4 weeks to feel significant benefits.   - Follow up:          - In 3+ months as needed, sooner for new/worsening symptoms.  - Patient has clinic contact information for questions or concerns.      Scar Day DO, FESTUS  [facility] Sports Medicine  [facility] - Department of Orthopedic Surgery     Disclaimer:  This note was prepared and written using Dragon Medical dictation software. As a result, there may be errors in the script that have gone undetected. Please consider this when interpreting the information in this note.

## 2025-06-23 SDOH — HEALTH STABILITY: PHYSICAL HEALTH: ON AVERAGE, HOW MANY DAYS PER WEEK DO YOU ENGAGE IN MODERATE TO STRENUOUS EXERCISE (LIKE A BRISK WALK)?: 2 DAYS

## 2025-06-23 SDOH — HEALTH STABILITY: PHYSICAL HEALTH: ON AVERAGE, HOW MANY MINUTES DO YOU ENGAGE IN EXERCISE AT THIS LEVEL?: 20 MIN

## 2025-06-24 ENCOUNTER — OFFICE VISIT (OUTPATIENT)
Dept: ORTHOPEDICS | Facility: CLINIC | Age: 60
End: 2025-06-24
Payer: COMMERCIAL

## 2025-06-24 ENCOUNTER — ANCILLARY PROCEDURE (OUTPATIENT)
Dept: ULTRASOUND IMAGING | Facility: HOSPITAL | Age: 60
End: 2025-06-24
Attending: STUDENT IN AN ORGANIZED HEALTH CARE EDUCATION/TRAINING PROGRAM
Payer: COMMERCIAL

## 2025-06-24 DIAGNOSIS — M65.311 TRIGGER FINGER OF RIGHT THUMB: Primary | ICD-10-CM

## 2025-06-24 DIAGNOSIS — M19.079 ANKLE ARTHROPATHY: ICD-10-CM

## 2025-06-24 DIAGNOSIS — M65.311 TRIGGER FINGER OF RIGHT THUMB: ICD-10-CM

## 2025-06-24 RX ORDER — LIDOCAINE HYDROCHLORIDE 10 MG/ML
1 INJECTION, SOLUTION INFILTRATION; PERINEURAL
Status: COMPLETED | OUTPATIENT
Start: 2025-06-24 | End: 2025-06-24

## 2025-06-24 RX ORDER — LIDOCAINE HYDROCHLORIDE 10 MG/ML
0.5 INJECTION, SOLUTION INFILTRATION; PERINEURAL
Status: COMPLETED | OUTPATIENT
Start: 2025-06-24 | End: 2025-06-24

## 2025-06-24 RX ORDER — BUPIVACAINE HYDROCHLORIDE 5 MG/ML
1 INJECTION, SOLUTION PERINEURAL
Status: COMPLETED | OUTPATIENT
Start: 2025-06-24 | End: 2025-06-24

## 2025-06-24 RX ORDER — TRIAMCINOLONE ACETONIDE 40 MG/ML
40 INJECTION, SUSPENSION INTRA-ARTICULAR; INTRAMUSCULAR
Status: COMPLETED | OUTPATIENT
Start: 2025-06-24 | End: 2025-06-24

## 2025-06-24 RX ORDER — TRIAMCINOLONE ACETONIDE 40 MG/ML
20 INJECTION, SUSPENSION INTRA-ARTICULAR; INTRAMUSCULAR
Status: COMPLETED | OUTPATIENT
Start: 2025-06-24 | End: 2025-06-24

## 2025-06-24 RX ADMIN — TRIAMCINOLONE ACETONIDE 40 MG: 40 INJECTION, SUSPENSION INTRA-ARTICULAR; INTRAMUSCULAR at 11:07

## 2025-06-24 RX ADMIN — LIDOCAINE HYDROCHLORIDE 1 ML: 10 INJECTION, SOLUTION INFILTRATION; PERINEURAL at 11:07

## 2025-06-24 RX ADMIN — TRIAMCINOLONE ACETONIDE 20 MG: 40 INJECTION, SUSPENSION INTRA-ARTICULAR; INTRAMUSCULAR at 11:08

## 2025-06-24 RX ADMIN — LIDOCAINE HYDROCHLORIDE 0.5 ML: 10 INJECTION, SOLUTION INFILTRATION; PERINEURAL at 11:08

## 2025-06-24 RX ADMIN — BUPIVACAINE HYDROCHLORIDE 1 ML: 5 INJECTION, SOLUTION PERINEURAL at 11:07

## 2025-06-24 NOTE — LETTER
2025      Neema Hunt  [address] [address]      Dear Colleague,    Thank you for referring your patient, Neema Hunt, to the [facility] SPORTS MEDICINE [facility] [place]. Please see a copy of my visit note below.    Neema Hunt  :  1965  DOS: 2025  MRN: [id number]    Sports Medicine Clinic Procedure    Ultrasound Guided Left Intra-Articular Ankle Injection and Right Thumb Trigger Finger Injection    Clinical History: Right trigger thumb.  Posttraumatic osteoarthritis of the left ankle.    Diagnosis:   1. Trigger finger of right thumb    2. Ankle arthropathy        Ultrasound-guided intra-articular ankle joint corticosteroid injection  The risks of the procedure were explained to the patient.  A consent was signed for the intra-articular ankle injection.  The patient was evaluated with a [employer] ultrasound machine using a 12 MHz linear probe.     By palpation and ultrasound guidance, the tibiotalar joint space was identified medial to the tibialis anterior tendon and long axis and marked. Injection site was prepared with chlorhexidine solution in sterile fashion.  A 1.5 inch 25 gauge needle was used to enter the ankle joint via anterior approach under ultrasound guidance and long axis in-plane.  A mixture of 1ml 1% lidocaine, 1ml of 0.5% bupivacaine, and 1ml of kenalog (40mg/ml) was injected without difficulty.  After removal of the needle, the area was cleaned, hemostasis achieved, and bandage applied. There was ultrasound documentation of needle placement and injection.  Patient tolerated the procedure well, no complications.    Procedure: Trigger Finger Injection   Consent given, and signed on chart.  Sterile prepping.    Ultrasound identification of flexor tendons and A1 pulley on both long and short axis.    The probe was placed in short axis view to the flexor digitorum tendons at the A1 pulley at the level of the metacarpophalangeal joint.  A 1.5 inch 27  gauge needle was placed under ultrasound guidance between the flexor tendon sheath and the flexor digitorum tendons.     A mixture of 1 cc 1% xylocaine and 0.5 cc 40 mg kenalog was injected without difficulty on the short axis view.    Good hemostasis and no complications.  Ultrasound documentation of needle placement and injection.    Medium Joint Injection/Arthrocentesis: L ankle    Date/Time: 6/24/2025 11:07 AM    Performed by: Scar Day DO  Authorized by: Scar Day DO    Indications:  Pain  Needle Size:  25 G  Guidance: ultrasound    Approach:  Lateral  Location:  Ankle  Site:  L ankle  Medications:  40 mg triamcinolone 40 MG/ML; 1 mL lidocaine 1 %; 1 mL BUPivacaine 0.5 %  Outcome:  Tolerated well, no immediate complications  Procedure discussed: discussed risks, benefits, and alternatives    Consent Given by:  Patient  Prep: patient was prepped and draped in usual sterile fashion     Ultrasound images of procedure were permanently stored.     Hand / Upper Extremity Injection/Arthrocentesis: R thumb A1    Date/Time: 6/24/2025 11:08 AM    Performed by: Scar Day DO  Authorized by: Scar Day DO    Indications:  Pain  Needle Size:  27 G  Guidance: ultrasound    Approach:  Radial  Condition: trigger finger    Location:  Thumb    Site:  R thumb A1  Medications:  20 mg triamcinolone 40 MG/ML; 0.5 mL lidocaine 1 %  Outcome:  Tolerated well, no immediate complications  Procedure discussed: discussed risks, benefits, and alternatives    Consent Given by:  Patient  Prep: patient was prepped and draped in usual sterile fashion     Ultrasound images of procedure were permanently stored.       Tendon sheath injection was performed.     Drug Amount Wasted:  Yes: 20 mg/ml   Vial/Syringe: Single dose vial  Expiration Date:  11/1/2026      Scar Day DO  June 24, 2025    Impression:  Successful ultrasound-guided right trigger thumb and left ankle joint corticosteroid injections.    Plan:  - Injection:     - Expectations and goals of the injection were discussed and verbal and written consent was obtained.  - Performed the above procedures today in clinic. Patient tolerated the procedure well without complications.    - Post-procedure instructions:    - Keep the injection site clean and dry.   - Do not submerge the injection site for 24 hours (no baths, pools). Showers are ok.   - Rest the area for 24-48 hours before resuming normal activities. Avoid overexerting the area for the first few weeks.   - It may take 2-3 days to start noticing the effects of the injection and up to 3-4 weeks to feel significant benefits.   - Follow up:          - In 3+ months as needed, sooner for new/worsening symptoms.  - Patient has clinic contact information for questions or concerns.      Scar Day DO, FESTUS  [facility] - Sports Medicine  [facility] - Department of Orthopedic Surgery     Disclaimer:  This note was prepared and written using Dragon Medical dictation software. As a result, there may be errors in the script that have gone undetected. Please consider this when interpreting the information in this note.       Again, thank you for allowing me to participate in the care of your patient.        Sincerely,        Scar Day DO    Electronically signed

## 2025-07-29 ASSESSMENT — PAIN SCALES - PAIN ENJOYMENT GENERAL ACTIVITY SCALE (PEG)
INTERFERED_GENERAL_ACTIVITY: 7
INTERFERED_ENJOYMENT_LIFE: 6
PEG_TOTALSCORE: 6
AVG_PAIN_PASTWEEK: 5

## 2025-07-30 ENCOUNTER — OFFICE VISIT (OUTPATIENT)
Dept: ANESTHESIOLOGY | Facility: CLINIC | Age: 60
End: 2025-07-30
Payer: COMMERCIAL

## 2025-07-30 VITALS
OXYGEN SATURATION: 98 % | RESPIRATION RATE: 16 BRPM | DIASTOLIC BLOOD PRESSURE: 81 MMHG | SYSTOLIC BLOOD PRESSURE: 117 MMHG | HEART RATE: 60 BPM

## 2025-07-30 DIAGNOSIS — M25.562 CHRONIC PAIN OF LEFT KNEE: Primary | ICD-10-CM

## 2025-07-30 DIAGNOSIS — G89.29 CHRONIC PAIN OF LEFT KNEE: Primary | ICD-10-CM

## 2025-07-30 ASSESSMENT — PAIN SCALES - GENERAL: PAINLEVEL_OUTOF10: MODERATE PAIN (5)

## 2025-07-30 NOTE — NURSING NOTE
Patient presents with:  Pain  Pain Management  RECHECK: Follow up-issues with knee after ablation      Moderate Pain (5)     Pain Medications       Analgesics Other Refills Start End     acetaminophen (TYLENOL) 500 MG tablet --  --    Sig - Route: Take 1,000 mg by mouth daily as needed for mild pain (2 X 500 mg = 1000 mg) - Oral    Class: Historical            What medications are you using for pain? Tylenol, Aleve, ocassional percocet      What refills are you needing today? none    Expectations: not sure what to expect    Estella Del Cid LPN

## 2025-07-30 NOTE — PROGRESS NOTES
[facility]    Date of visit: 7/30/2025    Chief complaint: No chief complaint on file.      Interval history:  Neema Hunt was last seen by me on 5/16/2025 for genicular nerve RFA on the left side.      Since his last visit, Neema Hunt reports:  Had right trigger finger/thumb and left ankle joint corticosteroid injections on 6/24/2025  Has been 2 months since left rfa and is noting improvement in symptoms around the knee. Rather than constant shooting, he only notes shooting pain 3 to 4 times per day. He notes the pain now only on the medial aspect of the joint and not around the entire joint. He states that he notes 40% relief in pain rather than 100% relief that he had with the diagnostic blocks. He is wondering if with repeated RFA he could possibly have full relief even at the medial aspect. Overall, however, he feels that the RFA was helpful in improving his ability to ambulate.     Pain scores:  Pain intensity on average is 5 on a scale of 0-10.     Current pain treatments:   Gabapentin 900 mg at bedtime    Past pain treatments:      Side Effects: denies any problems    Medications:  Current Outpatient Medications   Medication Sig Dispense Refill    acetaminophen (TYLENOL) 500 MG tablet Take 1,000 mg by mouth daily as needed for mild pain (2 X 500 mg = 1000 mg)      amLODIPine-benazepril (LOTREL) 10-20 MG capsule Take 1 capsule by mouth every morning       azelastine (ASTELIN) 0.1 % nasal spray 1 SPRAY IN AFFECTED NOSTRILS EVERY 12 HOURS AS NEEDED FOR ALLERGY      Cyanocobalamin (B-12 PO) Take 2 chew tab by mouth every morning       famotidine (PEPCID) 20 MG tablet Take 20 mg by mouth every evening      gabapentin (NEURONTIN) 300 MG capsule Take 3 capsules (900 mg) by mouth at bedtime. 90 capsule 3    gabapentin (NEURONTIN) 300 MG capsule TAKE 3 CAPSULES(900 MG) BY MOUTH AT BEDTIME 90 capsule 1    gabapentin (NEURONTIN) 300 MG capsule TAKE 2 CAPSULES(600 MG) BY MOUTH AT BEDTIME  180 capsule 0    ipratropium (ATROVENT) 0.06 % nasal spray 1 SPRAY IN AFFECTED NOSTRILS EVERY 12 HOURS AS NEEDED      lactulose encephalopathy (CHRONULAC) 10 GM/15ML SOLUTION TAKE 15 ML BY MOUTH ONCE DAILY WITH DAIRY      Lidocaine (LIDOCARE) 4 % Patch Place 1 patch over 12 hours onto the skin every 24 hours. To prevent lidocaine toxicity, patient should be patch free for 12 hrs daily. 30 patch 3    lidocaine (LIDODERM) 5 % Patch Place 1 patch onto the skin every 24 hours (Patient taking differently: Place onto the skin every 24 hours.) 30 patch 2    lidocaine (XYLOCAINE) 5 % external ointment Apply topically as needed for moderate pain (nerve pain). 50 g 11    magnesium 250 MG tablet Take 1 tablet by mouth daily Daily      Melatonin 10 MG TABS tablet Take 10 mg by mouth nightly as needed for sleep      Misc Natural Products (OSTEO BI-FLEX JOINT SHIELD) TABS Take 1 tablet by mouth daily      Multiple Vitamins-Minerals (AIRBORNE PO) Take 1 chew tab by mouth every morning       Multiple Vitamins-Minerals (MULTIVITAMIN ADULT PO) Take 2 chew tab by mouth every morning       naproxen sodium (ANAPROX) 220 MG tablet Take 220 mg by mouth 2 times daily (with meals).      omega 3 1000 MG CAPS Take 1 chew tab by mouth every morning       polyethylene glycol (MIRALAX) 17 g packet Take 1 packet by mouth daily      Probiotic Product (SOLUBLE FIBER/PROBIOTICS PO) Take 1 chew tab by mouth every morning       tamsulosin (FLOMAX) 0.4 MG capsule Take 0.4 mg by mouth daily.      terbinafine (LAMISIL) 250 MG tablet       tetrahydrozoline (VISINE) 0.05 % ophthalmic solution Place 1 drop into both eyes daily as needed      valACYclovir (VALTREX) 1000 mg tablet Take 1,000 mg by mouth 2 times daily as needed         Medical History: any changes in medical history since they were last seen? No    Review of Systems:  The 14 system ROS was reviewed from the intake questionnaire, and is positive for: left knee pain  Any bowel or bladder  problems: n/a  Mood: stable    Physical Exam:  There were no vitals taken for this visit.  General: AAOx3, NAD  Gait: Antalgic  MSK exam: TTP along medial aspect of left joint line    Assessment:   Chronic left knee pain    Neema Hunt is a 60 year old male who is seen at the pain clinic for follow up to discuss results of his left genicular nerve RFA. Overall it was a successful and helpful intervention as the pain is now only on the medial aspect of the knee joint and not the entire knee. He does feel that his pain is about 40% better. His daily activities and function have greatly improved. He will schedule repeat RFA at least 6 months from his initial RFA.     Plan:  Physical Therapy:  continue daily HEPs  Clinical Health Psychologist to address issues of relaxation, behavioral change, coping style, and other factors important to improvement.  Not indicated  Diagnostic Studies:  none  Medication Management:  none  Further procedures recommended: Repeat left genicular RFA when symptoms return or worsen. At least 4 months from today  Recommendations to PCP: none  Follow up: as needed when ready to repeat left genicular RFA      Regina Paris MD    Pain Medicine  Department of Anesthesiology  [facility]

## 2025-07-30 NOTE — LETTER
7/30/2025       RE: Neema Hunt  [address]  [place] [facility] [address]     Dear Colleague,    Thank you for referring your patient, Neema Hunt, to the [facility] CLINIC FOR COMPREHENSIVE PAIN MANAGEMENT [place] at [facility]. Please see a copy of my visit note below.    [facility]    Date of visit: 7/30/2025    Chief complaint: No chief complaint on file.      Interval history:  Neema Hunt was last seen by me on 5/16/2025 for genicular nerve RFA on the left side.      Since his last visit, Neema Hunt reports:  Had right trigger finger/thumb and left ankle joint corticosteroid injections on 6/24/2025  Has been 2 months since left rfa and is noting improvement in symptoms around the knee. Rather than constant shooting, he only notes shooting pain 3 to 4 times per day. He notes the pain now only on the medial aspect of the joint and not around the entire joint. He states that he notes 40% relief in pain rather than 100% relief that he had with the diagnostic blocks. He is wondering if with repeated RFA he could possibly have full relief even at the medial aspect. Overall, however, he feels that the RFA was helpful in improving his ability to ambulate.     Pain scores:  Pain intensity on average is 5 on a scale of 0-10.     Current pain treatments:   Gabapentin 900 mg at bedtime    Past pain treatments:      Side Effects: denies any problems    Medications:  Current Outpatient Medications   Medication Sig Dispense Refill     acetaminophen (TYLENOL) 500 MG tablet Take 1,000 mg by mouth daily as needed for mild pain (2 X 500 mg = 1000 mg)       amLODIPine-benazepril (LOTREL) 10-20 MG capsule Take 1 capsule by mouth every morning        azelastine (ASTELIN) 0.1 % nasal spray 1 SPRAY IN AFFECTED NOSTRILS EVERY 12 HOURS AS NEEDED FOR ALLERGY       Cyanocobalamin (B-12 PO) Take 2 chew tab by mouth every morning         famotidine (PEPCID) 20 MG tablet Take 20 mg by mouth every evening       gabapentin (NEURONTIN) 300 MG capsule Take 3 capsules (900 mg) by mouth at bedtime. 90 capsule 3     gabapentin (NEURONTIN) 300 MG capsule TAKE 3 CAPSULES(900 MG) BY MOUTH AT BEDTIME 90 capsule 1     gabapentin (NEURONTIN) 300 MG capsule TAKE 2 CAPSULES(600 MG) BY MOUTH AT BEDTIME 180 capsule 0     ipratropium (ATROVENT) 0.06 % nasal spray 1 SPRAY IN AFFECTED NOSTRILS EVERY 12 HOURS AS NEEDED       lactulose encephalopathy (CHRONULAC) 10 GM/15ML SOLUTION TAKE 15 ML BY MOUTH ONCE DAILY WITH DAIRY       Lidocaine (LIDOCARE) 4 % Patch Place 1 patch over 12 hours onto the skin every 24 hours. To prevent lidocaine toxicity, patient should be patch free for 12 hrs daily. 30 patch 3     lidocaine (LIDODERM) 5 % Patch Place 1 patch onto the skin every 24 hours (Patient taking differently: Place onto the skin every 24 hours.) 30 patch 2     lidocaine (XYLOCAINE) 5 % external ointment Apply topically as needed for moderate pain (nerve pain). 50 g 11     magnesium 250 MG tablet Take 1 tablet by mouth daily Daily       Melatonin 10 MG TABS tablet Take 10 mg by mouth nightly as needed for sleep       Misc Natural Products (OSTEO BI-FLEX JOINT SHIELD) TABS Take 1 tablet by mouth daily       Multiple Vitamins-Minerals (AIRBORNE PO) Take 1 chew tab by mouth every morning        Multiple Vitamins-Minerals (MULTIVITAMIN ADULT PO) Take 2 chew tab by mouth every morning        naproxen sodium (ANAPROX) 220 MG tablet Take 220 mg by mouth 2 times daily (with meals).       omega 3 1000 MG CAPS Take 1 chew tab by mouth every morning        polyethylene glycol (MIRALAX) 17 g packet Take 1 packet by mouth daily       Probiotic Product (SOLUBLE FIBER/PROBIOTICS PO) Take 1 chew tab by mouth every morning        tamsulosin (FLOMAX) 0.4 MG capsule Take 0.4 mg by mouth daily.       terbinafine (LAMISIL) 250 MG tablet        tetrahydrozoline (VISINE) 0.05 % ophthalmic  solution Place 1 drop into both eyes daily as needed       valACYclovir (VALTREX) 1000 mg tablet Take 1,000 mg by mouth 2 times daily as needed         Medical History: any changes in medical history since they were last seen? No    Review of Systems:  The 14 system ROS was reviewed from the intake questionnaire, and is positive for: left knee pain  Any bowel or bladder problems: n/a  Mood: stable    Physical Exam:  There were no vitals taken for this visit.  General: AAOx3, NAD  Gait: Antalgic  MSK exam: TTP along medial aspect of left joint line    Assessment:   Chronic left knee pain    Neema Hunt is a 60 year old male who is seen at the pain clinic for follow up to discuss results of his left genicular nerve RFA. Overall it was a successful and helpful intervention as the pain is now only on the medial aspect of the knee joint and not the entire knee. He does feel that his pain is about 40% better. His daily activities and function have greatly improved. He will schedule repeat RFA at least 6 months from his initial RFA.     Plan:  Physical Therapy:  continue daily HEPs  Clinical Health Psychologist to address issues of relaxation, behavioral change, coping style, and other factors important to improvement.  Not indicated  Diagnostic Studies:  none  Medication Management:  none  Further procedures recommended: Repeat left genicular RFA when symptoms return or worsen. At least 4 months from today  Recommendations to PCP: none  Follow up: as needed when ready to repeat left genicular RFA      Regina Paris MD    Pain Medicine  Department of Anesthesiology  [facility]              Again, thank you for allowing me to participate in the care of your patient.      Sincerely,    Regina Paris MD

## 2025-07-30 NOTE — PATIENT INSTRUCTIONS
Recommended Follow up:      Please reach out when you are ready for a repeat Genicular RFA           Recommendations will be written in the providers note for your Primary Care provider (OR other providers in your care team) to review and make changes to your therapies based on their discretion.   To speak with a nurse, schedule/reschedule/cancel a clinic appointment, or request a medication refill call: [phone].    You can also reach us by [employer]: [email or web address]

## 2025-08-06 DIAGNOSIS — G89.29 CHRONIC PAIN OF LEFT KNEE: Primary | ICD-10-CM

## 2025-08-06 DIAGNOSIS — M25.562 CHRONIC PAIN OF LEFT KNEE: Primary | ICD-10-CM

## (undated) DEVICE — CATH FOLEY COUDE 14FR 5ML LATEX

## (undated) DEVICE — NDL SPINAL 18GA 3.5" 405184

## (undated) DEVICE — GLOVE PROTEXIS BLUE W/NEU-THERA 8.0  2D73EB80

## (undated) DEVICE — DAVINCI XI FCP BIPOLAR FENESTRATED 470205

## (undated) DEVICE — TOOL DISSECT MIDAS MR8 14CM MATCH HEAD 3MM MR8-14MH30

## (undated) DEVICE — SYR 30ML LL W/O NDL 302832

## (undated) DEVICE — PREP CHLORAPREP W/ORANGE TINT 10.5ML 260715

## (undated) DEVICE — POSITIONER PT PRONESAFE HEAD REST W/DERMAPROX INSERT 40599

## (undated) DEVICE — DRAPE O ARM TUBE 9732722

## (undated) DEVICE — SU STRATAFIX PDS PLUS 3-0 SPIRAL SH 15CM SXPP1B420

## (undated) DEVICE — ESU GROUND PAD UNIVERSAL W/O CORD

## (undated) DEVICE — SPONGE SURGIFOAM 100 1974

## (undated) DEVICE — KIT PATIENT JACKSON 1 SPK10118

## (undated) DEVICE — MIDAS REX DISSECTING TOOL 2.5MM  T12MH25

## (undated) DEVICE — DAVINCI XI SEAL UNIVERSAL 5-8MM 470361

## (undated) DEVICE — DAVINCI XI MONOPOLAR SCISSORS HOT SHEARS 8MM 470179

## (undated) DEVICE — SOL WATER IRRIG 1000ML BOTTLE 2F7114

## (undated) DEVICE — CELL SAVER ELITE SET AUTOTRANSFUSION USB 125ML CSE-P-125

## (undated) DEVICE — PREP CHLORAPREP 26ML TINTED ORANGE  260815

## (undated) DEVICE — DRAPE X-RAY TUBE 00-901169-01-OEC

## (undated) DEVICE — RX SURGIFLO HEMOSTATIC MATRIX W/THROMBIN 8ML 2994

## (undated) DEVICE — CELL SAVER SET VACUUM LINE AUTOTRANSFUSION TUBING HAR-A-1000

## (undated) DEVICE — Device

## (undated) DEVICE — DAVINCI XI DRAPE COLUMN 470341

## (undated) DEVICE — GLOVE BIOGEL PI MICRO INDICATOR UNDERGLOVE SZ 8.5 48985

## (undated) DEVICE — PAD CHUX UNDERPAD 23X24" 7136

## (undated) DEVICE — PACK HAND WRIST SOP15HWFSP

## (undated) DEVICE — SET CLSVR 5+ ELT ANCOAG ASP ATF TBG LF DISP 00208-00

## (undated) DEVICE — TRAY PAIN INJECTION 97A 640

## (undated) DEVICE — DRAPE SHEET REV FOLD 3/4 9349

## (undated) DEVICE — TUBING EXTENSION SET MICROBORE 6" LL 2N1194

## (undated) DEVICE — DAVINCI HOT SHEARS TIP COVER  400180

## (undated) DEVICE — GLOVE PROTEXIS MICRO 7.5  2D73PM75

## (undated) DEVICE — ADH LIQUID MASTISOL TOPICAL VIAL 2-3ML 0523-48

## (undated) DEVICE — GLOVE BIOGEL PI MICRO SZ 6.0 48560

## (undated) DEVICE — SU VICRYL 2-0 CT-2 CR 8X18" J726D

## (undated) DEVICE — SU ETHILON 3-0 FS-1 18" 669H

## (undated) DEVICE — PREP CHLORAPREP 26ML TINTED HI-LITE ORANGE 930815

## (undated) DEVICE — GLOVE BIOGEL PI MICRO SZ 7.5 48575

## (undated) DEVICE — WIPES FOLEY CARE SURESTEP PROVON DFC100

## (undated) DEVICE — PACK UNIVERSAL SPLIT 29131

## (undated) DEVICE — SU VICRYL 0 CT-1 CR 8X18" J740D

## (undated) DEVICE — TUBING SUCTION 12"X1/4" N612

## (undated) DEVICE — PACK LARGE SPINE SNE15LSFSE

## (undated) DEVICE — DAVINCI XI NDL DRIVER MEGA SUTURE CUT 8MM 470309

## (undated) DEVICE — ESU ELEC BLADE 2.75" COATED/INSULATED E1455

## (undated) DEVICE — BLADE BONE MILL STRK 3.2MM FINE 5400-702-000

## (undated) DEVICE — SPONGE LAP 18X18" X8435

## (undated) DEVICE — ESU GROUND PAD ADULT W/CORD E7507

## (undated) DEVICE — DRAPE MAYO STAND 23X54 8337

## (undated) DEVICE — DRSG KERLIX FLUFFS X5

## (undated) DEVICE — DRAPE MICROSCOPE LEICA 54X150" AR8033650

## (undated) DEVICE — MIDAS REX DISSECTING TOOL  14MH30

## (undated) DEVICE — MANIFOLD NEPTUNE 4 PORT 700-20

## (undated) DEVICE — CATH FOLEY COUDE 14FR 5ML LUBRICATH LATEX 0168L14

## (undated) DEVICE — SU ETHILON 3-0 PS-1 18" 1663G

## (undated) DEVICE — PACK SPINE SM CUSTOM SNE15SSFSK

## (undated) DEVICE — SU VICRYL 0 CT-2 CR 8X18" J727D

## (undated) DEVICE — DRSG PRIMAPORE 02X3" 7133

## (undated) DEVICE — LINEN TOWEL PACK X6 WHITE 5487

## (undated) DEVICE — DAVINCI XI OBTURATOR BLADELESS 8MM 470359

## (undated) DEVICE — IOM SUPPLIES

## (undated) DEVICE — ESU PENCIL W/HOLSTER E2350H

## (undated) DEVICE — SUCTION FRAZIER 12FR W/HANDLE K73

## (undated) DEVICE — GLOVE PROTEXIS BLUE W/NEU-THERA 8.5  2D73EB85

## (undated) DEVICE — SU MONOCRYL 4-0 PS-2 18" UND Y496G

## (undated) DEVICE — BAG DECANTER STERILE WHITE DYNJDEC09

## (undated) DEVICE — SPONGE SURGIFOAM 12 1972

## (undated) DEVICE — SUCTION MANIFOLD NEPTUNE 2 SYS 4 PORT 0702-020-000

## (undated) DEVICE — CUSHION INSERT LG PRONE VIEW JACKSON TABLE

## (undated) DEVICE — DRSG TELFA ISLAND 4X14" 7544

## (undated) DEVICE — NDL SPINAL 22GA 3.5" QUINCKE 405181

## (undated) DEVICE — DRSG KERLIX 4 1/2"X4YDS ROLL 6715

## (undated) DEVICE — BLADE CLIPPER 4406

## (undated) DEVICE — SOL NACL 0.9% IRRIG 1000ML BOTTLE 2F7124

## (undated) DEVICE — ENDO TROCAR FIRST ENTRY KII FIOS Z-THRD 05X100MM CTF03

## (undated) DEVICE — LINEN TOWEL PACK X5 5464

## (undated) DEVICE — GLOVE BIOGEL PI MICRO INDICATOR UNDERGLOVE SZ 8.0 48980

## (undated) DEVICE — SYSTEM LAPAROVUE VISIBILITY LAPVUE10

## (undated) DEVICE — DAVINCI XI DRAPE ARM 470015

## (undated) DEVICE — LINEN TOWEL PACK X30 5481

## (undated) DEVICE — SYR 10ML LL W/O NDL 302995

## (undated) DEVICE — GOWN XXL 9575

## (undated) DEVICE — CATH TRAY FOLEY SURESTEP 16FR W/URINE MTR STATLK LF A303416A

## (undated) DEVICE — LINEN ORTHO ACL PACK 5447

## (undated) DEVICE — PREP DURAPREP 26ML APL 8630

## (undated) DEVICE — RX SURGIFLO HEMOSTATIC MATRIX 8ML 2991

## (undated) DEVICE — SPONGE COTTONOID 1/2X1/2" 80-1400

## (undated) DEVICE — DRAPE COVER C-ARM SEAMLESS SNAP-KAP 03-KP26 LATEX FREE

## (undated) DEVICE — PACK LAP CHOLE SLC15LCFSD

## (undated) DEVICE — FILTER BLOOD ULTIPOR  SQ40S

## (undated) DEVICE — SYR EAR BULB 3OZ 0035830

## (undated) DEVICE — LINEN HALF SHEET 5512

## (undated) DEVICE — SYR 10ML FINGER CONTROL W/O NDL 309695

## (undated) DEVICE — DRSG GAUZE 4X4" 2187

## (undated) DEVICE — MARKER SPHERES PASSIVE MEDT PACK 5 8801075

## (undated) DEVICE — SU VICRYL 2-0 CT-1 18' J739D

## (undated) DEVICE — LIGHT HANDLE X2

## (undated) DEVICE — RSRV 150UM CLSVR 5+ ELT SMARTSUCTION HRMN 3L RS FLTR 0020500

## (undated) DEVICE — DRSG PRIMAPORE 03 1/8X6" 66000318

## (undated) DEVICE — ESU ELEC BLADE 6" COATED/INSULATED E1455-6

## (undated) DEVICE — NDL BLUNT 15GA 1.5"

## (undated) DEVICE — DECANTER BAG 2002S

## (undated) DEVICE — GLOVE PROTEXIS W/NEU-THERA 8.5  2D73TE85

## (undated) DEVICE — PAD FOAM WILSON FRAME/JACKSON TABLE PT KIT 5878

## (undated) DEVICE — BLADE KNIFE SURG 11 371111

## (undated) DEVICE — PREP SKIN SCRUB TRAY 4461A

## (undated) DEVICE — CATH TRAY FOLEY COUDE SURESTEP 16FR W/URNE MTR STLK A304716A

## (undated) DEVICE — NDL COUNTER 40CT  31142311

## (undated) DEVICE — IONM UP TO 7 HOURS

## (undated) DEVICE — SU ETHILON 2-0 FS 18" 664G

## (undated) DEVICE — GLOVE PROTEXIS W/NEU-THERA 6.5  2D73TE65

## (undated) DEVICE — SU VICRYL 2-0 SH 27" J317H

## (undated) DEVICE — KIT MAZOR X SPINE MEDT DISP KIT0574-06

## (undated) DEVICE — SOL ADH LIQUID BENZOIN SWAB 0.6ML C1544

## (undated) DEVICE — DRAPE C-ARM W/STRAPS 42X72" 07-CA104

## (undated) DEVICE — SU STRATAFIX PDS PLUS CT-1 15CM SXPP1A420

## (undated) DEVICE — TUBING SUCTION SOFT 20'X3/16" 0036570

## (undated) DEVICE — DRAPE MICROSCOPE OPMI ZEISS 48X118" 306071-0000-000

## (undated) DEVICE — DRAPE POUCH INSTRUMENT 1018

## (undated) DEVICE — DRAPE STERI TOWEL LG 1010

## (undated) DEVICE — DRAPE C-ARM 60X42" 1013

## (undated) DEVICE — PACK SMALL SPINE RIDGES

## (undated) DEVICE — GLOVE BIOGEL PI ULTRATOUCH G SZ 7.0 42170

## (undated) DEVICE — SPONGE SURGIFOAM 50

## (undated) DEVICE — LUBRICANT INST ELECTROLUBE EL101

## (undated) RX ORDER — FENTANYL CITRATE 50 UG/ML
INJECTION, SOLUTION INTRAMUSCULAR; INTRAVENOUS
Status: DISPENSED
Start: 2019-09-05

## (undated) RX ORDER — HYDROMORPHONE HYDROCHLORIDE 1 MG/ML
INJECTION, SOLUTION INTRAMUSCULAR; INTRAVENOUS; SUBCUTANEOUS
Status: DISPENSED
Start: 2021-05-27

## (undated) RX ORDER — TRIAMCINOLONE ACETONIDE 40 MG/ML
INJECTION, SUSPENSION INTRA-ARTICULAR; INTRAMUSCULAR
Status: DISPENSED
Start: 2018-01-15

## (undated) RX ORDER — VANCOMYCIN HYDROCHLORIDE 1 G/20ML
INJECTION, POWDER, LYOPHILIZED, FOR SOLUTION INTRAVENOUS
Status: DISPENSED
Start: 2019-09-05

## (undated) RX ORDER — GABAPENTIN 300 MG/1
CAPSULE ORAL
Status: DISPENSED
Start: 2023-09-27

## (undated) RX ORDER — BUPIVACAINE HYDROCHLORIDE AND EPINEPHRINE 2.5; 5 MG/ML; UG/ML
INJECTION, SOLUTION EPIDURAL; INFILTRATION; INTRACAUDAL; PERINEURAL
Status: DISPENSED
Start: 2025-01-13

## (undated) RX ORDER — ALBUMIN, HUMAN INJ 5% 5 %
SOLUTION INTRAVENOUS
Status: DISPENSED
Start: 2021-05-27

## (undated) RX ORDER — HYDROMORPHONE HCL IN WATER/PF 6 MG/30 ML
PATIENT CONTROLLED ANALGESIA SYRINGE INTRAVENOUS
Status: DISPENSED
Start: 2023-12-21

## (undated) RX ORDER — LIDOCAINE HYDROCHLORIDE 20 MG/ML
JELLY TOPICAL
Status: DISPENSED
Start: 2023-12-21

## (undated) RX ORDER — PROPOFOL 10 MG/ML
INJECTION, EMULSION INTRAVENOUS
Status: DISPENSED
Start: 2020-07-15

## (undated) RX ORDER — FENTANYL CITRATE 50 UG/ML
INJECTION, SOLUTION INTRAMUSCULAR; INTRAVENOUS
Status: DISPENSED
Start: 2021-05-27

## (undated) RX ORDER — REMIFENTANIL HYDROCHLORIDE 1 MG/ML
INJECTION, POWDER, LYOPHILIZED, FOR SOLUTION INTRAVENOUS
Status: DISPENSED
Start: 2023-09-27

## (undated) RX ORDER — ONDANSETRON 2 MG/ML
INJECTION INTRAMUSCULAR; INTRAVENOUS
Status: DISPENSED
Start: 2020-07-15

## (undated) RX ORDER — NEOSTIGMINE METHYLSULFATE 1 MG/ML
VIAL (ML) INJECTION
Status: DISPENSED
Start: 2020-07-15

## (undated) RX ORDER — ONDANSETRON 2 MG/ML
INJECTION INTRAMUSCULAR; INTRAVENOUS
Status: DISPENSED
Start: 2021-05-27

## (undated) RX ORDER — VECURONIUM BROMIDE 1 MG/ML
INJECTION, POWDER, LYOPHILIZED, FOR SOLUTION INTRAVENOUS
Status: DISPENSED
Start: 2019-09-05

## (undated) RX ORDER — BUPIVACAINE HYDROCHLORIDE AND EPINEPHRINE 2.5; 5 MG/ML; UG/ML
INJECTION, SOLUTION EPIDURAL; INFILTRATION; INTRACAUDAL; PERINEURAL
Status: DISPENSED
Start: 2020-07-15

## (undated) RX ORDER — OXYCODONE HYDROCHLORIDE 5 MG/1
TABLET ORAL
Status: DISPENSED
Start: 2020-07-15

## (undated) RX ORDER — HYDROMORPHONE HYDROCHLORIDE 1 MG/ML
INJECTION, SOLUTION INTRAMUSCULAR; INTRAVENOUS; SUBCUTANEOUS
Status: DISPENSED
Start: 2023-12-21

## (undated) RX ORDER — EPHEDRINE SULFATE 50 MG/ML
INJECTION, SOLUTION INTRAMUSCULAR; INTRAVENOUS; SUBCUTANEOUS
Status: DISPENSED
Start: 2025-01-13

## (undated) RX ORDER — GLYCOPYRROLATE 0.2 MG/ML
INJECTION, SOLUTION INTRAMUSCULAR; INTRAVENOUS
Status: DISPENSED
Start: 2021-05-27

## (undated) RX ORDER — BUPIVACAINE HYDROCHLORIDE 2.5 MG/ML
INJECTION, SOLUTION EPIDURAL; INFILTRATION; INTRACAUDAL
Status: DISPENSED
Start: 2018-01-15

## (undated) RX ORDER — EPHEDRINE SULFATE 50 MG/ML
INJECTION, SOLUTION INTRAMUSCULAR; INTRAVENOUS; SUBCUTANEOUS
Status: DISPENSED
Start: 2023-09-27

## (undated) RX ORDER — HYDROMORPHONE HYDROCHLORIDE 1 MG/ML
INJECTION, SOLUTION INTRAMUSCULAR; INTRAVENOUS; SUBCUTANEOUS
Status: DISPENSED
Start: 2019-09-05

## (undated) RX ORDER — HYDROMORPHONE HYDROCHLORIDE 1 MG/ML
INJECTION, SOLUTION INTRAMUSCULAR; INTRAVENOUS; SUBCUTANEOUS
Status: DISPENSED
Start: 2020-07-15

## (undated) RX ORDER — PROPOFOL 10 MG/ML
INJECTION, EMULSION INTRAVENOUS
Status: DISPENSED
Start: 2025-01-13

## (undated) RX ORDER — LIDOCAINE HYDROCHLORIDE 10 MG/ML
INJECTION, SOLUTION EPIDURAL; INFILTRATION; INTRACAUDAL; PERINEURAL
Status: DISPENSED
Start: 2020-07-15

## (undated) RX ORDER — CEFAZOLIN SODIUM 1 G/3ML
INJECTION, POWDER, FOR SOLUTION INTRAMUSCULAR; INTRAVENOUS
Status: DISPENSED
Start: 2019-09-05

## (undated) RX ORDER — TRANEXAMIC ACID 10 MG/ML
INJECTION, SOLUTION INTRAVENOUS
Status: DISPENSED
Start: 2021-05-27

## (undated) RX ORDER — EPHEDRINE SULFATE 50 MG/ML
INJECTION, SOLUTION INTRAMUSCULAR; INTRAVENOUS; SUBCUTANEOUS
Status: DISPENSED
Start: 2020-07-15

## (undated) RX ORDER — ALBUMIN, HUMAN INJ 5% 5 %
SOLUTION INTRAVENOUS
Status: DISPENSED
Start: 2019-09-05

## (undated) RX ORDER — ACETAMINOPHEN 325 MG/1
TABLET ORAL
Status: DISPENSED
Start: 2025-01-13

## (undated) RX ORDER — DEXAMETHASONE SODIUM PHOSPHATE 10 MG/ML
INJECTION, SOLUTION INTRAMUSCULAR; INTRAVENOUS
Status: DISPENSED
Start: 2024-11-01

## (undated) RX ORDER — FENTANYL CITRATE 50 UG/ML
INJECTION, SOLUTION INTRAMUSCULAR; INTRAVENOUS
Status: DISPENSED
Start: 2020-07-15

## (undated) RX ORDER — BETAMETHASONE SODIUM PHOSPHATE AND BETAMETHASONE ACETATE 3; 3 MG/ML; MG/ML
INJECTION, SUSPENSION INTRA-ARTICULAR; INTRALESIONAL; INTRAMUSCULAR; SOFT TISSUE
Status: DISPENSED
Start: 2021-01-15

## (undated) RX ORDER — HYDROMORPHONE HYDROCHLORIDE 1 MG/ML
INJECTION, SOLUTION INTRAMUSCULAR; INTRAVENOUS; SUBCUTANEOUS
Status: DISPENSED
Start: 2022-07-26

## (undated) RX ORDER — FENTANYL CITRATE 50 UG/ML
INJECTION, SOLUTION INTRAMUSCULAR; INTRAVENOUS
Status: DISPENSED
Start: 2023-09-27

## (undated) RX ORDER — LIDOCAINE HYDROCHLORIDE 20 MG/ML
INJECTION, SOLUTION EPIDURAL; INFILTRATION; INTRACAUDAL; PERINEURAL
Status: DISPENSED
Start: 2021-05-27

## (undated) RX ORDER — FENTANYL CITRATE 0.05 MG/ML
INJECTION, SOLUTION INTRAMUSCULAR; INTRAVENOUS
Status: DISPENSED
Start: 2023-09-27

## (undated) RX ORDER — CEFAZOLIN SODIUM 2 G/100ML
INJECTION, SOLUTION INTRAVENOUS
Status: DISPENSED
Start: 2019-09-05

## (undated) RX ORDER — LIDOCAINE HYDROCHLORIDE 20 MG/ML
INJECTION, SOLUTION EPIDURAL; INFILTRATION; INTRACAUDAL; PERINEURAL
Status: DISPENSED
Start: 2019-09-05

## (undated) RX ORDER — PROPOFOL 10 MG/ML
INJECTION, EMULSION INTRAVENOUS
Status: DISPENSED
Start: 2021-05-27

## (undated) RX ORDER — LIDOCAINE HYDROCHLORIDE 10 MG/ML
INJECTION, SOLUTION EPIDURAL; INFILTRATION; INTRACAUDAL; PERINEURAL
Status: DISPENSED
Start: 2020-12-21

## (undated) RX ORDER — KETOROLAC TROMETHAMINE 30 MG/ML
INJECTION, SOLUTION INTRAMUSCULAR; INTRAVENOUS
Status: DISPENSED
Start: 2022-07-26

## (undated) RX ORDER — PROPOFOL 10 MG/ML
INJECTION, EMULSION INTRAVENOUS
Status: DISPENSED
Start: 2023-12-21

## (undated) RX ORDER — NEOSTIGMINE METHYLSULFATE 1 MG/ML
VIAL (ML) INJECTION
Status: DISPENSED
Start: 2019-09-05

## (undated) RX ORDER — OXYCODONE HYDROCHLORIDE 5 MG/1
TABLET ORAL
Status: DISPENSED
Start: 2025-01-13

## (undated) RX ORDER — EPINEPHRINE 1 MG/ML
INJECTION, SOLUTION INTRAMUSCULAR; SUBCUTANEOUS
Status: DISPENSED
Start: 2021-05-27

## (undated) RX ORDER — CEFAZOLIN SODIUM/WATER 2 G/20 ML
SYRINGE (ML) INTRAVENOUS
Status: DISPENSED
Start: 2022-07-26

## (undated) RX ORDER — ACETAMINOPHEN 325 MG/1
TABLET ORAL
Status: DISPENSED
Start: 2021-05-27

## (undated) RX ORDER — KETAMINE HCL IN NACL, ISO-OSM 100MG/10ML
SYRINGE (ML) INJECTION
Status: DISPENSED
Start: 2019-09-05

## (undated) RX ORDER — ONDANSETRON 2 MG/ML
INJECTION INTRAMUSCULAR; INTRAVENOUS
Status: DISPENSED
Start: 2025-05-16

## (undated) RX ORDER — FENTANYL CITRATE 0.05 MG/ML
INJECTION, SOLUTION INTRAMUSCULAR; INTRAVENOUS
Status: DISPENSED
Start: 2023-12-21

## (undated) RX ORDER — FENTANYL CITRATE 50 UG/ML
INJECTION, SOLUTION INTRAMUSCULAR; INTRAVENOUS
Status: DISPENSED
Start: 2025-01-13

## (undated) RX ORDER — FENTANYL CITRATE 50 UG/ML
INJECTION, SOLUTION INTRAMUSCULAR; INTRAVENOUS
Status: DISPENSED
Start: 2025-05-16

## (undated) RX ORDER — LIDOCAINE HYDROCHLORIDE 10 MG/ML
INJECTION, SOLUTION EPIDURAL; INFILTRATION; INTRACAUDAL; PERINEURAL
Status: DISPENSED
Start: 2024-11-01

## (undated) RX ORDER — FENTANYL CITRATE 0.05 MG/ML
INJECTION, SOLUTION INTRAMUSCULAR; INTRAVENOUS
Status: DISPENSED
Start: 2022-07-26

## (undated) RX ORDER — ONDANSETRON 2 MG/ML
INJECTION INTRAMUSCULAR; INTRAVENOUS
Status: DISPENSED
Start: 2022-07-26

## (undated) RX ORDER — FENTANYL CITRATE 50 UG/ML
INJECTION, SOLUTION INTRAMUSCULAR; INTRAVENOUS
Status: DISPENSED
Start: 2023-12-21

## (undated) RX ORDER — PROPOFOL 10 MG/ML
INJECTION, EMULSION INTRAVENOUS
Status: DISPENSED
Start: 2019-09-05

## (undated) RX ORDER — BUPIVACAINE HYDROCHLORIDE AND EPINEPHRINE 2.5; 5 MG/ML; UG/ML
INJECTION, SOLUTION EPIDURAL; INFILTRATION; INTRACAUDAL; PERINEURAL
Status: DISPENSED
Start: 2019-09-05

## (undated) RX ORDER — CEFAZOLIN SODIUM 2 G/100ML
INJECTION, SOLUTION INTRAVENOUS
Status: DISPENSED
Start: 2020-07-15

## (undated) RX ORDER — PROPOFOL 10 MG/ML
INJECTION, EMULSION INTRAVENOUS
Status: DISPENSED
Start: 2023-09-27

## (undated) RX ORDER — CEFAZOLIN SODIUM 2 G/100ML
INJECTION, SOLUTION INTRAVENOUS
Status: DISPENSED
Start: 2021-05-27

## (undated) RX ORDER — LIDOCAINE HYDROCHLORIDE 20 MG/ML
JELLY TOPICAL
Status: DISPENSED
Start: 2021-05-27

## (undated) RX ORDER — GLYCOPYRROLATE 0.2 MG/ML
INJECTION INTRAMUSCULAR; INTRAVENOUS
Status: DISPENSED
Start: 2020-07-15

## (undated) RX ORDER — ACETAMINOPHEN 325 MG/1
TABLET ORAL
Status: DISPENSED
Start: 2019-09-05

## (undated) RX ORDER — BUPIVACAINE HYDROCHLORIDE 2.5 MG/ML
INJECTION, SOLUTION EPIDURAL; INFILTRATION; INTRACAUDAL
Status: DISPENSED
Start: 2021-05-27

## (undated) RX ORDER — DEXAMETHASONE SODIUM PHOSPHATE 4 MG/ML
INJECTION, SOLUTION INTRA-ARTICULAR; INTRALESIONAL; INTRAMUSCULAR; INTRAVENOUS; SOFT TISSUE
Status: DISPENSED
Start: 2020-07-15

## (undated) RX ORDER — HYDROMORPHONE HYDROCHLORIDE 1 MG/ML
INJECTION, SOLUTION INTRAMUSCULAR; INTRAVENOUS; SUBCUTANEOUS
Status: DISPENSED
Start: 2023-09-27

## (undated) RX ORDER — GABAPENTIN 300 MG/1
CAPSULE ORAL
Status: DISPENSED
Start: 2019-09-05

## (undated) RX ORDER — EPHEDRINE SULFATE 50 MG/ML
INJECTION, SOLUTION INTRAMUSCULAR; INTRAVENOUS; SUBCUTANEOUS
Status: DISPENSED
Start: 2023-12-21

## (undated) RX ORDER — GLYCOPYRROLATE 0.2 MG/ML
INJECTION, SOLUTION INTRAMUSCULAR; INTRAVENOUS
Status: DISPENSED
Start: 2019-09-05

## (undated) RX ORDER — LIDOCAINE HYDROCHLORIDE 10 MG/ML
INJECTION, SOLUTION EPIDURAL; INFILTRATION; INTRACAUDAL; PERINEURAL
Status: DISPENSED
Start: 2021-01-15

## (undated) RX ORDER — ONDANSETRON 2 MG/ML
INJECTION INTRAMUSCULAR; INTRAVENOUS
Status: DISPENSED
Start: 2023-12-21

## (undated) RX ORDER — KETAMINE HCL IN NACL, ISO-OSM 100MG/10ML
SYRINGE (ML) INJECTION
Status: DISPENSED
Start: 2021-05-27

## (undated) RX ORDER — ACETAMINOPHEN 325 MG/1
TABLET ORAL
Status: DISPENSED
Start: 2020-07-15

## (undated) RX ORDER — BUPIVACAINE HYDROCHLORIDE AND EPINEPHRINE 5; 5 MG/ML; UG/ML
INJECTION, SOLUTION EPIDURAL; INTRACAUDAL; PERINEURAL
Status: DISPENSED
Start: 2022-07-26

## (undated) RX ORDER — ONDANSETRON 2 MG/ML
INJECTION INTRAMUSCULAR; INTRAVENOUS
Status: DISPENSED
Start: 2019-09-05

## (undated) RX ORDER — DEXAMETHASONE SODIUM PHOSPHATE 4 MG/ML
INJECTION, SOLUTION INTRA-ARTICULAR; INTRALESIONAL; INTRAMUSCULAR; INTRAVENOUS; SOFT TISSUE
Status: DISPENSED
Start: 2023-12-21

## (undated) RX ORDER — DEXAMETHASONE SODIUM PHOSPHATE 10 MG/ML
INJECTION, SOLUTION INTRAMUSCULAR; INTRAVENOUS
Status: DISPENSED
Start: 2020-12-21

## (undated) RX ORDER — NEOSTIGMINE METHYLSULFATE 1 MG/ML
VIAL (ML) INJECTION
Status: DISPENSED
Start: 2021-05-27

## (undated) RX ORDER — DIPHENHYDRAMINE HYDROCHLORIDE 50 MG/ML
INJECTION, SOLUTION INTRAMUSCULAR; INTRAVENOUS
Status: DISPENSED
Start: 2025-05-16

## (undated) RX ORDER — BETAMETHASONE SODIUM PHOSPHATE AND BETAMETHASONE ACETATE 3; 3 MG/ML; MG/ML
INJECTION, SUSPENSION INTRA-ARTICULAR; INTRALESIONAL; INTRAMUSCULAR; SOFT TISSUE
Status: DISPENSED
Start: 2020-12-21

## (undated) RX ORDER — LIDOCAINE HYDROCHLORIDE 10 MG/ML
INJECTION, SOLUTION EPIDURAL; INFILTRATION; INTRACAUDAL; PERINEURAL
Status: DISPENSED
Start: 2020-02-28

## (undated) RX ORDER — FENTANYL CITRATE 50 UG/ML
INJECTION, SOLUTION INTRAMUSCULAR; INTRAVENOUS
Status: DISPENSED
Start: 2022-07-26

## (undated) RX ORDER — FENTANYL CITRATE 0.05 MG/ML
INJECTION, SOLUTION INTRAMUSCULAR; INTRAVENOUS
Status: DISPENSED
Start: 2021-05-27

## (undated) RX ORDER — LIDOCAINE HYDROCHLORIDE 10 MG/ML
INJECTION, SOLUTION EPIDURAL; INFILTRATION; INTRACAUDAL; PERINEURAL
Status: DISPENSED
Start: 2018-01-15

## (undated) RX ORDER — CHLORHEXIDINE GLUCONATE ORAL RINSE 1.2 MG/ML
SOLUTION DENTAL
Status: DISPENSED
Start: 2019-09-05

## (undated) RX ORDER — DEXAMETHASONE SODIUM PHOSPHATE 10 MG/ML
INJECTION, SOLUTION INTRAMUSCULAR; INTRAVENOUS
Status: DISPENSED
Start: 2020-02-28

## (undated) RX ORDER — OXYCODONE HYDROCHLORIDE 5 MG/1
TABLET ORAL
Status: DISPENSED
Start: 2022-07-26

## (undated) RX ORDER — DEXAMETHASONE SODIUM PHOSPHATE 10 MG/ML
INJECTION, SOLUTION INTRAMUSCULAR; INTRAVENOUS
Status: DISPENSED
Start: 2021-01-15

## (undated) RX ORDER — DEXAMETHASONE SODIUM PHOSPHATE 4 MG/ML
INJECTION, SOLUTION INTRA-ARTICULAR; INTRALESIONAL; INTRAMUSCULAR; INTRAVENOUS; SOFT TISSUE
Status: DISPENSED
Start: 2019-09-05